# Patient Record
Sex: MALE | Race: WHITE | Employment: OTHER | URBAN - METROPOLITAN AREA
[De-identification: names, ages, dates, MRNs, and addresses within clinical notes are randomized per-mention and may not be internally consistent; named-entity substitution may affect disease eponyms.]

---

## 2020-02-04 ENCOUNTER — HOSPITAL ENCOUNTER (OUTPATIENT)
Dept: ONCOLOGY | Age: 65
Setting detail: INFUSION SERIES
Discharge: HOME | End: 2020-02-04
Payer: COMMERCIAL

## 2020-02-04 ENCOUNTER — HOSPITAL ENCOUNTER (OUTPATIENT)
Dept: GENERAL RADIOLOGY | Age: 65
Discharge: HOME | End: 2020-02-04
Payer: COMMERCIAL

## 2020-02-04 ENCOUNTER — HOSPITAL ENCOUNTER (OUTPATIENT)
Dept: NON INVASIVE DIAGNOSTICS | Age: 65
Discharge: HOME | End: 2020-02-04
Payer: COMMERCIAL

## 2020-02-04 ENCOUNTER — HOSPITAL ENCOUNTER (OUTPATIENT)
Dept: PULMONOLOGY | Age: 65
Discharge: HOME | End: 2020-02-04
Payer: COMMERCIAL

## 2020-02-04 VITALS
TEMPERATURE: 98.2 F | HEART RATE: 88 BPM | DIASTOLIC BLOOD PRESSURE: 65 MMHG | WEIGHT: 245.59 LBS | BODY MASS INDEX: 31.52 KG/M2 | SYSTOLIC BLOOD PRESSURE: 123 MMHG | HEIGHT: 74 IN | RESPIRATION RATE: 18 BRPM

## 2020-02-04 LAB
A/G RATIO: 1.9 (ref 1.1–2.2)
ABO/RH: NORMAL
ALBUMIN SERPL-MCNC: 3.9 G/DL (ref 3.4–5)
ALP BLD-CCNC: 61 U/L (ref 40–129)
ALT SERPL-CCNC: 12 U/L (ref 10–40)
ANION GAP SERPL CALCULATED.3IONS-SCNC: 15 MMOL/L (ref 3–16)
ANTIBODY SCREEN: NORMAL
AST SERPL-CCNC: 13 U/L (ref 15–37)
BACTERIA: ABNORMAL /HPF
BASOPHILS ABSOLUTE: 0 K/UL (ref 0–0.2)
BASOPHILS RELATIVE PERCENT: 0.8 %
BILIRUB SERPL-MCNC: 0.7 MG/DL (ref 0–1)
BILIRUBIN URINE: NEGATIVE
BLOOD, URINE: NEGATIVE
BUN BLDV-MCNC: 28 MG/DL (ref 7–20)
CALCIUM SERPL-MCNC: 8.6 MG/DL (ref 8.3–10.6)
CASTS: ABNORMAL /LPF
CHLORIDE BLD-SCNC: 99 MMOL/L (ref 99–110)
CLARITY: CLEAR
CO2: 25 MMOL/L (ref 21–32)
COLOR: YELLOW
CREAT SERPL-MCNC: 3.2 MG/DL (ref 0.8–1.3)
EOSINOPHILS ABSOLUTE: 0.3 K/UL (ref 0–0.6)
EOSINOPHILS RELATIVE PERCENT: 12.4 %
EPITHELIAL CELLS, UA: ABNORMAL /HPF
GFR AFRICAN AMERICAN: 24
GFR NON-AFRICAN AMERICAN: 20
GLOBULIN: 2.1 G/DL
GLUCOSE BLD-MCNC: 103 MG/DL (ref 70–99)
GLUCOSE URINE: NEGATIVE MG/DL
HAV IGM SER IA-ACNC: NORMAL
HBV SURFACE AB TITR SER: <3.5 MIU/ML
HCT VFR BLD CALC: 30.9 % (ref 40.5–52.5)
HEMOGLOBIN: 10.4 G/DL (ref 13.5–17.5)
INR BLD: 1.09 (ref 0.86–1.14)
KETONES, URINE: NEGATIVE MG/DL
LACTATE DEHYDROGENASE: 222 U/L (ref 100–190)
LEUKOCYTE ESTERASE, URINE: NEGATIVE
LYMPHOCYTES ABSOLUTE: 0.5 K/UL (ref 1–5.1)
LYMPHOCYTES RELATIVE PERCENT: 19.2 %
MAGNESIUM: 1.9 MG/DL (ref 1.8–2.4)
MCH RBC QN AUTO: 33.6 PG (ref 26–34)
MCHC RBC AUTO-ENTMCNC: 33.8 G/DL (ref 31–36)
MCV RBC AUTO: 99.2 FL (ref 80–100)
MICROSCOPIC EXAMINATION: YES
MONOCYTES ABSOLUTE: 0.4 K/UL (ref 0–1.3)
MONOCYTES RELATIVE PERCENT: 15.4 %
MUCUS: ABNORMAL /LPF
NEUTROPHILS ABSOLUTE: 1.3 K/UL (ref 1.7–7.7)
NEUTROPHILS RELATIVE PERCENT: 52.2 %
NITRITE, URINE: NEGATIVE
PDW BLD-RTO: 15.7 % (ref 12.4–15.4)
PH UA: 7.5 (ref 5–8)
PLATELET # BLD: 164 K/UL (ref 135–450)
PMV BLD AUTO: 8.4 FL (ref 5–10.5)
POTASSIUM SERPL-SCNC: 3.6 MMOL/L (ref 3.5–5.1)
PROTEIN UA: 30 MG/DL
PROTHROMBIN TIME: 12.6 SEC (ref 10–13.2)
RBC # BLD: 3.11 M/UL (ref 4.2–5.9)
RBC UA: ABNORMAL /HPF (ref 0–2)
SICKLE CELL SCREEN: NEGATIVE
SODIUM BLD-SCNC: 139 MMOL/L (ref 136–145)
SPECIFIC GRAVITY UA: 1.02 (ref 1–1.03)
TOTAL PROTEIN: 6 G/DL (ref 6.4–8.2)
URINE TYPE: ABNORMAL
UROBILINOGEN, URINE: 1 E.U./DL
WBC # BLD: 2.6 K/UL (ref 4–11)
WBC UA: ABNORMAL /HPF (ref 0–5)

## 2020-02-04 PROCEDURE — 86900 BLOOD TYPING SEROLOGIC ABO: CPT

## 2020-02-04 PROCEDURE — 85610 PROTHROMBIN TIME: CPT

## 2020-02-04 PROCEDURE — 86709 HEPATITIS A IGM ANTIBODY: CPT

## 2020-02-04 PROCEDURE — 86787 VARICELLA-ZOSTER ANTIBODY: CPT

## 2020-02-04 PROCEDURE — 85025 COMPLETE CBC W/AUTO DIFF WBC: CPT

## 2020-02-04 PROCEDURE — 83615 LACTATE (LD) (LDH) ENZYME: CPT

## 2020-02-04 PROCEDURE — 86706 HEP B SURFACE ANTIBODY: CPT

## 2020-02-04 PROCEDURE — 94664 DEMO&/EVAL PT USE INHALER: CPT

## 2020-02-04 PROCEDURE — 85660 RBC SICKLE CELL TEST: CPT

## 2020-02-04 PROCEDURE — 94060 EVALUATION OF WHEEZING: CPT

## 2020-02-04 PROCEDURE — 77075 RADEX OSSEOUS SURVEY COMPL: CPT

## 2020-02-04 PROCEDURE — 83735 ASSAY OF MAGNESIUM: CPT

## 2020-02-04 PROCEDURE — 86707 HEPATITIS BE ANTIBODY: CPT

## 2020-02-04 PROCEDURE — 93306 TTE W/DOPPLER COMPLETE: CPT

## 2020-02-04 PROCEDURE — 71046 X-RAY EXAM CHEST 2 VIEWS: CPT

## 2020-02-04 PROCEDURE — 94761 N-INVAS EAR/PLS OXIMETRY MLT: CPT

## 2020-02-04 PROCEDURE — 86645 CMV ANTIBODY IGM: CPT

## 2020-02-04 PROCEDURE — 86901 BLOOD TYPING SEROLOGIC RH(D): CPT

## 2020-02-04 PROCEDURE — 80053 COMPREHEN METABOLIC PANEL: CPT

## 2020-02-04 PROCEDURE — 99201 HC NEW PT, OUTPT VISIT LEVEL 1: CPT

## 2020-02-04 PROCEDURE — 86850 RBC ANTIBODY SCREEN: CPT

## 2020-02-04 PROCEDURE — 86694 HERPES SIMPLEX NES ANTBDY: CPT

## 2020-02-04 PROCEDURE — 93005 ELECTROCARDIOGRAM TRACING: CPT | Performed by: INTERNAL MEDICINE

## 2020-02-04 PROCEDURE — 94726 PLETHYSMOGRAPHY LUNG VOLUMES: CPT

## 2020-02-04 PROCEDURE — 94729 DIFFUSING CAPACITY: CPT

## 2020-02-04 PROCEDURE — 81001 URINALYSIS AUTO W/SCOPE: CPT

## 2020-02-04 RX ORDER — ALBUTEROL SULFATE 2.5 MG/3ML
2.5 SOLUTION RESPIRATORY (INHALATION) ONCE
Status: DISCONTINUED | OUTPATIENT
Start: 2020-02-04 | End: 2020-02-05 | Stop reason: HOSPADM

## 2020-02-04 NOTE — ONCOLOGY
Patient here for pre BMT evaluation. Plan for Cytoxan mobilization followed by Melphalan 140mg/m2 and PBSCT. Patient is on hemodialysis with [facility]) and has dialysis on M, W, F. Coordinator will work with Dr. Lilia Tohmpson office to facilitate dialysis while at [facility] [employer]   Reviewed the followin. Purpose of Cytoxan Mobilization to decrease tumor burden and collect stem cells  2. Side effects of Cytoxan discussed including pancytopenia, risk of infection, hair loss, hemorrhagic cystitis and GI complications. 3.. GCSF injections purpose and side effects reviewed. 4. Patient shown the apheresis machine and process explained. Aware collections may take 1-3 days in Outpatient Infusion. 5. Discussed admission for Melphalan prep regimen and infusion of stem cells. Side effects, hospital length of stay and follow up care discussed. 6. Tentative collection and admit calendar reviewed. Patient to return on 20 for candidacy with Dr. Krishna Mancia. Labs drawn per order.

## 2020-02-04 NOTE — PROGRESS NOTES
Patient seen in OP Infusion for labs and teaching with Blayne Smith RN, Transplant Coordinator. D/C'd ambulatory with Alicia.

## 2020-02-05 ENCOUNTER — TELEPHONE (OUTPATIENT)
Dept: SURGERY | Age: 65
End: 2020-02-05

## 2020-02-05 LAB
EKG ATRIAL RATE: 83 BPM
EKG DIAGNOSIS: NORMAL
EKG P AXIS: 38 DEGREES
EKG P-R INTERVAL: 156 MS
EKG Q-T INTERVAL: 374 MS
EKG QRS DURATION: 80 MS
EKG QTC CALCULATION (BAZETT): 439 MS
EKG R AXIS: 52 DEGREES
EKG T AXIS: 74 DEGREES
EKG VENTRICULAR RATE: 83 BPM
HEPATITIS BE ANTIBODY: NEGATIVE
VARICELLA-ZOSTER VIRUS AB, IGG: NORMAL

## 2020-02-05 PROCEDURE — 93010 ELECTROCARDIOGRAM REPORT: CPT | Performed by: INTERNAL MEDICINE

## 2020-02-06 LAB
CYTOMEGALOVIRUS IGM ANTIBODY: <8 AU/ML
HERPES TYPE 1/2 IGM COMBINED: 0.16 IV
VARICELLA ZOSTER AB IGM: 0.31 ISR

## 2020-02-06 NOTE — BH NOTE
Pre-Transplant Psychological Evaluation, Diane Marquez, 2020  Screening Measures:  PHQ-9, TERS  Reason for Referral:  Mr. Live Barrett and his significant other, Gisele Spaulding, were interviewed in preparation for his autologous stem cell transplant for multiple myeloma. He was diagnosed with kidney failure in 2018 and during workup was found to have anemia and thrombocytopenia. He had a good initial response to Cy/Bor/d and was switched to a clinical trial where he received Revlimid, Ninlaro, and dexamethasone beginning in 2019. He was switched to carfilzomib, pomalidomide and dex in 2019 when his light chains began trending up. He has received 6 cycles with good response. He continues on dialysis 3 times per week. Mental Status/Appearance/Coping Style: Mr. Live Barrett is a robust appearing 66-year-old  gentleman of tall and large build. He and [employer] are both very talkative and told many stories in great detail that had little to do with the evaluation. Mr. Live Barrett has an optimistic and upbeat nature and he feels positive about his prognosis, including the recovery of his renal function. He says he is spiritual and believes but doesnt affiliate with a particular imelda. Social/Family History/Support System: Mr. Live Barrett was born in [place], [place], the 2nd of 4 children. 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 He had an older brother who  by suicide as an adult after suffering a number of personal challenges and a divorce. He has 2 younger sisters, Jovanna Ludwig and Meghann Zamora. Jovanna Ludwig is 61 and lives in [facility], and Meghann Zamora is 48 and lives in [facility]. Meghann Zamora will be the back-up caregiver for [employer]. When Mr. Live Barrett was 8 his father was transferred to the [facility] and he spent the remainder of his childhood in the [facility].   After graduating high school, he was working full time at [employer] and going to college at [employer]. He quit school to get  when he was 21, but that marriage lasted 4 years and did not produce children. He went back to school, continuing to work full time, and gradually completed a degree in Marketing with a minor in politics. He stayed single for 15 years and met Talia Ivy in a club where she went on Saturday nights with her friends to drink and dance when her ex- had their daughter. She said she reluctantly began to date him but then they soon moved in together and have done so now for 28 years. She wears an engagement ring but never agreed to  him. At first it was so her daughter would be able to get financial aid through college when the time came without having to include Mr. Wilmer Persaud, but later she says they forgot about getting . Ms. Rolando Smart worked as manager for a [employer] complex for many years and retired several years ago. She is a smoker but says she goes outside. The couple lives in [place] and her daughter, Deanna Marley, and her 2 children, [place] (9) and Param Fleming (5) live 5 houses away. The grandchildren are usually frequent visitors but Deanna Marley refuses to let them get flu shots, so they are keeping them at a distance at present. Mr. Chato Frye main hobby since he was 16 has been drag racing and he owns a street legal dragster. He says he only goes to drag races about 4 times per year but enjoys spending time working on his car and going to car shows. Occupational History/Financial Concerns: Mr. 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 He has been on SSDI since soon after his diagnosis and he retains his health insurance through [facility]. Quality of Life Issues (Body Image, hair loss, sexual functioning):  Mr. Arabella Osuna says he has plenty of emotional support, sleeps well, and has a good appetite.   He says he has some age-related aches and pains

## 2020-02-07 LAB — HERPES TYPE I/II IGG COMBINED: 7.35 IV

## 2020-02-13 NOTE — H&P
[email or web address] History and Physical        Attending Physician: Rhys Lopez MD        Referring MD: MD [id number]  [place], [address]    Name: Kamryn Valentine :  1955  MRN:  [id number]    Admission: 20    Date: 20    Reason for Admission: Cytoxan Mobilization     History of Present Illness: This is a 60 yo male w/ h/o kappa light chain multiple myeloma (Dx 10/2018). He was referred to the ED after a routine workplace physical and labs revealed JUDY, anemia and thrombocytopenia  w/ SCr 4.6, Hgb 7.8 & platelets of 16Q. He was admitted to [facility] for additional work-up and was found to have light chain multiple myeloma and nephropathy. His myeloma work up (10/26/18) showed his serum kappa free light chains were 8353 and lambda free light chains 0.45 w/ [place] K/[place] 19051. SPEP monoclonal protein was barely detectable. A BM bx/asp (10/29/18) showed 80% plasma cells with a del 17 (TP53) and del 13q. He began treatment w/ Cytoxan, Velcade and dexamethasone (18) and had a good response. He then enrolled on clinical trial (19) w/ Ninlaro, Revlimid and dexamethasone, but his light chains started to increase. He then began treatment w/ carfilzomib, pomalidomide and dexamethasone (19). His restaging myeloma work -up (20) shows the followin hour urine - 228 mg protein per 24 hours, ANN MARIE positive for kappa light chains. SPEP showed minor m-spike consisting of free monoclonal kappa light chains. His serum free light chain showed kappa of 127.00, lambda 1.09 w/ K/L ratio of  116.15. His BM bx/asp (1/10/20) showed 2.8% plasma cells w/ normal cytogenetics, but FISH is abnormal w/ one copy of CDKN2C (1p32.3) in 49.3%. , CCND1/IGH t(11:14) single fusion in 82% of cells, monosomy 13 in 87% of cells, one copy of MAF (16q23.2) in 85% of cells, TP53 (17p13.1) in 89% of cells and once copy of MAFB (20q12) in 78 % of cells.  He is now s/p 6 cycles and has achieved a VGPR. He remains on hemodialysis 3 times weekly w/ Dr. Dony Wilburn. He is now being admitted for Cytoxan mobilization. This will be followed by high dose G-CSF and collection of peripheral blood stem cells. He feels well today and denies fever, chills, sweats, change in appetite, visual changes, headache, sinus congestion, sore throat, mouth pain, cough, increased shortness of breath, chest pain, abdominal pain, nausea, vomiting, diarrhea, constipation, dysuria, hematuria, lower extremity pain or swelling. He has no complaints today. Pre Transplant Workup:        Pre Transplant labs:     2/4/2020    CMV IgM <8.0   VARICELLA ZOSTER AB IGM 0.31   Herpes Type 1/2 IgM Combined 0.16      2/4/2020   Herpes Type I/II IgG Combined 7.35      2/4/2020    Herpes Type 1/2 IgM Combined 0.16   Hep A IgM Non-reactive   Hep B E Ab Negative   Hep B S Ab <3.50      2/4/2020    Varicella-Zoster Virus Ab, Igg Immune       No past surgical history on file. No past medical history on file. Prior to Admission medications    Medication Sig Start Date End Date Taking? Authorizing Provider   allopurinol (ZYLOPRIM) 100 MG tablet Take 200 mg by mouth daily     Historical Provider, MD   pantoprazole (PROTONIX) 20 MG tablet Take 20 mg by mouth daily    Historical Provider, MD   Cyanocobalamin (VITAMIN B 12 PO) Take by mouth    Historical Provider, MD   acyclovir (ZOVIRAX) 200 MG capsule Take by mouth 2 times daily     Historical Provider, MD       No Known Allergies    No family history on file.      Social History     Socioeconomic History    Marital status:      Spouse name: Not on file    Number of children: Not on file    Years of education: Not on file    Highest education level: Not on file   Occupational History    Not on file   Social Needs    Financial resource strain: Not on file    Food insecurity:     Worry: Not on file     Inability: Not on file   Patino Rule Transportation needs:     Medical: Not on file     Non-medical: Not on file   Tobacco Use    Smoking status: Not on file   Substance and Sexual Activity    Alcohol use: Not on file    Drug use: Not on file    Sexual activity: Not on file   Lifestyle    Physical activity:     Days per week: Not on file     Minutes per session: Not on file    Stress: Not on file   Relationships    Social connections:     Talks on phone: Not on file     Gets together: Not on file     Attends Adventism service: Not on file     Active member of club or organization: Not on file     Attends meetings of clubs or organizations: Not on file     Relationship status: Not on file    Intimate partner violence:     Fear of current or ex partner: Not on file     Emotionally abused: Not on file     Physically abused: Not on file     Forced sexual activity: Not on file   Other Topics Concern    Not on file   Social History Narrative    Not on file        ROS:  As noted above, otherwise remainder of 10-point ROS negative      Physical Exam:     Vital Signs:  /84   Pulse 100   Temp 98.4 °F (36.9 °C) (Oral)   Resp 17   Ht 6' 4\" (1.93 m)   Wt 241 lb 6 oz (109.5 kg)   SpO2 100%   BMI 29.38 kg/m²     Weight:    Wt Readings from Last 3 Encounters:   02/17/20 241 lb 6 oz (109.5 kg)   02/14/20 244 lb (110.7 kg)   02/04/20 245 lb 9.5 oz (111.4 kg)       KPS: 80% Normal activity with effort; some signs or symptoms of disease    ECOG PS:  (1) Restricted in physically strenuous activity, ambulatory and able to do work of light nature    General: Awake, alert and oriented.   HEENT: normocephalic, alopecia, PERRL, no scleral erythema or icterus, Oral mucosa moist and intact, throat clear  NECK: supple without palpable adenopathy  BACK: Straight negative CVAT  SKIN: warm dry and intact without lesions rashes or masses  CHEST: CTA bilaterally without use of accessory muscles  CV: Normal S1 S2, RRR, no MRG  ABD: NT ND normoactive BS, no palpable masses or hepatosplenomegaly  EXTREMITIES: without edema, denies calf tenderness, left arm fistula  NEURO: CN II - XII grossly intact  CATHETER: Right IJ Trifusion (IR, 2/14/20) - CDI     Laboratory Data:  CBC:   Recent Labs     02/17/20  1256   WBC 3.4*   HGB 11.2*   HCT 33.0*   MCV 97.7        BMP/Mag:  Recent Labs     02/17/20  1256      K 3.3*   CL 95*   CO2 32   PHOS 2.3*   BUN 13   CREATININE 1.6*   MG 1.80     LIVP:   Recent Labs     02/17/20  1256   AST 17   ALT 10   BILIDIR <0.2   BILITOT 0.9   ALKPHOS 68     Coags:   Recent Labs     02/17/20  1256   PROTIME 11.9   INR 1.03   APTT 30.7     Uric Acid   Recent Labs     02/17/20  1256   LABURIC 3.4*       PROBLEM LIST:            1. [place] Light Chain Multiple Myeloma   2. ESRD on HD   3. GERD  4. HTN  5. Hypokalemia       TREATMENT:            1. CyBorD (started 10/30/18)  2. Revlimid / Ishmael Sax / Dex on clinical trial (started 2/1/19)  3. Carfilzomib / Pomalidomide / Dex x 6 cycles (Relapse #1 - started 8/26/19 - 2/2020)  4. Cytoxan Mobilization w/ 25% dose reduction (2/17/20)    ASSESSMENT AND PLAN:            1. Kappa Light Chain Multiple Myeloma:  Currently in VGPR  - Admit for Cytoxan Mobilization w/ continuous bladder irrigation, which will be used w/ high dose G-CSF to collect peripheral blood stem cells  - Admit at a later date for reduced dose Melphalan (140 mg/M^2) followed by autologous stem cell resuce  - Post transplant maintenance: Consider KPD x 4 cycles    Day + 1    2. ID:  Afebrile, no evidence of infection.    - Cont acyclovir ppx  - Begin Diflucan and Levaquin (dose adjusted for ESRD) when ANC < 1.5    3. Heme:  Anemia from previous chemotherapy & ESRD   - Transfuse for Hgb < 7 and Platelets < 37Y  - No transfusion today    4. ESRD / Metabolic:  Electrolytes are WNL and renal fxn stable.   - Followed by Dr. Claire Hall, consult on admit    - Cont HD on MWF  - Start IVF:  NS @ 75 mL/hr w/ chemotherapy   - Replace

## 2020-02-13 NOTE — PRE-PROCEDURE INSTRUCTIONS
Attempted to call patient about procedure. No answer. Voicemail left. Told to be here at 1300 for procedure at 1430. NPO after midnight, but can take morning medication with sips of water. Office should have told them when and if they should stop any blood thinners. Told to have a responsible adult be with the patient to take them home and stay with them afterwards, if they are not admitted to hospital afterwards. And if available bring current list of medications.

## 2020-02-14 ENCOUNTER — HOSPITAL ENCOUNTER (OUTPATIENT)
Dept: INTERVENTIONAL RADIOLOGY/VASCULAR | Age: 65
Discharge: HOME | End: 2020-02-14
Attending: INTERNAL MEDICINE | Admitting: INTERNAL MEDICINE
Payer: COMMERCIAL

## 2020-02-14 VITALS — BODY MASS INDEX: 29.71 KG/M2 | TEMPERATURE: 98.4 F | WEIGHT: 244 LBS | HEIGHT: 76 IN

## 2020-02-14 LAB
A/G RATIO: 2.1 (ref 1.1–2.2)
ALBUMIN SERPL-MCNC: 4.5 G/DL (ref 3.4–5)
ALP BLD-CCNC: 72 U/L (ref 40–129)
ALT SERPL-CCNC: 11 U/L (ref 10–40)
ANION GAP SERPL CALCULATED.3IONS-SCNC: 13 MMOL/L (ref 3–16)
APTT: 30.3 SEC (ref 24.2–36.2)
AST SERPL-CCNC: 16 U/L (ref 15–37)
BASOPHILS ABSOLUTE: 0.1 K/UL (ref 0–0.2)
BASOPHILS RELATIVE PERCENT: 1.8 %
BILIRUB SERPL-MCNC: 1 MG/DL (ref 0–1)
BILIRUBIN DIRECT: <0.2 MG/DL (ref 0–0.3)
BILIRUBIN, INDIRECT: NORMAL MG/DL (ref 0–1)
BUN BLDV-MCNC: 13 MG/DL (ref 7–20)
CALCIUM SERPL-MCNC: 9.4 MG/DL (ref 8.3–10.6)
CHLORIDE BLD-SCNC: 95 MMOL/L (ref 99–110)
CO2: 32 MMOL/L (ref 21–32)
CREAT SERPL-MCNC: 1.8 MG/DL (ref 0.8–1.3)
EOSINOPHILS ABSOLUTE: 0.1 K/UL (ref 0–0.6)
EOSINOPHILS RELATIVE PERCENT: 2 %
GFR AFRICAN AMERICAN: 46
GFR NON-AFRICAN AMERICAN: 38
GLOBULIN: 2.1 G/DL
GLUCOSE BLD-MCNC: 93 MG/DL (ref 70–99)
HCT VFR BLD CALC: 34.1 % (ref 40.5–52.5)
HEMOGLOBIN: 11.4 G/DL (ref 13.5–17.5)
INR BLD: 1.11 (ref 0.86–1.14)
LACTATE DEHYDROGENASE: 223 U/L (ref 100–190)
LYMPHOCYTES ABSOLUTE: 0.5 K/UL (ref 1–5.1)
LYMPHOCYTES RELATIVE PERCENT: 16.5 %
MAGNESIUM: 1.8 MG/DL (ref 1.8–2.4)
MCH RBC QN AUTO: 32.9 PG (ref 26–34)
MCHC RBC AUTO-ENTMCNC: 33.4 G/DL (ref 31–36)
MCV RBC AUTO: 98.5 FL (ref 80–100)
MONOCYTES ABSOLUTE: 0.5 K/UL (ref 0–1.3)
MONOCYTES RELATIVE PERCENT: 16.5 %
NEUTROPHILS ABSOLUTE: 2 K/UL (ref 1.7–7.7)
NEUTROPHILS RELATIVE PERCENT: 63.2 %
PDW BLD-RTO: 16 % (ref 12.4–15.4)
PLATELET # BLD: 232 K/UL (ref 135–450)
PMV BLD AUTO: 7.8 FL (ref 5–10.5)
POTASSIUM SERPL-SCNC: 3.5 MMOL/L (ref 3.5–5.1)
PROTHROMBIN TIME: 12.9 SEC (ref 10–13.2)
RBC # BLD: 3.46 M/UL (ref 4.2–5.9)
SODIUM BLD-SCNC: 140 MMOL/L (ref 136–145)
TOTAL PROTEIN: 6.6 G/DL (ref 6.4–8.2)
URIC ACID, SERUM: 3.2 MG/DL (ref 3.5–7.2)
WBC # BLD: 3.1 K/UL (ref 4–11)

## 2020-02-14 PROCEDURE — 83615 LACTATE (LD) (LDH) ENZYME: CPT

## 2020-02-14 PROCEDURE — 85610 PROTHROMBIN TIME: CPT

## 2020-02-14 PROCEDURE — 83735 ASSAY OF MAGNESIUM: CPT

## 2020-02-14 PROCEDURE — 84550 ASSAY OF BLOOD/URIC ACID: CPT

## 2020-02-14 PROCEDURE — 6360000002 HC RX W HCPCS

## 2020-02-14 PROCEDURE — 80053 COMPREHEN METABOLIC PANEL: CPT

## 2020-02-14 PROCEDURE — 36558 INSERT TUNNELED CV CATH: CPT

## 2020-02-14 PROCEDURE — C1894 INTRO/SHEATH, NON-LASER: HCPCS

## 2020-02-14 PROCEDURE — 76937 US GUIDE VASCULAR ACCESS: CPT

## 2020-02-14 PROCEDURE — 99152 MOD SED SAME PHYS/QHP 5/>YRS: CPT

## 2020-02-14 PROCEDURE — C1751 CATH, INF, PER/CENT/MIDLINE: HCPCS

## 2020-02-14 PROCEDURE — 82248 BILIRUBIN DIRECT: CPT

## 2020-02-14 PROCEDURE — 85025 COMPLETE CBC W/AUTO DIFF WBC: CPT

## 2020-02-14 PROCEDURE — 77001 FLUOROGUIDE FOR VEIN DEVICE: CPT

## 2020-02-14 PROCEDURE — 85730 THROMBOPLASTIN TIME PARTIAL: CPT

## 2020-02-14 PROCEDURE — 2500000003 HC RX 250 WO HCPCS

## 2020-02-14 RX ORDER — ACYCLOVIR 400 MG/1
400 TABLET ORAL 2 TIMES DAILY
COMMUNITY
End: 2022-01-01 | Stop reason: SDUPTHER

## 2020-02-14 RX ORDER — PANTOPRAZOLE SODIUM 20 MG/1
40 TABLET, DELAYED RELEASE ORAL DAILY
Status: ON HOLD | COMMUNITY
End: 2020-02-19 | Stop reason: SDUPTHER

## 2020-02-14 RX ORDER — ALLOPURINOL 100 MG/1
200 TABLET ORAL DAILY
COMMUNITY
End: 2022-01-01 | Stop reason: SDUPTHER

## 2020-02-14 NOTE — PRE SEDATION
Sedation Pre-Procedure Note    Patient Name: Lexi Rapp   YOB: 1955  Room[facility]Bed: [facility]  Medical Record Number: [id number]  Date: 2/14/2020   Time: 4:03 PM       Indication:  Multiple myeloma; requires IV access for treatment. Consent: I have discussed with the patient and/or the patient representative the indication, alternatives, and the possible risks and/or complications of the planned procedure and the anesthesia methods. The patient and/or patient representative appear to understand and agree to proceed. Vital Signs:   Vitals:    02/14/20 1329   Temp: 98.4 °F (36.9 °C)       Past Medical History:   has no past medical history on file. Past Surgical History:   has no past surgical history on file. Medications:   Scheduled Meds:   Continuous Infusions:   PRN Meds:   Home Meds:   Prior to Admission medications    Medication Sig Start Date End Date Taking? Authorizing Provider   allopurinol (ZYLOPRIM) 100 MG tablet Take 200 mg by mouth daily    Yes Historical Provider, MD   pantoprazole (PROTONIX) 20 MG tablet Take 20 mg by mouth daily   Yes Historical Provider, MD   Cyanocobalamin (VITAMIN B 12 PO) Take by mouth   Yes Historical Provider, MD   aspirin 81 MG tablet Take 81 mg by mouth daily   Yes Historical Provider, MD   acyclovir (ZOVIRAX) 200 MG capsule Take by mouth 2 times daily    Yes Historical Provider, MD     Coumadin Use Last 7 Days:  no  Antiplatelet drug therapy use last 7 days: no  Other anticoagulant use last 7 days: no  Additional Medication Information:  n/a      Pre-Sedation Documentation and Exam:   I have reviewed the patient's history and review of systems.     Mallampati Airway Assessment:  Mallampati Class II - (soft palate, fauces & uvula are visible)    Prior History of Anesthesia Complications:   none    ASA Classification:  Class 2 - A normal healthy patient with mild systemic disease    Sedation/ Anesthesia Plan:   intravenous sedation    Medications Planned:   midazolam (Versed) intravenously and fentanyl intravenously    Patient is an appropriate candidate for plan of sedation: yes    Electronically signed by Tony Chand MD on 2/14/2020 at 4:03 PM

## 2020-02-14 NOTE — PROGRESS NOTES
Placement of right internal jugular approach tunneled Lombardo catheter (trifusion catheter). Tip confirmed at cavoatrial junction. OK to use.

## 2020-02-17 ENCOUNTER — APPOINTMENT (OUTPATIENT)
Dept: GENERAL RADIOLOGY | Age: 65
DRG: 846 | End: 2020-02-17
Attending: INTERNAL MEDICINE
Payer: COMMERCIAL

## 2020-02-17 ENCOUNTER — HOSPITAL ENCOUNTER (INPATIENT)
Age: 65
LOS: 2 days | Discharge: HOME | DRG: 846 | End: 2020-02-19
Attending: INTERNAL MEDICINE | Admitting: INTERNAL MEDICINE
Payer: COMMERCIAL

## 2020-02-17 PROBLEM — C90.01 MULTIPLE MYELOMA IN REMISSION (HCC): Status: ACTIVE | Noted: 2020-02-17

## 2020-02-17 LAB
ALBUMIN SERPL-MCNC: 4.4 G/DL (ref 3.4–5)
ALP BLD-CCNC: 68 U/L (ref 40–129)
ALT SERPL-CCNC: 10 U/L (ref 10–40)
AMORPHOUS: NORMAL /HPF
ANION GAP SERPL CALCULATED.3IONS-SCNC: 11 MMOL/L (ref 3–16)
APTT: 30.7 SEC (ref 24.2–36.2)
AST SERPL-CCNC: 17 U/L (ref 15–37)
BASOPHILS ABSOLUTE: 0.1 K/UL (ref 0–0.2)
BASOPHILS RELATIVE PERCENT: 2.1 %
BILIRUB SERPL-MCNC: 0.9 MG/DL (ref 0–1)
BILIRUBIN DIRECT: <0.2 MG/DL (ref 0–0.3)
BILIRUBIN URINE: NEGATIVE
BILIRUBIN, INDIRECT: NORMAL MG/DL (ref 0–1)
BLOOD, URINE: ABNORMAL
BUN BLDV-MCNC: 13 MG/DL (ref 7–20)
CALCIUM SERPL-MCNC: 9.3 MG/DL (ref 8.3–10.6)
CHLORIDE BLD-SCNC: 95 MMOL/L (ref 99–110)
CLARITY: CLEAR
CO2: 32 MMOL/L (ref 21–32)
COLOR: YELLOW
CREAT SERPL-MCNC: 1.6 MG/DL (ref 0.8–1.3)
EKG ATRIAL RATE: 91 BPM
EKG DIAGNOSIS: NORMAL
EKG P AXIS: 57 DEGREES
EKG P-R INTERVAL: 146 MS
EKG Q-T INTERVAL: 360 MS
EKG QRS DURATION: 82 MS
EKG QTC CALCULATION (BAZETT): 442 MS
EKG R AXIS: 66 DEGREES
EKG T AXIS: 84 DEGREES
EKG VENTRICULAR RATE: 91 BPM
EOSINOPHILS ABSOLUTE: 0.1 K/UL (ref 0–0.6)
EOSINOPHILS RELATIVE PERCENT: 3.7 %
GFR AFRICAN AMERICAN: 53
GFR NON-AFRICAN AMERICAN: 44
GLUCOSE BLD-MCNC: 114 MG/DL (ref 70–99)
GLUCOSE URINE: NEGATIVE MG/DL
HCT VFR BLD CALC: 33 % (ref 40.5–52.5)
HEMOGLOBIN: 11.2 G/DL (ref 13.5–17.5)
INR BLD: 1.03 (ref 0.86–1.14)
KETONES, URINE: NEGATIVE MG/DL
LACTATE DEHYDROGENASE: 206 U/L (ref 100–190)
LEUKOCYTE ESTERASE, URINE: NEGATIVE
LYMPHOCYTES ABSOLUTE: 0.5 K/UL (ref 1–5.1)
LYMPHOCYTES RELATIVE PERCENT: 15 %
MAGNESIUM: 1.8 MG/DL (ref 1.8–2.4)
MCH RBC QN AUTO: 33.2 PG (ref 26–34)
MCHC RBC AUTO-ENTMCNC: 34 G/DL (ref 31–36)
MCV RBC AUTO: 97.7 FL (ref 80–100)
MICROSCOPIC EXAMINATION: YES
MONOCYTES ABSOLUTE: 0.3 K/UL (ref 0–1.3)
MONOCYTES RELATIVE PERCENT: 8.5 %
NEUTROPHILS ABSOLUTE: 2.4 K/UL (ref 1.7–7.7)
NEUTROPHILS RELATIVE PERCENT: 70.7 %
NITRITE, URINE: NEGATIVE
PDW BLD-RTO: 15.5 % (ref 12.4–15.4)
PH UA: 7.5 (ref 5–8)
PHOSPHORUS: 2.3 MG/DL (ref 2.5–4.9)
PLATELET # BLD: 221 K/UL (ref 135–450)
PMV BLD AUTO: 8.2 FL (ref 5–10.5)
POTASSIUM SERPL-SCNC: 3.3 MMOL/L (ref 3.5–5.1)
PROTEIN UA: ABNORMAL MG/DL
PROTHROMBIN TIME: 11.9 SEC (ref 10–13.2)
RBC # BLD: 3.38 M/UL (ref 4.2–5.9)
RBC UA: NORMAL /HPF (ref 0–4)
SODIUM BLD-SCNC: 138 MMOL/L (ref 136–145)
SPECIFIC GRAVITY UA: 1.01 (ref 1–1.03)
TOTAL PROTEIN: 6.5 G/DL (ref 6.4–8.2)
URIC ACID, SERUM: 3.4 MG/DL (ref 3.5–7.2)
URINE TYPE: ABNORMAL
UROBILINOGEN, URINE: 0.2 E.U./DL
WBC # BLD: 3.4 K/UL (ref 4–11)
WBC UA: NORMAL /HPF (ref 0–5)

## 2020-02-17 PROCEDURE — 83735 ASSAY OF MAGNESIUM: CPT

## 2020-02-17 PROCEDURE — 93005 ELECTROCARDIOGRAM TRACING: CPT | Performed by: NURSE PRACTITIONER

## 2020-02-17 PROCEDURE — 83615 LACTATE (LD) (LDH) ENZYME: CPT

## 2020-02-17 PROCEDURE — 96415 CHEMO IV INFUSION ADDL HR: CPT

## 2020-02-17 PROCEDURE — 51702 INSERT TEMP BLADDER CATH: CPT

## 2020-02-17 PROCEDURE — 87081 CULTURE SCREEN ONLY: CPT

## 2020-02-17 PROCEDURE — 6370000000 HC RX 637 (ALT 250 FOR IP): Performed by: NURSE PRACTITIONER

## 2020-02-17 PROCEDURE — 36592 COLLECT BLOOD FROM PICC: CPT

## 2020-02-17 PROCEDURE — 84550 ASSAY OF BLOOD/URIC ACID: CPT

## 2020-02-17 PROCEDURE — 80076 HEPATIC FUNCTION PANEL: CPT

## 2020-02-17 PROCEDURE — 93010 ELECTROCARDIOGRAM REPORT: CPT | Performed by: INTERNAL MEDICINE

## 2020-02-17 PROCEDURE — 3E03305 INTRODUCTION OF OTHER ANTINEOPLASTIC INTO PERIPHERAL VEIN, PERCUTANEOUS APPROACH: ICD-10-PCS | Performed by: INTERNAL MEDICINE

## 2020-02-17 PROCEDURE — 6360000002 HC RX W HCPCS: Performed by: INTERNAL MEDICINE

## 2020-02-17 PROCEDURE — 85025 COMPLETE CBC W/AUTO DIFF WBC: CPT

## 2020-02-17 PROCEDURE — 6360000002 HC RX W HCPCS: Performed by: NURSE PRACTITIONER

## 2020-02-17 PROCEDURE — 2580000003 HC RX 258: Performed by: INTERNAL MEDICINE

## 2020-02-17 PROCEDURE — 6370000000 HC RX 637 (ALT 250 FOR IP): Performed by: INTERNAL MEDICINE

## 2020-02-17 PROCEDURE — 84100 ASSAY OF PHOSPHORUS: CPT

## 2020-02-17 PROCEDURE — 81001 URINALYSIS AUTO W/SCOPE: CPT

## 2020-02-17 PROCEDURE — 2060000000 HC ICU INTERMEDIATE R&B

## 2020-02-17 PROCEDURE — 85610 PROTHROMBIN TIME: CPT

## 2020-02-17 PROCEDURE — 85730 THROMBOPLASTIN TIME PARTIAL: CPT

## 2020-02-17 PROCEDURE — 80048 BASIC METABOLIC PNL TOTAL CA: CPT

## 2020-02-17 PROCEDURE — 71046 X-RAY EXAM CHEST 2 VIEWS: CPT

## 2020-02-17 PROCEDURE — 2580000003 HC RX 258: Performed by: NURSE PRACTITIONER

## 2020-02-17 PROCEDURE — 96413 CHEMO IV INFUSION 1 HR: CPT

## 2020-02-17 RX ORDER — ONDANSETRON HYDROCHLORIDE 8 MG/1
24 TABLET, FILM COATED ORAL ONCE
Status: COMPLETED | OUTPATIENT
Start: 2020-02-18 | End: 2020-02-18

## 2020-02-17 RX ORDER — LORAZEPAM 2 MG/ML
0.5 INJECTION INTRAMUSCULAR EVERY 4 HOURS PRN
Status: DISCONTINUED | OUTPATIENT
Start: 2020-02-17 | End: 2020-02-19 | Stop reason: HOSPADM

## 2020-02-17 RX ORDER — POTASSIUM CHLORIDE 29.8 MG/ML
20 INJECTION INTRAVENOUS PRN
Status: DISCONTINUED | OUTPATIENT
Start: 2020-02-17 | End: 2020-02-19 | Stop reason: HOSPADM

## 2020-02-17 RX ORDER — DEXAMETHASONE 4 MG/1
12 TABLET ORAL ONCE
Status: COMPLETED | OUTPATIENT
Start: 2020-02-17 | End: 2020-02-17

## 2020-02-17 RX ORDER — SODIUM CHLORIDE 0.9 % (FLUSH) 0.9 %
10 SYRINGE (ML) INJECTION EVERY 12 HOURS SCHEDULED
Status: DISCONTINUED | OUTPATIENT
Start: 2020-02-17 | End: 2020-02-19 | Stop reason: HOSPADM

## 2020-02-17 RX ORDER — DEXAMETHASONE 4 MG/1
8 TABLET ORAL DAILY
Status: DISCONTINUED | OUTPATIENT
Start: 2020-02-17 | End: 2020-02-18

## 2020-02-17 RX ORDER — HEPARIN SODIUM 5000 [USP'U]/ML
5000 INJECTION, SOLUTION INTRAVENOUS; SUBCUTANEOUS EVERY 8 HOURS SCHEDULED
Status: DISCONTINUED | OUTPATIENT
Start: 2020-02-17 | End: 2020-02-19 | Stop reason: HOSPADM

## 2020-02-17 RX ORDER — PANTOPRAZOLE SODIUM 40 MG/1
40 TABLET, DELAYED RELEASE ORAL DAILY
Status: DISCONTINUED | OUTPATIENT
Start: 2020-02-18 | End: 2020-02-19 | Stop reason: HOSPADM

## 2020-02-17 RX ORDER — ONDANSETRON HYDROCHLORIDE 8 MG/1
24 TABLET, FILM COATED ORAL ONCE
Status: COMPLETED | OUTPATIENT
Start: 2020-02-17 | End: 2020-02-17

## 2020-02-17 RX ORDER — UBIDECARENONE 75 MG
50 CAPSULE ORAL DAILY
Status: DISCONTINUED | OUTPATIENT
Start: 2020-02-17 | End: 2020-02-17

## 2020-02-17 RX ORDER — SODIUM CHLORIDE 9 MG/ML
INJECTION, SOLUTION INTRAVENOUS CONTINUOUS PRN
Status: DISCONTINUED | OUTPATIENT
Start: 2020-02-17 | End: 2020-02-19 | Stop reason: HOSPADM

## 2020-02-17 RX ORDER — LORAZEPAM 0.5 MG/1
0.5 TABLET ORAL EVERY 4 HOURS PRN
Status: DISCONTINUED | OUTPATIENT
Start: 2020-02-17 | End: 2020-02-19 | Stop reason: HOSPADM

## 2020-02-17 RX ORDER — PROCHLORPERAZINE EDISYLATE 5 MG/ML
10 INJECTION INTRAMUSCULAR; INTRAVENOUS EVERY 4 HOURS PRN
Status: DISCONTINUED | OUTPATIENT
Start: 2020-02-17 | End: 2020-02-19 | Stop reason: HOSPADM

## 2020-02-17 RX ORDER — ALLOPURINOL 100 MG/1
200 TABLET ORAL DAILY
Status: DISCONTINUED | OUTPATIENT
Start: 2020-02-17 | End: 2020-02-19 | Stop reason: HOSPADM

## 2020-02-17 RX ORDER — ACYCLOVIR 200 MG/1
200 CAPSULE ORAL 2 TIMES DAILY
Status: DISCONTINUED | OUTPATIENT
Start: 2020-02-17 | End: 2020-02-19 | Stop reason: HOSPADM

## 2020-02-17 RX ORDER — SODIUM CHLORIDE 0.9 % (FLUSH) 0.9 %
10 SYRINGE (ML) INJECTION PRN
Status: DISCONTINUED | OUTPATIENT
Start: 2020-02-17 | End: 2020-02-19 | Stop reason: HOSPADM

## 2020-02-17 RX ORDER — SODIUM CHLORIDE 9 MG/ML
INJECTION, SOLUTION INTRAVENOUS CONTINUOUS
Status: DISCONTINUED | OUTPATIENT
Start: 2020-02-17 | End: 2020-02-19

## 2020-02-17 RX ORDER — PROCHLORPERAZINE MALEATE 10 MG
10 TABLET ORAL EVERY 4 HOURS PRN
Status: DISCONTINUED | OUTPATIENT
Start: 2020-02-17 | End: 2020-02-19 | Stop reason: HOSPADM

## 2020-02-17 RX ORDER — PANTOPRAZOLE SODIUM 20 MG/1
20 TABLET, DELAYED RELEASE ORAL DAILY
Status: DISCONTINUED | OUTPATIENT
Start: 2020-02-17 | End: 2020-02-17

## 2020-02-17 RX ORDER — POTASSIUM CHLORIDE 20 MEQ/1
40 TABLET, EXTENDED RELEASE ORAL ONCE
Status: COMPLETED | OUTPATIENT
Start: 2020-02-17 | End: 2020-02-17

## 2020-02-17 RX ORDER — MAGNESIUM SULFATE IN WATER 40 MG/ML
4 INJECTION, SOLUTION INTRAVENOUS PRN
Status: DISCONTINUED | OUTPATIENT
Start: 2020-02-17 | End: 2020-02-19 | Stop reason: HOSPADM

## 2020-02-17 RX ORDER — DEXAMETHASONE 4 MG/1
12 TABLET ORAL ONCE
Status: COMPLETED | OUTPATIENT
Start: 2020-02-18 | End: 2020-02-18

## 2020-02-17 RX ORDER — HEPARIN SODIUM 5000 [USP'U]/.5ML
5000 INJECTION, SOLUTION INTRAVENOUS; SUBCUTANEOUS EVERY 8 HOURS SCHEDULED
Status: DISCONTINUED | OUTPATIENT
Start: 2020-02-17 | End: 2020-02-17 | Stop reason: CLARIF

## 2020-02-17 RX ORDER — CHOLECALCIFEROL (VITAMIN D3) 10 MCG
1 TABLET ORAL DAILY
Status: DISCONTINUED | OUTPATIENT
Start: 2020-02-17 | End: 2020-02-19 | Stop reason: HOSPADM

## 2020-02-17 RX ADMIN — NEPHROCAP 1 MG: 1 CAP ORAL at 15:59

## 2020-02-17 RX ADMIN — ALLOPURINOL 200 MG: 100 TABLET ORAL at 15:59

## 2020-02-17 RX ADMIN — Medication 10 ML: at 20:37

## 2020-02-17 RX ADMIN — HEPARIN SODIUM 5000 UNITS: 5000 INJECTION INTRAVENOUS; SUBCUTANEOUS at 21:27

## 2020-02-17 RX ADMIN — SODIUM CHLORIDE 15 ML: 900 IRRIGANT IRRIGATION at 17:38

## 2020-02-17 RX ADMIN — ACYCLOVIR 200 MG: 200 CAPSULE ORAL at 15:59

## 2020-02-17 RX ADMIN — ALTEPLASE 2 MG: 2.2 INJECTION, POWDER, LYOPHILIZED, FOR SOLUTION INTRAVENOUS at 17:29

## 2020-02-17 RX ADMIN — MESNA 3600 MG: 100 INJECTION, SOLUTION INTRAVENOUS at 18:28

## 2020-02-17 RX ADMIN — DEXAMETHASONE 12 MG: 4 TABLET ORAL at 17:20

## 2020-02-17 RX ADMIN — SODIUM CHLORIDE 15 ML: 900 IRRIGANT IRRIGATION at 17:36

## 2020-02-17 RX ADMIN — ACYCLOVIR 200 MG: 200 CAPSULE ORAL at 20:37

## 2020-02-17 RX ADMIN — CYCLOPHOSPHAMIDE 3600 MG: 1 INJECTION, POWDER, FOR SOLUTION INTRAVENOUS; ORAL at 18:28

## 2020-02-17 RX ADMIN — SODIUM CHLORIDE 150 MG: 9 INJECTION, SOLUTION INTRAVENOUS at 17:22

## 2020-02-17 RX ADMIN — POTASSIUM CHLORIDE 40 MEQ: 20 TABLET, EXTENDED RELEASE ORAL at 15:59

## 2020-02-17 RX ADMIN — ALTEPLASE 2 MG: 2.2 INJECTION, POWDER, LYOPHILIZED, FOR SOLUTION INTRAVENOUS at 21:28

## 2020-02-17 RX ADMIN — ONDANSETRON HYDROCHLORIDE 24 MG: 8 TABLET, FILM COATED ORAL at 17:21

## 2020-02-17 RX ADMIN — SODIUM CHLORIDE: 9 INJECTION, SOLUTION INTRAVENOUS at 17:25

## 2020-02-17 RX ADMIN — MESNA 720 MG: 100 INJECTION, SOLUTION INTRAVENOUS at 18:16

## 2020-02-17 ASSESSMENT — PAIN SCALES - GENERAL
PAINLEVEL_OUTOF10: 0
PAINLEVEL_OUTOF10: 0

## 2020-02-17 NOTE — PLAN OF CARE
Problem: Falls - Risk of:  Goal: Will remain free from falls  Description  Will remain free from falls  Outcome: Ongoing  Note:   Orthostatic vital signs obtained at start of shift - see flowsheet for details. Pt meets criteria for orthostasis. Pt is a Med fall risk. See Padmini Alta Fall Score and ABCDS Injury Risk assessments. - Screening for Orthostasis AND not a Kennan Risk per LOUIS/ABCDS: Pt bed is in low position, side rails up, call light and belongings are in reach. Fall risk light is on outside pts room. Pt encouraged to call for assistance as needed. Will continue with hourly rounds for PO intake, pain needs, toileting and repositioning as needed. Problem: Bleeding:  Goal: Will show no signs and symptoms of excessive bleeding  Description  Will show no signs and symptoms of excessive bleeding  Outcome: Ongoing  Note:   Patient's hemoglobin this AM:   Recent Labs     02/17/20  1256   HGB 11.2*     Patient's platelet count this AM:   Recent Labs     02/17/20  1256       Thrombocytopenia not present at this time. Patient showing no signs or symptoms of active bleeding. Transfusion not indicated at this time. Patient verbalizes understanding of all instructions. Will continue to assess and implement POC. Call light within reach and hourly rounding in place. Problem: Venous Thromboembolism:  Goal: Will show no signs or symptoms of venous thromboembolism  Description  Will show no signs or symptoms of venous thromboembolism  Outcome: Ongoing  Note:   - DVT Prophylaxis: Platelets >58,679 cells/dL, - daily lovenox prophylaxis ordered  Contraindications to pharmacologic prophylaxis: Patient already on therapeutic anticoagulation  Contraindications to mechanical prophylaxis: Patient already on therapeutic anticoagulation      Problem: PROTECTIVE PRECAUTIONS  Goal: Patient will remain free of nosocomial Infections  Outcome: Ongoing  Note:   Pt remains in protective precautions.   Pt educated on wearing mask when in hallways. Pt, staff, and visitors adhering to handwashing guidelines. Pt educated to shower or bathe daily with chlorhexidine and linens changed daily per protocol. Pt verbalizes understanding of low microbial diet. Will continue to monitor. Problem: Discharge Planning:  Goal: Discharged to appropriate level of care  Description  Discharged to appropriate level of care  Outcome: Ongoing  Note:   Pt is aware of current plan of care.

## 2020-02-17 NOTE — PROGRESS NOTES
Original chemotherapy orders reviewed and acknowledged. Appropriateness of chemotherapy treatment regimen Cytoxan for diagnosis of Multiple Myeloma was verified. Patient educated on chemotherapy regimen. Consent for chemotherapy obtained. Estimated body surface area is 2.42 meters squared as calculated from the following:    Height as of this encounter: 6' 4\" (1.93 m). Weight as of this encounter: 241 lb 6 oz (109.5 kg). verified. Appropriate dosing calculations of chemotherapy based on above height, weight, and BSA verified.       Mitra Villalobos Cox2/17/2020  1:56 PM

## 2020-02-17 NOTE — PROGRESS NOTES
4 Eyes Admission Assessment     I agree as the admission nurse that 2 RN's have performed a thorough Head to Toe Skin Assessment on the patient. ALL assessment sites listed below have been assessed on admission. Areas assessed by both nurses: Renetta and Vanessa  [x]   Head, Face, and Ears   [x]   Shoulders, Back, and Chest  [x]   Arms, Elbows, and Hands   [x]   Coccyx, Sacrum, and Ischum  []   Legs, Feet, and Heels        Does the Patient have Skin Breakdown?   No         Johann Prevention initiated:  No   Wound Care Orders initiated:  NA      [facility] nurse consulted for Pressure Injury (Stage 3,4, Unstageable, DTI, NWPT, and Complex wounds):  NA      Nurse 1 eSignature: Electronically signed by Yumiko Wall RN on 2/17/20 at 5:40 PM    **SHARE this note so that the co-signing nurse is able to place an eSignature**    Nurse 2 eSignature: Electronically signed by Sherry Arevalo RN on 2/17/20 at 6:03 PM

## 2020-02-18 LAB
ALBUMIN SERPL-MCNC: 4.1 G/DL (ref 3.4–5)
ALP BLD-CCNC: 68 U/L (ref 40–129)
ALT SERPL-CCNC: 9 U/L (ref 10–40)
ANION GAP SERPL CALCULATED.3IONS-SCNC: 13 MMOL/L (ref 3–16)
AST SERPL-CCNC: 15 U/L (ref 15–37)
BASOPHILS ABSOLUTE: 0 K/UL (ref 0–0.2)
BASOPHILS RELATIVE PERCENT: 0.3 %
BILIRUB SERPL-MCNC: 0.6 MG/DL (ref 0–1)
BILIRUBIN DIRECT: <0.2 MG/DL (ref 0–0.3)
BILIRUBIN, INDIRECT: ABNORMAL MG/DL (ref 0–1)
BUN BLDV-MCNC: 28 MG/DL (ref 7–20)
CALCIUM SERPL-MCNC: 9.1 MG/DL (ref 8.3–10.6)
CHLORIDE BLD-SCNC: 100 MMOL/L (ref 99–110)
CO2: 23 MMOL/L (ref 21–32)
CREAT SERPL-MCNC: 2.5 MG/DL (ref 0.8–1.3)
EOSINOPHILS ABSOLUTE: 0 K/UL (ref 0–0.6)
EOSINOPHILS RELATIVE PERCENT: 0.1 %
GFR AFRICAN AMERICAN: 32
GFR NON-AFRICAN AMERICAN: 26
GLUCOSE BLD-MCNC: 160 MG/DL (ref 70–99)
HCT VFR BLD CALC: 30.6 % (ref 40.5–52.5)
HEMOGLOBIN: 10.3 G/DL (ref 13.5–17.5)
LACTATE DEHYDROGENASE: 183 U/L (ref 100–190)
LYMPHOCYTES ABSOLUTE: 0.4 K/UL (ref 1–5.1)
LYMPHOCYTES RELATIVE PERCENT: 10 %
MCH RBC QN AUTO: 33.2 PG (ref 26–34)
MCHC RBC AUTO-ENTMCNC: 33.9 G/DL (ref 31–36)
MCV RBC AUTO: 98 FL (ref 80–100)
MONOCYTES ABSOLUTE: 0.1 K/UL (ref 0–1.3)
MONOCYTES RELATIVE PERCENT: 1.8 %
NEUTROPHILS ABSOLUTE: 3.3 K/UL (ref 1.7–7.7)
NEUTROPHILS RELATIVE PERCENT: 87.8 %
PDW BLD-RTO: 15.5 % (ref 12.4–15.4)
PHOSPHORUS: 2.4 MG/DL (ref 2.5–4.9)
PLATELET # BLD: 187 K/UL (ref 135–450)
PMV BLD AUTO: 8.5 FL (ref 5–10.5)
POTASSIUM SERPL-SCNC: 4.8 MMOL/L (ref 3.5–5.1)
RBC # BLD: 3.12 M/UL (ref 4.2–5.9)
SODIUM BLD-SCNC: 136 MMOL/L (ref 136–145)
TOTAL PROTEIN: 5.9 G/DL (ref 6.4–8.2)
URIC ACID, SERUM: 4.3 MG/DL (ref 3.5–7.2)
WBC # BLD: 3.7 K/UL (ref 4–11)

## 2020-02-18 PROCEDURE — 80076 HEPATIC FUNCTION PANEL: CPT

## 2020-02-18 PROCEDURE — 85025 COMPLETE CBC W/AUTO DIFF WBC: CPT

## 2020-02-18 PROCEDURE — 2580000003 HC RX 258: Performed by: INTERNAL MEDICINE

## 2020-02-18 PROCEDURE — 6370000000 HC RX 637 (ALT 250 FOR IP): Performed by: NURSE PRACTITIONER

## 2020-02-18 PROCEDURE — 6370000000 HC RX 637 (ALT 250 FOR IP): Performed by: INTERNAL MEDICINE

## 2020-02-18 PROCEDURE — 36592 COLLECT BLOOD FROM PICC: CPT

## 2020-02-18 PROCEDURE — 6360000002 HC RX W HCPCS: Performed by: INTERNAL MEDICINE

## 2020-02-18 PROCEDURE — 96415 CHEMO IV INFUSION ADDL HR: CPT

## 2020-02-18 PROCEDURE — 6360000002 HC RX W HCPCS: Performed by: NURSE PRACTITIONER

## 2020-02-18 PROCEDURE — 83615 LACTATE (LD) (LDH) ENZYME: CPT

## 2020-02-18 PROCEDURE — 84550 ASSAY OF BLOOD/URIC ACID: CPT

## 2020-02-18 PROCEDURE — 2060000000 HC ICU INTERMEDIATE R&B

## 2020-02-18 PROCEDURE — 84100 ASSAY OF PHOSPHORUS: CPT

## 2020-02-18 PROCEDURE — 80048 BASIC METABOLIC PNL TOTAL CA: CPT

## 2020-02-18 PROCEDURE — 2580000003 HC RX 258: Performed by: NURSE PRACTITIONER

## 2020-02-18 PROCEDURE — 96413 CHEMO IV INFUSION 1 HR: CPT

## 2020-02-18 RX ORDER — DEXAMETHASONE 4 MG/1
8 TABLET ORAL DAILY
Status: DISCONTINUED | OUTPATIENT
Start: 2020-02-19 | End: 2020-02-19 | Stop reason: HOSPADM

## 2020-02-18 RX ORDER — ACETAMINOPHEN 325 MG/1
650 TABLET ORAL EVERY 6 HOURS PRN
Status: DISCONTINUED | OUTPATIENT
Start: 2020-02-18 | End: 2020-02-19 | Stop reason: HOSPADM

## 2020-02-18 RX ADMIN — HEPARIN SODIUM 5000 UNITS: 5000 INJECTION INTRAVENOUS; SUBCUTANEOUS at 22:28

## 2020-02-18 RX ADMIN — NEPHROCAP 1 MG: 1 CAP ORAL at 12:31

## 2020-02-18 RX ADMIN — CYCLOPHOSPHAMIDE 3600 MG: 1 INJECTION, POWDER, FOR SOLUTION INTRAVENOUS; ORAL at 12:14

## 2020-02-18 RX ADMIN — MESNA 3600 MG: 100 INJECTION, SOLUTION INTRAVENOUS at 17:47

## 2020-02-18 RX ADMIN — SODIUM CHLORIDE: 9 INJECTION, SOLUTION INTRAVENOUS at 06:14

## 2020-02-18 RX ADMIN — SODIUM CHLORIDE 15 ML: 900 IRRIGANT IRRIGATION at 06:17

## 2020-02-18 RX ADMIN — ONDANSETRON HYDROCHLORIDE 24 MG: 8 TABLET, FILM COATED ORAL at 11:26

## 2020-02-18 RX ADMIN — PANTOPRAZOLE SODIUM 40 MG: 40 TABLET, DELAYED RELEASE ORAL at 09:13

## 2020-02-18 RX ADMIN — HEPARIN SODIUM 5000 UNITS: 5000 INJECTION INTRAVENOUS; SUBCUTANEOUS at 06:14

## 2020-02-18 RX ADMIN — DEXAMETHASONE 12 MG: 4 TABLET ORAL at 11:27

## 2020-02-18 RX ADMIN — SODIUM CHLORIDE 15 ML: 900 IRRIGANT IRRIGATION at 18:23

## 2020-02-18 RX ADMIN — Medication 10 ML: at 20:51

## 2020-02-18 RX ADMIN — SODIUM CHLORIDE: 9 INJECTION, SOLUTION INTRAVENOUS at 19:42

## 2020-02-18 RX ADMIN — MESNA 720 MG: 100 INJECTION, SOLUTION INTRAVENOUS at 11:55

## 2020-02-18 RX ADMIN — SODIUM CHLORIDE 15 ML: 900 IRRIGANT IRRIGATION at 12:24

## 2020-02-18 RX ADMIN — SALINE NASAL SPRAY 1 SPRAY: 1.5 SOLUTION NASAL at 22:35

## 2020-02-18 RX ADMIN — ACYCLOVIR 200 MG: 200 CAPSULE ORAL at 20:50

## 2020-02-18 RX ADMIN — SODIUM CHLORIDE 15 ML: 900 IRRIGANT IRRIGATION at 20:51

## 2020-02-18 RX ADMIN — HEPARIN SODIUM 5000 UNITS: 5000 INJECTION INTRAVENOUS; SUBCUTANEOUS at 17:41

## 2020-02-18 RX ADMIN — ACYCLOVIR 200 MG: 200 CAPSULE ORAL at 09:13

## 2020-02-18 RX ADMIN — ACETAMINOPHEN 650 MG: 325 TABLET ORAL at 23:09

## 2020-02-18 RX ADMIN — ALLOPURINOL 200 MG: 100 TABLET ORAL at 09:13

## 2020-02-18 RX ADMIN — Medication 10 ML: at 09:15

## 2020-02-18 ASSESSMENT — PAIN - FUNCTIONAL ASSESSMENT
PAIN_FUNCTIONAL_ASSESSMENT: ACTIVITIES ARE NOT PREVENTED
PAIN_FUNCTIONAL_ASSESSMENT: ACTIVITIES ARE NOT PREVENTED

## 2020-02-18 ASSESSMENT — PAIN DESCRIPTION - PROGRESSION
CLINICAL_PROGRESSION: NOT CHANGED
CLINICAL_PROGRESSION: GRADUALLY WORSENING

## 2020-02-18 ASSESSMENT — PAIN SCALES - GENERAL
PAINLEVEL_OUTOF10: 0
PAINLEVEL_OUTOF10: 0
PAINLEVEL_OUTOF10: 6
PAINLEVEL_OUTOF10: 6
PAINLEVEL_OUTOF10: 0
PAINLEVEL_OUTOF10: 0

## 2020-02-18 ASSESSMENT — PAIN DESCRIPTION - ORIENTATION
ORIENTATION: MID
ORIENTATION: ANTERIOR

## 2020-02-18 ASSESSMENT — PAIN DESCRIPTION - ONSET
ONSET: ON-GOING
ONSET: ON-GOING

## 2020-02-18 ASSESSMENT — PAIN DESCRIPTION - LOCATION
LOCATION: HEAD
LOCATION: HEAD

## 2020-02-18 ASSESSMENT — PAIN DESCRIPTION - PAIN TYPE
TYPE: ACUTE PAIN
TYPE: ACUTE PAIN

## 2020-02-18 ASSESSMENT — PAIN DESCRIPTION - DESCRIPTORS
DESCRIPTORS: HEADACHE
DESCRIPTORS: HEADACHE

## 2020-02-18 ASSESSMENT — PAIN DESCRIPTION - FREQUENCY
FREQUENCY: INTERMITTENT
FREQUENCY: INTERMITTENT

## 2020-02-18 NOTE — CARE COORDINATION
Type of Admission  Multiple Myeloma  Cytoxan Mobilization  Day #2        Central venous catheter  Right IJ Tri fusion ( 2/14/20, IR)  L AV Shunt for Hemodialysis    Plan  Cytoxan mobilization with future goal of Autologous Stem Cell Transplant for treatment of Multiple Myeloma        Update  2/17/20:  Planned admission for IV Cytoxan for stem cell mobilzation. Aware of bladder irrigation with chemotherapy. 2/18/20: Tolerating chemotherapy without problem, including bladder irrigation. Discharge tomorrow. Education  2/17/20:  Introduced myself in RN D/C Planner role to Mr. Robert Cantu. Given a copy of treatment calendar from BMT Coordinator. Given \"Purple ER\" card & reviewed purpose. 2/18/20:  Confirmed home Pharmacy as [facility] with patient.     Discharge  Wed, 2/19/20)  See AVS for Follow up appointments  Hemodialysis M-W-F    Pending

## 2020-02-18 NOTE — PLAN OF CARE
on wearing mask when in hallways. Pt, staff, and visitors adhering to handwashing guidelines. Pt educated to shower or bathe daily with chlorhexidine and linens changed daily per protocol. Pt verbalizes understanding of low microbial diet. Will continue to monitor. Problem: Discharge Planning:  Goal: Discharged to appropriate level of care  Description  Discharged to appropriate level of care  Outcome: Ongoing  Note:   Pt is aware of current plan of care.

## 2020-02-18 NOTE — ONCOLOGY
Administration: Chemotherapy drug Cytoxin independently verified with  Anthony Folds RN prior to administration. Acknowledgement of informed consent for chemotherapy administration verified. Original order, appropriateness of regimen, drug supplied, height, weight, BSA, dose calculations, expiration dates/times, drug appearance, and two patient identifiers were verified by both RNs. Drug checked for vesicant/irritant status and for risk of hypersensitivity. Most recent laboratory values and allergies, were reviewed. Positive, brisk blood return via CVC was confirmed prior to administration. Chest x-ray for correct line placement reviewed. Renetta R Shanks and Anthony Folds RN verified correct rate of chemotherapy and maintenance IV fluids. Patient was educated on chemotherapy regimen prior to administration including indication for treatment related to disease & side effects of chemotherapy drug. Patient verbalizes understanding of all instructions. Completion of Chemotherapy: Monitoring during infusion done per policy, see Flowsheets. Blood return verified before, during, and after infusion per policy; no signs of extravasation. Pt tolerated chemotherapy well and without incident. Chemotherapy infusion end time on the [facility]. Will continue to monitor.

## 2020-02-18 NOTE — PROCEDURES
[address]               [address]                               PULMONARY FUNCTION    PATIENT NAME: Maikol Polo                       :        1955  MED REC NO:   [id number]                          ROOM:  ACCOUNT NO:   [de-identified]                           ADMIT DATE: 2020  PROVIDER:     Priscilla Morgan MD      DATE OF PROCEDURE:  2020    Forced vital capacity low normal, FEV1 mildly reduced, FEV1 to FVC ratio  normal.  After inhaled bronchodilator, mid-to-late expiratory flow rate  increased. Lung volume determinations by plethysmography are normal.   Single-breath diffusion capacity normal after comparison to alveolar  volume. IMPRESSION:  Mild obstructive ventilatory defect. Improvement after  inhaled bronchodilator seen in small airway flow rates. Findings may be  consistent with mild asthma. Requires clinical correlation.         Radha Lloyd MD    D: 2020 11:00:41       T: 2020 11:04:14     [email or web address]  Job#: [id number]     Doc#: [id number]    CC:

## 2020-02-18 NOTE — PROGRESS NOTES
[facility] Progress Note    2020     Batool Eid    MRN: [id number]    : 1955    Referring MD: MD [id number]  [place], [address]      SUBJECTIVE:  No complaints. Nikki 1st dose of Cytoxan well. Bladder irrigation ongoing.       ECOG PS:  (1) Restricted in physically strenuous activity, ambulatory and able to do work of light nature    KPS: 90% Able to carry on normal activity; minor signs or symptoms of disease    Isolation: None    Medications    Scheduled Meds:   sodium chloride flush  10 mL Intravenous 2 times per day    Saline Mouthwash  15 mL Swish & Spit 4x Daily AC & HS    acyclovir  200 mg Oral BID    allopurinol  200 mg Oral Daily    ondansetron  24 mg Oral Once    dexamethasone  12 mg Oral Once    cyclophosphamide (CYTOXAN) chemo infusion  3,600 mg Intravenous Once    dexamethasone  8 mg Oral Daily    mesna (MESNEX) IVPB  720 mg Intravenous Once    b complex-C-folic acid  1 mg Oral Daily    mesna (MESNEX) IVPB  3,600 mg Intravenous Q24H    pantoprazole  40 mg Oral Daily    heparin (porcine)  5,000 Units Subcutaneous 3 times per day     Continuous Infusions:   sodium chloride      sodium chloride 75 mL/hr at 20 0614     PRN Meds:.sodium chloride, sodium chloride flush, potassium chloride, magnesium sulfate, magnesium hydroxide, Saline Mouthwash, alteplase, prochlorperazine **OR** prochlorperazine, LORazepam **OR** LORazepam    ROS:  As noted above, otherwise remainder of 10-point ROS negative    Physical Exam:     I&O:      Intake/Output Summary (Last 24 hours) at 2020 0651  Last data filed at 2020 0644  Gross per 24 hour   Intake 2948 ml   Output 850 ml   Net 2098 ml       Vital Signs:  /84   Pulse 74   Temp 97.6 °F (36.4 °C) (Oral)   Resp 16   Ht 6' 4\" (1.93 m)   Wt 241 lb 6 oz (109.5 kg)   SpO2 95%   BMI 29.38 kg/m²     Weight:    Wt Readings from Last 3 Encounters:   20 241 lb 6 oz (109.5 kg)   20 Melphalan (140 mg/M^2) followed by autologous stem cell resuce  - Post transplant maintenance: Consider KPD x 4 cycles     Day + 2     2. ID:  Afebrile, no evidence of infection.    - Cont acyclovir ppx  - Begin Diflucan and Levaquin (dose adjusted for ESRD) when ANC < 1.5     3. Heme:  Anemia from previous chemotherapy & ESRD   - Transfuse for Hgb < 7 and Platelets < 25D  - No transfusion today     4. ESRD / Metabolic:  Electrolytes are WNL and renal fxn stable except mild hypoPhos  - Followed by Dr. Hernando Reyna, consult on admit    - Cont HD on MWF  - Cont IVF:  NS @ 75 mL/hr w/ chemotherapy   - Cont bladder irrigation w/ mesna to prevent hemorrhagic cystitis  - Replace potassium and magnesium per nephrology     5. GI / Nutrition:    - H/o GERD  Nutrition:  Appetite and oral intake is good.    - Cont MVI  - Cont renal microbial diet   - Follow closely with dietary  GERD:    - Cont PPI        6.  Cardiac:   - H/o HTN  - Not currently on medications     - DVT Prophylaxis: Platelets >18,772 cells/dL, - subcut Heparin prophylaxis ordered  Contraindications to pharmacologic prophylaxis: None  Contraindications to mechanical prophylaxis: None    - Disposition: Likely on Wednesday when chemotherapy completes w/out toxicity       KIMBERLEY Wilhelm - CNP

## 2020-02-18 NOTE — CONSULTS
Nephrology Consult Note  [phone]   [email or web address].        Reason for Consult:  ESRD    HISTORY OF PRESENT ILLNESS:                This is a patient with significant past medical history of MM who presented 2/17 for chemo. His kappa light chain MM was diagnosed 10/2018. He is on hemodialysis Mon/Wed/Fri at [facility] and had HD yesterday before admission. He is here for cytoxan mobilization and hopes for stem cell transplant in the future. He is doing well, and of note is also getting bladder irrigation. No issues with HD recently. Past Medical History:        Diagnosis Date    Cancer [facility])        Past Surgical History:    History reviewed. No pertinent surgical history. Current Medications:    No current facility-administered medications on file prior to encounter. Current Outpatient Medications on File Prior to Encounter   Medication Sig Dispense Refill    B Complex-C-Folic Acid (DIALYVITE 140 PO) Take 1 tablet by mouth daily         Allergies:  Patient has no known allergies.     Social History:    Social History     Socioeconomic History    Marital status: Life Partner     Spouse name: Earl Loco Number of children: Not on file    Years of education: Not on file    Highest education level: Not on file   Occupational History    Not on file   Social Needs    Financial resource strain: Not on file    Food insecurity:     Worry: Not on file     Inability: Not on file    Transportation needs:     Medical: Not on file     Non-medical: Not on file   Tobacco Use    Smoking status: Never Smoker    Smokeless tobacco: Never Used   Substance and Sexual Activity    Alcohol use: Not Currently    Drug use: Not on file    Sexual activity: Not on file   Lifestyle    Physical activity:     Days per week: Not on file     Minutes per session: Not on file    Stress: Not on file   Relationships    Social connections:     Talks on phone: Not on file     Gets together: Not on file

## 2020-02-19 VITALS
DIASTOLIC BLOOD PRESSURE: 77 MMHG | OXYGEN SATURATION: 100 % | HEART RATE: 70 BPM | TEMPERATURE: 98.1 F | BODY MASS INDEX: 30.45 KG/M2 | HEIGHT: 76 IN | WEIGHT: 250.06 LBS | SYSTOLIC BLOOD PRESSURE: 116 MMHG | RESPIRATION RATE: 18 BRPM

## 2020-02-19 LAB
ALBUMIN SERPL-MCNC: 3.5 G/DL (ref 3.4–5)
ALP BLD-CCNC: 54 U/L (ref 40–129)
ALT SERPL-CCNC: 7 U/L (ref 10–40)
ANION GAP SERPL CALCULATED.3IONS-SCNC: 10 MMOL/L (ref 3–16)
AST SERPL-CCNC: 13 U/L (ref 15–37)
BASOPHILS ABSOLUTE: 0 K/UL (ref 0–0.2)
BASOPHILS RELATIVE PERCENT: 0 %
BILIRUB SERPL-MCNC: 0.5 MG/DL (ref 0–1)
BILIRUBIN DIRECT: <0.2 MG/DL (ref 0–0.3)
BILIRUBIN, INDIRECT: ABNORMAL MG/DL (ref 0–1)
BUN BLDV-MCNC: 48 MG/DL (ref 7–20)
CALCIUM SERPL-MCNC: 8.7 MG/DL (ref 8.3–10.6)
CHLORIDE BLD-SCNC: 103 MMOL/L (ref 99–110)
CO2: 21 MMOL/L (ref 21–32)
CREAT SERPL-MCNC: 2.6 MG/DL (ref 0.8–1.3)
EOSINOPHILS ABSOLUTE: 0 K/UL (ref 0–0.6)
EOSINOPHILS RELATIVE PERCENT: 0 %
GFR AFRICAN AMERICAN: 30
GFR NON-AFRICAN AMERICAN: 25
GLUCOSE BLD-MCNC: 133 MG/DL (ref 70–99)
HCT VFR BLD CALC: 27.1 % (ref 40.5–52.5)
HEMOGLOBIN: 9.2 G/DL (ref 13.5–17.5)
LACTATE DEHYDROGENASE: 162 U/L (ref 100–190)
LYMPHOCYTES ABSOLUTE: 0.2 K/UL (ref 1–5.1)
LYMPHOCYTES RELATIVE PERCENT: 3.7 %
MCH RBC QN AUTO: 33.2 PG (ref 26–34)
MCHC RBC AUTO-ENTMCNC: 33.9 G/DL (ref 31–36)
MCV RBC AUTO: 98 FL (ref 80–100)
MONOCYTES ABSOLUTE: 0.2 K/UL (ref 0–1.3)
MONOCYTES RELATIVE PERCENT: 4.3 %
NEUTROPHILS ABSOLUTE: 4.8 K/UL (ref 1.7–7.7)
NEUTROPHILS RELATIVE PERCENT: 92 %
PDW BLD-RTO: 15.3 % (ref 12.4–15.4)
PHOSPHORUS: 4.6 MG/DL (ref 2.5–4.9)
PLATELET # BLD: 158 K/UL (ref 135–450)
PMV BLD AUTO: 8.5 FL (ref 5–10.5)
POTASSIUM SERPL-SCNC: 4.7 MMOL/L (ref 3.5–5.1)
RBC # BLD: 2.77 M/UL (ref 4.2–5.9)
SODIUM BLD-SCNC: 134 MMOL/L (ref 136–145)
TOTAL PROTEIN: 5.2 G/DL (ref 6.4–8.2)
URIC ACID, SERUM: 6.1 MG/DL (ref 3.5–7.2)
VRE CULTURE: NORMAL
WBC # BLD: 5.3 K/UL (ref 4–11)

## 2020-02-19 PROCEDURE — 85025 COMPLETE CBC W/AUTO DIFF WBC: CPT

## 2020-02-19 PROCEDURE — 83615 LACTATE (LD) (LDH) ENZYME: CPT

## 2020-02-19 PROCEDURE — 6370000000 HC RX 637 (ALT 250 FOR IP): Performed by: NURSE PRACTITIONER

## 2020-02-19 PROCEDURE — 80076 HEPATIC FUNCTION PANEL: CPT

## 2020-02-19 PROCEDURE — 6360000002 HC RX W HCPCS: Performed by: INTERNAL MEDICINE

## 2020-02-19 PROCEDURE — 36592 COLLECT BLOOD FROM PICC: CPT

## 2020-02-19 PROCEDURE — 5A1D70Z PERFORMANCE OF URINARY FILTRATION, INTERMITTENT, LESS THAN 6 HOURS PER DAY: ICD-10-PCS | Performed by: INTERNAL MEDICINE

## 2020-02-19 PROCEDURE — 6360000002 HC RX W HCPCS: Performed by: NURSE PRACTITIONER

## 2020-02-19 PROCEDURE — 84100 ASSAY OF PHOSPHORUS: CPT

## 2020-02-19 PROCEDURE — 80048 BASIC METABOLIC PNL TOTAL CA: CPT

## 2020-02-19 PROCEDURE — 90935 HEMODIALYSIS ONE EVALUATION: CPT

## 2020-02-19 PROCEDURE — 84550 ASSAY OF BLOOD/URIC ACID: CPT

## 2020-02-19 RX ORDER — PANTOPRAZOLE SODIUM 40 MG/1
40 TABLET, DELAYED RELEASE ORAL DAILY
Qty: 30 TABLET | Refills: 0
Start: 2020-02-19 | End: 2022-01-01 | Stop reason: HOSPADM

## 2020-02-19 RX ORDER — PROCHLORPERAZINE MALEATE 10 MG
10 TABLET ORAL EVERY 4 HOURS PRN
Qty: 60 TABLET | Refills: 3 | Status: ON HOLD | OUTPATIENT
Start: 2020-02-19 | End: 2022-01-01 | Stop reason: CLARIF

## 2020-02-19 RX ORDER — HEPARIN SODIUM (PORCINE) LOCK FLUSH IV SOLN 100 UNIT/ML 100 UNIT/ML
1500 SOLUTION INTRAVENOUS ONCE
Status: DISCONTINUED | OUTPATIENT
Start: 2020-02-19 | End: 2020-02-19 | Stop reason: HOSPADM

## 2020-02-19 RX ADMIN — PANTOPRAZOLE SODIUM 40 MG: 40 TABLET, DELAYED RELEASE ORAL at 14:21

## 2020-02-19 RX ADMIN — NEPHROCAP 1 MG: 1 CAP ORAL at 14:21

## 2020-02-19 RX ADMIN — ALLOPURINOL 200 MG: 100 TABLET ORAL at 14:22

## 2020-02-19 RX ADMIN — DEXAMETHASONE 8 MG: 4 TABLET ORAL at 14:22

## 2020-02-19 RX ADMIN — HEPARIN SODIUM 5000 UNITS: 5000 INJECTION INTRAVENOUS; SUBCUTANEOUS at 06:01

## 2020-02-19 RX ADMIN — ACYCLOVIR 200 MG: 200 CAPSULE ORAL at 14:22

## 2020-02-19 ASSESSMENT — PAIN SCALES - GENERAL
PAINLEVEL_OUTOF10: 0

## 2020-02-19 NOTE — PLAN OF CARE
Problem: Falls - Risk of:  Goal: Will remain free from falls  Description  Will remain free from falls  Outcome: Ongoing  Note:   Orthostatic vital signs obtained at start of shift - see flowsheet for details. Pt does not meet criteria for orthostasis. Pt is a Med fall risk. See Belita Goody Fall Score and ABCDS Injury Risk assessments. - Screening for Orthostasis AND not a Ephraim Risk per LOUIS/ABCDS: Pt bed is in low position, side rails up, call light and belongings are in reach. Fall risk light is on outside pts room. Pt encouraged to call for assistance as needed. Will continue with hourly rounds for PO intake, pain needs, toileting and repositioning as needed. Problem: Bleeding:  Goal: Will show no signs and symptoms of excessive bleeding  Description  Will show no signs and symptoms of excessive bleeding  Outcome: Ongoing  Note:   Patient's hemoglobin this AM:   Recent Labs     02/19/20  0330   HGB 9.2*     Patient's platelet count this AM:   Recent Labs     02/19/20  0330       Thrombocytopenia not present at this time. Patient showing no signs or symptoms of active bleeding. Transfusion not indicated at this time. Patient verbalizes understanding of all instructions. Will continue to assess and implement POC. Call light within reach and hourly rounding in place. Problem: Venous Thromboembolism:  Goal: Will show no signs or symptoms of venous thromboembolism  Description  Will show no signs or symptoms of venous thromboembolism  Outcome: Ongoing  Note:   Adherent with DVT Prevention: Pt is at risk for DVT d/t decreased mobility and cancer treatment. Pt educated on importance of activity. Pt has orders for Heparin . Pt verbalizes understanding of need for prophylaxis while inpatient. Problem: PROTECTIVE PRECAUTIONS  Goal: Patient will remain free of nosocomial Infections  Outcome: Ongoing  Note:   Pt currently in a private, positive pressure room.   Educated pt on wearing a mask when neutropenic and/or leaving the floor. No living plants or flowers allowed. Also reinforced importance of hand hygiene. Pt following a low microbial diet. Surfaces throughout room cleaned with bleach wipes per unit policy. Problem: Discharge Planning:  Goal: Discharged to appropriate level of care  Description  Discharged to appropriate level of care  Outcome: Ongoing  Note:   Pt aware of discharge plan. Problem: Pain:  Goal: Pain level will decrease  Description  Pain level will decrease  Outcome: Ongoing  Note:   Pt reports 6/10 headache, Tylenol given. Pt reports relief, will continue to monitor.

## 2020-02-19 NOTE — PROGRESS NOTES
Nephrology Progress Note  [phone]  [phone]   [email or web address]    Patient:  Narciso Comer   : 1955    CC:  ESRD    Subjective:  Mr. Chelsi Salazar had a headache overnight that is better. Otherwise doing well. ROS:   No chest pain, no N/V.      SHx:  No visitors this morning. Meds:  Scheduled Meds:   dexamethasone  8 mg Oral Daily    sodium chloride flush  10 mL Intravenous 2 times per day    Saline Mouthwash  15 mL Swish & Spit 4x Daily AC & HS    acyclovir  200 mg Oral BID    allopurinol  200 mg Oral Daily    b complex-C-folic acid  1 mg Oral Daily    mesna (MESNEX) IVPB  3,600 mg Intravenous Q24H    pantoprazole  40 mg Oral Daily    heparin (porcine)  5,000 Units Subcutaneous 3 times per day     Continuous Infusions:   sodium chloride      sodium chloride 75 mL/hr at 20     PRN Meds:.sodium chloride, acetaminophen, sodium chloride, sodium chloride flush, potassium chloride, magnesium sulfate, magnesium hydroxide, Saline Mouthwash, alteplase, prochlorperazine **OR** prochlorperazine, LORazepam **OR** LORazepam    Vitals:  /74   Pulse 79   Temp 97.6 °F (36.4 °C) (Oral)   Resp 18   Ht 6' 4\" (1.93 m)   Wt 250 lb 1 oz (113.4 kg)   SpO2 97%   BMI 30.44 kg/m²     Physical Exam:  Gen: Resting in bed, NAD. HEENT: MMM, OP clear. CV: RRR, no S3.  Lungs: good respiratory effort and clear air entry   Abd: S/NT +BS  Ext: No edema, no cyanosis  Skin: Warm. No rashes appreciated. : +3 way peters with bladder irrigation. Access: Left upper arm AV fistula +bruit/thrill. Right IJ tunneled HD catheter c/d/i.     Labs:  CBC:   Lab Results   Component Value Date    WBC 5.3 2020    RBC 2.77 2020    HGB 9.2 2020    HCT 27.1 2020    MCV 98.0 2020    MCH 33.2 2020    MCHC 33.9 2020    RDW 15.3 2020     2020    MPV 8.5 2020     BMP:    Lab Results   Component Value Date     2020    K 4.7 2020    CL 103 02/19/2020    CO2 21 02/19/2020    BUN 48 02/19/2020    LABALBU 3.5 02/19/2020    CREATININE 2.6 02/19/2020    CALCIUM 8.7 02/19/2020    GFRAA 30 02/19/2020    LABGLOM 25 02/19/2020    GLUCOSE 133 02/19/2020       Assessment/Plan:  1. ESRD: On HD Mon/Wed/Fri at [facility]. HD this morning.     2. Kappa light chain MM: Here for cytoxan mobilization. Planning future melphalan treatment with autologous stem cell tx. I will discontinue the NS IV fluids today.      3. Anemia of CKD and cancer: Hgb at outpatient ESRD goal.  Per oncology.     4. Hypokalemia: Resolved.       5. Volume/HTN: Stop IV fluids. Remove fluid to dry weight with HD today - BP normal, he does not appear fluid overloaded today.     5. ID: On acyclovir, diflucan, and levaquin prophylaxis.       6. Access: Left upper arm AV fistula. Right IJ tunneled HD catheter.     7. Disposition: HD this morning to facilitate discharge.       Discussed with patient's RN, dialysis RN, and NP team for BMT    Jillian Lord MD  [facility]  Office Number: [phone]

## 2020-02-19 NOTE — DISCHARGE SUMMARY
to carry on normal activity; minor signs or symptoms of disease    PE performed by Dr. Nishi Hudson: Awake, alert and oriented. HEENT: normocephalic, alopecia,  no scleral erythema or icterus, Oral mucosa moist and intact, throat clear  NECK: supple   BACK: Straight negative CVAT  SKIN: warm dry and intact without lesions rashes or masses  CHEST: CTA bilaterally without use of accessory muscles  CV: Normal S1 S2, RRR, no MRG  ABD: NT ND normoactive BS, no palpable masses or hepatosplenomegaly  EXTREMITIES: without edema, denies calf tenderness, left arm fistula  NEURO: CN II - XII grossly intact  CATHETER: Right IJ Trifusion (IR, 2/14/20) - CDI     Discharge Laboratory Data:  CBC:   Recent Labs     02/17/20  1256 02/18/20  0332 02/19/20  0330   WBC 3.4* 3.7* 5.3   HGB 11.2* 10.3* 9.2*   HCT 33.0* 30.6* 27.1*   MCV 97.7 98.0 98.0    187 158     BMP/Mag:  Recent Labs     02/17/20  1256 02/18/20  0332 02/19/20  0330    136 134*   K 3.3* 4.8 4.7   CL 95* 100 103   CO2 32 23 21   PHOS 2.3* 2.4* 4.6   BUN 13 28* 48*   CREATININE 1.6* 2.5* 2.6*   MG 1.80  --   --      LIVP:   Recent Labs     02/17/20  1256 02/18/20  0332 02/19/20  0330   AST 17 15 13*   ALT 10 9* 7*   BILIDIR <0.2 <0.2 <0.2   BILITOT 0.9 0.6 0.5   ALKPHOS 68 68 54     Coags:   Recent Labs     02/17/20  1256   PROTIME 11.9   INR 1.03   APTT 30.7     Uric Acid   Recent Labs     02/17/20  1256 02/18/20  0332 02/19/20  0330   LABURIC 3.4* 4.3 6.1     Tacro:  No results for input(s): TACROLEV in the last 72 hours. CMV Quant DNA by PCR: No results found for: CMVDNAQNT  IgG: No results for input(s): IGG in the last 72 hours. PROBLEM LIST:           1.  [place] Light Chain Multiple Myeloma   2.  ESRD on HD   3.  GERD  4.  HTN  5.  Hypokalemia      TREATMENT:           1.  CyBorD (started 10/30/18)  2.  Revlimid / Sophia Ovalles / Manas Persons clinical trial (started 2/1/19)  3.  Carfilzomib / Pomalidomide / Dex x 6 cycles (Relapse #1 - started 8/26/19 -

## 2020-02-24 ENCOUNTER — APPOINTMENT (OUTPATIENT)
Dept: GENERAL RADIOLOGY | Age: 65
DRG: 871 | End: 2020-02-24
Payer: COMMERCIAL

## 2020-02-24 ENCOUNTER — HOSPITAL ENCOUNTER (INPATIENT)
Age: 65
LOS: 7 days | Discharge: HOME | DRG: 871 | End: 2020-03-02
Attending: EMERGENCY MEDICINE | Admitting: INTERNAL MEDICINE
Payer: COMMERCIAL

## 2020-02-24 PROBLEM — R50.81 NEUTROPENIC FEVER (HCC): Status: ACTIVE | Noted: 2020-02-24

## 2020-02-24 PROBLEM — D70.9 NEUTROPENIC FEVER (HCC): Status: ACTIVE | Noted: 2020-02-24

## 2020-02-24 LAB
ALBUMIN SERPL-MCNC: 3.5 G/DL (ref 3.4–5)
ALP BLD-CCNC: 79 U/L (ref 40–129)
ALT SERPL-CCNC: 16 U/L (ref 10–40)
ANION GAP SERPL CALCULATED.3IONS-SCNC: 12 MMOL/L (ref 3–16)
AST SERPL-CCNC: 11 U/L (ref 15–37)
BILIRUB SERPL-MCNC: 1.2 MG/DL (ref 0–1)
BILIRUBIN DIRECT: 0.4 MG/DL (ref 0–0.3)
BILIRUBIN, INDIRECT: 0.8 MG/DL (ref 0–1)
BUN BLDV-MCNC: 30 MG/DL (ref 7–20)
CALCIUM SERPL-MCNC: 8.7 MG/DL (ref 8.3–10.6)
CHLORIDE BLD-SCNC: 96 MMOL/L (ref 99–110)
CO2: 28 MMOL/L (ref 21–32)
CREAT SERPL-MCNC: 2 MG/DL (ref 0.8–1.3)
GFR AFRICAN AMERICAN: 41
GFR NON-AFRICAN AMERICAN: 34
GLUCOSE BLD-MCNC: 121 MG/DL (ref 70–99)
HCT VFR BLD CALC: 23.1 % (ref 40.5–52.5)
HEMOGLOBIN: 8 G/DL (ref 13.5–17.5)
LACTATE DEHYDROGENASE: 107 U/L (ref 100–190)
MAGNESIUM: 1.7 MG/DL (ref 1.8–2.4)
MCH RBC QN AUTO: 33.3 PG (ref 26–34)
MCHC RBC AUTO-ENTMCNC: 34.6 G/DL (ref 31–36)
MCV RBC AUTO: 96.2 FL (ref 80–100)
PDW BLD-RTO: 14.3 % (ref 12.4–15.4)
PLATELET # BLD: 54 K/UL (ref 135–450)
PMV BLD AUTO: 10.2 FL (ref 5–10.5)
POTASSIUM SERPL-SCNC: 3.9 MMOL/L (ref 3.5–5.1)
RAPID INFLUENZA  B AGN: NEGATIVE
RAPID INFLUENZA A AGN: NEGATIVE
RBC # BLD: 2.4 M/UL (ref 4.2–5.9)
SODIUM BLD-SCNC: 136 MMOL/L (ref 136–145)
TOTAL PROTEIN: 5.4 G/DL (ref 6.4–8.2)
URIC ACID, SERUM: 3.7 MG/DL (ref 3.5–7.2)
WBC # BLD: 0.1 K/UL (ref 4–11)

## 2020-02-24 PROCEDURE — 87102 FUNGUS ISOLATION CULTURE: CPT

## 2020-02-24 PROCEDURE — 85025 COMPLETE CBC W/AUTO DIFF WBC: CPT

## 2020-02-24 PROCEDURE — 87252 VIRUS INOCULATION TISSUE: CPT

## 2020-02-24 PROCEDURE — 87103 BLOOD FUNGUS CULTURE: CPT

## 2020-02-24 PROCEDURE — 87804 INFLUENZA ASSAY W/OPTIC: CPT

## 2020-02-24 PROCEDURE — 93005 ELECTROCARDIOGRAM TRACING: CPT | Performed by: PHYSICIAN ASSISTANT

## 2020-02-24 PROCEDURE — 6360000002 HC RX W HCPCS: Performed by: PHYSICIAN ASSISTANT

## 2020-02-24 PROCEDURE — 71046 X-RAY EXAM CHEST 2 VIEWS: CPT

## 2020-02-24 PROCEDURE — 36415 COLL VENOUS BLD VENIPUNCTURE: CPT

## 2020-02-24 PROCEDURE — 99284 EMERGENCY DEPT VISIT MOD MDM: CPT

## 2020-02-24 PROCEDURE — 83605 ASSAY OF LACTIC ACID: CPT

## 2020-02-24 PROCEDURE — 87253 VIRUS INOCULATE TISSUE ADDL: CPT

## 2020-02-24 PROCEDURE — 2580000003 HC RX 258: Performed by: PHYSICIAN ASSISTANT

## 2020-02-24 PROCEDURE — 83735 ASSAY OF MAGNESIUM: CPT

## 2020-02-24 PROCEDURE — 80076 HEPATIC FUNCTION PANEL: CPT

## 2020-02-24 PROCEDURE — 80048 BASIC METABOLIC PNL TOTAL CA: CPT

## 2020-02-24 PROCEDURE — 83615 LACTATE (LD) (LDH) ENZYME: CPT

## 2020-02-24 PROCEDURE — 87070 CULTURE OTHR SPECIMN AEROBIC: CPT

## 2020-02-24 PROCEDURE — 96365 THER/PROPH/DIAG IV INF INIT: CPT

## 2020-02-24 PROCEDURE — 84550 ASSAY OF BLOOD/URIC ACID: CPT

## 2020-02-24 PROCEDURE — 87040 BLOOD CULTURE FOR BACTERIA: CPT

## 2020-02-24 PROCEDURE — 2060000000 HC ICU INTERMEDIATE R&B

## 2020-02-24 RX ORDER — FAMOTIDINE 20 MG/1
20 TABLET, FILM COATED ORAL DAILY
Status: DISCONTINUED | OUTPATIENT
Start: 2020-02-25 | End: 2020-03-02 | Stop reason: HOSPADM

## 2020-02-24 RX ORDER — ACYCLOVIR 200 MG/1
400 CAPSULE ORAL 2 TIMES DAILY
Status: DISCONTINUED | OUTPATIENT
Start: 2020-02-24 | End: 2020-02-25

## 2020-02-24 RX ORDER — POTASSIUM CHLORIDE 29.8 MG/ML
20 INJECTION INTRAVENOUS PRN
Status: DISCONTINUED | OUTPATIENT
Start: 2020-02-24 | End: 2020-02-25

## 2020-02-24 RX ORDER — ACETAMINOPHEN 500 MG
500 TABLET ORAL EVERY 8 HOURS PRN
Status: DISCONTINUED | OUTPATIENT
Start: 2020-02-24 | End: 2020-02-24 | Stop reason: SDUPTHER

## 2020-02-24 RX ORDER — ALLOPURINOL 100 MG/1
200 TABLET ORAL DAILY
Status: DISCONTINUED | OUTPATIENT
Start: 2020-02-25 | End: 2020-03-02 | Stop reason: HOSPADM

## 2020-02-24 RX ORDER — 0.9 % SODIUM CHLORIDE 0.9 %
250 INTRAVENOUS SOLUTION INTRAVENOUS ONCE
Status: COMPLETED | OUTPATIENT
Start: 2020-02-24 | End: 2020-02-25

## 2020-02-24 RX ORDER — SODIUM CHLORIDE 0.9 % (FLUSH) 0.9 %
10 SYRINGE (ML) INJECTION EVERY 12 HOURS SCHEDULED
Status: DISCONTINUED | OUTPATIENT
Start: 2020-02-24 | End: 2020-03-02 | Stop reason: HOSPADM

## 2020-02-24 RX ORDER — OXYCODONE HYDROCHLORIDE 5 MG/1
5 TABLET ORAL EVERY 4 HOURS PRN
Status: DISCONTINUED | OUTPATIENT
Start: 2020-02-24 | End: 2020-03-02 | Stop reason: HOSPADM

## 2020-02-24 RX ORDER — ONDANSETRON HYDROCHLORIDE 8 MG/1
8 TABLET, FILM COATED ORAL EVERY 8 HOURS PRN
Status: DISCONTINUED | OUTPATIENT
Start: 2020-02-24 | End: 2020-03-02 | Stop reason: HOSPADM

## 2020-02-24 RX ORDER — MAGNESIUM SULFATE IN WATER 40 MG/ML
4 INJECTION, SOLUTION INTRAVENOUS PRN
Status: DISCONTINUED | OUTPATIENT
Start: 2020-02-24 | End: 2020-02-25

## 2020-02-24 RX ORDER — SODIUM CHLORIDE 0.9 % (FLUSH) 0.9 %
10 SYRINGE (ML) INJECTION PRN
Status: DISCONTINUED | OUTPATIENT
Start: 2020-02-24 | End: 2020-03-02 | Stop reason: HOSPADM

## 2020-02-24 RX ORDER — ACETAMINOPHEN 325 MG/1
650 TABLET ORAL EVERY 6 HOURS PRN
Status: DISCONTINUED | OUTPATIENT
Start: 2020-02-24 | End: 2020-03-02 | Stop reason: HOSPADM

## 2020-02-24 RX ORDER — ACETAMINOPHEN 650 MG/1
650 SUPPOSITORY RECTAL EVERY 6 HOURS PRN
Status: DISCONTINUED | OUTPATIENT
Start: 2020-02-24 | End: 2020-02-25

## 2020-02-24 RX ORDER — 0.9 % SODIUM CHLORIDE 0.9 %
500 INTRAVENOUS SOLUTION INTRAVENOUS ONCE
Status: COMPLETED | OUTPATIENT
Start: 2020-02-24 | End: 2020-02-24

## 2020-02-24 RX ORDER — OXYCODONE HYDROCHLORIDE 5 MG/1
10 TABLET ORAL EVERY 4 HOURS PRN
Status: DISCONTINUED | OUTPATIENT
Start: 2020-02-24 | End: 2020-03-02 | Stop reason: HOSPADM

## 2020-02-24 RX ADMIN — SODIUM CHLORIDE 250 ML: 9 INJECTION, SOLUTION INTRAVENOUS at 21:53

## 2020-02-24 RX ADMIN — PIPERACILLIN AND TAZOBACTAM 3.38 G: 3; .375 INJECTION, POWDER, LYOPHILIZED, FOR SOLUTION INTRAVENOUS at 20:55

## 2020-02-24 RX ADMIN — SODIUM CHLORIDE 500 ML: 9 INJECTION, SOLUTION INTRAVENOUS at 20:55

## 2020-02-24 ASSESSMENT — ENCOUNTER SYMPTOMS
NAUSEA: 0
COUGH: 0
WHEEZING: 0
VOMITING: 0
SHORTNESS OF BREATH: 0
DIARRHEA: 0
ABDOMINAL PAIN: 0
CHEST TIGHTNESS: 0

## 2020-02-24 ASSESSMENT — PAIN SCALES - GENERAL
PAINLEVEL_OUTOF10: 0
PAINLEVEL_OUTOF10: 0

## 2020-02-25 LAB
A/G RATIO: 1.8 (ref 1.1–2.2)
ALBUMIN SERPL-MCNC: 3.2 G/DL (ref 3.4–5)
ALP BLD-CCNC: 71 U/L (ref 40–129)
ALT SERPL-CCNC: 14 U/L (ref 10–40)
ANION GAP SERPL CALCULATED.3IONS-SCNC: 11 MMOL/L (ref 3–16)
APTT: 31.1 SEC (ref 24.2–36.2)
AST SERPL-CCNC: 10 U/L (ref 15–37)
BILIRUB SERPL-MCNC: 1.1 MG/DL (ref 0–1)
BUN BLDV-MCNC: 34 MG/DL (ref 7–20)
CALCIUM SERPL-MCNC: 8.3 MG/DL (ref 8.3–10.6)
CHLORIDE BLD-SCNC: 99 MMOL/L (ref 99–110)
CO2: 28 MMOL/L (ref 21–32)
CREAT SERPL-MCNC: 2.4 MG/DL (ref 0.8–1.3)
EKG ATRIAL RATE: 109 BPM
EKG DIAGNOSIS: NORMAL
EKG P AXIS: 13 DEGREES
EKG P-R INTERVAL: 140 MS
EKG Q-T INTERVAL: 358 MS
EKG QRS DURATION: 80 MS
EKG QTC CALCULATION (BAZETT): 482 MS
EKG R AXIS: 38 DEGREES
EKG T AXIS: 52 DEGREES
EKG VENTRICULAR RATE: 109 BPM
GFR AFRICAN AMERICAN: 33
GFR NON-AFRICAN AMERICAN: 27
GLOBULIN: 1.8 G/DL
GLUCOSE BLD-MCNC: 114 MG/DL (ref 70–99)
HCT VFR BLD CALC: 21.3 % (ref 40.5–52.5)
HEMOGLOBIN: 7.2 G/DL (ref 13.5–17.5)
INR BLD: 1.32 (ref 0.86–1.14)
LACTIC ACID: 0.9 MMOL/L (ref 0.4–2)
MAGNESIUM: 1.8 MG/DL (ref 1.8–2.4)
MCH RBC QN AUTO: 32.7 PG (ref 26–34)
MCHC RBC AUTO-ENTMCNC: 33.9 G/DL (ref 31–36)
MCV RBC AUTO: 96.4 FL (ref 80–100)
PDW BLD-RTO: 14.3 % (ref 12.4–15.4)
PHOSPHORUS: 3.1 MG/DL (ref 2.5–4.9)
PLATELET # BLD: 42 K/UL (ref 135–450)
PMV BLD AUTO: 9.4 FL (ref 5–10.5)
POTASSIUM SERPL-SCNC: 3.7 MMOL/L (ref 3.5–5.1)
PROTHROMBIN TIME: 15.4 SEC (ref 10–13.2)
RBC # BLD: 2.2 M/UL (ref 4.2–5.9)
SODIUM BLD-SCNC: 138 MMOL/L (ref 136–145)
TOTAL PROTEIN: 5 G/DL (ref 6.4–8.2)
WBC # BLD: 0 K/UL (ref 4–11)

## 2020-02-25 PROCEDURE — 2500000003 HC RX 250 WO HCPCS: Performed by: NURSE PRACTITIONER

## 2020-02-25 PROCEDURE — 6370000000 HC RX 637 (ALT 250 FOR IP): Performed by: INTERNAL MEDICINE

## 2020-02-25 PROCEDURE — 2060000000 HC ICU INTERMEDIATE R&B

## 2020-02-25 PROCEDURE — 36415 COLL VENOUS BLD VENIPUNCTURE: CPT

## 2020-02-25 PROCEDURE — 36592 COLLECT BLOOD FROM PICC: CPT

## 2020-02-25 PROCEDURE — 85730 THROMBOPLASTIN TIME PARTIAL: CPT

## 2020-02-25 PROCEDURE — 85025 COMPLETE CBC W/AUTO DIFF WBC: CPT

## 2020-02-25 PROCEDURE — 80053 COMPREHEN METABOLIC PANEL: CPT

## 2020-02-25 PROCEDURE — 2580000003 HC RX 258: Performed by: INTERNAL MEDICINE

## 2020-02-25 PROCEDURE — 6370000000 HC RX 637 (ALT 250 FOR IP): Performed by: NURSE PRACTITIONER

## 2020-02-25 PROCEDURE — 83735 ASSAY OF MAGNESIUM: CPT

## 2020-02-25 PROCEDURE — 6360000002 HC RX W HCPCS: Performed by: NURSE PRACTITIONER

## 2020-02-25 PROCEDURE — 85610 PROTHROMBIN TIME: CPT

## 2020-02-25 PROCEDURE — 6360000002 HC RX W HCPCS: Performed by: INTERNAL MEDICINE

## 2020-02-25 PROCEDURE — 84100 ASSAY OF PHOSPHORUS: CPT

## 2020-02-25 PROCEDURE — 2580000003 HC RX 258

## 2020-02-25 RX ORDER — FLUCONAZOLE 200 MG/1
200 TABLET ORAL
Status: DISCONTINUED | OUTPATIENT
Start: 2020-02-26 | End: 2020-03-02

## 2020-02-25 RX ORDER — SODIUM CHLORIDE 9 MG/ML
INJECTION, SOLUTION INTRAVENOUS
Status: COMPLETED
Start: 2020-02-25 | End: 2020-02-25

## 2020-02-25 RX ORDER — ACYCLOVIR 200 MG/1
200 CAPSULE ORAL 2 TIMES DAILY
Status: DISCONTINUED | OUTPATIENT
Start: 2020-02-25 | End: 2020-03-02 | Stop reason: HOSPADM

## 2020-02-25 RX ADMIN — MICONAZOLE NITRATE: 20 POWDER TOPICAL at 20:58

## 2020-02-25 RX ADMIN — SODIUM CHLORIDE 250 ML: 9 INJECTION, SOLUTION INTRAVENOUS at 05:42

## 2020-02-25 RX ADMIN — TBO-FILGRASTIM 1200 MCG: 300 INJECTION, SOLUTION SUBCUTANEOUS at 10:39

## 2020-02-25 RX ADMIN — FAMOTIDINE 20 MG: 20 TABLET ORAL at 08:25

## 2020-02-25 RX ADMIN — ALLOPURINOL 200 MG: 100 TABLET ORAL at 08:25

## 2020-02-25 RX ADMIN — PIPERACILLIN AND TAZOBACTAM 2.25 G: 2; .25 INJECTION, POWDER, LYOPHILIZED, FOR SOLUTION INTRAVENOUS at 05:40

## 2020-02-25 RX ADMIN — Medication 10 ML: at 20:58

## 2020-02-25 RX ADMIN — PIPERACILLIN AND TAZOBACTAM 2.25 G: 2; .25 INJECTION, POWDER, LYOPHILIZED, FOR SOLUTION INTRAVENOUS at 20:58

## 2020-02-25 RX ADMIN — MICONAZOLE NITRATE: 20 POWDER TOPICAL at 10:38

## 2020-02-25 RX ADMIN — Medication 10 ML: at 00:13

## 2020-02-25 RX ADMIN — PIPERACILLIN AND TAZOBACTAM 2.25 G: 2; .25 INJECTION, POWDER, LYOPHILIZED, FOR SOLUTION INTRAVENOUS at 13:12

## 2020-02-25 RX ADMIN — ACYCLOVIR 200 MG: 200 CAPSULE ORAL at 20:58

## 2020-02-25 ASSESSMENT — PAIN SCALES - GENERAL
PAINLEVEL_OUTOF10: 0
PAINLEVEL_OUTOF10: 0

## 2020-02-25 NOTE — H&P
Patient with MM - currently in the process of cytoxan assisted, mobilization of autologous stem cells    Called with fever > 101; received GCSF and underwent HD today      Upon presentation, temp 100.3; associated fatigue and malaise      Pan cultured - given a dose of zosyn; no clear localizing source of fever    Admit to [facility]    A few meds renally adjusted:  Pantoprazole changed to pepcid  Vitamins held  Compazine changed to ondansetron  Zosyn, 2.5 g q 8hrs; dose after HD or if not immediately after will need additional 0.75 g dose    Tylenol for fever, dose adjusted; oxycodone for pain    Daily CBC, CMP - bili mildly elevated and will trend moving forward            Vel Booker DO, MS  [facility]    Please contact via:  1. Perfect Serve  2.   Cell Phone:  [phone]    2/24/2020   10:11 PM

## 2020-02-25 NOTE — ED PROVIDER NOTES
is no distension. Palpations: Abdomen is soft. Tenderness: There is no abdominal tenderness. There is no guarding or rebound. Hernia: No hernia is present. Musculoskeletal: Normal range of motion. General: No swelling. Skin:     General: Skin is warm and dry. Findings: Rash (erythematous rash bilateral groin, no overt cellulitis surrounding) present. Neurological:      Mental Status: He is alert and oriented to person, place, and time. Psychiatric:         Behavior: Behavior normal.         Thought Content: Thought content normal.         Judgment: Judgment normal.       Diagnostic Results     EKG   Interpreted in conjunction with emergency department physician No att. providers found  Rhythm: sinus tachycardia  Rate: 109  Axis: normal  Ectopy: none  Conduction: normal  ST Segments: no acute change  T Waves: no acute change  Q Waves:nonspecific  Clinical Impression: sinus tachycardia  Comparison:  2/17/20    RADIOLOGY:  XR CHEST STANDARD (2 VW)   Final Result   1.  No acute findings in the chest.          LABS:   Results for orders placed or performed during the hospital encounter of 02/24/20   Rapid influenza A/B antigens   Result Value Ref Range    Rapid Influenza A Ag Negative Negative    Rapid Influenza B Ag Negative Negative   CBC auto differential   Result Value Ref Range    WBC 0.1 (LL) 4.0 - 11.0 K/uL    RBC 2.40 (L) 4.20 - 5.90 M/uL    Hemoglobin 8.0 (L) 13.5 - 17.5 g/dL    Hematocrit 23.1 (L) 40.5 - 52.5 %    MCV 96.2 80.0 - 100.0 fL    MCH 33.3 26.0 - 34.0 pg    MCHC 34.6 31.0 - 36.0 g/dL    RDW 14.3 12.4 - 15.4 %    Platelets 54 (L) 070 - 450 K/uL    MPV 10.2 5.0 - 10.5 fL   Basic Metabolic Panel   Result Value Ref Range    Sodium 136 136 - 145 mmol/L    Potassium 3.9 3.5 - 5.1 mmol/L    Chloride 96 (L) 99 - 110 mmol/L    CO2 28 21 - 32 mmol/L    Anion Gap 12 3 - 16    Glucose 121 (H) 70 - 99 mg/dL    BUN 30 (H) 7 - 20 mg/dL    CREATININE 2.0 (H) 0.8 - 1.3 mg/dL    GFR (TYLENOL) suppository 650 mg    magnesium hydroxide (MILK OF MAGNESIA) 400 MG/5ML suspension 30 mL    Saline Mouthwash 15 mL    Saline Mouthwash 15 mL    alteplase (CATHFLO) injection 2 mg       CONSULTS:  IP CONSULT TO Joseph / PETRA / Jose Jae is a 59 y.o. male presenting with concerns of fever in setting of current multiple myeloma treatment. Patient has generally felt ill for the past 2 days, no specific symptoms. Was noted to be febrile to 101.3 at home, prompting presentation to the emergency department. On arrival, patient does have a temperature of 100.3 and he is tachycardic, softer blood pressures. Patient appears ill, although is in no acute distress. Cardiac evaluation with tachycardia present, no murmurs or rubs. Lungs are clear to auscultation throughout. Abdomen is benign. Patient does have a rash to the bilateral groin which is consistent with jock itch, does not appear to be cellulitic in nature. No peripheral edema is noted. EKG with sinus tachycardia, no evidence of ischemia. Chest x-ray without pneumonia. Neutropenic fever work-up was initiated, IV access was established, swabs and cultures were obtained. Patient is negative for influenza. White blood cell count did come back at 0.1, consistent with neutropenia. Patient was initiated on Zosyn for antibiotic coverage. Although patient does have symptoms consistent with sepsis, he is a end-stage renal disease on dialysis patient, therefore sepsis fluids were not initiated. Patient's appropriate sepsis fluids were calculated at 2500 on ideal body weight, although patient was provided with boluses in 250 ml increments to avoid excessive fluid overload. At this time, patient will be admitted to oncology service for further monitoring and evaluation of neutropenic fever. He will be monitored in the emergency department until bed is ready upstairs.     This patient was also evaluated by the attending physician. All care plans were discussed and agreed upon. Clinical Impression     1. Neutropenic fever ([facility] [address]        Disposition     PATIENT REFERRED TO:  No follow-up provider specified.     DISCHARGE MEDICATIONS:  Current Discharge Medication List          DISPOSITION Admitted 02/24/2020 10:08:33 PM        Jean Ivey  02/25/20 9430

## 2020-02-25 NOTE — H&P
once copy of MAFB (20q12) in 78 % of cells. He is now s/p 6 cycles and has achieved a VGPR. He remains on hemodialysis 3 times weekly w/ Dr. Kelly Ngo.       He received high dose Cytoxan mobilization (2/17/20) and is now receiving high dose G-CSF in preparation to collect peripheral blood progenitor cells. He reported to the ED (2/24/20) w/ fever, malaise and fatigue. History reviewed. No pertinent surgical history. Past Medical History:   Diagnosis Date    Cancer [facility])        Prior to Admission medications    Medication Sig Start Date End Date Taking?  Authorizing Provider   pantoprazole (PROTONIX) 40 MG tablet Take 1 tablet by mouth daily 2/19/20  Yes Michelle Dyson MD   prochlorperazine (COMPAZINE) 10 MG tablet Take 1 tablet by mouth every 4 hours as needed (nausea) 2/19/20  Yes Michelle Dyson MD   B Complex-C-Folic Acid (DIALYVITE 420 PO) Take 1 tablet by mouth daily   Yes Historical Provider, MD   allopurinol (ZYLOPRIM) 100 MG tablet Take 200 mg by mouth daily    Yes Historical Provider, MD   acyclovir (ZOVIRAX) 200 MG capsule Take by mouth 2 times daily    Yes Historical Provider, MD       No Known Allergies    Family History   Problem Relation Age of Onset    Cancer Father         Social History     Socioeconomic History    Marital status: Life Partner     Spouse name: Pastor Antunez Number of children: Not on file    Years of education: Not on file    Highest education level: Not on file   Occupational History    Not on file   Social Needs    Financial resource strain: Not on file    Food insecurity:     Worry: Not on file     Inability: Not on file    Transportation needs:     Medical: Not on file     Non-medical: Not on file   Tobacco Use    Smoking status: Never Smoker    Smokeless tobacco: Never Used   Substance and Sexual Activity    Alcohol use: Not Currently    Drug use: Never    Sexual activity: Not Currently     Partners: Female   Lifestyle    Physical activity: 0.1* 0.0*   HGB 8.0* 7.2*   HCT 23.1* 21.3*   MCV 96.2 96.4   PLT 54* 42*     BMP/Mag:  Recent Labs     02/24/20  2104 02/25/20  0350    138   K 3.9 3.7   CL 96* 99   CO2 28 28   PHOS  --  3.1   BUN 30* 34*   CREATININE 2.0* 2.4*   MG 1.70* 1.80     LIVP:   Recent Labs     02/24/20  2104 02/25/20  0350   AST 11* 10*   ALT 16 14   BILIDIR 0.4*  --    BILITOT 1.2* 1.1*   ALKPHOS 79 71     Coags:   Recent Labs     02/25/20  0650   PROTIME 15.4*   INR 1.32*   APTT 31.1     Uric Acid   Recent Labs     02/24/20  2104   LABURIC 3.7       PROBLEM LIST:           1. [place] Light Chain Multiple Myeloma   2. ESRD on HD   3. GERD  4. HTN  5. Hypokalemia   6. Neutropenic Fever / Sepsis (2/2020)     TREATMENT:           1. CyBorD (started 10/30/18)  2. Revlimid / Trish Nunnery / Dex on clinical trial (started 2/1/19)  3. Carfilzomib / Pomalidomide / Dex x 6 cycles (Relapse #1 - started 8/26/19 - 2/2020)  4. Cytoxan Mobilization w/ 25% dose reduction (2/17/20)     ASSESSMENT AND PLAN:           1. [place]appa Light Chain Multiple Myeloma:  Currently in VGPR  - S/p Cytoxan Mobilization. Cont high dose G-CSF 1200 mcg daily in preparation to collect peripheral blood stem cells  - Admit at a later date for reduced dose Melphalan (140 mg/M^2) followed by autologous stem cell resuce  - Post transplant maintenance: Consider KPD x 4 cycles     Day + 9     2. ID:  Admitted w/ neutropenic fever and sepsis from unidentified infection   - Tinea cruris - start Miconazole powder  - CXR (2/24/20) - No acute process  - Pan cx (2/24/20) - Pending   - Cont Diflucan & acyclovir ppx  - Cont Zosyn Day + 2     3. Heme:  Pancytopenia from chemotherapy   - Transfuse for Hgb < 7 and Platelets < 15Z  - No transfusion today     4. ESRD / Metabolic:  Electrolytes are WNL and renal fxn stable.   - Followed by Dr. Farshad Richmond, consult on admit    - Cont HD on MWF  - Replace potassium and magnesium per nephrology     5. GI / Nutrition:    - H/o GERD  Nutrition:  Appetite and oral intake is good. - Cont MVI  - Start renal microbial diet   - Follow closely with dietary  GERD:    - Cont PPI        6.  Cardiac:   - H/o HTN  - Not currently on medications        - DVT Prophylaxis: Platelets <62,554 cells/dL - prophylactic lovenox on hold and mechanical prophylaxis with bilateral SCDs while in bed in place. Contraindications to pharmacologic prophylaxis: Thrombocytopenia  Contraindications to mechanical prophylaxis: None      - Disposition: once counts recover and afebrile    The patient was seen and examined by Dr. Ramya Ba . This admission history and physical has been discussed and agreed upon by Dr. Ramya Ba.     KIMBERLEY Baig - CNP

## 2020-02-25 NOTE — ED PROVIDER NOTES
ED Attending Attestation Note     Date of evaluation: 2/24/2020    This patient was seen by the advance practice provider. I have seen and examined the patient, agree with the workup, evaluation, management and diagnosis. The care plan has been discussed. I have reviewed the ECG and concur with the NIKI's interpretation. My assessment reveals a younger than stated age appearing white male who appears to feel unwell and who on arrival is noted to have multiple vital abnormalities including tachycardia 115, temperature 100.3, and tachypnea 24. Medical history notable for MM and patient reports fever 103 at home prior to coming in. Neutropenic fever workup started and ordered zosyn. Medical history notable for ESRD as well and will plan for 30cc/kg bolus (~2.5L) in small bolus aliquots with frequent reassessments. Will plan to discuss admission with oncology when initial labs return.       Vitor Kaufman MD  02/25/20 2222

## 2020-02-26 LAB
A/G RATIO: 1.9 (ref 1.1–2.2)
ABO/RH: NORMAL
ALBUMIN SERPL-MCNC: 3.4 G/DL (ref 3.4–5)
ALP BLD-CCNC: 71 U/L (ref 40–129)
ALT SERPL-CCNC: 12 U/L (ref 10–40)
ANION GAP SERPL CALCULATED.3IONS-SCNC: 14 MMOL/L (ref 3–16)
ANTIBODY SCREEN: NORMAL
AST SERPL-CCNC: 9 U/L (ref 15–37)
BILIRUB SERPL-MCNC: 0.7 MG/DL (ref 0–1)
BLOOD BANK DISPENSE STATUS: NORMAL
BLOOD BANK PRODUCT CODE: NORMAL
BPU ID: NORMAL
BUN BLDV-MCNC: 45 MG/DL (ref 7–20)
CALCIUM SERPL-MCNC: 8.6 MG/DL (ref 8.3–10.6)
CHLORIDE BLD-SCNC: 102 MMOL/L (ref 99–110)
CO2: 23 MMOL/L (ref 21–32)
CREAT SERPL-MCNC: 3 MG/DL (ref 0.8–1.3)
DESCRIPTION BLOOD BANK: NORMAL
GFR AFRICAN AMERICAN: 26
GFR NON-AFRICAN AMERICAN: 21
GLOBULIN: 1.8 G/DL
GLUCOSE BLD-MCNC: 106 MG/DL (ref 70–99)
HCT VFR BLD CALC: 20.5 % (ref 40.5–52.5)
HEMOGLOBIN: 7 G/DL (ref 13.5–17.5)
MAGNESIUM: 2 MG/DL (ref 1.8–2.4)
MCH RBC QN AUTO: 32.8 PG (ref 26–34)
MCHC RBC AUTO-ENTMCNC: 34.3 G/DL (ref 31–36)
MCV RBC AUTO: 95.6 FL (ref 80–100)
PDW BLD-RTO: 14.3 % (ref 12.4–15.4)
PHOSPHORUS: 3.3 MG/DL (ref 2.5–4.9)
PLATELET # BLD: 34 K/UL (ref 135–450)
PMV BLD AUTO: 9 FL (ref 5–10.5)
POTASSIUM SERPL-SCNC: 3.5 MMOL/L (ref 3.5–5.1)
RBC # BLD: 2.14 M/UL (ref 4.2–5.9)
SODIUM BLD-SCNC: 139 MMOL/L (ref 136–145)
THROAT CULTURE: NORMAL
TOTAL PROTEIN: 5.2 G/DL (ref 6.4–8.2)
URIC ACID, SERUM: 5.4 MG/DL (ref 3.5–7.2)
WBC # BLD: 0 K/UL (ref 4–11)

## 2020-02-26 PROCEDURE — 83735 ASSAY OF MAGNESIUM: CPT

## 2020-02-26 PROCEDURE — 84550 ASSAY OF BLOOD/URIC ACID: CPT

## 2020-02-26 PROCEDURE — 2580000003 HC RX 258: Performed by: INTERNAL MEDICINE

## 2020-02-26 PROCEDURE — 6370000000 HC RX 637 (ALT 250 FOR IP): Performed by: INTERNAL MEDICINE

## 2020-02-26 PROCEDURE — 86901 BLOOD TYPING SEROLOGIC RH(D): CPT

## 2020-02-26 PROCEDURE — 6360000002 HC RX W HCPCS: Performed by: INTERNAL MEDICINE

## 2020-02-26 PROCEDURE — 85025 COMPLETE CBC W/AUTO DIFF WBC: CPT

## 2020-02-26 PROCEDURE — 6360000002 HC RX W HCPCS: Performed by: NURSE PRACTITIONER

## 2020-02-26 PROCEDURE — 5A1D70Z PERFORMANCE OF URINARY FILTRATION, INTERMITTENT, LESS THAN 6 HOURS PER DAY: ICD-10-PCS | Performed by: INTERNAL MEDICINE

## 2020-02-26 PROCEDURE — 94760 N-INVAS EAR/PLS OXIMETRY 1: CPT

## 2020-02-26 PROCEDURE — 86923 COMPATIBILITY TEST ELECTRIC: CPT

## 2020-02-26 PROCEDURE — 2060000000 HC ICU INTERMEDIATE R&B

## 2020-02-26 PROCEDURE — 80053 COMPREHEN METABOLIC PANEL: CPT

## 2020-02-26 PROCEDURE — 84100 ASSAY OF PHOSPHORUS: CPT

## 2020-02-26 PROCEDURE — 86900 BLOOD TYPING SEROLOGIC ABO: CPT

## 2020-02-26 PROCEDURE — 6370000000 HC RX 637 (ALT 250 FOR IP): Performed by: NURSE PRACTITIONER

## 2020-02-26 PROCEDURE — 36430 TRANSFUSION BLD/BLD COMPNT: CPT

## 2020-02-26 PROCEDURE — 36592 COLLECT BLOOD FROM PICC: CPT

## 2020-02-26 PROCEDURE — P9040 RBC LEUKOREDUCED IRRADIATED: HCPCS

## 2020-02-26 PROCEDURE — 86850 RBC ANTIBODY SCREEN: CPT

## 2020-02-26 PROCEDURE — 90935 HEMODIALYSIS ONE EVALUATION: CPT

## 2020-02-26 RX ORDER — 0.9 % SODIUM CHLORIDE 0.9 %
20 INTRAVENOUS SOLUTION INTRAVENOUS ONCE
Status: DISCONTINUED | OUTPATIENT
Start: 2020-02-26 | End: 2020-02-27

## 2020-02-26 RX ADMIN — ALLOPURINOL 200 MG: 100 TABLET ORAL at 13:33

## 2020-02-26 RX ADMIN — MICONAZOLE NITRATE: 20 POWDER TOPICAL at 20:35

## 2020-02-26 RX ADMIN — Medication 10 ML: at 20:35

## 2020-02-26 RX ADMIN — ACYCLOVIR 200 MG: 200 CAPSULE ORAL at 20:34

## 2020-02-26 RX ADMIN — FLUCONAZOLE 200 MG: 200 TABLET ORAL at 20:34

## 2020-02-26 RX ADMIN — PIPERACILLIN AND TAZOBACTAM 2.25 G: 2; .25 INJECTION, POWDER, LYOPHILIZED, FOR SOLUTION INTRAVENOUS at 20:47

## 2020-02-26 RX ADMIN — SODIUM CHLORIDE 15 ML: 900 IRRIGANT IRRIGATION at 20:35

## 2020-02-26 RX ADMIN — MICONAZOLE NITRATE: 20 POWDER TOPICAL at 13:34

## 2020-02-26 RX ADMIN — PIPERACILLIN AND TAZOBACTAM 2.25 G: 2; .25 INJECTION, POWDER, LYOPHILIZED, FOR SOLUTION INTRAVENOUS at 13:33

## 2020-02-26 RX ADMIN — FAMOTIDINE 20 MG: 20 TABLET ORAL at 13:33

## 2020-02-26 RX ADMIN — TBO-FILGRASTIM 1200 MCG: 300 INJECTION, SOLUTION SUBCUTANEOUS at 08:33

## 2020-02-26 RX ADMIN — PIPERACILLIN AND TAZOBACTAM 2.25 G: 2; .25 INJECTION, POWDER, LYOPHILIZED, FOR SOLUTION INTRAVENOUS at 05:29

## 2020-02-26 RX ADMIN — ACYCLOVIR 200 MG: 200 CAPSULE ORAL at 13:33

## 2020-02-26 RX ADMIN — Medication 10 ML: at 08:40

## 2020-02-26 ASSESSMENT — PAIN SCALES - GENERAL
PAINLEVEL_OUTOF10: 0

## 2020-02-26 NOTE — PLAN OF CARE
Problem: Falls - Risk of:  Goal: Will remain free from falls  Description  Will remain free from falls  2/26/2020 1716 by Trygve Kawasaki, RN  Outcome: Ongoing  Note:   Orthostatic vital signs obtained at start of shift - see flowsheet for details. Pt does not meet criteria for orthostasis. Pt is a Med fall risk. See Hillery Lawrence Fall Score and ABCDS Injury Risk assessments. Pt bed is in low position, side rails up, call light and belongings are in reach. Fall risk light is on outside pts room. Pt encouraged to call for assistance as needed. Will continue with hourly rounds for PO intake, pain needs, toileting and repositioning as needed. Problem: Infection - Central Venous Catheter-Associated Bloodstream Infection:  Goal: Will show no infection signs and symptoms  Description  Will show no infection signs and symptoms  2/26/2020 1716 by Trygve Kawasaki, RN  Outcome: Ongoing  Note:   CVC site remains free of signs/symptoms of infection. No drainage, edema, erythema, pain, or warmth noted at site. Dressing changes continue per protocol and on an as needed basis - see flowsheet. Performed CHG bath today per [facility] protocol utilizing CHG solution in the shower. CVC site cleansed with CHG wipe over dressing, skin surrounding dressing, and first 6\" of IV tubing. Pt tolerated well. Continued to encourage daily CHG bathing per [facility] protocol. Problem: Bleeding:  Goal: Will show no signs and symptoms of excessive bleeding  Description  Will show no signs and symptoms of excessive bleeding  2/26/2020 1716 by Trygve Kawasaki, RN  Note:   Patient's hemoglobin this AM:   Recent Labs     02/26/20  0405   HGB 7.0*     Patient's platelet count this AM:   Recent Labs     02/26/20  0405   PLT 34*    Thrombocytopenia Precautions in place. Patient showing no signs or symptoms of active bleeding. Patient transfused blood products per orders - see flowsheet.   Patient verbalizes understanding of all instructions. Will continue to assess and implement POC. Call light within reach and hourly rounding in place.

## 2020-02-26 NOTE — PROGRESS NOTES
unidentified infection   - Tinea cruris - start Miconazole powder  - CXR (2/24/20) - No acute process  - Pan cx (2/24/20) - NGTD   - Cont Diflucan & acyclovir ppx  - Cont Zosyn Day + 3     3.  Heme:  Pancytopenia from chemotherapy   - Transfuse for Hgb < 7 and Platelets < 41D  - No transfusion today  -Continue HD GCSF daily, send CD34 when counts recover     4.  ESRD / Metabolic:  Electrolytes are WNL and renal fxn stable. - Followed by Dr. Briana Sheikh, consult on admit    - Cont HD on MWF  - Replace potassium and magnesium per nephrology     5. GI / Nutrition:    - H/o GERD  Nutrition:  Appetite and oral intake is good.   - Cont MVI  - Start renal microbial diet   - Follow closely with dietary  GERD:    - Cont PPI        6.  Cardiac:   - H/o HTN  - Not currently on medications             - DVT Prophylaxis: Platelets <48,940 cells/dL - prophylactic lovenox on hold and mechanical prophylaxis with bilateral SCDs while in bed in place. Contraindications to pharmacologic prophylaxis: Thrombocytopenia  Contraindications to mechanical prophylaxis: None    - Disposition: When afebrile and off IV abx      KIMBERLEY Cast - MINDI Dobson.  Tony Aguilar, [facility]

## 2020-02-27 LAB
A/G RATIO: 1.8 (ref 1.1–2.2)
ALBUMIN SERPL-MCNC: 3.3 G/DL (ref 3.4–5)
ALP BLD-CCNC: 70 U/L (ref 40–129)
ALT SERPL-CCNC: 10 U/L (ref 10–40)
ANION GAP SERPL CALCULATED.3IONS-SCNC: 13 MMOL/L (ref 3–16)
APTT: 29.1 SEC (ref 24.2–36.2)
AST SERPL-CCNC: 7 U/L (ref 15–37)
BILIRUB SERPL-MCNC: 0.7 MG/DL (ref 0–1)
BUN BLDV-MCNC: 32 MG/DL (ref 7–20)
CALCIUM SERPL-MCNC: 8.4 MG/DL (ref 8.3–10.6)
CHLORIDE BLD-SCNC: 99 MMOL/L (ref 99–110)
CO2: 25 MMOL/L (ref 21–32)
CREAT SERPL-MCNC: 2.9 MG/DL (ref 0.8–1.3)
FINAL REPORT: NORMAL
GFR AFRICAN AMERICAN: 27
GFR NON-AFRICAN AMERICAN: 22
GLOBULIN: 1.8 G/DL
GLUCOSE BLD-MCNC: 96 MG/DL (ref 70–99)
HCT VFR BLD CALC: 21.2 % (ref 40.5–52.5)
HEMOGLOBIN: 7.3 G/DL (ref 13.5–17.5)
HEPATITIS B SURFACE ANTIGEN INTERPRETATION: NORMAL
INR BLD: 1.27 (ref 0.86–1.14)
MAGNESIUM: 1.9 MG/DL (ref 1.8–2.4)
MCH RBC QN AUTO: 32.7 PG (ref 26–34)
MCHC RBC AUTO-ENTMCNC: 34.4 G/DL (ref 31–36)
MCV RBC AUTO: 94.8 FL (ref 80–100)
PDW BLD-RTO: 14.1 % (ref 12.4–15.4)
PLATELET # BLD: 30 K/UL (ref 135–450)
PMV BLD AUTO: 9.1 FL (ref 5–10.5)
POTASSIUM SERPL-SCNC: 3.7 MMOL/L (ref 3.5–5.1)
PRELIMINARY: NORMAL
PROTHROMBIN TIME: 14.8 SEC (ref 10–13.2)
RBC # BLD: 2.24 M/UL (ref 4.2–5.9)
SODIUM BLD-SCNC: 137 MMOL/L (ref 136–145)
TOTAL PROTEIN: 5.1 G/DL (ref 6.4–8.2)
WBC # BLD: 0.1 K/UL (ref 4–11)

## 2020-02-27 PROCEDURE — 2580000003 HC RX 258: Performed by: INTERNAL MEDICINE

## 2020-02-27 PROCEDURE — 80053 COMPREHEN METABOLIC PANEL: CPT

## 2020-02-27 PROCEDURE — 83735 ASSAY OF MAGNESIUM: CPT

## 2020-02-27 PROCEDURE — 6360000002 HC RX W HCPCS: Performed by: INTERNAL MEDICINE

## 2020-02-27 PROCEDURE — 36592 COLLECT BLOOD FROM PICC: CPT

## 2020-02-27 PROCEDURE — 6370000000 HC RX 637 (ALT 250 FOR IP): Performed by: NURSE PRACTITIONER

## 2020-02-27 PROCEDURE — 85610 PROTHROMBIN TIME: CPT

## 2020-02-27 PROCEDURE — 6360000002 HC RX W HCPCS: Performed by: NURSE PRACTITIONER

## 2020-02-27 PROCEDURE — 85730 THROMBOPLASTIN TIME PARTIAL: CPT

## 2020-02-27 PROCEDURE — 87340 HEPATITIS B SURFACE AG IA: CPT

## 2020-02-27 PROCEDURE — 6370000000 HC RX 637 (ALT 250 FOR IP): Performed by: INTERNAL MEDICINE

## 2020-02-27 PROCEDURE — 2060000000 HC ICU INTERMEDIATE R&B

## 2020-02-27 PROCEDURE — 85025 COMPLETE CBC W/AUTO DIFF WBC: CPT

## 2020-02-27 RX ADMIN — MICONAZOLE NITRATE: 20 POWDER TOPICAL at 20:44

## 2020-02-27 RX ADMIN — TBO-FILGRASTIM 1200 MCG: 300 INJECTION, SOLUTION SUBCUTANEOUS at 06:55

## 2020-02-27 RX ADMIN — PIPERACILLIN AND TAZOBACTAM 2.25 G: 2; .25 INJECTION, POWDER, LYOPHILIZED, FOR SOLUTION INTRAVENOUS at 13:05

## 2020-02-27 RX ADMIN — ALLOPURINOL 200 MG: 100 TABLET ORAL at 08:35

## 2020-02-27 RX ADMIN — Medication 10 ML: at 20:44

## 2020-02-27 RX ADMIN — PIPERACILLIN AND TAZOBACTAM 2.25 G: 2; .25 INJECTION, POWDER, LYOPHILIZED, FOR SOLUTION INTRAVENOUS at 05:57

## 2020-02-27 RX ADMIN — PIPERACILLIN AND TAZOBACTAM 2.25 G: 2; .25 INJECTION, POWDER, LYOPHILIZED, FOR SOLUTION INTRAVENOUS at 20:49

## 2020-02-27 RX ADMIN — ACYCLOVIR 200 MG: 200 CAPSULE ORAL at 20:44

## 2020-02-27 RX ADMIN — ACYCLOVIR 200 MG: 200 CAPSULE ORAL at 08:35

## 2020-02-27 RX ADMIN — MICONAZOLE NITRATE: 20 POWDER TOPICAL at 08:35

## 2020-02-27 RX ADMIN — Medication 10 ML: at 08:35

## 2020-02-27 RX ADMIN — FAMOTIDINE 20 MG: 20 TABLET ORAL at 08:35

## 2020-02-27 RX ADMIN — ACETAMINOPHEN 650 MG: 325 TABLET ORAL at 00:37

## 2020-02-27 ASSESSMENT — PAIN DESCRIPTION - FREQUENCY: FREQUENCY: CONTINUOUS

## 2020-02-27 ASSESSMENT — PAIN DESCRIPTION - DESCRIPTORS: DESCRIPTORS: HEADACHE

## 2020-02-27 ASSESSMENT — PAIN - FUNCTIONAL ASSESSMENT: PAIN_FUNCTIONAL_ASSESSMENT: ACTIVITIES ARE NOT PREVENTED

## 2020-02-27 ASSESSMENT — PAIN DESCRIPTION - PAIN TYPE: TYPE: ACUTE PAIN

## 2020-02-27 ASSESSMENT — PAIN SCALES - GENERAL
PAINLEVEL_OUTOF10: 0
PAINLEVEL_OUTOF10: 0
PAINLEVEL_OUTOF10: 3
PAINLEVEL_OUTOF10: 0

## 2020-02-27 ASSESSMENT — PAIN DESCRIPTION - PROGRESSION
CLINICAL_PROGRESSION: NOT CHANGED

## 2020-02-27 ASSESSMENT — PAIN DESCRIPTION - LOCATION: LOCATION: HEAD

## 2020-02-27 ASSESSMENT — PAIN DESCRIPTION - ORIENTATION: ORIENTATION: MID

## 2020-02-27 ASSESSMENT — PAIN DESCRIPTION - ONSET: ONSET: GRADUAL

## 2020-02-27 NOTE — PROGRESS NOTES
[address] Progress Note    2020     Harpal Last    MRN: [id number]    : 1955    Referring MD: MD Cesar Maza [address] [place] [address]      SUBJECTIVE:  No further fever. No complaints this morning. ECOG PS:(2) Ambulatory and capable of self care, unable to carry out work activity, up and about > 50% or waking hours    KPS: 80% Normal activity with effort; some signs or symptoms of disease    Isolation: None    Medications    Scheduled Meds:   sodium chloride  20 mL Intravenous Once    tbo-filgrastim  1,200 mcg Subcutaneous Q24H    miconazole   Topical BID    fluconazole  200 mg Oral Once per day on     acyclovir  200 mg Oral BID    allopurinol  200 mg Oral Daily    piperacillin-tazobactam  2.25 g Intravenous Q8H    famotidine  20 mg Oral Daily    sodium chloride flush  10 mL Intravenous 2 times per day    Saline Mouthwash  15 mL Swish & Spit 4x Daily AC & HS     Continuous Infusions:  PRN Meds:.ondansetron, oxyCODONE **OR** oxyCODONE, sodium chloride flush, acetaminophen, magnesium hydroxide, Saline Mouthwash, alteplase    ROS:  As noted above, otherwise remainder of 10-point ROS negative    Physical Exam:     I&O:      Intake/Output Summary (Last 24 hours) at 2020 0702  Last data filed at 2020 5052  Gross per 24 hour   Intake 2334.48 ml   Output 1700 ml   Net 634.48 ml       Vital Signs:  /69   Pulse 85   Temp 98.2 °F (36.8 °C) (Oral)   Resp 16   Ht 6' 4\" (1.93 m)   Wt 237 lb 7 oz (107.7 kg)   SpO2 96%   BMI 28.90 kg/m²     Weight:    Wt Readings from Last 3 Encounters:   20 237 lb 7 oz (107.7 kg)   20 250 lb 1 oz (113.4 kg)   20 244 lb (110.7 kg)         General: Awake, alert and oriented.   HEENT: normocephalic, alopecia, PERRL, no scleral erythema or icterus, Oral mucosa moist and intact, throat clear  NECK: supple without palpable adenopathy  BACK: Straight negative CVAT  SKIN: warm dry and intact without

## 2020-02-27 NOTE — PLAN OF CARE
Problem: Falls - Risk of:  Goal: Will remain free from falls  Description  Will remain free from falls  2/26/2020 1716 by Kody Tee RN  Outcome: Ongoing  Note:   Orthostatic vital signs obtained at start of shift - see flowsheet for details. Pt does not meet criteria for orthostasis. Pt is a Med fall risk. See [place] Fall Fall Score and ABCDS Injury Risk assessments. Pt bed is in low position, side rails up, call light and belongings are in reach. Fall risk light is on outside pts room. Pt encouraged to call for assistance as needed. Will continue with hourly rounds for PO intake, pain needs, toileting and repositioning as needed. Problem: Infection - Central Venous Catheter-Associated Bloodstream Infection:  Goal: Will show no infection signs and symptoms  Description  Will show no infection signs and symptoms  2/26/2020 1716 by Kody Tee RN  Outcome: Ongoing  Note:   CVC site remains free of signs/symptoms of infection. No drainage, edema, erythema, pain, or warmth noted at site. Dressing changes continue per protocol and on an as needed basis - see flowsheet. Performed CHG bath today per [facility] protocol utilizing CHG solution in the shower. CVC site cleansed with CHG wipe over dressing, skin surrounding dressing, and first 6\" of IV tubing. Pt tolerated well. Continued to encourage daily CHG bathing per [facility] protocol. Problem: Bleeding:  Goal: Will show no signs and symptoms of excessive bleeding  Description  Will show no signs and symptoms of excessive bleeding  2/26/2020 1716 by Kody Tee RN  Note:   Patient's hemoglobin this AM:   Recent Labs     02/27/20  0357   HGB 7.3*     Patient's platelet count this AM:   Recent Labs     02/27/20  0357   PLT 30*    Thrombocytopenia Precautions in place. Patient showing no signs or symptoms of active bleeding. Patient transfused blood products per orders - see flowsheet.   Patient verbalizes understanding of all instructions. Will continue to assess and implement POC. Call light within reach and hourly rounding in place.

## 2020-02-27 NOTE — PROGRESS NOTES
Nutrition Assessment     Type and Reason for Visit: Initial, Positive Nutrition Screen    Nutrition Recommendations: Continue current diet- monitor for need for renal diet restrictions. Nutrition Assessment:  RD triggered for positive nutr screen for MST. Pt reports poor po intake x 1 day pta r/t fever. Otherwise reports good appetite and minor weight loss, but he attriubutes this to eating less/more portioned controlled and gave up soda. No nutrition risk at this time. RD did review renal diet- pt \"not following\" per say but renal labs wnl and will monitor if necessary as pt is on HD .     Malnutrition Assessment:  · Malnutrition Status: No malnutrition    Nutrition Risk Level   Risk Level: Low    Nutrition Diagnosis:   · Problem: No nutrition diagnosis at this time  · Etiology:      Signs and symptoms:      Nutrition Intervention:  Food and/or Delivery: Continue current diet  Nutrition Education/Counseling/Coordination of Care:  Continued Inpatient Monitoring      Electronically signed by Gely Cline RD, LD on 2/27/20 at 3:34 PM    Contact Number: [phone]

## 2020-02-28 LAB
A/G RATIO: 1.7 (ref 1.1–2.2)
ALBUMIN SERPL-MCNC: 3.2 G/DL (ref 3.4–5)
ALP BLD-CCNC: 70 U/L (ref 40–129)
ALT SERPL-CCNC: 9 U/L (ref 10–40)
ANION GAP SERPL CALCULATED.3IONS-SCNC: 12 MMOL/L (ref 3–16)
AST SERPL-CCNC: 7 U/L (ref 15–37)
BILIRUB SERPL-MCNC: 0.5 MG/DL (ref 0–1)
BLOOD CULTURE, ROUTINE: NORMAL
BUN BLDV-MCNC: 33 MG/DL (ref 7–20)
CALCIUM SERPL-MCNC: 8.7 MG/DL (ref 8.3–10.6)
CHLORIDE BLD-SCNC: 104 MMOL/L (ref 99–110)
CO2: 25 MMOL/L (ref 21–32)
CREAT SERPL-MCNC: 3.3 MG/DL (ref 0.8–1.3)
CULTURE, BLOOD 2: NORMAL
GFR AFRICAN AMERICAN: 23
GFR NON-AFRICAN AMERICAN: 19
GLOBULIN: 1.9 G/DL
GLUCOSE BLD-MCNC: 87 MG/DL (ref 70–99)
HCT VFR BLD CALC: 21.3 % (ref 40.5–52.5)
HEMOGLOBIN: 7.2 G/DL (ref 13.5–17.5)
MAGNESIUM: 1.9 MG/DL (ref 1.8–2.4)
MCH RBC QN AUTO: 32.1 PG (ref 26–34)
MCHC RBC AUTO-ENTMCNC: 33.8 G/DL (ref 31–36)
MCV RBC AUTO: 95 FL (ref 80–100)
PDW BLD-RTO: 14.2 % (ref 12.4–15.4)
PHOSPHORUS: 2.6 MG/DL (ref 2.5–4.9)
PLATELET # BLD: 42 K/UL (ref 135–450)
PMV BLD AUTO: 9.9 FL (ref 5–10.5)
POTASSIUM SERPL-SCNC: 3.7 MMOL/L (ref 3.5–5.1)
RBC # BLD: 2.24 M/UL (ref 4.2–5.9)
SODIUM BLD-SCNC: 141 MMOL/L (ref 136–145)
TOTAL PROTEIN: 5.1 G/DL (ref 6.4–8.2)
URIC ACID, SERUM: 4.8 MG/DL (ref 3.5–7.2)
WBC # BLD: 0.4 K/UL (ref 4–11)

## 2020-02-28 PROCEDURE — 83735 ASSAY OF MAGNESIUM: CPT

## 2020-02-28 PROCEDURE — 2060000000 HC ICU INTERMEDIATE R&B

## 2020-02-28 PROCEDURE — 36592 COLLECT BLOOD FROM PICC: CPT

## 2020-02-28 PROCEDURE — 6360000002 HC RX W HCPCS: Performed by: INTERNAL MEDICINE

## 2020-02-28 PROCEDURE — 84100 ASSAY OF PHOSPHORUS: CPT

## 2020-02-28 PROCEDURE — 85025 COMPLETE CBC W/AUTO DIFF WBC: CPT

## 2020-02-28 PROCEDURE — 90935 HEMODIALYSIS ONE EVALUATION: CPT

## 2020-02-28 PROCEDURE — 6370000000 HC RX 637 (ALT 250 FOR IP): Performed by: NURSE PRACTITIONER

## 2020-02-28 PROCEDURE — 2580000003 HC RX 258: Performed by: INTERNAL MEDICINE

## 2020-02-28 PROCEDURE — 80053 COMPREHEN METABOLIC PANEL: CPT

## 2020-02-28 PROCEDURE — 84550 ASSAY OF BLOOD/URIC ACID: CPT

## 2020-02-28 PROCEDURE — 6360000002 HC RX W HCPCS: Performed by: NURSE PRACTITIONER

## 2020-02-28 PROCEDURE — 2580000003 HC RX 258

## 2020-02-28 PROCEDURE — 6370000000 HC RX 637 (ALT 250 FOR IP): Performed by: INTERNAL MEDICINE

## 2020-02-28 RX ORDER — SODIUM CHLORIDE 9 MG/ML
INJECTION, SOLUTION INTRAVENOUS
Status: COMPLETED
Start: 2020-02-28 | End: 2020-02-28

## 2020-02-28 RX ADMIN — SODIUM CHLORIDE 250 ML: 9 INJECTION, SOLUTION INTRAVENOUS at 21:29

## 2020-02-28 RX ADMIN — ACETAMINOPHEN 650 MG: 325 TABLET ORAL at 21:24

## 2020-02-28 RX ADMIN — FLUCONAZOLE 200 MG: 200 TABLET ORAL at 21:23

## 2020-02-28 RX ADMIN — PIPERACILLIN AND TAZOBACTAM 2.25 G: 2; .25 INJECTION, POWDER, LYOPHILIZED, FOR SOLUTION INTRAVENOUS at 14:04

## 2020-02-28 RX ADMIN — FAMOTIDINE 20 MG: 20 TABLET ORAL at 14:04

## 2020-02-28 RX ADMIN — PIPERACILLIN AND TAZOBACTAM 2.25 G: 2; .25 INJECTION, POWDER, LYOPHILIZED, FOR SOLUTION INTRAVENOUS at 21:29

## 2020-02-28 RX ADMIN — Medication 10 ML: at 14:04

## 2020-02-28 RX ADMIN — ACYCLOVIR 200 MG: 200 CAPSULE ORAL at 21:23

## 2020-02-28 RX ADMIN — MICONAZOLE NITRATE: 20 POWDER TOPICAL at 21:30

## 2020-02-28 RX ADMIN — ALLOPURINOL 200 MG: 100 TABLET ORAL at 14:03

## 2020-02-28 RX ADMIN — Medication 10 ML: at 21:29

## 2020-02-28 RX ADMIN — TBO-FILGRASTIM 1200 MCG: 300 INJECTION, SOLUTION SUBCUTANEOUS at 06:44

## 2020-02-28 RX ADMIN — PIPERACILLIN AND TAZOBACTAM 2.25 G: 2; .25 INJECTION, POWDER, LYOPHILIZED, FOR SOLUTION INTRAVENOUS at 05:25

## 2020-02-28 ASSESSMENT — PAIN DESCRIPTION - PAIN TYPE: TYPE: ACUTE PAIN

## 2020-02-28 ASSESSMENT — PAIN SCALES - GENERAL
PAINLEVEL_OUTOF10: 3
PAINLEVEL_OUTOF10: 0
PAINLEVEL_OUTOF10: 0
PAINLEVEL_OUTOF10: 3
PAINLEVEL_OUTOF10: 0

## 2020-02-28 ASSESSMENT — PAIN DESCRIPTION - ONSET: ONSET: GRADUAL

## 2020-02-28 ASSESSMENT — PAIN DESCRIPTION - ORIENTATION: ORIENTATION: MID

## 2020-02-28 ASSESSMENT — PAIN DESCRIPTION - LOCATION: LOCATION: HEAD

## 2020-02-28 ASSESSMENT — PAIN DESCRIPTION - FREQUENCY: FREQUENCY: CONTINUOUS

## 2020-02-28 ASSESSMENT — PAIN DESCRIPTION - PROGRESSION
CLINICAL_PROGRESSION: NOT CHANGED
CLINICAL_PROGRESSION: NOT CHANGED

## 2020-02-28 ASSESSMENT — PAIN - FUNCTIONAL ASSESSMENT: PAIN_FUNCTIONAL_ASSESSMENT: ACTIVITIES ARE NOT PREVENTED

## 2020-02-28 ASSESSMENT — PAIN DESCRIPTION - DESCRIPTORS: DESCRIPTORS: HEADACHE

## 2020-02-28 NOTE — PLAN OF CARE
Problem: Falls - Risk of:  Goal: Will remain free from falls  Description  Will remain free from falls  Outcome: Ongoing  Note:   Orthostatic vital signs obtained at start of shift - see flowsheet for details. Pt does not meet criteria for orthostasis. Pt is a Med fall risk. See Elin Hurter Fall Score and ABCDS Injury Risk assessments. - Screening for Orthostasis AND not a [place] Risk per LOUIS/ABCDS: Pt bed is in low position, side rails up, call light and belongings are in reach. Fall risk light is on outside pts room. Pt encouraged to call for assistance as needed. Will continue with hourly rounds for PO intake, pain needs, toileting and repositioning as needed. Problem: Infection - Central Venous Catheter-Associated Bloodstream Infection:  Goal: Will show no infection signs and symptoms  Description  Will show no infection signs and symptoms  Outcome: Ongoing  Note:   CVC site remains free of signs/symptoms of infection. No drainage, edema, erythema, pain, or warmth noted at site. Dressing changes continue per protocol and on an as needed basis - see flowsheet. Compliant with [facility] Bath Protocol:  Performed CHG bath today per [facility] protocol utilizing CHG solution in the shower. CVC site cleansed with CHG wipe over dressing, skin surrounding dressing, and first 6\" of IV tubing. Pt tolerated well. Continued to encourage daily CHG bathing per [facility] [place] [facility] protocol. Problem: Bleeding:  Goal: Will show no signs and symptoms of excessive bleeding  Description  Will show no signs and symptoms of excessive bleeding  Outcome: Ongoing  Note:   Patient's hemoglobin this AM:   Recent Labs     02/28/20  0335   HGB 7.2*     Patient's platelet count this AM:   Recent Labs     02/28/20  0335   PLT 42*    Thrombocytopenia Precautions in place. Patient showing no signs or symptoms of active bleeding. Transfusion not indicated at this time. Patient verbalizes understanding of all instructions.  Will continue to assess and implement POC. Call light within reach and hourly rounding in place. Problem: Pain:  Goal: Pain level will decrease  Description  Pain level will decrease  Outcome: Ongoing  Note:   Pt denies pain, will continue to monitor.

## 2020-02-28 NOTE — PROGRESS NOTES
[address] Progress Note    2020     Mikey Gaffney    MRN: [id number]    : 1955    Referring MD: MD Cesar Moreno [address]      SUBJECTIVE:  Awake alert and well oriented. No complaints this morning. ECOG PS:(2) Ambulatory and capable of self care, unable to carry out work activity, up and about > 50% or waking hours    KPS: 80% Normal activity with effort; some signs or symptoms of disease    Isolation: None    Medications    Scheduled Meds:   tbo-filgrastim  1,200 mcg Subcutaneous Q24H    miconazole   Topical BID    fluconazole  200 mg Oral Once per day on     acyclovir  200 mg Oral BID    allopurinol  200 mg Oral Daily    piperacillin-tazobactam  2.25 g Intravenous Q8H    famotidine  20 mg Oral Daily    sodium chloride flush  10 mL Intravenous 2 times per day    Saline Mouthwash  15 mL Swish & Spit 4x Daily AC & HS     Continuous Infusions:  PRN Meds:.ondansetron, oxyCODONE **OR** oxyCODONE, sodium chloride flush, acetaminophen, magnesium hydroxide, Saline Mouthwash, alteplase    ROS:  As noted above, otherwise remainder of 10-point ROS negative    Physical Exam:     I&O:      Intake/Output Summary (Last 24 hours) at 2020 0706  Last data filed at 2020 0659  Gross per 24 hour   Intake 910 ml   Output 775 ml   Net 135 ml       Vital Signs:  /64   Pulse 88   Temp 98 °F (36.7 °C) (Oral)   Resp 16   Ht 6' 4\" (1.93 m)   Wt 235 lb 6.4 oz (106.8 kg)   SpO2 94%   BMI 28.65 kg/m²     Weight:    Wt Readings from Last 3 Encounters:   20 235 lb 6.4 oz (106.8 kg)   20 250 lb 1 oz (113.4 kg)   20 244 lb (110.7 kg)         General: Awake, alert and oriented.   HEENT: normocephalic, alopecia, PERRL, no scleral erythema or icterus, Oral mucosa moist and intact, throat clear  NECK: supple without palpable adenopathy  BACK: Straight negative CVAT  SKIN: warm dry and intact without lesions rashes or masses  CHEST: CTA bilaterally without use of accessory muscles  CV: Normal S1 S2, RRR, no MRG  ABD: NT ND normoactive BS, no palpable masses or hepatosplenomegaly  EXTREMITIES: without edema, denies calf tenderness, left arm fistula  NEURO: CN II - XII grossly intact  CATHETER: Right IJ Trifusion (IR, 2/14/20) - CDI     Data    CBC:   Recent Labs     02/26/20  0405 02/27/20  0357 02/28/20  0335   WBC 0.0* 0.1* 0.4*   HGB 7.0* 7.3* 7.2*   HCT 20.5* 21.2* 21.3*   MCV 95.6 94.8 95.0   PLT 34* 30* 42*     BMP/Mag:  Recent Labs     02/26/20  0405 02/27/20  0357 02/28/20  0335    137 141   K 3.5 3.7 3.7    99 104   CO2 23 25 25   PHOS 3.3  --  2.6   BUN 45* 32* 33*   CREATININE 3.0* 2.9* 3.3*   MG 2.00 1.90 1.90     LIVP:   Recent Labs     02/26/20  0405 02/27/20  0357 02/28/20  0335   AST 9* 7* 7*   ALT 12 10 9*   BILITOT 0.7 0.7 0.5   ALKPHOS 71 70 70     Coags:   Recent Labs     02/27/20  0357   PROTIME 14.8*   INR 1.27*   APTT 29.1     Uric Acid   Recent Labs     02/26/20  0405 02/28/20  0335   LABURIC 5.4 4.8       PROBLEM LIST:             1.  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  ESRD on HD   3.  GERD  4.  HTN  5.  Hypokalemia   6. Neutropenic Fever / Sepsis (2/2020)     TREATMENT:           1. CyBorD (started 10/30/18)  2.  Revlimid / Daysi Sierra / Dana Mane clinical trial (started 2/1/19)  3.  Carfilzomib / Pomalidomide / Dex x 6 cycles (Relapse #1 - started 8/26/19 - 2/2020)  4.  Cytoxan Mobilization w/ 25% dose reduction (2/17/20)     ASSESSMENT AND PLAN:           1.  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 Cont high dose G-CSF 1200 mcg daily in preparation to collect peripheral blood stem cells  - Admit at a later date for reduced dose Melphalan (140 mg/M^2) followed by autologous stem cell resuce  - Post transplant maintenance: Consider KPD x 4 cycles     Day + 12 - CD34 count when WBC is 1.0. Eugenia is aware that he is inpatient.  He was predicated to begin collecting on 3/1/20.       2.  ID:  Admitted w/ neutropenic fever and sepsis from unidentified infection. Sepsis has resolved  - Tinea cruris:  Cont Miconazole powder  - CXR (2/24/20) - No acute process  - Pan cx (2/24/20) - NGTD   - Cont Diflucan & acyclovir ppx  - Cont Zosyn Day + 5     3.  Heme:  Pancytopenia from chemotherapy   - Transfuse for Hgb < 7 and Platelets < 11C  - No transfusion today     4.  ESRD / Metabolic:  Electrolytes are WNL and renal fxn stable. - Followed by Dr. Governor Roblero, consult on admit    - Cont HD on MWF  - Replace potassium and magnesium per nephrology     5. GI / Nutrition:    - H/o GERD  Nutrition:  Appetite and oral intake are good.   - Cont MVI  - Cont renal microbial diet   - Follow closely with dietary  GERD:    - Cont PPI        6.  Cardiac:   - H/o HTN   - Not currently on medications         - DVT Prophylaxis: Platelets <74,558 cells/dL - prophylactic lovenox on hold and mechanical prophylaxis with bilateral SCDs while in bed in place. Contraindications to pharmacologic prophylaxis: Thrombocytopenia  Contraindications to mechanical prophylaxis: None    - Disposition: When afebrile and off IV abx      KIMBERLEY Clark - MINDI Andino.  [place], [place]  [address] [facility]

## 2020-02-28 NOTE — PLAN OF CARE
02/28/20  0335   HGB 7.2*     Patient's platelet count this AM:   Recent Labs     02/28/20  0335   PLT 42*    Thrombocytopenia Precautions in place. Patient showing no signs or symptoms of active bleeding. Transfusion not indicated at this time. Patient verbalizes understanding of all instructions. Will continue to assess and implement POC. Call light within reach and hourly rounding in place. Problem: Pain:  Goal: Pain level will decrease  Description  Pain level will decrease  2/28/2020 1457 by Jeferson Kc RN  Outcome: Ongoing  Note:   No complaints of pain at this time. Will continue to order.

## 2020-02-29 LAB
A/G RATIO: 1.8 (ref 1.1–2.2)
ALBUMIN SERPL-MCNC: 3.6 G/DL (ref 3.4–5)
ALP BLD-CCNC: 80 U/L (ref 40–129)
ALT SERPL-CCNC: 9 U/L (ref 10–40)
ANION GAP SERPL CALCULATED.3IONS-SCNC: 13 MMOL/L (ref 3–16)
AST SERPL-CCNC: 11 U/L (ref 15–37)
BANDED NEUTROPHILS RELATIVE PERCENT: 4 % (ref 0–7)
BASOPHILS ABSOLUTE: 0 K/UL (ref 0–0.2)
BASOPHILS RELATIVE PERCENT: 0 %
BILIRUB SERPL-MCNC: 0.5 MG/DL (ref 0–1)
BUN BLDV-MCNC: 17 MG/DL (ref 7–20)
CALCIUM SERPL-MCNC: 8.8 MG/DL (ref 8.3–10.6)
CHLORIDE BLD-SCNC: 100 MMOL/L (ref 99–110)
CO2: 26 MMOL/L (ref 21–32)
CREAT SERPL-MCNC: 2.8 MG/DL (ref 0.8–1.3)
EOSINOPHILS ABSOLUTE: 0 K/UL (ref 0–0.6)
EOSINOPHILS RELATIVE PERCENT: 0 %
GFR AFRICAN AMERICAN: 28
GFR NON-AFRICAN AMERICAN: 23
GLOBULIN: 2 G/DL
GLUCOSE BLD-MCNC: 89 MG/DL (ref 70–99)
HCT VFR BLD CALC: 23.5 % (ref 40.5–52.5)
HEMATOLOGY PATH CONSULT: NO
HEMOGLOBIN: 8 G/DL (ref 13.5–17.5)
LYMPHOCYTES ABSOLUTE: 0.3 K/UL (ref 1–5.1)
LYMPHOCYTES RELATIVE PERCENT: 11 %
MAGNESIUM: 1.7 MG/DL (ref 1.8–2.4)
MCH RBC QN AUTO: 32.2 PG (ref 26–34)
MCHC RBC AUTO-ENTMCNC: 34 G/DL (ref 31–36)
MCV RBC AUTO: 95 FL (ref 80–100)
METAMYELOCYTES RELATIVE PERCENT: 9 %
MONOCYTES ABSOLUTE: 0.2 K/UL (ref 0–1.3)
MONOCYTES RELATIVE PERCENT: 10 %
MYELOCYTE PERCENT: 3 %
NEUTROPHILS ABSOLUTE: 1.9 K/UL (ref 1.7–7.7)
NEUTROPHILS RELATIVE PERCENT: 63 %
PDW BLD-RTO: 14.1 % (ref 12.4–15.4)
PLATELET # BLD: 65 K/UL (ref 135–450)
PMV BLD AUTO: 9.9 FL (ref 5–10.5)
POTASSIUM SERPL-SCNC: 3.8 MMOL/L (ref 3.5–5.1)
RBC # BLD: 2.48 M/UL (ref 4.2–5.9)
SODIUM BLD-SCNC: 139 MMOL/L (ref 136–145)
TOTAL PROTEIN: 5.6 G/DL (ref 6.4–8.2)
WBC # BLD: 2.4 K/UL (ref 4–11)

## 2020-02-29 PROCEDURE — 36592 COLLECT BLOOD FROM PICC: CPT

## 2020-02-29 PROCEDURE — 85025 COMPLETE CBC W/AUTO DIFF WBC: CPT

## 2020-02-29 PROCEDURE — 86367 STEM CELLS TOTAL COUNT: CPT

## 2020-02-29 PROCEDURE — 6360000002 HC RX W HCPCS: Performed by: INTERNAL MEDICINE

## 2020-02-29 PROCEDURE — 80053 COMPREHEN METABOLIC PANEL: CPT

## 2020-02-29 PROCEDURE — 6370000000 HC RX 637 (ALT 250 FOR IP): Performed by: NURSE PRACTITIONER

## 2020-02-29 PROCEDURE — 83735 ASSAY OF MAGNESIUM: CPT

## 2020-02-29 PROCEDURE — 6370000000 HC RX 637 (ALT 250 FOR IP): Performed by: INTERNAL MEDICINE

## 2020-02-29 PROCEDURE — 6360000002 HC RX W HCPCS: Performed by: NURSE PRACTITIONER

## 2020-02-29 PROCEDURE — 2580000003 HC RX 258: Performed by: INTERNAL MEDICINE

## 2020-02-29 PROCEDURE — 2060000000 HC ICU INTERMEDIATE R&B

## 2020-02-29 RX ADMIN — ALLOPURINOL 200 MG: 100 TABLET ORAL at 08:29

## 2020-02-29 RX ADMIN — MICONAZOLE NITRATE: 20 POWDER TOPICAL at 21:44

## 2020-02-29 RX ADMIN — Medication 10 ML: at 21:43

## 2020-02-29 RX ADMIN — ACYCLOVIR 200 MG: 200 CAPSULE ORAL at 08:29

## 2020-02-29 RX ADMIN — PIPERACILLIN AND TAZOBACTAM 2.25 G: 2; .25 INJECTION, POWDER, LYOPHILIZED, FOR SOLUTION INTRAVENOUS at 21:30

## 2020-02-29 RX ADMIN — PIPERACILLIN AND TAZOBACTAM 2.25 G: 2; .25 INJECTION, POWDER, LYOPHILIZED, FOR SOLUTION INTRAVENOUS at 05:04

## 2020-02-29 RX ADMIN — TBO-FILGRASTIM 1200 MCG: 300 INJECTION, SOLUTION SUBCUTANEOUS at 06:44

## 2020-02-29 RX ADMIN — PIPERACILLIN AND TAZOBACTAM 2.25 G: 2; .25 INJECTION, POWDER, LYOPHILIZED, FOR SOLUTION INTRAVENOUS at 13:30

## 2020-02-29 RX ADMIN — ACYCLOVIR 200 MG: 200 CAPSULE ORAL at 21:30

## 2020-02-29 RX ADMIN — FAMOTIDINE 20 MG: 20 TABLET ORAL at 08:29

## 2020-02-29 ASSESSMENT — PAIN SCALES - GENERAL
PAINLEVEL_OUTOF10: 0

## 2020-02-29 ASSESSMENT — PAIN DESCRIPTION - PROGRESSION: CLINICAL_PROGRESSION: NOT CHANGED

## 2020-02-29 NOTE — PLAN OF CARE
Problem: Falls - Risk of:  Goal: Will remain free from falls  Description  Will remain free from falls  Outcome: Ongoing     Orthostatic vital signs obtained at start of shift - see flowsheet for details. Pt does not meet criteria for orthostasis. Pt is a Med fall risk. See Robert Ear Fall Score and ABCDS Injury Risk assessments. - Screening for Orthostasis AND not a [place] Risk per LOUIS/ABCDS: Pt bed is in low position, side rails up, call light and belongings are in reach. Fall risk light is on outside pts room. Pt encouraged to call for assistance as needed. Will continue with hourly rounds for PO intake, pain needs, toileting and repositioning as needed. Problem: Infection - Central Venous Catheter-Associated Bloodstream Infection:  Goal: Will show no infection signs and symptoms  Description  Will show no infection signs and symptoms  Outcome: Ongoing     CVC site remains free of signs/symptoms of infection. No drainage, edema, erythema, pain, or warmth noted at site. Dressing changes continue per protocol and on an as needed basis - see flowsheet. Problem: Bleeding:  Goal: Will show no signs and symptoms of excessive bleeding  Description  Will show no signs and symptoms of excessive bleeding  Outcome: Ongoing  Patient's hemoglobin this AM:   Recent Labs     02/29/20  0340   HGB 8.0*     Patient's platelet count this AM:   Recent Labs     02/29/20  0340   PLT 65*    Thrombocytopenia Precautions in place. Patient showing no signs or symptoms of active bleeding. Transfusion not indicated at this time. Patient verbalizes understanding of all instructions. Will continue to assess and implement POC. Call light within reach and hourly rounding in place. Problem: Pain:  Description  Pain management should include both nonpharmacologic and pharmacologic interventions.   Goal: Pain level will decrease  Description  Pain level will decrease  Outcome: Ongoing      Pt reported a headache this shift.  Took PRN tylenol once and pt satisfied.

## 2020-02-29 NOTE — PROGRESS NOTES
[address] Progress Note    2020     Humberto Kaur    MRN: [id number]    : 1955    Referring MD: MD Cesar Mart [address]      SUBJECTIVE:  No new issues - wbc 2.4 today    ECOG PS:(2) Ambulatory and capable of self care, unable to carry out work activity, up and about > 50% or waking hours    KPS: 80% Normal activity with effort; some signs or symptoms of disease    Isolation: None    Medications    Scheduled Meds:   tbo-filgrastim  1,200 mcg Subcutaneous Q24H    miconazole   Topical BID    fluconazole  200 mg Oral Once per day on     acyclovir  200 mg Oral BID    allopurinol  200 mg Oral Daily    piperacillin-tazobactam  2.25 g Intravenous Q8H    famotidine  20 mg Oral Daily    sodium chloride flush  10 mL Intravenous 2 times per day    Saline Mouthwash  15 mL Swish & Spit 4x Daily AC & HS     Continuous Infusions:  PRN Meds:.ondansetron, oxyCODONE **OR** oxyCODONE, sodium chloride flush, acetaminophen, magnesium hydroxide, Saline Mouthwash, alteplase    ROS:  As noted above, otherwise remainder of 10-point ROS negative    Physical Exam:     I&O:      Intake/Output Summary (Last 24 hours) at 2020 1319  Last data filed at 2020 1207  Gross per 24 hour   Intake 806 ml   Output 400 ml   Net 406 ml       Vital Signs:  /70   Pulse 102   Temp 98.9 °F (37.2 °C) (Oral)   Resp 20   Ht 6' 4\" (1.93 m)   Wt 239 lb (108.4 kg) Comment: per pt refused to retake   SpO2 97%   BMI 29.09 kg/m²     Weight:    Wt Readings from Last 3 Encounters:   20 239 lb (108.4 kg)   20 250 lb 1 oz (113.4 kg)   20 244 lb (110.7 kg)     General: Awake, alert and oriented.   HEENT: normocephalic, alopecia, PERRL, no scleral erythema or icterus, Oral mucosa moist and intact, throat clear  NECK: supple without palpable adenopathy  BACK: Straight negative CVAT  SKIN: warm dry and intact without lesions rashes or masses  CHEST: CTA

## 2020-02-29 NOTE — PROGRESS NOTES
Physician notified of CD34 Count 26. Notified Earnestine at [facility] that he would be ready to collect Bernard 3/1. Fabien Alonso stated that she would be here around 8:00 am on 3/1.     Patient updated with the plan

## 2020-03-01 LAB
A/G RATIO: 1.6 (ref 1.1–2.2)
ALBUMIN SERPL-MCNC: 3.1 G/DL (ref 3.4–5)
ALP BLD-CCNC: 83 U/L (ref 40–129)
ALT SERPL-CCNC: 8 U/L (ref 10–40)
ANION GAP SERPL CALCULATED.3IONS-SCNC: 12 MMOL/L (ref 3–16)
AST SERPL-CCNC: 13 U/L (ref 15–37)
BANDED NEUTROPHILS RELATIVE PERCENT: 19 % (ref 0–7)
BASOPHILS ABSOLUTE: 0 K/UL (ref 0–0.2)
BASOPHILS RELATIVE PERCENT: 0 %
BILIRUB SERPL-MCNC: <0.2 MG/DL (ref 0–1)
BUN BLDV-MCNC: 21 MG/DL (ref 7–20)
CALCIUM SERPL-MCNC: 9 MG/DL (ref 8.3–10.6)
CHLORIDE BLD-SCNC: 102 MMOL/L (ref 99–110)
CO2: 26 MMOL/L (ref 21–32)
CREAT SERPL-MCNC: 3.4 MG/DL (ref 0.8–1.3)
EOSINOPHILS ABSOLUTE: 0 K/UL (ref 0–0.6)
EOSINOPHILS RELATIVE PERCENT: 0 %
GFR AFRICAN AMERICAN: 22
GFR NON-AFRICAN AMERICAN: 18
GLOBULIN: 1.9 G/DL
GLUCOSE BLD-MCNC: 90 MG/DL (ref 70–99)
HCT VFR BLD CALC: 21.4 % (ref 40.5–52.5)
HEMOGLOBIN: 7.3 G/DL (ref 13.5–17.5)
LYMPHOCYTES ABSOLUTE: 0.2 K/UL (ref 1–5.1)
LYMPHOCYTES RELATIVE PERCENT: 3 %
MAGNESIUM: 1.9 MG/DL (ref 1.8–2.4)
MCH RBC QN AUTO: 32.4 PG (ref 26–34)
MCHC RBC AUTO-ENTMCNC: 34 G/DL (ref 31–36)
MCV RBC AUTO: 95.1 FL (ref 80–100)
METAMYELOCYTES RELATIVE PERCENT: 6 %
MONOCYTES ABSOLUTE: 0.8 K/UL (ref 0–1.3)
MONOCYTES RELATIVE PERCENT: 11 %
MYELOCYTE PERCENT: 4 %
NEUTROPHILS ABSOLUTE: 6.6 K/UL (ref 1.7–7.7)
NEUTROPHILS RELATIVE PERCENT: 57 %
PDW BLD-RTO: 14.1 % (ref 12.4–15.4)
PLATELET # BLD: 80 K/UL (ref 135–450)
PMV BLD AUTO: 9.9 FL (ref 5–10.5)
POTASSIUM SERPL-SCNC: 3.6 MMOL/L (ref 3.5–5.1)
RBC # BLD: 2.25 M/UL (ref 4.2–5.9)
SODIUM BLD-SCNC: 140 MMOL/L (ref 136–145)
TOTAL PROTEIN: 5 G/DL (ref 6.4–8.2)
WBC # BLD: 7.7 K/UL (ref 4–11)

## 2020-03-01 PROCEDURE — 83735 ASSAY OF MAGNESIUM: CPT

## 2020-03-01 PROCEDURE — 38206 HARVEST AUTO STEM CELLS: CPT

## 2020-03-01 PROCEDURE — 85025 COMPLETE CBC W/AUTO DIFF WBC: CPT

## 2020-03-01 PROCEDURE — 80053 COMPREHEN METABOLIC PANEL: CPT

## 2020-03-01 PROCEDURE — 2060000000 HC ICU INTERMEDIATE R&B

## 2020-03-01 PROCEDURE — 36592 COLLECT BLOOD FROM PICC: CPT

## 2020-03-01 PROCEDURE — 38207 CRYOPRESERVE STEM CELLS: CPT

## 2020-03-01 PROCEDURE — 2580000003 HC RX 258: Performed by: INTERNAL MEDICINE

## 2020-03-01 PROCEDURE — 6360000002 HC RX W HCPCS: Performed by: INTERNAL MEDICINE

## 2020-03-01 PROCEDURE — 6370000000 HC RX 637 (ALT 250 FOR IP): Performed by: INTERNAL MEDICINE

## 2020-03-01 PROCEDURE — 6370000000 HC RX 637 (ALT 250 FOR IP): Performed by: NURSE PRACTITIONER

## 2020-03-01 RX ORDER — SODIUM CHLORIDE 9 MG/ML
INJECTION, SOLUTION INTRAVENOUS
Status: DISCONTINUED
Start: 2020-03-01 | End: 2020-03-02

## 2020-03-01 RX ADMIN — ACYCLOVIR 200 MG: 200 CAPSULE ORAL at 20:33

## 2020-03-01 RX ADMIN — ALLOPURINOL 200 MG: 100 TABLET ORAL at 08:00

## 2020-03-01 RX ADMIN — PIPERACILLIN AND TAZOBACTAM 2.25 G: 2; .25 INJECTION, POWDER, LYOPHILIZED, FOR SOLUTION INTRAVENOUS at 20:35

## 2020-03-01 RX ADMIN — FAMOTIDINE 20 MG: 20 TABLET ORAL at 07:59

## 2020-03-01 RX ADMIN — PIPERACILLIN AND TAZOBACTAM 2.25 G: 2; .25 INJECTION, POWDER, LYOPHILIZED, FOR SOLUTION INTRAVENOUS at 05:30

## 2020-03-01 RX ADMIN — PIPERACILLIN AND TAZOBACTAM 2.25 G: 2; .25 INJECTION, POWDER, LYOPHILIZED, FOR SOLUTION INTRAVENOUS at 13:29

## 2020-03-01 RX ADMIN — CALCIUM GLUCONATE 4 G: 98 INJECTION, SOLUTION INTRAVENOUS at 08:04

## 2020-03-01 RX ADMIN — ACYCLOVIR 200 MG: 200 CAPSULE ORAL at 07:59

## 2020-03-01 RX ADMIN — Medication 10 ML: at 20:33

## 2020-03-01 RX ADMIN — TBO-FILGRASTIM 1200 MCG: 300 INJECTION, SOLUTION SUBCUTANEOUS at 06:25

## 2020-03-01 ASSESSMENT — PAIN SCALES - GENERAL
PAINLEVEL_OUTOF10: 0

## 2020-03-01 NOTE — PROGRESS NOTES
Notified that patient will be collecting  in the morning 3/2 approximately 0800. Patient collected 4.5 CD 34/kg today. Dr. Alvina Shaw would like another collection tomorrow morning. Eugenia aware. Patient will get dialysis tomorrow after collection. Dr. Rochelle Darby is aware of the plan as well. If patient HCT is at or below 22% give patient one unit of blood prior to collection. If platelets are below 30 give one unit of platelets. Give granix at 0630. Have calcium gluconate at the bedside.

## 2020-03-01 NOTE — PROGRESS NOTES
Nephrology Progress Note  [phone]   [email or web address]    Patient:  Diane Sierra   : 1955    CC:  ESRD    Subjective:  Mr. Serena Disla left last week, had high dose cytoxan mobilization and high dose G-CSF, in prep to collect progenitor cells. He is admitted with fever and malaise. All cultures are NGSF  Pt is doing well, no new issues, cells collected today, next HD in am. Home soon       ROS:   No chest pain, no N/V.      SHx:  No visitors this morning. Meds:  Scheduled Meds:   tbo-filgrastim  1,200 mcg Subcutaneous Q24H    miconazole   Topical BID    fluconazole  200 mg Oral Once per day on     acyclovir  200 mg Oral BID    allopurinol  200 mg Oral Daily    piperacillin-tazobactam  2.25 g Intravenous Q8H    famotidine  20 mg Oral Daily    sodium chloride flush  10 mL Intravenous 2 times per day    Saline Mouthwash  15 mL Swish & Spit 4x Daily AC & HS     Continuous Infusions:    PRN Meds:.ondansetron, oxyCODONE **OR** oxyCODONE, sodium chloride flush, acetaminophen, magnesium hydroxide, Saline Mouthwash, alteplase    Vitals:  /75   Pulse 95   Temp 98.3 °F (36.8 °C) (Oral)   Resp 18   Ht 6' 4\" (1.93 m)   Wt 239 lb (108.4 kg) Comment: per pt refused to retake   SpO2 98%   BMI 29.09 kg/m²     Physical Exam:  Gen: Resting in bed, NAD. HEENT: MMM, OP clear. CV: RRR, no S3.  Lungs: good respiratory effort and clear air entry   Abd: S/NT +BS  Ext: No edema, no cyanosis  Skin: Warm. No rashes appreciated. : +3 way peters with bladder irrigation. Access: Left upper arm AV fistula +bruit/thrill. Right IJ tunneled HD catheter c/d/i.     Labs:  CBC:   Lab Results   Component Value Date    WBC 7.7 2020    RBC 2.25 2020    HGB 7.3 2020    HCT 21.4 2020    MCV 95.1 2020    MCH 32.4 2020    MCHC 34.0 2020    RDW 14.1 2020    PLT 80 2020    MPV 9.9 2020     BMP:    Lab Results   Component Value Date    NA

## 2020-03-01 NOTE — PROGRESS NOTES
negative CVAT  SKIN: warm dry and intact without lesions rashes or masses  CHEST: CTA bilaterally without use of accessory muscles  CV: Normal S1 S2, RRR, no MRG  ABD: NT ND normoactive BS, no palpable masses or hepatosplenomegaly  EXTREMITIES: without edema, denies calf tenderness, left arm fistula  NEURO: CN II - XII grossly intact  CATHETER: Right IJ Trifusion (IR, 2/14/20) - CDI     Data    CBC:   Recent Labs     02/28/20 0335 02/29/20 0340 03/01/20  0423   WBC 0.4* 2.4* 7.7   HGB 7.2* 8.0* 7.3*   HCT 21.3* 23.5* 21.4*   MCV 95.0 95.0 95.1   PLT 42* 65* 80*     BMP/Mag:  Recent Labs     02/28/20 0335 02/29/20 0340 03/01/20  0423 03/01/20  0432    139 140  --    K 3.7 3.8 3.6  --     100 102  --    CO2 25 26 26  --    PHOS 2.6  --   --   --    BUN 33* 17 21*  --    CREATININE 3.3* 2.8* 3.4*  --    MG 1.90 1.70*  --  1.90     LIVP:   Recent Labs     02/28/20 0335 02/29/20 0340 03/01/20  0423   AST 7* 11* 13*   ALT 9* 9* 8*   BILITOT 0.5 0.5 <0.2   ALKPHOS 70 80 83     Coags:   No results for input(s): PROTIME, INR, APTT in the last 72 hours. Uric Acid   Recent Labs     02/28/20 0335   LABURIC 4.8       PROBLEM LIST:             1.  [place] Light Chain Multiple Myeloma   2.  ESRD on HD   3.  GERD  4.  HTN  5.  Hypokalemia   6. Neutropenic Fever / Sepsis (2/2020)     TREATMENT:           1. CyBorD (started 10/30/18)  2.  Revlimid / Raenette Cellar / Sorento Orts clinical trial (started 2/1/19)  3.  Carfilzomib / Pomalidomide / Dex x 6 cycles (Relapse #1 - started 8/26/19 - 2/2020)  4.  Cytoxan Mobilization w/ 25% dose reduction (2/17/20)     ASSESSMENT AND PLAN:           1.  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  Cont high dose G-CSF 1200 mcg daily in preparation to collect peripheral blood stem cells  - Admit at a later date for reduced dose Melphalan (140 mg/M^2) followed by autologous stem cell resuce  - Post transplant maintenance: Consider KPD x 4

## 2020-03-02 VITALS
BODY MASS INDEX: 28.99 KG/M2 | HEIGHT: 76 IN | SYSTOLIC BLOOD PRESSURE: 133 MMHG | OXYGEN SATURATION: 96 % | TEMPERATURE: 99 F | WEIGHT: 238.1 LBS | RESPIRATION RATE: 18 BRPM | HEART RATE: 90 BPM | DIASTOLIC BLOOD PRESSURE: 79 MMHG

## 2020-03-02 LAB
A/G RATIO: 1.5 (ref 1.1–2.2)
ABO/RH: NORMAL
ALBUMIN SERPL-MCNC: 3 G/DL (ref 3.4–5)
ALP BLD-CCNC: 97 U/L (ref 40–129)
ALT SERPL-CCNC: 8 U/L (ref 10–40)
ANION GAP SERPL CALCULATED.3IONS-SCNC: 12 MMOL/L (ref 3–16)
ANTIBODY SCREEN: NORMAL
APTT: 26.1 SEC (ref 24.2–36.2)
AST SERPL-CCNC: 16 U/L (ref 15–37)
BANDED NEUTROPHILS RELATIVE PERCENT: 13 % (ref 0–7)
BASOPHILS ABSOLUTE: 0 K/UL (ref 0–0.2)
BASOPHILS RELATIVE PERCENT: 0 %
BILIRUB SERPL-MCNC: 0.3 MG/DL (ref 0–1)
BILIRUBIN URINE: NEGATIVE
BLOOD BANK DISPENSE STATUS: NORMAL
BLOOD BANK PRODUCT CODE: NORMAL
BLOOD, URINE: NEGATIVE
BPU ID: NORMAL
BUN BLDV-MCNC: 19 MG/DL (ref 7–20)
CALCIUM SERPL-MCNC: 9.3 MG/DL (ref 8.3–10.6)
CHLORIDE BLD-SCNC: 100 MMOL/L (ref 99–110)
CLARITY: CLEAR
CO2: 28 MMOL/L (ref 21–32)
COLOR: YELLOW
CREAT SERPL-MCNC: 3.5 MG/DL (ref 0.8–1.3)
DESCRIPTION BLOOD BANK: NORMAL
EOSINOPHILS ABSOLUTE: 0 K/UL (ref 0–0.6)
EOSINOPHILS RELATIVE PERCENT: 0 %
EPITHELIAL CELLS, UA: NORMAL /HPF (ref 0–5)
GFR AFRICAN AMERICAN: 21
GFR NON-AFRICAN AMERICAN: 18
GLOBULIN: 2 G/DL
GLUCOSE BLD-MCNC: 98 MG/DL (ref 70–99)
GLUCOSE URINE: NEGATIVE MG/DL
HCT VFR BLD CALC: 21.8 % (ref 40.5–52.5)
HCT VFR BLD CALC: 22.4 % (ref 40.5–52.5)
HEMOGLOBIN: 7.2 G/DL (ref 13.5–17.5)
HEMOGLOBIN: 7.7 G/DL (ref 13.5–17.5)
INR BLD: 1.2 (ref 0.86–1.14)
KETONES, URINE: NEGATIVE MG/DL
LEUKOCYTE ESTERASE, URINE: NEGATIVE
LYMPHOCYTES ABSOLUTE: 0.4 K/UL (ref 1–5.1)
LYMPHOCYTES RELATIVE PERCENT: 3 %
MAGNESIUM: 1.7 MG/DL (ref 1.8–2.4)
MCH RBC QN AUTO: 31.9 PG (ref 26–34)
MCH RBC QN AUTO: 32.3 PG (ref 26–34)
MCHC RBC AUTO-ENTMCNC: 33.1 G/DL (ref 31–36)
MCHC RBC AUTO-ENTMCNC: 34.2 G/DL (ref 31–36)
MCV RBC AUTO: 94.4 FL (ref 80–100)
MCV RBC AUTO: 96.4 FL (ref 80–100)
METAMYELOCYTES RELATIVE PERCENT: 3 %
MICROSCOPIC EXAMINATION: YES
MONOCYTES ABSOLUTE: 0.3 K/UL (ref 0–1.3)
MONOCYTES RELATIVE PERCENT: 2 %
MYELOCYTE PERCENT: 1 %
NEUTROPHILS ABSOLUTE: 12.4 K/UL (ref 1.7–7.7)
NEUTROPHILS RELATIVE PERCENT: 78 %
NITRITE, URINE: NEGATIVE
PDW BLD-RTO: 14.6 % (ref 12.4–15.4)
PDW BLD-RTO: 14.7 % (ref 12.4–15.4)
PH UA: 8.5 (ref 5–8)
PHOSPHORUS: 2 MG/DL (ref 2.5–4.9)
PLATELET # BLD: 72 K/UL (ref 135–450)
PLATELET # BLD: 82 K/UL (ref 135–450)
PLATELET SLIDE REVIEW: ABNORMAL
PMV BLD AUTO: 8.9 FL (ref 5–10.5)
PMV BLD AUTO: 8.9 FL (ref 5–10.5)
POTASSIUM SERPL-SCNC: 3.4 MMOL/L (ref 3.5–5.1)
PROTEIN UA: ABNORMAL MG/DL
PROTHROMBIN TIME: 13.9 SEC (ref 10–13.2)
RBC # BLD: 2.26 M/UL (ref 4.2–5.9)
RBC # BLD: 2.37 M/UL (ref 4.2–5.9)
RBC UA: NORMAL /HPF (ref 0–4)
SLIDE REVIEW: ABNORMAL
SODIUM BLD-SCNC: 140 MMOL/L (ref 136–145)
SPECIFIC GRAVITY UA: 1.01 (ref 1–1.03)
TEAR DROP CELLS: ABNORMAL
TOTAL PROTEIN: 5 G/DL (ref 6.4–8.2)
TOXIC GRANULATION: PRESENT
URIC ACID, SERUM: 6.3 MG/DL (ref 3.5–7.2)
URINE TYPE: ABNORMAL
UROBILINOGEN, URINE: 0.2 E.U./DL
WBC # BLD: 13.1 K/UL (ref 4–11)
WBC # BLD: 13.8 K/UL (ref 4–11)
WBC UA: NORMAL /HPF (ref 0–5)

## 2020-03-02 PROCEDURE — 38206 HARVEST AUTO STEM CELLS: CPT

## 2020-03-02 PROCEDURE — 6370000000 HC RX 637 (ALT 250 FOR IP): Performed by: INTERNAL MEDICINE

## 2020-03-02 PROCEDURE — 6360000002 HC RX W HCPCS: Performed by: NURSE PRACTITIONER

## 2020-03-02 PROCEDURE — 85025 COMPLETE CBC W/AUTO DIFF WBC: CPT

## 2020-03-02 PROCEDURE — 86850 RBC ANTIBODY SCREEN: CPT

## 2020-03-02 PROCEDURE — 85730 THROMBOPLASTIN TIME PARTIAL: CPT

## 2020-03-02 PROCEDURE — P9040 RBC LEUKOREDUCED IRRADIATED: HCPCS

## 2020-03-02 PROCEDURE — 38207 CRYOPRESERVE STEM CELLS: CPT

## 2020-03-02 PROCEDURE — 86900 BLOOD TYPING SEROLOGIC ABO: CPT

## 2020-03-02 PROCEDURE — 85610 PROTHROMBIN TIME: CPT

## 2020-03-02 PROCEDURE — 83735 ASSAY OF MAGNESIUM: CPT

## 2020-03-02 PROCEDURE — 6370000000 HC RX 637 (ALT 250 FOR IP): Performed by: NURSE PRACTITIONER

## 2020-03-02 PROCEDURE — 2580000003 HC RX 258: Performed by: INTERNAL MEDICINE

## 2020-03-02 PROCEDURE — 84550 ASSAY OF BLOOD/URIC ACID: CPT

## 2020-03-02 PROCEDURE — 86901 BLOOD TYPING SEROLOGIC RH(D): CPT

## 2020-03-02 PROCEDURE — 36430 TRANSFUSION BLD/BLD COMPNT: CPT

## 2020-03-02 PROCEDURE — 6360000002 HC RX W HCPCS: Performed by: INTERNAL MEDICINE

## 2020-03-02 PROCEDURE — 81001 URINALYSIS AUTO W/SCOPE: CPT

## 2020-03-02 PROCEDURE — 36592 COLLECT BLOOD FROM PICC: CPT

## 2020-03-02 PROCEDURE — 86923 COMPATIBILITY TEST ELECTRIC: CPT

## 2020-03-02 PROCEDURE — 90935 HEMODIALYSIS ONE EVALUATION: CPT

## 2020-03-02 PROCEDURE — 80053 COMPREHEN METABOLIC PANEL: CPT

## 2020-03-02 PROCEDURE — 84100 ASSAY OF PHOSPHORUS: CPT

## 2020-03-02 PROCEDURE — 85027 COMPLETE CBC AUTOMATED: CPT

## 2020-03-02 RX ORDER — HEPARIN SODIUM (PORCINE) LOCK FLUSH IV SOLN 100 UNIT/ML 100 UNIT/ML
1500 SOLUTION INTRAVENOUS ONCE
Status: COMPLETED | OUTPATIENT
Start: 2020-03-02 | End: 2020-03-02

## 2020-03-02 RX ORDER — 0.9 % SODIUM CHLORIDE 0.9 %
20 INTRAVENOUS SOLUTION INTRAVENOUS ONCE
Status: COMPLETED | OUTPATIENT
Start: 2020-03-02 | End: 2020-03-02

## 2020-03-02 RX ADMIN — SODIUM CHLORIDE 20 ML: 9 INJECTION, SOLUTION INTRAVENOUS at 04:58

## 2020-03-02 RX ADMIN — CALCIUM GLUCONATE 4 G: 98 INJECTION, SOLUTION INTRAVENOUS at 08:23

## 2020-03-02 RX ADMIN — ACYCLOVIR 200 MG: 200 CAPSULE ORAL at 10:15

## 2020-03-02 RX ADMIN — ALTEPLASE 2 MG: 2.2 INJECTION, POWDER, LYOPHILIZED, FOR SOLUTION INTRAVENOUS at 02:58

## 2020-03-02 RX ADMIN — TBO-FILGRASTIM 1200 MCG: 300 INJECTION, SOLUTION SUBCUTANEOUS at 06:26

## 2020-03-02 RX ADMIN — FAMOTIDINE 20 MG: 20 TABLET ORAL at 10:17

## 2020-03-02 RX ADMIN — Medication 1500 UNITS: at 18:26

## 2020-03-02 RX ADMIN — ALLOPURINOL 200 MG: 100 TABLET ORAL at 10:16

## 2020-03-02 RX ADMIN — PIPERACILLIN AND TAZOBACTAM 2.25 G: 2; .25 INJECTION, POWDER, LYOPHILIZED, FOR SOLUTION INTRAVENOUS at 04:41

## 2020-03-02 ASSESSMENT — PAIN SCALES - GENERAL
PAINLEVEL_OUTOF10: 0

## 2020-03-02 NOTE — DISCHARGE SUMMARY
[facility] Discharge Summary             Attending Physician: Nila Cisneros MD    Referring MD: MD Cesar Leong [facility] [address]    Name: Doug Brice :  1955  MRN:  [id number]    Admission: 2020   Discharge:  3/2/2020    Date: 3/2/2020    Diagnosis on admit: Neutropenic fever & Sepsis      Procedures: Routine chest x-ray, laboratories, EKG, IV fluid hydration, Panculture for fevers, IV antimicrobial therapy, Respiratory therapy, Oxygen therapy, Blood Product Infusions, HD MWF    Consultations: Nephrology     Medications:    Geneberto Baijoaquin, [address] Medication Instructions W:[id number]    Printed on:20 4363   Medication Information                      acyclovir (ZOVIRAX) 200 MG capsule  Take by mouth 2 times daily              allopurinol (ZYLOPRIM) 100 MG tablet  Take 200 mg by mouth daily              B Complex-C-Folic Acid (DIALYVITE 606 PO)  Take 1 tablet by mouth daily             pantoprazole (PROTONIX) 40 MG tablet  Take 1 tablet by mouth daily             prochlorperazine (COMPAZINE) 10 MG tablet  Take 1 tablet by mouth every 4 hours as needed (nausea)                 Reason for Admission: Neutropenic fever & Sepsis    History of Present Illness:      This is a 58 yo male w/ h/o kappa light chain multiple myeloma (Dx 10/2018). Yuliya Richards was referred to the ED after a routine workplace physical and labs revealed JUDY, anemia and thrombocytopenia  w/ SCr 4.6, Hgb 7.8 & platelets of 90E.  He was admitted to [facility] for additional work-up and was found to have light chain multiple myeloma and nephropathy.  His myeloma work up (10/26/18) showed his serum kappa free light chains were 8353 and lambda free light chains 0.45 w/ a K/L ratio of 48678.  SPEP monoclonal protein was barely detectable.  A BM bx/asp (10/29/18) showed 80% plasma cells with a del 17 (TP53) and del 13q.  He began treatment w/ Cytoxan, Velcade and dexamethasone (18) and had a good response.

## 2020-03-02 NOTE — PROGRESS NOTES
Reviewed discharge instructions with patient. Reviewed discharge medications including dosing, schedule, indication, and adverse reactions. Reviewed which medications were already taken today and next dosage due for each medication. Reviewed signs and symptoms that prompt a call to the physician and appropriate phone numbers. Purple ER card given to the patient with explanations of its use. Reviewed follow up appointments that have been made in [facility]. Low microbial diet, activity restrictions, and increased risk of infection were reviewed. Patient is being discharged with IV access d/t need for ongoing therapy:      Type: R trifusion                        Plan:continue   Next dressing change due on: 3/9/20  Cap changes due on: 3/9/20  CVC care and maintenance was reviewed with patient. Pt verbalizes understanding of line care and maintenance. Patient verbalized understanding of all instructions and questions were answered to his. satisfaction. Signed discharge instructions were given to the patient and a copy placed in the paper-lite chart. Patient discharged to home per self with spouse.       Electronically signed by Zeny Wolf RN on 3/2/2020 at 6:51 PM

## 2020-03-02 NOTE — PROGRESS NOTES
03/02/2020    K 3.4 03/02/2020     03/02/2020    CO2 28 03/02/2020    BUN 19 03/02/2020    LABALBU 3.0 03/02/2020    CREATININE 3.5 03/02/2020    CALCIUM 9.3 03/02/2020    GFRAA 21 03/02/2020    LABGLOM 18 03/02/2020    GLUCOSE 98 03/02/2020       Assessment/Plan:  1. ESRD: On HD Mon/Wed/Fri at [facility].  kg. - Below EDW; no fluid on him; will make  kg.     2. [place] light chain MM: undergoing VGPR,  Planning future treatment with autologous stem cell tx.    - s/p cytoxan, GSF to be admitted for later for stem cell transplant      3. Neutropenic fever , completed zosyn, cultures were negative. On acyclovir ppx     4. Pancytopenia      5. Volume/HTN: stable      5. ID: On antibiotics for now .       6. Access: Left upper arm AV fistula.      7.) Anemia; Selective transfusion protocol per Heme/onc, Hgb > 7 currently. Recommendations:  iHD today per schedule; adjust EDW to be current weight after HD will try  mL as below 110. No fluid on him. Will make EDW post weight today. Likely 108 kg. Stable for DC after from renal stand point when OK with other physicians. Thank you for involving us in the care of this patient, please call with any questions.     Signed:  Charity Copeland M.D.   [facility]  Office Number: [phone]  Fax Number: [phone]  Answering Service: [phone]  3/2/2020, 11:55 AM

## 2020-03-02 NOTE — DISCHARGE SUMMARY
He then enrolled on clinical trial (19) w/ Ninlaro, Revlimid and dexamethasone, but his light chains started to increase.  He then began treatment w/ carfilzomib, pomalidomide and dexamethasone (19).  His restaging myeloma work -up (20) shows the followin hour urine - 228 mg protein per 24 hours, ANN MARIE positive for kappa light chains.  SPEP showed minor m-spike consisting of free monoclonal kappa light chains.  His serum free light chain showed kappa of 127.00, lambda 1.09 w/ K/L ratio of  116. 15.  His BM bx/asp (1/10/20) showed 2.8% plasma cells w/ normal cytogenetics, but FISH is abnormal w/ one copy of CDKN2C (1p32.3) in 49.3%. , CCND1/IGH t(11:14) single fusion in 82% of cells, monosomy 13 in 87% of cells, one copy of MAF (16q23.2) in 85% of cells, TP53 (17p13.1) in 89% of cells and once copy of MAFB (20q12) in 78 % of cells. He is now s/p 6 cycles and has achieved a VGPR.  He remains on hemodialysis 3 times weekly w/ Dr. Artur Acevedo.       He received high dose Cytoxan mobilization (20) and was receiving high dose G-CSF in preparation to collect peripheral blood progenitor cells when he reported to the ED (20) w/ sepsis / neutropenic fever, malaise and fatigue. He was pancultured and started on Zosyn. His blood cultures were negative as well as his chest x-ray. The etiology of the neutropenic fever is unclear but likely a bacterial infection. He completed 7 days of Zosyn and is remained afebrile. He continued to receive high-dose G-CSF while inpatient and collected 4.5 x10^6 vu31stoxv/kg on 3/1/20. He will collect stem cells again today, receive dialysis and then be discharged home.   He will follow-up in [facility] sometime the week of 3/9/20 w/ Dr. Emir Shine.           Physical Exam:     Vital Signs:  BP 99/62   Pulse 83   Temp 98.2 °F (36.8 °C) (Oral)   Resp 18   Ht 6' 4\" (1.93 m)   Wt 239 lb (108.4 kg) Comment: per pt refused to retake   SpO2 96%   BMI 29.09 kg/m²     Weight: cycles (Relapse #1 - started 8/26/19 - 2/2020)  4.  Cytoxan Mobilization w/ 25% dose reduction (2/17/20)     ASSESSMENT AND PLAN:           1.  Kappa Light Chain Multiple Myeloma:  Currently in VGPR  - S/p Cytoxan Mobilization. Cont high dose G-CSF 1200 mcg daily in preparation to collect peripheral blood stem cells  - Admit at a later date for reduced dose Melphalan (140 mg/M^2) followed by autologous stem cell resuce  - Post transplant maintenance: Consider KPD x 4 cycles     IIV + 06 - She collected 4.5 x10^6 va21auyqs/kg and will collect again on 3/2/20      2.  ID:  Afebrile, no evidence of infection   - Cont acyclovir ppx    3.  Heme:  Pancytopenia from chemotherapy has resolved    - Transfuse for Hgb < 7 and Platelets < 81E  - No transfusion today     4.  ESRD / Metabolic:  Electrolytes are WNL and renal fxn stable. Mild hypoPhos & hypoK  - Followed by Dr. Varsha oMnahan, consult on admit    - Cont HD on MWF     5. GI / Nutrition:    - H/o GERD  Nutrition:  Appetite and oral intake are good.   - Cont MVI  - Cont renal microbial diet   GERD:    - Cont PPI        6.  Cardiac:   - H/o HTN   - Not currently on medications       Condition on discharge:  Stable    Discharge Instructions:  Return to [facility] the week of 3/9/20 for labs (CBC w/ diff, CMP, Mag & Phos) and provider visit. The patient was advised on activity and dietary restrictions. The patient was advised to follow up in the emergency department or contact the physician with any unresolved nausea/vomiting/diarrhea/pain or temperature greater than 100.5 F or any other unusual symptoms. This discharge summary and plan was discussed and agreed upon with Dr. Shelly Aguilar.     Genna Juarez, MINDI

## 2020-03-02 NOTE — PLAN OF CARE
Problem: Falls - Risk of:  Goal: Will remain free from falls  Description  Will remain free from falls  Outcome: Ongoing   Orthostatic vital signs obtained at start of shift - see flowsheet for details. Pt does not meet criteria for orthostasis. Pt is a Med fall risk. See [place] Fall Score and ABCDS Injury Risk assessments. - Screening for Orthostasis AND not a [place] Risk per LOUIS/ABCDS: Pt bed is in low position, side rails up, call light and belongings are in reach. Fall risk light is on outside pts room. Pt encouraged to call for assistance as needed. Will continue with hourly rounds for PO intake, pain needs, toileting and repositioning as needed. Problem: Infection - Central Venous Catheter-Associated Bloodstream Infection:  Goal: Will show no infection signs and symptoms  Description  Will show no infection signs and symptoms  Outcome: Ongoing   CVC site remains free of signs/symptoms of infection. No drainage, edema, erythema, pain, or warmth noted at site. Dressing changes continue per protocol and on an as needed basis - see flowsheet. Compliant with [facility] Bath Protocol:  Performed CHG bath today per [facility] protocol utilizing CHG solution in the shower. CVC site cleansed with CHG wipe over dressing, skin surrounding dressing, and first 6\" of IV tubing. Pt tolerated well. Continued to encourage daily CHG bathing per [facility] protocol. Patient prefers to shower at night. Problem: Discharge Planning:  Goal: Discharged to appropriate level of care  Description  Discharged to appropriate level of care  Outcome: Ongoing   Pt verbalizes understanding of discharge plan at this time. Will continue to monitor and provide support.         Problem: Bleeding:  Goal: Will show no signs and symptoms of excessive bleeding  Description  Will show no signs and symptoms of excessive bleeding  Outcome: Ongoing   Patient's hemoglobin this AM:   Recent Labs     03/01/20  0423   HGB 7.3*     Patient's

## 2020-03-11 ENCOUNTER — HOSPITAL ENCOUNTER (INPATIENT)
Age: 65
LOS: 16 days | Discharge: HOME | DRG: 016 | End: 2020-03-27
Attending: INTERNAL MEDICINE | Admitting: INTERNAL MEDICINE
Payer: COMMERCIAL

## 2020-03-11 ENCOUNTER — APPOINTMENT (OUTPATIENT)
Dept: GENERAL RADIOLOGY | Age: 65
DRG: 016 | End: 2020-03-11
Attending: INTERNAL MEDICINE
Payer: COMMERCIAL

## 2020-03-11 PROBLEM — Z94.81 AUTOLOGOUS BONE MARROW TRANSPLANTATION STATUS (HCC): Status: ACTIVE | Noted: 2020-03-11

## 2020-03-11 LAB
ALBUMIN SERPL-MCNC: 3.8 G/DL (ref 3.4–5)
ALP BLD-CCNC: 73 U/L (ref 40–129)
ALT SERPL-CCNC: 20 U/L (ref 10–40)
ANION GAP SERPL CALCULATED.3IONS-SCNC: 11 MMOL/L (ref 3–16)
ANISOCYTOSIS: ABNORMAL
APTT: 29.9 SEC (ref 24.2–36.2)
AST SERPL-CCNC: 26 U/L (ref 15–37)
ATYPICAL LYMPHOCYTE RELATIVE PERCENT: 1 % (ref 0–6)
BANDED NEUTROPHILS RELATIVE PERCENT: 1 % (ref 0–7)
BASOPHILS ABSOLUTE: 0 K/UL (ref 0–0.2)
BASOPHILS RELATIVE PERCENT: 0 %
BILIRUB SERPL-MCNC: 0.5 MG/DL (ref 0–1)
BILIRUBIN DIRECT: <0.2 MG/DL (ref 0–0.3)
BILIRUBIN, INDIRECT: ABNORMAL MG/DL (ref 0–1)
BUN BLDV-MCNC: 14 MG/DL (ref 7–20)
CALCIUM SERPL-MCNC: 9.2 MG/DL (ref 8.3–10.6)
CHLORIDE BLD-SCNC: 95 MMOL/L (ref 99–110)
CO2: 32 MMOL/L (ref 21–32)
CREAT SERPL-MCNC: 1.9 MG/DL (ref 0.8–1.3)
EKG ATRIAL RATE: 106 BPM
EKG DIAGNOSIS: NORMAL
EKG P AXIS: 22 DEGREES
EKG P-R INTERVAL: 154 MS
EKG Q-T INTERVAL: 334 MS
EKG QRS DURATION: 78 MS
EKG QTC CALCULATION (BAZETT): 443 MS
EKG R AXIS: 30 DEGREES
EKG T AXIS: 22 DEGREES
EKG VENTRICULAR RATE: 106 BPM
EOSINOPHILS ABSOLUTE: 0 K/UL (ref 0–0.6)
EOSINOPHILS RELATIVE PERCENT: 0 %
GFR AFRICAN AMERICAN: 43
GFR NON-AFRICAN AMERICAN: 36
GLUCOSE BLD-MCNC: 108 MG/DL (ref 70–99)
HCT VFR BLD CALC: 24.1 % (ref 40.5–52.5)
HEMOGLOBIN: 8.2 G/DL (ref 13.5–17.5)
INR BLD: 1.07 (ref 0.86–1.14)
LACTATE DEHYDROGENASE: 248 U/L (ref 100–190)
LYMPHOCYTES ABSOLUTE: 0.2 K/UL (ref 1–5.1)
LYMPHOCYTES RELATIVE PERCENT: 7 %
MAGNESIUM: 1.8 MG/DL (ref 1.8–2.4)
MCH RBC QN AUTO: 32.7 PG (ref 26–34)
MCHC RBC AUTO-ENTMCNC: 34.1 G/DL (ref 31–36)
MCV RBC AUTO: 96.1 FL (ref 80–100)
METAMYELOCYTES RELATIVE PERCENT: 1 %
MONOCYTES ABSOLUTE: 0.2 K/UL (ref 0–1.3)
MONOCYTES RELATIVE PERCENT: 7 %
MYELOCYTE PERCENT: 1 %
NEUTROPHILS ABSOLUTE: 2.3 K/UL (ref 1.7–7.7)
NEUTROPHILS RELATIVE PERCENT: 82 %
PDW BLD-RTO: 15.2 % (ref 12.4–15.4)
PHOSPHORUS: 2 MG/DL (ref 2.5–4.9)
PLATELET # BLD: 147 K/UL (ref 135–450)
PLATELET SLIDE REVIEW: ABNORMAL
PMV BLD AUTO: 9.1 FL (ref 5–10.5)
POTASSIUM SERPL-SCNC: 3.8 MMOL/L (ref 3.5–5.1)
PROTHROMBIN TIME: 12.4 SEC (ref 10–13.2)
RBC # BLD: 2.51 M/UL (ref 4.2–5.9)
SODIUM BLD-SCNC: 138 MMOL/L (ref 136–145)
TOTAL PROTEIN: 5.7 G/DL (ref 6.4–8.2)
URIC ACID, SERUM: 2.3 MG/DL (ref 3.5–7.2)
WBC # BLD: 2.7 K/UL (ref 4–11)

## 2020-03-11 PROCEDURE — 83735 ASSAY OF MAGNESIUM: CPT

## 2020-03-11 PROCEDURE — 85730 THROMBOPLASTIN TIME PARTIAL: CPT

## 2020-03-11 PROCEDURE — 83615 LACTATE (LD) (LDH) ENZYME: CPT

## 2020-03-11 PROCEDURE — 85025 COMPLETE CBC W/AUTO DIFF WBC: CPT

## 2020-03-11 PROCEDURE — 2580000003 HC RX 258: Performed by: INTERNAL MEDICINE

## 2020-03-11 PROCEDURE — 93005 ELECTROCARDIOGRAM TRACING: CPT | Performed by: NURSE PRACTITIONER

## 2020-03-11 PROCEDURE — 93010 ELECTROCARDIOGRAM REPORT: CPT | Performed by: INTERNAL MEDICINE

## 2020-03-11 PROCEDURE — 80048 BASIC METABOLIC PNL TOTAL CA: CPT

## 2020-03-11 PROCEDURE — 71046 X-RAY EXAM CHEST 2 VIEWS: CPT

## 2020-03-11 PROCEDURE — 6370000000 HC RX 637 (ALT 250 FOR IP): Performed by: NURSE PRACTITIONER

## 2020-03-11 PROCEDURE — 84100 ASSAY OF PHOSPHORUS: CPT

## 2020-03-11 PROCEDURE — 80076 HEPATIC FUNCTION PANEL: CPT

## 2020-03-11 PROCEDURE — 2060000000 HC ICU INTERMEDIATE R&B

## 2020-03-11 PROCEDURE — 84550 ASSAY OF BLOOD/URIC ACID: CPT

## 2020-03-11 PROCEDURE — 2580000003 HC RX 258: Performed by: NURSE PRACTITIONER

## 2020-03-11 PROCEDURE — 36592 COLLECT BLOOD FROM PICC: CPT

## 2020-03-11 PROCEDURE — 87081 CULTURE SCREEN ONLY: CPT

## 2020-03-11 PROCEDURE — 6360000002 HC RX W HCPCS: Performed by: NURSE PRACTITIONER

## 2020-03-11 PROCEDURE — 6370000000 HC RX 637 (ALT 250 FOR IP): Performed by: INTERNAL MEDICINE

## 2020-03-11 PROCEDURE — 6360000002 HC RX W HCPCS: Performed by: INTERNAL MEDICINE

## 2020-03-11 PROCEDURE — 85610 PROTHROMBIN TIME: CPT

## 2020-03-11 RX ORDER — SODIUM CHLORIDE 0.9 % (FLUSH) 0.9 %
10 SYRINGE (ML) INJECTION PRN
Status: DISCONTINUED | OUTPATIENT
Start: 2020-03-11 | End: 2020-03-27 | Stop reason: HOSPADM

## 2020-03-11 RX ORDER — 0.9 % SODIUM CHLORIDE 0.9 %
250 INTRAVENOUS SOLUTION INTRAVENOUS ONCE
Status: COMPLETED | OUTPATIENT
Start: 2020-03-11 | End: 2020-03-11

## 2020-03-11 RX ORDER — DEXAMETHASONE 4 MG/1
12 TABLET ORAL ONCE
Status: COMPLETED | OUTPATIENT
Start: 2020-03-11 | End: 2020-03-11

## 2020-03-11 RX ORDER — LORAZEPAM 0.5 MG/1
0.5 TABLET ORAL EVERY 4 HOURS PRN
Status: DISCONTINUED | OUTPATIENT
Start: 2020-03-11 | End: 2020-03-27 | Stop reason: HOSPADM

## 2020-03-11 RX ORDER — PROCHLORPERAZINE MALEATE 10 MG
10 TABLET ORAL EVERY 4 HOURS PRN
Status: DISCONTINUED | OUTPATIENT
Start: 2020-03-11 | End: 2020-03-27 | Stop reason: HOSPADM

## 2020-03-11 RX ORDER — FLUCONAZOLE 200 MG/1
200 TABLET ORAL
Status: DISCONTINUED | OUTPATIENT
Start: 2020-03-13 | End: 2020-03-26

## 2020-03-11 RX ORDER — ONDANSETRON HYDROCHLORIDE 8 MG/1
24 TABLET, FILM COATED ORAL ONCE
Status: COMPLETED | OUTPATIENT
Start: 2020-03-11 | End: 2020-03-11

## 2020-03-11 RX ORDER — HEPARIN SODIUM 5000 [USP'U]/ML
5000 INJECTION, SOLUTION INTRAVENOUS; SUBCUTANEOUS EVERY 8 HOURS SCHEDULED
Status: DISCONTINUED | OUTPATIENT
Start: 2020-03-11 | End: 2020-03-18

## 2020-03-11 RX ORDER — PANTOPRAZOLE SODIUM 40 MG/1
40 TABLET, DELAYED RELEASE ORAL DAILY
Status: DISCONTINUED | OUTPATIENT
Start: 2020-03-12 | End: 2020-03-27 | Stop reason: HOSPADM

## 2020-03-11 RX ORDER — PROCHLORPERAZINE EDISYLATE 5 MG/ML
10 INJECTION INTRAMUSCULAR; INTRAVENOUS EVERY 4 HOURS PRN
Status: DISCONTINUED | OUTPATIENT
Start: 2020-03-11 | End: 2020-03-27 | Stop reason: HOSPADM

## 2020-03-11 RX ORDER — ACYCLOVIR 200 MG/1
200 CAPSULE ORAL 2 TIMES DAILY
Status: DISCONTINUED | OUTPATIENT
Start: 2020-03-11 | End: 2020-03-27 | Stop reason: HOSPADM

## 2020-03-11 RX ORDER — ALLOPURINOL 100 MG/1
200 TABLET ORAL DAILY
Status: DISCONTINUED | OUTPATIENT
Start: 2020-03-12 | End: 2020-03-27 | Stop reason: HOSPADM

## 2020-03-11 RX ORDER — LORAZEPAM 2 MG/ML
0.5 INJECTION INTRAMUSCULAR EVERY 4 HOURS PRN
Status: DISCONTINUED | OUTPATIENT
Start: 2020-03-11 | End: 2020-03-27 | Stop reason: HOSPADM

## 2020-03-11 RX ORDER — FLUCONAZOLE 200 MG/1
200 TABLET ORAL DAILY
Status: DISCONTINUED | OUTPATIENT
Start: 2020-03-12 | End: 2020-03-11

## 2020-03-11 RX ORDER — FOLIC ACID/VIT B COMPLEX AND C 0.8 MG
1 TABLET ORAL DAILY
Status: DISCONTINUED | OUTPATIENT
Start: 2020-03-11 | End: 2020-03-11

## 2020-03-11 RX ORDER — SODIUM CHLORIDE 9 MG/ML
INJECTION, SOLUTION INTRAVENOUS CONTINUOUS PRN
Status: DISCONTINUED | OUTPATIENT
Start: 2020-03-11 | End: 2020-03-27 | Stop reason: HOSPADM

## 2020-03-11 RX ORDER — CHOLECALCIFEROL (VITAMIN D3) 10 MCG
1 TABLET ORAL DAILY
Status: DISCONTINUED | OUTPATIENT
Start: 2020-03-12 | End: 2020-03-27 | Stop reason: HOSPADM

## 2020-03-11 RX ORDER — SODIUM CHLORIDE 0.9 % (FLUSH) 0.9 %
10 SYRINGE (ML) INJECTION EVERY 12 HOURS SCHEDULED
Status: DISCONTINUED | OUTPATIENT
Start: 2020-03-11 | End: 2020-03-27 | Stop reason: HOSPADM

## 2020-03-11 RX ADMIN — SODIUM CHLORIDE 15 ML: 900 IRRIGANT IRRIGATION at 17:37

## 2020-03-11 RX ADMIN — SODIUM CHLORIDE 150 MG: 900 INJECTION, SOLUTION INTRAVENOUS at 16:05

## 2020-03-11 RX ADMIN — HEPARIN SODIUM 5000 UNITS: 5000 INJECTION INTRAVENOUS; SUBCUTANEOUS at 15:04

## 2020-03-11 RX ADMIN — Medication 10 ML: at 20:12

## 2020-03-11 RX ADMIN — ONDANSETRON HYDROCHLORIDE 24 MG: 8 TABLET, FILM COATED ORAL at 16:06

## 2020-03-11 RX ADMIN — ACYCLOVIR 200 MG: 200 CAPSULE ORAL at 20:12

## 2020-03-11 RX ADMIN — SODIUM CHLORIDE 250 ML: 9 INJECTION, SOLUTION INTRAVENOUS at 15:05

## 2020-03-11 RX ADMIN — SODIUM CHLORIDE 15 ML: 900 IRRIGANT IRRIGATION at 20:12

## 2020-03-11 RX ADMIN — DEXAMETHASONE 12 MG: 4 TABLET ORAL at 16:06

## 2020-03-11 RX ADMIN — HEPARIN SODIUM 5000 UNITS: 5000 INJECTION INTRAVENOUS; SUBCUTANEOUS at 22:12

## 2020-03-11 ASSESSMENT — PAIN SCALES - GENERAL
PAINLEVEL_OUTOF10: 0

## 2020-03-11 NOTE — PROGRESS NOTES
4 Eyes Admission Assessment     I agree as the admission nurse that 2 RN's have performed a thorough Head to Toe Skin Assessment on the patient. ALL assessment sites listed below have been assessed on admission. Areas assessed by both nurses: Consuelo Frankel and Christa Boast  [x]   Head, Face, and Ears   [x]   Shoulders, Back, and Chest  [x]   Arms, Elbows, and Hands   [x]   Coccyx, Sacrum, and Ischum  [x]   Legs, Feet, and Heels        Does the Patient have Skin Breakdown?   Yes a wound was noted on the Admission Assessment and an LDA was Initiated documentation include the Vanna-wound, Wound Assessment, Measurements, Dressing Treatment, Drainage, and Color\",         Johann Prevention initiated:  NA   Wound Care Orders initiated:  NA      WOC nurse consulted for Pressure Injury (Stage 3,4, Unstageable, DTI, NWPT, and Complex wounds):  NA      Nurse 1 eSignature: Electronically signed by Aisha Portillo RN on 3/11/20 at 6:55 PM EDT        Nurse 2 eSignature: Electronically signed by Alma Delia Villa RN on 3/12/20 at 11:17 AM EDT

## 2020-03-11 NOTE — H&P
Cont dialyvite daily    5. GI / Nutrition:    - H/o GERD  Nutrition:  Appetite and oral intake is good. - Cont MVI  - Cont low microbial diet   - Follow closely with dietary  GERD:    - Cont PPI         - DVT Prophylaxis: Platelets >05,291 cells/dL, - subcut Heparin prophylaxis TID   Contraindications to pharmacologic prophylaxis: None  Contraindications to mechanical prophylaxis: None    - Disposition:  Likely on Wednesday when chemotherapy completes w/out toxicity     The patient was seen and examined by Dr. Kim Fleming.  This admission history and physical has been discussed and agreed upon by Dr. Kim Fleming

## 2020-03-11 NOTE — PROGRESS NOTES
Administration: Chemotherapy drug Melphalan independently verified with [address] prior to administration. Acknowledgement of informed consent for chemotherapy administration verified. Original order, appropriateness of regimen, drug supplied, height, weight, BSA, dose calculations, expiration dates/times, drug appearance, and two patient identifiers were verified by both RNs. Drug checked for vesicant/irritant status and for risk of hypersensitivity. Most recent laboratory values and allergies, were reviewed. Positive, brisk blood return via CVC was confirmed prior to administration. Chest x-ray for correct line placement reviewed. Dorina Tapia RN verified correct rate of chemotherapy and maintenance IV fluids. Patient was educated on chemotherapy regimen prior to administration including indication for treatment related to disease & side effects of chemotherapy drug. Patient verbalizes understanding of all instructions. Completion of Chemotherapy: Monitoring during infusion done per policy, see Flowsheets. Blood return verified before, during, and after infusion per policy; no signs of extravasation. Pt tolerated chemotherapy well and without incident. Chemotherapy infusion end time on the [facility]. Will continue to monitor.

## 2020-03-12 LAB
ANION GAP SERPL CALCULATED.3IONS-SCNC: 14 MMOL/L (ref 3–16)
APTT: 29.8 SEC (ref 24.2–36.2)
BACTERIA: ABNORMAL /HPF
BASOPHILS ABSOLUTE: 0 K/UL (ref 0–0.2)
BASOPHILS RELATIVE PERCENT: 0 %
BILIRUBIN URINE: NEGATIVE
BLOOD, URINE: NEGATIVE
BUN BLDV-MCNC: 27 MG/DL (ref 7–20)
CALCIUM SERPL-MCNC: 9.2 MG/DL (ref 8.3–10.6)
CHLORIDE BLD-SCNC: 98 MMOL/L (ref 99–110)
CLARITY: CLEAR
CO2: 26 MMOL/L (ref 21–32)
COLOR: YELLOW
CREAT SERPL-MCNC: 2.6 MG/DL (ref 0.8–1.3)
EOSINOPHILS ABSOLUTE: 0 K/UL (ref 0–0.6)
EOSINOPHILS RELATIVE PERCENT: 0 %
EPITHELIAL CELLS, UA: ABNORMAL /HPF (ref 0–5)
GFR AFRICAN AMERICAN: 30
GFR NON-AFRICAN AMERICAN: 25
GLUCOSE BLD-MCNC: 149 MG/DL (ref 70–99)
GLUCOSE URINE: NEGATIVE MG/DL
HCT VFR BLD CALC: 24 % (ref 40.5–52.5)
HEMOGLOBIN: 8.3 G/DL (ref 13.5–17.5)
INR BLD: 1.05 (ref 0.86–1.14)
KETONES, URINE: NEGATIVE MG/DL
LEUKOCYTE ESTERASE, URINE: NEGATIVE
LYMPHOCYTES ABSOLUTE: 0.1 K/UL (ref 1–5.1)
LYMPHOCYTES RELATIVE PERCENT: 3 %
MAGNESIUM: 1.9 MG/DL (ref 1.8–2.4)
MCH RBC QN AUTO: 33.2 PG (ref 26–34)
MCHC RBC AUTO-ENTMCNC: 34.5 G/DL (ref 31–36)
MCV RBC AUTO: 96.1 FL (ref 80–100)
MICROSCOPIC EXAMINATION: YES
MONOCYTES ABSOLUTE: 0.1 K/UL (ref 0–1.3)
MONOCYTES RELATIVE PERCENT: 5 %
MUCUS: ABNORMAL /LPF
NEUTROPHILS ABSOLUTE: 1.9 K/UL (ref 1.7–7.7)
NEUTROPHILS RELATIVE PERCENT: 92 %
NITRITE, URINE: NEGATIVE
PDW BLD-RTO: 15.1 % (ref 12.4–15.4)
PH UA: 8.5 (ref 5–8)
PLATELET # BLD: 164 K/UL (ref 135–450)
PMV BLD AUTO: 9.3 FL (ref 5–10.5)
POTASSIUM SERPL-SCNC: 5.3 MMOL/L (ref 3.5–5.1)
PROTEIN UA: 30 MG/DL
PROTHROMBIN TIME: 12.2 SEC (ref 10–13.2)
RBC # BLD: 2.5 M/UL (ref 4.2–5.9)
RBC UA: ABNORMAL /HPF (ref 0–4)
SODIUM BLD-SCNC: 138 MMOL/L (ref 136–145)
SPECIFIC GRAVITY UA: 1.01 (ref 1–1.03)
URIC ACID, SERUM: 4.7 MG/DL (ref 3.5–7.2)
URINE TYPE: ABNORMAL
UROBILINOGEN, URINE: 0.2 E.U./DL
WBC # BLD: 2.1 K/UL (ref 4–11)
WBC UA: ABNORMAL /HPF (ref 0–5)

## 2020-03-12 PROCEDURE — 85730 THROMBOPLASTIN TIME PARTIAL: CPT

## 2020-03-12 PROCEDURE — 6360000002 HC RX W HCPCS: Performed by: NURSE PRACTITIONER

## 2020-03-12 PROCEDURE — 84550 ASSAY OF BLOOD/URIC ACID: CPT

## 2020-03-12 PROCEDURE — 2060000000 HC ICU INTERMEDIATE R&B

## 2020-03-12 PROCEDURE — 38208 THAW PRESERVED STEM CELLS: CPT

## 2020-03-12 PROCEDURE — 83735 ASSAY OF MAGNESIUM: CPT

## 2020-03-12 PROCEDURE — 85025 COMPLETE CBC W/AUTO DIFF WBC: CPT

## 2020-03-12 PROCEDURE — 30243Y0 TRANSFUSION OF AUTOLOGOUS HEMATOPOIETIC STEM CELLS INTO CENTRAL VEIN, PERCUTANEOUS APPROACH: ICD-10-PCS | Performed by: INTERNAL MEDICINE

## 2020-03-12 PROCEDURE — 80048 BASIC METABOLIC PNL TOTAL CA: CPT

## 2020-03-12 PROCEDURE — 36592 COLLECT BLOOD FROM PICC: CPT

## 2020-03-12 PROCEDURE — 30243G0 TRANSFUSION OF AUTOLOGOUS BONE MARROW INTO CENTRAL VEIN, PERCUTANEOUS APPROACH: ICD-10-PCS | Performed by: INTERNAL MEDICINE

## 2020-03-12 PROCEDURE — 6370000000 HC RX 637 (ALT 250 FOR IP): Performed by: NURSE PRACTITIONER

## 2020-03-12 PROCEDURE — 81001 URINALYSIS AUTO W/SCOPE: CPT

## 2020-03-12 PROCEDURE — 38241 TRANSPLT AUTOL HCT/DONOR: CPT

## 2020-03-12 PROCEDURE — 2580000003 HC RX 258: Performed by: NURSE PRACTITIONER

## 2020-03-12 PROCEDURE — 85610 PROTHROMBIN TIME: CPT

## 2020-03-12 RX ORDER — SODIUM CHLORIDE 9 MG/ML
INJECTION, SOLUTION INTRAVENOUS CONTINUOUS PRN
Status: DISCONTINUED | OUTPATIENT
Start: 2020-03-12 | End: 2020-03-27 | Stop reason: HOSPADM

## 2020-03-12 RX ORDER — EPINEPHRINE 1 MG/ML
0.3 INJECTION, SOLUTION, CONCENTRATE INTRAVENOUS
Status: DISPENSED | OUTPATIENT
Start: 2020-03-12 | End: 2020-03-12

## 2020-03-12 RX ORDER — DIPHENHYDRAMINE HCL 25 MG
25 TABLET ORAL ONCE
Status: COMPLETED | OUTPATIENT
Start: 2020-03-12 | End: 2020-03-12

## 2020-03-12 RX ORDER — ACETAMINOPHEN 325 MG/1
650 TABLET ORAL ONCE
Status: COMPLETED | OUTPATIENT
Start: 2020-03-12 | End: 2020-03-12

## 2020-03-12 RX ORDER — DIPHENHYDRAMINE HYDROCHLORIDE 50 MG/ML
25 INJECTION INTRAMUSCULAR; INTRAVENOUS PRN
Status: DISPENSED | OUTPATIENT
Start: 2020-03-12 | End: 2020-03-13

## 2020-03-12 RX ORDER — MORPHINE SULFATE 2 MG/ML
2 INJECTION, SOLUTION INTRAMUSCULAR; INTRAVENOUS PRN
Status: DISCONTINUED | OUTPATIENT
Start: 2020-03-12 | End: 2020-03-16

## 2020-03-12 RX ADMIN — DIPHENHYDRAMINE HCL 25 MG: 25 TABLET ORAL at 10:15

## 2020-03-12 RX ADMIN — NEPHROCAP 1 MG: 1 CAP ORAL at 08:21

## 2020-03-12 RX ADMIN — PANTOPRAZOLE SODIUM 40 MG: 40 TABLET, DELAYED RELEASE ORAL at 08:21

## 2020-03-12 RX ADMIN — ALLOPURINOL 200 MG: 100 TABLET ORAL at 08:21

## 2020-03-12 RX ADMIN — ACYCLOVIR 200 MG: 200 CAPSULE ORAL at 21:28

## 2020-03-12 RX ADMIN — ACYCLOVIR 200 MG: 200 CAPSULE ORAL at 08:22

## 2020-03-12 RX ADMIN — ACETAMINOPHEN 650 MG: 325 TABLET ORAL at 10:15

## 2020-03-12 RX ADMIN — HYDROCORTISONE SODIUM SUCCINATE 100 MG: 100 INJECTION, POWDER, FOR SOLUTION INTRAMUSCULAR; INTRAVENOUS at 10:16

## 2020-03-12 RX ADMIN — HEPARIN SODIUM 5000 UNITS: 5000 INJECTION INTRAVENOUS; SUBCUTANEOUS at 15:21

## 2020-03-12 RX ADMIN — Medication 10 ML: at 08:21

## 2020-03-12 RX ADMIN — Medication 10 ML: at 21:00

## 2020-03-12 RX ADMIN — HEPARIN SODIUM 5000 UNITS: 5000 INJECTION INTRAVENOUS; SUBCUTANEOUS at 21:28

## 2020-03-12 RX ADMIN — HEPARIN SODIUM 5000 UNITS: 5000 INJECTION INTRAVENOUS; SUBCUTANEOUS at 06:28

## 2020-03-12 ASSESSMENT — PAIN SCALES - GENERAL
PAINLEVEL_OUTOF10: 0

## 2020-03-12 NOTE — PROGRESS NOTES
4 Eyes Admission Assessment     I agree as the admission nurse that 2 RN's have performed a thorough Head to Toe Skin Assessment on the patient. ALL assessment sites listed below have been assessed on admission. Areas assessed by both nurses: Andi Jackson and Elliott Omar  [x]   Head, Face, and Ears   [x]   Shoulders, Back, and Chest  [x]   Arms, Elbows, and Hands   [x]   Coccyx, Sacrum, and Ischum  [x]   Legs, Feet, and Heels        Does the Patient have Skin Breakdown?   Yes a wound was noted on the Admission Assessment and an LDA was Initiated documentation include the Vanna-wound, Wound Assessment, Measurements, Dressing Treatment, Drainage, and Color\",         Johann Prevention initiated:  NA   Wound Care Orders initiated:  NA      WOC nurse consulted for Pressure Injury (Stage 3,4, Unstageable, DTI, NWPT, and Complex wounds):  NA      Nurse 1 eSignature: Electronically signed by Celso Caba RN on 3/11/20 at 6:55 PM EDT        Nurse 2 eSignature: Electronically signed by Jesús Wise RN on 3/12/20 at 2:48 AM EDT

## 2020-03-12 NOTE — PROCEDURES
Transplant (T0) Progress Note    Tran Pérez                           Blood Type: O neg    MARCH 12, 2020                          Start time: 1116 Completion time 1145    Transplant Type: Autologous    Product Type: PBSC (peripheral blood stem cell)    Product Unit Number: [id number] A0-B0  [id number] A0-B0    Cell Count: 5  x 10^6  Volume: 280 mL      Product Manipulation:      Volume Reduction  No  Plasma Depletion  No  RBC Depletion  No    Catheter lumen used for infusion: RED         Positive Blood Return: Yes    Donor Name: SELF                                     Blood Type: O neg    Relationship: SELF                          Donor Sex: Male    Premeds Given: TYLENOL, BENADRYL, SOLU-CORTEF    Adverse Reaction(s) and treatment: Baseline vital signs obtained prior to infusion and monitoring completed throughout per [facility] protocol - see flowsheets. All IVFs turned down to [facility] during stem cell infusion. Stem cell product(s) verified with 2nd RN Ana Whitt prior to infusion using 2 patient identifiers. Infused via gravity with rate controlled by Keyon Vincent RN per [facility] protocols. Emergency medications epinephrine, benadryl, & hydrocortisone available as needed. Emergency medical equipment available as needed including telemetry monitoring throughout infusion, supplemental O2, suction equipment, and crash cart. RN at bedside throughout infusion.    Riaz Peck

## 2020-03-12 NOTE — PLAN OF CARE
Problem: Falls - Risk of:  Goal: Will remain free from falls  Description: Will remain free from falls  3/12/2020 1543 by Danya Bacon RN  Outcome: Ongoing  Note: Pt bed is in low position, side rails up, call light and belongings are in reach. Fall risk light is on outside pts room. Pt encouraged to call for assistance as needed. Will continue with hourly rounds for PO intake, pain needs, toileting and repositioning as needed. Problem: PROTECTIVE PRECAUTIONS  Goal: Patient will remain free of nosocomial Infections  3/12/2020 1543 by Danya Bacon RN  Outcome: Ongoing  Note: Pt remains in protective precautions. Pt educated on wearing mask when in hallways. Pt, staff, and visitors adhering to handwashing guidelines. Pt educated to shower or bathe daily with chlorhexidine and linens changed daily per protocol. Pt verbalizes understanding of low microbial diet. Will continue to monitor.

## 2020-03-12 NOTE — PLAN OF CARE
Problem: Falls - Risk of:  Goal: Will remain free from falls  Description: Will remain free from falls  3/12/2020 1425 by Alvaro Elliott RN  Outcome: Ongoing  Note: Orthostatic vital signs obtained at start of shift - see flowsheet for details. Pt does not meet criteria for orthostasis. Pt is a Med fall risk. See [place] Fall Score and ABCDS Injury Risk assessments. Pt bed is in low position, side rails up, call light and belongings are in reach. Fall risk light is on outside pts room. Pt encouraged to call for assistance as needed. Will continue with hourly rounds for PO intake, pain needs, toileting and repositioning as needed. Problem: Bleeding:  Goal: Will show no signs and symptoms of excessive bleeding  Description: Will show no signs and symptoms of excessive bleeding  3/12/2020 1425 by Alvaro Elliott RN  Outcome: Ongoing  Note: Patient's hemoglobin this AM:   Recent Labs     03/12/20  0415   HGB 8.3*     Patient's platelet count this AM:   Recent Labs     03/12/20 0415       Thrombocytopenia not present at this time. Patient showing no signs or symptoms of active bleeding. Transfusion not indicated at this time. Patient verbalizes understanding of all instructions. Will continue to assess and implement POC. Call light within reach and hourly rounding in place. Problem: Venous Thromboembolism:  Goal: Will show no signs or symptoms of venous thromboembolism  Description: Will show no signs or symptoms of venous thromboembolism  3/12/2020 1425 by Alvaro Elliott RN  Outcome: Ongoing  Note: Pt is at risk for DVT d/t decreased mobility and cancer treatment. Pt educated on importance of activity. Pt has orders for Subcut prophylactic lovenox. Pt verbalizes understanding of need for prophylaxis while inpatient.           Problem: PROTECTIVE PRECAUTIONS  Goal: Patient will remain free of nosocomial Infections  3/12/2020 1425 by Alvaro Elliott RN  Outcome: Ongoing  Note: Pt remains in protective precautions. No living plants or fresh flowers in his/her room. Patient educated on wearing mask when in hallways. Patient, staff, and visitors adhering to handwashing guidelines. Patient cleansed with chlorhexidine wipes and linens changed daily per protocol. Pt verbalizes understanding of low microbial diet. Patient remains free of nosocomial infections.

## 2020-03-12 NOTE — PLAN OF CARE
meadows or plants in patients room. Pt shows no signs and symptoms of nosocomial infection. Will continue to monitor and educate.

## 2020-03-12 NOTE — PROGRESS NOTES
[facility]  Autologous Progress Note    3/12/2020    Narciso Comer    :  1955    MRN:  [id number]    Referring MD: No referring provider defined for this encounter. Subjective: Tolerated chemo well. ECOG PS:  (1) Restricted in physically strenuous activity, ambulatory and able to do work of light nature    KPS: 90% Able to carry on normal activity; minor signs or symptoms of disease    Isolation:  None     Medications    Scheduled Meds:   sodium chloride flush  10 mL Intravenous 2 times per day    Saline Mouthwash  15 mL Swish & Spit 4x Daily AC & HS    heparin (porcine)  5,000 Units Subcutaneous 3 times per day    acyclovir  200 mg Oral BID    allopurinol  200 mg Oral Daily    pantoprazole  40 mg Oral Daily    [START ON 3/13/2020] fluconazole  200 mg Oral Q MWF    b complex-C-folic acid  1 capsule Oral Daily     Continuous Infusions:   sodium chloride       PRN Meds:.sodium chloride, sodium chloride flush, magnesium hydroxide, Saline Mouthwash, alteplase, prochlorperazine **OR** prochlorperazine, LORazepam **OR** LORazepam    ROS:  As noted above, otherwise remainder of 10-point ROS negative    Physical Exam:     I&O:      Intake/Output Summary (Last 24 hours) at 3/12/2020 0629  Last data filed at 3/12/2020 0036  Gross per 24 hour   Intake 1665 ml   Output 280 ml   Net 1385 ml       Vital Signs:  /77   Pulse 87   Temp 98.2 °F (36.8 °C) (Oral)   Resp 16   Ht 6' 4\" (1.93 m)   Wt 239 lb 6 oz (108.6 kg)   SpO2 92%   BMI 29.14 kg/m²     Weight:    Wt Readings from Last 3 Encounters:   20 239 lb 6 oz (108.6 kg)   20 238 lb 1.6 oz (108 kg)   20 250 lb 1 oz (113.4 kg)       General: Awake, alert and oriented.   HEENT: normocephalic, alopecia, PERRL, no scleral erythema or icterus, Oral mucosa moist and intact, throat clear  NECK: supple without palpable adenopathy  BACK: Straight negative CVAT  SKIN: warm dry and intact without lesions rashes or masses  CHEST: CTA bilaterally without use of accessory muscles  CV: Normal S1 S2, RRR, no MRG  ABD: NT ND normoactive BS, no palpable masses or hepatosplenomegaly  EXTREMITIES: without edema, denies calf tenderness, left arm fistula  NEURO: CN II - XII grossly intact  CATHETER: Right IJ Trifusion (IR, 2/14/20) - CDI     Data:   CBC:   Recent Labs     03/11/20  1344 03/12/20  0415   WBC 2.7* 2.1*   HGB 8.2* 8.3*   HCT 24.1* 24.0*   MCV 96.1 96.1    164     BMP/Mag:  Recent Labs     03/11/20  1344 03/12/20  0415    138   K 3.8 5.3*   CL 95* 98*   CO2 32 26   PHOS 2.0*  --    BUN 14 27*   CREATININE 1.9* 2.6*   MG 1.80 1.90     LIVP:   Recent Labs     03/11/20  1344   AST 26   ALT 20   BILIDIR <0.2   BILITOT 0.5   ALKPHOS 73     Uric Acid:    Recent Labs     03/12/20  0415   LABURIC 4.7     Coags:   Recent Labs     03/11/20  1344 03/12/20  0415   PROTIME 12.4 12.2   INR 1.07 1.05   APTT 29.9 29.8         PROBLEM LIST:           1.  [place] Light Chain Multiple Myeloma   2. ESRD on HD   3. GERD  4. HTN  5. Hypokalemia      TREATMENT:           1. CyBorD (started 10/30/18)  2. Revlimid / Hollice Mariluz / Dex on clinical trial (started 2/1/19)  3. Carfilzomib / Pomalidomide / Dex x 6 cycles (Relapse #1 - started 8/26/19 - 2/2020)  4. Cytoxan Mobilization w/ 25% dose reduction (2/17/20)  5. Melphalan 140mg/m2 & Autologous SCT 3/12/20     ASSESSMENT AND PLAN:           1. [place] Light Chain Multiple Myeloma:  Currently in [facility]  - Admitted for reduced dose Melphalan (140 mg/M^2) followed by autologous stem cell resuce  - Post transplant maintenance: Consider KPD x 4 cycles     Day 0     2. ID: Afebrile, no evidence of infection.    - Cont acyclovir ppx; Start fluconazole ppx 3/12/20; start Levaquin ppx (dose adjusted for ESRD) when [address] < 1.5     3. Heme: Leukopenia from previous chemo, Anemia from previous chemotherapy & ESRD   - Transfuse for Hgb < 7 and Platelets < 99K  - No transfusion today     4.  ESRD / Metabolic: HyperK+, ESRD  - Followed by Dr. Calixto Gonzales, consult on admit    - Cont HD on MWF  - IVFs: 250mL x1 prior to melphalan and then only as needed  - Electrolyte management per nephrology  - Cont allopurinol 200mg daily  - Cont dialyvite daily     5. GI / Nutrition:    - H/o GERD  Nutrition:  Appetite and oral intake is good.    - Cont MVI  - Cont low microbial diet   - Follow closely with dietary  GERD:    - Cont PPI         - DVT Prophylaxis: Platelets >56,291; heparin Q 8 hours  Contraindications to pharmacologic prophylaxis: None  Contraindications to mechanical prophylaxis: None    - Disposition:  Plan for discharge once ANC >1 and patient has recovered from chemo toxicities    Toni Richter MD  [facility]  Please contact me through [facility]

## 2020-03-12 NOTE — PROGRESS NOTES
Nutrition Assessment     Type and Reason for Visit: Initial    Nutrition Recommendations:    Low Microbial diet. Monitor renal labs- may need renal diet restrictions. Pt reports not following diet restrictions at home. Nutrition Assessment:  RD triggered as pt admitted for [place] 140 then AUTO 3/12. Pt on HD MWF; RD offered diet edu -pt declined. RD notes from previous edu attempt during pt admit for stem cell collection declined need for renal or BMT edu. Pt states \"My wife has all of that\". RD edu pt that K+ elevated today and may need to add diet modifiers for elevated renal labs. Pt eating well, however dietary recall only indicates pt consume ~32-40 oz fluid daily. Stable nutritionally otherwise. RD will monitor pt labs and ability to maintain nutrition post BMT.      Malnutrition Assessment:  · Malnutrition Status: No malnutrition    Nutrition Risk Level   Risk Level: Low    Nutrition Diagnosis:   · Problem: Increased nutrient needs  · Etiology: Increased demand for energy/nutrients    Signs and symptoms: Known losses from dialysis(bmt; )    Nutrition Intervention:  Food and/or Delivery: Continue current diet  Nutrition Education/Counseling/Coordination of Care:  Continued Inpatient Monitoring, Education declined      Electronically signed by Shreya Fall RD, LD on 3/12/20 at 11:06 AM EDT    Contact Number: [phone]

## 2020-03-13 LAB
ALBUMIN SERPL-MCNC: 3.5 G/DL (ref 3.4–5)
ALP BLD-CCNC: 59 U/L (ref 40–129)
ALT SERPL-CCNC: 15 U/L (ref 10–40)
ANION GAP SERPL CALCULATED.3IONS-SCNC: 12 MMOL/L (ref 3–16)
AST SERPL-CCNC: 25 U/L (ref 15–37)
BASOPHILS ABSOLUTE: 0 K/UL (ref 0–0.2)
BASOPHILS RELATIVE PERCENT: 0.1 %
BILIRUB SERPL-MCNC: 0.4 MG/DL (ref 0–1)
BILIRUBIN DIRECT: <0.2 MG/DL (ref 0–0.3)
BILIRUBIN, INDIRECT: ABNORMAL MG/DL (ref 0–1)
BUN BLDV-MCNC: 56 MG/DL (ref 7–20)
CALCIUM SERPL-MCNC: 8.4 MG/DL (ref 8.3–10.6)
CHLORIDE BLD-SCNC: 101 MMOL/L (ref 99–110)
CO2: 26 MMOL/L (ref 21–32)
CREAT SERPL-MCNC: 4.1 MG/DL (ref 0.8–1.3)
EOSINOPHILS ABSOLUTE: 0 K/UL (ref 0–0.6)
EOSINOPHILS RELATIVE PERCENT: 0 %
GFR AFRICAN AMERICAN: 18
GFR NON-AFRICAN AMERICAN: 15
GLUCOSE BLD-MCNC: 115 MG/DL (ref 70–99)
HCT VFR BLD CALC: 21.2 % (ref 40.5–52.5)
HEMOGLOBIN: 7.4 G/DL (ref 13.5–17.5)
LACTATE DEHYDROGENASE: 353 U/L (ref 100–190)
LYMPHOCYTES ABSOLUTE: 0.1 K/UL (ref 1–5.1)
LYMPHOCYTES RELATIVE PERCENT: 1.6 %
MCH RBC QN AUTO: 33.3 PG (ref 26–34)
MCHC RBC AUTO-ENTMCNC: 34.7 G/DL (ref 31–36)
MCV RBC AUTO: 96 FL (ref 80–100)
MONOCYTES ABSOLUTE: 0.2 K/UL (ref 0–1.3)
MONOCYTES RELATIVE PERCENT: 5.5 %
NEUTROPHILS ABSOLUTE: 3.1 K/UL (ref 1.7–7.7)
NEUTROPHILS RELATIVE PERCENT: 92.8 %
PDW BLD-RTO: 15.3 % (ref 12.4–15.4)
PHOSPHORUS: 5.8 MG/DL (ref 2.5–4.9)
PLATELET # BLD: 161 K/UL (ref 135–450)
PMV BLD AUTO: 8.7 FL (ref 5–10.5)
POTASSIUM SERPL-SCNC: 4.6 MMOL/L (ref 3.5–5.1)
RBC # BLD: 2.21 M/UL (ref 4.2–5.9)
SODIUM BLD-SCNC: 139 MMOL/L (ref 136–145)
TOTAL PROTEIN: 5.1 G/DL (ref 6.4–8.2)
URIC ACID, SERUM: 10 MG/DL (ref 3.5–7.2)
VRE CULTURE: NORMAL
WBC # BLD: 3.3 K/UL (ref 4–11)

## 2020-03-13 PROCEDURE — 6370000000 HC RX 637 (ALT 250 FOR IP): Performed by: INTERNAL MEDICINE

## 2020-03-13 PROCEDURE — 5A1D70Z PERFORMANCE OF URINARY FILTRATION, INTERMITTENT, LESS THAN 6 HOURS PER DAY: ICD-10-PCS | Performed by: INTERNAL MEDICINE

## 2020-03-13 PROCEDURE — 80048 BASIC METABOLIC PNL TOTAL CA: CPT

## 2020-03-13 PROCEDURE — 84550 ASSAY OF BLOOD/URIC ACID: CPT

## 2020-03-13 PROCEDURE — 2060000000 HC ICU INTERMEDIATE R&B

## 2020-03-13 PROCEDURE — 90935 HEMODIALYSIS ONE EVALUATION: CPT

## 2020-03-13 PROCEDURE — 36592 COLLECT BLOOD FROM PICC: CPT

## 2020-03-13 PROCEDURE — 80076 HEPATIC FUNCTION PANEL: CPT

## 2020-03-13 PROCEDURE — 85025 COMPLETE CBC W/AUTO DIFF WBC: CPT

## 2020-03-13 PROCEDURE — 84100 ASSAY OF PHOSPHORUS: CPT

## 2020-03-13 PROCEDURE — 83615 LACTATE (LD) (LDH) ENZYME: CPT

## 2020-03-13 PROCEDURE — 6370000000 HC RX 637 (ALT 250 FOR IP): Performed by: NURSE PRACTITIONER

## 2020-03-13 PROCEDURE — 6360000002 HC RX W HCPCS: Performed by: NURSE PRACTITIONER

## 2020-03-13 PROCEDURE — 2580000003 HC RX 258: Performed by: NURSE PRACTITIONER

## 2020-03-13 RX ADMIN — HEPARIN SODIUM 5000 UNITS: 5000 INJECTION INTRAVENOUS; SUBCUTANEOUS at 22:48

## 2020-03-13 RX ADMIN — HEPARIN SODIUM 5000 UNITS: 5000 INJECTION INTRAVENOUS; SUBCUTANEOUS at 06:29

## 2020-03-13 RX ADMIN — Medication 10 ML: at 20:09

## 2020-03-13 RX ADMIN — ALLOPURINOL 200 MG: 100 TABLET ORAL at 08:28

## 2020-03-13 RX ADMIN — SODIUM CHLORIDE 15 ML: 900 IRRIGANT IRRIGATION at 20:10

## 2020-03-13 RX ADMIN — ACYCLOVIR 200 MG: 200 CAPSULE ORAL at 08:29

## 2020-03-13 RX ADMIN — PANTOPRAZOLE SODIUM 40 MG: 40 TABLET, DELAYED RELEASE ORAL at 08:29

## 2020-03-13 RX ADMIN — ACYCLOVIR 200 MG: 200 CAPSULE ORAL at 20:09

## 2020-03-13 RX ADMIN — FLUCONAZOLE 200 MG: 200 TABLET ORAL at 17:15

## 2020-03-13 RX ADMIN — Medication 10 ML: at 08:29

## 2020-03-13 ASSESSMENT — PAIN SCALES - GENERAL
PAINLEVEL_OUTOF10: 0

## 2020-03-13 NOTE — PROGRESS NOTES
[facility]  Autologous Progress Note    3/13/2020    Wiley Ovalles    :  1955    MRN:  [id number]    Referring MD: No referring provider defined for this encounter. Subjective: Tolerated PBSC infusion well. ECOG PS:  (1) Restricted in physically strenuous activity, ambulatory and able to do work of light nature    KPS: 90% Able to carry on normal activity; minor signs or symptoms of disease    Isolation:  None     Medications    Scheduled Meds:   [START ON 3/17/2020] Tbo-Filgrastim  300 mcg Subcutaneous QPM    sodium chloride flush  10 mL Intravenous 2 times per day    Saline Mouthwash  15 mL Swish & Spit 4x Daily AC & HS    heparin (porcine)  5,000 Units Subcutaneous 3 times per day    acyclovir  200 mg Oral BID    allopurinol  200 mg Oral Daily    pantoprazole  40 mg Oral Daily    fluconazole  200 mg Oral Q MWF    b complex-C-folic acid  1 capsule Oral Daily     Continuous Infusions:   sodium chloride      sodium chloride       PRN Meds:.morphine, sodium chloride, sodium chloride, sodium chloride, sodium chloride flush, magnesium hydroxide, Saline Mouthwash, alteplase, prochlorperazine **OR** prochlorperazine, LORazepam **OR** LORazepam    ROS:  As noted above, otherwise remainder of 10-point ROS negative    Physical Exam:     I&O:      Intake/Output Summary (Last 24 hours) at 3/13/2020 0937  Last data filed at 3/13/2020 0824  Gross per 24 hour   Intake 520 ml   Output 1050 ml   Net -530 ml       Vital Signs:  /72   Pulse 84   Temp 98.3 °F (36.8 °C) (Oral)   Resp 16   Ht 6' 4\" (1.93 m)   Wt 239 lb 9.6 oz (108.7 kg)   SpO2 95%   BMI 29.17 kg/m²     Weight:    Wt Readings from Last 3 Encounters:   20 239 lb 9.6 oz (108.7 kg)   20 238 lb 1.6 oz (108 kg)   20 250 lb 1 oz (113.4 kg)       General: Awake, alert and oriented.   HEENT: normocephalic, alopecia, PERRL, no scleral erythema or icterus, Oral mucosa moist and intact, throat clear  NECK: supple without palpable adenopathy  BACK: Straight negative CVAT  SKIN: warm dry and intact without lesions rashes or masses  CHEST: CTA bilaterally without use of accessory muscles  CV: Normal S1 S2, RRR, no MRG  ABD: NT ND normoactive BS, no palpable masses or hepatosplenomegaly  EXTREMITIES: without edema, denies calf tenderness, left arm fistula  NEURO: CN II - XII grossly intact  CATHETER: Right IJ Trifusion (IR, 2/14/20) - CDI     Data:   CBC:   Recent Labs     03/11/20  1344 03/12/20  0415 03/13/20  0405   WBC 2.7* 2.1* 3.3*   HGB 8.2* 8.3* 7.4*   HCT 24.1* 24.0* 21.2*   MCV 96.1 96.1 96.0    164 161     BMP/Mag:  Recent Labs     03/11/20  1344 03/12/20  0415 03/13/20  0405    138 139   K 3.8 5.3* 4.6   CL 95* 98* 101   CO2 32 26 26   PHOS 2.0*  --  5.8*   BUN 14 27* 56*   CREATININE 1.9* 2.6* 4.1*   MG 1.80 1.90  --      LIVP:   Recent Labs     03/11/20  1344 03/13/20  0405   AST 26 25   ALT 20 15   BILIDIR <0.2 <0.2   BILITOT 0.5 0.4   ALKPHOS 73 59     Uric Acid:    Recent Labs     03/13/20  0405   LABURIC 10.0*     Coags:   Recent Labs     03/11/20  1344 03/12/20  0415   PROTIME 12.4 12.2   INR 1.07 1.05   APTT 29.9 29.8         PROBLEM LIST:           1.  [place] Light Chain Multiple Myeloma   2. ESRD on HD   3. GERD  4. HTN  5. Hypokalemia      TREATMENT:           1. CyBorD (started 10/30/18)  2. Revlimid / Anthony Pedro / Dex on clinical trial (started 2/1/19)  3. Carfilzomib / Pomalidomide / Dex x 6 cycles (Relapse #1 - started 8/26/19 - 2/2020)  4. Cytoxan Mobilization w/ 25% dose reduction (2/17/20)  5. Melphalan 140mg/m2 & Autologous SCT 3/12/20     ASSESSMENT AND PLAN:           1. [place] Light Chain Multiple Myeloma:  Currently in [facility]GPR  - Admitted for reduced dose Melphalan (140 mg/M^2) followed by autologous stem cell resuce  - Post transplant maintenance: Consider KPD x 4 cycles     Day +1     2.  ID: Afebrile, no evidence of infection.    - Cont acyclovir ppx; Start fluconazole ppx 3/12/20;

## 2020-03-13 NOTE — PROGRESS NOTES
[facility]       Progress Note           Subjective/   59y.o. year old male who we are seeing in consultation for ESRD. HPI:  Seen on dialysis. Orders confirmed. Pre-weight at HD today 109.8 kg. ROS:  Denies any sob, intake reduced.     Objective/   GEN:  Chronically ill, /75   Pulse 84   Temp 98 °F (36.7 °C)   Resp 16   Ht 6' 4\" (1.93 m)   Wt 242 lb 1.6 oz (109.8 kg)   SpO2 95%   BMI 29.47 kg/m²   HEENT: non-icteric, no JVD  CV: S1, S2 without m/r/g; no LE edema  RESP: CTA B without w/r/r; breathing wnl  ABD: +bs, soft, nt, no hsm  SKIN: warm, no rashes  ACCESS: LUE AVF in use    Data/  Recent Labs     03/11/20  1344 03/12/20  0415 03/13/20  0405   WBC 2.7* 2.1* 3.3*   HGB 8.2* 8.3* 7.4*   HCT 24.1* 24.0* 21.2*   MCV 96.1 96.1 96.0    164 161     Recent Labs     03/11/20  1344 03/12/20  0415 03/13/20  0405    138 139   K 3.8 5.3* 4.6   CL 95* 98* 101   CO2 32 26 26   GLUCOSE 108* 149* 115*   PHOS 2.0*  --  5.8*   MG 1.80 1.90  --    BUN 14 27* 56*   CREATININE 1.9* 2.6* 4.1*   LABGLOM 36* 25* 15*   GFRAA 43* 30* 18*       Assessment/     - End stage renal disease - on HD Mon-Wed-Fri     - [place] light chain MM - admitted for high dose melphalan & autologous stem cell  transplant on 3/12     - Anemia of chronic disease - VANESSA with HD     - Hypertension - controlled     - Hyperkalemia - within range    - Hyperuricemia - on allopurinol       Plan/     - HD today with 500 ml removal goal today, under EDW of 111 kg  - Dose VANESSA with HD today, prn prbc's for Hb less than 7.0  - Trend labs

## 2020-03-13 NOTE — CARE COORDINATION
Type of Admission  [place] Light Chain Multiple myeloma  Melphalan Auto SCT--->T:0: 3/12/20  Day+1      Central venous catheter  Right SC Tri fusion ( 2/14/20 IR)  Left Forearm Hemodialysis  Fistula        Plan    Proceed with Melphalan Auto SCT for treatment of Multiple Myeloma      Update  3/11/20:  Planned admission for Melphalan Auto SCT. Rosey Roque is known to [facility] staff from recent admits for Cytoxan mobilization. Patient receives hemodialysis 3x week. 3/13/20: Day +1 from infusion of stem cells yesterday. Hemodialysis in room today. Education  3/11/20:  Greeted Mr. Karma Roque when admitted to the unit,accompanied by significant other.         Discharge  Home when [address] is >1.0 & without toxicities        Pending

## 2020-03-14 LAB
ANION GAP SERPL CALCULATED.3IONS-SCNC: 13 MMOL/L (ref 3–16)
BASOPHILS ABSOLUTE: 0 K/UL (ref 0–0.2)
BASOPHILS RELATIVE PERCENT: 0.3 %
BUN BLDV-MCNC: 35 MG/DL (ref 7–20)
CALCIUM SERPL-MCNC: 8.1 MG/DL (ref 8.3–10.6)
CHLORIDE BLD-SCNC: 98 MMOL/L (ref 99–110)
CO2: 27 MMOL/L (ref 21–32)
CREAT SERPL-MCNC: 2.5 MG/DL (ref 0.8–1.3)
EOSINOPHILS ABSOLUTE: 0 K/UL (ref 0–0.6)
EOSINOPHILS RELATIVE PERCENT: 0.1 %
GFR AFRICAN AMERICAN: 32
GFR NON-AFRICAN AMERICAN: 26
GLUCOSE BLD-MCNC: 116 MG/DL (ref 70–99)
HCT VFR BLD CALC: 23.3 % (ref 40.5–52.5)
HEMOGLOBIN: 8.1 G/DL (ref 13.5–17.5)
LYMPHOCYTES ABSOLUTE: 0 K/UL (ref 1–5.1)
LYMPHOCYTES RELATIVE PERCENT: 1.1 %
MCH RBC QN AUTO: 32.9 PG (ref 26–34)
MCHC RBC AUTO-ENTMCNC: 34.7 G/DL (ref 31–36)
MCV RBC AUTO: 94.9 FL (ref 80–100)
MONOCYTES ABSOLUTE: 0 K/UL (ref 0–1.3)
MONOCYTES RELATIVE PERCENT: 1.2 %
NEUTROPHILS ABSOLUTE: 1.8 K/UL (ref 1.7–7.7)
NEUTROPHILS RELATIVE PERCENT: 97.3 %
PDW BLD-RTO: 15.4 % (ref 12.4–15.4)
PLATELET # BLD: 178 K/UL (ref 135–450)
PMV BLD AUTO: 8.7 FL (ref 5–10.5)
POTASSIUM SERPL-SCNC: 4.4 MMOL/L (ref 3.5–5.1)
RBC # BLD: 2.45 M/UL (ref 4.2–5.9)
SODIUM BLD-SCNC: 138 MMOL/L (ref 136–145)
URIC ACID, SERUM: 7.1 MG/DL (ref 3.5–7.2)
WBC # BLD: 1.8 K/UL (ref 4–11)

## 2020-03-14 PROCEDURE — 36592 COLLECT BLOOD FROM PICC: CPT

## 2020-03-14 PROCEDURE — 2060000000 HC ICU INTERMEDIATE R&B

## 2020-03-14 PROCEDURE — 6360000002 HC RX W HCPCS: Performed by: NURSE PRACTITIONER

## 2020-03-14 PROCEDURE — 80048 BASIC METABOLIC PNL TOTAL CA: CPT

## 2020-03-14 PROCEDURE — 6370000000 HC RX 637 (ALT 250 FOR IP): Performed by: INTERNAL MEDICINE

## 2020-03-14 PROCEDURE — 84550 ASSAY OF BLOOD/URIC ACID: CPT

## 2020-03-14 PROCEDURE — 85025 COMPLETE CBC W/AUTO DIFF WBC: CPT

## 2020-03-14 PROCEDURE — 6370000000 HC RX 637 (ALT 250 FOR IP): Performed by: NURSE PRACTITIONER

## 2020-03-14 PROCEDURE — 2580000003 HC RX 258: Performed by: NURSE PRACTITIONER

## 2020-03-14 RX ORDER — LEVOFLOXACIN 250 MG/1
250 TABLET ORAL EVERY OTHER DAY
Status: DISCONTINUED | OUTPATIENT
Start: 2020-03-14 | End: 2020-03-20

## 2020-03-14 RX ADMIN — ACYCLOVIR 200 MG: 200 CAPSULE ORAL at 20:01

## 2020-03-14 RX ADMIN — HEPARIN SODIUM 5000 UNITS: 5000 INJECTION INTRAVENOUS; SUBCUTANEOUS at 22:37

## 2020-03-14 RX ADMIN — HEPARIN SODIUM 5000 UNITS: 5000 INJECTION INTRAVENOUS; SUBCUTANEOUS at 14:10

## 2020-03-14 RX ADMIN — Medication 10 ML: at 20:02

## 2020-03-14 RX ADMIN — NEPHROCAP 1 MG: 1 CAP ORAL at 09:01

## 2020-03-14 RX ADMIN — ACYCLOVIR 200 MG: 200 CAPSULE ORAL at 08:58

## 2020-03-14 RX ADMIN — PANTOPRAZOLE SODIUM 40 MG: 40 TABLET, DELAYED RELEASE ORAL at 08:58

## 2020-03-14 RX ADMIN — SODIUM CHLORIDE 15 ML: 900 IRRIGANT IRRIGATION at 20:02

## 2020-03-14 RX ADMIN — HEPARIN SODIUM 5000 UNITS: 5000 INJECTION INTRAVENOUS; SUBCUTANEOUS at 06:10

## 2020-03-14 RX ADMIN — LEVOFLOXACIN 250 MG: 250 TABLET, FILM COATED ORAL at 20:01

## 2020-03-14 RX ADMIN — PROCHLORPERAZINE MALEATE 10 MG: 10 TABLET ORAL at 08:25

## 2020-03-14 RX ADMIN — SODIUM CHLORIDE 15 ML: 900 IRRIGANT IRRIGATION at 16:42

## 2020-03-14 RX ADMIN — SODIUM CHLORIDE 15 ML: 900 IRRIGANT IRRIGATION at 15:15

## 2020-03-14 RX ADMIN — SODIUM CHLORIDE 15 ML: 900 IRRIGANT IRRIGATION at 06:11

## 2020-03-14 RX ADMIN — Medication 10 ML: at 15:15

## 2020-03-14 RX ADMIN — ALLOPURINOL 200 MG: 100 TABLET ORAL at 08:58

## 2020-03-14 ASSESSMENT — PAIN SCALES - GENERAL
PAINLEVEL_OUTOF10: 0

## 2020-03-14 NOTE — PROGRESS NOTES
[facility]  Autologous Progress Note    3/14/2020    Stuart Isbell    :  1955    MRN:  [id number]    Referring MD: No referring provider defined for this encounter. Subjective:  Mildly orthostatic this morning. Feels kind of out of it. No specific complaint. ECOG PS:  (1) Restricted in physically strenuous activity, ambulatory and able to do work of light nature    KPS: 90% Able to carry on normal activity; minor signs or symptoms of disease    Isolation:  None     Medications    Scheduled Meds:   [START ON 3/17/2020] Tbo-Filgrastim  300 mcg Subcutaneous QPM    darbepoetin evangelina-polysorbate  100 mcg Intravenous Weekly    sodium chloride flush  10 mL Intravenous 2 times per day    Saline Mouthwash  15 mL Swish & Spit 4x Daily AC & HS    heparin (porcine)  5,000 Units Subcutaneous 3 times per day    acyclovir  200 mg Oral BID    allopurinol  200 mg Oral Daily    pantoprazole  40 mg Oral Daily    fluconazole  200 mg Oral Q MWF    b complex-C-folic acid  1 capsule Oral Daily     Continuous Infusions:   sodium chloride      sodium chloride       PRN Meds:.morphine, sodium chloride, sodium chloride, sodium chloride flush, magnesium hydroxide, Saline Mouthwash, alteplase, prochlorperazine **OR** prochlorperazine, LORazepam **OR** LORazepam    ROS:  As noted above, otherwise remainder of 10-point ROS negative    Physical Exam:     I&O:      Intake/Output Summary (Last 24 hours) at 3/14/2020 0917  Last data filed at 3/14/2020 0321  Gross per 24 hour   Intake 600 ml   Output 1925 ml   Net -1325 ml       Vital Signs:  /66   Pulse 82   Temp 98.1 °F (36.7 °C) (Oral)   Resp 16   Ht 6' 4\" (1.93 m)   Wt 236 lb (107 kg)   SpO2 98%   BMI 28.73 kg/m²     Weight:    Wt Readings from Last 3 Encounters:   20 236 lb (107 kg)   20 238 lb 1.6 oz (108 kg)   20 250 lb 1 oz (113.4 kg)       General: Awake, alert and oriented.   HEENT: normocephalic, alopecia, PERRL, no scleral erythema or icterus, Oral mucosa moist and intact, throat clear  NECK: supple without palpable adenopathy  BACK: Straight negative CVAT  SKIN: warm dry and intact without lesions rashes or masses  CHEST: CTA bilaterally without use of accessory muscles  CV: Normal S1 S2, RRR, no MRG  ABD: NT ND normoactive BS, no palpable masses or hepatosplenomegaly  EXTREMITIES: without edema, denies calf tenderness, left arm fistula  NEURO: CN II - XII grossly intact  CATHETER: Right IJ Trifusion (IR, 2/14/20) - CDI     Data:   CBC:   Recent Labs     03/12/20  0415 03/13/20  0405 03/14/20  0320   WBC 2.1* 3.3* 1.8*   HGB 8.3* 7.4* 8.1*   HCT 24.0* 21.2* 23.3*   MCV 96.1 96.0 94.9    161 178     BMP/Mag:  Recent Labs     03/11/20  1344 03/12/20  0415 03/13/20  0405 03/14/20  0320    138 139 138   K 3.8 5.3* 4.6 4.4   CL 95* 98* 101 98*   CO2 32 26 26 27   PHOS 2.0*  --  5.8*  --    BUN 14 27* 56* 35*   CREATININE 1.9* 2.6* 4.1* 2.5*   MG 1.80 1.90  --   --      LIVP:   Recent Labs     03/11/20  1344 03/13/20  0405   AST 26 25   ALT 20 15   BILIDIR <0.2 <0.2   BILITOT 0.5 0.4   ALKPHOS 73 59     Uric Acid:    Recent Labs     03/14/20  0320   LABURIC 7.1     Coags:   Recent Labs     03/11/20  1344 03/12/20  0415   PROTIME 12.4 12.2   INR 1.07 1.05   APTT 29.9 29.8         PROBLEM LIST:           1.  [place] Light Chain Multiple Myeloma   2. ESRD on HD   3. GERD  4. HTN  5. Hypokalemia      TREATMENT:           1. CyBorD (started 10/30/18)  2. Revlimid / Ishmael Sax / Dex on clinical trial (started 2/1/19)  3. Carfilzomib / Pomalidomide / Dex x 6 cycles (Relapse #1 - started 8/26/19 - 2/2020)  4. Cytoxan Mobilization w/ 25% dose reduction (2/17/20)  5. Melphalan 140mg/m2 & Autologous SCT 3/12/20     ASSESSMENT AND PLAN:           1.  [place] Light Chain Multiple Myeloma:  Currently in [facility]GPR  - Admitted for reduced dose Melphalan (140 mg/M^2) followed by autologous stem cell resuce  - Post transplant maintenance: Consider KPD x

## 2020-03-14 NOTE — PROGRESS NOTES
[facility]       Progress Note           Subjective/   59y.o. year old male who we are seeing in consultation for ESRD. HPI:  No issues with HD yesterday. ROS:  Denies any sob. No chest pain. Feels fatigued. SHx: No visitors this morning. Objective/   /66   Pulse 82   Temp 98.1 °F (36.7 °C) (Oral)   Resp 16   Ht 6' 4\" (1.93 m)   Wt 235 lb 3.2 oz (106.7 kg)   SpO2 98%   BMI 28.63 kg/m²   GEN:  NAD, resting in bed. HEENT: non-icteric, no JVD  CV: S1, S2 without m/r/g; no LE edema  RESP: CTA B without w/r/r; breathing wnl  ABD: +bs, soft, nt, no hsm  SKIN: warm, no rashes  ACCESS: LUE AVF +bruit/thrill.     Data/  Recent Labs     03/12/20  0415 03/13/20  0405 03/14/20  0320   WBC 2.1* 3.3* 1.8*   HGB 8.3* 7.4* 8.1*   HCT 24.0* 21.2* 23.3*   MCV 96.1 96.0 94.9    161 178     Recent Labs     03/11/20  1344 03/12/20  0415 03/13/20  0405 03/14/20  0320    138 139 138   K 3.8 5.3* 4.6 4.4   CL 95* 98* 101 98*   CO2 32 26 26 27   GLUCOSE 108* 149* 115* 116*   PHOS 2.0*  --  5.8*  --    MG 1.80 1.90  --   --    BUN 14 27* 56* 35*   CREATININE 1.9* 2.6* 4.1* 2.5*   LABGLOM 36* 25* 15* 26*   GFRAA 43* 30* 18* 32*       Assessment/     - End stage renal disease - on HD Mon-Wed-Fri     - [place] light chain MM - admitted for high dose melphalan & autologous stem cell  transplant on 3/12     - Anemia of chronic disease - VANESSA with HD     - Hypertension - controlled     - Hyperkalemia - within range    - Hyperuricemia - on allopurinol       Plan/     - HD again Monday, under dry weight of 111 kg  - Dose VANESSA with HD, prn prbc's for Hb less than 7.0  - Trend labs

## 2020-03-14 NOTE — PLAN OF CARE
visitors adhering to handwashing guidelines. Patient cleansed with chlorhexidine wipes and linens changed daily per protocol. Pt verbalizes understanding of low microbial diet. Patient remains free of nosocomial infections.

## 2020-03-15 LAB
ANION GAP SERPL CALCULATED.3IONS-SCNC: 13 MMOL/L (ref 3–16)
BASOPHILS ABSOLUTE: 0 K/UL (ref 0–0.2)
BASOPHILS RELATIVE PERCENT: 0.4 %
BUN BLDV-MCNC: 47 MG/DL (ref 7–20)
CALCIUM SERPL-MCNC: 8.6 MG/DL (ref 8.3–10.6)
CHLORIDE BLD-SCNC: 100 MMOL/L (ref 99–110)
CO2: 23 MMOL/L (ref 21–32)
CREAT SERPL-MCNC: 3.3 MG/DL (ref 0.8–1.3)
EOSINOPHILS ABSOLUTE: 0 K/UL (ref 0–0.6)
EOSINOPHILS RELATIVE PERCENT: 0.3 %
GFR AFRICAN AMERICAN: 23
GFR NON-AFRICAN AMERICAN: 19
GLUCOSE BLD-MCNC: 113 MG/DL (ref 70–99)
HCT VFR BLD CALC: 22.1 % (ref 40.5–52.5)
HEMOGLOBIN: 7.5 G/DL (ref 13.5–17.5)
LYMPHOCYTES ABSOLUTE: 0 K/UL (ref 1–5.1)
LYMPHOCYTES RELATIVE PERCENT: 0.7 %
MCH RBC QN AUTO: 32.4 PG (ref 26–34)
MCHC RBC AUTO-ENTMCNC: 34 G/DL (ref 31–36)
MCV RBC AUTO: 95.1 FL (ref 80–100)
MONOCYTES ABSOLUTE: 0 K/UL (ref 0–1.3)
MONOCYTES RELATIVE PERCENT: 0.8 %
NEUTROPHILS ABSOLUTE: 1.2 K/UL (ref 1.7–7.7)
NEUTROPHILS RELATIVE PERCENT: 97.8 %
PDW BLD-RTO: 15.2 % (ref 12.4–15.4)
PLATELET # BLD: 151 K/UL (ref 135–450)
PMV BLD AUTO: 8.3 FL (ref 5–10.5)
POTASSIUM SERPL-SCNC: 4.2 MMOL/L (ref 3.5–5.1)
RBC # BLD: 2.32 M/UL (ref 4.2–5.9)
SODIUM BLD-SCNC: 136 MMOL/L (ref 136–145)
URIC ACID, SERUM: 10.6 MG/DL (ref 3.5–7.2)
WBC # BLD: 1.2 K/UL (ref 4–11)

## 2020-03-15 PROCEDURE — 36592 COLLECT BLOOD FROM PICC: CPT

## 2020-03-15 PROCEDURE — 6360000002 HC RX W HCPCS: Performed by: NURSE PRACTITIONER

## 2020-03-15 PROCEDURE — 80048 BASIC METABOLIC PNL TOTAL CA: CPT

## 2020-03-15 PROCEDURE — 2060000000 HC ICU INTERMEDIATE R&B

## 2020-03-15 PROCEDURE — 6370000000 HC RX 637 (ALT 250 FOR IP): Performed by: NURSE PRACTITIONER

## 2020-03-15 PROCEDURE — 85025 COMPLETE CBC W/AUTO DIFF WBC: CPT

## 2020-03-15 PROCEDURE — 2580000003 HC RX 258: Performed by: NURSE PRACTITIONER

## 2020-03-15 PROCEDURE — 84550 ASSAY OF BLOOD/URIC ACID: CPT

## 2020-03-15 RX ADMIN — ACYCLOVIR 200 MG: 200 CAPSULE ORAL at 08:57

## 2020-03-15 RX ADMIN — ACYCLOVIR 200 MG: 200 CAPSULE ORAL at 20:08

## 2020-03-15 RX ADMIN — Medication 10 ML: at 09:01

## 2020-03-15 RX ADMIN — HEPARIN SODIUM 5000 UNITS: 5000 INJECTION INTRAVENOUS; SUBCUTANEOUS at 22:46

## 2020-03-15 RX ADMIN — ALLOPURINOL 200 MG: 100 TABLET ORAL at 08:57

## 2020-03-15 RX ADMIN — HEPARIN SODIUM 5000 UNITS: 5000 INJECTION INTRAVENOUS; SUBCUTANEOUS at 16:09

## 2020-03-15 RX ADMIN — SODIUM CHLORIDE 15 ML: 900 IRRIGANT IRRIGATION at 05:51

## 2020-03-15 RX ADMIN — HEPARIN SODIUM 5000 UNITS: 5000 INJECTION INTRAVENOUS; SUBCUTANEOUS at 05:50

## 2020-03-15 RX ADMIN — SODIUM CHLORIDE 15 ML: 900 IRRIGANT IRRIGATION at 20:08

## 2020-03-15 RX ADMIN — NEPHROCAP 1 MG: 1 CAP ORAL at 09:01

## 2020-03-15 RX ADMIN — SODIUM CHLORIDE 15 ML: 900 IRRIGANT IRRIGATION at 16:09

## 2020-03-15 RX ADMIN — PANTOPRAZOLE SODIUM 40 MG: 40 TABLET, DELAYED RELEASE ORAL at 08:57

## 2020-03-15 RX ADMIN — Medication 10 ML: at 20:08

## 2020-03-15 ASSESSMENT — PAIN SCALES - GENERAL
PAINLEVEL_OUTOF10: 0

## 2020-03-15 NOTE — PROGRESS NOTES
[facility]       Progress Note           Subjective/   59y.o. year old male who we are seeing in consultation for ESRD. HPI:  No issues, still feels weak. ROS:  Denies any sob. No chest pain. Feels fatigued. SHx: No visitors this morning. Objective/   /67   Pulse 95   Temp 98 °F (36.7 °C) (Oral)   Resp 18   Ht 6' 4\" (1.93 m)   Wt 235 lb 3.2 oz (106.7 kg)   SpO2 99%   BMI 28.63 kg/m²   GEN:  NAD, resting in bed. HEENT: non-icteric, no JVD  CV: S1, S2 without m/r/g; no LE edema  RESP: CTA B without w/r/r; breathing wnl  ABD: +bs, soft, nt, no hsm  SKIN: warm, no rashes  ACCESS: LUE AVF +bruit/thrill. Data/  Recent Labs     03/13/20 0405 03/14/20 0320 03/15/20  0318   WBC 3.3* 1.8* 1.2*   HGB 7.4* 8.1* 7.5*   HCT 21.2* 23.3* 22.1*   MCV 96.0 94.9 95.1    178 151     Recent Labs     03/13/20 0405 03/14/20  0320 03/15/20  0318    138 136   K 4.6 4.4 4.2    98* 100   CO2 26 27 23   GLUCOSE 115* 116* 113*   PHOS 5.8*  --   --    BUN 56* 35* 47*   CREATININE 4.1* 2.5* 3.3*   LABGLOM 15* 26* 19*   GFRAA 18* 32* 23*       Assessment/     - End stage renal disease - on HD Mon-Wed-Fri     - [place] light chain MM - admitted for high dose melphalan & autologous stem cell  transplant on 3/12     - Anemia of chronic disease - VANESSA with HD     - Hypertension - controlled     - Hyperkalemia - within range    - Hyperuricemia - on allopurinol       Plan/     - HD again Monday, under dry weight of 111 kg  - Dose VANESSA with HD, prn prbc's for Hb less than 7.0  - Update PO4 Monday.

## 2020-03-15 NOTE — PROGRESS NOTES
[facility]  Autologous Progress Note    3/15/2020    Mack Dunn    :  1955    MRN:  [id number]    Referring MD: No referring provider defined for this encounter. Subjective:  Feels better this morning. Able to eat breakfast.       ECOG PS:  (1) Restricted in physically strenuous activity, ambulatory and able to do work of light nature    KPS: 90% Able to carry on normal activity; minor signs or symptoms of disease    Isolation:  None     Medications    Scheduled Meds:   levoFLOXacin  250 mg Oral Every Other Day    [START ON 3/17/2020] Tbo-Filgrastim  300 mcg Subcutaneous QPM    darbepoetin evangelina-polysorbate  100 mcg Intravenous Weekly    sodium chloride flush  10 mL Intravenous 2 times per day    Saline Mouthwash  15 mL Swish & Spit 4x Daily AC & HS    heparin (porcine)  5,000 Units Subcutaneous 3 times per day    acyclovir  200 mg Oral BID    allopurinol  200 mg Oral Daily    pantoprazole  40 mg Oral Daily    fluconazole  200 mg Oral Q MWF    b complex-C-folic acid  1 capsule Oral Daily     Continuous Infusions:   sodium chloride      sodium chloride       PRN Meds:.morphine, sodium chloride, sodium chloride, sodium chloride flush, magnesium hydroxide, Saline Mouthwash, alteplase, prochlorperazine **OR** prochlorperazine, LORazepam **OR** LORazepam    ROS:  As noted above, otherwise remainder of 10-point ROS negative    Physical Exam:     I&O:      Intake/Output Summary (Last 24 hours) at 3/15/2020 0939  Last data filed at 3/14/2020 2243  Gross per 24 hour   Intake --   Output 825 ml   Net -825 ml       Vital Signs:  /67   Pulse 95   Temp 98 °F (36.7 °C) (Oral)   Resp 18   Ht 6' 4\" (1.93 m)   Wt 235 lb 3.2 oz (106.7 kg)   SpO2 99%   BMI 28.63 kg/m²     Weight:    Wt Readings from Last 3 Encounters:   20 235 lb 3.2 oz (106.7 kg)   20 238 lb 1.6 oz (108 kg)   20 250 lb 1 oz (113.4 kg)       General: Awake, alert and oriented.   HEENT: normocephalic, alopecia, PERRL, no scleral erythema or icterus, Oral mucosa moist and intact, throat clear  NECK: supple without palpable adenopathy  BACK: Straight negative CVAT  SKIN: warm dry and intact without lesions rashes or masses  CHEST: CTA bilaterally without use of accessory muscles  CV: Normal S1 S2, RRR, no MRG  ABD: NT ND normoactive BS, no palpable masses or hepatosplenomegaly  EXTREMITIES: without edema, denies calf tenderness, left arm fistula  NEURO: CN II - XII grossly intact  CATHETER: Right IJ Trifusion (IR, 2/14/20) - CDI     Data:   CBC:   Recent Labs     03/13/20  0405 03/14/20  0320 03/15/20  0318   WBC 3.3* 1.8* 1.2*   HGB 7.4* 8.1* 7.5*   HCT 21.2* 23.3* 22.1*   MCV 96.0 94.9 95.1    178 151     BMP/Mag:  Recent Labs     03/13/20  0405 03/14/20  0320 03/15/20  0318    138 136   K 4.6 4.4 4.2    98* 100   CO2 26 27 23   PHOS 5.8*  --   --    BUN 56* 35* 47*   CREATININE 4.1* 2.5* 3.3*     LIVP:   Recent Labs     03/13/20 0405   AST 25   ALT 15   BILIDIR <0.2   BILITOT 0.4   ALKPHOS 59     Uric Acid:    Recent Labs     03/15/20  0318   LABURIC 10.6*     Coags:   No results for input(s): PROTIME, INR, APTT in the last 72 hours. PROBLEM LIST:           1.  [place] Light Chain Multiple Myeloma   2. ESRD on HD   3. GERD  4. HTN  5. Hypokalemia      TREATMENT:           1. CyBorD (started 10/30/18)  2. Revlimid / Glenora Peyer / Dex on clinical trial (started 2/1/19)  3. Carfilzomib / Pomalidomide / Dex x 6 cycles (Relapse #1 - started 8/26/19 - 2/2020)  4. Cytoxan Mobilization w/ 25% dose reduction (2/17/20)  5. Melphalan 140mg/m2 & Autologous SCT 3/12/20     ASSESSMENT AND PLAN:           1. [place] Light Chain Multiple Myeloma:  Currently in [facility]  - Admitted for reduced dose Melphalan (140 mg/M^2) followed by autologous stem cell resuce  - Post transplant maintenance: Consider KPD x 4 cycles     Day + 3     2.  ID: Afebrile, no evidence of infection.    - Cont acyclovir ppx; Start fluconazole ppx 3/12/20; start Levaquin ppx (dose adjusted for ESRD - 250 mg Q 48 hours) when ANC < 1.5, started 3/14     3. Heme: Leukopenia from previous chemo, Anemia from previous chemotherapy & ESRD   - Transfuse for Hgb < 7 and Platelets < 26C  - No transfusion today     4. ESRD / Metabolic: HyperK+, ESRD  - Followed by Dr. Calixto Gonzales, consult on admit    - Cont HD on MWF  - IVFs: 250mL x1 prior to melphalan and then only as needed  - Electrolyte management per nephrology  - Cont allopurinol 200mg daily  - Cont dialyvite daily     5. GI / Nutrition:    - H/o GERD  Nutrition:  Appetite and oral intake is good.    - Cont MVI  - Cont low microbial diet   - Follow closely with dietary  GERD:    - Cont PPI         - DVT Prophylaxis: Platelets >64,263; heparin Q 8 hours  Contraindications to pharmacologic prophylaxis: None  Contraindications to mechanical prophylaxis: None    - Disposition:  Plan for discharge once ANC >1 and patient has recovered from chemo toxicities    Josefa Helton MD

## 2020-03-15 NOTE — PLAN OF CARE
Problem: Falls - Risk of:  Goal: Will remain free from falls  Description: Will remain free from falls  Outcome: Ongoing  Note: Orthostatic vital signs obtained at start of shift - see flowsheet for details. Pt does not meet criteria for orthostasis. Pt is a Med fall risk. See Evins Aguirre Fall Score and ABCDS Injury Risk assessments. Pt bed is in low position, side rails up, call light and belongings are in reach. Fall risk light is on outside pts room. Pt encouraged to call for assistance as needed. Will continue with hourly rounds for PO intake, pain needs, toileting and repositioning as needed. Problem: Bleeding:  Goal: Will show no signs and symptoms of excessive bleeding  Description: Will show no signs and symptoms of excessive bleeding  Outcome: Ongoing  Note: Patient's hemoglobin this AM:   Recent Labs     03/15/20  0318   HGB 7.5*     Patient's platelet count this AM:   Recent Labs     03/15/20  0318       Thrombocytopenia Precautions in place. Patient showing no signs or symptoms of active bleeding. Transfusion not indicated at this time. Patient verbalizes understanding of all instructions. Will continue to assess and implement POC. Call light within reach and hourly rounding in place. Problem: Venous Thromboembolism:  Goal: Will show no signs or symptoms of venous thromboembolism  Description: Will show no signs or symptoms of venous thromboembolism  Outcome: Ongoing  Note: Pt is at risk for DVT d/t decreased mobility and cancer treatment. Pt educated on importance of activity. Pt has orders for heparin. Pt verbalizes understanding of need for prophylaxis while inpatient. Problem: PROTECTIVE PRECAUTIONS  Goal: Patient will remain free of nosocomial Infections  Outcome: Ongoing  Note: Pt remains in protective precautions. No living plants or fresh flowers in his/her room. Patient educated on wearing mask when in hallways.  Patient, staff, and visitors adhering to handwashing guidelines. Patient cleansed with chlorhexidine wipes and linens changed daily per protocol. Pt verbalizes understanding of low microbial diet. Patient remains free of nosocomial infections.

## 2020-03-16 ENCOUNTER — APPOINTMENT (OUTPATIENT)
Dept: GENERAL RADIOLOGY | Age: 65
DRG: 016 | End: 2020-03-16
Attending: INTERNAL MEDICINE
Payer: COMMERCIAL

## 2020-03-16 LAB
ALBUMIN SERPL-MCNC: 3.8 G/DL (ref 3.4–5)
ALP BLD-CCNC: 64 U/L (ref 40–129)
ALT SERPL-CCNC: 19 U/L (ref 10–40)
ANION GAP SERPL CALCULATED.3IONS-SCNC: 14 MMOL/L (ref 3–16)
APTT: 30.7 SEC (ref 24.2–36.2)
AST SERPL-CCNC: 20 U/L (ref 15–37)
BASOPHILS ABSOLUTE: 0 K/UL (ref 0–0.2)
BASOPHILS RELATIVE PERCENT: 0.4 %
BILIRUB SERPL-MCNC: 0.4 MG/DL (ref 0–1)
BILIRUBIN DIRECT: <0.2 MG/DL (ref 0–0.3)
BILIRUBIN, INDIRECT: ABNORMAL MG/DL (ref 0–1)
BUN BLDV-MCNC: 58 MG/DL (ref 7–20)
CALCIUM SERPL-MCNC: 8.6 MG/DL (ref 8.3–10.6)
CHLORIDE BLD-SCNC: 102 MMOL/L (ref 99–110)
CO2: 20 MMOL/L (ref 21–32)
CREAT SERPL-MCNC: 3.5 MG/DL (ref 0.8–1.3)
EOSINOPHILS ABSOLUTE: 0 K/UL (ref 0–0.6)
EOSINOPHILS RELATIVE PERCENT: 0.3 %
GFR AFRICAN AMERICAN: 21
GFR NON-AFRICAN AMERICAN: 18
GLUCOSE BLD-MCNC: 120 MG/DL (ref 70–99)
HCT VFR BLD CALC: 21.5 % (ref 40.5–52.5)
HEMOGLOBIN: 7.4 G/DL (ref 13.5–17.5)
INR BLD: 1.04 (ref 0.86–1.14)
LACTATE DEHYDROGENASE: 164 U/L (ref 100–190)
LYMPHOCYTES ABSOLUTE: 0 K/UL (ref 1–5.1)
LYMPHOCYTES RELATIVE PERCENT: 0.6 %
MAGNESIUM: 2.1 MG/DL (ref 1.8–2.4)
MCH RBC QN AUTO: 32.6 PG (ref 26–34)
MCHC RBC AUTO-ENTMCNC: 34.5 G/DL (ref 31–36)
MCV RBC AUTO: 94.4 FL (ref 80–100)
MONOCYTES ABSOLUTE: 0 K/UL (ref 0–1.3)
MONOCYTES RELATIVE PERCENT: 0.6 %
NEUTROPHILS ABSOLUTE: 1.1 K/UL (ref 1.7–7.7)
NEUTROPHILS RELATIVE PERCENT: 98.1 %
PDW BLD-RTO: 15 % (ref 12.4–15.4)
PHOSPHORUS: 4 MG/DL (ref 2.5–4.9)
PLATELET # BLD: 133 K/UL (ref 135–450)
PMV BLD AUTO: 8.9 FL (ref 5–10.5)
POTASSIUM SERPL-SCNC: 4.2 MMOL/L (ref 3.5–5.1)
PROTHROMBIN TIME: 12.1 SEC (ref 10–13.2)
RBC # BLD: 2.27 M/UL (ref 4.2–5.9)
SODIUM BLD-SCNC: 136 MMOL/L (ref 136–145)
TOTAL PROTEIN: 5.5 G/DL (ref 6.4–8.2)
URIC ACID, SERUM: 11.9 MG/DL (ref 3.5–7.2)
WBC # BLD: 1.1 K/UL (ref 4–11)

## 2020-03-16 PROCEDURE — 90935 HEMODIALYSIS ONE EVALUATION: CPT

## 2020-03-16 PROCEDURE — 85610 PROTHROMBIN TIME: CPT

## 2020-03-16 PROCEDURE — 36592 COLLECT BLOOD FROM PICC: CPT

## 2020-03-16 PROCEDURE — 84100 ASSAY OF PHOSPHORUS: CPT

## 2020-03-16 PROCEDURE — 6370000000 HC RX 637 (ALT 250 FOR IP): Performed by: INTERNAL MEDICINE

## 2020-03-16 PROCEDURE — 80048 BASIC METABOLIC PNL TOTAL CA: CPT

## 2020-03-16 PROCEDURE — 2580000003 HC RX 258: Performed by: NURSE PRACTITIONER

## 2020-03-16 PROCEDURE — 6370000000 HC RX 637 (ALT 250 FOR IP): Performed by: NURSE PRACTITIONER

## 2020-03-16 PROCEDURE — 2060000000 HC ICU INTERMEDIATE R&B

## 2020-03-16 PROCEDURE — 80076 HEPATIC FUNCTION PANEL: CPT

## 2020-03-16 PROCEDURE — 84550 ASSAY OF BLOOD/URIC ACID: CPT

## 2020-03-16 PROCEDURE — 83735 ASSAY OF MAGNESIUM: CPT

## 2020-03-16 PROCEDURE — 85730 THROMBOPLASTIN TIME PARTIAL: CPT

## 2020-03-16 PROCEDURE — 83615 LACTATE (LD) (LDH) ENZYME: CPT

## 2020-03-16 PROCEDURE — 6360000002 HC RX W HCPCS: Performed by: INTERNAL MEDICINE

## 2020-03-16 PROCEDURE — 71045 X-RAY EXAM CHEST 1 VIEW: CPT

## 2020-03-16 PROCEDURE — 6360000002 HC RX W HCPCS: Performed by: NURSE PRACTITIONER

## 2020-03-16 PROCEDURE — 85025 COMPLETE CBC W/AUTO DIFF WBC: CPT

## 2020-03-16 RX ORDER — SODIUM CHLORIDE 9 MG/ML
INJECTION, SOLUTION INTRAVENOUS
Status: DISPENSED
Start: 2020-03-16 | End: 2020-03-16

## 2020-03-16 RX ADMIN — HEPARIN SODIUM 5000 UNITS: 5000 INJECTION INTRAVENOUS; SUBCUTANEOUS at 13:56

## 2020-03-16 RX ADMIN — SODIUM CHLORIDE 15 ML: 900 IRRIGANT IRRIGATION at 20:00

## 2020-03-16 RX ADMIN — Medication 10 ML: at 20:00

## 2020-03-16 RX ADMIN — SODIUM CHLORIDE 15 ML: 900 IRRIGANT IRRIGATION at 05:38

## 2020-03-16 RX ADMIN — PROCHLORPERAZINE EDISYLATE 10 MG: 5 INJECTION INTRAMUSCULAR; INTRAVENOUS at 20:00

## 2020-03-16 RX ADMIN — ALLOPURINOL 200 MG: 100 TABLET ORAL at 08:05

## 2020-03-16 RX ADMIN — LEVOFLOXACIN 250 MG: 250 TABLET, FILM COATED ORAL at 22:00

## 2020-03-16 RX ADMIN — PANTOPRAZOLE SODIUM 40 MG: 40 TABLET, DELAYED RELEASE ORAL at 08:05

## 2020-03-16 RX ADMIN — HEPARIN SODIUM 5000 UNITS: 5000 INJECTION INTRAVENOUS; SUBCUTANEOUS at 22:00

## 2020-03-16 RX ADMIN — ACYCLOVIR 200 MG: 200 CAPSULE ORAL at 08:05

## 2020-03-16 RX ADMIN — LORAZEPAM 0.5 MG: 0.5 TABLET ORAL at 22:00

## 2020-03-16 RX ADMIN — FLUCONAZOLE 200 MG: 200 TABLET ORAL at 16:28

## 2020-03-16 RX ADMIN — HEPARIN SODIUM 5000 UNITS: 5000 INJECTION INTRAVENOUS; SUBCUTANEOUS at 05:38

## 2020-03-16 RX ADMIN — NEPHROCAP 1 MG: 1 CAP ORAL at 13:49

## 2020-03-16 RX ADMIN — ACYCLOVIR 200 MG: 200 CAPSULE ORAL at 22:00

## 2020-03-16 ASSESSMENT — PAIN SCALES - GENERAL
PAINLEVEL_OUTOF10: 0

## 2020-03-16 NOTE — FLOWSHEET NOTE
Treatment time: 3.5 hours  Net UF: 0 ml     Pre weight: 104 kg   Post weight: 104 kg  EDW: 110 kg     Access used: GENARO AVF  Access function: Positional with -400 ml/min     Medications or blood products given: none     Regular outpatient schedule: MWF     Summary of response to treatment: Tolerated treatment well. No HD complications or complaints. Copy of dialysis treatment record placed in chart, to be scanned into EMR.       03/16/20 0801 03/16/20 1230   Treatment   Time On  --  0840   Time Off  --  1214   Vital Signs   /79 125/61   Temp 98.1 °F (36.7 °C) 97.5 °F (36.4 °C)   Pulse 91 79   Resp 20 16   Weight 229 lb 4.5 oz (104 kg) 229 lb 4.5 oz (104 kg)   Weight Method Standing scale Standing scale   Treatment Initiation   Dialyze Hours 3.5  --    Treatment  Initiation Universal Precautions maintained;Lines secured to patient; Connections secured;Prime given;Venous Parameters set; Arterial Parameters set; Air foam detector engaged;Dialysate;Saline line double clamped;Dialyzer;F180  --    Dialysis Bath   K+ (Potassium) 3  --    Ca+ (Calcium) 2.5  --    Na+ (Sodium) 138  --    HCO3 (Bicarb) 35  --    Hemodialysis Fistula/Graft Left Forearm   Placement Date/Time: 07/25/19 0900   Orientation: Left  Access Location: Forearm   Site Assessment Clean;Dry; Intact Clean;Dry; Intact   Thrill Present Present   Bruit Present Present   Access Interventions Aseptic Technique; Needles taped to patient  --    Dressing Intervention  --  New   Dressing Status  --  Clean;Dry; Intact   Post-Hemodialysis Assessment   Post-Treatment Procedures  --  Blood returned; Access bleeding time > 10 minutes   Machine Disinfection Process  --  Exterior Machine Disinfection   Rinseback Volume (ml)  --  500 ml   Total Liters Processed (l/min)  --  71.9 l/min   Dialyzer Clearance  --  Moderately streaked   Hemodialysis Intake (ml)  --  600 ml   Hemodialysis Output (ml)  --  1100 ml   NET Removed (ml)  --  0 ml   Tolerated Treatment  -- Good

## 2020-03-16 NOTE — PLAN OF CARE
Problem: Falls - Risk of:  Goal: Will remain free from falls  Description: Will remain free from falls  Note: Orthostatic vital signs obtained at start of shift - see flowsheet for details. Pt does not meet criteria for orthostasis. Pt is a Med fall risk. See Alysia Person Fall Score and ABCDS Injury Risk assessments. Pt bed is in low position, side rails up, call light and belongings are in reach. Fall risk light is on outside pts room. Pt encouraged to call for assistance as needed. Will continue with hourly rounds for PO intake, pain needs, toileting and repositioning as needed. Problem: Bleeding:  Goal: Will show no signs and symptoms of excessive bleeding  Description: Will show no signs and symptoms of excessive bleeding  Patient's hemoglobin this AM:   Recent Labs     03/16/20 0310   HGB 7.4*     Patient's platelet count this AM:   Recent Labs     03/16/20 0310   *    Thrombocytopenia Precautions in place. Patient showing no signs or symptoms of active bleeding. Transfusion not indicated at this time. Patient verbalizes understanding of all instructions. Will continue to assess and implement POC. Call light within reach and hourly rounding in place. Problem: Venous Thromboembolism:  Goal: Will show no signs or symptoms of venous thromboembolism  Description: Will show no signs or symptoms of venous thromboembolism  Note: Pt with recent hx of clot. Currently on treatment dose Heparin. Pt at risk of bleeding d/t anticoagulation. Cautioned pt on safety precautions to decrease risk of bleeding. Will notify provider if platelets drop below 50,000 and/or if pt has invasive procedure scheduled in order to hold anticoagulation. Problem: PROTECTIVE PRECAUTIONS  Goal: Patient will remain free of nosocomial Infections  Note: Pt remains in protective precautions. No living plants or fresh flowers in his room. Patient educated on wearing mask when in hallways.  Patient, staff, and visitors

## 2020-03-16 NOTE — CARE COORDINATION
Type of Admission  [place] Light Chain Multiple myeloma  Melphalan Auto SCT--->T:0: 3/12/20  Day+4      Central venous catheter  Right SC Tri fusion ( 2/14/20 IR)  Left Forearm Hemodialysis  Fistula        Plan    Proceed with Melphalan Auto SCT for treatment of Multiple Myeloma      Update  3/11/20:  Planned admission for Melphalan Auto SCT. Rosey Roque is known to [employer] staff from recent admits for Cytoxan mobilization. Patient receives hemodialysis 3x week. 3/13/20: Day +1 from infusion of stem cells yesterday. Hemodialysis in room today. 3/16/20: Hemodialysis today. Reports no problems. Education  3/11/20:  Greeted Mr. Karma Roque when admitted to the unit,accompanied by significant other. 3/16/20:  Discussed use of G-CSF daily starting on day +5 to assist in [facility] recovery, verbalized understanding.         Discharge  Home when [facility] is >1.0 & without toxicities        Pending

## 2020-03-16 NOTE — PROGRESS NOTES
normocephalic, alopecia, PERRL, no scleral erythema or icterus, Oral mucosa moist and intact, throat clear  NECK: supple without palpable adenopathy  BACK: Straight negative CVAT  SKIN: warm dry and intact without lesions rashes or masses  CHEST: CTA bilaterally without use of accessory muscles  CV: Normal S1 S2, RRR, no MRG  ABD: NT ND normoactive BS, no palpable masses or hepatosplenomegaly  EXTREMITIES: without edema, denies calf tenderness, left arm fistula  NEURO: CN II - XII grossly intact  CATHETER: Right IJ Trifusion (IR, 2/14/20) - CDI     Data:   CBC:   Recent Labs     03/14/20 0320 03/15/20  0318 03/16/20  0310   WBC 1.8* 1.2* 1.1*   HGB 8.1* 7.5* 7.4*   HCT 23.3* 22.1* 21.5*   MCV 94.9 95.1 94.4    151 133*     BMP/Mag:  Recent Labs     03/14/20  0320 03/15/20  0318 03/16/20  0310    136 136   K 4.4 4.2 4.2   CL 98* 100 102   CO2 27 23 20*   PHOS  --   --  4.0   BUN 35* 47* 58*   CREATININE 2.5* 3.3* 3.5*   MG  --   --  2.10     LIVP:   Recent Labs     03/16/20 0310   AST 20   ALT 19   BILIDIR <0.2   BILITOT 0.4   ALKPHOS 64     Uric Acid:    Recent Labs     03/16/20 0310   LABURIC 11.9*     Coags:   Recent Labs     03/16/20 0310   PROTIME 12.1   INR 1.04   APTT 30.7         PROBLEM LIST:           1.  [place] Light Chain Multiple Myeloma   2. ESRD on HD   3. GERD  4. HTN  5. Hypokalemia      TREATMENT:           1. CyBorD (started 10/30/18)  2. Revlimid / Duayne Jewels / Dex on clinical trial (started 2/1/19)  3. Carfilzomib / Pomalidomide / Dex x 6 cycles (Relapse #1 - started 8/26/19 - 2/2020)  4. Cytoxan Mobilization w/ 25% dose reduction (2/17/20)  5. Melphalan 140mg/m2 & Autologous SCT 3/12/20     ASSESSMENT AND PLAN:           1. [place] Light Chain Multiple Myeloma:  Currently in [facility]  - Admitted for reduced dose Melphalan (140 mg/M^2) followed by autologous stem cell resuce  - Post transplant maintenance: Consider KPD x 4 cycles     Day + 4     2.  ID: Afebrile, no evidence of

## 2020-03-16 NOTE — PROGRESS NOTES
[facility]       Progress Note           Subjective/   59y.o. year old male who we are seeing in consultation for ESRD. HPI:  No issues, feels a bit stronger than yesterday, weak, HD today so far feels ok. ROS:  Denies any sob. No chest pain. SHx: No visitors this morning. Objective/   /79   Pulse 91   Temp 98.1 °F (36.7 °C) (Oral)   Resp 20   Ht 6' 4\" (1.93 m)   Wt 229 lb 4.5 oz (104 kg)   SpO2 99%   BMI 27.91 kg/m²   GEN:  NAD, resting in bed. HEENT: non-icteric, no JVD  CV: S1, S2 without m/r/g; no LE edema  RESP: CTA B without w/r/r; breathing wnl  ABD: +bs, soft, nt, no hsm  SKIN: warm, no rashes  ACCESS: LUE AVF +bruit/thrill. Data/  Recent Labs     03/14/20  0320 03/15/20  0318 03/16/20  0310   WBC 1.8* 1.2* 1.1*   HGB 8.1* 7.5* 7.4*   HCT 23.3* 22.1* 21.5*   MCV 94.9 95.1 94.4    151 133*     Recent Labs     03/14/20  0320 03/15/20  0318 03/16/20  0310    136 136   K 4.4 4.2 4.2   CL 98* 100 102   CO2 27 23 20*   GLUCOSE 116* 113* 120*   PHOS  --   --  4.0   MG  --   --  2.10   BUN 35* 47* 58*   CREATININE 2.5* 3.3* 3.5*   LABGLOM 26* 19* 18*   GFRAA 32* 23* 21*       Assessment/     - End stage renal disease - on HD Mon-Wed-Fri     - [place] light chain MM - admitted for high dose melphalan & autologous stem cell  transplant on 3/12     - Anemia of chronic disease - VANESSA with HD     - Hypertension - controlled     - Hyperkalemia - within range    - Hyperuricemia - on allopurinol       Plan/     - HD MWF ,below prior target weight will need to adjust down .   - Dose VANESSA with HD, prn prbc's for Hb less than 7.0

## 2020-03-17 LAB
ABO/RH: NORMAL
ANION GAP SERPL CALCULATED.3IONS-SCNC: 14 MMOL/L (ref 3–16)
ANISOCYTOSIS: ABNORMAL
ANTIBODY SCREEN: NORMAL
BASOPHILS ABSOLUTE: 0 K/UL (ref 0–0.2)
BASOPHILS RELATIVE PERCENT: 0.6 %
BILIRUBIN URINE: NEGATIVE
BLOOD BANK DISPENSE STATUS: NORMAL
BLOOD BANK PRODUCT CODE: NORMAL
BLOOD, URINE: NEGATIVE
BPU ID: NORMAL
BUN BLDV-MCNC: 39 MG/DL (ref 7–20)
C DIFF TOXIN/ANTIGEN: NORMAL
CALCIUM SERPL-MCNC: 8.6 MG/DL (ref 8.3–10.6)
CHLORIDE BLD-SCNC: 100 MMOL/L (ref 99–110)
CLARITY: CLEAR
CO2: 25 MMOL/L (ref 21–32)
COLOR: YELLOW
CREAT SERPL-MCNC: 2.7 MG/DL (ref 0.8–1.3)
DESCRIPTION BLOOD BANK: NORMAL
EOSINOPHILS ABSOLUTE: 0 K/UL (ref 0–0.6)
EOSINOPHILS RELATIVE PERCENT: 1.8 %
EPITHELIAL CELLS, UA: ABNORMAL /HPF (ref 0–5)
GFR AFRICAN AMERICAN: 29
GFR NON-AFRICAN AMERICAN: 24
GLUCOSE BLD-MCNC: 105 MG/DL (ref 70–99)
GLUCOSE URINE: NEGATIVE MG/DL
HCT VFR BLD CALC: 19.3 % (ref 40.5–52.5)
HEMOGLOBIN: 6.7 G/DL (ref 13.5–17.5)
KETONES, URINE: NEGATIVE MG/DL
LEUKOCYTE ESTERASE, URINE: NEGATIVE
LYMPHOCYTES ABSOLUTE: 0 K/UL (ref 1–5.1)
LYMPHOCYTES RELATIVE PERCENT: 0.8 %
MCH RBC QN AUTO: 32.9 PG (ref 26–34)
MCHC RBC AUTO-ENTMCNC: 34.9 G/DL (ref 31–36)
MCV RBC AUTO: 94.2 FL (ref 80–100)
MICROCYTES: ABNORMAL
MICROSCOPIC EXAMINATION: YES
MONOCYTES ABSOLUTE: 0 K/UL (ref 0–1.3)
MONOCYTES RELATIVE PERCENT: 0.2 %
NEUTROPHILS ABSOLUTE: 0.5 K/UL (ref 1.7–7.7)
NEUTROPHILS RELATIVE PERCENT: 96.6 %
NITRITE, URINE: NEGATIVE
OVALOCYTES: ABNORMAL
PDW BLD-RTO: 15.3 % (ref 12.4–15.4)
PH UA: 7.5 (ref 5–8)
PLATELET # BLD: 104 K/UL (ref 135–450)
PLATELET SLIDE REVIEW: ABNORMAL
PMV BLD AUTO: 8.9 FL (ref 5–10.5)
POTASSIUM SERPL-SCNC: 3.8 MMOL/L (ref 3.5–5.1)
PROTEIN UA: ABNORMAL MG/DL
RBC # BLD: 2.04 M/UL (ref 4.2–5.9)
RBC UA: ABNORMAL /HPF (ref 0–4)
SLIDE REVIEW: ABNORMAL
SODIUM BLD-SCNC: 139 MMOL/L (ref 136–145)
SPECIFIC GRAVITY UA: 1.01 (ref 1–1.03)
URIC ACID, SERUM: 6 MG/DL (ref 3.5–7.2)
URINE TYPE: ABNORMAL
UROBILINOGEN, URINE: 0.2 E.U./DL
WBC # BLD: 0.6 K/UL (ref 4–11)
WBC UA: ABNORMAL /HPF (ref 0–5)

## 2020-03-17 PROCEDURE — 36430 TRANSFUSION BLD/BLD COMPNT: CPT

## 2020-03-17 PROCEDURE — 36592 COLLECT BLOOD FROM PICC: CPT

## 2020-03-17 PROCEDURE — P9040 RBC LEUKOREDUCED IRRADIATED: HCPCS

## 2020-03-17 PROCEDURE — 2060000000 HC ICU INTERMEDIATE R&B

## 2020-03-17 PROCEDURE — 6370000000 HC RX 637 (ALT 250 FOR IP): Performed by: INTERNAL MEDICINE

## 2020-03-17 PROCEDURE — 84550 ASSAY OF BLOOD/URIC ACID: CPT

## 2020-03-17 PROCEDURE — 6360000002 HC RX W HCPCS: Performed by: NURSE PRACTITIONER

## 2020-03-17 PROCEDURE — 80048 BASIC METABOLIC PNL TOTAL CA: CPT

## 2020-03-17 PROCEDURE — 86850 RBC ANTIBODY SCREEN: CPT

## 2020-03-17 PROCEDURE — 86900 BLOOD TYPING SEROLOGIC ABO: CPT

## 2020-03-17 PROCEDURE — 6370000000 HC RX 637 (ALT 250 FOR IP): Performed by: NURSE PRACTITIONER

## 2020-03-17 PROCEDURE — 2580000003 HC RX 258: Performed by: NURSE PRACTITIONER

## 2020-03-17 PROCEDURE — 86923 COMPATIBILITY TEST ELECTRIC: CPT

## 2020-03-17 PROCEDURE — 81001 URINALYSIS AUTO W/SCOPE: CPT

## 2020-03-17 PROCEDURE — 86901 BLOOD TYPING SEROLOGIC RH(D): CPT

## 2020-03-17 PROCEDURE — 85025 COMPLETE CBC W/AUTO DIFF WBC: CPT

## 2020-03-17 PROCEDURE — 87324 CLOSTRIDIUM AG IA: CPT

## 2020-03-17 PROCEDURE — 87449 NOS EACH ORGANISM AG IA: CPT

## 2020-03-17 PROCEDURE — 87081 CULTURE SCREEN ONLY: CPT

## 2020-03-17 PROCEDURE — 6360000002 HC RX W HCPCS: Performed by: INTERNAL MEDICINE

## 2020-03-17 RX ORDER — 0.9 % SODIUM CHLORIDE 0.9 %
20 INTRAVENOUS SOLUTION INTRAVENOUS ONCE
Status: COMPLETED | OUTPATIENT
Start: 2020-03-17 | End: 2020-03-17

## 2020-03-17 RX ORDER — LOPERAMIDE HYDROCHLORIDE 2 MG/1
4 CAPSULE ORAL ONCE
Status: COMPLETED | OUTPATIENT
Start: 2020-03-17 | End: 2020-03-17

## 2020-03-17 RX ORDER — LOPERAMIDE HYDROCHLORIDE 2 MG/1
2 CAPSULE ORAL PRN
Status: DISCONTINUED | OUTPATIENT
Start: 2020-03-17 | End: 2020-03-27 | Stop reason: HOSPADM

## 2020-03-17 RX ADMIN — Medication 10 ML: at 09:21

## 2020-03-17 RX ADMIN — LOPERAMIDE HYDROCHLORIDE 4 MG: 2 CAPSULE ORAL at 15:24

## 2020-03-17 RX ADMIN — SODIUM CHLORIDE 15 ML: 900 IRRIGANT IRRIGATION at 20:15

## 2020-03-17 RX ADMIN — HEPARIN SODIUM 5000 UNITS: 5000 INJECTION INTRAVENOUS; SUBCUTANEOUS at 22:15

## 2020-03-17 RX ADMIN — HEPARIN SODIUM 5000 UNITS: 5000 INJECTION INTRAVENOUS; SUBCUTANEOUS at 15:24

## 2020-03-17 RX ADMIN — NEPHROCAP 1 MG: 1 CAP ORAL at 09:21

## 2020-03-17 RX ADMIN — PANTOPRAZOLE SODIUM 40 MG: 40 TABLET, DELAYED RELEASE ORAL at 09:21

## 2020-03-17 RX ADMIN — LOPERAMIDE HYDROCHLORIDE 2 MG: 2 CAPSULE ORAL at 20:15

## 2020-03-17 RX ADMIN — SODIUM CHLORIDE 15 ML: 900 IRRIGANT IRRIGATION at 11:41

## 2020-03-17 RX ADMIN — HEPARIN SODIUM 5000 UNITS: 5000 INJECTION INTRAVENOUS; SUBCUTANEOUS at 06:00

## 2020-03-17 RX ADMIN — LORAZEPAM 0.5 MG: 0.5 TABLET ORAL at 22:15

## 2020-03-17 RX ADMIN — SODIUM CHLORIDE 20 ML: 9 INJECTION, SOLUTION INTRAVENOUS at 09:10

## 2020-03-17 RX ADMIN — ACYCLOVIR 200 MG: 200 CAPSULE ORAL at 20:15

## 2020-03-17 RX ADMIN — ALLOPURINOL 200 MG: 100 TABLET ORAL at 09:21

## 2020-03-17 RX ADMIN — TBO-FILGRASTIM 300 MCG: 300 INJECTION, SOLUTION SUBCUTANEOUS at 17:58

## 2020-03-17 RX ADMIN — SODIUM CHLORIDE 15 ML: 900 IRRIGANT IRRIGATION at 18:00

## 2020-03-17 RX ADMIN — ACYCLOVIR 200 MG: 200 CAPSULE ORAL at 09:21

## 2020-03-17 RX ADMIN — Medication 10 ML: at 20:15

## 2020-03-17 ASSESSMENT — PAIN SCALES - GENERAL
PAINLEVEL_OUTOF10: 0

## 2020-03-17 NOTE — PLAN OF CARE
Problem: Diarrhea:  Goal: Bowel elimination is within specified parameters  Description: Bowel elimination is within specified parameters  Outcome: Ongoing  Note: Pt reported intermittent diarrhea. Hat placed in toilet and pt had three loose stools within 24 hours. Cdiff sample sent per protocol. Result was negative and pt was given Imodium. Will cont to monitor.

## 2020-03-17 NOTE — PROGRESS NOTES
[facility]  Autologous Progress Note    3/17/2020    Batool Eid    :  1955    MRN:  [id number]    Referring MD: No referring provider defined for this encounter. Subjective:  Feeling better. No acute complaints. ECOG PS:  (1) Restricted in physically strenuous activity, ambulatory and able to do work of light nature    KPS: 90% Able to carry on normal activity; minor signs or symptoms of disease    Isolation:  None     Medications    Scheduled Meds:   sodium chloride  20 mL Intravenous Once    levoFLOXacin  250 mg Oral Every Other Day    Tbo-Filgrastim  300 mcg Subcutaneous QPM    darbepoetin evangelina-polysorbate  100 mcg Intravenous Weekly    sodium chloride flush  10 mL Intravenous 2 times per day    Saline Mouthwash  15 mL Swish & Spit 4x Daily AC & HS    heparin (porcine)  5,000 Units Subcutaneous 3 times per day    acyclovir  200 mg Oral BID    allopurinol  200 mg Oral Daily    pantoprazole  40 mg Oral Daily    fluconazole  200 mg Oral Q MWF    b complex-C-folic acid  1 capsule Oral Daily     Continuous Infusions:   sodium chloride      sodium chloride       PRN Meds:.sodium chloride, sodium chloride, sodium chloride flush, magnesium hydroxide, Saline Mouthwash, alteplase, prochlorperazine **OR** prochlorperazine, LORazepam **OR** LORazepam    ROS:  As noted above, otherwise remainder of 10-point ROS negative    Physical Exam:     I&O:      Intake/Output Summary (Last 24 hours) at 3/17/2020 0943  Last data filed at 3/17/2020 0920  Gross per 24 hour   Intake 1680 ml   Output 2275 ml   Net -595 ml       Vital Signs:  BP (!) 101/59   Pulse 101   Temp 98 °F (36.7 °C) (Oral)   Resp 16   Ht 6' 4\" (1.93 m)   Wt 229 lb 3.2 oz (104 kg)   SpO2 96%   BMI 27.90 kg/m²     Weight:    Wt Readings from Last 3 Encounters:   20 229 lb 3.2 oz (104 kg)   20 238 lb 1.6 oz (108 kg)   20 250 lb 1 oz (113.4 kg)       General: Awake, alert and oriented.   HEENT:

## 2020-03-17 NOTE — PROGRESS NOTES
[facility]       Progress Note           Subjective/   59y.o. year old male who we are seeing in consultation for ESRD. HPI:  No issues, feels a bit stronger than yesterday, weak, HD next in am ,will need to revisit target weight as his weight is down and po intake poor. ROS:  Denies any sob. No chest pain. SHx: No visitors this morning. Objective/   /67   Pulse 95   Temp 98.1 °F (36.7 °C) (Oral)   Resp 16   Ht 6' 4\" (1.93 m)   Wt 229 lb 3.2 oz (104 kg)   SpO2 97%   BMI 27.90 kg/m²   GEN:  NAD, resting in bed. HEENT: non-icteric, no JVD  CV: S1, S2 without m/r/g; no LE edema  RESP: CTA B without w/r/r; breathing wnl  ABD: +bs, soft, nt, no hsm  SKIN: warm, no rashes  ACCESS: LUE AVF +bruit/thrill. Data/  Recent Labs     03/15/20  0318 03/16/20  0310 03/17/20  0400   WBC 1.2* 1.1* 0.6*   HGB 7.5* 7.4* 6.7*   HCT 22.1* 21.5* 19.3*   MCV 95.1 94.4 94.2    133* 104*     Recent Labs     03/15/20  0318 03/16/20  0310 03/17/20  0400    136 139   K 4.2 4.2 3.8    102 100   CO2 23 20* 25   GLUCOSE 113* 120* 105*   PHOS  --  4.0  --    MG  --  2.10  --    BUN 47* 58* 39*   CREATININE 3.3* 3.5* 2.7*   LABGLOM 19* 18* 24*   GFRAA 23* 21* 29*       Assessment/     - End stage renal disease - on HD Mon-Wed-Fri     - [place] light chain MM - admitted for high dose melphalan & autologous stem cell  transplant on 3/12     - Anemia of chronic disease - VANESSA with HD as needed, transfusion per Oncology      - Hypertension - controlled     - Hyperkalemia - within range    - Hyperuricemia - on allopurinol       Plan/     - HD MWF ,below prior target weight will need to adjust down .

## 2020-03-17 NOTE — PLAN OF CARE
Problem: Falls - Risk of:  Goal: Will remain free from falls  Description: Will remain free from falls  3/17/2020 1444 by Edgardo Chatterjee RN  Outcome: Ongoing  Note: Orthostatic vital signs obtained at start of shift - see flowsheet for details. Pt does not meet criteria for orthostasis. Pt is a Med fall risk. See Debby Bucco Fall Score and ABCDS Injury Risk assessments. - Screening for Orthostasis AND not a [place] Risk per [place]OUIS/ABCDS: Pt bed is in low position, side rails up, call light and belongings are in reach. Fall risk light is on outside pts room. Pt encouraged to call for assistance as needed. Will continue with hourly rounds for PO intake, pain needs, toileting and repositioning as needed. Problem: Bleeding:  Goal: Will show no signs and symptoms of excessive bleeding  Description: Will show no signs and symptoms of excessive bleeding  3/17/2020 1444 by Edgardo Chatterjee RN  Outcome: Ongoing  Note: Patient's hemoglobin this AM:   Recent Labs     03/17/20  0400   HGB 6.7*     Patient's platelet count this AM:   Recent Labs     03/17/20  0400   *    Thrombocytopenia Precautions in place. Patient showing no signs or symptoms of active bleeding. Patient transfused blood products per orders - see flowsheet. Patient verbalizes understanding of all instructions. Will continue to assess and implement POC. Call light within reach and hourly rounding in place. Problem: Venous Thromboembolism:  Goal: Will show no signs or symptoms of venous thromboembolism  Description: Will show no signs or symptoms of venous thromboembolism  3/17/2020 1444 by Edgardo Chatterjee RN  Outcome: Ongoing  Note:  Pt is at risk for DVT d/t decreased mobility and cancer treatment. Pt educated on importance of activity. Pt has orders for subcut prophylactic heparin. Pt verbalizes understanding of need for prophylaxis while inpatient.         Problem: PROTECTIVE PRECAUTIONS  Goal: Patient will remain

## 2020-03-18 LAB
ALBUMIN SERPL-MCNC: 3.5 G/DL (ref 3.4–5)
ALP BLD-CCNC: 61 U/L (ref 40–129)
ALT SERPL-CCNC: 17 U/L (ref 10–40)
ANION GAP SERPL CALCULATED.3IONS-SCNC: 12 MMOL/L (ref 3–16)
AST SERPL-CCNC: 17 U/L (ref 15–37)
BILIRUB SERPL-MCNC: 0.4 MG/DL (ref 0–1)
BILIRUBIN DIRECT: <0.2 MG/DL (ref 0–0.3)
BILIRUBIN, INDIRECT: ABNORMAL MG/DL (ref 0–1)
BUN BLDV-MCNC: 48 MG/DL (ref 7–20)
CALCIUM SERPL-MCNC: 8.8 MG/DL (ref 8.3–10.6)
CHLORIDE BLD-SCNC: 102 MMOL/L (ref 99–110)
CO2: 24 MMOL/L (ref 21–32)
CREAT SERPL-MCNC: 3.2 MG/DL (ref 0.8–1.3)
GFR AFRICAN AMERICAN: 24
GFR NON-AFRICAN AMERICAN: 20
GLUCOSE BLD-MCNC: 99 MG/DL (ref 70–99)
HCT VFR BLD CALC: 20.8 % (ref 40.5–52.5)
HEMOGLOBIN: 7.3 G/DL (ref 13.5–17.5)
LACTATE DEHYDROGENASE: 131 U/L (ref 100–190)
MCH RBC QN AUTO: 32.4 PG (ref 26–34)
MCHC RBC AUTO-ENTMCNC: 35.1 G/DL (ref 31–36)
MCV RBC AUTO: 92.4 FL (ref 80–100)
PDW BLD-RTO: 14.8 % (ref 12.4–15.4)
PHOSPHORUS: 4.1 MG/DL (ref 2.5–4.9)
PLATELET # BLD: 65 K/UL (ref 135–450)
PMV BLD AUTO: 8.1 FL (ref 5–10.5)
POTASSIUM SERPL-SCNC: 3.8 MMOL/L (ref 3.5–5.1)
RBC # BLD: 2.25 M/UL (ref 4.2–5.9)
SODIUM BLD-SCNC: 138 MMOL/L (ref 136–145)
TOTAL PROTEIN: 5.1 G/DL (ref 6.4–8.2)
URIC ACID, SERUM: 7.6 MG/DL (ref 3.5–7.2)
WBC # BLD: 0.1 K/UL (ref 4–11)

## 2020-03-18 PROCEDURE — 85025 COMPLETE CBC W/AUTO DIFF WBC: CPT

## 2020-03-18 PROCEDURE — 6360000002 HC RX W HCPCS: Performed by: NURSE PRACTITIONER

## 2020-03-18 PROCEDURE — 6370000000 HC RX 637 (ALT 250 FOR IP): Performed by: INTERNAL MEDICINE

## 2020-03-18 PROCEDURE — 6370000000 HC RX 637 (ALT 250 FOR IP): Performed by: NURSE PRACTITIONER

## 2020-03-18 PROCEDURE — 80048 BASIC METABOLIC PNL TOTAL CA: CPT

## 2020-03-18 PROCEDURE — 83615 LACTATE (LD) (LDH) ENZYME: CPT

## 2020-03-18 PROCEDURE — 84550 ASSAY OF BLOOD/URIC ACID: CPT

## 2020-03-18 PROCEDURE — 90935 HEMODIALYSIS ONE EVALUATION: CPT

## 2020-03-18 PROCEDURE — 80076 HEPATIC FUNCTION PANEL: CPT

## 2020-03-18 PROCEDURE — 36592 COLLECT BLOOD FROM PICC: CPT

## 2020-03-18 PROCEDURE — 6360000002 HC RX W HCPCS: Performed by: INTERNAL MEDICINE

## 2020-03-18 PROCEDURE — 2580000003 HC RX 258: Performed by: NURSE PRACTITIONER

## 2020-03-18 PROCEDURE — 84100 ASSAY OF PHOSPHORUS: CPT

## 2020-03-18 PROCEDURE — 2060000000 HC ICU INTERMEDIATE R&B

## 2020-03-18 RX ADMIN — LEVOFLOXACIN 250 MG: 250 TABLET, FILM COATED ORAL at 20:23

## 2020-03-18 RX ADMIN — SODIUM CHLORIDE 15 ML: 900 IRRIGANT IRRIGATION at 16:28

## 2020-03-18 RX ADMIN — SODIUM CHLORIDE 15 ML: 900 IRRIGANT IRRIGATION at 20:24

## 2020-03-18 RX ADMIN — ACYCLOVIR 200 MG: 200 CAPSULE ORAL at 20:23

## 2020-03-18 RX ADMIN — HEPARIN SODIUM 5000 UNITS: 5000 INJECTION INTRAVENOUS; SUBCUTANEOUS at 06:15

## 2020-03-18 RX ADMIN — PANTOPRAZOLE SODIUM 40 MG: 40 TABLET, DELAYED RELEASE ORAL at 13:46

## 2020-03-18 RX ADMIN — NEPHROCAP 1 MG: 1 CAP ORAL at 13:45

## 2020-03-18 RX ADMIN — LORAZEPAM 0.5 MG: 0.5 TABLET ORAL at 22:52

## 2020-03-18 RX ADMIN — Medication 10 ML: at 20:24

## 2020-03-18 RX ADMIN — FLUCONAZOLE 200 MG: 200 TABLET ORAL at 16:27

## 2020-03-18 RX ADMIN — TBO-FILGRASTIM 300 MCG: 300 INJECTION, SOLUTION SUBCUTANEOUS at 18:33

## 2020-03-18 RX ADMIN — ACYCLOVIR 200 MG: 200 CAPSULE ORAL at 13:45

## 2020-03-18 RX ADMIN — ALLOPURINOL 200 MG: 100 TABLET ORAL at 13:45

## 2020-03-18 ASSESSMENT — PAIN SCALES - GENERAL
PAINLEVEL_OUTOF10: 0

## 2020-03-18 NOTE — PROGRESS NOTES
[facility]       Progress Note           Subjective/   59y.o. year old male who we are seeing in consultation for ESRD. HPI:  No issues, feels about the same, appetite still not great. Pt is seen while on HD   Lowered target weight to 104 Kg ,doing well on dialysis so far today and  blood pressure stable . ROS:  Denies any sob. No chest pain. SHx: No visitors this morning. Objective/   /62   Pulse 96   Temp 98.6 °F (37 °C) (Oral)   Resp 14   Ht 6' 4\" (1.93 m)   Wt 228 lb 12.8 oz (103.8 kg)   SpO2 96%   BMI 27.85 kg/m²   GEN:  NAD, resting in bed. HEENT: non-icteric, no JVD  CV: S1, S2 without m/r/g; no LE edema  RESP: CTA B without w/r/r; breathing wnl  ABD: +bs, soft, nt, no hsm  SKIN: warm, no rashes  ACCESS: LUE AVF +bruit/thrill. Data/  Recent Labs     03/16/20  0310 03/17/20  0400 03/18/20  0415   WBC 1.1* 0.6* 0.1*   HGB 7.4* 6.7* 7.3*   HCT 21.5* 19.3* 20.8*   MCV 94.4 94.2 92.4   * 104* 65*     Recent Labs     03/16/20  0310 03/17/20  0400 03/18/20  0415    139 138   K 4.2 3.8 3.8    100 102   CO2 20* 25 24   GLUCOSE 120* 105* 99   PHOS 4.0  --  4.1   MG 2.10  --   --    BUN 58* 39* 48*   CREATININE 3.5* 2.7* 3.2*   LABGLOM 18* 24* 20*   GFRAA 21* 29* 24*       Assessment/     - End stage renal disease - on HD Mon-Wed-Fri     - [place] light chain MM - admitted for high dose melphalan & autologous stem cell  transplant on 3/12     - Anemia of chronic disease - VANESSA with HD as needed, transfusion per Oncology      - Hypertension - controlled     - Hyperkalemia - within range    - Hyperuricemia - on allopurinol       Plan/     - HD MWF ,below prior target weight will need to adjust down to 104 Kg for now  .

## 2020-03-18 NOTE — PLAN OF CARE
Problem: Falls - Risk of:  Goal: Will remain free from falls  Description: Will remain free from falls  Outcome: Ongoing  Note: Pt with steady gait, up ad essence, no issues at this time, instructed pt to call out for assistance as needed, pt verbalized understanding, bed in low position, side rails up, call light in reach, will continue to monitor. Problem: Infection - Central Venous Catheter-Associated Bloodstream Infection:  Goal: Will show no infection signs and symptoms  Description: Will show no infection signs and symptoms  Outcome: Ongoing  Note: Pt afebrile, no S/S of infection, CVC site free from S/S of infection, no drainage, edema, redness, swelling, or tenderness, flushes easily with brisk blood return, dressing changes continue per protocol and on an as needed basis - see flowsheet, will continue to monitor. Problem: Bleeding:  Goal: Will show no signs and symptoms of excessive bleeding  Description: Will show no signs and symptoms of excessive bleeding  Outcome: Ongoing  Note: Pt at risk for bleeding, no S/S of bleeding noted, PLT count with AM labs are unknown at this time, orders to transfuse if equal to or less then 10, will continue to monitor. Problem: PROTECTIVE PRECAUTIONS  Goal: Patient will remain free of nosocomial Infections  Outcome: Ongoing  Note: Pt afebrile, no S/S of infection, will continue to monitor. Refusing BCC Bath Protocol:  Despite multiple attempts, pt refusing shower or bed bath with CHG today. Discussed risks associated with not following BCC bath protocol including increased risk of CVC line infection and sepsis. Will notify treatment team of refusal.      Problem: Activity:  Goal: Ability to tolerate increased activity will improve  Description: Ability to tolerate increased activity will improve  Outcome: Ongoing  Note: Stable/No Isolation Precautions:  Pt with activity orders for up ad essence.   Encouraged pt to be up OOB as much as possible throughout the day and for all meals. Encouraged frequent short naps as necessary to preserve energy but instructed that while awake, pt should be OOB. Encouraged pt to ambulate in halls. Pt not seen up in halls this shift. Pt is visualized to be OOB 0-25% of the time this shift. Will continue to encourage frequent activity. Problem: Skin Integrity:  Goal: Absence of new skin breakdown  Description: Absence of new skin breakdown  Outcome: Ongoing  Note: Pt's skin remains intact, no evidence of breakdown noted, will continue to monitor. Problem: Venous Thromboembolism:  Goal: Will show no signs or symptoms of venous thromboembolism  Description: Will show no signs or symptoms of venous thromboembolism  Outcome: Ongoing  Note: Adherent with DVT Prevention: Pt is at risk for DVT d/t decreased mobility and cancer treatment. Pt educated on importance of activity. Pt has orders for heparin. Pt verbalizes understanding of need for prophylaxis while inpatient. Problem: Nausea/Vomiting:  Goal: Ability to achieve adequate nutritional intake will improve  Description: Ability to achieve adequate nutritional intake will improve  Outcome: Ongoing  Note: Pt free from nausea/vomiting, will continue to monitor. Problem: Diarrhea:  Goal: Bowel elimination is within specified parameters  Description: Bowel elimination is within specified parameters  Outcome: Ongoing  Note: Pt with on episode of diarrhea, administered imodium x1, pt reports adequate relief, will continue to monitor.

## 2020-03-18 NOTE — PROGRESS NOTES
[facility]  Autologous Progress Note    3/18/2020    Yudi Mackenzie    :  1955    MRN:  [id number]    Referring MD: No referring provider defined for this encounter. Subjective:  Feeling better. No acute complaints. ECOG PS:  (1) Restricted in physically strenuous activity, ambulatory and able to do work of light nature    KPS: 90% Able to carry on normal activity; minor signs or symptoms of disease    Isolation:  None     Medications    Scheduled Meds:   levoFLOXacin  250 mg Oral Every Other Day    Tbo-Filgrastim  300 mcg Subcutaneous QPM    darbepoetin evangelina-polysorbate  100 mcg Intravenous Weekly    sodium chloride flush  10 mL Intravenous 2 times per day    Saline Mouthwash  15 mL Swish & Spit 4x Daily AC & HS    heparin (porcine)  5,000 Units Subcutaneous 3 times per day    acyclovir  200 mg Oral BID    allopurinol  200 mg Oral Daily    pantoprazole  40 mg Oral Daily    fluconazole  200 mg Oral Q MWF    b complex-C-folic acid  1 capsule Oral Daily     Continuous Infusions:   sodium chloride      sodium chloride       PRN Meds:. [COMPLETED] loperamide **FOLLOWED BY** loperamide, sodium chloride, sodium chloride, sodium chloride flush, magnesium hydroxide, Saline Mouthwash, alteplase, prochlorperazine **OR** prochlorperazine, LORazepam **OR** LORazepam    ROS:  As noted above, otherwise remainder of 10-point ROS negative    Physical Exam:     I&O:      Intake/Output Summary (Last 24 hours) at 3/18/2020 0649  Last data filed at 3/18/2020 0415  Gross per 24 hour   Intake 1759 ml   Output 1125 ml   Net 634 ml       Vital Signs:  /76   Pulse 100   Temp 98.9 °F (37.2 °C) (Oral)   Resp 20   Ht 6' 4\" (1.93 m)   Wt 229 lb 3.2 oz (104 kg)   SpO2 96%   BMI 27.90 kg/m²     Weight:    Wt Readings from Last 3 Encounters:   20 229 lb 3.2 oz (104 kg)   20 238 lb 1.6 oz (108 kg)   20 250 lb 1 oz (113.4 kg)       General: Awake, alert and oriented.   HEENT: normocephalic, alopecia, PERRL, no scleral erythema or icterus, Oral mucosa moist and intact, throat clear  NECK: supple without palpable adenopathy  BACK: Straight negative CVAT  SKIN: warm dry and intact without lesions rashes or masses  CHEST: CTA bilaterally without use of accessory muscles  CV: Normal S1 S2, RRR, no MRG  ABD: NT ND normoactive BS, no palpable masses or hepatosplenomegaly  EXTREMITIES: without edema, denies calf tenderness, left arm fistula  NEURO: CN II - XII grossly intact  CATHETER: Right IJ Trifusion (IR, 2/14/20) - CDI     Data:   CBC:   Recent Labs     03/16/20 0310 03/17/20  0400 03/18/20  0415   WBC 1.1* 0.6* 0.1*   HGB 7.4* 6.7* 7.3*   HCT 21.5* 19.3* 20.8*   MCV 94.4 94.2 92.4   * 104* 65*     BMP/Mag:  Recent Labs     03/16/20  0310 03/17/20  0400 03/18/20  0415    139 138   K 4.2 3.8 3.8    100 102   CO2 20* 25 24   PHOS 4.0  --  4.1   BUN 58* 39* 48*   CREATININE 3.5* 2.7* 3.2*   MG 2.10  --   --      LIVP:   Recent Labs     03/16/20  0310 03/18/20  0415   AST 20 17   ALT 19 17   BILIDIR <0.2 <0.2   BILITOT 0.4 0.4   ALKPHOS 64 61     Uric Acid:    Recent Labs     03/18/20  0415   LABURIC 7.6*     Coags:   Recent Labs     03/16/20  0310   PROTIME 12.1   INR 1.04   APTT 30.7         PROBLEM LIST:           1.  [place] Multiple Myeloma   2. ESRD on HD   3. GERD  4. HTN  5. Hypokalemia      TREATMENT:           1. CyBorD (started 10/30/18)  2. Revlimid / John Jeffery / Dex on clinical trial (started 2/1/19)  3. Carfilzomib / Pomalidomide / Dex x 6 cycles (Relapse #1 - started 8/26/19 - 2/2020)  4. Cytoxan Mobilization w/ 25% dose reduction (2/17/20)  5. Melphalan 140mg/m2 & Autologous SCT 3/12/20     ASSESSMENT AND PLAN:           1. [place] Multiple Myeloma:  Currently in [facility]  - Admitted for reduced dose Melphalan (140 mg/M^2) followed by autologous stem cell resuce  - Post transplant maintenance: Consider KPD x 4 cycles     Day + 6     2.  ID:

## 2020-03-19 LAB
ANION GAP SERPL CALCULATED.3IONS-SCNC: 10 MMOL/L (ref 3–16)
APTT: 28.6 SEC (ref 24.2–36.2)
BUN BLDV-MCNC: 30 MG/DL (ref 7–20)
CALCIUM SERPL-MCNC: 8.6 MG/DL (ref 8.3–10.6)
CHLORIDE BLD-SCNC: 98 MMOL/L (ref 99–110)
CO2: 28 MMOL/L (ref 21–32)
CREAT SERPL-MCNC: 2.6 MG/DL (ref 0.8–1.3)
GFR AFRICAN AMERICAN: 30
GFR NON-AFRICAN AMERICAN: 25
GLUCOSE BLD-MCNC: 104 MG/DL (ref 70–99)
HCT VFR BLD CALC: 20.8 % (ref 40.5–52.5)
HEMOGLOBIN: 7.2 G/DL (ref 13.5–17.5)
INR BLD: 1.08 (ref 0.86–1.14)
MAGNESIUM: 2 MG/DL (ref 1.8–2.4)
MCH RBC QN AUTO: 32.2 PG (ref 26–34)
MCHC RBC AUTO-ENTMCNC: 34.8 G/DL (ref 31–36)
MCV RBC AUTO: 92.5 FL (ref 80–100)
PDW BLD-RTO: 14.4 % (ref 12.4–15.4)
PLATELET # BLD: 52 K/UL (ref 135–450)
PMV BLD AUTO: 8.3 FL (ref 5–10.5)
POTASSIUM SERPL-SCNC: 3.8 MMOL/L (ref 3.5–5.1)
PROTHROMBIN TIME: 12.5 SEC (ref 10–13.2)
RBC # BLD: 2.24 M/UL (ref 4.2–5.9)
SODIUM BLD-SCNC: 136 MMOL/L (ref 136–145)
URIC ACID, SERUM: 3.6 MG/DL (ref 3.5–7.2)
VRE CULTURE: NORMAL
WBC # BLD: 0 K/UL (ref 4–11)

## 2020-03-19 PROCEDURE — 85025 COMPLETE CBC W/AUTO DIFF WBC: CPT

## 2020-03-19 PROCEDURE — 80048 BASIC METABOLIC PNL TOTAL CA: CPT

## 2020-03-19 PROCEDURE — 6370000000 HC RX 637 (ALT 250 FOR IP): Performed by: NURSE PRACTITIONER

## 2020-03-19 PROCEDURE — 36592 COLLECT BLOOD FROM PICC: CPT

## 2020-03-19 PROCEDURE — 85730 THROMBOPLASTIN TIME PARTIAL: CPT

## 2020-03-19 PROCEDURE — 2060000000 HC ICU INTERMEDIATE R&B

## 2020-03-19 PROCEDURE — 83735 ASSAY OF MAGNESIUM: CPT

## 2020-03-19 PROCEDURE — 6370000000 HC RX 637 (ALT 250 FOR IP): Performed by: INTERNAL MEDICINE

## 2020-03-19 PROCEDURE — 2580000003 HC RX 258: Performed by: NURSE PRACTITIONER

## 2020-03-19 PROCEDURE — 85610 PROTHROMBIN TIME: CPT

## 2020-03-19 PROCEDURE — 6360000002 HC RX W HCPCS: Performed by: INTERNAL MEDICINE

## 2020-03-19 PROCEDURE — 84550 ASSAY OF BLOOD/URIC ACID: CPT

## 2020-03-19 RX ADMIN — LORAZEPAM 0.5 MG: 0.5 TABLET ORAL at 21:55

## 2020-03-19 RX ADMIN — ALLOPURINOL 200 MG: 100 TABLET ORAL at 08:39

## 2020-03-19 RX ADMIN — SODIUM CHLORIDE 15 ML: 900 IRRIGANT IRRIGATION at 05:39

## 2020-03-19 RX ADMIN — SODIUM CHLORIDE 15 ML: 900 IRRIGANT IRRIGATION at 12:36

## 2020-03-19 RX ADMIN — ACYCLOVIR 200 MG: 200 CAPSULE ORAL at 08:40

## 2020-03-19 RX ADMIN — NEPHROCAP 1 MG: 1 CAP ORAL at 08:39

## 2020-03-19 RX ADMIN — ACYCLOVIR 200 MG: 200 CAPSULE ORAL at 20:15

## 2020-03-19 RX ADMIN — PANTOPRAZOLE SODIUM 40 MG: 40 TABLET, DELAYED RELEASE ORAL at 08:40

## 2020-03-19 RX ADMIN — SODIUM CHLORIDE 15 ML: 900 IRRIGANT IRRIGATION at 20:16

## 2020-03-19 RX ADMIN — Medication 10 ML: at 08:39

## 2020-03-19 RX ADMIN — Medication 10 ML: at 20:16

## 2020-03-19 RX ADMIN — TBO-FILGRASTIM 300 MCG: 300 INJECTION, SOLUTION SUBCUTANEOUS at 18:10

## 2020-03-19 ASSESSMENT — PAIN SCALES - GENERAL
PAINLEVEL_OUTOF10: 0

## 2020-03-19 NOTE — PROGRESS NOTES
[facility]  Autologous Progress Note    3/19/2020    Kaykay Rangel    :  1955    MRN:  [id number]    Referring MD: No referring provider defined for this encounter. Subjective:  Lack of energy, no specific complaints. ECOG PS:  (1) Restricted in physically strenuous activity, ambulatory and able to do work of light nature    KPS: 90% Able to carry on normal activity; minor signs or symptoms of disease    Isolation:  None     Medications    Scheduled Meds:   levoFLOXacin  250 mg Oral Every Other Day    Tbo-Filgrastim  300 mcg Subcutaneous QPM    darbepoetin evangelina-polysorbate  100 mcg Intravenous Weekly    sodium chloride flush  10 mL Intravenous 2 times per day    Saline Mouthwash  15 mL Swish & Spit 4x Daily AC & HS    acyclovir  200 mg Oral BID    allopurinol  200 mg Oral Daily    pantoprazole  40 mg Oral Daily    fluconazole  200 mg Oral Q MWF    b complex-C-folic acid  1 capsule Oral Daily     Continuous Infusions:   sodium chloride      sodium chloride       PRN Meds:. [COMPLETED] loperamide **FOLLOWED BY** loperamide, sodium chloride, sodium chloride, sodium chloride flush, magnesium hydroxide, Saline Mouthwash, alteplase, prochlorperazine **OR** prochlorperazine, LORazepam **OR** LORazepam    ROS:  As noted above, otherwise remainder of 10-point ROS negative    Physical Exam:     I&O:      Intake/Output Summary (Last 24 hours) at 3/19/2020 0708  Last data filed at 3/19/2020 4557  Gross per 24 hour   Intake 720 ml   Output 825 ml   Net -105 ml       Vital Signs:  /66   Pulse 97   Temp 98.1 °F (36.7 °C) (Oral)   Resp 16   Ht 6' 4\" (1.93 m)   Wt 228 lb 12.8 oz (103.8 kg)   SpO2 97%   BMI 27.85 kg/m²     Weight:    Wt Readings from Last 3 Encounters:   20 228 lb 12.8 oz (103.8 kg)   20 238 lb 1.6 oz (108 kg)   20 250 lb 1 oz (113.4 kg)       General: Awake, alert and oriented.   HEENT: normocephalic, alopecia, PERRL, no scleral erythema or icterus, adjusted for ESRD - 250 mg Q 48 hours)      3. Heme: Pancytopenia from recent chemo  - Transfuse for Hgb < 7 and Platelets < 15N  - No transfusion today  - VANESSA by renal     4. ESRD / Metabolic: Hyperuricemia, ESRD  - Followed by Dr. Linda Landaverde HD on MWF  - IVFs: 250mL as needed  - Electrolyte management per nephrology  - Cont allopurinol 200mg daily  - Cont dialyvite daily     5. GI / Nutrition:    - H/o GERD  Nutrition:  Appetite and oral intake is good.    - Cont MVI  - Cont low microbial diet   - Follow closely with dietary  GERD:    - Cont PPI         - DVT Prophylaxis: Platelets <47,153  Contraindications to pharmacologic prophylaxis: Thrombocytopenia  Contraindications to mechanical prophylaxis: None    - Disposition:  Plan for discharge once ANC >1 and patient has recovered from chemo toxicities    Sergio Melton MD  [facility]  Please contact me through [address]

## 2020-03-19 NOTE — PROGRESS NOTES
g/day  Estimated Daily Total Fluid (ml/day): 2000 mL per day     Food / Nutrition-Related History  Pre-Admission / Home Diet:  Pre-Admission/Home Diet: General   Home Supplements / Herbals:    none noted  Food Restrictions / Cultural Requests:    none noted    Diet Orders / Intake / Nutrition Support  Current diet/supplement order: DIET GENERAL;  Dietary Nutrition Supplements: Renal Oral Supplement     NSG Recorded PO:   PO Fluids P.O.: 120 mL(water)  PO Meals PO Meals Eaten (%): 1 - 25%(eggs)   PO Intake: 26-50%, 51-75% and %    NUTRITION RISK LEVEL: Risk Level:  Moderate    Noam Fonseca, [address]  [place]:  [phone]  Office:  [phone]

## 2020-03-19 NOTE — PROGRESS NOTES
[facility]       Progress Note           Subjective/   59y.o. year old male who we are seeing in consultation for ESRD. HPI:  No issues, feels about the same, appetite still not great. Pt is stable   Lowered target weight to 104 Kg. ROS:  Denies any sob. No chest pain. SHx: No visitors this morning. Objective/   BP (!) 91/58   Pulse 101   Temp 98.7 °F (37.1 °C) (Oral)   Resp 18   Ht 6' 4\" (1.93 m)   Wt 229 lb (103.9 kg)   SpO2 98%   BMI 27.87 kg/m²   GEN:  NAD, resting in bed. HEENT: non-icteric, no JVD  CV: S1, S2 without m/r/g; no LE edema  RESP: CTA B without w/r/r; breathing wnl  ABD: +bs, soft, nt, no hsm  SKIN: warm, no rashes  ACCESS: LUE AVF +bruit/thrill. Data/  Recent Labs     03/17/20  0400 03/18/20  0415 03/19/20  0330   WBC 0.6* 0.1* 0.0*   HGB 6.7* 7.3* 7.2*   HCT 19.3* 20.8* 20.8*   MCV 94.2 92.4 92.5   * 65* 52*     Recent Labs     03/17/20  0400 03/18/20  0415 03/19/20  0330    138 136   K 3.8 3.8 3.8    102 98*   CO2 25 24 28   GLUCOSE 105* 99 104*   PHOS  --  4.1  --    MG  --   --  2.00   BUN 39* 48* 30*   CREATININE 2.7* 3.2* 2.6*   LABGLOM 24* 20* 25*   GFRAA 29* 24* 30*       Assessment/     - End stage renal disease - on HD Mon-Wed-Fri     - [place] chain MM - admitted for high dose melphalan & autologous stem cell  transplant on 3/12     - Anemia of chronic disease - VANESSA with HD as needed, transfusion per Oncology      - Hypertension - controlled     - Hyperkalemia - within range    - Hyperuricemia - on allopurinol       Plan/     - HD MWF we did  adjust down to 104 Kg for now  .

## 2020-03-19 NOTE — PLAN OF CARE
Nutrition Problem: Increased nutrient needs  Intervention: Food and/or Nutrient Delivery: Continue current diet  Nutritional Goals: Patient will tolerate and consume 50% or greater of all meals and supplements

## 2020-03-19 NOTE — PLAN OF CARE
prophylaxis while inpatient. Problem: PROTECTIVE PRECAUTIONS  Goal: Patient will remain free of nosocomial Infections  3/19/2020 0025 by Marie Hensley RN  Outcome: Ongoing    Pt educated on hand hygiene. Pt wears mask when in hallway. No fresh flowers or plants in patients room. Pt shows no signs or symptoms of nosocomial infection so far this shift. Will continue to monitor. Problem: Infection - Central Venous Catheter-Associated Bloodstream Infection:  Goal: Will show no infection signs and symptoms  Description: Will show no infection signs and symptoms  3/19/2020 0025 by Marie Hensley RN  Outcome: Ongoing    CVC site remains free of signs/symptoms of infection. No drainage, edema, erythema, pain, or warmth noted at site. Dressing changes continue per protocol and on an as needed basis - see flowsheet. Problem: Discharge Planning:  Goal: Discharged to appropriate level of care  Description: Discharged to appropriate level of care  Outcome: Ongoing    Pt demonstrates and verbalizes understanding of plan of care. Will continue to update pt. Problem: Activity:  Goal: Ability to tolerate increased activity will improve  Description: Ability to tolerate increased activity will improve  3/19/2020 0025 by Marie Hensley RN  Outcome: Ongoing    Pt remained resting in bed during this shift. Encouraged pt to be active when possible. Will continue to monitor. Problem: Nutrition Deficit:  Goal: Ability to achieve adequate nutritional intake will improve  Description: Ability to achieve adequate nutritional intake will improve  3/19/2020 0025 by Marie Hensley RN  Outcome: Ongoing    Pt has decrease in appetite and not eating adequate amounts at meals. Will continue to monitor.       Problem: Skin Integrity:  Goal: Absence of new skin breakdown  Description: Absence of new skin breakdown  3/19/2020 0025 by Marie Hensley RN  Outcome: Ongoing    Pt shows no new evidence of skin breakdown during this shift. Will continue to monitor. Problem: Nausea/Vomiting:  Goal: Ability to achieve adequate nutritional intake will improve  Description: Ability to achieve adequate nutritional intake will improve  3/19/2020 0025 by Lori Nevarez RN  Outcome: Ongoing     Problem: Diarrhea:  Goal: Bowel elimination is within specified parameters  Description: Bowel elimination is within specified parameters  3/19/2020 0025 by Lori Nevarez RN  Outcome: Ongoing    Pt had two bowel movements so far this shift. Pt stated bowel movement was loose, not watery. Will continue to monitor.

## 2020-03-20 ENCOUNTER — APPOINTMENT (OUTPATIENT)
Dept: GENERAL RADIOLOGY | Age: 65
DRG: 016 | End: 2020-03-20
Attending: INTERNAL MEDICINE
Payer: COMMERCIAL

## 2020-03-20 LAB
ALBUMIN SERPL-MCNC: 3.4 G/DL (ref 3.4–5)
ALP BLD-CCNC: 67 U/L (ref 40–129)
ALT SERPL-CCNC: 12 U/L (ref 10–40)
ANION GAP SERPL CALCULATED.3IONS-SCNC: 12 MMOL/L (ref 3–16)
AST SERPL-CCNC: 11 U/L (ref 15–37)
BILIRUB SERPL-MCNC: 0.5 MG/DL (ref 0–1)
BILIRUBIN DIRECT: <0.2 MG/DL (ref 0–0.3)
BILIRUBIN URINE: NEGATIVE
BILIRUBIN, INDIRECT: ABNORMAL MG/DL (ref 0–1)
BLOOD BANK DISPENSE STATUS: NORMAL
BLOOD BANK PRODUCT CODE: NORMAL
BLOOD, URINE: ABNORMAL
BPU ID: NORMAL
BUN BLDV-MCNC: 44 MG/DL (ref 7–20)
CALCIUM SERPL-MCNC: 8.8 MG/DL (ref 8.3–10.6)
CHLORIDE BLD-SCNC: 100 MMOL/L (ref 99–110)
CLARITY: CLEAR
CO2: 25 MMOL/L (ref 21–32)
COLOR: YELLOW
CREAT SERPL-MCNC: 3.3 MG/DL (ref 0.8–1.3)
DESCRIPTION BLOOD BANK: NORMAL
EKG ATRIAL RATE: 109 BPM
EKG DIAGNOSIS: NORMAL
EKG P AXIS: 21 DEGREES
EKG P-R INTERVAL: 148 MS
EKG Q-T INTERVAL: 348 MS
EKG QRS DURATION: 84 MS
EKG QTC CALCULATION (BAZETT): 468 MS
EKG R AXIS: 40 DEGREES
EKG T AXIS: 55 DEGREES
EKG VENTRICULAR RATE: 109 BPM
GFR AFRICAN AMERICAN: 23
GFR NON-AFRICAN AMERICAN: 19
GLUCOSE BLD-MCNC: 121 MG/DL (ref 70–99)
GLUCOSE URINE: NEGATIVE MG/DL
HCT VFR BLD CALC: 19.3 % (ref 40.5–52.5)
HEMOGLOBIN: 6.9 G/DL (ref 13.5–17.5)
KETONES, URINE: NEGATIVE MG/DL
LACTATE DEHYDROGENASE: 113 U/L (ref 100–190)
LACTIC ACID: 0.8 MMOL/L (ref 0.4–2)
LEUKOCYTE ESTERASE, URINE: NEGATIVE
MCH RBC QN AUTO: 32.2 PG (ref 26–34)
MCHC RBC AUTO-ENTMCNC: 35.8 G/DL (ref 31–36)
MCV RBC AUTO: 90.1 FL (ref 80–100)
MICROSCOPIC EXAMINATION: YES
NITRITE, URINE: NEGATIVE
PDW BLD-RTO: 14.3 % (ref 12.4–15.4)
PH UA: 5.5 (ref 5–8)
PHOSPHORUS: 3.4 MG/DL (ref 2.5–4.9)
PLATELET # BLD: 33 K/UL (ref 135–450)
PMV BLD AUTO: 8.8 FL (ref 5–10.5)
POTASSIUM SERPL-SCNC: 3.7 MMOL/L (ref 3.5–5.1)
PROTEIN UA: ABNORMAL MG/DL
RBC # BLD: 2.14 M/UL (ref 4.2–5.9)
SODIUM BLD-SCNC: 137 MMOL/L (ref 136–145)
SPECIFIC GRAVITY UA: 1.02 (ref 1–1.03)
TOTAL PROTEIN: 5.2 G/DL (ref 6.4–8.2)
URIC ACID, SERUM: 4.7 MG/DL (ref 3.5–7.2)
URINE TYPE: ABNORMAL
UROBILINOGEN, URINE: 0.2 E.U./DL
WBC # BLD: 0 K/UL (ref 4–11)

## 2020-03-20 PROCEDURE — 80048 BASIC METABOLIC PNL TOTAL CA: CPT

## 2020-03-20 PROCEDURE — 85025 COMPLETE CBC W/AUTO DIFF WBC: CPT

## 2020-03-20 PROCEDURE — 87102 FUNGUS ISOLATION CULTURE: CPT

## 2020-03-20 PROCEDURE — 84100 ASSAY OF PHOSPHORUS: CPT

## 2020-03-20 PROCEDURE — 2060000000 HC ICU INTERMEDIATE R&B

## 2020-03-20 PROCEDURE — 87040 BLOOD CULTURE FOR BACTERIA: CPT

## 2020-03-20 PROCEDURE — 87186 SC STD MICRODIL/AGAR DIL: CPT

## 2020-03-20 PROCEDURE — P9040 RBC LEUKOREDUCED IRRADIATED: HCPCS

## 2020-03-20 PROCEDURE — 71045 X-RAY EXAM CHEST 1 VIEW: CPT

## 2020-03-20 PROCEDURE — 6370000000 HC RX 637 (ALT 250 FOR IP): Performed by: NURSE PRACTITIONER

## 2020-03-20 PROCEDURE — 6370000000 HC RX 637 (ALT 250 FOR IP): Performed by: INTERNAL MEDICINE

## 2020-03-20 PROCEDURE — 87086 URINE CULTURE/COLONY COUNT: CPT

## 2020-03-20 PROCEDURE — 2580000003 HC RX 258: Performed by: NURSE PRACTITIONER

## 2020-03-20 PROCEDURE — 84550 ASSAY OF BLOOD/URIC ACID: CPT

## 2020-03-20 PROCEDURE — 87252 VIRUS INOCULATION TISSUE: CPT

## 2020-03-20 PROCEDURE — 87205 SMEAR GRAM STAIN: CPT

## 2020-03-20 PROCEDURE — 6360000002 HC RX W HCPCS: Performed by: INTERNAL MEDICINE

## 2020-03-20 PROCEDURE — 99222 1ST HOSP IP/OBS MODERATE 55: CPT | Performed by: INTERNAL MEDICINE

## 2020-03-20 PROCEDURE — 93005 ELECTROCARDIOGRAM TRACING: CPT | Performed by: INTERNAL MEDICINE

## 2020-03-20 PROCEDURE — 87150 DNA/RNA AMPLIFIED PROBE: CPT

## 2020-03-20 PROCEDURE — 80076 HEPATIC FUNCTION PANEL: CPT

## 2020-03-20 PROCEDURE — 83615 LACTATE (LD) (LDH) ENZYME: CPT

## 2020-03-20 PROCEDURE — 87103 BLOOD FUNGUS CULTURE: CPT

## 2020-03-20 PROCEDURE — 36430 TRANSFUSION BLD/BLD COMPNT: CPT

## 2020-03-20 PROCEDURE — 2580000003 HC RX 258: Performed by: INTERNAL MEDICINE

## 2020-03-20 PROCEDURE — 83605 ASSAY OF LACTIC ACID: CPT

## 2020-03-20 PROCEDURE — 81001 URINALYSIS AUTO W/SCOPE: CPT

## 2020-03-20 PROCEDURE — 87070 CULTURE OTHR SPECIMN AEROBIC: CPT

## 2020-03-20 PROCEDURE — 93010 ELECTROCARDIOGRAM REPORT: CPT | Performed by: INTERNAL MEDICINE

## 2020-03-20 PROCEDURE — 87253 VIRUS INOCULATE TISSUE ADDL: CPT

## 2020-03-20 PROCEDURE — 36592 COLLECT BLOOD FROM PICC: CPT

## 2020-03-20 RX ORDER — ACETAMINOPHEN 325 MG/1
650 TABLET ORAL EVERY 4 HOURS PRN
Status: DISCONTINUED | OUTPATIENT
Start: 2020-03-20 | End: 2020-03-27 | Stop reason: HOSPADM

## 2020-03-20 RX ORDER — 0.9 % SODIUM CHLORIDE 0.9 %
20 INTRAVENOUS SOLUTION INTRAVENOUS ONCE
Status: DISCONTINUED | OUTPATIENT
Start: 2020-03-20 | End: 2020-03-23

## 2020-03-20 RX ADMIN — ACETAMINOPHEN 650 MG: 325 TABLET ORAL at 15:50

## 2020-03-20 RX ADMIN — PROCHLORPERAZINE EDISYLATE 10 MG: 5 INJECTION INTRAMUSCULAR; INTRAVENOUS at 12:03

## 2020-03-20 RX ADMIN — NEPHROCAP 1 MG: 1 CAP ORAL at 11:54

## 2020-03-20 RX ADMIN — ACETAMINOPHEN 650 MG: 325 TABLET ORAL at 20:24

## 2020-03-20 RX ADMIN — Medication 10 ML: at 11:54

## 2020-03-20 RX ADMIN — ACYCLOVIR 200 MG: 200 CAPSULE ORAL at 11:53

## 2020-03-20 RX ADMIN — ACETAMINOPHEN 650 MG: 325 TABLET ORAL at 11:54

## 2020-03-20 RX ADMIN — METOPROLOL TARTRATE 12.5 MG: 25 TABLET, FILM COATED ORAL at 18:08

## 2020-03-20 RX ADMIN — FLUCONAZOLE 200 MG: 200 TABLET ORAL at 15:50

## 2020-03-20 RX ADMIN — ALLOPURINOL 200 MG: 100 TABLET ORAL at 11:54

## 2020-03-20 RX ADMIN — PIPERACILLIN AND TAZOBACTAM 2.25 G: 2; .25 INJECTION, POWDER, LYOPHILIZED, FOR SOLUTION INTRAVENOUS at 20:15

## 2020-03-20 RX ADMIN — PANTOPRAZOLE SODIUM 40 MG: 40 TABLET, DELAYED RELEASE ORAL at 11:54

## 2020-03-20 RX ADMIN — PIPERACILLIN AND TAZOBACTAM 2.25 G: 2; .25 INJECTION, POWDER, LYOPHILIZED, FOR SOLUTION INTRAVENOUS at 12:01

## 2020-03-20 RX ADMIN — ACYCLOVIR 200 MG: 200 CAPSULE ORAL at 20:30

## 2020-03-20 RX ADMIN — TBO-FILGRASTIM 300 MCG: 300 INJECTION, SOLUTION SUBCUTANEOUS at 18:08

## 2020-03-20 RX ADMIN — LOPERAMIDE HYDROCHLORIDE 2 MG: 2 CAPSULE ORAL at 18:01

## 2020-03-20 RX ADMIN — Medication 10 ML: at 21:41

## 2020-03-20 ASSESSMENT — PAIN SCALES - GENERAL
PAINLEVEL_OUTOF10: 0

## 2020-03-20 NOTE — PLAN OF CARE
Problem: Falls - Risk of:  Goal: Will remain free from falls  Description: Will remain free from falls  3/19/2020 0025 by Marie Hensley RN  Outcome: Ongoing    Orthostatic vital signs obtained at start of shift - see flowsheet for details. Pt does not meet criteria for orthostasis. Pt is a Med fall risk. See Keren Maser Fall Score and ABCDS Injury Risk assessments. Pt placed on bed alarm with soft blood pressures in 90's and 80's. Pt states no current symptoms of dizziness or light headedness. States sometimes feeling dizzy when standing. Pt bed is in low position, side rails up, call light and belongings are in reach. Fall risk light is on outside pts room. Pt encouraged to call for assistance as needed. Bed alarm on with soft BP's in the 80's when standing. Will continue with hourly rounds for PO intake, pain needs, toileting and repositioning as needed. Problem: Bleeding:  Goal: Will show no signs and symptoms of excessive bleeding  Description: Will show no signs and symptoms of excessive bleeding  3/19/2020 0025 by Marie Hensley RN  Outcome: Ongoing     Patient's hemoglobin this AM:   Recent Labs     03/20/20  0325   HGB 6.9*     Patient's platelet count this AM:   Recent Labs     03/20/20  0325   PLT 33*    Thrombocytopenia Precautions in place. Patient showing no signs or symptoms of active bleeding. Patient transfused blood products per orders - see flowsheet. Patient verbalizes understanding of all instructions. Will continue to assess and implement POC. Call light within reach and hourly rounding in place. Problem: Venous Thromboembolism:  Goal: Will show no signs or symptoms of venous thromboembolism  Description: Will show no signs or symptoms of venous thromboembolism  Outcome: Ongoing    Adherent with DVT Prevention: Pt is at risk for DVT d/t decreased mobility and cancer treatment. Pt educated on importance of activity. Pt has orders for SCDs while in bed.   Pt verbalizes understanding of need for prophylaxis while inpatient. Problem: PROTECTIVE PRECAUTIONS  Goal: Patient will remain free of nosocomial Infections  3/19/2020 0025 by Miko Crespo RN  Outcome: Ongoing    Pt educated on hand hygiene. Pt wears mask when in hallway. No fresh flowers or plants in patients room. Pt shows no signs or symptoms of nosocomial infection so far this shift. Will continue to monitor. Problem: Infection - Central Venous Catheter-Associated Bloodstream Infection:  Goal: Will show no infection signs and symptoms  Description: Will show no infection signs and symptoms  3/19/2020 0025 by Miko Crespo RN  Outcome: Ongoing    CVC site remains free of signs/symptoms of infection. No drainage, edema, erythema, pain, or warmth noted at site. Dressing changes continue per protocol and on an as needed basis - see flowsheet. Problem: Discharge Planning:  Goal: Discharged to appropriate level of care  Description: Discharged to appropriate level of care  Outcome: Ongoing    Pt demonstrates and verbalizes understanding of plan of care. Will continue to update pt. Problem: Activity:  Goal: Ability to tolerate increased activity will improve  Description: Ability to tolerate increased activity will improve  3/19/2020 0025 by Miko Crespo RN  Outcome: Ongoing    Pt remained resting in bed during this shift. Encouraged pt to be active when possible. Will continue to monitor. Problem: Nutrition Deficit:  Goal: Ability to achieve adequate nutritional intake will improve  Description: Ability to achieve adequate nutritional intake will improve  3/19/2020 0025 by Miko Crespo RN  Outcome: Ongoing    Pt has decrease in appetite and not eating adequate amounts at meals. Will continue to monitor.       Problem: Skin Integrity:  Goal: Absence of new skin breakdown  Description: Absence of new skin breakdown  3/19/2020 0025 by Miko Crespo RN  Outcome: Ongoing    Pt shows no new evidence of skin breakdown

## 2020-03-20 NOTE — PROGRESS NOTES
Went to assess and do vitals on patient. Revealed temp 102.7 f. Neutropenic protocol initiated per transcribed orders. Dr. Abbey Gastelum notified. Started on Zosyn. Temp 102.7    Site(s) / Line Type Time Cultures obtained   Date 3/20/2020  11:08 AM   R.  Trifusion   red and white lumen 11:50 AM

## 2020-03-20 NOTE — CONSULTS
Nephrology Consult received for ESRD. Full consult note to follow.     [employer]
WBC 0.1* 0.0* 0.0*   HGB 7.3* 7.2* 6.9*   HCT 20.8* 20.8* 19.3*   MCV 92.4 92.5 90.1   PLT 65* 52* 33*     No results found for: CKTOTAL, CKMB, CKMBINDEX, TROPONINI  No results found for: BNP  Lab Results   Component Value Date    PROTIME 12.5 03/19/2020    PROTIME 12.1 03/16/2020    PROTIME 12.2 03/12/2020    INR 1.08 03/19/2020    INR 1.04 03/16/2020    INR 1.05 03/12/2020     No results found for: CHOL, HDL, TRIG    Diagnostic and imaging results reviewed. ECG: Sinus tachy, LV basal and septal PVCs  Echo: 2/2020 - normal LVEF  Cath: Normal.    I independently reviewed the ECG and telemetry. Scheduled Meds:   sodium chloride  20 mL Intravenous Once    piperacillin-tazobactam  2.25 g Intravenous Q8H    metoprolol tartrate  12.5 mg Oral Q8H    Tbo-Filgrastim  300 mcg Subcutaneous QPM    darbepoetin evangelina-polysorbate  100 mcg Intravenous Weekly    sodium chloride flush  10 mL Intravenous 2 times per day    Saline Mouthwash  15 mL Swish & Spit 4x Daily AC & HS    acyclovir  200 mg Oral BID    allopurinol  200 mg Oral Daily    pantoprazole  40 mg Oral Daily    fluconazole  200 mg Oral Q MWF    b complex-C-folic acid  1 capsule Oral Daily     Continuous Infusions:   sodium chloride      sodium chloride       PRN Meds:.acetaminophen, [COMPLETED] loperamide **FOLLOWED BY** loperamide, sodium chloride, sodium chloride, sodium chloride flush, magnesium hydroxide, Saline Mouthwash, alteplase, prochlorperazine **OR** prochlorperazine, LORazepam **OR** LORazepam     Assessment:   Patient Active Problem List    Diagnosis Date Noted    Autologous bone marrow transplantation status ([facility] 03/11/2020    Neutropenic fever ([facility] 02/24/2020    Multiple myeloma in remission ([facility] 02/17/2020      Active Hospital Problems    Diagnosis Date Noted    Autologous bone marrow transplantation status ([facility] [Z94.81] 03/11/2020     Recommendation (s): 1. Sinus Tachycardia  2.  PVCs from Left ventricular inflow

## 2020-03-20 NOTE — PROGRESS NOTES
[facility]       Progress Note           Subjective/   59y.o. year old male who we are seeing in consultation for ESRD. HPI:  No issues, feels about the same, appetite still not great. Pt is a bit hypotensive nadh ad run of Trigeminy, his weight is stable no weight gain ,labs look ok ,K 3.7, will defer dialysis until tomorrow am.    Lowered target weight to 104 Kg. ROS:  Denies any sob. No chest pain. SHx: No visitors this morning. Objective/   /65   Pulse 107   Temp 102.7 °F (39.3 °C) (Oral)   Resp 18   Ht 6' 4\" (1.93 m)   Wt 228 lb 12.8 oz (103.8 kg)   SpO2 94%   BMI 27.85 kg/m²   GEN:  NAD, resting in bed. HEENT: non-icteric, no JVD  CV: S1, S2 without m/r/g; no LE edema  RESP: CTA B without w/r/r; breathing wnl  ABD: +bs, soft, nt, no hsm  SKIN: warm, no rashes  ACCESS: LUE AVF +bruit/thrill.     Data/  Recent Labs     03/18/20  0415 03/19/20  0330 03/20/20  0325   WBC 0.1* 0.0* 0.0*   HGB 7.3* 7.2* 6.9*   HCT 20.8* 20.8* 19.3*   MCV 92.4 92.5 90.1   PLT 65* 52* 33*     Recent Labs     03/18/20  0415 03/19/20  0330 03/20/20  0325    136 137   K 3.8 3.8 3.7    98* 100   CO2 24 28 25   GLUCOSE 99 104* 121*   PHOS 4.1  --  3.4   MG  --  2.00  --    BUN 48* 30* 44*   CREATININE 3.2* 2.6* 3.3*   LABGLOM 20* 25* 19*   GFRAA 24* 30* 23*       Assessment/     - End stage renal disease - on HD Mon-Wed-Fri     - [place] light chain MM - admitted for high dose melphalan & autologous stem cell  transplant on 3/12     - Anemia of chronic disease - VANESSA with HD as needed, transfusion per Oncology      - Hypertension - controlled     - Hyperkalemia - within range    - Hyperuricemia - on allopurinol       Plan/     - HD MWF will hold for now and monitor plan next HD in am .

## 2020-03-20 NOTE — PROGRESS NOTES
[facility]  Autologous Progress Note    3/20/2020    Kathi Bowles    :  1955    MRN:  [id number]    Referring MD: No referring provider defined for this encounter. Subjective:  LFatigued without specific complaints. ECOG PS:  (1) Restricted in physically strenuous activity, ambulatory and able to do work of light nature    KPS: 90% Able to carry on normal activity; minor signs or symptoms of disease    Isolation:  None     Medications    Scheduled Meds:   sodium chloride  20 mL Intravenous Once    levoFLOXacin  250 mg Oral Every Other Day    Tbo-Filgrastim  300 mcg Subcutaneous QPM    darbepoetin evangelina-polysorbate  100 mcg Intravenous Weekly    sodium chloride flush  10 mL Intravenous 2 times per day    Saline Mouthwash  15 mL Swish & Spit 4x Daily AC & HS    acyclovir  200 mg Oral BID    allopurinol  200 mg Oral Daily    pantoprazole  40 mg Oral Daily    fluconazole  200 mg Oral Q MWF    b complex-C-folic acid  1 capsule Oral Daily     Continuous Infusions:   sodium chloride      sodium chloride       PRN Meds:. [COMPLETED] loperamide **FOLLOWED BY** loperamide, sodium chloride, sodium chloride, sodium chloride flush, magnesium hydroxide, Saline Mouthwash, alteplase, prochlorperazine **OR** prochlorperazine, LORazepam **OR** LORazepam    ROS:  As noted above, otherwise remainder of 10-point ROS negative    Physical Exam:     I&O:      Intake/Output Summary (Last 24 hours) at 3/20/2020 0946  Last data filed at 3/20/2020 0915  Gross per 24 hour   Intake 130 ml   Output 550 ml   Net -420 ml       Vital Signs:  /65   Pulse 111   Temp 98.5 °F (36.9 °C) (Oral)   Resp 18   Ht 6' 4\" (1.93 m)   Wt 228 lb 12.8 oz (103.8 kg)   SpO2 94%   BMI 27.85 kg/m²     Weight:    Wt Readings from Last 3 Encounters:   20 228 lb 12.8 oz (103.8 kg)   20 238 lb 1.6 oz (108 kg)   20 250 lb 1 oz (113.4 kg)       General: Awake, alert and oriented.   HEENT: normocephalic, alopecia, PERRL, no scleral erythema or icterus, Oral mucosa moist and intact, throat clear  NECK: supple without palpable adenopathy  BACK: Straight negative CVAT  SKIN: warm dry and intact without lesions rashes or masses  CHEST: CTA bilaterally without use of accessory muscles  CV: Normal S1 S2, irreg, no MRG  ABD: NT ND normoactive BS, no palpable masses or hepatosplenomegaly  EXTREMITIES: without edema, denies calf tenderness, left arm fistula  NEURO: CN II - XII grossly intact  CATHETER: Right IJ Trifusion (IR, 2/14/20) - CDI     Data:   CBC:   Recent Labs     03/18/20 0415 03/19/20  0330 03/20/20  0325   WBC 0.1* 0.0* 0.0*   HGB 7.3* 7.2* 6.9*   HCT 20.8* 20.8* 19.3*   MCV 92.4 92.5 90.1   PLT 65* 52* 33*     BMP/Mag:  Recent Labs     03/18/20 0415 03/19/20  0330 03/20/20  0325    136 137   K 3.8 3.8 3.7    98* 100   CO2 24 28 25   PHOS 4.1  --  3.4   BUN 48* 30* 44*   CREATININE 3.2* 2.6* 3.3*   MG  --  2.00  --      LIVP:   Recent Labs     03/18/20 0415 03/20/20  0325   AST 17 11*   ALT 17 12   BILIDIR <0.2 <0.2   BILITOT 0.4 0.5   ALKPHOS 61 67     Uric Acid:    Recent Labs     03/20/20  0325   LABURIC 4.7     Coags:   Recent Labs     03/19/20  0330   PROTIME 12.5   INR 1.08   APTT 28.6         PROBLEM LIST:           1.  [place] Light Chain Multiple Myeloma   2. ESRD on HD   3. GERD  4. HTN  5. Hypokalemia      TREATMENT:           1. CyBorD (started 10/30/18)  2. Revlimid / Aleman Fried / Dex on clinical trial (started 2/1/19)  3. Carfilzomib / Pomalidomide / Dex x 6 cycles (Relapse #1 - started 8/26/19 - 2/2020)  4. Cytoxan Mobilization w/ 25% dose reduction (2/17/20)  5. Melphalan 140mg/m2 & Autologous SCT 3/12/20     ASSESSMENT AND PLAN:           1. [place] Light Chain Multiple Myeloma:  Currently in [facility]  - Admitted for reduced dose Melphalan (140 mg/M^2) followed by autologous stem cell resuce  - Post transplant maintenance: Consider KPD x 4 cycles     Day + 8     2.  ID: Afebrile, no

## 2020-03-21 LAB
ANION GAP SERPL CALCULATED.3IONS-SCNC: 15 MMOL/L (ref 3–16)
BACTERIA: ABNORMAL /HPF
BUN BLDV-MCNC: 57 MG/DL (ref 7–20)
CALCIUM SERPL-MCNC: 8.9 MG/DL (ref 8.3–10.6)
CHLORIDE BLD-SCNC: 96 MMOL/L (ref 99–110)
CO2: 22 MMOL/L (ref 21–32)
CREAT SERPL-MCNC: 4.2 MG/DL (ref 0.8–1.3)
EPITHELIAL CELLS, UA: ABNORMAL /HPF (ref 0–5)
GFR AFRICAN AMERICAN: 17
GFR NON-AFRICAN AMERICAN: 14
GLUCOSE BLD-MCNC: 120 MG/DL (ref 70–99)
HCT VFR BLD CALC: 20.8 % (ref 40.5–52.5)
HEMOGLOBIN: 7.5 G/DL (ref 13.5–17.5)
MCH RBC QN AUTO: 32.3 PG (ref 26–34)
MCHC RBC AUTO-ENTMCNC: 35.9 G/DL (ref 31–36)
MCV RBC AUTO: 90.1 FL (ref 80–100)
PDW BLD-RTO: 14.6 % (ref 12.4–15.4)
PLATELET # BLD: 14 K/UL (ref 135–450)
PMV BLD AUTO: 8 FL (ref 5–10.5)
POTASSIUM SERPL-SCNC: 3.5 MMOL/L (ref 3.5–5.1)
RBC # BLD: 2.31 M/UL (ref 4.2–5.9)
RBC UA: >100 /HPF (ref 0–4)
RENAL EPITHELIAL, UA: ABNORMAL /HPF (ref 0–1)
REPORT: NORMAL
SODIUM BLD-SCNC: 133 MMOL/L (ref 136–145)
URIC ACID, SERUM: 5.6 MG/DL (ref 3.5–7.2)
WBC # BLD: 0 K/UL (ref 4–11)
WBC UA: ABNORMAL /HPF (ref 0–5)

## 2020-03-21 PROCEDURE — 6370000000 HC RX 637 (ALT 250 FOR IP): Performed by: INTERNAL MEDICINE

## 2020-03-21 PROCEDURE — 36592 COLLECT BLOOD FROM PICC: CPT

## 2020-03-21 PROCEDURE — 80048 BASIC METABOLIC PNL TOTAL CA: CPT

## 2020-03-21 PROCEDURE — 2580000003 HC RX 258: Performed by: NURSE PRACTITIONER

## 2020-03-21 PROCEDURE — 99233 SBSQ HOSP IP/OBS HIGH 50: CPT | Performed by: NURSE PRACTITIONER

## 2020-03-21 PROCEDURE — 6360000002 HC RX W HCPCS: Performed by: INTERNAL MEDICINE

## 2020-03-21 PROCEDURE — 36430 TRANSFUSION BLD/BLD COMPNT: CPT

## 2020-03-21 PROCEDURE — 2060000000 HC ICU INTERMEDIATE R&B

## 2020-03-21 PROCEDURE — 85025 COMPLETE CBC W/AUTO DIFF WBC: CPT

## 2020-03-21 PROCEDURE — 90935 HEMODIALYSIS ONE EVALUATION: CPT

## 2020-03-21 PROCEDURE — 6370000000 HC RX 637 (ALT 250 FOR IP): Performed by: NURSE PRACTITIONER

## 2020-03-21 PROCEDURE — 84550 ASSAY OF BLOOD/URIC ACID: CPT

## 2020-03-21 PROCEDURE — 2580000003 HC RX 258: Performed by: INTERNAL MEDICINE

## 2020-03-21 RX ORDER — 0.9 % SODIUM CHLORIDE 0.9 %
500 INTRAVENOUS SOLUTION INTRAVENOUS ONCE
Status: COMPLETED | OUTPATIENT
Start: 2020-03-21 | End: 2020-03-21

## 2020-03-21 RX ADMIN — LOPERAMIDE HYDROCHLORIDE 2 MG: 2 CAPSULE ORAL at 03:31

## 2020-03-21 RX ADMIN — ACYCLOVIR 200 MG: 200 CAPSULE ORAL at 20:30

## 2020-03-21 RX ADMIN — METOPROLOL TARTRATE 12.5 MG: 25 TABLET, FILM COATED ORAL at 18:31

## 2020-03-21 RX ADMIN — METOPROLOL TARTRATE 12.5 MG: 25 TABLET, FILM COATED ORAL at 00:50

## 2020-03-21 RX ADMIN — PIPERACILLIN AND TAZOBACTAM 2.25 G: 2; .25 INJECTION, POWDER, LYOPHILIZED, FOR SOLUTION INTRAVENOUS at 11:37

## 2020-03-21 RX ADMIN — ACYCLOVIR 200 MG: 200 CAPSULE ORAL at 10:12

## 2020-03-21 RX ADMIN — NEPHROCAP 1 MG: 1 CAP ORAL at 10:12

## 2020-03-21 RX ADMIN — ALLOPURINOL 200 MG: 100 TABLET ORAL at 10:12

## 2020-03-21 RX ADMIN — SODIUM CHLORIDE 500 ML: 9 INJECTION, SOLUTION INTRAVENOUS at 11:37

## 2020-03-21 RX ADMIN — Medication 10 ML: at 20:02

## 2020-03-21 RX ADMIN — DARBEPOETIN ALFA 100 MCG: 100 INJECTION, SOLUTION INTRAVENOUS; SUBCUTANEOUS at 16:21

## 2020-03-21 RX ADMIN — PIPERACILLIN AND TAZOBACTAM 2.25 G: 2; .25 INJECTION, POWDER, LYOPHILIZED, FOR SOLUTION INTRAVENOUS at 20:42

## 2020-03-21 RX ADMIN — TBO-FILGRASTIM 300 MCG: 300 INJECTION, SOLUTION SUBCUTANEOUS at 18:30

## 2020-03-21 RX ADMIN — PANTOPRAZOLE SODIUM 40 MG: 40 TABLET, DELAYED RELEASE ORAL at 10:12

## 2020-03-21 RX ADMIN — SODIUM CHLORIDE 15 ML: 900 IRRIGANT IRRIGATION at 20:03

## 2020-03-21 RX ADMIN — PIPERACILLIN AND TAZOBACTAM 2.25 G: 2; .25 INJECTION, POWDER, LYOPHILIZED, FOR SOLUTION INTRAVENOUS at 03:26

## 2020-03-21 RX ADMIN — Medication 10 ML: at 10:12

## 2020-03-21 ASSESSMENT — PAIN SCALES - GENERAL
PAINLEVEL_OUTOF10: 0

## 2020-03-21 NOTE — PLAN OF CARE
Problem: Falls - Risk of:  Goal: Will remain free from falls  Description: Will remain free from falls  Outcome: Ongoing  Note: + High Fall Risk per LOUIS/ABCDS: Explained fall risk precautions to pt and family and rationale behind their use to keep the patient safe. Pt bed is in low position, side rails up, call light and belongings are in reach. Fall wristband applied and present on pts wrist.  Bed alarm on. Pt encouraged to call for assistance. Will continue with hourly rounds for PO intake, pain needs, toileting and repositioning as needed. Problem: Bleeding:  Goal: Will show no signs and symptoms of excessive bleeding  Description: Will show no signs and symptoms of excessive bleeding  Outcome: Ongoing  Note: Patient's hemoglobin this AM:   Recent Labs     03/21/20  0325   HGB 7.5*     Patient's platelet count this AM:   Recent Labs     03/21/20  0325   PLT 14*    Thrombocytopenia Precautions in place. Patient showing no signs or symptoms of active bleeding. Transfusion not indicated at this time. Patient verbalizes understanding of all instructions. Will continue to assess and implement POC. Call light within reach and hourly rounding in place. Problem: PROTECTIVE PRECAUTIONS  Goal: Patient will remain free of nosocomial Infections  Outcome: Ongoing  Note: Pt currently in a private, positive pressure room. Educated pt on wearing a mask when neutropenic and/or leaving the floor. No living plants or flowers allowed. Also reinforced importance of hand hygiene. Pt following a low microbial diet. Surfaces throughout room cleaned with bleach wipes per unit policy. Problem: Infection - Central Venous Catheter-Associated Bloodstream Infection:  Goal: Will show no infection signs and symptoms  Description: Will show no infection signs and symptoms  Outcome: Ongoing  Note: Pt afebrile this shift. CVC site remains free of signs/symptoms of infection.  No drainage, edema, erythema, pain, or nausea/vomiting  Description: Absence of nausea/vomiting  Outcome: Ongoing  Note: No c/o nausea. Problem: Diarrhea:  Goal: Bowel elimination is within specified parameters  Description: Bowel elimination is within specified parameters  Outcome: Ongoing  Note: Pt with one loose BM. Cdiff neg.

## 2020-03-21 NOTE — PROGRESS NOTES
Pt with lower BP this mornin/59, 83/61. Pt continues on telemetry with frequent PVCs. Metoprolol held per order parameters. Cardiology paged but did not receive call back. Discussed pt with MD Riggins with order for 500cc fluid bolus. Pts BP increased slightly after bolus, pt now 90/60s. Not as many PVCs on tele this afternoon. Will cont to monitor.

## 2020-03-21 NOTE — PROGRESS NOTES
Treatment time: 3.5hrs  Net UF: 0 ml     Pre weight: 104 kg   Post weight:104 kg  EDW: 104 kg     Access used: LAVF    Access function:good with  ml/min     Medications or blood products given: Aranesp 100mcg     Regular outpatient schedule: Marco A QUIÑONES     Summary of response to treatment: tolerated tx in full, hypotensive at times throughout tx, hemostasis achieved within appropriate time, no s/s of acute distress noted during or post tx       Copy of dialysis treatment record placed in chart, to be scanned into EMR.

## 2020-03-21 NOTE — PLAN OF CARE
Problem: Falls - Risk of:  Goal: Will remain free from falls  Description: Will remain free from falls  Outcome: Ongoing  Note: Orthostatic vital signs obtained at start of shift - see flowsheet for details. Pt does not meet criteria for orthostasis. Pt is a high fall risk. Bed alarm on. Pt instructed to call for help before getting out of bed. See Keren Mcknighter Fall Score and ABCDS Injury Risk assessments. Pt bed is in low position, side rails up, call light and belongings are in reach. Fall risk light is on outside pts room. Pt encouraged to call for assistance as needed. Will continue with hourly rounds for PO intake, pain needs, toileting and repositioning as needed. Problem: Bleeding:  Goal: Will show no signs and symptoms of excessive bleeding  Description: Will show no signs and symptoms of excessive bleeding  Outcome: Ongoing  Note: Patient's hemoglobin this AM:   Recent Labs     03/20/20  0325   HGB 6.9*     Patient's platelet count this AM:   Recent Labs     03/20/20  0325   PLT 33*    Thrombocytopenia Precautions in place. Patient showing no signs or symptoms of active bleeding. Transfusion not indicated at this time. Problem: Infection - Central Venous Catheter-Associated Bloodstream Infection:  Goal: Will show no infection signs and symptoms  Description: Will show no infection signs and symptoms  Outcome: Ongoing  Note: CVC site remains free of signs/symptoms of infection. No drainage, edema, erythema, pain, or warmth noted at site. Dressing changes continue per protocol and on an as needed basis - see flowsheet.

## 2020-03-21 NOTE — PROGRESS NOTES
[facility]  Autologous Progress Note    3/21/2020    Adan Cirri    :  1955    MRN:  [id number]    Referring MD: No referring provider defined for this encounter. Subjective:  Flat affect.   Weak and no appetite,  Intermittent diarrhea     ECOG PS:  (1) Restricted in physically strenuous activity, ambulatory and able to do work of light nature    KPS: 90% Able to carry on normal activity; minor signs or symptoms of disease    Isolation:  None     Medications    Scheduled Meds:   sodium chloride  500 mL Intravenous Once    sodium chloride  20 mL Intravenous Once    piperacillin-tazobactam  2.25 g Intravenous Q8H    metoprolol tartrate  12.5 mg Oral Q8H    Tbo-Filgrastim  300 mcg Subcutaneous QPM    darbepoetin evangelina-polysorbate  100 mcg Intravenous Weekly    sodium chloride flush  10 mL Intravenous 2 times per day    Saline Mouthwash  15 mL Swish & Spit 4x Daily AC & HS    acyclovir  200 mg Oral BID    allopurinol  200 mg Oral Daily    pantoprazole  40 mg Oral Daily    fluconazole  200 mg Oral Q MWF    b complex-C-folic acid  1 capsule Oral Daily     Continuous Infusions:   sodium chloride      sodium chloride       PRN Meds:.acetaminophen, [COMPLETED] loperamide **FOLLOWED BY** loperamide, sodium chloride, sodium chloride, sodium chloride flush, magnesium hydroxide, Saline Mouthwash, alteplase, prochlorperazine **OR** prochlorperazine, LORazepam **OR** LORazepam    ROS:  As noted above, otherwise remainder of 10-point ROS negative    Physical Exam:     I&O:      Intake/Output Summary (Last 24 hours) at 3/21/2020 1134  Last data filed at 3/21/2020 1007  Gross per 24 hour   Intake 692.21 ml   Output 775 ml   Net -82.79 ml       Vital Signs:  BP 83/61   Pulse 91   Temp 98.7 °F (37.1 °C) (Oral)   Resp 20   Ht 6' 4\" (1.93 m)   Wt 227 lb 11.2 oz (103.3 kg)   SpO2 95%   BMI 27.72 kg/m²     Weight:    Wt Readings from Last 3 Encounters:   20 227 lb 11.2 oz (103.3 kg)

## 2020-03-21 NOTE — PROGRESS NOTES
and affect         Assessment/Plan:     1.  Trigeminy - decreased frequency on Metoprolol 12.5mg TID started yesterday, continue to monitor        CASSIE Pritchett, ANTHONY Gentile Leader, MINDI  Heart Failure  [facility]       Core Measures:   · Discharge instructions:   · LVEF documented:   · ACEI for LV dysfunction:   · Smoking Cessation:

## 2020-03-22 LAB
ABO/RH: NORMAL
ANION GAP SERPL CALCULATED.3IONS-SCNC: 12 MMOL/L (ref 3–16)
ANTIBODY SCREEN: NORMAL
BACTERIA: ABNORMAL /HPF
BILIRUBIN URINE: NEGATIVE
BLOOD BANK DISPENSE STATUS: NORMAL
BLOOD BANK DISPENSE STATUS: NORMAL
BLOOD BANK PRODUCT CODE: NORMAL
BLOOD BANK PRODUCT CODE: NORMAL
BLOOD, URINE: ABNORMAL
BPU ID: NORMAL
BPU ID: NORMAL
BUN BLDV-MCNC: 35 MG/DL (ref 7–20)
CALCIUM SERPL-MCNC: 8.3 MG/DL (ref 8.3–10.6)
CHLORIDE BLD-SCNC: 98 MMOL/L (ref 99–110)
CLARITY: CLEAR
CO2: 26 MMOL/L (ref 21–32)
COLOR: YELLOW
CREAT SERPL-MCNC: 3.1 MG/DL (ref 0.8–1.3)
DESCRIPTION BLOOD BANK: NORMAL
DESCRIPTION BLOOD BANK: NORMAL
GFR AFRICAN AMERICAN: 25
GFR NON-AFRICAN AMERICAN: 20
GLUCOSE BLD-MCNC: 105 MG/DL (ref 70–99)
GLUCOSE URINE: NEGATIVE MG/DL
HCT VFR BLD CALC: 18.4 % (ref 40.5–52.5)
HEMOGLOBIN: 6.6 G/DL (ref 13.5–17.5)
KETONES, URINE: NEGATIVE MG/DL
LEUKOCYTE ESTERASE, URINE: NEGATIVE
MCH RBC QN AUTO: 32.4 PG (ref 26–34)
MCHC RBC AUTO-ENTMCNC: 36.1 G/DL (ref 31–36)
MCV RBC AUTO: 89.8 FL (ref 80–100)
MICROSCOPIC EXAMINATION: YES
NITRITE, URINE: NEGATIVE
PDW BLD-RTO: 14.6 % (ref 12.4–15.4)
PH UA: 6 (ref 5–8)
PLATELET # BLD: 5 K/UL (ref 135–450)
PMV BLD AUTO: 9.2 FL (ref 5–10.5)
POTASSIUM SERPL-SCNC: 3.6 MMOL/L (ref 3.5–5.1)
PROTEIN UA: 30 MG/DL
RBC # BLD: 2.05 M/UL (ref 4.2–5.9)
RBC UA: ABNORMAL /HPF (ref 0–4)
SODIUM BLD-SCNC: 136 MMOL/L (ref 136–145)
SPECIFIC GRAVITY UA: 1.02 (ref 1–1.03)
THROAT CULTURE: NORMAL
URIC ACID, SERUM: 3.7 MG/DL (ref 3.5–7.2)
URINE CULTURE, ROUTINE: NORMAL
URINE TYPE: ABNORMAL
UROBILINOGEN, URINE: 0.2 E.U./DL
WBC # BLD: 0 K/UL (ref 4–11)
WBC UA: ABNORMAL /HPF (ref 0–5)

## 2020-03-22 PROCEDURE — 6370000000 HC RX 637 (ALT 250 FOR IP): Performed by: NURSE PRACTITIONER

## 2020-03-22 PROCEDURE — 6370000000 HC RX 637 (ALT 250 FOR IP): Performed by: INTERNAL MEDICINE

## 2020-03-22 PROCEDURE — 36592 COLLECT BLOOD FROM PICC: CPT

## 2020-03-22 PROCEDURE — 85025 COMPLETE CBC W/AUTO DIFF WBC: CPT

## 2020-03-22 PROCEDURE — 81001 URINALYSIS AUTO W/SCOPE: CPT

## 2020-03-22 PROCEDURE — 84550 ASSAY OF BLOOD/URIC ACID: CPT

## 2020-03-22 PROCEDURE — 2060000000 HC ICU INTERMEDIATE R&B

## 2020-03-22 PROCEDURE — 86901 BLOOD TYPING SEROLOGIC RH(D): CPT

## 2020-03-22 PROCEDURE — 86850 RBC ANTIBODY SCREEN: CPT

## 2020-03-22 PROCEDURE — 6360000002 HC RX W HCPCS: Performed by: INTERNAL MEDICINE

## 2020-03-22 PROCEDURE — P9037 PLATE PHERES LEUKOREDU IRRAD: HCPCS

## 2020-03-22 PROCEDURE — 2580000003 HC RX 258: Performed by: NURSE PRACTITIONER

## 2020-03-22 PROCEDURE — 2580000003 HC RX 258: Performed by: INTERNAL MEDICINE

## 2020-03-22 PROCEDURE — 86900 BLOOD TYPING SEROLOGIC ABO: CPT

## 2020-03-22 PROCEDURE — P9040 RBC LEUKOREDUCED IRRADIATED: HCPCS

## 2020-03-22 PROCEDURE — 99232 SBSQ HOSP IP/OBS MODERATE 35: CPT | Performed by: NURSE PRACTITIONER

## 2020-03-22 PROCEDURE — 86923 COMPATIBILITY TEST ELECTRIC: CPT

## 2020-03-22 PROCEDURE — 36430 TRANSFUSION BLD/BLD COMPNT: CPT

## 2020-03-22 PROCEDURE — 80048 BASIC METABOLIC PNL TOTAL CA: CPT

## 2020-03-22 RX ORDER — 0.9 % SODIUM CHLORIDE 0.9 %
20 INTRAVENOUS SOLUTION INTRAVENOUS ONCE
Status: COMPLETED | OUTPATIENT
Start: 2020-03-22 | End: 2020-03-22

## 2020-03-22 RX ORDER — 0.9 % SODIUM CHLORIDE 0.9 %
250 INTRAVENOUS SOLUTION INTRAVENOUS ONCE
Status: COMPLETED | OUTPATIENT
Start: 2020-03-22 | End: 2020-03-22

## 2020-03-22 RX ADMIN — ACYCLOVIR 200 MG: 200 CAPSULE ORAL at 09:14

## 2020-03-22 RX ADMIN — PIPERACILLIN AND TAZOBACTAM 2.25 G: 2; .25 INJECTION, POWDER, LYOPHILIZED, FOR SOLUTION INTRAVENOUS at 11:44

## 2020-03-22 RX ADMIN — METOPROLOL TARTRATE 12.5 MG: 25 TABLET, FILM COATED ORAL at 18:09

## 2020-03-22 RX ADMIN — LOPERAMIDE HYDROCHLORIDE 2 MG: 2 CAPSULE ORAL at 20:06

## 2020-03-22 RX ADMIN — SODIUM CHLORIDE 15 ML: 900 IRRIGANT IRRIGATION at 20:00

## 2020-03-22 RX ADMIN — NEPHROCAP 1 MG: 1 CAP ORAL at 09:18

## 2020-03-22 RX ADMIN — PIPERACILLIN AND TAZOBACTAM 2.25 G: 2; .25 INJECTION, POWDER, LYOPHILIZED, FOR SOLUTION INTRAVENOUS at 19:56

## 2020-03-22 RX ADMIN — METOPROLOL TARTRATE 12.5 MG: 25 TABLET, FILM COATED ORAL at 01:06

## 2020-03-22 RX ADMIN — SODIUM CHLORIDE 20 ML: 9 INJECTION, SOLUTION INTRAVENOUS at 05:54

## 2020-03-22 RX ADMIN — LOPERAMIDE HYDROCHLORIDE 2 MG: 2 CAPSULE ORAL at 09:41

## 2020-03-22 RX ADMIN — SODIUM CHLORIDE 15 ML: 900 IRRIGANT IRRIGATION at 17:14

## 2020-03-22 RX ADMIN — LOPERAMIDE HYDROCHLORIDE 2 MG: 2 CAPSULE ORAL at 18:09

## 2020-03-22 RX ADMIN — SODIUM CHLORIDE: 9 INJECTION, SOLUTION INTRAVENOUS at 11:37

## 2020-03-22 RX ADMIN — SODIUM CHLORIDE 250 ML: 9 INJECTION, SOLUTION INTRAVENOUS at 01:06

## 2020-03-22 RX ADMIN — PIPERACILLIN AND TAZOBACTAM 2.25 G: 2; .25 INJECTION, POWDER, LYOPHILIZED, FOR SOLUTION INTRAVENOUS at 03:18

## 2020-03-22 RX ADMIN — ALLOPURINOL 200 MG: 100 TABLET ORAL at 09:18

## 2020-03-22 RX ADMIN — TBO-FILGRASTIM 300 MCG: 300 INJECTION, SOLUTION SUBCUTANEOUS at 18:09

## 2020-03-22 RX ADMIN — PANTOPRAZOLE SODIUM 40 MG: 40 TABLET, DELAYED RELEASE ORAL at 09:13

## 2020-03-22 RX ADMIN — METOPROLOL TARTRATE 12.5 MG: 25 TABLET, FILM COATED ORAL at 09:14

## 2020-03-22 RX ADMIN — SODIUM CHLORIDE 15 ML: 900 IRRIGANT IRRIGATION at 06:00

## 2020-03-22 RX ADMIN — Medication 10 ML: at 20:00

## 2020-03-22 RX ADMIN — SODIUM CHLORIDE 20 ML: 9 INJECTION, SOLUTION INTRAVENOUS at 06:25

## 2020-03-22 RX ADMIN — ACYCLOVIR 200 MG: 200 CAPSULE ORAL at 20:30

## 2020-03-22 ASSESSMENT — PAIN SCALES - GENERAL
PAINLEVEL_OUTOF10: 0

## 2020-03-22 NOTE — PROGRESS NOTES
[facility] [place]  DAILY PROGRESS NOTE    Admit Date: 3/11/2020       Chief Complaint: Tigeminy     Interval History:   Patient seen and examined and notes reviewed. Patient is being followed for ST, frequent PVCs in trigeminy noted yesterday. TTE wnl, metoprolol started and PVCs decreased in frequency. Pt had 10beat run of VT early this morning, asx.  BP a little improved today so hopefully he will be able to tolerate Metoprolol TID and will not need other treatment for ectopy.    pt denies CP, palpitations or SOB    In: 1798 [P.O.:540; I.V.:832; Blood:250]  Out: 450    Wt Readings from Last 2 Encounters:   03/21/20 229 lb 4.5 oz (104 kg)   03/02/20 238 lb 1.6 oz (108 kg)         Data:   Scheduled Meds:   Scheduled Meds:   sodium chloride  20 mL Intravenous Once    piperacillin-tazobactam  2.25 g Intravenous Q8H    metoprolol tartrate  12.5 mg Oral Q8H    Tbo-Filgrastim  300 mcg Subcutaneous QPM    darbepoetin evangelina-polysorbate  100 mcg Intravenous Weekly    sodium chloride flush  10 mL Intravenous 2 times per day    Saline Mouthwash  15 mL Swish & Spit 4x Daily AC & HS    acyclovir  200 mg Oral BID    allopurinol  200 mg Oral Daily    pantoprazole  40 mg Oral Daily    fluconazole  200 mg Oral Q MWF    b complex-C-folic acid  1 capsule Oral Daily     Continuous Infusions:   sodium chloride      sodium chloride       PRN Meds:.acetaminophen, [COMPLETED] loperamide **FOLLOWED BY** loperamide, sodium chloride, sodium chloride, sodium chloride flush, magnesium hydroxide, Saline Mouthwash, alteplase, prochlorperazine **OR** prochlorperazine, LORazepam **OR** LORazepam  Continuous Infusions:   sodium chloride      sodium chloride         Intake/Output Summary (Last 24 hours) at 3/22/2020 0839  Last data filed at 3/22/2020 0512  Gross per 24 hour   Intake 1858 ml   Output 550 ml   Net 1308 ml     Lab Data:  CBC:   Lab Results   Component Value Date    WBC 0.0 03/22/2020    HGB 6.6 03/22/2020    PLT 5 muscle use, no respiratory distress  Heart: s1s2  Abdomen: soft, non-tender; bowel sounds normal  Extremities: No edema, DP +  Psychiatric: normal insight and affect         Assessment/Plan:     1.  Trigeminy/VT - continue Metoprolol 12.5mg TID, if ectopy increases or worsens please call        CASSIE Peng, AGPCNP Denver Evener, CNP  Heart Failure  [facility]       Core Measures:   · Discharge instructions:   · LVEF documented:   · ACEI for LV dysfunction:   · Smoking Cessation:

## 2020-03-22 NOTE — PROGRESS NOTES
[facility]       Progress Note           Subjective/   59y.o. year old male who we are seeing in consultation for ESRD. HPI:  No issues, feels about the same  Tolerated HD well yesterday      ROS:  Denies any sob. No chest pain. BP still on the low side, but stable    SHx: No visitors this morning. Objective/   BP (!) 92/58   Pulse 91   Temp 98.7 °F (37.1 °C) (Oral)   Resp 20   Ht 6' 4\" (1.93 m)   Wt 230 lb (104.3 kg)   SpO2 97%   BMI 28.00 kg/m²   GEN:  NAD, resting in bed. HEENT: non-icteric, no JVD  CV: S1, S2 without m/r/g; no LE edema  RESP: CTA B without w/r/r; breathing wnl  ABD: +bs, soft, nt, no hsm  SKIN: warm, no rashes  ACCESS: LUE AVF +bruit/thrill.     Data/  Recent Labs     03/20/20 0325 03/21/20 0325 03/22/20  0315   WBC 0.0* 0.0* 0.0*   HGB 6.9* 7.5* 6.6*   HCT 19.3* 20.8* 18.4*   MCV 90.1 90.1 89.8   PLT 33* 14* 5*     Recent Labs     03/20/20 0325 03/21/20  0325 03/22/20  0315    133* 136   K 3.7 3.5 3.6    96* 98*   CO2 25 22 26   GLUCOSE 121* 120* 105*   PHOS 3.4  --   --    BUN 44* 57* 35*   CREATININE 3.3* 4.2* 3.1*   LABGLOM 19* 14* 20*   GFRAA 23* 17* 25*       Assessment/Plan     - End stage renal disease - on HD Mon-Wed-Fri   TW lowered to 104 kg per dr Tolu Lan; discussed with dr Guerita Hammond, may need to give some fluid back at HD tomorrow ( he lost a lot of weight since admission , and may very well be intravascularly depleted )  If hypotension persists, may need ECHO (the  chemotherapy that he received is NOT cardiotoxic, but may need to r/o cardiac etiology still)      - [place] light chain MM - admitted for high dose melphalan & autologous stem cell  transplant on 3/12     - Anemia of chronic disease - VANESSA with HD as needed, transfusion per Oncology      - Hypertension - controlled     - Hyperkalemia - within range    - Hyperuricemia - on allopurinol

## 2020-03-22 NOTE — PROGRESS NOTES
Pt had 10 beats of V tach at 0418. Pt was sleeping on his right side at the time. I woke the pt up and took a set of vitals. Vital signs were stable . See flowsheet. Pt was asymptomatic. Will continue to monitor.

## 2020-03-22 NOTE — PLAN OF CARE
edema, erythema, pain, or warmth noted at site. Dressing changes continue per protocol and on an as needed basis - see flowsheet. Compliant with [facility] Bath Protocol:  Performed CHG bath today per [facility] protocol utilizing CHG solution in the shower. CVC site cleansed with CHG wipe over dressing, skin surrounding dressing, and first 6\" of IV tubing. Pt tolerated well. Continued to encourage daily CHG bathing per [email or web address] protocol. Problem: Activity:  Goal: Ability to tolerate increased activity will improve  Description: Ability to tolerate increased activity will improve  Outcome: Ongoing  Note: Encouraged sitting up chair throughout day. Pt sat up in morning for approx 1hr and then remained in bed rest of shift. Problem: Nutrition Deficit:  Goal: Ability to achieve adequate nutritional intake will improve  Description: Ability to achieve adequate nutritional intake will improve  Outcome: Ongoing  Note: Pt with very poor appetite despite encouragement. Problem: Skin Integrity:  Goal: Absence of new skin breakdown  Description: Absence of new skin breakdown  Outcome: Ongoing  Note: No new signs of skin breakdown. Problem: Nausea/Vomiting:  Goal: Absence of nausea/vomiting  Description: Absence of nausea/vomiting  Outcome: Ongoing  Note: No c/o nausea. Problem: Diarrhea:  Goal: Bowel elimination is within specified parameters  Description: Bowel elimination is within specified parameters  Outcome: Ongoing  Note: Pt with two loose BMs this shift. Unmeasured urine output with BMs. Cdiff negative. Imodium administered.

## 2020-03-22 NOTE — PLAN OF CARE
edema, erythema, pain, or warmth noted at site. Dressing changes continue per protocol and on an as needed basis - see flowsheet. Refusing [facility] Bath Protocol:  Despite multiple attempts by this RN, pt refusing shower or bed bath with CHG today. Discussed risks associated with not following [facility] bath protocol including increased risk of CVC line infection & sepsis in an immunocompromised pt. Will discuss continued refusal with treatment team if pt continues to refuse daily bath protocol for 2 or more days. CVC site cleansed with CHG wipe over dressing, skin surrounding dressing, and first 6\" of IV tubing. Pt tolerated well. Continued to encourage daily CHG bathing per [facility] protocol. Problem: Nutrition Deficit:  Goal: Ability to achieve adequate nutritional intake will improve  Description: Ability to achieve adequate nutritional intake will improve  Outcome: Ongoing  Note: Pt with very poor appetite. Problem: Skin Integrity:  Goal: Absence of new skin breakdown  Description: Absence of new skin breakdown  Outcome: Ongoing  Note: No new signs of skin breakdown. Problem: Nausea/Vomiting:  Goal: Ability to achieve adequate nutritional intake will improve  Description: Ability to achieve adequate nutritional intake will improve  Outcome: Ongoing  Note: Pt with very poor appetite. Problem: Nausea/Vomiting:  Goal: Absence of nausea/vomiting  Description: Absence of nausea/vomiting  Outcome: Ongoing  Note: No c/o nausea. Problem: Diarrhea:  Goal: Bowel elimination is within specified parameters  Description: Bowel elimination is within specified parameters  Outcome: Ongoing  Note: Pt with 2 loose BM. Cdiff neg.

## 2020-03-22 NOTE — PROGRESS NOTES
[facility]  Autologous Progress Note    3/22/2020    Nisha Whyte    :  1955    MRN:  [id number]    Referring MD: No referring provider defined for this encounter. Subjective:A little more interactive. No diarrhea, poor appetite.      ECOG PS:  (1) Restricted in physically strenuous activity, ambulatory and able to do work of light nature    KPS: 90% Able to carry on normal activity; minor signs or symptoms of disease    Isolation:  None     Medications    Scheduled Meds:   sodium chloride  20 mL Intravenous Once    piperacillin-tazobactam  2.25 g Intravenous Q8H    metoprolol tartrate  12.5 mg Oral Q8H    Tbo-Filgrastim  300 mcg Subcutaneous QPM    darbepoetin evangelina-polysorbate  100 mcg Intravenous Weekly    sodium chloride flush  10 mL Intravenous 2 times per day    Saline Mouthwash  15 mL Swish & Spit 4x Daily AC & HS    acyclovir  200 mg Oral BID    allopurinol  200 mg Oral Daily    pantoprazole  40 mg Oral Daily    fluconazole  200 mg Oral Q MWF    b complex-C-folic acid  1 capsule Oral Daily     Continuous Infusions:   sodium chloride      sodium chloride       PRN Meds:.acetaminophen, [COMPLETED] loperamide **FOLLOWED BY** loperamide, sodium chloride, sodium chloride, sodium chloride flush, magnesium hydroxide, Saline Mouthwash, alteplase, prochlorperazine **OR** prochlorperazine, LORazepam **OR** LORazepam    ROS:  As noted above, otherwise remainder of 10-point ROS negative    Physical Exam:     I&O:      Intake/Output Summary (Last 24 hours) at 3/22/2020 1132  Last data filed at 3/22/2020 0905  Gross per 24 hour   Intake 2228 ml   Output 450 ml   Net 1778 ml       Vital Signs:  BP (!) 92/58   Pulse 91   Temp 98.7 °F (37.1 °C) (Oral)   Resp 20   Ht 6' 4\" (1.93 m)   Wt 230 lb (104.3 kg)   SpO2 97%   BMI 28.00 kg/m²     Weight:    Wt Readings from Last 3 Encounters:   20 230 lb (104.3 kg)   20 238 lb 1.6 oz (108 kg)   20 250 lb 1 oz (113.4 kg) without a source. - Cont acyclovir 200 BID; Fluconazole and Levaquin ppx (dose adjusted for ESRD - 250 mg Q 48 hours)     D2  Zosyn 2.25 gm IV Q 8 H.     3. Heme: Pancytopenia from recent chemo  - Transfuse for Hgb < 7 and Platelets < 28U  - No transfusion today  - VANESSA by renal     4. ESRD / Metabolic: Hyperuricemia, ESRD  - Followed by Dr. Mariya Blanton HD on MWF  - IVFs: 250mL as needed  - Electrolyte management per nephrology  - Cont allopurinol 200mg daily  - Cont dialyvite daily  - 500 ml NS 3/21, 250 ml NS 3/22     5. GI / Nutrition:    - H/o GERD  Nutrition:  Appetite and oral intake is good. - Cont MVI  - Cont low microbial diet   - Follow closely with dietary  GERD:    - Cont PPI       6. CV - irreg HR.   12 lead pending.    - DVT Prophylaxis: Platelets <08,841  Contraindications to pharmacologic prophylaxis: Thrombocytopenia  Contraindications to mechanical prophylaxis: None    - Disposition:  Plan for discharge once ANC >1 and patient has recovered from chemo toxicities    Gerhardt Denmark, MD Gerhardt Denmark, MD  [facility]  Please contact me through [address]

## 2020-03-23 ENCOUNTER — APPOINTMENT (OUTPATIENT)
Dept: GENERAL RADIOLOGY | Age: 65
DRG: 016 | End: 2020-03-23
Attending: INTERNAL MEDICINE
Payer: COMMERCIAL

## 2020-03-23 LAB
ALBUMIN SERPL-MCNC: 2.9 G/DL (ref 3.4–5)
ALP BLD-CCNC: 59 U/L (ref 40–129)
ALT SERPL-CCNC: 13 U/L (ref 10–40)
ANION GAP SERPL CALCULATED.3IONS-SCNC: 12 MMOL/L (ref 3–16)
APTT: 27.6 SEC (ref 24.2–36.2)
AST SERPL-CCNC: 11 U/L (ref 15–37)
BILIRUB SERPL-MCNC: 0.5 MG/DL (ref 0–1)
BILIRUBIN DIRECT: <0.2 MG/DL (ref 0–0.3)
BILIRUBIN, INDIRECT: ABNORMAL MG/DL (ref 0–1)
BLOOD BANK DISPENSE STATUS: NORMAL
BLOOD BANK PRODUCT CODE: NORMAL
BLOOD CULTURE, ROUTINE: ABNORMAL
BLOOD CULTURE, ROUTINE: ABNORMAL
BPU ID: NORMAL
BUN BLDV-MCNC: 47 MG/DL (ref 7–20)
CALCIUM SERPL-MCNC: 8.6 MG/DL (ref 8.3–10.6)
CHLORIDE BLD-SCNC: 96 MMOL/L (ref 99–110)
CO2: 26 MMOL/L (ref 21–32)
CREAT SERPL-MCNC: 3.8 MG/DL (ref 0.8–1.3)
DESCRIPTION BLOOD BANK: NORMAL
FINAL REPORT: NORMAL
GFR AFRICAN AMERICAN: 19
GFR NON-AFRICAN AMERICAN: 16
GLUCOSE BLD-MCNC: 96 MG/DL (ref 70–99)
HCT VFR BLD CALC: 20.4 % (ref 40.5–52.5)
HEMOGLOBIN: 7.2 G/DL (ref 13.5–17.5)
INR BLD: 1.16 (ref 0.86–1.14)
LACTATE DEHYDROGENASE: 96 U/L (ref 100–190)
MAGNESIUM: 2.1 MG/DL (ref 1.8–2.4)
MCH RBC QN AUTO: 31.8 PG (ref 26–34)
MCHC RBC AUTO-ENTMCNC: 35.4 G/DL (ref 31–36)
MCV RBC AUTO: 89.8 FL (ref 80–100)
ORGANISM: ABNORMAL
ORGANISM: ABNORMAL
PDW BLD-RTO: 14.2 % (ref 12.4–15.4)
PHOSPHORUS: 3 MG/DL (ref 2.5–4.9)
PLATELET # BLD: 12 K/UL (ref 135–450)
PMV BLD AUTO: 7.9 FL (ref 5–10.5)
POTASSIUM SERPL-SCNC: 3.3 MMOL/L (ref 3.5–5.1)
PRELIMINARY: NORMAL
PROTHROMBIN TIME: 13.5 SEC (ref 10–13.2)
RBC # BLD: 2.27 M/UL (ref 4.2–5.9)
SODIUM BLD-SCNC: 134 MMOL/L (ref 136–145)
TOTAL PROTEIN: 5.1 G/DL (ref 6.4–8.2)
URIC ACID, SERUM: 4.9 MG/DL (ref 3.5–7.2)
WBC # BLD: 0.1 K/UL (ref 4–11)

## 2020-03-23 PROCEDURE — 80076 HEPATIC FUNCTION PANEL: CPT

## 2020-03-23 PROCEDURE — 85730 THROMBOPLASTIN TIME PARTIAL: CPT

## 2020-03-23 PROCEDURE — P9037 PLATE PHERES LEUKOREDU IRRAD: HCPCS

## 2020-03-23 PROCEDURE — 6370000000 HC RX 637 (ALT 250 FOR IP): Performed by: INTERNAL MEDICINE

## 2020-03-23 PROCEDURE — 84550 ASSAY OF BLOOD/URIC ACID: CPT

## 2020-03-23 PROCEDURE — 85610 PROTHROMBIN TIME: CPT

## 2020-03-23 PROCEDURE — 2580000003 HC RX 258

## 2020-03-23 PROCEDURE — 83735 ASSAY OF MAGNESIUM: CPT

## 2020-03-23 PROCEDURE — 83615 LACTATE (LD) (LDH) ENZYME: CPT

## 2020-03-23 PROCEDURE — 6360000002 HC RX W HCPCS: Performed by: INTERNAL MEDICINE

## 2020-03-23 PROCEDURE — 99232 SBSQ HOSP IP/OBS MODERATE 35: CPT | Performed by: NURSE PRACTITIONER

## 2020-03-23 PROCEDURE — 36592 COLLECT BLOOD FROM PICC: CPT

## 2020-03-23 PROCEDURE — 2580000003 HC RX 258: Performed by: INTERNAL MEDICINE

## 2020-03-23 PROCEDURE — 90935 HEMODIALYSIS ONE EVALUATION: CPT

## 2020-03-23 PROCEDURE — 2060000000 HC ICU INTERMEDIATE R&B

## 2020-03-23 PROCEDURE — 80048 BASIC METABOLIC PNL TOTAL CA: CPT

## 2020-03-23 PROCEDURE — 6370000000 HC RX 637 (ALT 250 FOR IP): Performed by: NURSE PRACTITIONER

## 2020-03-23 PROCEDURE — 84100 ASSAY OF PHOSPHORUS: CPT

## 2020-03-23 PROCEDURE — 2580000003 HC RX 258: Performed by: NURSE PRACTITIONER

## 2020-03-23 PROCEDURE — 71045 X-RAY EXAM CHEST 1 VIEW: CPT

## 2020-03-23 PROCEDURE — 36430 TRANSFUSION BLD/BLD COMPNT: CPT

## 2020-03-23 PROCEDURE — 85025 COMPLETE CBC W/AUTO DIFF WBC: CPT

## 2020-03-23 PROCEDURE — 6360000002 HC RX W HCPCS: Performed by: NURSE PRACTITIONER

## 2020-03-23 RX ORDER — 0.9 % SODIUM CHLORIDE 0.9 %
20 INTRAVENOUS SOLUTION INTRAVENOUS ONCE
Status: COMPLETED | OUTPATIENT
Start: 2020-03-23 | End: 2020-03-23

## 2020-03-23 RX ORDER — POTASSIUM CHLORIDE 20 MEQ/1
20 TABLET, EXTENDED RELEASE ORAL ONCE
Status: COMPLETED | OUTPATIENT
Start: 2020-03-23 | End: 2020-03-23

## 2020-03-23 RX ORDER — SODIUM CHLORIDE 9 MG/ML
INJECTION, SOLUTION INTRAVENOUS
Status: DISPENSED
Start: 2020-03-23 | End: 2020-03-24

## 2020-03-23 RX ORDER — POTASSIUM CHLORIDE 20 MEQ/1
20 TABLET, EXTENDED RELEASE ORAL 2 TIMES DAILY WITH MEALS
Status: DISCONTINUED | OUTPATIENT
Start: 2020-03-23 | End: 2020-03-23

## 2020-03-23 RX ORDER — SODIUM CHLORIDE 9 MG/ML
INJECTION, SOLUTION INTRAVENOUS
Status: COMPLETED
Start: 2020-03-23 | End: 2020-03-23

## 2020-03-23 RX ADMIN — Medication 10 ML: at 09:36

## 2020-03-23 RX ADMIN — SODIUM CHLORIDE 20 ML: 9 INJECTION, SOLUTION INTRAVENOUS at 11:12

## 2020-03-23 RX ADMIN — ALLOPURINOL 200 MG: 100 TABLET ORAL at 20:30

## 2020-03-23 RX ADMIN — SODIUM CHLORIDE: 900 INJECTION INTRAVENOUS at 20:30

## 2020-03-23 RX ADMIN — POTASSIUM CHLORIDE 20 MEQ: 20 TABLET, EXTENDED RELEASE ORAL at 20:30

## 2020-03-23 RX ADMIN — METOPROLOL TARTRATE 12.5 MG: 25 TABLET, FILM COATED ORAL at 01:48

## 2020-03-23 RX ADMIN — SODIUM CHLORIDE 15 ML: 900 IRRIGANT IRRIGATION at 09:37

## 2020-03-23 RX ADMIN — MEROPENEM 500 MG: 500 INJECTION, POWDER, FOR SOLUTION INTRAVENOUS at 09:35

## 2020-03-23 RX ADMIN — FLUCONAZOLE 200 MG: 200 TABLET ORAL at 20:30

## 2020-03-23 RX ADMIN — Medication 10 ML: at 20:30

## 2020-03-23 RX ADMIN — NEPHROCAP 1 MG: 1 CAP ORAL at 22:00

## 2020-03-23 RX ADMIN — ACYCLOVIR 200 MG: 200 CAPSULE ORAL at 20:30

## 2020-03-23 RX ADMIN — MEROPENEM 500 MG: 500 INJECTION, POWDER, FOR SOLUTION INTRAVENOUS at 20:30

## 2020-03-23 RX ADMIN — PANTOPRAZOLE SODIUM 40 MG: 40 TABLET, DELAYED RELEASE ORAL at 20:30

## 2020-03-23 RX ADMIN — TBO-FILGRASTIM 300 MCG: 300 INJECTION, SOLUTION SUBCUTANEOUS at 20:30

## 2020-03-23 RX ADMIN — SODIUM CHLORIDE: 9 INJECTION, SOLUTION INTRAVENOUS at 20:30

## 2020-03-23 RX ADMIN — METOPROLOL TARTRATE 12.5 MG: 25 TABLET, FILM COATED ORAL at 20:30

## 2020-03-23 RX ADMIN — PIPERACILLIN AND TAZOBACTAM 2.25 G: 2; .25 INJECTION, POWDER, LYOPHILIZED, FOR SOLUTION INTRAVENOUS at 03:30

## 2020-03-23 ASSESSMENT — PAIN SCALES - GENERAL
PAINLEVEL_OUTOF10: 0

## 2020-03-23 NOTE — PROGRESS NOTES
oz (108 kg)   02/18/20 250 lb 1 oz (113.4 kg)       General: Awake, alert and oriented. HEENT: normocephalic, alopecia, PERRL, no scleral erythema or icterus, Oral mucosa moist and intact, throat clear  NECK: supple without palpable adenopathy  BACK: Straight negative CVAT  SKIN: warm dry and intact without lesions rashes or masses  CHEST: CTA bilaterally without use of accessory muscles  CV: Normal S1 S2, irreg, no MRG  ABD: NT ND normoactive BS, no palpable masses or hepatosplenomegaly  EXTREMITIES: without edema, denies calf tenderness, left arm fistula  NEURO: CN II - XII grossly intact  CATHETER: Right IJ Trifusion (IR, 2/14/20) - CDI     Data:   CBC:   Recent Labs     03/21/20 0325 03/22/20 0315 03/23/20  0330   WBC 0.0* 0.0* 0.1*   HGB 7.5* 6.6* 7.2*   HCT 20.8* 18.4* 20.4*   MCV 90.1 89.8 89.8   PLT 14* 5* 12*     BMP/Mag:  Recent Labs     03/21/20 0325 03/22/20 0315 03/23/20  0330   * 136 134*   K 3.5 3.6 3.3*   CL 96* 98* 96*   CO2 22 26 26   PHOS  --   --  3.0   BUN 57* 35* 47*   CREATININE 4.2* 3.1* 3.8*   MG  --   --  2.10     LIVP:   Recent Labs     03/23/20  0330   AST 11*   ALT 13   BILIDIR <0.2   BILITOT 0.5   ALKPHOS 59     Uric Acid:    Recent Labs     03/23/20  0330   LABURIC 4.9     Coags:   Recent Labs     03/23/20  0330   PROTIME 13.5*   INR 1.16*   APTT 27.6     MICROBIOLOGY:  1. Blood Cultures 3/20:  Escherichia coli (esbl) (2)   Antibiotic Interpretation ENE   ampicillin Resistant >=32 mcg/mL   ceFAZolin Resistant >=64 mcg/mL   cefepime Resistant     cefTRIAXone Resistant >=64 mcg/mL   ciprofloxacin Resistant >=4 mcg/mL   ertapenem Sensitive <=0.12 mcg/mL   gentamicin Resistant >=16 mcg/mL   levofloxacin Resistant >=8 mcg/mL   tobramycin Resistant >=16 mcg/mL   trimethoprim-sulfamethoxazole Sensitive <=20 mcg/mL     2. Blood Culture BCID Panel 3/20:  - +. Coli  - +Enterobacteriaceae    PROBLEM LIST:           1.  [place] Light Chain Multiple Myeloma   2. ESRD on HD   3. GERD  4.

## 2020-03-23 NOTE — PROGRESS NOTES
[facility]       Progress Note           Subjective/   59y.o. year old male who we are seeing in consultation for ESRD. HPI:  No issues, feels about the same  Next HD today ,no fluid removal ,may need fluid given ,will replace K and use 4 K bath . ROS:  Denies any sob. No chest pain. BP still on the low side, but stable    SHx: No visitors this morning. Objective/   /62   Pulse 85   Temp 98.6 °F (37 °C) (Oral)   Resp 20   Ht 6' 4\" (1.93 m)   Wt 230 lb (104.3 kg)   SpO2 95%   BMI 28.00 kg/m²   GEN:  NAD, resting in bed. HEENT: non-icteric, no JVD  CV: S1, S2 without m/r/g; no LE edema  RESP: CTA B without w/r/r; breathing wnl  ABD: +bs, soft, nt, no hsm  SKIN: warm, no rashes  ACCESS: LUE AVF +bruit/thrill.     Data/  Recent Labs     03/21/20 0325 03/22/20  0315 03/23/20  0330   WBC 0.0* 0.0* 0.1*   HGB 7.5* 6.6* 7.2*   HCT 20.8* 18.4* 20.4*   MCV 90.1 89.8 89.8   PLT 14* 5* 12*     Recent Labs     03/21/20 0325 03/22/20  0315 03/23/20  0330   * 136 134*   K 3.5 3.6 3.3*   CL 96* 98* 96*   CO2 22 26 26   GLUCOSE 120* 105* 96   PHOS  --   --  3.0   MG  --   --  2.10   BUN 57* 35* 47*   CREATININE 4.2* 3.1* 3.8*   LABGLOM 14* 20* 16*   GFRAA 17* 25* 19*       Assessment/Plan     - End stage renal disease - on HD Mon-Wed-Fri  Reviewed with        - [place] light chain MM - admitted for high dose melphalan & autologous stem cell  transplant on 3/12     - Anemia of chronic disease - VANESSA with HD as needed, transfusion per Oncology      - Hypertension - controlled     - Hyperkalemia - within range    - Hyperuricemia - on allopurinol

## 2020-03-24 LAB
ANION GAP SERPL CALCULATED.3IONS-SCNC: 12 MMOL/L (ref 3–16)
BUN BLDV-MCNC: 24 MG/DL (ref 7–20)
CALCIUM SERPL-MCNC: 8.5 MG/DL (ref 8.3–10.6)
CHLORIDE BLD-SCNC: 98 MMOL/L (ref 99–110)
CO2: 28 MMOL/L (ref 21–32)
CREAT SERPL-MCNC: 2.3 MG/DL (ref 0.8–1.3)
CULTURE, BLOOD 2: NORMAL
GFR AFRICAN AMERICAN: 35
GFR NON-AFRICAN AMERICAN: 29
GLUCOSE BLD-MCNC: 84 MG/DL (ref 70–99)
HCT VFR BLD CALC: 21.6 % (ref 40.5–52.5)
HEMOGLOBIN: 7.6 G/DL (ref 13.5–17.5)
MCH RBC QN AUTO: 32 PG (ref 26–34)
MCHC RBC AUTO-ENTMCNC: 35.2 G/DL (ref 31–36)
MCV RBC AUTO: 90.9 FL (ref 80–100)
PDW BLD-RTO: 14.3 % (ref 12.4–15.4)
PLATELET # BLD: 24 K/UL (ref 135–450)
PMV BLD AUTO: 8.8 FL (ref 5–10.5)
POTASSIUM SERPL-SCNC: 3.5 MMOL/L (ref 3.5–5.1)
RBC # BLD: 2.37 M/UL (ref 4.2–5.9)
SODIUM BLD-SCNC: 138 MMOL/L (ref 136–145)
URIC ACID, SERUM: 2.5 MG/DL (ref 3.5–7.2)
WBC # BLD: 0.3 K/UL (ref 4–11)

## 2020-03-24 PROCEDURE — 2580000003 HC RX 258: Performed by: INTERNAL MEDICINE

## 2020-03-24 PROCEDURE — 87081 CULTURE SCREEN ONLY: CPT

## 2020-03-24 PROCEDURE — 6370000000 HC RX 637 (ALT 250 FOR IP): Performed by: INTERNAL MEDICINE

## 2020-03-24 PROCEDURE — 36592 COLLECT BLOOD FROM PICC: CPT

## 2020-03-24 PROCEDURE — 85025 COMPLETE CBC W/AUTO DIFF WBC: CPT

## 2020-03-24 PROCEDURE — 6370000000 HC RX 637 (ALT 250 FOR IP): Performed by: NURSE PRACTITIONER

## 2020-03-24 PROCEDURE — 6360000002 HC RX W HCPCS: Performed by: INTERNAL MEDICINE

## 2020-03-24 PROCEDURE — 80048 BASIC METABOLIC PNL TOTAL CA: CPT

## 2020-03-24 PROCEDURE — 84550 ASSAY OF BLOOD/URIC ACID: CPT

## 2020-03-24 PROCEDURE — 2580000003 HC RX 258: Performed by: NURSE PRACTITIONER

## 2020-03-24 PROCEDURE — 2060000000 HC ICU INTERMEDIATE R&B

## 2020-03-24 RX ADMIN — Medication 10 ML: at 20:30

## 2020-03-24 RX ADMIN — METOPROLOL TARTRATE 12.5 MG: 25 TABLET, FILM COATED ORAL at 04:00

## 2020-03-24 RX ADMIN — MEROPENEM 500 MG: 500 INJECTION, POWDER, FOR SOLUTION INTRAVENOUS at 18:22

## 2020-03-24 RX ADMIN — NEPHROCAP 1 MG: 1 CAP ORAL at 08:58

## 2020-03-24 RX ADMIN — PANTOPRAZOLE SODIUM 40 MG: 40 TABLET, DELAYED RELEASE ORAL at 08:58

## 2020-03-24 RX ADMIN — TBO-FILGRASTIM 300 MCG: 300 INJECTION, SOLUTION SUBCUTANEOUS at 18:22

## 2020-03-24 RX ADMIN — Medication 10 ML: at 08:59

## 2020-03-24 RX ADMIN — METOPROLOL TARTRATE 12.5 MG: 25 TABLET, FILM COATED ORAL at 12:17

## 2020-03-24 RX ADMIN — ACYCLOVIR 200 MG: 200 CAPSULE ORAL at 08:58

## 2020-03-24 RX ADMIN — METOPROLOL TARTRATE 12.5 MG: 25 TABLET, FILM COATED ORAL at 20:30

## 2020-03-24 RX ADMIN — ALLOPURINOL 200 MG: 100 TABLET ORAL at 08:58

## 2020-03-24 RX ADMIN — ACYCLOVIR 200 MG: 200 CAPSULE ORAL at 20:30

## 2020-03-24 ASSESSMENT — PAIN SCALES - GENERAL
PAINLEVEL_OUTOF10: 0

## 2020-03-24 NOTE — PLAN OF CARE
Problem: Falls - Risk of:  Goal: Will remain free from falls  Description: Will remain free from falls  Outcome: Ongoing  Note: Pt with weak but steady gait, up with standby assistance, no other issues at this time, instructed pt to call out for assistance as needed, pt verbalized understanding, bed in low position, bed alarm on, side rails up, call light in reach, will continue to monitor. Problem: Infection - Central Venous Catheter-Associated Bloodstream Infection:  Goal: Will show no infection signs and symptoms  Description: Will show no infection signs and symptoms  Outcome: Ongoing  Note: Pt afebrile, no S/S of infection, CVC site free from S/S of infection, no drainage, edema, redness, swelling, or tenderness, flushes easily with brisk blood return, dressing changes continue per protocol and on an as needed basis - see flowsheet, will continue to monitor. Problem: Bleeding:  Goal: Will show no signs and symptoms of excessive bleeding  Description: Will show no signs and symptoms of excessive bleeding  Outcome: Ongoing  Note: Pt at risk for bleeding, no S/S of bleeding noted, PLTS were 24 with AM labs, orders to transfuse if equal to or less then 10, will continue to monitor. Problem: PROTECTIVE PRECAUTIONS  Goal: Patient will remain free of nosocomial Infections  Outcome: Ongoing  Note: Pt afebrile, no S/S of infection, will continue to monitor. Refusing BCC Bath Protocol:  Despite multiple attempts, pt refusing shower or bed bath with CHG today. Discussed risks associated with not following BCC bath protocol including increased risk of CVC line infection and sepsis. Will notify treatment team of refusal.      Problem: Activity:  Goal: Ability to tolerate increased activity will improve  Description: Ability to tolerate increased activity will improve  Outcome: Ongoing  Note: Stable/In Isolation:  Pt with activity orders for up with assistance.   Encouraged pt to be up OOB as much as possible throughout the day and for all meals. Encouraged frequent short naps as necessary to preserve energy but instructed that while awake, pt should be OOB. Pt in isolation and is unable to ambulate in halls d/t restrictions. Continued to encourage activity in room as much as possible. Pt is visualized to be OOB 0-25% of the time this shift. Will continue to encourage frequent activity. Problem: Skin Integrity:  Goal: Absence of new skin breakdown  Description: Absence of new skin breakdown  Outcome: Ongoing  Note: Pt's skin remains intact, no evidence of breakdown noted, will continue to monitor. Problem: Venous Thromboembolism:  Goal: Will show no signs or symptoms of venous thromboembolism  Description: Will show no signs or symptoms of venous thromboembolism  Outcome: Ongoing  Note: Refusing DVT Prevention: Pt is at risk for DVT d/t decreased mobility and cancer treatment. Pt educated on importance of activity. Pt has orders for SCDs while in bed, however pt currently refusing treatment. Reviewed risks of DVT & PE development while inpatient. Treatment team aware of patient's refusal and re-education of importance of prophylaxis. No new orders at this time. Will continue to re-instruct patient and intervene as appropriate. Problem: Physical Regulation:  Goal: Prevent transmision of infection  Description: Prevent transmision of infection  Note: Pt remains in isolation for active ESBL infection, maintained isolation precautions, will continue to monitor. Problem: Nausea/Vomiting:  Goal: Ability to achieve adequate nutritional intake will improve  Description: Ability to achieve adequate nutritional intake will improve  Outcome: Ongoing  Note: Pt free from nausea/vomiting, will continue to monitor.      Problem: Diarrhea:  Goal: Bowel elimination is within specified parameters  Description: Bowel elimination is within specified parameters  Note: Pt with two loose stools this shift, denies want for imodium, will continue to monitor.

## 2020-03-24 NOTE — PROGRESS NOTES
BCC  Autologous Progress Note    3/24/2020    Elisabeth Palacios    :  1955    MRN:  [id number]    Referring MD: No referring provider defined for this encounter. Subjective: Feeling much better. Appetite recovering, but still weak.      ECOG PS: (2) Ambulatory and capable of self care, unable to carry out work activity, up and about > 50% or waking hours     KPS: 70% Cares for self; unable to carry on normal activity or to do active work    Isolation: Contact for ESBL E. Coli    Medications    Scheduled Meds:   meropenem  500 mg Intravenous Q24H    metoprolol tartrate  12.5 mg Oral Q8H    Tbo-Filgrastim  300 mcg Subcutaneous QPM    darbepoetin evangelina-polysorbate  100 mcg Intravenous Weekly    sodium chloride flush  10 mL Intravenous 2 times per day    Saline Mouthwash  15 mL Swish & Spit 4x Daily AC & HS    acyclovir  200 mg Oral BID    allopurinol  200 mg Oral Daily    pantoprazole  40 mg Oral Daily    fluconazole  200 mg Oral Q MWF    b complex-C-folic acid  1 capsule Oral Daily     Continuous Infusions:   sodium chloride Stopped (200)    sodium chloride 20 mL/hr at 20 1137     PRN Meds:.acetaminophen, [COMPLETED] loperamide **FOLLOWED BY** loperamide, sodium chloride, sodium chloride, sodium chloride flush, magnesium hydroxide, Saline Mouthwash, alteplase, prochlorperazine **OR** prochlorperazine, LORazepam **OR** LORazepam    ROS:  As noted above, otherwise remainder of 10-point ROS negative    Physical Exam:     I&O:      Intake/Output Summary (Last 24 hours) at 3/24/2020 0735  Last data filed at 3/24/2020 0400  Gross per 24 hour   Intake 1844 ml   Output 1560 ml   Net 284 ml       Vital Signs:  /69   Pulse 80   Temp 98.2 °F (36.8 °C) (Oral)   Resp 22   Ht 6' 4\" (1.93 m)   Wt 230 lb 6.1 oz (104.5 kg)   SpO2 98%   BMI 28.04 kg/m²     Weight:    Wt Readings from Last 3 Encounters:   20 230 lb 6.1 oz (104.5 kg)   20 238 lb 1.6 oz (108 kg) 02/18/20 250 lb 1 oz (113.4 kg)       General: Awake, alert and oriented. HEENT: normocephalic, alopecia, PERRL, no scleral erythema or icterus, Oral mucosa moist and intact, throat clear  NECK: supple without palpable adenopathy  BACK: Straight negative CVAT  SKIN: warm dry and intact without lesions rashes or masses  CHEST: CTA bilaterally without use of accessory muscles  CV: Normal S1 S2, irreg, no MRG  ABD: NT ND normoactive BS, no palpable masses or hepatosplenomegaly  EXTREMITIES: without edema, denies calf tenderness, left arm fistula  NEURO: CN II - XII grossly intact  CATHETER: Right IJ Trifusion (IR, 2/14/20) - CDI     Data:   CBC:   Recent Labs     03/22/20 0315 03/23/20  0330 03/24/20  0400   WBC 0.0* 0.1* 0.3*   HGB 6.6* 7.2* 7.6*   HCT 18.4* 20.4* 21.6*   MCV 89.8 89.8 90.9   PLT 5* 12* 24*     BMP/Mag:  Recent Labs     03/22/20 0315 03/23/20  0330 03/24/20  0400    134* 138   K 3.6 3.3* 3.5   CL 98* 96* 98*   CO2 26 26 28   PHOS  --  3.0  --    BUN 35* 47* 24*   CREATININE 3.1* 3.8* 2.3*   MG  --  2.10  --      LIVP:   Recent Labs     03/23/20  0330   AST 11*   ALT 13   BILIDIR <0.2   BILITOT 0.5   ALKPHOS 59     Uric Acid:    Recent Labs     03/24/20  0400   LABURIC 2.5*     Coags:   Recent Labs     03/23/20  0330   PROTIME 13.5*   INR 1.16*   APTT 27.6     MICROBIOLOGY:  1. Blood Cultures 3/20:  Escherichia coli (esbl) (2)   Antibiotic Interpretation ENE   ampicillin Resistant >=32 mcg/mL   ceFAZolin Resistant >=64 mcg/mL   cefepime Resistant     cefTRIAXone Resistant >=64 mcg/mL   ciprofloxacin Resistant >=4 mcg/mL   ertapenem Sensitive <=0.12 mcg/mL   gentamicin Resistant >=16 mcg/mL   levofloxacin Resistant >=8 mcg/mL   tobramycin Resistant >=16 mcg/mL   trimethoprim-sulfamethoxazole Sensitive <=20 mcg/mL     2. Blood Culture BCID Panel 3/20:  - +. Coli  - +Enterobacteriaceae    PROBLEM LIST:           1.  [place] Light Chain Multiple Myeloma   2. ESRD on HD   3. GERD  4. HTN  5.

## 2020-03-24 NOTE — PROGRESS NOTES
none noted    Diet Orders / Intake / Nutrition Support  Current diet/supplement order: DIET GENERAL;  Dietary Nutrition Supplements: Renal Oral Supplement     NSG Recorded PO:   PO Fluids P.O.: 100 mL  PO Meals PO Meals Eaten (%): 51 - 75%(PBJ)   PO Intake: 26-50%, 51-75% and %    NUTRITION RISK LEVEL: Risk Level:  Moderate    Chhaya Tran RD, LD  [place]:  [phone]  Office:  [phone]

## 2020-03-24 NOTE — FLOWSHEET NOTE
03/22/20 1530 03/23/20 1915   Treatment   Time On 1539  --    Time Off  --  1911   Treatment Goal  --  0   Observations & Evaluations   Level of Consciousness  --  0   Heart Rhythm Regular Regular   Respiratory Quality/Effort Unlabored Unlabored   Vital Signs   BP (!) 113/57 (!) 113/57   Temp 98.9 °F (37.2 °C)  --    Pulse 84 91   Resp  --  23   SpO2  --  93 %   Weight 229 lb 15 oz (104.3 kg) 230 lb 6.1 oz (104.5 kg)   Weight Method Standing scale Standing scale   Percent Weight Change -0.03 0.19   Dry Weight 230 lb 6.1 oz (104.5 kg) 230 lb 6.1 oz (104.5 kg)   Access   Needle Size Used 15  --    Treatment Initiation   Dialyze Hours 3.5  --    Dialysis Bath   K+ (Potassium) 4  --    Ca+ (Calcium) 2.25  --    Na+ (Sodium) 138  --    HCO3 (Bicarb) 35  --    Post-Hemodialysis Assessment   Rinseback Volume (ml)  --  400 ml   Total Liters Processed (l/min)  --  80.7 l/min   Dialyzer Clearance  --  Lightly streaked   Duration of Treatment (minutes)  --  210 minutes   Heparin amount administered during treatment (units)  --  0 units   Hemodialysis Intake (ml)  --  700 ml   Hemodialysis Output (ml)  --  1100 ml   NET Removed (ml)  --  0 ml   Tolerated Treatment  --  Good   Tolerated 3.5 hour HD tx without any difficulty. BP dropped below 100 once during end of tx, asymptomatic. No fluid removed today. No meds given. Report to floor RN and tx record printed for scan into the EMR.

## 2020-03-25 LAB
ALBUMIN SERPL-MCNC: 2.9 G/DL (ref 3.4–5)
ALP BLD-CCNC: 59 U/L (ref 40–129)
ALT SERPL-CCNC: 11 U/L (ref 10–40)
ANION GAP SERPL CALCULATED.3IONS-SCNC: 12 MMOL/L (ref 3–16)
AST SERPL-CCNC: 11 U/L (ref 15–37)
BASOPHILS ABSOLUTE: 0 K/UL (ref 0–0.2)
BASOPHILS RELATIVE PERCENT: 0.1 %
BILIRUB SERPL-MCNC: 0.3 MG/DL (ref 0–1)
BILIRUBIN DIRECT: <0.2 MG/DL (ref 0–0.3)
BILIRUBIN, INDIRECT: ABNORMAL MG/DL (ref 0–1)
BUN BLDV-MCNC: 35 MG/DL (ref 7–20)
CALCIUM SERPL-MCNC: 8.5 MG/DL (ref 8.3–10.6)
CHLORIDE BLD-SCNC: 103 MMOL/L (ref 99–110)
CO2: 28 MMOL/L (ref 21–32)
CREAT SERPL-MCNC: 3 MG/DL (ref 0.8–1.3)
EOSINOPHILS ABSOLUTE: 0 K/UL (ref 0–0.6)
EOSINOPHILS RELATIVE PERCENT: 0.1 %
GFR AFRICAN AMERICAN: 26
GFR NON-AFRICAN AMERICAN: 21
GLUCOSE BLD-MCNC: 94 MG/DL (ref 70–99)
HCT VFR BLD CALC: 21.1 % (ref 40.5–52.5)
HEMOGLOBIN: 7.4 G/DL (ref 13.5–17.5)
HEPATITIS B SURFACE ANTIGEN INTERPRETATION: NORMAL
LACTATE DEHYDROGENASE: 93 U/L (ref 100–190)
LYMPHOCYTES ABSOLUTE: 0 K/UL (ref 1–5.1)
LYMPHOCYTES RELATIVE PERCENT: 5 %
MCH RBC QN AUTO: 31.9 PG (ref 26–34)
MCHC RBC AUTO-ENTMCNC: 35.3 G/DL (ref 31–36)
MCV RBC AUTO: 90.5 FL (ref 80–100)
MONOCYTES ABSOLUTE: 0.1 K/UL (ref 0–1.3)
MONOCYTES RELATIVE PERCENT: 14.4 %
NEUTROPHILS ABSOLUTE: 0.7 K/UL (ref 1.7–7.7)
NEUTROPHILS RELATIVE PERCENT: 80.4 %
PDW BLD-RTO: 14.4 % (ref 12.4–15.4)
PHOSPHORUS: 1.7 MG/DL (ref 2.5–4.9)
PLATELET # BLD: 22 K/UL (ref 135–450)
PMV BLD AUTO: 9.7 FL (ref 5–10.5)
POTASSIUM SERPL-SCNC: 3.5 MMOL/L (ref 3.5–5.1)
RBC # BLD: 2.33 M/UL (ref 4.2–5.9)
SODIUM BLD-SCNC: 143 MMOL/L (ref 136–145)
TOTAL PROTEIN: 4.9 G/DL (ref 6.4–8.2)
URIC ACID, SERUM: 4.5 MG/DL (ref 3.5–7.2)
WBC # BLD: 0.9 K/UL (ref 4–11)

## 2020-03-25 PROCEDURE — 6370000000 HC RX 637 (ALT 250 FOR IP): Performed by: INTERNAL MEDICINE

## 2020-03-25 PROCEDURE — 2580000003 HC RX 258: Performed by: NURSE PRACTITIONER

## 2020-03-25 PROCEDURE — 36592 COLLECT BLOOD FROM PICC: CPT

## 2020-03-25 PROCEDURE — 90935 HEMODIALYSIS ONE EVALUATION: CPT

## 2020-03-25 PROCEDURE — 87340 HEPATITIS B SURFACE AG IA: CPT

## 2020-03-25 PROCEDURE — APPSS15 APP SPLIT SHARED TIME 0-15 MINUTES: Performed by: NURSE PRACTITIONER

## 2020-03-25 PROCEDURE — 84550 ASSAY OF BLOOD/URIC ACID: CPT

## 2020-03-25 PROCEDURE — 6360000002 HC RX W HCPCS: Performed by: INTERNAL MEDICINE

## 2020-03-25 PROCEDURE — 2060000000 HC ICU INTERMEDIATE R&B

## 2020-03-25 PROCEDURE — 80076 HEPATIC FUNCTION PANEL: CPT

## 2020-03-25 PROCEDURE — 6370000000 HC RX 637 (ALT 250 FOR IP): Performed by: NURSE PRACTITIONER

## 2020-03-25 PROCEDURE — 85025 COMPLETE CBC W/AUTO DIFF WBC: CPT

## 2020-03-25 PROCEDURE — 2580000003 HC RX 258: Performed by: INTERNAL MEDICINE

## 2020-03-25 PROCEDURE — 84100 ASSAY OF PHOSPHORUS: CPT

## 2020-03-25 PROCEDURE — 80048 BASIC METABOLIC PNL TOTAL CA: CPT

## 2020-03-25 PROCEDURE — 83615 LACTATE (LD) (LDH) ENZYME: CPT

## 2020-03-25 RX ADMIN — METOPROLOL TARTRATE 12.5 MG: 25 TABLET, FILM COATED ORAL at 15:55

## 2020-03-25 RX ADMIN — FLUCONAZOLE 200 MG: 200 TABLET ORAL at 09:00

## 2020-03-25 RX ADMIN — METOPROLOL TARTRATE 12.5 MG: 25 TABLET, FILM COATED ORAL at 03:30

## 2020-03-25 RX ADMIN — NEPHROCAP 1 MG: 1 CAP ORAL at 08:37

## 2020-03-25 RX ADMIN — MEROPENEM 500 MG: 500 INJECTION, POWDER, FOR SOLUTION INTRAVENOUS at 18:45

## 2020-03-25 RX ADMIN — ALLOPURINOL 200 MG: 100 TABLET ORAL at 08:36

## 2020-03-25 RX ADMIN — METOPROLOL TARTRATE 12.5 MG: 25 TABLET, FILM COATED ORAL at 20:25

## 2020-03-25 RX ADMIN — TBO-FILGRASTIM 300 MCG: 300 INJECTION, SOLUTION SUBCUTANEOUS at 18:45

## 2020-03-25 RX ADMIN — Medication 10 ML: at 16:00

## 2020-03-25 RX ADMIN — Medication 10 ML: at 20:26

## 2020-03-25 RX ADMIN — ACYCLOVIR 200 MG: 200 CAPSULE ORAL at 20:25

## 2020-03-25 RX ADMIN — ACYCLOVIR 200 MG: 200 CAPSULE ORAL at 08:37

## 2020-03-25 RX ADMIN — PANTOPRAZOLE SODIUM 40 MG: 40 TABLET, DELAYED RELEASE ORAL at 08:37

## 2020-03-25 ASSESSMENT — PAIN SCALES - GENERAL
PAINLEVEL_OUTOF10: 0

## 2020-03-25 NOTE — CARE COORDINATION
Case Management Assessment           Daily Note                 Date/ Time of Note: 3/25/2020 8:16 AM         Note completed by: Oralia Kwok    Patient Name: Jose Jane  YOB: 1955    Diagnosis:Multiple myeloma not having achieved remission [C90.00]  Autologous bone marrow transplantation status [facility]) [Z94.81]  Patient Admission Status: Inpatient    Date of Admission:3/11/2020 12:36 PM Length of Stay: 14 GLOS:        Current Plan of Care: Home with no needs  ________________________________________________________________________________________  PT AM-PAC:   / 24 per last evaluation on: n/a    OT AM-PAC:   / 24 per last evaluation on: n/a    DME Needs for discharge: n/a    ________________________________________________________________________________________  Discharge Plan: Home    Tentative discharge date: tbd    Current barriers to discharge: once [address] >1 and patient has recovered from chemo toxicities    Referrals completed: Not Applicable    Resources/ information provided: Not indicated at this time  ________________________________________________________________________________________  Case Management Notes: Patient remains inpatient as he continue to recover from his auto transplant. He is feeling better, but still weak. Awaiting recovery from chemo toxicities. Patient will return home with no needs. Ansley Anders and his family were provided with choice of provider; he and his family are in agreement with the discharge plan.     Care Transition Patient: Vidhya Schmidt  Case Management Department  Ph: [phone]  Fax: [phone]

## 2020-03-25 NOTE — PROGRESS NOTES
[facility] Heart Electrophysiology    Reviewed tele and chart. NSR with occasional PVCs overnight. PVCs  - Some PVCs and trigeminy noted on tele, no NSVT  - On metoprolol 12.5 mg Q 8 - tolerating - BP soft at times  - Keep on tele  - Keep K+ > 4.0 and Mg > 2.0    Patient not assessed.      Kt Paez CNP

## 2020-03-25 NOTE — CARE COORDINATION
Type of Admission  [place] Light Chain Multiple myeloma  Melphalan Auto SCT--->T:0: 3/12/20  Day+13      Central venous catheter  Right SC Tri fusion ( 2/14/20 IR)  Left Forearm Hemodialysis  Fistula        Plan    Proceed with Melphalan Auto SCT for treatment of Multiple Myeloma      Update  3/11/20:  Planned admission for Melphalan Auto SCT. Rosey Sweet is known to [facility] staff from recent admits for Cytoxan mobilization. Patient receives hemodialysis 3x week. 3/13/20: Day +1 from infusion of stem cells yesterday. Hemodialysis in room today. 3/16/20: Hemodialysis today. Reports no problems. 3/17/20: Requiring PRBC\"s today. States he feels much better, states sleep better due to taking a \"sleeper\" last evening,  3/25/20:  Counts continue to recover. Potential for discharge on Friday, 3/27      Education  3/11/20:  Greeted Mr. Elana Sweet when admitted to the unit,accompanied by significant other. 3/16/20:  Discussed use of G-CSF daily starting on day +5 to assist in [facility] recovery, verbalized understanding. 3/17/20:  Reinforced use of daily G-CSF to assist in [facility] recovery. 3/27/20: I have left a message with significant other, Johana regarding discharge planning. I have forwarded to her my contact information.         Discharge  Home when [facility] is >1.0 & without toxicities        Pending

## 2020-03-25 NOTE — PROGRESS NOTES
HTN  5.  Hypokalemia      TREATMENT:           1. CyBorD (started 10/30/18)  2. Revlimid / Sophia Gong / Dex on clinical trial (started 2/1/19)  3. Carfilzomib / Pomalidomide / Dex x 6 cycles (Relapse #1 - started 8/26/19 - 2/2020)  4. Cytoxan Mobilization w/ 25% dose reduction (2/17/20)  5. Melphalan 140mg/m2 & Autologous SCT 3/12/20     ASSESSMENT AND PLAN:           1. [place] Multiple Myeloma:  Currently in [facility]  - Admitted for reduced dose Melphalan (140 mg/M^2) followed by autologous stem cell resuce  - Post transplant maintenance: Consider KPD x 4 cycles     Day +13     2. ID: +ESBL E.Coli & Enterobacteriaceae bacteremia  - Cont acyclovir 200 BID; Fluconazole and Levaquin ppx (dose adjusted for ESRD - 250 mg Q 48 hours)   - Cont Merrem Day +3 (3/23/20). Will convert to Invanz at home. Abx Hx:   Zosyn 3/20-3/23     3. Heme: Pancytopenia from chemotherapy. WBC recovering  - Transfuse for Hgb < 7 and Platelets < 48U  - No transfusion today  - VANESSA by renal  - Cont daily granix (3/17)     4. ESRD / Metabolic: HypoPhos  - Followed by Dr. Win HERNANDEZ on MWF - conservative fluid removal with concurrent sepsis  - IVFs: Only as needed for hypovolemia / hypotension  - Electrolytes managed per nephrology - will let them adjust phos, HD today  - Cont allopurinol 200mg daily  - Cont dialyvite daily     5. GI / Nutrition:   Nutrition:  Poor appetite & intake  - Cont MVI  - Cont low microbial diet   - Follow closely with dietary  GERD:    - Cont PPI         - DVT Prophylaxis: SCDs while in bed.  No pharm ppx   Contraindications to pharmacologic prophylaxis: Thrombocytopenia  Contraindications to mechanical prophylaxis: None    - Disposition:  Plan for discharge once ANC >1 and patient has recovered from chemo toxicities      KIMBERLEY Nath - MINDI Hernandez MD  [facility]  Please contact me through [facility]

## 2020-03-25 NOTE — PLAN OF CARE
throughout the day and for all meals. Encouraged frequent short naps as necessary to preserve energy but instructed that while awake, pt should be OOB. Pt in isolation and is unable to ambulate in halls d/t restrictions. Continued to encourage activity in room as much as possible. Pt is visualized to be OOB 0-25% of the time this shift. Will continue to encourage frequent activity. Problem: Skin Integrity:  Goal: Absence of new skin breakdown  Description: Absence of new skin breakdown  Outcome: Ongoing  Note: Pt's skin remains intact, no evidence of breakdown noted, will continue to monitor. Problem: Venous Thromboembolism:  Goal: Will show no signs or symptoms of venous thromboembolism  Description: Will show no signs or symptoms of venous thromboembolism  Outcome: Ongoing  Note: Refusing DVT Prevention: Pt is at risk for DVT d/t decreased mobility and cancer treatment. Pt educated on importance of activity. Pt has orders for SCDs while in bed, however pt currently refusing treatment. Reviewed risks of DVT & PE development while inpatient. Treatment team aware of patient's refusal and re-education of importance of prophylaxis. No new orders at this time. Will continue to re-instruct patient and intervene as appropriate. Problem: Physical Regulation:  Goal: Prevent transmision of infection  Description: Prevent transmision of infection  Outcome: Ongoing  Note: Pt remains in isolation for active ESBL infection, maintained isolation precautions, will continue to monitor. Problem: Nausea/Vomiting:  Goal: Ability to achieve adequate nutritional intake will improve  Description: Ability to achieve adequate nutritional intake will improve  Outcome: Ongoing  Note: Pt free from nausea/vomiting, will continue to monitor.      Problem: Diarrhea:  Goal: Bowel elimination is within specified parameters  Description: Bowel elimination is within specified parameters  Outcome: Ongoing  Note: Pt free from

## 2020-03-26 ENCOUNTER — APPOINTMENT (OUTPATIENT)
Dept: INTERVENTIONAL RADIOLOGY/VASCULAR | Age: 65
DRG: 016 | End: 2020-03-26
Attending: INTERNAL MEDICINE
Payer: COMMERCIAL

## 2020-03-26 LAB
ANION GAP SERPL CALCULATED.3IONS-SCNC: 11 MMOL/L (ref 3–16)
APTT: 27.2 SEC (ref 24.2–36.2)
BANDED NEUTROPHILS RELATIVE PERCENT: 19 % (ref 0–7)
BASOPHILS ABSOLUTE: 0 K/UL (ref 0–0.2)
BASOPHILS RELATIVE PERCENT: 0 %
BLOOD BANK DISPENSE STATUS: NORMAL
BLOOD BANK DISPENSE STATUS: NORMAL
BLOOD BANK PRODUCT CODE: NORMAL
BLOOD BANK PRODUCT CODE: NORMAL
BPU ID: NORMAL
BPU ID: NORMAL
BUN BLDV-MCNC: 22 MG/DL (ref 7–20)
CALCIUM SERPL-MCNC: 8.4 MG/DL (ref 8.3–10.6)
CHLORIDE BLD-SCNC: 99 MMOL/L (ref 99–110)
CO2: 29 MMOL/L (ref 21–32)
CREAT SERPL-MCNC: 2.3 MG/DL (ref 0.8–1.3)
DESCRIPTION BLOOD BANK: NORMAL
DESCRIPTION BLOOD BANK: NORMAL
EOSINOPHILS ABSOLUTE: 0 K/UL (ref 0–0.6)
EOSINOPHILS RELATIVE PERCENT: 0 %
GFR AFRICAN AMERICAN: 35
GFR NON-AFRICAN AMERICAN: 29
GLUCOSE BLD-MCNC: 95 MG/DL (ref 70–99)
HCT VFR BLD CALC: 21.6 % (ref 40.5–52.5)
HEMOGLOBIN: 7.4 G/DL (ref 13.5–17.5)
HYPOCHROMIA: ABNORMAL
INR BLD: 1.17 (ref 0.86–1.14)
LYMPHOCYTES ABSOLUTE: 0.1 K/UL (ref 1–5.1)
LYMPHOCYTES RELATIVE PERCENT: 3 %
MAGNESIUM: 1.7 MG/DL (ref 1.8–2.4)
MCH RBC QN AUTO: 31.9 PG (ref 26–34)
MCHC RBC AUTO-ENTMCNC: 34.2 G/DL (ref 31–36)
MCV RBC AUTO: 93 FL (ref 80–100)
METAMYELOCYTES RELATIVE PERCENT: 2 %
MONOCYTES ABSOLUTE: 0.2 K/UL (ref 0–1.3)
MONOCYTES RELATIVE PERCENT: 9 %
NEUTROPHILS ABSOLUTE: 2.2 K/UL (ref 1.7–7.7)
NEUTROPHILS RELATIVE PERCENT: 67 %
PDW BLD-RTO: 14.2 % (ref 12.4–15.4)
PLATELET # BLD: 29 K/UL (ref 135–450)
PLATELET # BLD: 45 K/UL (ref 135–450)
PMV BLD AUTO: 10.8 FL (ref 5–10.5)
POTASSIUM SERPL-SCNC: 3.4 MMOL/L (ref 3.5–5.1)
PROTHROMBIN TIME: 13.6 SEC (ref 10–13.2)
RBC # BLD: 2.32 M/UL (ref 4.2–5.9)
SODIUM BLD-SCNC: 139 MMOL/L (ref 136–145)
URIC ACID, SERUM: 3.4 MG/DL (ref 3.5–7.2)
VRE CULTURE: NORMAL
WBC # BLD: 2.5 K/UL (ref 4–11)

## 2020-03-26 PROCEDURE — 6370000000 HC RX 637 (ALT 250 FOR IP): Performed by: NURSE PRACTITIONER

## 2020-03-26 PROCEDURE — 2580000003 HC RX 258: Performed by: NURSE PRACTITIONER

## 2020-03-26 PROCEDURE — 6370000000 HC RX 637 (ALT 250 FOR IP): Performed by: INTERNAL MEDICINE

## 2020-03-26 PROCEDURE — 85730 THROMBOPLASTIN TIME PARTIAL: CPT

## 2020-03-26 PROCEDURE — 83735 ASSAY OF MAGNESIUM: CPT

## 2020-03-26 PROCEDURE — 85049 AUTOMATED PLATELET COUNT: CPT

## 2020-03-26 PROCEDURE — 6360000002 HC RX W HCPCS: Performed by: NURSE PRACTITIONER

## 2020-03-26 PROCEDURE — 85610 PROTHROMBIN TIME: CPT

## 2020-03-26 PROCEDURE — P9037 PLATE PHERES LEUKOREDU IRRAD: HCPCS

## 2020-03-26 PROCEDURE — 2060000000 HC ICU INTERMEDIATE R&B

## 2020-03-26 PROCEDURE — 05PY33Z REMOVAL OF INFUSION DEVICE FROM UPPER VEIN, PERCUTANEOUS APPROACH: ICD-10-PCS | Performed by: RADIOLOGY

## 2020-03-26 PROCEDURE — 97165 OT EVAL LOW COMPLEX 30 MIN: CPT

## 2020-03-26 PROCEDURE — 36589 REMOVAL TUNNELED CV CATH: CPT

## 2020-03-26 PROCEDURE — 2580000003 HC RX 258: Performed by: INTERNAL MEDICINE

## 2020-03-26 PROCEDURE — 36430 TRANSFUSION BLD/BLD COMPNT: CPT

## 2020-03-26 PROCEDURE — 97116 GAIT TRAINING THERAPY: CPT

## 2020-03-26 PROCEDURE — 85025 COMPLETE CBC W/AUTO DIFF WBC: CPT

## 2020-03-26 PROCEDURE — 97535 SELF CARE MNGMENT TRAINING: CPT

## 2020-03-26 PROCEDURE — 6360000002 HC RX W HCPCS: Performed by: INTERNAL MEDICINE

## 2020-03-26 PROCEDURE — 84550 ASSAY OF BLOOD/URIC ACID: CPT

## 2020-03-26 PROCEDURE — 80048 BASIC METABOLIC PNL TOTAL CA: CPT

## 2020-03-26 PROCEDURE — 97161 PT EVAL LOW COMPLEX 20 MIN: CPT

## 2020-03-26 RX ORDER — 0.9 % SODIUM CHLORIDE 0.9 %
20 INTRAVENOUS SOLUTION INTRAVENOUS ONCE
Status: DISCONTINUED | OUTPATIENT
Start: 2020-03-26 | End: 2020-03-27 | Stop reason: HOSPADM

## 2020-03-26 RX ORDER — POTASSIUM CHLORIDE 750 MG/1
10 TABLET, EXTENDED RELEASE ORAL ONCE
Status: COMPLETED | OUTPATIENT
Start: 2020-03-26 | End: 2020-03-26

## 2020-03-26 RX ORDER — 0.9 % SODIUM CHLORIDE 0.9 %
20 INTRAVENOUS SOLUTION INTRAVENOUS ONCE
Status: COMPLETED | OUTPATIENT
Start: 2020-03-26 | End: 2020-03-26

## 2020-03-26 RX ADMIN — NEPHROCAP 1 MG: 1 CAP ORAL at 08:10

## 2020-03-26 RX ADMIN — TBO-FILGRASTIM 300 MCG: 300 INJECTION, SOLUTION SUBCUTANEOUS at 18:42

## 2020-03-26 RX ADMIN — SODIUM CHLORIDE 20 ML: 9 INJECTION, SOLUTION INTRAVENOUS at 08:11

## 2020-03-26 RX ADMIN — Medication 10 ML: at 20:04

## 2020-03-26 RX ADMIN — ALLOPURINOL 200 MG: 100 TABLET ORAL at 08:10

## 2020-03-26 RX ADMIN — MEROPENEM 500 MG: 500 INJECTION, POWDER, FOR SOLUTION INTRAVENOUS at 18:41

## 2020-03-26 RX ADMIN — SODIUM CHLORIDE 15 ML: 900 IRRIGANT IRRIGATION at 20:04

## 2020-03-26 RX ADMIN — PANTOPRAZOLE SODIUM 40 MG: 40 TABLET, DELAYED RELEASE ORAL at 08:10

## 2020-03-26 RX ADMIN — PROCHLORPERAZINE MALEATE 10 MG: 10 TABLET ORAL at 13:42

## 2020-03-26 RX ADMIN — METOPROLOL TARTRATE 12.5 MG: 25 TABLET, FILM COATED ORAL at 12:53

## 2020-03-26 RX ADMIN — POTASSIUM CHLORIDE 10 MEQ: 750 TABLET, EXTENDED RELEASE ORAL at 12:53

## 2020-03-26 RX ADMIN — ACYCLOVIR 200 MG: 200 CAPSULE ORAL at 20:04

## 2020-03-26 RX ADMIN — ACYCLOVIR 200 MG: 200 CAPSULE ORAL at 08:10

## 2020-03-26 RX ADMIN — Medication 10 ML: at 09:00

## 2020-03-26 ASSESSMENT — PAIN SCALES - GENERAL
PAINLEVEL_OUTOF10: 0

## 2020-03-26 NOTE — PROGRESS NOTES
kg)       General: Awake, alert and oriented. HEENT: normocephalic, alopecia, PERRL, no scleral erythema or icterus, Oral mucosa moist and intact, throat clear  NECK: supple without palpable adenopathy  BACK: Straight negative CVAT  SKIN: warm dry and intact without lesions rashes or masses  CHEST: CTA bilaterally without use of accessory muscles  CV: Normal S1 S2, irreg, no MRG  ABD: NT ND normoactive BS, no palpable masses or hepatosplenomegaly  EXTREMITIES: without edema, denies calf tenderness, left arm fistula  NEURO: CN II - XII grossly intact  CATHETER: Right IJ Trifusion (IR, 2/14/20) - CDI     Data:   CBC:   Recent Labs     03/24/20  0400 03/25/20  0402 03/26/20  0400   WBC 0.3* 0.9* 2.5*   HGB 7.6* 7.4* 7.4*   HCT 21.6* 21.1* 21.6*   MCV 90.9 90.5 93.0   PLT 24* 22* 29*     BMP/Mag:  Recent Labs     03/24/20 0400 03/25/20  0402 03/26/20  0359 03/26/20  0400    143  --  139   K 3.5 3.5  --  3.4*   CL 98* 103  --  99   CO2 28 28  --  29   PHOS  --  1.7*  --   --    BUN 24* 35*  --  22*   CREATININE 2.3* 3.0*  --  2.3*   MG  --   --  1.70*  --      LIVP:   Recent Labs     03/25/20 0402   AST 11*   ALT 11   BILIDIR <0.2   BILITOT 0.3   ALKPHOS 59     Uric Acid:    Recent Labs     03/26/20 0400   LABURIC 3.4*     Coags:   Recent Labs     03/26/20 0359   PROTIME 13.6*   INR 1.17*   APTT 27.2     MICROBIOLOGY:  1. Blood Cultures 3/20:  Escherichia coli (esbl) (2)   Antibiotic Interpretation ENE   ampicillin Resistant >=32 mcg/mL   ceFAZolin Resistant >=64 mcg/mL   cefepime Resistant     cefTRIAXone Resistant >=64 mcg/mL   ciprofloxacin Resistant >=4 mcg/mL   ertapenem Sensitive <=0.12 mcg/mL   gentamicin Resistant >=16 mcg/mL   levofloxacin Resistant >=8 mcg/mL   tobramycin Resistant >=16 mcg/mL   trimethoprim-sulfamethoxazole Sensitive <=20 mcg/mL     2. Blood Culture BCID Panel 3/20:  - +E. Coli ESBL  - +Enterobacteriaceae    PROBLEM LIST:           1.  [place] Light Chain Multiple Myeloma   2.   ESRD on

## 2020-03-26 NOTE — PROGRESS NOTES
Occupational Therapy   Occupational Therapy Initial Assessment /Treatment/Discharge  Date: 3/26/2020   Patient Name: Ahmet Velazquez  MRN: [id number]     : 1955    Date of Service: 3/26/2020    Discharge Recommendations:    Ahmet Velazquez scored a 24/24 on the AM-PAC ADL Inpatient form. Current research shows that an AM-PAC score of 18 or greater is typically associated with a discharge to the patient's home setting. Based on the patients AM-PAC score and their current ADL deficits, it is recommended that the patient have 2-3 sessions per week of Occupational Therapy at d/c to increase the patients independence. If patient discharges prior to next session this note will serve as a discharge summary. Please see below for the latest assessment towards goals. OT Equipment Recommendations  Equipment Needed: No    Assessment   Assessment: Pt is demonstrating good functional ability. No OT needs identified. Pt expresses no concerns regarding discharge to home. Prognosis: Good  Decision Making: Low Complexity  Patient Education: Role of OT:  Pt verbalized understanding  No Skilled OT: Safe to return home; No OT goals identified  REQUIRES OT FOLLOW UP: No  Activity Tolerance  Activity Tolerance: Patient Tolerated treatment well  Safety Devices  Safety Devices in place: Yes  Type of devices: Left in bed;Bed alarm in place;Call light within reach;Nurse notified           Restrictions  Position Activity Restriction  Other position/activity restrictions: Up as tolerate    Subjective   General  Chart Reviewed: Yes  Additional Pertinent Hx: Pt admitted to [facility] 3/11/2020 for high-dose melphalan & autologous SCT. PMH includes: ESRD, Multiple Myeloma  Family / Caregiver Present: No  Referring Practitioner: KIMBERLEY Carlos-CNP  Diagnosis: Multiple Myeloma  Subjective  Subjective: Pt in bed upon entry. I'm not going back to work.   Patient Currently in Pain: Denies  Vital Signs  Patient Currently in Pain: Denies  Social/Functional History  Social/Functional History  Lives With: Spouse  Type of Home: House  Home Layout: Two level  Home Access: Stairs to enter with rails  Entrance Stairs - Number of Steps: 2-3; flight to second floor bedroom  Bathroom Shower/Tub: Tub/Shower unit  Bathroom Toilet: Standard  Bathroom Equipment: Grab bars around toilet, Shower chair, Grab bars in shower, Hand-held shower  Home Equipment: 4 wheeled walker  ADL Assistance: Independent  Homemaking Assistance: Needs assistance(share the housework)  Ambulation Assistance: Independent  Transfer Assistance: Independent  Active : Yes  Type of occupation: off work for a year- unsure if going back  Leisure & Hobbies: work on cars  Additional Comments: No falls PTA. Wife available 24/7 at [place]. Objective   Vision: Impaired  Vision Exceptions: Wears glasses at all times  Hearing: Within functional limits    Orientation  Overall Orientation Status: Within Functional Limits     Balance  Sitting Balance: Independent  Standing Balance: Independent  Standing Balance  Time: ~3 min  Activity: bathroom mobility/activity, walk in room  Functional Mobility  Functional - Mobility Device: No device  Activity: To/from bathroom; Other  Assist Level: Supervision  Toilet Transfers  Toilet - Technique: Ambulating  Equipment Used: Standard toilet  Toilet Transfer: Supervision  ADL  Grooming: Independent  LE Dressing: Independent  Toileting: (denied need)  Tone LUE  LUE Tone: Normotonic  Coordination  Movements Are Fluid And Coordinated: Yes     Bed mobility  Supine to Sit: Modified independent  Sit to Supine: Modified independent  Scooting: Independent  Transfers  Stand Step Transfers: Supervision  Sit to stand: Supervision  Stand to sit: Supervision     Cognition  Overall Cognitive Status: WNL                 LUE AROM (degrees)  LUE AROM : WFL  RUE AROM (degrees)  RUE AROM : WFL                  Treatment included ADL and transfer training.      Plan

## 2020-03-26 NOTE — PROGRESS NOTES
Excited to see wife & grandkids. Pain Screening  Patient Currently in Pain: Denies  Vital Signs  Patient Currently in Pain: Denies       Orientation  Orientation  Overall Orientation Status: Within Normal Limits  Social/Functional History  Social/Functional History  Lives With: Spouse  Type of Home: House  Home Layout: Two level  Home Access: Stairs to enter with rails  Entrance Stairs - Number of Steps: 2-3; flight to second floor bedroom  Bathroom Shower/Tub: Tub/Shower unit  Bathroom Toilet: Standard  Bathroom Equipment: Grab bars around toilet, Shower chair, Grab bars in shower, Hand-held shower  Home Equipment: 4 wheeled walker  ADL Assistance: Independent  Homemaking Assistance: Needs assistance(share the housework)  Ambulation Assistance: Independent  Transfer Assistance: Independent  Active : Yes  Type of occupation: off work for a year- unsure if going back  Leisure & Hobbies: work on cars  Additional Comments: No falls PTA. Wife available 24/7 at [place]. Objective          AROM RLE (degrees)  RLE AROM: WNL  AROM LLE (degrees)  LLE AROM : WNL  Strength RLE  Comment: MMT not performed due to low platelets but appears WFL; independent with LAQ sitting EOB  Strength LLE  Comment: MMT not performed due to low platelets but appears WFL; independent with LAQ sitting EOB        Bed mobility  Supine to Sit: Independent(HOB flat without rail)  Sit to Supine: Independent  Scooting: Independent  Transfers  Sit to Stand: Independent(from bed & chair)  Stand to sit: Independent  Ambulation  Ambulation?: Yes  Ambulation 1  Surface: level tile  Device: No Device  Assistance: Supervision  Quality of Gait: steady gait, no LOB, upright posture  Distance: 50' x 2 (within room due to isolation)  Stairs/Curb  Stairs?: No(pt reports no concerns for steps at home) unable to leave room due to isolation.      Balance  Sitting - Static: Good  Sitting - Dynamic: Good  Standing - Static: Good  Standing - Dynamic:

## 2020-03-26 NOTE — PLAN OF CARE
Problem: Falls - Risk of:  Goal: Will remain free from falls  Description: Will remain free from falls  Outcome: Ongoing     Pt is a High fall risk. See Belita Goody Fall Score and ABCDS Injury Risk assessments.   + Screening for Orthostasis and/or + High Fall Risk per LOUIS/ABCDS: Explained fall risk precautions to pt and family and rationale behind their use to keep the patient safe. Pt bed is in low position, side rails up, call light and belongings are in reach. Fall wristband applied and present on pts wrist.  Bed alarm on. Pt encouraged to call for assistance. Will continue with hourly rounds for PO intake, pain needs, toileting and repositioning as needed. Problem: Bleeding:  Goal: Will show no signs and symptoms of excessive bleeding  Description: Will show no signs and symptoms of excessive bleeding  Outcome: Ongoing     Patient's hemoglobin this AM:   Recent Labs     03/26/20  0400   HGB 7.4*     Patient's platelet count this AM:   Recent Labs     03/26/20  0400   PLT 29*    Thrombocytopenia Precautions in place. Patient showing no signs or symptoms of active bleeding. Transfusion not indicated at this time. Patient verbalizes understanding of all instructions. Will continue to assess and implement POC. Call light within reach and hourly rounding in place. Problem: PROTECTIVE PRECAUTIONS  Goal: Patient will remain free of nosocomial Infections  Outcome: Ongoing     Pt remains in protective precautions. Pt educated on wearing mask when in hallways. Pt, staff, and visitors adhering to handwashing guidelines. Pt educated to shower or bathe daily with chlorhexidine and linens changed daily per protocol. Pt verbalizes understanding of low microbial diet. Will continue to monitor.       Problem: Infection - Central Venous Catheter-Associated Bloodstream Infection:  Goal: Will show no infection signs and symptoms  Description: Will show no infection signs and symptoms  Outcome: Ongoing     CVC site remains free of signs/symptoms of infection. No drainage, edema, erythema, pain, or warmth noted at site. Dressing changes continue per protocol and on an as needed basis - see flowsheet. Problem: Diarrhea:  Goal: Bowel elimination is within specified parameters  Description: Bowel elimination is within specified parameters  Outcome: Ongoing     Pt has had two episodes of diarrhea this shift. Pt. Has declined imodium.

## 2020-03-26 NOTE — PLAN OF CARE
Problem: Falls - Risk of:  Goal: Will remain free from falls  Description: Will remain free from falls  3/26/2020 0830 by Jana Rivera RN  Outcome: Ongoing  Note: Pt remains on BASIM due to intermittent dizziness and low pressures. Pt calls out appropriately and is adherent to fall prevention measures. Care area free of debris, bed in low locked position. No falls to note at this time. Will monitor. Problem: Bleeding:  Goal: Will show no signs and symptoms of excessive bleeding  Description: Will show no signs and symptoms of excessive bleeding  3/26/2020 0830 by Jana Rivera RN  Outcome: Ongoing  Note: Pt given platelets for line removal in IR today. Pt tolerated well. Problem: PROTECTIVE PRECAUTIONS  Goal: Patient will remain free of nosocomial Infections  3/26/2020 0830 by Jana Rivera RN  Outcome: Ongoing  Note: Pt remains in protective precautions. No living plants or fresh flowers in his/her room. Patient educated on wearing mask when in hallways. Patient, staff, and visitors adhering to handwashing guidelines. Patient cleansed with chlorhexidine wipes and linens changed daily per protocol. Pt verbalizes understanding of low microbial diet. Patient remains free of nosocomial infections. Pt is on granix injections to stimulate counts. Problem: Infection - Central Venous Catheter-Associated Bloodstream Infection:  Goal: Will show no infection signs and symptoms  Description: Will show no infection signs and symptoms  3/26/2020 0830 by Jana Rivera RN  Outcome: Ongoing  Note: Line planned to be removed. Pt remains in contact isolation due to bloodstream infection. Problem: Discharge Planning:  Goal: Discharged to appropriate level of care  Description: Discharged to appropriate level of care  3/26/2020 0830 by Jana Rivera RN  Outcome: Ongoing  Note: Pt plans to be discharged on Friday after dialysis. Problem:  Activity:  Goal: Ability to tolerate increased activity will improve  Description: Ability to tolerate increased activity will improve  3/26/2020 0830 by Xenia Alvarenga RN  Outcome: Ongoing  Note: Pt ambulates to bathroom with stand-by assistance. Tolerates activity well. Problem: Nutrition Deficit:  Goal: Ability to achieve adequate nutritional intake will improve  Description: Ability to achieve adequate nutritional intake will improve  3/26/2020 0830 by Xenia Alvarenga RN  Outcome: Ongoing  Note: Pt consumes 50% of meals but does drink neppro shakes in addition. NPO at midnight for line removal.      Problem: Nausea/Vomiting:  Goal: Ability to achieve adequate nutritional intake will improve  Description: Ability to achieve adequate nutritional intake will improve  3/26/2020 0830 by Xenia Alvarenga RN  Outcome: Ongoing  Note: Pt consumes 50% of meals but does drink neppro shakes in addition. NPO at midnight for line removal.      Problem: Diarrhea:  Goal: Bowel elimination is within specified parameters  Description: Bowel elimination is within specified parameters  3/26/2020 0830 by Xenia Alvarenga RN  Outcome: Ongoing  Note: Pt has few, small loose bowel movements. Declines need for immodium. Problem: Physical Regulation:  Goal: Prevent transmision of infection  Description: Prevent transmision of infection  3/26/2020 0830 by Xenia Alvarenga RN  Outcome: Ongoing  Note: Remains in contact isolation.

## 2020-03-27 VITALS
HEART RATE: 103 BPM | RESPIRATION RATE: 18 BRPM | WEIGHT: 228.4 LBS | OXYGEN SATURATION: 99 % | TEMPERATURE: 97.7 F | DIASTOLIC BLOOD PRESSURE: 71 MMHG | BODY MASS INDEX: 27.81 KG/M2 | HEIGHT: 76 IN | SYSTOLIC BLOOD PRESSURE: 108 MMHG

## 2020-03-27 LAB
ABO/RH: NORMAL
ALBUMIN SERPL-MCNC: 3.3 G/DL (ref 3.4–5)
ALP BLD-CCNC: 166 U/L (ref 40–129)
ALT SERPL-CCNC: 12 U/L (ref 10–40)
ANION GAP SERPL CALCULATED.3IONS-SCNC: 15 MMOL/L (ref 3–16)
ANTIBODY SCREEN: NORMAL
AST SERPL-CCNC: 18 U/L (ref 15–37)
BANDED NEUTROPHILS RELATIVE PERCENT: 12 % (ref 0–7)
BASOPHILS ABSOLUTE: 0 K/UL (ref 0–0.2)
BASOPHILS RELATIVE PERCENT: 0 %
BILIRUB SERPL-MCNC: 0.3 MG/DL (ref 0–1)
BILIRUBIN DIRECT: <0.2 MG/DL (ref 0–0.3)
BILIRUBIN, INDIRECT: ABNORMAL MG/DL (ref 0–1)
BUN BLDV-MCNC: 27 MG/DL (ref 7–20)
CALCIUM SERPL-MCNC: 8.7 MG/DL (ref 8.3–10.6)
CHLORIDE BLD-SCNC: 99 MMOL/L (ref 99–110)
CO2: 26 MMOL/L (ref 21–32)
CREAT SERPL-MCNC: 2.6 MG/DL (ref 0.8–1.3)
EOSINOPHILS ABSOLUTE: 0 K/UL (ref 0–0.6)
EOSINOPHILS RELATIVE PERCENT: 0 %
GFR AFRICAN AMERICAN: 30
GFR NON-AFRICAN AMERICAN: 25
GLUCOSE BLD-MCNC: 93 MG/DL (ref 70–99)
HCT VFR BLD CALC: 22 % (ref 40.5–52.5)
HEMOGLOBIN: 7.7 G/DL (ref 13.5–17.5)
LACTATE DEHYDROGENASE: 206 U/L (ref 100–190)
LYMPHOCYTES ABSOLUTE: 0.2 K/UL (ref 1–5.1)
LYMPHOCYTES RELATIVE PERCENT: 3 %
MCH RBC QN AUTO: 32.2 PG (ref 26–34)
MCHC RBC AUTO-ENTMCNC: 34.8 G/DL (ref 31–36)
MCV RBC AUTO: 92.5 FL (ref 80–100)
METAMYELOCYTES RELATIVE PERCENT: 2 %
MONOCYTES ABSOLUTE: 0.7 K/UL (ref 0–1.3)
MONOCYTES RELATIVE PERCENT: 13 %
MYELOCYTE PERCENT: 1 %
NEUTROPHILS ABSOLUTE: 4.7 K/UL (ref 1.7–7.7)
NEUTROPHILS RELATIVE PERCENT: 69 %
NUCLEATED RED BLOOD CELLS: 1 /100 WBC
NUCLEATED RED BLOOD CELLS: 1 /100 WBC
PDW BLD-RTO: 14.4 % (ref 12.4–15.4)
PHOSPHORUS: 1.9 MG/DL (ref 2.5–4.9)
PLATELET # BLD: 53 K/UL (ref 135–450)
PMV BLD AUTO: 9.9 FL (ref 5–10.5)
POTASSIUM SERPL-SCNC: 3.4 MMOL/L (ref 3.5–5.1)
RBC # BLD: 2.38 M/UL (ref 4.2–5.9)
SODIUM BLD-SCNC: 140 MMOL/L (ref 136–145)
TOTAL PROTEIN: 5.3 G/DL (ref 6.4–8.2)
URIC ACID, SERUM: 5.5 MG/DL (ref 3.5–7.2)
WBC # BLD: 5.6 K/UL (ref 4–11)

## 2020-03-27 PROCEDURE — 6370000000 HC RX 637 (ALT 250 FOR IP): Performed by: INTERNAL MEDICINE

## 2020-03-27 PROCEDURE — 80048 BASIC METABOLIC PNL TOTAL CA: CPT

## 2020-03-27 PROCEDURE — 2580000003 HC RX 258: Performed by: NURSE PRACTITIONER

## 2020-03-27 PROCEDURE — 85025 COMPLETE CBC W/AUTO DIFF WBC: CPT

## 2020-03-27 PROCEDURE — 90935 HEMODIALYSIS ONE EVALUATION: CPT

## 2020-03-27 PROCEDURE — 6360000002 HC RX W HCPCS: Performed by: INTERNAL MEDICINE

## 2020-03-27 PROCEDURE — 84550 ASSAY OF BLOOD/URIC ACID: CPT

## 2020-03-27 PROCEDURE — 80076 HEPATIC FUNCTION PANEL: CPT

## 2020-03-27 PROCEDURE — 2580000003 HC RX 258: Performed by: INTERNAL MEDICINE

## 2020-03-27 PROCEDURE — 86900 BLOOD TYPING SEROLOGIC ABO: CPT

## 2020-03-27 PROCEDURE — 6370000000 HC RX 637 (ALT 250 FOR IP): Performed by: NURSE PRACTITIONER

## 2020-03-27 PROCEDURE — 83615 LACTATE (LD) (LDH) ENZYME: CPT

## 2020-03-27 PROCEDURE — 84100 ASSAY OF PHOSPHORUS: CPT

## 2020-03-27 PROCEDURE — 86850 RBC ANTIBODY SCREEN: CPT

## 2020-03-27 PROCEDURE — 86901 BLOOD TYPING SEROLOGIC RH(D): CPT

## 2020-03-27 RX ORDER — POTASSIUM CHLORIDE 750 MG/1
10 TABLET, EXTENDED RELEASE ORAL ONCE
Status: COMPLETED | OUTPATIENT
Start: 2020-03-27 | End: 2020-03-27

## 2020-03-27 RX ORDER — LOPERAMIDE HYDROCHLORIDE 2 MG/1
2 CAPSULE ORAL PRN
COMMUNITY
Start: 2020-03-27 | End: 2020-04-06

## 2020-03-27 RX ADMIN — Medication 10 ML: at 12:46

## 2020-03-27 RX ADMIN — ACYCLOVIR 200 MG: 200 CAPSULE ORAL at 12:46

## 2020-03-27 RX ADMIN — ALLOPURINOL 200 MG: 100 TABLET ORAL at 12:47

## 2020-03-27 RX ADMIN — SODIUM CHLORIDE 500 MG: 900 INJECTION, SOLUTION INTRAVENOUS at 12:43

## 2020-03-27 RX ADMIN — DARBEPOETIN ALFA 100 MCG: 100 INJECTION, SOLUTION INTRAVENOUS; SUBCUTANEOUS at 11:51

## 2020-03-27 RX ADMIN — POTASSIUM CHLORIDE 10 MEQ: 750 TABLET, EXTENDED RELEASE ORAL at 13:26

## 2020-03-27 RX ADMIN — PANTOPRAZOLE SODIUM 40 MG: 40 TABLET, DELAYED RELEASE ORAL at 12:46

## 2020-03-27 RX ADMIN — NEPHROCAP 1 MG: 1 CAP ORAL at 12:46

## 2020-03-27 RX ADMIN — METOPROLOL TARTRATE 12.5 MG: 25 TABLET, FILM COATED ORAL at 03:06

## 2020-03-27 ASSESSMENT — PAIN SCALES - GENERAL
PAINLEVEL_OUTOF10: 0

## 2020-03-27 NOTE — PLAN OF CARE
Problem: Falls - Risk of:  Goal: Will remain free from falls  Description: Will remain free from falls  3/27/2020 1219 by Grant Nash RN  Outcome: Met This Shift  Pt remained free of falls this admission. Problem: Bleeding:  Goal: Will show no signs and symptoms of excessive bleeding  Description: Will show no signs and symptoms of excessive bleeding  3/27/2020 1219 by Grant Nash RN  Outcome: Met This Shift   Pt discharged with no s/s of bleeding. Problem: Venous Thromboembolism:  Goal: Will show no signs or symptoms of venous thromboembolism  Description: Will show no signs or symptoms of venous thromboembolism  3/27/2020 1219 by Grant Nash RN  Outcome: Met This Shift   Pt discharged without s/s of DVT. Problem: PROTECTIVE PRECAUTIONS  Goal: Patient will remain free of nosocomial Infections  3/27/2020 1219 by Grant Nash RN  Outcome: Met This Shift   Pt remained in protective precautions throughout admission. Problem: Infection - Central Venous Catheter-Associated Bloodstream Infection:  Goal: Will show no infection signs and symptoms  Description: Will show no infection signs and symptoms  Outcome: Met This Shift   Pt discharged without IV access. CVC removal site C/D/I. Problem: Discharge Planning:  Goal: Discharged to appropriate level of care  Description: Discharged to appropriate level of care  Outcome: Met This Shift   Pt discharged home today with wife. Problem: Activity:  Goal: Ability to tolerate increased activity will improve  Description: Ability to tolerate increased activity will improve  3/27/2020 1219 by Grant Nash RN  Outcome: Met This Shift   Pt discharged ambulatory. Problem: Nutrition Deficit:  Goal: Ability to achieve adequate nutritional intake will improve  Description: Ability to achieve adequate nutritional intake will improve  Outcome: Met This Shift   Pt with adequate PO intake prior to discharge.      Problem: Skin Integrity:  Goal: Absence of new skin breakdown  Description: Absence of new skin breakdown  Outcome: Met This Shift   Pt without s/s of new skin breakdown at discharge. Problem: Nausea/Vomiting:  Goal: Ability to achieve adequate nutritional intake will improve  Description: Ability to achieve adequate nutritional intake will improve  Outcome: Met This Shift   Pt denied nausea and vomiting at discharge. Problem: Diarrhea:  Goal: Bowel elimination is within specified parameters  Description: Bowel elimination is within specified parameters  Outcome: Met This Shift   Pt without diarrhea at discharge. Problem: Physical Regulation:  Goal: Prevent transmision of infection  Description: Prevent transmision of infection  Outcome: Met This Shift   Pt remained in contact precautions throughout admission for ESBL.

## 2020-03-27 NOTE — DISCHARGE SUMMARY
toxicities including CINV & mild diarrhea. He also developed NF & sepsis 3/20 2/2 ESBL E.coli & enterobacteriacae bacteremia. He was initially started on IV zosyn (3/20/22) and had some improvement. Once ESBL E.coli was cultured this was changed to IV merrem (3/23). He then had neutrophil recovery 3/24. He is now greatly improved and nearly back to baseline. He has been afebrile for several days on current abx regimen. Given ESBL E.coli, decision was made to remove his trifusion catheter. This was done 3/26. He will now be discharged on IV Invanz (3/27-4/5) via PIV to complete 14 days of appropriate therapy. He will f/u at [facility] 3/29 with provider. He will otherwise plan on follow up every day for IV Invanz through 4/5. At d/c he is feeling overall well and cont to improve. He denies n/v/d/constipation. Denies SOB/ENGEL/Chest pain. Denies fevers/chills/night sweats. Physical Exam:     Vital Signs:  BP (!) 99/56   Pulse 87   Temp 98.2 °F (36.8 °C) (Oral)   Resp 18   Ht 6' 4\" (1.93 m)   Wt 229 lb 8 oz (104.1 kg)   SpO2 98%   BMI 27.94 kg/m²     Weight:    Wt Readings from Last 3 Encounters:   03/27/20 229 lb 8 oz (104.1 kg)   03/02/20 238 lb 1.6 oz (108 kg)   02/18/20 250 lb 1 oz (113.4 kg)       KPS: 80% Normal activity with effort; some signs or symptoms of disease    PE performed by Dr. Leticia Bello: Awake, alert and oriented    HEENT: normocephalic, alopecia, PERRL, no scleral erythema or icterus, Oral mucosa moist and intact, throat clear. NECK: supple without palpable adenopathy  BACK: Straight, negative CVAT  SKIN: warm dry and intact without lesions rashes or masses  CHEST: CTA bilaterally without use of accessory muscles  CV: Normal S1 S2, RRR, no MRG  ABD: NT ND normoactive BS, no palpable masses or hepatosplenomegaly  EXTREMITIES: without edema, denies calf tenderness.  LUE AVF +bruit/thrill  NEURO: CN II - XII grossly intact  CATHETER: None    Discharge Laboratory Data:  CBC:

## 2020-03-27 NOTE — FLOWSHEET NOTE
Treatment time: 3.5 hours  Net UF: 0 ml     Pre weight: 104.1kg   Post weight: 103.6 kg  EDW: 104.5 kg     Access used: GENARO AVF  Access function: Good with  ml/min     Medications or blood products given: Aranesp     Regular outpatient schedule: JENI,W,F Kathe Franklin     Summary of response to treatment: Tolerated treatment good. No HD complications noted.  No fluid removed per order as patient is under his EDW     Copy of dialysis treatment record placed in chart, to be scanned into EMR.       03/27/20 0845 03/27/20 1232   Treatment   Time On  --  0857   Time Off  --  1228   Vital Signs   BP (!) 100/56 105/60   Temp 98.2 °F (36.8 °C) 98.2 °F (36.8 °C)   Pulse 82  --    Resp 18  --    SpO2 98 % 97 %   Weight 229 lb 8 oz (104.1 kg) 228 lb 6.3 oz (103.6 kg)   Dialysis Bath   K+ (Potassium) 4  --    Ca+ (Calcium) 2.5  --    Na+ (Sodium) 138  --    HCO3 (Bicarb) 35  --

## 2020-03-27 NOTE — PLAN OF CARE
daily per protocol. Pt verbalizes understanding of low microbial diet. Patient remains free of nosocomial infections. Problem: Activity:  Goal: Ability to tolerate increased activity will improve  Description: Ability to tolerate increased activity will improve  Note: Pt with activity orders for stand by assist.  Pt in isolation and is unable to ambulate in halls d/t restrictions. Continued to encourage activity in room as much as possible. Pt is visualized to be OOB 26-50% of the time this shift. Will continue to encourage frequent activity. Problem: Nausea/Vomiting:  Goal: Absence of nausea/vomiting  Description: Absence of nausea/vomiting  Note: Patient denied any nausea throughout shift. Will continue to monitor for needs. Problem: Nutrition  Goal: Optimal nutrition therapy  Note: Patient with decreased appetite. Encouraged patient to eat small meals more frequently as well as drink nutritional supplements to meet caloric intake. Patient verbalized understanding. Patient attempting to eat (see flow sheet). Will continue to monitor.

## 2020-03-27 NOTE — PROGRESS NOTES
[facility]       Progress Note           Subjective/   59y.o. year old male who we are seeing in consultation for ESRD. HPI:    No new issues, feels better     Pt is seen while on HD reviewed with RN. ROS:  Denies any sob. No chest pain. Reviewed plans with RN     SHx: No visitors this morning. Objective/   BP (!) 99/56   Pulse 87   Temp 98.2 °F (36.8 °C) (Oral)   Resp 18   Ht 6' 4\" (1.93 m)   Wt 229 lb 8 oz (104.1 kg)   SpO2 98%   BMI 27.94 kg/m²   GEN:  NAD, resting in bed. HEENT: non-icteric, no JVD  CV: S1, S2 without m/r/g; no LE edema  RESP: CTA B without w/r/r; breathing wnl  ABD: +bs, soft, nt, no hsm  SKIN: warm, no rashes  ACCESS: LUE AVF +bruit/thrill.     Data/  Recent Labs     03/25/20  0402 03/26/20  0400 03/26/20  0920 03/27/20  0412   WBC 0.9* 2.5*  --  5.6   HGB 7.4* 7.4*  --  7.7*   HCT 21.1* 21.6*  --  22.0*   MCV 90.5 93.0  --  92.5   PLT 22* 29* 45* 53*     Recent Labs     03/25/20  0402 03/26/20  0359 03/26/20  0400 03/27/20  0412     --  139 140   K 3.5  --  3.4* 3.4*     --  99 99   CO2 28  --  29 26   GLUCOSE 94  --  95 93   PHOS 1.7*  --   --  1.9*   MG  --  1.70*  --   --    BUN 35*  --  22* 27*   CREATININE 3.0*  --  2.3* 2.6*   LABGLOM 21*  --  29* 25*   GFRAA 26*  --  35* 30*       Assessment/Plan     - End stage renal disease - on HD Mon-Wed-Fri, back to [facility] next Monday after discharge today   replace K     - [place] MM - admitted for high dose melphalan & autologous stem cell  transplant on 3/12     - Anemia of chronic disease - VANESSA with HD as needed, transfusion per Oncology      - ESBL on Merrem        - Hyperuricemia - on allopurinol

## 2020-03-27 NOTE — CARE COORDINATION
Type of Admission  [place] Multiple myeloma  Melphalan Auto SCT--->T:0: 3/12/20  Day+15      Central venous catheter  Right SC Tri fusion ( 2/14/20 IR)  Left Forearm Hemodialysis  Fistula        Plan    Proceed with Melphalan Auto SCT for treatment of Multiple Myeloma      Update  3/11/20:  Planned admission for Melphalan Auto SCT. MrJarvis Rossi is known to [facility] staff from recent admits for Cytoxan mobilization. Patient receives hemodialysis 3x week. 3/13/20: Day +1 from infusion of stem cells yesterday. Hemodialysis in room today. 3/16/20: Hemodialysis today. Reports no problems. 3/17/20: Requiring PRBC\"s today. States he feels much better, states sleep better due to taking a \"sleeper\" last evening,  3/25/20:  Counts continue to recover. Potential for discharge on Friday, 3/27  6/27/20:  Home today after hemodialysis. I have contacted [facility] in [facility] regarding discharge & faxed Discharge Summary & Dialysis Run times. Griffin Cooper is aware of foll up at [address]. Education  3/11/20:  Greeted Mr. Ashwini Rossi when admitted to the unit,accompanied by significant other. 3/16/20:  Discussed use of G-CSF daily starting on day +5 to assist in [facility] recovery, verbalized understanding. 3/17/20:  Reinforced use of daily G-CSF to assist in [facility] recovery. 3/27/20: I have left a message with significant other, Johana regarding discharge planning. I have forwarded to her my contact information. 3/27/20: I have spoke to significant other, Johana regarding discharge & precautions to take. Discussed follow up appointments, low microbial guidelines. 3/27/20::Reviewed discharge instructions with patient . Reviewed discharge medications including dosing, schedule, indication, and adverse reactions. Reviewed which medications were already taken today and next dosage due for each medication. Aware of prescription e-prescribed to local Pharmacy.     Reviewed signs and symptoms that prompt a

## 2020-03-30 LAB
CULTURE, FUNGUS BLOOD: NORMAL
CULTURE, FUNGUS BLOOD: NORMAL
FUNGUS (MYCOLOGY) CULTURE: NORMAL
FUNGUS STAIN: NORMAL

## 2020-04-20 LAB
CULTURE, FUNGUS BLOOD: NORMAL
CULTURE, FUNGUS BLOOD: NORMAL
FUNGUS (MYCOLOGY) CULTURE: NORMAL
FUNGUS (MYCOLOGY) CULTURE: NORMAL
FUNGUS STAIN: NORMAL
FUNGUS STAIN: NORMAL

## 2020-07-13 ENCOUNTER — OFFICE VISIT (OUTPATIENT)
Dept: PRIMARY CARE CLINIC | Age: 65
End: 2020-07-13
Payer: MEDICARE

## 2020-07-13 PROCEDURE — G8419 CALC BMI OUT NRM PARAM NOF/U: HCPCS | Performed by: NURSE PRACTITIONER

## 2020-07-13 PROCEDURE — G8428 CUR MEDS NOT DOCUMENT: HCPCS | Performed by: NURSE PRACTITIONER

## 2020-07-13 PROCEDURE — 99211 OFF/OP EST MAY X REQ PHY/QHP: CPT | Performed by: NURSE PRACTITIONER

## 2020-07-13 NOTE — PROGRESS NOTES
Melany Sousa received a viral test for COVID-19. They were educated on isolation and quarantine as appropriate. For any symptoms, they were directed to seek care from their PCP, given contact information to establish with a doctor, directed to an urgent care or the emergency room.

## 2020-07-18 LAB
SARS-COV-2: NOT DETECTED
SOURCE: NORMAL

## 2021-01-01 ENCOUNTER — HOSPITAL ENCOUNTER (OUTPATIENT)
Dept: GENERAL RADIOLOGY | Age: 66
Discharge: HOME | End: 2021-12-20
Payer: MEDICARE

## 2021-01-01 ENCOUNTER — HOSPITAL ENCOUNTER (OUTPATIENT)
Dept: PULMONOLOGY | Age: 66
Discharge: HOME | End: 2021-12-20
Payer: MEDICARE

## 2021-01-01 ENCOUNTER — HOSPITAL ENCOUNTER (OUTPATIENT)
Dept: NON INVASIVE DIAGNOSTICS | Age: 66
Discharge: HOME | End: 2021-12-20
Payer: MEDICARE

## 2021-01-01 DIAGNOSIS — C90.02 MULTIPLE MYELOMA IN RELAPSE (HCC): Primary | ICD-10-CM

## 2021-01-01 DIAGNOSIS — R06.02 SHORTNESS OF BREATH: ICD-10-CM

## 2021-01-01 DIAGNOSIS — C90.02 MULTIPLE MYELOMA IN RELAPSE (HCC): ICD-10-CM

## 2021-01-01 LAB
EKG ATRIAL RATE: 86 BPM
EKG DIAGNOSIS: NORMAL
EKG P AXIS: 52 DEGREES
EKG P-R INTERVAL: 148 MS
EKG Q-T INTERVAL: 360 MS
EKG QRS DURATION: 76 MS
EKG QTC CALCULATION (BAZETT): 430 MS
EKG R AXIS: 68 DEGREES
EKG T AXIS: 88 DEGREES
EKG VENTRICULAR RATE: 86 BPM
LV EF: 58 %
LVEF MODALITY: NORMAL

## 2021-01-01 PROCEDURE — 93010 ELECTROCARDIOGRAM REPORT: CPT | Performed by: INTERNAL MEDICINE

## 2021-01-01 PROCEDURE — 71046 X-RAY EXAM CHEST 2 VIEWS: CPT

## 2021-01-01 PROCEDURE — 6360000002 HC RX W HCPCS: Performed by: INTERNAL MEDICINE

## 2021-01-01 PROCEDURE — 94729 DIFFUSING CAPACITY: CPT

## 2021-01-01 PROCEDURE — 94761 N-INVAS EAR/PLS OXIMETRY MLT: CPT

## 2021-01-01 PROCEDURE — 94726 PLETHYSMOGRAPHY LUNG VOLUMES: CPT

## 2021-01-01 PROCEDURE — 6360000004 HC RX CONTRAST MEDICATION: Performed by: INTERNAL MEDICINE

## 2021-01-01 PROCEDURE — 93005 ELECTROCARDIOGRAM TRACING: CPT | Performed by: INTERNAL MEDICINE

## 2021-01-01 PROCEDURE — C8929 TTE W OR WO FOL WCON,DOPPLER: HCPCS

## 2021-01-01 PROCEDURE — 94060 EVALUATION OF WHEEZING: CPT

## 2021-01-01 PROCEDURE — 94664 DEMO&/EVAL PT USE INHALER: CPT

## 2021-01-01 RX ORDER — ALBUTEROL SULFATE 2.5 MG/3ML
2.5 SOLUTION RESPIRATORY (INHALATION) ONCE
Status: COMPLETED | OUTPATIENT
Start: 2021-01-01 | End: 2021-01-01

## 2021-01-01 RX ADMIN — ALBUTEROL SULFATE 2.5 MG: 2.5 SOLUTION RESPIRATORY (INHALATION) at 16:44

## 2021-01-01 RX ADMIN — PERFLUTREN 1.65 MG: 6.52 INJECTION, SUSPENSION INTRAVENOUS at 15:33

## 2021-12-22 NOTE — PROCEDURES
[address]               [address] [place], [address]                               PULMONARY FUNCTION    PATIENT NAME: Lina Ashley                       :        1955  MED REC NO:   [id number]                          ROOM:  ACCOUNT NO:   [de-identified]                           ADMIT DATE: 2021  PROVIDER:     Jen Mccoy MD    DATE OF PROCEDURE:  2021    INDICATION:  Multiple myeloma, relapse. BMI:  29.2. TOBACCO HISTORY:  Never smoked. Spirometry data is acceptable and reproducible. Lung volumes performed via plethysmography. Oxygen saturation is 98%. Diffusion capacity adjusted for hemoglobin of 8.8 via Dinakara method. SPIROMETRY:  FEV1 to FVC ratio is 71%. FEV1 is 3.35, which is 79% of  predicted. FVC is 4.72, which is 83% of predicted. Lung volumes show total lung capacity of 9.94, which is 120% of  predicted. Residual volume is 5.53, which is 204% of predicted. Diffusion capacity is 32.32, which is 81% of predicted. IMPRESSION:  1. No obstructive defect is present. 2.  No significant response to bronchodilators. 3.  Air trapping and hyperinflation are present. 4.  Normal diffusion capacity. 5. In comparison with PFTs dated 2020, there has been no  significant change in spirometry; however, air trapping and  hyperinflation are worse, and diffusion capacity is unchanged.         Satnam Santacruz MD    D: 2021 15:35:47       T: 2021 16:35:06     [email or web address]  Job#: [id number]     Doc#: [id number]    CC:

## 2022-01-01 ENCOUNTER — APPOINTMENT (OUTPATIENT)
Dept: GENERAL RADIOLOGY | Age: 67
DRG: 018 | End: 2022-01-01
Attending: INTERNAL MEDICINE
Payer: MEDICARE

## 2022-01-01 ENCOUNTER — HOSPITAL ENCOUNTER (OUTPATIENT)
Dept: ONCOLOGY | Age: 67
Setting detail: INFUSION SERIES
Discharge: HOME | End: 2022-03-29
Payer: MEDICARE

## 2022-01-01 ENCOUNTER — HOSPITAL ENCOUNTER (OUTPATIENT)
Age: 67
Discharge: HOME | End: 2022-04-22
Attending: INTERNAL MEDICINE | Admitting: INTERNAL MEDICINE
Payer: MEDICARE

## 2022-01-01 ENCOUNTER — HOSPITAL ENCOUNTER (OUTPATIENT)
Dept: ONCOLOGY | Age: 67
Setting detail: INFUSION SERIES
Discharge: HOME | End: 2022-07-23
Payer: MEDICARE

## 2022-01-01 ENCOUNTER — APPOINTMENT (OUTPATIENT)
Dept: GENERAL RADIOLOGY | Age: 67
DRG: 871 | End: 2022-01-01
Payer: MEDICARE

## 2022-01-01 ENCOUNTER — HOSPITAL ENCOUNTER (OUTPATIENT)
Dept: ONCOLOGY | Age: 67
Setting detail: INFUSION SERIES
Discharge: HOME | End: 2022-02-03
Payer: MEDICARE

## 2022-01-01 ENCOUNTER — HOSPITAL ENCOUNTER (OUTPATIENT)
Dept: ONCOLOGY | Age: 67
Setting detail: INFUSION SERIES
Discharge: HOME | End: 2022-07-21
Payer: MEDICARE

## 2022-01-01 ENCOUNTER — HOSPITAL ENCOUNTER (OUTPATIENT)
Dept: ONCOLOGY | Age: 67
Setting detail: INFUSION SERIES
Discharge: HOME | End: 2022-04-22
Payer: MEDICARE

## 2022-01-01 ENCOUNTER — CLINICAL DOCUMENTATION (OUTPATIENT)
Dept: ONCOLOGY | Age: 67
End: 2022-01-01

## 2022-01-01 ENCOUNTER — HOSPITAL ENCOUNTER (OUTPATIENT)
Dept: ONCOLOGY | Age: 67
Setting detail: INFUSION SERIES
Discharge: HOME | End: 2022-06-27
Payer: MEDICARE

## 2022-01-01 ENCOUNTER — HOSPITAL ENCOUNTER (OUTPATIENT)
Dept: ONCOLOGY | Age: 67
Setting detail: INFUSION SERIES
Discharge: HOME | End: 2022-02-08
Payer: MEDICARE

## 2022-01-01 ENCOUNTER — HOSPITAL ENCOUNTER (OUTPATIENT)
Dept: ONCOLOGY | Age: 67
Setting detail: INFUSION SERIES
Discharge: HOME | End: 2022-02-02
Payer: MEDICARE

## 2022-01-01 ENCOUNTER — HOSPITAL ENCOUNTER (OUTPATIENT)
Dept: ONCOLOGY | Age: 67
Setting detail: INFUSION SERIES
Discharge: HOME | End: 2022-07-25
Payer: MEDICARE

## 2022-01-01 ENCOUNTER — HOSPITAL ENCOUNTER (OUTPATIENT)
Dept: ONCOLOGY | Age: 67
Setting detail: INFUSION SERIES
Discharge: HOME | End: 2022-07-07
Payer: MEDICARE

## 2022-01-01 ENCOUNTER — HOSPITAL ENCOUNTER (OUTPATIENT)
Dept: ONCOLOGY | Age: 67
Setting detail: INFUSION SERIES
Discharge: HOME | End: 2022-03-03
Payer: MEDICARE

## 2022-01-01 ENCOUNTER — HOSPITAL ENCOUNTER (OUTPATIENT)
Dept: ONCOLOGY | Age: 67
Setting detail: INFUSION SERIES
Discharge: HOME | End: 2022-02-01
Payer: MEDICARE

## 2022-01-01 ENCOUNTER — HOSPITAL ENCOUNTER (OUTPATIENT)
Dept: ONCOLOGY | Age: 67
Setting detail: INFUSION SERIES
Discharge: HOME | End: 2022-07-14
Payer: MEDICARE

## 2022-01-01 ENCOUNTER — HOSPITAL ENCOUNTER (OUTPATIENT)
Age: 67
Discharge: HOME | End: 2022-04-13
Attending: INTERNAL MEDICINE | Admitting: INTERNAL MEDICINE
Payer: MEDICARE

## 2022-01-01 ENCOUNTER — HOSPITAL ENCOUNTER (OUTPATIENT)
Age: 67
Discharge: HOME | End: 2022-04-15
Attending: INTERNAL MEDICINE | Admitting: INTERNAL MEDICINE
Payer: MEDICARE

## 2022-01-01 ENCOUNTER — HOSPITAL ENCOUNTER (OUTPATIENT)
Dept: ONCOLOGY | Age: 67
Setting detail: INFUSION SERIES
Discharge: HOME | End: 2022-02-28
Payer: MEDICARE

## 2022-01-01 ENCOUNTER — HOSPITAL ENCOUNTER (OUTPATIENT)
Dept: ONCOLOGY | Age: 67
Setting detail: INFUSION SERIES
Discharge: HOME | End: 2022-07-09
Payer: MEDICARE

## 2022-01-01 ENCOUNTER — HOSPITAL ENCOUNTER (OUTPATIENT)
Dept: ONCOLOGY | Age: 67
Setting detail: INFUSION SERIES
Discharge: HOME | End: 2022-04-08
Payer: MEDICARE

## 2022-01-01 ENCOUNTER — HOSPITAL ENCOUNTER (OUTPATIENT)
Dept: ONCOLOGY | Age: 67
Setting detail: INFUSION SERIES
Discharge: HOME | End: 2022-07-04
Payer: MEDICARE

## 2022-01-01 ENCOUNTER — HOSPITAL ENCOUNTER (OUTPATIENT)
Dept: ONCOLOGY | Age: 67
Setting detail: INFUSION SERIES
Discharge: HOME | End: 2022-03-18
Payer: MEDICARE

## 2022-01-01 ENCOUNTER — HOSPITAL ENCOUNTER (OUTPATIENT)
Dept: ONCOLOGY | Age: 67
Setting detail: INFUSION SERIES
Discharge: HOME | End: 2022-05-11
Payer: MEDICARE

## 2022-01-01 ENCOUNTER — HOSPITAL ENCOUNTER (OUTPATIENT)
Dept: ONCOLOGY | Age: 67
Setting detail: INFUSION SERIES
Discharge: HOME | End: 2022-04-19
Payer: MEDICARE

## 2022-01-01 ENCOUNTER — HOSPITAL ENCOUNTER (OUTPATIENT)
Dept: ONCOLOGY | Age: 67
Setting detail: INFUSION SERIES
Discharge: HOME | End: 2022-02-17
Payer: MEDICARE

## 2022-01-01 ENCOUNTER — HOSPITAL ENCOUNTER (OUTPATIENT)
Dept: ONCOLOGY | Age: 67
Setting detail: INFUSION SERIES
Discharge: HOME | End: 2022-04-24
Payer: MEDICARE

## 2022-01-01 ENCOUNTER — HOSPITAL ENCOUNTER (OUTPATIENT)
Dept: ONCOLOGY | Age: 67
Setting detail: INFUSION SERIES
Discharge: HOME | End: 2022-07-20

## 2022-01-01 ENCOUNTER — HOSPITAL ENCOUNTER (OUTPATIENT)
Dept: ONCOLOGY | Age: 67
Setting detail: INFUSION SERIES
Discharge: HOME | End: 2022-04-15
Payer: MEDICARE

## 2022-01-01 ENCOUNTER — HOSPITAL ENCOUNTER (OUTPATIENT)
Dept: ONCOLOGY | Age: 67
Setting detail: INFUSION SERIES
Discharge: HOME | End: 2022-05-02
Payer: MEDICARE

## 2022-01-01 ENCOUNTER — HOSPITAL ENCOUNTER (OUTPATIENT)
Dept: ONCOLOGY | Age: 67
Setting detail: INFUSION SERIES
Discharge: HOME | End: 2022-08-01
Payer: MEDICARE

## 2022-01-01 ENCOUNTER — HOSPITAL ENCOUNTER (OUTPATIENT)
Dept: ONCOLOGY | Age: 67
Setting detail: INFUSION SERIES
Discharge: HOME | End: 2022-07-27
Payer: MEDICARE

## 2022-01-01 ENCOUNTER — HOSPITAL ENCOUNTER (OUTPATIENT)
Dept: ONCOLOGY | Age: 67
Setting detail: INFUSION SERIES
Discharge: HOME | End: 2022-01-12
Payer: MEDICARE

## 2022-01-01 ENCOUNTER — HOSPITAL ENCOUNTER (OUTPATIENT)
Age: 67
Discharge: HOME | End: 2022-04-14
Attending: INTERNAL MEDICINE | Admitting: INTERNAL MEDICINE
Payer: MEDICARE

## 2022-01-01 ENCOUNTER — HOSPITAL ENCOUNTER (OUTPATIENT)
Dept: ONCOLOGY | Age: 67
Setting detail: INFUSION SERIES
Discharge: HOME | End: 2022-01-30
Payer: MEDICARE

## 2022-01-01 ENCOUNTER — HOSPITAL ENCOUNTER (OUTPATIENT)
Dept: CT IMAGING | Age: 67
Discharge: HOME | End: 2022-07-11
Payer: MEDICARE

## 2022-01-01 ENCOUNTER — HOSPITAL ENCOUNTER (OUTPATIENT)
Dept: ONCOLOGY | Age: 67
Setting detail: INFUSION SERIES
End: 2022-01-01
Payer: MEDICARE

## 2022-01-01 ENCOUNTER — APPOINTMENT (OUTPATIENT)
Dept: CT IMAGING | Age: 67
DRG: 064 | End: 2022-01-01
Payer: MEDICARE

## 2022-01-01 ENCOUNTER — HOSPITAL ENCOUNTER (OUTPATIENT)
Dept: ONCOLOGY | Age: 67
Setting detail: INFUSION SERIES
Discharge: HOME | End: 2022-04-06
Payer: MEDICARE

## 2022-01-01 ENCOUNTER — HOSPITAL ENCOUNTER (OUTPATIENT)
Dept: ONCOLOGY | Age: 67
Setting detail: INFUSION SERIES
Discharge: HOME | End: 2022-04-28
Payer: MEDICARE

## 2022-01-01 ENCOUNTER — HOSPITAL ENCOUNTER (OUTPATIENT)
Dept: ONCOLOGY | Age: 67
Setting detail: INFUSION SERIES
Discharge: HOME | End: 2022-04-12
Payer: MEDICARE

## 2022-01-01 ENCOUNTER — HOSPITAL ENCOUNTER (OUTPATIENT)
Dept: INTERVENTIONAL RADIOLOGY/VASCULAR | Age: 67
Discharge: HOME | End: 2022-02-01
Payer: MEDICARE

## 2022-01-01 ENCOUNTER — HOSPITAL ENCOUNTER (OUTPATIENT)
Dept: ONCOLOGY | Age: 67
Setting detail: INFUSION SERIES
Discharge: HOME | End: 2022-04-10
Payer: MEDICARE

## 2022-01-01 ENCOUNTER — HOSPITAL ENCOUNTER (OUTPATIENT)
Age: 67
Discharge: HOME | End: 2022-04-28
Attending: INTERNAL MEDICINE | Admitting: INTERNAL MEDICINE
Payer: MEDICARE

## 2022-01-01 ENCOUNTER — TELEPHONE (OUTPATIENT)
Dept: ONCOLOGY | Age: 67
End: 2022-01-01

## 2022-01-01 ENCOUNTER — HOSPITAL ENCOUNTER (OUTPATIENT)
Dept: ONCOLOGY | Age: 67
Setting detail: INFUSION SERIES
Discharge: HOME | End: 2022-07-29
Payer: MEDICARE

## 2022-01-01 ENCOUNTER — HOSPITAL ENCOUNTER (OUTPATIENT)
Dept: ONCOLOGY | Age: 67
Setting detail: INFUSION SERIES
Discharge: HOME | End: 2022-03-31
Payer: MEDICARE

## 2022-01-01 ENCOUNTER — HOSPITAL ENCOUNTER (OUTPATIENT)
Dept: GENERAL RADIOLOGY | Age: 67
Discharge: HOME | End: 2022-04-12
Payer: MEDICARE

## 2022-01-01 ENCOUNTER — HOSPITAL ENCOUNTER (OUTPATIENT)
Dept: ONCOLOGY | Age: 67
Setting detail: INFUSION SERIES
Discharge: HOME | End: 2022-07-12
Payer: MEDICARE

## 2022-01-01 ENCOUNTER — HOSPITAL ENCOUNTER (OUTPATIENT)
Age: 67
Discharge: HOME | End: 2022-04-18
Attending: INTERNAL MEDICINE | Admitting: INTERNAL MEDICINE
Payer: MEDICARE

## 2022-01-01 ENCOUNTER — HOSPITAL ENCOUNTER (OUTPATIENT)
Dept: ONCOLOGY | Age: 67
Setting detail: INFUSION SERIES
Discharge: HOME | End: 2022-05-21
Payer: MEDICARE

## 2022-01-01 ENCOUNTER — HOSPITAL ENCOUNTER (OUTPATIENT)
Dept: ONCOLOGY | Age: 67
Setting detail: INFUSION SERIES
Discharge: HOME | End: 2022-04-20
Payer: MEDICARE

## 2022-01-01 ENCOUNTER — PREP FOR PROCEDURE (OUTPATIENT)
Dept: ONCOLOGY | Age: 67
End: 2022-01-01

## 2022-01-01 ENCOUNTER — HOSPITAL ENCOUNTER (OUTPATIENT)
Dept: ONCOLOGY | Age: 67
Setting detail: INFUSION SERIES
Discharge: HOME | End: 2022-04-26
Payer: MEDICARE

## 2022-01-01 ENCOUNTER — HOSPITAL ENCOUNTER (OUTPATIENT)
Dept: ONCOLOGY | Age: 67
Setting detail: INFUSION SERIES
Discharge: HOME | End: 2022-01-26
Payer: MEDICARE

## 2022-01-01 ENCOUNTER — HOSPITAL ENCOUNTER (OUTPATIENT)
Age: 67
Discharge: HOME | End: 2022-04-11
Attending: INTERNAL MEDICINE | Admitting: INTERNAL MEDICINE
Payer: MEDICARE

## 2022-01-01 ENCOUNTER — HOSPITAL ENCOUNTER (OUTPATIENT)
Dept: ONCOLOGY | Age: 67
Setting detail: INFUSION SERIES
Discharge: HOME | End: 2022-02-07
Payer: MEDICARE

## 2022-01-01 ENCOUNTER — HOSPITAL ENCOUNTER (OUTPATIENT)
Dept: ONCOLOGY | Age: 67
Setting detail: INFUSION SERIES
Discharge: HOME | End: 2022-03-14
Payer: MEDICARE

## 2022-01-01 ENCOUNTER — HOSPITAL ENCOUNTER (OUTPATIENT)
Dept: ONCOLOGY | Age: 67
Setting detail: INFUSION SERIES
Discharge: HOME | End: 2022-07-16
Payer: MEDICARE

## 2022-01-01 ENCOUNTER — HOSPITAL ENCOUNTER (OUTPATIENT)
Dept: ONCOLOGY | Age: 67
Setting detail: INFUSION SERIES
Discharge: HOME | End: 2022-02-20
Payer: MEDICARE

## 2022-01-01 ENCOUNTER — HOSPITAL ENCOUNTER (OUTPATIENT)
Age: 67
Discharge: HOME | End: 2022-04-24
Attending: INTERNAL MEDICINE | Admitting: INTERNAL MEDICINE
Payer: MEDICARE

## 2022-01-01 ENCOUNTER — APPOINTMENT (OUTPATIENT)
Dept: MRI IMAGING | Age: 67
DRG: 064 | End: 2022-01-01
Payer: MEDICARE

## 2022-01-01 ENCOUNTER — HOSPITAL ENCOUNTER (OUTPATIENT)
Dept: ONCOLOGY | Age: 67
Setting detail: INFUSION SERIES
Discharge: HOME | End: 2022-04-04
Payer: MEDICARE

## 2022-01-01 ENCOUNTER — HOSPITAL ENCOUNTER (OUTPATIENT)
Dept: ONCOLOGY | Age: 67
Setting detail: INFUSION SERIES
Discharge: HOME | End: 2022-01-28
Payer: MEDICARE

## 2022-01-01 ENCOUNTER — HOSPITAL ENCOUNTER (OUTPATIENT)
Dept: ONCOLOGY | Age: 67
Setting detail: INFUSION SERIES
Discharge: HOME | End: 2022-03-10
Payer: MEDICARE

## 2022-01-01 ENCOUNTER — HOSPITAL ENCOUNTER (OUTPATIENT)
Dept: GENERAL RADIOLOGY | Age: 67
Discharge: HOME | End: 2022-03-29
Payer: MEDICARE

## 2022-01-01 ENCOUNTER — HOSPITAL ENCOUNTER (OUTPATIENT)
Dept: ONCOLOGY | Age: 67
Setting detail: INFUSION SERIES
Discharge: HOME | End: 2022-03-30
Payer: MEDICARE

## 2022-01-01 ENCOUNTER — HOSPITAL ENCOUNTER (OUTPATIENT)
Dept: ONCOLOGY | Age: 67
Setting detail: INFUSION SERIES
Discharge: HOME | End: 2022-05-22
Payer: MEDICARE

## 2022-01-01 ENCOUNTER — HOSPITAL ENCOUNTER (OUTPATIENT)
Dept: ONCOLOGY | Age: 67
Setting detail: INFUSION SERIES
Discharge: HOME | End: 2022-08-04
Payer: MEDICARE

## 2022-01-01 ENCOUNTER — HOSPITAL ENCOUNTER (OUTPATIENT)
Dept: ONCOLOGY | Age: 67
Setting detail: INFUSION SERIES
Discharge: HOME | End: 2022-04-18
Payer: MEDICARE

## 2022-01-01 ENCOUNTER — HOSPITAL ENCOUNTER (OUTPATIENT)
Dept: ONCOLOGY | Age: 67
Setting detail: INFUSION SERIES
Discharge: HOME | End: 2022-03-16
Payer: MEDICARE

## 2022-01-01 ENCOUNTER — HOSPITAL ENCOUNTER (OUTPATIENT)
Dept: ONCOLOGY | Age: 67
Setting detail: INFUSION SERIES
Discharge: HOME | End: 2022-06-30
Payer: MEDICARE

## 2022-01-01 ENCOUNTER — HOSPITAL ENCOUNTER (OUTPATIENT)
Dept: ONCOLOGY | Age: 67
Setting detail: INFUSION SERIES
Discharge: HOME | End: 2022-04-03
Payer: MEDICARE

## 2022-01-01 ENCOUNTER — HOSPITAL ENCOUNTER (OUTPATIENT)
Dept: ONCOLOGY | Age: 67
Setting detail: INFUSION SERIES
Discharge: HOME | End: 2022-04-01
Payer: MEDICARE

## 2022-01-01 ENCOUNTER — APPOINTMENT (OUTPATIENT)
Dept: GENERAL RADIOLOGY | Age: 67
End: 2022-01-01
Attending: INTERNAL MEDICINE
Payer: MEDICARE

## 2022-01-01 ENCOUNTER — HOSPITAL ENCOUNTER (OUTPATIENT)
Dept: CT IMAGING | Age: 67
Discharge: HOME | End: 2022-02-24
Payer: MEDICARE

## 2022-01-01 ENCOUNTER — HOSPITAL ENCOUNTER (OUTPATIENT)
Dept: ONCOLOGY | Age: 67
Setting detail: INFUSION SERIES
Discharge: HOME | End: 2022-03-17
Payer: MEDICARE

## 2022-01-01 ENCOUNTER — HOSPITAL ENCOUNTER (OUTPATIENT)
Dept: ONCOLOGY | Age: 67
Setting detail: INFUSION SERIES
Discharge: HOME | End: 2022-02-14
Payer: MEDICARE

## 2022-01-01 ENCOUNTER — HOSPITAL ENCOUNTER (OUTPATIENT)
Dept: GENERAL RADIOLOGY | Age: 67
Discharge: HOME | End: 2022-05-02
Payer: MEDICARE

## 2022-01-01 ENCOUNTER — HOSPITAL ENCOUNTER (OUTPATIENT)
Age: 67
Discharge: HOME | End: 2022-04-12
Attending: INTERNAL MEDICINE | Admitting: INTERNAL MEDICINE
Payer: MEDICARE

## 2022-01-01 ENCOUNTER — HOSPITAL ENCOUNTER (OUTPATIENT)
Age: 67
Discharge: HOME | End: 2022-04-17
Attending: INTERNAL MEDICINE | Admitting: INTERNAL MEDICINE
Payer: MEDICARE

## 2022-01-01 ENCOUNTER — HOSPITAL ENCOUNTER (OUTPATIENT)
Dept: GENERAL RADIOLOGY | Age: 67
Discharge: HOME | End: 2022-04-04
Payer: MEDICARE

## 2022-01-01 ENCOUNTER — APPOINTMENT (OUTPATIENT)
Dept: GENERAL RADIOLOGY | Age: 67
DRG: 064 | End: 2022-01-01
Payer: MEDICARE

## 2022-01-01 ENCOUNTER — HOSPITAL ENCOUNTER (OUTPATIENT)
Dept: ONCOLOGY | Age: 67
Setting detail: INFUSION SERIES
Discharge: HOME | End: 2022-04-14
Payer: MEDICARE

## 2022-01-01 ENCOUNTER — HOSPITAL ENCOUNTER (INPATIENT)
Age: 67
LOS: 3 days | Discharge: HOME | DRG: 064 | End: 2022-01-21
Attending: EMERGENCY MEDICINE | Admitting: INTERNAL MEDICINE
Payer: MEDICARE

## 2022-01-01 ENCOUNTER — HOSPITAL ENCOUNTER (INPATIENT)
Age: 67
LOS: 4 days | DRG: 871 | End: 2022-08-09
Attending: EMERGENCY MEDICINE | Admitting: INTERNAL MEDICINE
Payer: MEDICARE

## 2022-01-01 ENCOUNTER — HOSPITAL ENCOUNTER (INPATIENT)
Age: 67
LOS: 7 days | Discharge: HOME | DRG: 018 | End: 2022-03-28
Attending: INTERNAL MEDICINE | Admitting: INTERNAL MEDICINE
Payer: MEDICARE

## 2022-01-01 ENCOUNTER — HOSPITAL ENCOUNTER (OUTPATIENT)
Age: 67
Setting detail: OBSERVATION
Discharge: HOME | End: 2022-05-02
Attending: INTERNAL MEDICINE | Admitting: INTERNAL MEDICINE
Payer: MEDICARE

## 2022-01-01 ENCOUNTER — HOSPITAL ENCOUNTER (OUTPATIENT)
Dept: ONCOLOGY | Age: 67
Setting detail: INFUSION SERIES
Discharge: HOME | End: 2022-04-13
Payer: MEDICARE

## 2022-01-01 ENCOUNTER — HOSPITAL ENCOUNTER (OUTPATIENT)
Dept: ONCOLOGY | Age: 67
Setting detail: INFUSION SERIES
Discharge: HOME | End: 2022-02-24
Payer: MEDICARE

## 2022-01-01 ENCOUNTER — HOSPITAL ENCOUNTER (OUTPATIENT)
Dept: ONCOLOGY | Age: 67
Setting detail: INFUSION SERIES
Discharge: HOME | End: 2022-04-30
Payer: MEDICARE

## 2022-01-01 ENCOUNTER — HOSPITAL ENCOUNTER (OUTPATIENT)
Dept: ONCOLOGY | Age: 67
Setting detail: INFUSION SERIES
Discharge: HOME | End: 2022-04-05
Payer: MEDICARE

## 2022-01-01 ENCOUNTER — HOSPITAL ENCOUNTER (OUTPATIENT)
Dept: ONCOLOGY | Age: 67
Setting detail: INFUSION SERIES
Discharge: HOME | End: 2022-07-13
Payer: MEDICARE

## 2022-01-01 ENCOUNTER — HOSPITAL ENCOUNTER (OUTPATIENT)
Dept: ONCOLOGY | Age: 67
Setting detail: INFUSION SERIES
Discharge: HOME | End: 2022-04-02
Payer: MEDICARE

## 2022-01-01 ENCOUNTER — HOSPITAL ENCOUNTER (OUTPATIENT)
Dept: GENERAL RADIOLOGY | Age: 67
Discharge: HOME | End: 2022-04-18
Payer: MEDICARE

## 2022-01-01 ENCOUNTER — HOSPITAL ENCOUNTER (OUTPATIENT)
Age: 67
Discharge: HOME | End: 2022-04-30
Attending: INTERNAL MEDICINE | Admitting: INTERNAL MEDICINE
Payer: MEDICARE

## 2022-01-01 ENCOUNTER — HOSPITAL ENCOUNTER (OUTPATIENT)
Age: 67
Discharge: HOME | End: 2022-04-26
Attending: INTERNAL MEDICINE | Admitting: INTERNAL MEDICINE
Payer: MEDICARE

## 2022-01-01 ENCOUNTER — HOSPITAL ENCOUNTER (OUTPATIENT)
Dept: ONCOLOGY | Age: 67
Setting detail: INFUSION SERIES
Discharge: HOME | End: 2022-03-07
Payer: MEDICARE

## 2022-01-01 ENCOUNTER — HOSPITAL ENCOUNTER (OUTPATIENT)
Dept: ONCOLOGY | Age: 67
Setting detail: INFUSION SERIES
Discharge: HOME | End: 2022-04-11
Payer: MEDICARE

## 2022-01-01 ENCOUNTER — HOSPITAL ENCOUNTER (OUTPATIENT)
Dept: ONCOLOGY | Age: 67
Setting detail: INFUSION SERIES
Discharge: HOME | End: 2022-02-06
Payer: MEDICARE

## 2022-01-01 ENCOUNTER — HOSPITAL ENCOUNTER (OUTPATIENT)
Dept: ONCOLOGY | Age: 67
Setting detail: INFUSION SERIES
Discharge: HOME | End: 2022-07-02
Payer: MEDICARE

## 2022-01-01 ENCOUNTER — HOSPITAL ENCOUNTER (OUTPATIENT)
Age: 67
Discharge: HOME | End: 2022-04-20
Attending: INTERNAL MEDICINE | Admitting: INTERNAL MEDICINE
Payer: MEDICARE

## 2022-01-01 ENCOUNTER — HOSPITAL ENCOUNTER (OUTPATIENT)
Dept: ONCOLOGY | Age: 67
Setting detail: INFUSION SERIES
Discharge: HOME | End: 2022-02-10
Payer: MEDICARE

## 2022-01-01 ENCOUNTER — HOSPITAL ENCOUNTER (OUTPATIENT)
Dept: ONCOLOGY | Age: 67
Setting detail: INFUSION SERIES
Discharge: HOME | End: 2022-04-21
Payer: MEDICARE

## 2022-01-01 ENCOUNTER — HOSPITAL ENCOUNTER (OUTPATIENT)
Dept: ONCOLOGY | Age: 67
Setting detail: INFUSION SERIES
Discharge: HOME | End: 2022-04-07
Payer: MEDICARE

## 2022-01-01 ENCOUNTER — HOSPITAL ENCOUNTER (OUTPATIENT)
Dept: ONCOLOGY | Age: 67
Setting detail: INFUSION SERIES
End: 2022-01-01

## 2022-01-01 ENCOUNTER — HOSPITAL ENCOUNTER (OUTPATIENT)
Dept: ONCOLOGY | Age: 67
Setting detail: INFUSION SERIES
Discharge: HOME | End: 2022-04-17
Payer: MEDICARE

## 2022-01-01 ENCOUNTER — HOSPITAL ENCOUNTER (OUTPATIENT)
Dept: ONCOLOGY | Age: 67
Setting detail: INFUSION SERIES
Discharge: HOME | End: 2022-07-18
Payer: MEDICARE

## 2022-01-01 ENCOUNTER — HOSPITAL ENCOUNTER (OUTPATIENT)
Dept: GENERAL RADIOLOGY | Age: 67
Discharge: HOME | End: 2022-03-16
Payer: MEDICARE

## 2022-01-01 ENCOUNTER — APPOINTMENT (OUTPATIENT)
Dept: ONCOLOGY | Age: 67
End: 2022-01-01
Payer: MEDICARE

## 2022-01-01 VITALS
TEMPERATURE: 98.3 F | HEART RATE: 102 BPM | SYSTOLIC BLOOD PRESSURE: 122 MMHG | DIASTOLIC BLOOD PRESSURE: 78 MMHG | RESPIRATION RATE: 16 BRPM | OXYGEN SATURATION: 99 %

## 2022-01-01 VITALS
TEMPERATURE: 98.3 F | RESPIRATION RATE: 18 BRPM | HEART RATE: 95 BPM | OXYGEN SATURATION: 100 % | DIASTOLIC BLOOD PRESSURE: 96 MMHG | SYSTOLIC BLOOD PRESSURE: 148 MMHG

## 2022-01-01 VITALS
RESPIRATION RATE: 20 BRPM | BODY MASS INDEX: 32.34 KG/M2 | SYSTOLIC BLOOD PRESSURE: 141 MMHG | HEART RATE: 102 BPM | OXYGEN SATURATION: 99 % | WEIGHT: 251.99 LBS | TEMPERATURE: 98.9 F | DIASTOLIC BLOOD PRESSURE: 90 MMHG

## 2022-01-01 VITALS
WEIGHT: 253.75 LBS | TEMPERATURE: 97 F | BODY MASS INDEX: 32.57 KG/M2 | RESPIRATION RATE: 20 BRPM | OXYGEN SATURATION: 98 % | DIASTOLIC BLOOD PRESSURE: 87 MMHG | HEIGHT: 74 IN | SYSTOLIC BLOOD PRESSURE: 134 MMHG | HEART RATE: 83 BPM

## 2022-01-01 VITALS
RESPIRATION RATE: 16 BRPM | OXYGEN SATURATION: 98 % | SYSTOLIC BLOOD PRESSURE: 151 MMHG | HEART RATE: 80 BPM | TEMPERATURE: 98.2 F | DIASTOLIC BLOOD PRESSURE: 83 MMHG

## 2022-01-01 VITALS
RESPIRATION RATE: 18 BRPM | SYSTOLIC BLOOD PRESSURE: 156 MMHG | WEIGHT: 248.9 LBS | HEART RATE: 90 BPM | OXYGEN SATURATION: 100 % | TEMPERATURE: 98.1 F | BODY MASS INDEX: 31.94 KG/M2 | DIASTOLIC BLOOD PRESSURE: 86 MMHG

## 2022-01-01 VITALS
RESPIRATION RATE: 16 BRPM | SYSTOLIC BLOOD PRESSURE: 155 MMHG | TEMPERATURE: 98.2 F | HEART RATE: 93 BPM | DIASTOLIC BLOOD PRESSURE: 86 MMHG | OXYGEN SATURATION: 100 %

## 2022-01-01 VITALS
DIASTOLIC BLOOD PRESSURE: 99 MMHG | HEART RATE: 82 BPM | OXYGEN SATURATION: 100 % | RESPIRATION RATE: 18 BRPM | SYSTOLIC BLOOD PRESSURE: 143 MMHG | TEMPERATURE: 97.9 F

## 2022-01-01 VITALS
RESPIRATION RATE: 20 BRPM | OXYGEN SATURATION: 99 % | RESPIRATION RATE: 20 BRPM | TEMPERATURE: 98 F | SYSTOLIC BLOOD PRESSURE: 150 MMHG | WEIGHT: 253.09 LBS | SYSTOLIC BLOOD PRESSURE: 127 MMHG | TEMPERATURE: 98.2 F | HEART RATE: 87 BPM | DIASTOLIC BLOOD PRESSURE: 93 MMHG | BODY MASS INDEX: 32.48 KG/M2 | OXYGEN SATURATION: 100 % | HEART RATE: 99 BPM | DIASTOLIC BLOOD PRESSURE: 77 MMHG

## 2022-01-01 VITALS
HEART RATE: 87 BPM | SYSTOLIC BLOOD PRESSURE: 130 MMHG | TEMPERATURE: 97.4 F | DIASTOLIC BLOOD PRESSURE: 85 MMHG | OXYGEN SATURATION: 98 % | RESPIRATION RATE: 18 BRPM

## 2022-01-01 VITALS
HEART RATE: 96 BPM | SYSTOLIC BLOOD PRESSURE: 125 MMHG | RESPIRATION RATE: 18 BRPM | OXYGEN SATURATION: 98 % | TEMPERATURE: 98 F | DIASTOLIC BLOOD PRESSURE: 81 MMHG

## 2022-01-01 VITALS
BODY MASS INDEX: 32.28 KG/M2 | TEMPERATURE: 98.1 F | WEIGHT: 251.54 LBS | HEART RATE: 94 BPM | DIASTOLIC BLOOD PRESSURE: 74 MMHG | OXYGEN SATURATION: 98 % | SYSTOLIC BLOOD PRESSURE: 129 MMHG | RESPIRATION RATE: 16 BRPM

## 2022-01-01 VITALS
TEMPERATURE: 97.8 F | SYSTOLIC BLOOD PRESSURE: 131 MMHG | WEIGHT: 254.85 LBS | HEART RATE: 107 BPM | OXYGEN SATURATION: 98 % | RESPIRATION RATE: 18 BRPM | BODY MASS INDEX: 31.02 KG/M2 | DIASTOLIC BLOOD PRESSURE: 90 MMHG

## 2022-01-01 VITALS
HEART RATE: 103 BPM | WEIGHT: 254.63 LBS | SYSTOLIC BLOOD PRESSURE: 137 MMHG | BODY MASS INDEX: 32.68 KG/M2 | TEMPERATURE: 97.9 F | OXYGEN SATURATION: 98 % | DIASTOLIC BLOOD PRESSURE: 83 MMHG | RESPIRATION RATE: 18 BRPM | HEIGHT: 74 IN

## 2022-01-01 VITALS
OXYGEN SATURATION: 100 % | TEMPERATURE: 98.1 F | DIASTOLIC BLOOD PRESSURE: 85 MMHG | HEART RATE: 95 BPM | SYSTOLIC BLOOD PRESSURE: 139 MMHG | RESPIRATION RATE: 20 BRPM

## 2022-01-01 VITALS
HEART RATE: 108 BPM | DIASTOLIC BLOOD PRESSURE: 77 MMHG | SYSTOLIC BLOOD PRESSURE: 113 MMHG | OXYGEN SATURATION: 98 % | TEMPERATURE: 98.8 F | RESPIRATION RATE: 18 BRPM

## 2022-01-01 VITALS
SYSTOLIC BLOOD PRESSURE: 138 MMHG | TEMPERATURE: 97.9 F | RESPIRATION RATE: 16 BRPM | DIASTOLIC BLOOD PRESSURE: 92 MMHG | OXYGEN SATURATION: 98 % | HEART RATE: 84 BPM

## 2022-01-01 VITALS
SYSTOLIC BLOOD PRESSURE: 124 MMHG | HEART RATE: 98 BPM | DIASTOLIC BLOOD PRESSURE: 87 MMHG | OXYGEN SATURATION: 99 % | RESPIRATION RATE: 20 BRPM | TEMPERATURE: 98.5 F

## 2022-01-01 VITALS
DIASTOLIC BLOOD PRESSURE: 92 MMHG | TEMPERATURE: 97.8 F | HEART RATE: 99 BPM | BODY MASS INDEX: 32.68 KG/M2 | RESPIRATION RATE: 18 BRPM | SYSTOLIC BLOOD PRESSURE: 146 MMHG | OXYGEN SATURATION: 100 % | WEIGHT: 254.63 LBS

## 2022-01-01 VITALS
TEMPERATURE: 97.8 F | WEIGHT: 255.07 LBS | BODY MASS INDEX: 31.05 KG/M2 | HEART RATE: 85 BPM | DIASTOLIC BLOOD PRESSURE: 80 MMHG | SYSTOLIC BLOOD PRESSURE: 140 MMHG | OXYGEN SATURATION: 98 % | RESPIRATION RATE: 18 BRPM

## 2022-01-01 VITALS
RESPIRATION RATE: 18 BRPM | OXYGEN SATURATION: 98 % | HEART RATE: 100 BPM | TEMPERATURE: 97.9 F | TEMPERATURE: 98.1 F | DIASTOLIC BLOOD PRESSURE: 67 MMHG | SYSTOLIC BLOOD PRESSURE: 112 MMHG | RESPIRATION RATE: 18 BRPM | SYSTOLIC BLOOD PRESSURE: 137 MMHG | OXYGEN SATURATION: 100 % | HEART RATE: 77 BPM | DIASTOLIC BLOOD PRESSURE: 79 MMHG

## 2022-01-01 VITALS
RESPIRATION RATE: 20 BRPM | TEMPERATURE: 98.7 F | HEART RATE: 97 BPM | DIASTOLIC BLOOD PRESSURE: 77 MMHG | SYSTOLIC BLOOD PRESSURE: 133 MMHG | OXYGEN SATURATION: 96 %

## 2022-01-01 VITALS
RESPIRATION RATE: 18 BRPM | OXYGEN SATURATION: 98 % | SYSTOLIC BLOOD PRESSURE: 122 MMHG | HEART RATE: 104 BPM | TEMPERATURE: 98.3 F | DIASTOLIC BLOOD PRESSURE: 76 MMHG

## 2022-01-01 VITALS
TEMPERATURE: 98.3 F | OXYGEN SATURATION: 100 % | TEMPERATURE: 97.9 F | DIASTOLIC BLOOD PRESSURE: 84 MMHG | HEART RATE: 102 BPM | SYSTOLIC BLOOD PRESSURE: 132 MMHG | OXYGEN SATURATION: 99 % | SYSTOLIC BLOOD PRESSURE: 128 MMHG | RESPIRATION RATE: 16 BRPM | HEART RATE: 88 BPM | DIASTOLIC BLOOD PRESSURE: 88 MMHG | RESPIRATION RATE: 16 BRPM

## 2022-01-01 VITALS
HEART RATE: 106 BPM | SYSTOLIC BLOOD PRESSURE: 136 MMHG | TEMPERATURE: 98 F | OXYGEN SATURATION: 99 % | RESPIRATION RATE: 18 BRPM | DIASTOLIC BLOOD PRESSURE: 87 MMHG

## 2022-01-01 VITALS
DIASTOLIC BLOOD PRESSURE: 88 MMHG | TEMPERATURE: 98.4 F | HEART RATE: 86 BPM | SYSTOLIC BLOOD PRESSURE: 149 MMHG | OXYGEN SATURATION: 100 % | RESPIRATION RATE: 18 BRPM

## 2022-01-01 VITALS
DIASTOLIC BLOOD PRESSURE: 54 MMHG | WEIGHT: 251.1 LBS | SYSTOLIC BLOOD PRESSURE: 148 MMHG | HEART RATE: 102 BPM | BODY MASS INDEX: 32.22 KG/M2 | OXYGEN SATURATION: 100 % | TEMPERATURE: 98 F | RESPIRATION RATE: 16 BRPM

## 2022-01-01 VITALS
RESPIRATION RATE: 18 BRPM | BODY MASS INDEX: 32.16 KG/M2 | DIASTOLIC BLOOD PRESSURE: 76 MMHG | TEMPERATURE: 97.9 F | HEART RATE: 107 BPM | WEIGHT: 250.6 LBS | SYSTOLIC BLOOD PRESSURE: 119 MMHG | OXYGEN SATURATION: 99 %

## 2022-01-01 VITALS
HEART RATE: 84 BPM | OXYGEN SATURATION: 99 % | DIASTOLIC BLOOD PRESSURE: 96 MMHG | SYSTOLIC BLOOD PRESSURE: 152 MMHG | RESPIRATION RATE: 16 BRPM | TEMPERATURE: 97.6 F

## 2022-01-01 VITALS
RESPIRATION RATE: 16 BRPM | DIASTOLIC BLOOD PRESSURE: 78 MMHG | SYSTOLIC BLOOD PRESSURE: 119 MMHG | OXYGEN SATURATION: 97 % | HEART RATE: 102 BPM | TEMPERATURE: 98.5 F

## 2022-01-01 VITALS
BODY MASS INDEX: 31.56 KG/M2 | RESPIRATION RATE: 17 BRPM | TEMPERATURE: 98 F | HEIGHT: 76 IN | SYSTOLIC BLOOD PRESSURE: 134 MMHG | WEIGHT: 259.2 LBS | HEART RATE: 79 BPM | DIASTOLIC BLOOD PRESSURE: 84 MMHG | OXYGEN SATURATION: 98 %

## 2022-01-01 VITALS
SYSTOLIC BLOOD PRESSURE: 149 MMHG | HEIGHT: 74 IN | TEMPERATURE: 98 F | DIASTOLIC BLOOD PRESSURE: 91 MMHG | OXYGEN SATURATION: 98 % | WEIGHT: 253.53 LBS | HEART RATE: 85 BPM | RESPIRATION RATE: 16 BRPM | BODY MASS INDEX: 32.54 KG/M2

## 2022-01-01 VITALS
WEIGHT: 254.41 LBS | HEART RATE: 101 BPM | HEART RATE: 77 BPM | TEMPERATURE: 99 F | SYSTOLIC BLOOD PRESSURE: 144 MMHG | RESPIRATION RATE: 18 BRPM | BODY MASS INDEX: 32.65 KG/M2 | TEMPERATURE: 98 F | DIASTOLIC BLOOD PRESSURE: 86 MMHG | SYSTOLIC BLOOD PRESSURE: 160 MMHG | RESPIRATION RATE: 18 BRPM | OXYGEN SATURATION: 96 % | OXYGEN SATURATION: 100 % | DIASTOLIC BLOOD PRESSURE: 70 MMHG

## 2022-01-01 VITALS
HEART RATE: 102 BPM | DIASTOLIC BLOOD PRESSURE: 86 MMHG | RESPIRATION RATE: 18 BRPM | TEMPERATURE: 97.9 F | SYSTOLIC BLOOD PRESSURE: 145 MMHG | OXYGEN SATURATION: 98 %

## 2022-01-01 VITALS
DIASTOLIC BLOOD PRESSURE: 88 MMHG | RESPIRATION RATE: 20 BRPM | SYSTOLIC BLOOD PRESSURE: 135 MMHG | OXYGEN SATURATION: 98 % | TEMPERATURE: 98.2 F | HEART RATE: 99 BPM

## 2022-01-01 VITALS
HEART RATE: 73 BPM | TEMPERATURE: 98 F | DIASTOLIC BLOOD PRESSURE: 88 MMHG | SYSTOLIC BLOOD PRESSURE: 146 MMHG | RESPIRATION RATE: 18 BRPM | OXYGEN SATURATION: 98 %

## 2022-01-01 VITALS
HEART RATE: 99 BPM | DIASTOLIC BLOOD PRESSURE: 54 MMHG | OXYGEN SATURATION: 100 % | SYSTOLIC BLOOD PRESSURE: 126 MMHG | TEMPERATURE: 98.5 F | WEIGHT: 254.19 LBS | RESPIRATION RATE: 18 BRPM | BODY MASS INDEX: 32.62 KG/M2

## 2022-01-01 VITALS
HEART RATE: 95 BPM | SYSTOLIC BLOOD PRESSURE: 142 MMHG | WEIGHT: 251.77 LBS | TEMPERATURE: 98.2 F | DIASTOLIC BLOOD PRESSURE: 92 MMHG | RESPIRATION RATE: 18 BRPM | BODY MASS INDEX: 32.31 KG/M2 | OXYGEN SATURATION: 100 %

## 2022-01-01 VITALS
OXYGEN SATURATION: 98 % | TEMPERATURE: 97.6 F | BODY MASS INDEX: 31.53 KG/M2 | RESPIRATION RATE: 18 BRPM | DIASTOLIC BLOOD PRESSURE: 94 MMHG | SYSTOLIC BLOOD PRESSURE: 150 MMHG | WEIGHT: 259.04 LBS | HEART RATE: 84 BPM

## 2022-01-01 VITALS
RESPIRATION RATE: 18 BRPM | SYSTOLIC BLOOD PRESSURE: 161 MMHG | BODY MASS INDEX: 31.05 KG/M2 | RESPIRATION RATE: 18 BRPM | DIASTOLIC BLOOD PRESSURE: 93 MMHG | OXYGEN SATURATION: 100 % | HEART RATE: 85 BPM | SYSTOLIC BLOOD PRESSURE: 155 MMHG | WEIGHT: 255.07 LBS | OXYGEN SATURATION: 98 % | DIASTOLIC BLOOD PRESSURE: 93 MMHG | HEART RATE: 102 BPM | BODY MASS INDEX: 30.97 KG/M2 | TEMPERATURE: 98 F | TEMPERATURE: 98 F | WEIGHT: 254.41 LBS

## 2022-01-01 VITALS
HEART RATE: 93 BPM | SYSTOLIC BLOOD PRESSURE: 144 MMHG | RESPIRATION RATE: 18 BRPM | DIASTOLIC BLOOD PRESSURE: 85 MMHG | TEMPERATURE: 97.8 F | OXYGEN SATURATION: 98 %

## 2022-01-01 VITALS
SYSTOLIC BLOOD PRESSURE: 130 MMHG | DIASTOLIC BLOOD PRESSURE: 70 MMHG | HEART RATE: 83 BPM | RESPIRATION RATE: 20 BRPM | OXYGEN SATURATION: 96 % | TEMPERATURE: 98.4 F

## 2022-01-01 VITALS
DIASTOLIC BLOOD PRESSURE: 75 MMHG | RESPIRATION RATE: 18 BRPM | TEMPERATURE: 97.9 F | HEART RATE: 80 BPM | OXYGEN SATURATION: 100 % | SYSTOLIC BLOOD PRESSURE: 130 MMHG

## 2022-01-01 VITALS
TEMPERATURE: 97.7 F | OXYGEN SATURATION: 100 % | RESPIRATION RATE: 22 BRPM | SYSTOLIC BLOOD PRESSURE: 146 MMHG | HEART RATE: 86 BPM | DIASTOLIC BLOOD PRESSURE: 93 MMHG

## 2022-01-01 VITALS
SYSTOLIC BLOOD PRESSURE: 121 MMHG | TEMPERATURE: 98.3 F | DIASTOLIC BLOOD PRESSURE: 86 MMHG | OXYGEN SATURATION: 96 % | RESPIRATION RATE: 18 BRPM | HEART RATE: 110 BPM

## 2022-01-01 VITALS
TEMPERATURE: 98.2 F | OXYGEN SATURATION: 99 % | SYSTOLIC BLOOD PRESSURE: 130 MMHG | RESPIRATION RATE: 17 BRPM | DIASTOLIC BLOOD PRESSURE: 85 MMHG | HEART RATE: 92 BPM

## 2022-01-01 VITALS
RESPIRATION RATE: 18 BRPM | OXYGEN SATURATION: 100 % | SYSTOLIC BLOOD PRESSURE: 134 MMHG | DIASTOLIC BLOOD PRESSURE: 64 MMHG | TEMPERATURE: 98 F | HEART RATE: 86 BPM

## 2022-01-01 VITALS
SYSTOLIC BLOOD PRESSURE: 128 MMHG | OXYGEN SATURATION: 97 % | TEMPERATURE: 98.7 F | DIASTOLIC BLOOD PRESSURE: 97 MMHG | RESPIRATION RATE: 18 BRPM | HEART RATE: 89 BPM

## 2022-01-01 VITALS
OXYGEN SATURATION: 100 % | TEMPERATURE: 97.6 F | DIASTOLIC BLOOD PRESSURE: 95 MMHG | SYSTOLIC BLOOD PRESSURE: 147 MMHG | HEART RATE: 96 BPM | RESPIRATION RATE: 18 BRPM

## 2022-01-01 VITALS
OXYGEN SATURATION: 99 % | HEART RATE: 96 BPM | WEIGHT: 249.34 LBS | TEMPERATURE: 97.7 F | SYSTOLIC BLOOD PRESSURE: 157 MMHG | DIASTOLIC BLOOD PRESSURE: 81 MMHG | RESPIRATION RATE: 20 BRPM | BODY MASS INDEX: 32 KG/M2

## 2022-01-01 VITALS
OXYGEN SATURATION: 100 % | DIASTOLIC BLOOD PRESSURE: 75 MMHG | HEART RATE: 88 BPM | RESPIRATION RATE: 16 BRPM | TEMPERATURE: 98.7 F | SYSTOLIC BLOOD PRESSURE: 145 MMHG

## 2022-01-01 VITALS
HEART RATE: 97 BPM | RESPIRATION RATE: 18 BRPM | OXYGEN SATURATION: 100 % | DIASTOLIC BLOOD PRESSURE: 94 MMHG | SYSTOLIC BLOOD PRESSURE: 131 MMHG | TEMPERATURE: 97.8 F

## 2022-01-01 VITALS
BODY MASS INDEX: 32.13 KG/M2 | SYSTOLIC BLOOD PRESSURE: 127 MMHG | HEART RATE: 95 BPM | TEMPERATURE: 97.4 F | DIASTOLIC BLOOD PRESSURE: 86 MMHG | WEIGHT: 250.4 LBS | OXYGEN SATURATION: 100 % | RESPIRATION RATE: 18 BRPM

## 2022-01-01 VITALS
RESPIRATION RATE: 18 BRPM | HEART RATE: 91 BPM | SYSTOLIC BLOOD PRESSURE: 140 MMHG | WEIGHT: 258.82 LBS | BODY MASS INDEX: 31.5 KG/M2 | DIASTOLIC BLOOD PRESSURE: 75 MMHG | TEMPERATURE: 97.8 F | OXYGEN SATURATION: 97 %

## 2022-01-01 VITALS
TEMPERATURE: 98.4 F | BODY MASS INDEX: 32.31 KG/M2 | RESPIRATION RATE: 16 BRPM | HEART RATE: 97 BPM | WEIGHT: 251.77 LBS | OXYGEN SATURATION: 98 % | DIASTOLIC BLOOD PRESSURE: 87 MMHG | SYSTOLIC BLOOD PRESSURE: 133 MMHG

## 2022-01-01 VITALS
RESPIRATION RATE: 18 BRPM | SYSTOLIC BLOOD PRESSURE: 142 MMHG | DIASTOLIC BLOOD PRESSURE: 95 MMHG | OXYGEN SATURATION: 100 % | HEART RATE: 90 BPM | TEMPERATURE: 98.4 F

## 2022-01-01 VITALS
RESPIRATION RATE: 20 BRPM | DIASTOLIC BLOOD PRESSURE: 96 MMHG | SYSTOLIC BLOOD PRESSURE: 143 MMHG | OXYGEN SATURATION: 99 % | BODY MASS INDEX: 31.53 KG/M2 | WEIGHT: 259.04 LBS | TEMPERATURE: 97.5 F | HEART RATE: 83 BPM

## 2022-01-01 VITALS
TEMPERATURE: 97.7 F | DIASTOLIC BLOOD PRESSURE: 111 MMHG | SYSTOLIC BLOOD PRESSURE: 176 MMHG | RESPIRATION RATE: 20 BRPM | HEART RATE: 78 BPM | OXYGEN SATURATION: 100 %

## 2022-01-01 VITALS
OXYGEN SATURATION: 100 % | DIASTOLIC BLOOD PRESSURE: 98 MMHG | BODY MASS INDEX: 30.69 KG/M2 | HEIGHT: 76 IN | SYSTOLIC BLOOD PRESSURE: 155 MMHG | RESPIRATION RATE: 18 BRPM | HEART RATE: 97 BPM | WEIGHT: 251.99 LBS | TEMPERATURE: 99.1 F

## 2022-01-01 VITALS
DIASTOLIC BLOOD PRESSURE: 83 MMHG | SYSTOLIC BLOOD PRESSURE: 129 MMHG | OXYGEN SATURATION: 97 % | TEMPERATURE: 98.3 F | RESPIRATION RATE: 16 BRPM | HEART RATE: 98 BPM

## 2022-01-01 VITALS
SYSTOLIC BLOOD PRESSURE: 136 MMHG | TEMPERATURE: 97.9 F | OXYGEN SATURATION: 99 % | DIASTOLIC BLOOD PRESSURE: 78 MMHG | RESPIRATION RATE: 20 BRPM | HEART RATE: 89 BPM

## 2022-01-01 VITALS
DIASTOLIC BLOOD PRESSURE: 87 MMHG | OXYGEN SATURATION: 97 % | HEART RATE: 90 BPM | TEMPERATURE: 97.9 F | SYSTOLIC BLOOD PRESSURE: 132 MMHG | RESPIRATION RATE: 16 BRPM

## 2022-01-01 VITALS
RESPIRATION RATE: 18 BRPM | SYSTOLIC BLOOD PRESSURE: 144 MMHG | TEMPERATURE: 98.2 F | OXYGEN SATURATION: 99 % | DIASTOLIC BLOOD PRESSURE: 88 MMHG | HEART RATE: 87 BPM

## 2022-01-01 VITALS
DIASTOLIC BLOOD PRESSURE: 86 MMHG | OXYGEN SATURATION: 100 % | SYSTOLIC BLOOD PRESSURE: 146 MMHG | RESPIRATION RATE: 20 BRPM | TEMPERATURE: 97.8 F | HEART RATE: 84 BPM

## 2022-01-01 VITALS
OXYGEN SATURATION: 98 % | RESPIRATION RATE: 16 BRPM | DIASTOLIC BLOOD PRESSURE: 82 MMHG | SYSTOLIC BLOOD PRESSURE: 144 MMHG | HEART RATE: 89 BPM | TEMPERATURE: 97.4 F

## 2022-01-01 VITALS
RESPIRATION RATE: 18 BRPM | HEART RATE: 90 BPM | SYSTOLIC BLOOD PRESSURE: 169 MMHG | DIASTOLIC BLOOD PRESSURE: 99 MMHG | TEMPERATURE: 98 F | OXYGEN SATURATION: 99 %

## 2022-01-01 VITALS
SYSTOLIC BLOOD PRESSURE: 155 MMHG | RESPIRATION RATE: 16 BRPM | DIASTOLIC BLOOD PRESSURE: 87 MMHG | TEMPERATURE: 97.8 F | OXYGEN SATURATION: 100 % | HEART RATE: 89 BPM

## 2022-01-01 VITALS
DIASTOLIC BLOOD PRESSURE: 66 MMHG | SYSTOLIC BLOOD PRESSURE: 103 MMHG | TEMPERATURE: 97.7 F | BODY MASS INDEX: 31.94 KG/M2 | HEART RATE: 110 BPM | WEIGHT: 248.9 LBS | OXYGEN SATURATION: 100 % | RESPIRATION RATE: 20 BRPM

## 2022-01-01 VITALS
RESPIRATION RATE: 16 BRPM | OXYGEN SATURATION: 99 % | SYSTOLIC BLOOD PRESSURE: 152 MMHG | HEART RATE: 72 BPM | DIASTOLIC BLOOD PRESSURE: 86 MMHG | TEMPERATURE: 98 F

## 2022-01-01 VITALS
RESPIRATION RATE: 20 BRPM | DIASTOLIC BLOOD PRESSURE: 85 MMHG | HEART RATE: 87 BPM | OXYGEN SATURATION: 99 % | TEMPERATURE: 98.3 F | SYSTOLIC BLOOD PRESSURE: 122 MMHG

## 2022-01-01 VITALS
TEMPERATURE: 98.1 F | SYSTOLIC BLOOD PRESSURE: 134 MMHG | HEART RATE: 111 BPM | RESPIRATION RATE: 18 BRPM | OXYGEN SATURATION: 100 % | DIASTOLIC BLOOD PRESSURE: 55 MMHG

## 2022-01-01 VITALS
WEIGHT: 253.53 LBS | HEART RATE: 99 BPM | OXYGEN SATURATION: 97 % | TEMPERATURE: 97.7 F | RESPIRATION RATE: 18 BRPM | BODY MASS INDEX: 32.54 KG/M2 | SYSTOLIC BLOOD PRESSURE: 132 MMHG | DIASTOLIC BLOOD PRESSURE: 82 MMHG

## 2022-01-01 VITALS
SYSTOLIC BLOOD PRESSURE: 119 MMHG | RESPIRATION RATE: 21 BRPM | BODY MASS INDEX: 36.64 KG/M2 | TEMPERATURE: 101.5 F | WEIGHT: 285.5 LBS | HEIGHT: 74 IN | OXYGEN SATURATION: 96 % | HEART RATE: 95 BPM | DIASTOLIC BLOOD PRESSURE: 55 MMHG

## 2022-01-01 VITALS
HEART RATE: 95 BPM | WEIGHT: 243.8 LBS | HEIGHT: 76 IN | OXYGEN SATURATION: 100 % | RESPIRATION RATE: 13 BRPM | BODY MASS INDEX: 29.69 KG/M2 | SYSTOLIC BLOOD PRESSURE: 118 MMHG | TEMPERATURE: 97.5 F | DIASTOLIC BLOOD PRESSURE: 80 MMHG

## 2022-01-01 VITALS
SYSTOLIC BLOOD PRESSURE: 128 MMHG | OXYGEN SATURATION: 99 % | HEART RATE: 96 BPM | TEMPERATURE: 98.2 F | DIASTOLIC BLOOD PRESSURE: 74 MMHG | RESPIRATION RATE: 16 BRPM

## 2022-01-01 VITALS
HEART RATE: 97 BPM | TEMPERATURE: 97.7 F | DIASTOLIC BLOOD PRESSURE: 82 MMHG | RESPIRATION RATE: 16 BRPM | OXYGEN SATURATION: 100 % | SYSTOLIC BLOOD PRESSURE: 142 MMHG

## 2022-01-01 VITALS
TEMPERATURE: 97.4 F | HEART RATE: 87 BPM | RESPIRATION RATE: 18 BRPM | BODY MASS INDEX: 31.61 KG/M2 | WEIGHT: 259.7 LBS | OXYGEN SATURATION: 100 % | DIASTOLIC BLOOD PRESSURE: 88 MMHG | SYSTOLIC BLOOD PRESSURE: 169 MMHG

## 2022-01-01 VITALS
HEART RATE: 82 BPM | OXYGEN SATURATION: 100 % | WEIGHT: 260.14 LBS | TEMPERATURE: 97.7 F | SYSTOLIC BLOOD PRESSURE: 151 MMHG | DIASTOLIC BLOOD PRESSURE: 98 MMHG | BODY MASS INDEX: 31.67 KG/M2 | RESPIRATION RATE: 20 BRPM

## 2022-01-01 VITALS
RESPIRATION RATE: 18 BRPM | OXYGEN SATURATION: 98 % | HEART RATE: 98 BPM | DIASTOLIC BLOOD PRESSURE: 94 MMHG | SYSTOLIC BLOOD PRESSURE: 150 MMHG | TEMPERATURE: 98 F

## 2022-01-01 VITALS
DIASTOLIC BLOOD PRESSURE: 97 MMHG | HEART RATE: 108 BPM | BODY MASS INDEX: 31.75 KG/M2 | RESPIRATION RATE: 18 BRPM | TEMPERATURE: 98.1 F | WEIGHT: 260.8 LBS | OXYGEN SATURATION: 98 % | SYSTOLIC BLOOD PRESSURE: 146 MMHG

## 2022-01-01 VITALS
TEMPERATURE: 98.1 F | HEART RATE: 109 BPM | DIASTOLIC BLOOD PRESSURE: 79 MMHG | OXYGEN SATURATION: 100 % | SYSTOLIC BLOOD PRESSURE: 142 MMHG | RESPIRATION RATE: 18 BRPM

## 2022-01-01 VITALS
DIASTOLIC BLOOD PRESSURE: 95 MMHG | SYSTOLIC BLOOD PRESSURE: 177 MMHG | TEMPERATURE: 98 F | HEART RATE: 80 BPM | OXYGEN SATURATION: 100 % | RESPIRATION RATE: 16 BRPM

## 2022-01-01 VITALS
SYSTOLIC BLOOD PRESSURE: 127 MMHG | RESPIRATION RATE: 16 BRPM | OXYGEN SATURATION: 98 % | HEART RATE: 94 BPM | DIASTOLIC BLOOD PRESSURE: 83 MMHG | TEMPERATURE: 97.8 F

## 2022-01-01 VITALS — WEIGHT: 236 LBS | TEMPERATURE: 97.9 F | BODY MASS INDEX: 28.74 KG/M2 | HEIGHT: 76 IN

## 2022-01-01 DIAGNOSIS — C90.01 MULTIPLE MYELOMA IN REMISSION (HCC): ICD-10-CM

## 2022-01-01 DIAGNOSIS — U07.1 COVID-19: ICD-10-CM

## 2022-01-01 DIAGNOSIS — C90.00 MULTIPLE MYELOMA NOT HAVING ACHIEVED REMISSION (HCC): ICD-10-CM

## 2022-01-01 DIAGNOSIS — R94.5 ABNORMAL RESULTS OF LIVER FUNCTION STUDIES: Primary | ICD-10-CM

## 2022-01-01 DIAGNOSIS — C90.01 MULTIPLE MYELOMA IN REMISSION (HCC): Primary | ICD-10-CM

## 2022-01-01 DIAGNOSIS — R79.1 ABNORMAL COAGULATION PROFILE: ICD-10-CM

## 2022-01-01 DIAGNOSIS — R50.81 NEUTROPENIC FEVER (HCC): Primary | ICD-10-CM

## 2022-01-01 DIAGNOSIS — C90.02 MULTIPLE MYELOMA IN RELAPSE (HCC): ICD-10-CM

## 2022-01-01 DIAGNOSIS — C90.00 MULTIPLE MYELOMA NOT HAVING ACHIEVED REMISSION (HCC): Primary | ICD-10-CM

## 2022-01-01 DIAGNOSIS — G93.1 ANOXIC BRAIN DAMAGE (HCC): ICD-10-CM

## 2022-01-01 DIAGNOSIS — R65.21 SEPTIC SHOCK (HCC): ICD-10-CM

## 2022-01-01 DIAGNOSIS — E87.5 HYPERKALEMIA: ICD-10-CM

## 2022-01-01 DIAGNOSIS — J96.00 ACUTE RESPIRATORY FAILURE DUE TO COVID-19 (HCC): ICD-10-CM

## 2022-01-01 DIAGNOSIS — R94.5 ABNORMAL RESULTS OF LIVER FUNCTION STUDIES: ICD-10-CM

## 2022-01-01 DIAGNOSIS — I62.00 SUBDURAL HEMORRHAGE (HCC): Primary | ICD-10-CM

## 2022-01-01 DIAGNOSIS — D70.9 NEUTROPENIC FEVER (HCC): Primary | ICD-10-CM

## 2022-01-01 DIAGNOSIS — U07.1 ACUTE RESPIRATORY FAILURE DUE TO COVID-19 (HCC): ICD-10-CM

## 2022-01-01 DIAGNOSIS — E83.42 HYPOMAGNESEMIA: ICD-10-CM

## 2022-01-01 DIAGNOSIS — A41.9 SEPTIC SHOCK (HCC): ICD-10-CM

## 2022-01-01 DIAGNOSIS — C90.00 MULTIPLE MYELOMA, REMISSION STATUS UNSPECIFIED (HCC): ICD-10-CM

## 2022-01-01 DIAGNOSIS — D64.9 ANEMIA, UNSPECIFIED TYPE: ICD-10-CM

## 2022-01-01 LAB
A/G RATIO: 1.5 (ref 1.1–2.2)
ABO/RH: NORMAL
ALBUMIN SERPL-MCNC: 2.2 G/DL (ref 3.4–5)
ALBUMIN SERPL-MCNC: 2.5 G/DL (ref 3.4–5)
ALBUMIN SERPL-MCNC: 2.8 G/DL (ref 3.4–5)
ALBUMIN SERPL-MCNC: 3.1 G/DL (ref 3.4–5)
ALBUMIN SERPL-MCNC: 3.3 G/DL (ref 3.4–5)
ALBUMIN SERPL-MCNC: 3.4 G/DL (ref 3.4–5)
ALBUMIN SERPL-MCNC: 3.5 G/DL (ref 3.4–5)
ALBUMIN SERPL-MCNC: 3.6 G/DL (ref 3.1–4.9)
ALBUMIN SERPL-MCNC: 3.7 G/DL (ref 3.4–5)
ALBUMIN SERPL-MCNC: 3.8 G/DL (ref 3.4–5)
ALBUMIN SERPL-MCNC: 3.8 G/DL (ref 3.4–5)
ALBUMIN SERPL-MCNC: 3.9 G/DL (ref 3.4–5)
ALBUMIN SERPL-MCNC: 4 G/DL (ref 3.4–5)
ALBUMIN SERPL-MCNC: 4 G/DL (ref 3.4–5)
ALBUMIN SERPL-MCNC: 4.1 G/DL (ref 3.4–5)
ALBUMIN SERPL-MCNC: 4.2 G/DL (ref 3.4–5)
ALBUMIN SERPL-MCNC: 4.3 G/DL (ref 3.4–5)
ALBUMIN SERPL-MCNC: 4.4 G/DL (ref 3.4–5)
ALP BLD-CCNC: 61 U/L (ref 40–129)
ALP BLD-CCNC: 62 U/L (ref 40–129)
ALP BLD-CCNC: 62 U/L (ref 40–129)
ALP BLD-CCNC: 63 U/L (ref 40–129)
ALP BLD-CCNC: 67 U/L (ref 40–129)
ALP BLD-CCNC: 67 U/L (ref 40–129)
ALP BLD-CCNC: 68 U/L (ref 40–129)
ALP BLD-CCNC: 69 U/L (ref 40–129)
ALP BLD-CCNC: 70 U/L (ref 40–129)
ALP BLD-CCNC: 70 U/L (ref 40–129)
ALP BLD-CCNC: 71 U/L (ref 40–129)
ALP BLD-CCNC: 72 U/L (ref 40–129)
ALP BLD-CCNC: 72 U/L (ref 40–129)
ALP BLD-CCNC: 73 U/L (ref 40–129)
ALP BLD-CCNC: 74 U/L (ref 40–129)
ALP BLD-CCNC: 76 U/L (ref 40–129)
ALP BLD-CCNC: 80 U/L (ref 40–129)
ALP BLD-CCNC: 84 U/L (ref 40–129)
ALP BLD-CCNC: 98 U/L (ref 40–129)
ALP BLD-CCNC: 99 U/L (ref 40–129)
ALPHA-1-GLOBULIN: 0.2 G/DL (ref 0.2–0.4)
ALPHA-2-GLOBULIN: 0.5 G/DL (ref 0.4–1.1)
ALT SERPL-CCNC: 11 U/L (ref 10–40)
ALT SERPL-CCNC: 11 U/L (ref 10–40)
ALT SERPL-CCNC: 13 U/L (ref 10–40)
ALT SERPL-CCNC: 14 U/L (ref 10–40)
ALT SERPL-CCNC: 14 U/L (ref 10–40)
ALT SERPL-CCNC: 15 U/L (ref 10–40)
ALT SERPL-CCNC: 16 U/L (ref 10–40)
ALT SERPL-CCNC: 18 U/L (ref 10–40)
ALT SERPL-CCNC: 19 U/L (ref 10–40)
ALT SERPL-CCNC: 20 U/L (ref 10–40)
ALT SERPL-CCNC: 21 U/L (ref 10–40)
ALT SERPL-CCNC: 23 U/L (ref 10–40)
ALT SERPL-CCNC: 24 U/L (ref 10–40)
ALT SERPL-CCNC: 6 U/L (ref 10–40)
ALT SERPL-CCNC: 7 U/L (ref 10–40)
ALT SERPL-CCNC: 8 U/L (ref 10–40)
ALT SERPL-CCNC: 9 U/L (ref 10–40)
ALT SERPL-CCNC: <5 U/L (ref 10–40)
AMORPHOUS: ABNORMAL
AMORPHOUS: ABNORMAL /HPF
AMORPHOUS: NORMAL /HPF
ANION GAP SERPL CALCULATED.3IONS-SCNC: 10 MMOL/L (ref 3–16)
ANION GAP SERPL CALCULATED.3IONS-SCNC: 11 MMOL/L (ref 3–16)
ANION GAP SERPL CALCULATED.3IONS-SCNC: 12 MMOL/L (ref 3–16)
ANION GAP SERPL CALCULATED.3IONS-SCNC: 13 MMOL/L (ref 3–16)
ANION GAP SERPL CALCULATED.3IONS-SCNC: 15 MMOL/L (ref 3–16)
ANION GAP SERPL CALCULATED.3IONS-SCNC: 17 MMOL/L (ref 3–16)
ANION GAP SERPL CALCULATED.3IONS-SCNC: 19 MMOL/L (ref 3–16)
ANION GAP SERPL CALCULATED.3IONS-SCNC: 19 MMOL/L (ref 3–16)
ANION GAP SERPL CALCULATED.3IONS-SCNC: 7 MMOL/L (ref 3–16)
ANION GAP SERPL CALCULATED.3IONS-SCNC: 7 MMOL/L (ref 3–16)
ANION GAP SERPL CALCULATED.3IONS-SCNC: 9 MMOL/L (ref 3–16)
ANISOCYTOSIS: ABNORMAL
ANTIBODY IDENTIFICATION: NORMAL
ANTIBODY SCREEN: NORMAL
APTT: 16.6 SEC (ref 26.2–38.6)
APTT: 29.8 SEC (ref 26.2–38.6)
APTT: 29.9 SEC (ref 26.2–38.6)
APTT: 30.3 SEC (ref 26.2–38.6)
APTT: 30.9 SEC (ref 26.2–38.6)
APTT: 30.9 SEC (ref 26.2–38.6)
APTT: 31.4 SEC (ref 26.2–38.6)
APTT: 31.6 SEC (ref 23–34.3)
APTT: 31.6 SEC (ref 26.2–38.6)
APTT: 31.7 SEC (ref 26.2–38.6)
APTT: 31.8 SEC (ref 26.2–38.6)
APTT: 32.1 SEC (ref 26.2–38.6)
APTT: 32.7 SEC (ref 26.2–38.6)
APTT: 33.1 SEC (ref 26.2–38.6)
APTT: 33.6 SEC (ref 26.2–38.6)
APTT: 37.6 SEC (ref 23–34.3)
APTT: 45.8 SEC (ref 26.2–38.6)
APTT: 47 SEC (ref 26.2–38.6)
APTT: 48 SEC (ref 26.2–38.6)
APTT: 55.2 SEC (ref 26.2–38.6)
APTT: 67.3 SEC (ref 26.2–38.6)
AST SERPL-CCNC: 10 U/L (ref 15–37)
AST SERPL-CCNC: 12 U/L (ref 15–37)
AST SERPL-CCNC: 12 U/L (ref 15–37)
AST SERPL-CCNC: 13 U/L (ref 15–37)
AST SERPL-CCNC: 13 U/L (ref 15–37)
AST SERPL-CCNC: 14 U/L (ref 15–37)
AST SERPL-CCNC: 15 U/L (ref 15–37)
AST SERPL-CCNC: 16 U/L (ref 15–37)
AST SERPL-CCNC: 17 U/L (ref 15–37)
AST SERPL-CCNC: 17 U/L (ref 15–37)
AST SERPL-CCNC: 18 U/L (ref 15–37)
AST SERPL-CCNC: 19 U/L (ref 15–37)
AST SERPL-CCNC: 19 U/L (ref 15–37)
AST SERPL-CCNC: 20 U/L (ref 15–37)
AST SERPL-CCNC: 21 U/L (ref 15–37)
AST SERPL-CCNC: 22 U/L (ref 15–37)
AST SERPL-CCNC: 22 U/L (ref 15–37)
AST SERPL-CCNC: 24 U/L (ref 15–37)
AST SERPL-CCNC: 24 U/L (ref 15–37)
AST SERPL-CCNC: 27 U/L (ref 15–37)
AST SERPL-CCNC: 38 U/L (ref 15–37)
AST SERPL-CCNC: 9 U/L (ref 15–37)
ATYPICAL LYMPHOCYTE RELATIVE PERCENT: 1 % (ref 0–6)
BACTERIA: ABNORMAL /HPF
BANDED NEUTROPHILS RELATIVE PERCENT: 1 % (ref 0–7)
BANDED NEUTROPHILS RELATIVE PERCENT: 10 % (ref 0–7)
BANDED NEUTROPHILS RELATIVE PERCENT: 19 % (ref 0–7)
BANDED NEUTROPHILS RELATIVE PERCENT: 2 % (ref 0–7)
BANDED NEUTROPHILS RELATIVE PERCENT: 4 % (ref 0–7)
BANDED NEUTROPHILS RELATIVE PERCENT: 6 % (ref 0–7)
BASE EXCESS ARTERIAL: -1.2 MMOL/L (ref -3–3)
BASE EXCESS ARTERIAL: -15 (ref -3–3)
BASE EXCESS ARTERIAL: -15 (ref -3–3)
BASE EXCESS ARTERIAL: -16 (ref -3–3)
BASE EXCESS ARTERIAL: -4.2 MMOL/L (ref -3–3)
BASE EXCESS ARTERIAL: -4.7 MMOL/L (ref -3–3)
BASE EXCESS VENOUS: -2 MMOL/L (ref -2–3)
BASE EXCESS VENOUS: -9.7 MMOL/L (ref -2–3)
BASOPHILS ABSOLUTE: 0 E9/L (ref 0–0.2)
BASOPHILS ABSOLUTE: 0 K/UL (ref 0–0.2)
BASOPHILS RELATIVE PERCENT: 0 %
BASOPHILS RELATIVE PERCENT: 0.1 %
BASOPHILS RELATIVE PERCENT: 0.2 %
BASOPHILS RELATIVE PERCENT: 0.2 %
BASOPHILS RELATIVE PERCENT: 0.3 %
BASOPHILS RELATIVE PERCENT: 0.4 %
BASOPHILS RELATIVE PERCENT: 0.5 %
BASOPHILS RELATIVE PERCENT: 0.8 %
BASOPHILS RELATIVE PERCENT: 0.9 %
BASOPHILS RELATIVE PERCENT: 1 %
BASOPHILS RELATIVE PERCENT: 1.1 %
BASOPHILS RELATIVE PERCENT: 1.4 % (ref 0–2)
BASOPHILS RELATIVE PERCENT: 1.6 %
BASOPHILS RELATIVE PERCENT: 2 %
BASOPHILS RELATIVE PERCENT: 2.3 %
BASOPHILS RELATIVE PERCENT: 4 %
BETA GLOBULIN: 0.8 G/DL (ref 0.9–1.6)
BILIRUB SERPL-MCNC: 0.3 MG/DL (ref 0–1)
BILIRUB SERPL-MCNC: 0.4 MG/DL (ref 0–1)
BILIRUB SERPL-MCNC: 0.5 MG/DL (ref 0–1)
BILIRUB SERPL-MCNC: 0.6 MG/DL (ref 0–1)
BILIRUB SERPL-MCNC: 0.7 MG/DL (ref 0–1)
BILIRUB SERPL-MCNC: 0.8 MG/DL (ref 0–1)
BILIRUB SERPL-MCNC: 0.9 MG/DL (ref 0–1)
BILIRUB SERPL-MCNC: 1.1 MG/DL (ref 0–1)
BILIRUB SERPL-MCNC: 1.2 MG/DL (ref 0–1)
BILIRUB SERPL-MCNC: 2.8 MG/DL (ref 0–1)
BILIRUB SERPL-MCNC: 3.6 MG/DL (ref 0–1)
BILIRUBIN DIRECT: 1.1 MG/DL (ref 0–0.3)
BILIRUBIN DIRECT: 2.7 MG/DL (ref 0–0.3)
BILIRUBIN DIRECT: 3.1 MG/DL (ref 0–0.3)
BILIRUBIN DIRECT: <0.2 MG/DL (ref 0–0.3)
BILIRUBIN URINE: NEGATIVE
BILIRUBIN, INDIRECT: 0.1 MG/DL (ref 0–1)
BILIRUBIN, INDIRECT: 0.1 MG/DL (ref 0–1)
BILIRUBIN, INDIRECT: 0.5 MG/DL (ref 0–1)
BILIRUBIN, INDIRECT: ABNORMAL MG/DL (ref 0–1)
BLASTS RELATIVE PERCENT: 5 %
BLOOD BANK DISPENSE STATUS: NORMAL
BLOOD BANK PRODUCT CODE: NORMAL
BLOOD CULTURE, ROUTINE: ABNORMAL
BLOOD CULTURE, ROUTINE: ABNORMAL
BLOOD CULTURE, ROUTINE: NORMAL
BLOOD CULTURE, ROUTINE: NORMAL
BLOOD, URINE: ABNORMAL
BPU ID: NORMAL
BUN BLDV-MCNC: 16 MG/DL (ref 7–20)
BUN BLDV-MCNC: 17 MG/DL (ref 7–20)
BUN BLDV-MCNC: 18 MG/DL (ref 7–20)
BUN BLDV-MCNC: 20 MG/DL (ref 7–20)
BUN BLDV-MCNC: 21 MG/DL (ref 7–20)
BUN BLDV-MCNC: 22 MG/DL (ref 7–20)
BUN BLDV-MCNC: 24 MG/DL (ref 7–20)
BUN BLDV-MCNC: 26 MG/DL (ref 7–20)
BUN BLDV-MCNC: 27 MG/DL (ref 7–20)
BUN BLDV-MCNC: 28 MG/DL (ref 7–20)
BUN BLDV-MCNC: 29 MG/DL (ref 7–20)
BUN BLDV-MCNC: 30 MG/DL (ref 7–20)
BUN BLDV-MCNC: 30 MG/DL (ref 7–20)
BUN BLDV-MCNC: 31 MG/DL (ref 7–20)
BUN BLDV-MCNC: 32 MG/DL (ref 7–20)
BUN BLDV-MCNC: 33 MG/DL (ref 7–20)
BUN BLDV-MCNC: 34 MG/DL (ref 7–20)
BUN BLDV-MCNC: 35 MG/DL (ref 7–20)
BUN BLDV-MCNC: 36 MG/DL (ref 7–20)
BUN BLDV-MCNC: 36 MG/DL (ref 7–20)
BUN BLDV-MCNC: 37 MG/DL (ref 7–20)
BUN BLDV-MCNC: 38 MG/DL (ref 7–20)
BUN BLDV-MCNC: 40 MG/DL (ref 7–20)
BUN BLDV-MCNC: 43 MG/DL (ref 7–20)
BUN BLDV-MCNC: 66 MG/DL (ref 7–20)
BUN BLDV-MCNC: 68 MG/DL (ref 7–20)
BUN BLDV-MCNC: 71 MG/DL (ref 7–20)
BUN BLDV-MCNC: 76 MG/DL (ref 7–20)
BUN BLDV-MCNC: 88 MG/DL (ref 7–20)
C DIFF TOXIN/ANTIGEN: NORMAL
C-REACTIVE PROTEIN: 14.2 MG/L (ref 0–5.1)
C-REACTIVE PROTEIN: 156.4 MG/L (ref 0–5.1)
C-REACTIVE PROTEIN: 25.2 MG/L (ref 0–5.1)
C-REACTIVE PROTEIN: 288 MG/L (ref 0–5.1)
C-REACTIVE PROTEIN: 3.2 MG/L (ref 0–5.1)
C-REACTIVE PROTEIN: 31.7 MG/L (ref 0–5.1)
C-REACTIVE PROTEIN: 32.3 MG/L (ref 0–5.1)
C-REACTIVE PROTEIN: 335.9 MG/L (ref 0–5.1)
C-REACTIVE PROTEIN: 382.5 MG/L (ref 0–5.1)
C-REACTIVE PROTEIN: 4.5 MG/L (ref 0–5.1)
C-REACTIVE PROTEIN: 45.3 MG/L (ref 0–5.1)
C-REACTIVE PROTEIN: 51 MG/L (ref 0–5.1)
C-REACTIVE PROTEIN: 53.9 MG/L (ref 0–5.1)
C-REACTIVE PROTEIN: 6.2 MG/L (ref 0–5.1)
C-REACTIVE PROTEIN: 8.8 MG/L (ref 0–5.1)
C-REACTIVE PROTEIN: <3 MG/L (ref 0–5.1)
CALCIUM IONIZED: 0.77 MMOL/L (ref 1.12–1.32)
CALCIUM IONIZED: 1.08 MMOL/L (ref 1.12–1.32)
CALCIUM IONIZED: 1.18 MMOL/L (ref 1.12–1.32)
CALCIUM SERPL-MCNC: 10 MG/DL (ref 8.3–10.6)
CALCIUM SERPL-MCNC: 5.9 MG/DL (ref 8.3–10.6)
CALCIUM SERPL-MCNC: 6.2 MG/DL (ref 8.3–10.6)
CALCIUM SERPL-MCNC: 6.5 MG/DL (ref 8.3–10.6)
CALCIUM SERPL-MCNC: 6.5 MG/DL (ref 8.3–10.6)
CALCIUM SERPL-MCNC: 6.6 MG/DL (ref 8.3–10.6)
CALCIUM SERPL-MCNC: 6.7 MG/DL (ref 8.3–10.6)
CALCIUM SERPL-MCNC: 6.9 MG/DL (ref 8.3–10.6)
CALCIUM SERPL-MCNC: 7.1 MG/DL (ref 8.3–10.6)
CALCIUM SERPL-MCNC: 7.2 MG/DL (ref 8.3–10.6)
CALCIUM SERPL-MCNC: 7.4 MG/DL (ref 8.3–10.6)
CALCIUM SERPL-MCNC: 7.5 MG/DL (ref 8.3–10.6)
CALCIUM SERPL-MCNC: 7.5 MG/DL (ref 8.3–10.6)
CALCIUM SERPL-MCNC: 7.7 MG/DL (ref 8.3–10.6)
CALCIUM SERPL-MCNC: 7.9 MG/DL (ref 8.3–10.6)
CALCIUM SERPL-MCNC: 7.9 MG/DL (ref 8.3–10.6)
CALCIUM SERPL-MCNC: 8 MG/DL (ref 8.3–10.6)
CALCIUM SERPL-MCNC: 8.1 MG/DL (ref 8.3–10.6)
CALCIUM SERPL-MCNC: 8.1 MG/DL (ref 8.3–10.6)
CALCIUM SERPL-MCNC: 8.2 MG/DL (ref 8.3–10.6)
CALCIUM SERPL-MCNC: 8.3 MG/DL (ref 8.3–10.6)
CALCIUM SERPL-MCNC: 8.4 MG/DL (ref 8.3–10.6)
CALCIUM SERPL-MCNC: 8.5 MG/DL (ref 8.3–10.6)
CALCIUM SERPL-MCNC: 8.5 MG/DL (ref 8.3–10.6)
CALCIUM SERPL-MCNC: 8.6 MG/DL (ref 8.3–10.6)
CALCIUM SERPL-MCNC: 8.6 MG/DL (ref 8.3–10.6)
CALCIUM SERPL-MCNC: 8.7 MG/DL (ref 8.3–10.6)
CALCIUM SERPL-MCNC: 8.8 MG/DL (ref 8.3–10.6)
CALCIUM SERPL-MCNC: 8.9 MG/DL (ref 8.3–10.6)
CALCIUM SERPL-MCNC: 9.1 MG/DL (ref 8.3–10.6)
CALCIUM SERPL-MCNC: 9.2 MG/DL (ref 8.3–10.6)
CALCIUM SERPL-MCNC: 9.3 MG/DL (ref 8.3–10.6)
CALCIUM SERPL-MCNC: 9.3 MG/DL (ref 8.3–10.6)
CALCIUM SERPL-MCNC: 9.4 MG/DL (ref 8.3–10.6)
CALCIUM SERPL-MCNC: 9.5 MG/DL (ref 8.3–10.6)
CALCIUM SERPL-MCNC: 9.7 MG/DL (ref 8.3–10.6)
CALCIUM SERPL-MCNC: 9.7 MG/DL (ref 8.3–10.6)
CALCIUM SERPL-MCNC: 9.9 MG/DL (ref 8.3–10.6)
CARBOXYHEMOGLOBIN ARTERIAL: 1.2 % (ref 0–1.5)
CARBOXYHEMOGLOBIN ARTERIAL: 1.3 % (ref 0–1.5)
CARBOXYHEMOGLOBIN ARTERIAL: 1.3 % (ref 0–1.5)
CARBOXYHEMOGLOBIN: 1.3 % (ref 0–1.5)
CARBOXYHEMOGLOBIN: 2 % (ref 0–1.5)
CHLORIDE BLD-SCNC: 101 MMOL/L (ref 99–110)
CHLORIDE BLD-SCNC: 105 MMOL/L (ref 99–110)
CHLORIDE BLD-SCNC: 106 MMOL/L (ref 99–110)
CHLORIDE BLD-SCNC: 107 MMOL/L (ref 99–110)
CHLORIDE BLD-SCNC: 108 MMOL/L (ref 99–110)
CHLORIDE BLD-SCNC: 109 MMOL/L (ref 99–110)
CHLORIDE BLD-SCNC: 110 MMOL/L (ref 99–110)
CHLORIDE BLD-SCNC: 111 MMOL/L (ref 99–110)
CHLORIDE BLD-SCNC: 112 MMOL/L (ref 99–110)
CHLORIDE BLD-SCNC: 113 MMOL/L (ref 99–110)
CHLORIDE BLD-SCNC: 114 MMOL/L (ref 99–110)
CHLORIDE BLD-SCNC: 86 MMOL/L (ref 99–110)
CHLORIDE BLD-SCNC: 86 MMOL/L (ref 99–110)
CHLORIDE BLD-SCNC: 91 MMOL/L (ref 99–110)
CLARITY: CLEAR
CO2: 13 MMOL/L (ref 21–32)
CO2: 14 MMOL/L (ref 21–32)
CO2: 18 MMOL/L (ref 21–32)
CO2: 19 MMOL/L (ref 21–32)
CO2: 20 MMOL/L (ref 21–32)
CO2: 21 MMOL/L (ref 21–32)
CO2: 22 MMOL/L (ref 21–32)
CO2: 23 MMOL/L (ref 21–32)
CO2: 24 MMOL/L (ref 21–32)
CO2: 25 MMOL/L (ref 21–32)
CO2: 25 MMOL/L (ref 21–32)
CO2: 26 MMOL/L (ref 21–32)
CO2: 26 MMOL/L (ref 21–32)
COLOR: YELLOW
CREAT SERPL-MCNC: 1.6 MG/DL (ref 0.8–1.3)
CREAT SERPL-MCNC: 1.7 MG/DL (ref 0.8–1.3)
CREAT SERPL-MCNC: 1.8 MG/DL (ref 0.8–1.3)
CREAT SERPL-MCNC: 1.9 MG/DL (ref 0.8–1.3)
CREAT SERPL-MCNC: 2 MG/DL (ref 0.8–1.3)
CREAT SERPL-MCNC: 2.1 MG/DL (ref 0.8–1.3)
CREAT SERPL-MCNC: 2.2 MG/DL (ref 0.8–1.3)
CREAT SERPL-MCNC: 2.2 MG/DL (ref 0.8–1.3)
CREAT SERPL-MCNC: 2.3 MG/DL (ref 0.8–1.3)
CREAT SERPL-MCNC: 2.3 MG/DL (ref 0.8–1.3)
CREAT SERPL-MCNC: 2.4 MG/DL (ref 0.8–1.3)
CREAT SERPL-MCNC: 2.5 MG/DL (ref 0.8–1.3)
CREAT SERPL-MCNC: 2.6 MG/DL (ref 0.8–1.3)
CREAT SERPL-MCNC: 2.7 MG/DL (ref 0.8–1.3)
CREAT SERPL-MCNC: 2.9 MG/DL (ref 0.8–1.3)
CREAT SERPL-MCNC: 2.9 MG/DL (ref 0.8–1.3)
CREAT SERPL-MCNC: 3.1 MG/DL (ref 0.8–1.3)
CREAT SERPL-MCNC: 3.1 MG/DL (ref 0.8–1.3)
CREAT SERPL-MCNC: 4 MG/DL (ref 0.8–1.3)
CREAT SERPL-MCNC: 4.8 MG/DL (ref 0.8–1.3)
CREAT SERPL-MCNC: 5.3 MG/DL (ref 0.8–1.3)
CREAT SERPL-MCNC: 6.4 MG/DL (ref 0.8–1.3)
CULTURE, BLOOD 2: ABNORMAL
CULTURE, BLOOD 2: NORMAL
CULTURE, BLOOD 2: NORMAL
CULTURE, FUNGUS BLOOD: NORMAL
CULTURE, FUNGUS BLOOD: NORMAL
D DIMER: 369 NG/ML DDU (ref 0–229)
D DIMER: 491 NG/ML DDU (ref 0–229)
D DIMER: 527 NG/ML DDU (ref 0–229)
D DIMER: 532 NG/ML DDU (ref 0–229)
D DIMER: 535 NG/ML DDU (ref 0–229)
D DIMER: 573 NG/ML DDU (ref 0–229)
D DIMER: 648 NG/ML DDU (ref 0–229)
DESCRIPTION BLOOD BANK: NORMAL
DOHLE BODIES: PRESENT
DOHLE BODIES: SLIGHT
EKG ATRIAL RATE: 101 BPM
EKG ATRIAL RATE: 153 BPM
EKG ATRIAL RATE: 90 BPM
EKG ATRIAL RATE: 91 BPM
EKG DIAGNOSIS: NORMAL
EKG P AXIS: 26 DEGREES
EKG P AXIS: 44 DEGREES
EKG P AXIS: 5 DEGREES
EKG P AXIS: 56 DEGREES
EKG P-R INTERVAL: 120 MS
EKG P-R INTERVAL: 156 MS
EKG P-R INTERVAL: 170 MS
EKG P-R INTERVAL: 172 MS
EKG Q-T INTERVAL: 244 MS
EKG Q-T INTERVAL: 364 MS
EKG Q-T INTERVAL: 364 MS
EKG Q-T INTERVAL: 366 MS
EKG QRS DURATION: 68 MS
EKG QRS DURATION: 70 MS
EKG QRS DURATION: 72 MS
EKG QRS DURATION: 76 MS
EKG QTC CALCULATION (BAZETT): 389 MS
EKG QTC CALCULATION (BAZETT): 445 MS
EKG QTC CALCULATION (BAZETT): 450 MS
EKG QTC CALCULATION (BAZETT): 471 MS
EKG R AXIS: 24 DEGREES
EKG R AXIS: 28 DEGREES
EKG R AXIS: 35 DEGREES
EKG R AXIS: 44 DEGREES
EKG T AXIS: 29 DEGREES
EKG T AXIS: 40 DEGREES
EKG T AXIS: 66 DEGREES
EKG T AXIS: 98 DEGREES
EKG VENTRICULAR RATE: 101 BPM
EKG VENTRICULAR RATE: 153 BPM
EKG VENTRICULAR RATE: 90 BPM
EKG VENTRICULAR RATE: 91 BPM
EOSINOPHILS ABSOLUTE: 0 K/UL (ref 0–0.6)
EOSINOPHILS ABSOLUTE: 0.1 E9/L (ref 0.05–0.5)
EOSINOPHILS ABSOLUTE: 0.1 K/UL (ref 0–0.6)
EOSINOPHILS ABSOLUTE: 0.2 K/UL (ref 0–0.6)
EOSINOPHILS ABSOLUTE: 0.3 K/UL (ref 0–0.6)
EOSINOPHILS RELATIVE PERCENT: 0 %
EOSINOPHILS RELATIVE PERCENT: 0.1 %
EOSINOPHILS RELATIVE PERCENT: 0.7 %
EOSINOPHILS RELATIVE PERCENT: 1 %
EOSINOPHILS RELATIVE PERCENT: 10.6 % (ref 0–6)
EOSINOPHILS RELATIVE PERCENT: 11.2 %
EOSINOPHILS RELATIVE PERCENT: 11.6 %
EOSINOPHILS RELATIVE PERCENT: 12 %
EOSINOPHILS RELATIVE PERCENT: 12.1 %
EOSINOPHILS RELATIVE PERCENT: 13 %
EOSINOPHILS RELATIVE PERCENT: 14 %
EOSINOPHILS RELATIVE PERCENT: 16 %
EOSINOPHILS RELATIVE PERCENT: 16 %
EOSINOPHILS RELATIVE PERCENT: 2 %
EOSINOPHILS RELATIVE PERCENT: 2.4 %
EOSINOPHILS RELATIVE PERCENT: 2.6 %
EOSINOPHILS RELATIVE PERCENT: 4 %
EOSINOPHILS RELATIVE PERCENT: 4 %
EOSINOPHILS RELATIVE PERCENT: 4.2 %
EOSINOPHILS RELATIVE PERCENT: 5 %
EOSINOPHILS RELATIVE PERCENT: 5 %
EOSINOPHILS RELATIVE PERCENT: 5.3 %
EOSINOPHILS RELATIVE PERCENT: 5.5 %
EOSINOPHILS RELATIVE PERCENT: 6 %
EOSINOPHILS RELATIVE PERCENT: 6 %
EOSINOPHILS RELATIVE PERCENT: 7 %
EOSINOPHILS RELATIVE PERCENT: 8 %
EOSINOPHILS RELATIVE PERCENT: 8.7 %
EOSINOPHILS RELATIVE PERCENT: 9 %
EOSINOPHILS RELATIVE PERCENT: 9.1 %
EPITHELIAL CELLS, UA: ABNORMAL /HPF
EPITHELIAL CELLS, UA: ABNORMAL /HPF (ref 0–5)
EPITHELIAL CELLS, UA: NORMAL /HPF (ref 0–5)
FERRITIN: 1000 NG/ML (ref 30–400)
FERRITIN: 1010 NG/ML (ref 30–400)
FERRITIN: 1021 NG/ML (ref 30–400)
FERRITIN: 1059 NG/ML (ref 30–400)
FERRITIN: 1070 NG/ML (ref 30–400)
FERRITIN: 1154 NG/ML (ref 30–400)
FERRITIN: 1156 NG/ML (ref 30–400)
FERRITIN: 1166 NG/ML (ref 30–400)
FERRITIN: 1190 NG/ML (ref 30–400)
FERRITIN: 1222 NG/ML (ref 30–400)
FERRITIN: 1325 NG/ML (ref 30–400)
FERRITIN: 1407 NG/ML (ref 30–400)
FERRITIN: 1456 NG/ML (ref 30–400)
FERRITIN: 1470 NG/ML (ref 30–400)
FERRITIN: 1492 NG/ML (ref 30–400)
FERRITIN: 1678 NG/ML (ref 30–400)
FERRITIN: 1688 NG/ML (ref 30–400)
FERRITIN: 1723 NG/ML (ref 30–400)
FERRITIN: 1742 NG/ML (ref 30–400)
FERRITIN: 1794 NG/ML (ref 30–400)
FERRITIN: 2049 NG/ML (ref 30–400)
FERRITIN: 760.6 NG/ML (ref 30–400)
FERRITIN: 768.7 NG/ML (ref 30–400)
FERRITIN: 772.7 NG/ML (ref 30–400)
FERRITIN: 778.5 NG/ML (ref 30–400)
FERRITIN: 812.1 NG/ML (ref 30–400)
FERRITIN: 815.7 NG/ML (ref 30–400)
FERRITIN: 835.1 NG/ML (ref 30–400)
FERRITIN: 928.7 NG/ML (ref 30–400)
FERRITIN: 931.8 NG/ML (ref 30–400)
FERRITIN: 933.5 NG/ML (ref 30–400)
FERRITIN: 936 NG/ML (ref 30–400)
FERRITIN: 955.5 NG/ML (ref 30–400)
FERRITIN: 956.4 NG/ML (ref 30–400)
FERRITIN: 959.7 NG/ML (ref 30–400)
FERRITIN: 981.4 NG/ML (ref 30–400)
FIBRINOGEN: 279 MG/DL (ref 200–397)
FIBRINOGEN: 312 MG/DL (ref 200–397)
FIBRINOGEN: 333 MG/DL (ref 200–397)
FIBRINOGEN: 355 MG/DL (ref 200–397)
FIBRINOGEN: 387 MG/DL (ref 200–397)
FIBRINOGEN: 391 MG/DL (ref 200–397)
FIBRINOGEN: 419 MG/DL (ref 200–397)
FIBRINOGEN: 455 MG/DL (ref 200–397)
FINAL REPORT: NORMAL
FUNGUS (MYCOLOGY) CULTURE: NORMAL
FUNGUS (MYCOLOGY) CULTURE: NORMAL
FUNGUS STAIN: NORMAL
FUNGUS STAIN: NORMAL
GAMMA GLOBULIN: 0.7 G/DL (ref 0.6–1.8)
GFR AFRICAN AMERICAN: 11
GFR AFRICAN AMERICAN: 13
GFR AFRICAN AMERICAN: 15
GFR AFRICAN AMERICAN: 18
GFR AFRICAN AMERICAN: 24
GFR AFRICAN AMERICAN: 24
GFR AFRICAN AMERICAN: 26
GFR AFRICAN AMERICAN: 26
GFR AFRICAN AMERICAN: 29
GFR AFRICAN AMERICAN: 30
GFR AFRICAN AMERICAN: 31
GFR AFRICAN AMERICAN: 33
GFR AFRICAN AMERICAN: 35
GFR AFRICAN AMERICAN: 35
GFR AFRICAN AMERICAN: 36
GFR AFRICAN AMERICAN: 36
GFR AFRICAN AMERICAN: 38
GFR AFRICAN AMERICAN: 41
GFR AFRICAN AMERICAN: 43
GFR AFRICAN AMERICAN: 46
GFR AFRICAN AMERICAN: 49
GFR AFRICAN AMERICAN: 53
GFR NON-AFRICAN AMERICAN: 11
GFR NON-AFRICAN AMERICAN: 12
GFR NON-AFRICAN AMERICAN: 15
GFR NON-AFRICAN AMERICAN: 20
GFR NON-AFRICAN AMERICAN: 20
GFR NON-AFRICAN AMERICAN: 22
GFR NON-AFRICAN AMERICAN: 22
GFR NON-AFRICAN AMERICAN: 24
GFR NON-AFRICAN AMERICAN: 25
GFR NON-AFRICAN AMERICAN: 26
GFR NON-AFRICAN AMERICAN: 27
GFR NON-AFRICAN AMERICAN: 29
GFR NON-AFRICAN AMERICAN: 29
GFR NON-AFRICAN AMERICAN: 30
GFR NON-AFRICAN AMERICAN: 30
GFR NON-AFRICAN AMERICAN: 32
GFR NON-AFRICAN AMERICAN: 34
GFR NON-AFRICAN AMERICAN: 36
GFR NON-AFRICAN AMERICAN: 38
GFR NON-AFRICAN AMERICAN: 40
GFR NON-AFRICAN AMERICAN: 43
GFR NON-AFRICAN AMERICAN: 9
GLUCOSE BLD-MCNC: 102 MG/DL (ref 70–99)
GLUCOSE BLD-MCNC: 103 MG/DL (ref 70–99)
GLUCOSE BLD-MCNC: 104 MG/DL (ref 70–99)
GLUCOSE BLD-MCNC: 106 MG/DL (ref 70–99)
GLUCOSE BLD-MCNC: 109 MG/DL (ref 70–99)
GLUCOSE BLD-MCNC: 110 MG/DL (ref 70–99)
GLUCOSE BLD-MCNC: 111 MG/DL (ref 70–99)
GLUCOSE BLD-MCNC: 113 MG/DL (ref 70–99)
GLUCOSE BLD-MCNC: 114 MG/DL (ref 70–99)
GLUCOSE BLD-MCNC: 115 MG/DL (ref 70–99)
GLUCOSE BLD-MCNC: 116 MG/DL (ref 70–99)
GLUCOSE BLD-MCNC: 117 MG/DL (ref 70–99)
GLUCOSE BLD-MCNC: 117 MG/DL (ref 70–99)
GLUCOSE BLD-MCNC: 119 MG/DL (ref 70–99)
GLUCOSE BLD-MCNC: 120 MG/DL (ref 70–99)
GLUCOSE BLD-MCNC: 121 MG/DL (ref 70–99)
GLUCOSE BLD-MCNC: 121 MG/DL (ref 70–99)
GLUCOSE BLD-MCNC: 123 MG/DL (ref 70–99)
GLUCOSE BLD-MCNC: 127 MG/DL (ref 70–99)
GLUCOSE BLD-MCNC: 129 MG/DL (ref 70–99)
GLUCOSE BLD-MCNC: 129 MG/DL (ref 70–99)
GLUCOSE BLD-MCNC: 130 MG/DL (ref 70–99)
GLUCOSE BLD-MCNC: 131 MG/DL (ref 70–99)
GLUCOSE BLD-MCNC: 134 MG/DL (ref 70–99)
GLUCOSE BLD-MCNC: 137 MG/DL (ref 70–99)
GLUCOSE BLD-MCNC: 139 MG/DL (ref 70–99)
GLUCOSE BLD-MCNC: 141 MG/DL (ref 70–99)
GLUCOSE BLD-MCNC: 144 MG/DL (ref 70–99)
GLUCOSE BLD-MCNC: 144 MG/DL (ref 70–99)
GLUCOSE BLD-MCNC: 149 MG/DL (ref 70–99)
GLUCOSE BLD-MCNC: 155 MG/DL (ref 70–99)
GLUCOSE BLD-MCNC: 159 MG/DL (ref 70–99)
GLUCOSE BLD-MCNC: 164 MG/DL (ref 70–99)
GLUCOSE BLD-MCNC: 274 MG/DL (ref 70–99)
GLUCOSE BLD-MCNC: 78 MG/DL (ref 70–99)
GLUCOSE BLD-MCNC: 81 MG/DL (ref 70–99)
GLUCOSE BLD-MCNC: 83 MG/DL (ref 70–99)
GLUCOSE BLD-MCNC: 84 MG/DL (ref 70–99)
GLUCOSE BLD-MCNC: 88 MG/DL (ref 70–99)
GLUCOSE BLD-MCNC: 89 MG/DL (ref 70–99)
GLUCOSE BLD-MCNC: 91 MG/DL (ref 70–99)
GLUCOSE BLD-MCNC: 93 MG/DL (ref 70–99)
GLUCOSE BLD-MCNC: 95 MG/DL (ref 70–99)
GLUCOSE BLD-MCNC: 95 MG/DL (ref 70–99)
GLUCOSE BLD-MCNC: 96 MG/DL (ref 70–99)
GLUCOSE BLD-MCNC: 97 MG/DL (ref 70–99)
GLUCOSE BLD-MCNC: 98 MG/DL (ref 70–99)
GLUCOSE BLD-MCNC: 99 MG/DL (ref 70–99)
GLUCOSE URINE: NEGATIVE MG/DL
HCO3 ARTERIAL: 11 MMOL/L (ref 21–29)
HCO3 ARTERIAL: 12.6 MMOL/L (ref 21–29)
HCO3 ARTERIAL: 12.7 MMOL/L (ref 21–29)
HCO3 ARTERIAL: 20 MMOL/L (ref 21–29)
HCO3 ARTERIAL: 20 MMOL/L (ref 21–29)
HCO3 ARTERIAL: 23 MMOL/L (ref 21–29)
HCO3 VENOUS: 16.6 MMOL/L (ref 24–28)
HCO3 VENOUS: 23.2 MMOL/L (ref 24–28)
HCT VFR BLD CALC: 17.1 % (ref 40.5–52.5)
HCT VFR BLD CALC: 18.3 % (ref 40.5–52.5)
HCT VFR BLD CALC: 18.6 % (ref 40.5–52.5)
HCT VFR BLD CALC: 18.9 % (ref 40.5–52.5)
HCT VFR BLD CALC: 18.9 % (ref 40.5–52.5)
HCT VFR BLD CALC: 19.2 % (ref 40.5–52.5)
HCT VFR BLD CALC: 19.3 % (ref 40.5–52.5)
HCT VFR BLD CALC: 19.5 % (ref 40.5–52.5)
HCT VFR BLD CALC: 19.8 % (ref 40.5–52.5)
HCT VFR BLD CALC: 19.9 % (ref 40.5–52.5)
HCT VFR BLD CALC: 20.5 % (ref 40.5–52.5)
HCT VFR BLD CALC: 20.5 % (ref 40.5–52.5)
HCT VFR BLD CALC: 20.6 % (ref 40.5–52.5)
HCT VFR BLD CALC: 20.6 % (ref 40.5–52.5)
HCT VFR BLD CALC: 20.7 % (ref 40.5–52.5)
HCT VFR BLD CALC: 21.1 % (ref 37–54)
HCT VFR BLD CALC: 21.2 % (ref 40.5–52.5)
HCT VFR BLD CALC: 21.3 % (ref 40.5–52.5)
HCT VFR BLD CALC: 21.4 % (ref 40.5–52.5)
HCT VFR BLD CALC: 21.4 % (ref 40.5–52.5)
HCT VFR BLD CALC: 21.5 % (ref 40.5–52.5)
HCT VFR BLD CALC: 21.5 % (ref 40.5–52.5)
HCT VFR BLD CALC: 21.7 % (ref 40.5–52.5)
HCT VFR BLD CALC: 21.9 % (ref 40.5–52.5)
HCT VFR BLD CALC: 22.1 % (ref 40.5–52.5)
HCT VFR BLD CALC: 22.2 % (ref 40.5–52.5)
HCT VFR BLD CALC: 22.3 % (ref 40.5–52.5)
HCT VFR BLD CALC: 22.4 % (ref 40.5–52.5)
HCT VFR BLD CALC: 22.7 % (ref 40.5–52.5)
HCT VFR BLD CALC: 22.7 % (ref 40.5–52.5)
HCT VFR BLD CALC: 22.8 % (ref 40.5–52.5)
HCT VFR BLD CALC: 23 % (ref 40.5–52.5)
HCT VFR BLD CALC: 23.3 % (ref 40.5–52.5)
HCT VFR BLD CALC: 23.5 % (ref 40.5–52.5)
HCT VFR BLD CALC: 23.7 % (ref 40.5–52.5)
HCT VFR BLD CALC: 23.8 % (ref 40.5–52.5)
HCT VFR BLD CALC: 23.8 % (ref 40.5–52.5)
HCT VFR BLD CALC: 24.1 % (ref 40.5–52.5)
HCT VFR BLD CALC: 24.2 % (ref 40.5–52.5)
HCT VFR BLD CALC: 24.6 % (ref 40.5–52.5)
HCT VFR BLD CALC: 24.7 % (ref 40.5–52.5)
HCT VFR BLD CALC: 25.7 % (ref 40.5–52.5)
HCT VFR BLD CALC: 26.2 % (ref 40.5–52.5)
HCT VFR BLD CALC: 26.5 % (ref 40.5–52.5)
HCT VFR BLD CALC: 26.6 % (ref 40.5–52.5)
HCT VFR BLD CALC: 26.6 % (ref 40.5–52.5)
HCT VFR BLD CALC: 26.8 % (ref 40.5–52.5)
HCT VFR BLD CALC: 27 % (ref 40.5–52.5)
HCT VFR BLD CALC: 27.1 % (ref 40.5–52.5)
HCT VFR BLD CALC: 27.2 % (ref 40.5–52.5)
HCT VFR BLD CALC: 27.3 % (ref 40.5–52.5)
HCT VFR BLD CALC: 27.7 % (ref 40.5–52.5)
HCT VFR BLD CALC: 28 % (ref 40.5–52.5)
HCT VFR BLD CALC: 28.4 % (ref 40.5–52.5)
HCT VFR BLD CALC: 28.7 % (ref 40.5–52.5)
HCT VFR BLD CALC: 29.3 % (ref 40.5–52.5)
HCT VFR BLD CALC: 30.4 % (ref 40.5–52.5)
HEMATOLOGY PATH CONSULT: NO
HEMATOLOGY PATH CONSULT: NO
HEMATOLOGY PATH CONSULT: YES
HEMATOLOGY PATH CONSULT: YES
HEMOGLOBIN, ART, EXTENDED: 6.4 G/DL
HEMOGLOBIN, ART, EXTENDED: 6.6 G/DL
HEMOGLOBIN, ART, EXTENDED: 6.9 G/DL
HEMOGLOBIN, VEN, REDUCED: 63.5 %
HEMOGLOBIN, VEN, REDUCED: 65.7 %
HEMOGLOBIN: 10 G/DL (ref 13.5–17.5)
HEMOGLOBIN: 10.1 G/DL (ref 13.5–17.5)
HEMOGLOBIN: 6 G/DL (ref 13.5–17.5)
HEMOGLOBIN: 6.4 G/DL (ref 13.5–17.5)
HEMOGLOBIN: 6.7 G/DL (ref 13.5–17.5)
HEMOGLOBIN: 6.7 G/DL (ref 13.5–17.5)
HEMOGLOBIN: 6.8 G/DL (ref 13.5–17.5)
HEMOGLOBIN: 7 G/DL (ref 13.5–17.5)
HEMOGLOBIN: 7.2 G/DL (ref 13.5–17.5)
HEMOGLOBIN: 7.3 G/DL (ref 12.5–16.5)
HEMOGLOBIN: 7.3 G/DL (ref 13.5–17.5)
HEMOGLOBIN: 7.4 G/DL (ref 13.5–17.5)
HEMOGLOBIN: 7.4 G/DL (ref 13.5–17.5)
HEMOGLOBIN: 7.6 G/DL (ref 13.5–17.5)
HEMOGLOBIN: 7.7 G/DL (ref 13.5–17.5)
HEMOGLOBIN: 7.7 G/DL (ref 13.5–17.5)
HEMOGLOBIN: 7.8 G/DL (ref 13.5–17.5)
HEMOGLOBIN: 7.9 G/DL (ref 13.5–17.5)
HEMOGLOBIN: 8.1 G/DL (ref 13.5–17.5)
HEMOGLOBIN: 8.2 G/DL (ref 13.5–17.5)
HEMOGLOBIN: 8.3 G/DL (ref 13.5–17.5)
HEMOGLOBIN: 8.5 G/DL (ref 13.5–17.5)
HEMOGLOBIN: 8.6 G/DL (ref 13.5–17.5)
HEMOGLOBIN: 8.9 G/DL (ref 13.5–17.5)
HEMOGLOBIN: 9.1 G/DL (ref 13.5–17.5)
HEMOGLOBIN: 9.1 G/DL (ref 13.5–17.5)
HEMOGLOBIN: 9.2 G/DL (ref 13.5–17.5)
HEMOGLOBIN: 9.3 G/DL (ref 13.5–17.5)
HEMOGLOBIN: 9.3 G/DL (ref 13.5–17.5)
HEMOGLOBIN: 9.4 G/DL (ref 13.5–17.5)
HEMOGLOBIN: 9.4 G/DL (ref 13.5–17.5)
HEMOGLOBIN: 9.5 G/DL (ref 13.5–17.5)
HEMOGLOBIN: 9.6 G/DL (ref 13.5–17.5)
HYALINE CASTS: ABNORMAL /LPF (ref 0–2)
HYPOCHROMIA: ABNORMAL
IGA: 32 MG/DL (ref 70–400)
IGG: 411 MG/DL (ref 700–1600)
IGG: 663 MG/DL (ref 700–1600)
IGM: 24 MG/DL (ref 40–230)
INFLUENZA A: NOT DETECTED
INFLUENZA B: NOT DETECTED
INR BLD: 0.94 (ref 0.88–1.12)
INR BLD: 1.03 (ref 0.88–1.12)
INR BLD: 1.03 (ref 0.88–1.12)
INR BLD: 1.04 (ref 0.88–1.12)
INR BLD: 1.05 (ref 0.88–1.12)
INR BLD: 1.06 (ref 0.88–1.12)
INR BLD: 1.07 (ref 0.88–1.12)
INR BLD: 1.1 (ref 0.88–1.12)
INR BLD: 1.1 (ref 0.88–1.12)
INR BLD: 1.12 (ref 0.88–1.12)
INR BLD: 1.14 (ref 0.88–1.12)
INR BLD: 1.14 (ref 0.88–1.12)
INR BLD: 1.16 (ref 0.88–1.12)
INR BLD: 1.17 (ref 0.88–1.12)
INR BLD: 1.18 (ref 0.88–1.12)
INR BLD: 1.19 (ref 0.88–1.12)
INR BLD: 1.19 (ref 0.88–1.12)
INR BLD: 1.21 (ref 0.88–1.12)
INR BLD: 1.21 (ref 0.88–1.12)
INR BLD: 1.23 (ref 0.88–1.12)
INR BLD: 1.41 (ref 0.88–1.12)
INR BLD: 1.51 (ref 0.87–1.14)
INR BLD: 1.55 (ref 0.87–1.14)
KAPPA, FREE LIGHT CHAINS, SERUM: 3230.6 MG/L (ref 3.3–19.4)
KAPPA/LAMBDA RATIO: 486.54 (ref 0.26–1.65)
KAPPA/LAMBDA TEST COMMENT: ABNORMAL
KETONES, URINE: ABNORMAL MG/DL
KETONES, URINE: NEGATIVE MG/DL
LACTATE DEHYDROGENASE: 180 U/L (ref 100–190)
LACTATE DEHYDROGENASE: 198 U/L (ref 100–190)
LACTATE DEHYDROGENASE: 206 U/L (ref 100–190)
LACTATE DEHYDROGENASE: 208 U/L (ref 100–190)
LACTATE DEHYDROGENASE: 209 U/L (ref 100–190)
LACTATE DEHYDROGENASE: 212 U/L (ref 100–190)
LACTATE DEHYDROGENASE: 214 U/L (ref 100–190)
LACTATE DEHYDROGENASE: 220 U/L (ref 100–190)
LACTATE DEHYDROGENASE: 223 U/L (ref 100–190)
LACTATE DEHYDROGENASE: 231 U/L (ref 100–190)
LACTATE DEHYDROGENASE: 232 U/L (ref 100–190)
LACTATE DEHYDROGENASE: 233 U/L (ref 100–190)
LACTATE DEHYDROGENASE: 239 U/L (ref 100–190)
LACTATE DEHYDROGENASE: 242 U/L (ref 100–190)
LACTATE DEHYDROGENASE: 251 U/L (ref 100–190)
LACTATE DEHYDROGENASE: 260 U/L (ref 100–190)
LACTATE DEHYDROGENASE: 260 U/L (ref 100–190)
LACTATE DEHYDROGENASE: 266 U/L (ref 100–190)
LACTATE DEHYDROGENASE: 266 U/L (ref 100–190)
LACTATE DEHYDROGENASE: 269 U/L (ref 100–190)
LACTATE DEHYDROGENASE: 297 U/L (ref 100–190)
LACTATE DEHYDROGENASE: 303 U/L (ref 100–190)
LACTATE DEHYDROGENASE: 311 U/L (ref 100–190)
LACTATE DEHYDROGENASE: 367 U/L (ref 100–190)
LACTATE DEHYDROGENASE: 375 U/L (ref 100–190)
LACTATE DEHYDROGENASE: 461 U/L (ref 100–190)
LACTATE DEHYDROGENASE: 490 U/L (ref 100–190)
LACTATE: 3.14 MMOL/L (ref 0.4–2)
LACTATE: 5.46 MMOL/L (ref 0.4–2)
LACTIC ACID, SEPSIS: 1.4 MMOL/L (ref 0.4–1.9)
LACTIC ACID, SEPSIS: 1.8 MMOL/L (ref 0.4–1.9)
LACTIC ACID, SEPSIS: 1.9 MMOL/L (ref 0.4–1.9)
LACTIC ACID, SEPSIS: 2.1 MMOL/L (ref 0.4–1.9)
LACTIC ACID, SEPSIS: 2.2 MMOL/L (ref 0.4–1.9)
LACTIC ACID, SEPSIS: 2.3 MMOL/L (ref 0.4–1.9)
LACTIC ACID, SEPSIS: 3.6 MMOL/L (ref 0.4–1.9)
LACTIC ACID, SEPSIS: 4 MMOL/L (ref 0.4–1.9)
LACTIC ACID, SEPSIS: 4.4 MMOL/L (ref 0.4–1.9)
LACTIC ACID, SEPSIS: 4.8 MMOL/L (ref 0.4–1.9)
LACTIC ACID, SEPSIS: 5 MMOL/L (ref 0.4–1.9)
LACTIC ACID, SEPSIS: 5.6 MMOL/L (ref 0.4–1.9)
LACTIC ACID, SEPSIS: 5.6 MMOL/L (ref 0.4–1.9)
LACTIC ACID, SEPSIS: 5.7 MMOL/L (ref 0.4–1.9)
LACTIC ACID, SEPSIS: 6 MMOL/L (ref 0.4–1.9)
LACTIC ACID, SEPSIS: 6.1 MMOL/L (ref 0.4–1.9)
LACTIC ACID, SEPSIS: 6.4 MMOL/L (ref 0.4–1.9)
LACTIC ACID, SEPSIS: 6.6 MMOL/L (ref 0.4–1.9)
LACTIC ACID, SEPSIS: 6.7 MMOL/L (ref 0.4–1.9)
LACTIC ACID: 0.6 MMOL/L (ref 0.4–2)
LACTIC ACID: 1.3 MMOL/L (ref 0.4–2)
LAMBDA, FREE LIGHT CHAINS, SERUM: 6.64 MG/L (ref 5.71–26.3)
LEUKOCYTE ESTERASE, URINE: NEGATIVE
LYMPHOCYTES ABSOLUTE: 0 K/UL (ref 1–5.1)
LYMPHOCYTES ABSOLUTE: 0 K/UL (ref 1–5.1)
LYMPHOCYTES ABSOLUTE: 0.1 K/UL (ref 1–5.1)
LYMPHOCYTES ABSOLUTE: 0.2 E9/L (ref 1.5–4)
LYMPHOCYTES ABSOLUTE: 0.2 K/UL (ref 1–5.1)
LYMPHOCYTES ABSOLUTE: 0.3 K/UL (ref 1–5.1)
LYMPHOCYTES ABSOLUTE: 0.4 K/UL (ref 1–5.1)
LYMPHOCYTES ABSOLUTE: 0.7 K/UL (ref 1–5.1)
LYMPHOCYTES RELATIVE PERCENT: 1.8 %
LYMPHOCYTES RELATIVE PERCENT: 10 %
LYMPHOCYTES RELATIVE PERCENT: 10.7 %
LYMPHOCYTES RELATIVE PERCENT: 11 %
LYMPHOCYTES RELATIVE PERCENT: 11 %
LYMPHOCYTES RELATIVE PERCENT: 12 %
LYMPHOCYTES RELATIVE PERCENT: 17 %
LYMPHOCYTES RELATIVE PERCENT: 19 %
LYMPHOCYTES RELATIVE PERCENT: 2.6 %
LYMPHOCYTES RELATIVE PERCENT: 20 %
LYMPHOCYTES RELATIVE PERCENT: 20.6 %
LYMPHOCYTES RELATIVE PERCENT: 21 %
LYMPHOCYTES RELATIVE PERCENT: 21.9 %
LYMPHOCYTES RELATIVE PERCENT: 22 %
LYMPHOCYTES RELATIVE PERCENT: 23.4 %
LYMPHOCYTES RELATIVE PERCENT: 23.6 %
LYMPHOCYTES RELATIVE PERCENT: 26 %
LYMPHOCYTES RELATIVE PERCENT: 26.5 % (ref 20–42)
LYMPHOCYTES RELATIVE PERCENT: 26.8 %
LYMPHOCYTES RELATIVE PERCENT: 27 %
LYMPHOCYTES RELATIVE PERCENT: 28 %
LYMPHOCYTES RELATIVE PERCENT: 29.3 %
LYMPHOCYTES RELATIVE PERCENT: 31 %
LYMPHOCYTES RELATIVE PERCENT: 31 %
LYMPHOCYTES RELATIVE PERCENT: 34 %
LYMPHOCYTES RELATIVE PERCENT: 39.4 %
LYMPHOCYTES RELATIVE PERCENT: 40 %
LYMPHOCYTES RELATIVE PERCENT: 42 %
LYMPHOCYTES RELATIVE PERCENT: 43 %
LYMPHOCYTES RELATIVE PERCENT: 44 %
LYMPHOCYTES RELATIVE PERCENT: 5.4 %
LYMPHOCYTES RELATIVE PERCENT: 5.7 %
LYMPHOCYTES RELATIVE PERCENT: 6.1 %
M SPIKE 1 PROTEIN ELECTROPHORESIS URINE/VOL: 0.05 G/DL
MACROCYTES: ABNORMAL
MAGNESIUM: 1.4 MG/DL (ref 1.8–2.4)
MAGNESIUM: 1.5 MG/DL (ref 1.8–2.4)
MAGNESIUM: 1.6 MG/DL (ref 1.8–2.4)
MAGNESIUM: 1.7 MG/DL (ref 1.8–2.4)
MAGNESIUM: 1.8 MG/DL (ref 1.8–2.4)
MAGNESIUM: 2.1 MG/DL (ref 1.8–2.4)
MAGNESIUM: 2.1 MG/DL (ref 1.8–2.4)
MAGNESIUM: 2.3 MG/DL (ref 1.8–2.4)
MCH RBC QN AUTO: 30.8 PG (ref 26–34)
MCH RBC QN AUTO: 31.3 PG (ref 26–34)
MCH RBC QN AUTO: 31.7 PG (ref 26–34)
MCH RBC QN AUTO: 31.8 PG (ref 26–34)
MCH RBC QN AUTO: 31.9 PG (ref 26–34)
MCH RBC QN AUTO: 32 PG (ref 26–34)
MCH RBC QN AUTO: 32.1 PG (ref 26–34)
MCH RBC QN AUTO: 32.1 PG (ref 26–34)
MCH RBC QN AUTO: 32.2 PG (ref 26–34)
MCH RBC QN AUTO: 32.3 PG (ref 26–34)
MCH RBC QN AUTO: 32.4 PG (ref 26–34)
MCH RBC QN AUTO: 32.5 PG (ref 26–34)
MCH RBC QN AUTO: 32.6 PG (ref 26–34)
MCH RBC QN AUTO: 32.6 PG (ref 26–34)
MCH RBC QN AUTO: 32.7 PG (ref 26–34)
MCH RBC QN AUTO: 32.8 PG (ref 26–34)
MCH RBC QN AUTO: 32.8 PG (ref 26–34)
MCH RBC QN AUTO: 32.9 PG (ref 26–34)
MCH RBC QN AUTO: 33 PG (ref 26–34)
MCH RBC QN AUTO: 33.1 PG (ref 26–34)
MCH RBC QN AUTO: 33.1 PG (ref 26–35)
MCH RBC QN AUTO: 33.3 PG (ref 26–34)
MCH RBC QN AUTO: 33.4 PG (ref 26–34)
MCH RBC QN AUTO: 33.5 PG (ref 26–34)
MCH RBC QN AUTO: 33.5 PG (ref 26–34)
MCH RBC QN AUTO: 33.6 PG (ref 26–34)
MCH RBC QN AUTO: 33.7 PG (ref 26–34)
MCH RBC QN AUTO: 33.7 PG (ref 26–34)
MCH RBC QN AUTO: 33.8 PG (ref 26–34)
MCH RBC QN AUTO: 33.9 PG (ref 26–34)
MCH RBC QN AUTO: 33.9 PG (ref 26–34)
MCH RBC QN AUTO: 34 PG (ref 26–34)
MCH RBC QN AUTO: 34.1 PG (ref 26–34)
MCH RBC QN AUTO: 34.1 PG (ref 26–34)
MCH RBC QN AUTO: 34.4 PG (ref 26–34)
MCH RBC QN AUTO: 34.6 PG (ref 26–34)
MCH RBC QN AUTO: 34.7 PG (ref 26–34)
MCH RBC QN AUTO: 34.9 PG (ref 26–34)
MCH RBC QN AUTO: 35 PG (ref 26–34)
MCHC RBC AUTO-ENTMCNC: 33.1 G/DL (ref 31–36)
MCHC RBC AUTO-ENTMCNC: 33.3 G/DL (ref 31–36)
MCHC RBC AUTO-ENTMCNC: 33.3 G/DL (ref 31–36)
MCHC RBC AUTO-ENTMCNC: 33.5 G/DL (ref 31–36)
MCHC RBC AUTO-ENTMCNC: 33.5 G/DL (ref 31–36)
MCHC RBC AUTO-ENTMCNC: 33.7 G/DL (ref 31–36)
MCHC RBC AUTO-ENTMCNC: 33.8 G/DL (ref 31–36)
MCHC RBC AUTO-ENTMCNC: 33.8 G/DL (ref 31–36)
MCHC RBC AUTO-ENTMCNC: 33.9 G/DL (ref 31–36)
MCHC RBC AUTO-ENTMCNC: 34 G/DL (ref 31–36)
MCHC RBC AUTO-ENTMCNC: 34.1 G/DL (ref 31–36)
MCHC RBC AUTO-ENTMCNC: 34.2 G/DL (ref 31–36)
MCHC RBC AUTO-ENTMCNC: 34.2 G/DL (ref 31–36)
MCHC RBC AUTO-ENTMCNC: 34.3 G/DL (ref 31–36)
MCHC RBC AUTO-ENTMCNC: 34.4 G/DL (ref 31–36)
MCHC RBC AUTO-ENTMCNC: 34.5 G/DL (ref 31–36)
MCHC RBC AUTO-ENTMCNC: 34.6 G/DL (ref 31–36)
MCHC RBC AUTO-ENTMCNC: 34.6 G/DL (ref 31–36)
MCHC RBC AUTO-ENTMCNC: 34.7 G/DL (ref 31–36)
MCHC RBC AUTO-ENTMCNC: 34.8 % (ref 32–34.5)
MCHC RBC AUTO-ENTMCNC: 34.8 G/DL (ref 31–36)
MCHC RBC AUTO-ENTMCNC: 35 G/DL (ref 31–36)
MCHC RBC AUTO-ENTMCNC: 35 G/DL (ref 31–36)
MCHC RBC AUTO-ENTMCNC: 36.5 G/DL (ref 31–36)
MCHC RBC AUTO-ENTMCNC: 37.9 G/DL (ref 31–36)
MCHC RBC AUTO-ENTMCNC: 38.8 G/DL (ref 31–36)
MCHC RBC AUTO-ENTMCNC: 38.8 G/DL (ref 31–36)
MCHC RBC AUTO-ENTMCNC: 39.3 G/DL (ref 31–36)
MCHC RBC AUTO-ENTMCNC: 40 G/DL (ref 31–36)
MCV RBC AUTO: 100.8 FL (ref 80–100)
MCV RBC AUTO: 100.9 FL (ref 80–100)
MCV RBC AUTO: 100.9 FL (ref 80–100)
MCV RBC AUTO: 101.8 FL (ref 80–100)
MCV RBC AUTO: 102.3 FL (ref 80–100)
MCV RBC AUTO: 102.7 FL (ref 80–100)
MCV RBC AUTO: 103 FL (ref 80–100)
MCV RBC AUTO: 87.2 FL (ref 80–100)
MCV RBC AUTO: 87.3 FL (ref 80–100)
MCV RBC AUTO: 87.9 FL (ref 80–100)
MCV RBC AUTO: 88 FL (ref 80–100)
MCV RBC AUTO: 88.2 FL (ref 80–100)
MCV RBC AUTO: 88.4 FL (ref 80–100)
MCV RBC AUTO: 89.3 FL (ref 80–100)
MCV RBC AUTO: 91.2 FL (ref 80–100)
MCV RBC AUTO: 91.3 FL (ref 80–100)
MCV RBC AUTO: 92.7 FL (ref 80–100)
MCV RBC AUTO: 93.2 FL (ref 80–100)
MCV RBC AUTO: 93.5 FL (ref 80–100)
MCV RBC AUTO: 93.8 FL (ref 80–100)
MCV RBC AUTO: 94.4 FL (ref 80–100)
MCV RBC AUTO: 94.5 FL (ref 80–100)
MCV RBC AUTO: 94.6 FL (ref 80–100)
MCV RBC AUTO: 94.7 FL (ref 80–100)
MCV RBC AUTO: 94.8 FL (ref 80–100)
MCV RBC AUTO: 94.9 FL (ref 80–100)
MCV RBC AUTO: 94.9 FL (ref 80–100)
MCV RBC AUTO: 95 FL (ref 80–100)
MCV RBC AUTO: 95.2 FL (ref 80–100)
MCV RBC AUTO: 95.2 FL (ref 80–100)
MCV RBC AUTO: 95.2 FL (ref 80–99.9)
MCV RBC AUTO: 95.3 FL (ref 80–100)
MCV RBC AUTO: 95.5 FL (ref 80–100)
MCV RBC AUTO: 95.5 FL (ref 80–100)
MCV RBC AUTO: 95.6 FL (ref 80–100)
MCV RBC AUTO: 95.7 FL (ref 80–100)
MCV RBC AUTO: 95.8 FL (ref 80–100)
MCV RBC AUTO: 95.8 FL (ref 80–100)
MCV RBC AUTO: 95.9 FL (ref 80–100)
MCV RBC AUTO: 96 FL (ref 80–100)
MCV RBC AUTO: 96.1 FL (ref 80–100)
MCV RBC AUTO: 96.3 FL (ref 80–100)
MCV RBC AUTO: 96.5 FL (ref 80–100)
MCV RBC AUTO: 96.6 FL (ref 80–100)
MCV RBC AUTO: 96.6 FL (ref 80–100)
MCV RBC AUTO: 97 FL (ref 80–100)
MCV RBC AUTO: 97.1 FL (ref 80–100)
MCV RBC AUTO: 97.3 FL (ref 80–100)
MCV RBC AUTO: 97.6 FL (ref 80–100)
MCV RBC AUTO: 98 FL (ref 80–100)
MCV RBC AUTO: 98.1 FL (ref 80–100)
MCV RBC AUTO: 98.3 FL (ref 80–100)
MCV RBC AUTO: 98.6 FL (ref 80–100)
MCV RBC AUTO: 98.7 FL (ref 80–100)
MCV RBC AUTO: 98.9 FL (ref 80–100)
MCV RBC AUTO: 99.1 FL (ref 80–100)
MCV RBC AUTO: 99.9 FL (ref 80–100)
METAMYELOCYTES RELATIVE PERCENT: 1 %
METAMYELOCYTES RELATIVE PERCENT: 1 %
METAMYELOCYTES RELATIVE PERCENT: 3 %
METAMYELOCYTES RELATIVE PERCENT: 5 %
METHEMOGLOBIN ARTERIAL: 1.4 % (ref 0–1.4)
METHEMOGLOBIN ARTERIAL: 1.7 % (ref 0–1.4)
METHEMOGLOBIN ARTERIAL: 1.9 % (ref 0–1.4)
METHEMOGLOBIN VENOUS: 0.5 % (ref 0–1.5)
METHEMOGLOBIN VENOUS: 0.6 % (ref 0–1.5)
MICROCYTES: ABNORMAL
MICROSCOPIC EXAMINATION: YES
MONOCYTES ABSOLUTE: 0 K/UL (ref 0–1.3)
MONOCYTES ABSOLUTE: 0.1 E9/L (ref 0.1–0.95)
MONOCYTES ABSOLUTE: 0.1 K/UL (ref 0–1.3)
MONOCYTES ABSOLUTE: 0.2 K/UL (ref 0–1.3)
MONOCYTES ABSOLUTE: 0.3 K/UL (ref 0–1.3)
MONOCYTES RELATIVE PERCENT: 10 %
MONOCYTES RELATIVE PERCENT: 10 %
MONOCYTES RELATIVE PERCENT: 10.4 %
MONOCYTES RELATIVE PERCENT: 12 %
MONOCYTES RELATIVE PERCENT: 13 %
MONOCYTES RELATIVE PERCENT: 13.2 %
MONOCYTES RELATIVE PERCENT: 13.5 %
MONOCYTES RELATIVE PERCENT: 14.5 %
MONOCYTES RELATIVE PERCENT: 15 %
MONOCYTES RELATIVE PERCENT: 15.4 % (ref 2–12)
MONOCYTES RELATIVE PERCENT: 18.6 %
MONOCYTES RELATIVE PERCENT: 19 %
MONOCYTES RELATIVE PERCENT: 22.9 %
MONOCYTES RELATIVE PERCENT: 23 %
MONOCYTES RELATIVE PERCENT: 25.6 %
MONOCYTES RELATIVE PERCENT: 26.6 %
MONOCYTES RELATIVE PERCENT: 27.6 %
MONOCYTES RELATIVE PERCENT: 28 %
MONOCYTES RELATIVE PERCENT: 3 %
MONOCYTES RELATIVE PERCENT: 34 %
MONOCYTES RELATIVE PERCENT: 4 %
MONOCYTES RELATIVE PERCENT: 4.8 %
MONOCYTES RELATIVE PERCENT: 6 %
MONOCYTES RELATIVE PERCENT: 6 %
MONOCYTES RELATIVE PERCENT: 7 %
MONOCYTES RELATIVE PERCENT: 8.5 %
MONOCYTES RELATIVE PERCENT: 9 %
MONOCYTES RELATIVE PERCENT: 9.3 %
MONOCYTES RELATIVE PERCENT: 9.3 %
MONOCYTES RELATIVE PERCENT: 9.9 %
MYELOCYTE PERCENT: 4 %
NEUTROPHILS ABSOLUTE: 0.2 K/UL (ref 1.7–7.7)
NEUTROPHILS ABSOLUTE: 0.3 E9/L (ref 1.8–7.3)
NEUTROPHILS ABSOLUTE: 0.3 K/UL (ref 1.7–7.7)
NEUTROPHILS ABSOLUTE: 0.4 K/UL (ref 1.7–7.7)
NEUTROPHILS ABSOLUTE: 0.5 K/UL (ref 1.7–7.7)
NEUTROPHILS ABSOLUTE: 0.6 K/UL (ref 1.7–7.7)
NEUTROPHILS ABSOLUTE: 0.9 K/UL (ref 1.7–7.7)
NEUTROPHILS ABSOLUTE: 1 K/UL (ref 1.7–7.7)
NEUTROPHILS ABSOLUTE: 1.4 K/UL (ref 1.7–7.7)
NEUTROPHILS ABSOLUTE: 1.4 K/UL (ref 1.7–7.7)
NEUTROPHILS ABSOLUTE: 1.7 K/UL (ref 1.7–7.7)
NEUTROPHILS ABSOLUTE: 2.6 K/UL (ref 1.7–7.7)
NEUTROPHILS ABSOLUTE: 3.4 K/UL (ref 1.7–7.7)
NEUTROPHILS RELATIVE PERCENT: 28 %
NEUTROPHILS RELATIVE PERCENT: 31.4 %
NEUTROPHILS RELATIVE PERCENT: 35 %
NEUTROPHILS RELATIVE PERCENT: 35 %
NEUTROPHILS RELATIVE PERCENT: 36 %
NEUTROPHILS RELATIVE PERCENT: 38 %
NEUTROPHILS RELATIVE PERCENT: 38.8 %
NEUTROPHILS RELATIVE PERCENT: 40.6 %
NEUTROPHILS RELATIVE PERCENT: 40.9 %
NEUTROPHILS RELATIVE PERCENT: 43 %
NEUTROPHILS RELATIVE PERCENT: 45 %
NEUTROPHILS RELATIVE PERCENT: 46 %
NEUTROPHILS RELATIVE PERCENT: 46.1 % (ref 43–80)
NEUTROPHILS RELATIVE PERCENT: 47 %
NEUTROPHILS RELATIVE PERCENT: 48 %
NEUTROPHILS RELATIVE PERCENT: 50 %
NEUTROPHILS RELATIVE PERCENT: 54 %
NEUTROPHILS RELATIVE PERCENT: 56 %
NEUTROPHILS RELATIVE PERCENT: 58 %
NEUTROPHILS RELATIVE PERCENT: 59 %
NEUTROPHILS RELATIVE PERCENT: 59.8 %
NEUTROPHILS RELATIVE PERCENT: 60.7 %
NEUTROPHILS RELATIVE PERCENT: 60.8 %
NEUTROPHILS RELATIVE PERCENT: 62 %
NEUTROPHILS RELATIVE PERCENT: 64.3 %
NEUTROPHILS RELATIVE PERCENT: 70 %
NEUTROPHILS RELATIVE PERCENT: 74 %
NEUTROPHILS RELATIVE PERCENT: 75.4 %
NEUTROPHILS RELATIVE PERCENT: 76 %
NEUTROPHILS RELATIVE PERCENT: 79.1 %
NEUTROPHILS RELATIVE PERCENT: 84.3 %
NEUTROPHILS RELATIVE PERCENT: 88.7 %
NEUTROPHILS RELATIVE PERCENT: 88.7 %
NITRITE, URINE: NEGATIVE
NUCLEATED RED BLOOD CELLS: 1 /100 WBC
NUCLEATED RED BLOOD CELLS: 2 /100 WBC
NUCLEATED RED BLOOD CELLS: 2 /100 WBC
NUCLEATED RED BLOOD CELLS: 3 /100 WBC
O2 SAT, ARTERIAL: 100 % (ref 93–100)
O2 SAT, ARTERIAL: 98 % (ref 93–100)
O2 SAT, ARTERIAL: 99 % (ref 93–100)
O2 SAT, ARTERIAL: 99 % (ref 93–100)
O2 SAT, VEN: 33 %
O2 SAT, VEN: 35 %
ORGANISM: ABNORMAL
OVALOCYTES: ABNORMAL
PCO2 ARTERIAL: 24.1 MM HG (ref 35–45)
PCO2 ARTERIAL: 32.1 MM HG (ref 35–45)
PCO2 ARTERIAL: 32.2 MM HG (ref 35–45)
PCO2 ARTERIAL: 33.4 MMHG (ref 35–45)
PCO2 ARTERIAL: 33.6 MMHG (ref 35–45)
PCO2 ARTERIAL: 35.6 MMHG (ref 35–45)
PCO2, VEN: 37.2 MMHG (ref 41–51)
PCO2, VEN: 40.7 MMHG (ref 41–51)
PDW BLD-RTO: 15.8 % (ref 12.4–15.4)
PDW BLD-RTO: 15.9 % (ref 12.4–15.4)
PDW BLD-RTO: 16 % (ref 12.4–15.4)
PDW BLD-RTO: 16.1 % (ref 12.4–15.4)
PDW BLD-RTO: 16.2 % (ref 12.4–15.4)
PDW BLD-RTO: 16.3 % (ref 12.4–15.4)
PDW BLD-RTO: 16.4 % (ref 12.4–15.4)
PDW BLD-RTO: 16.5 % (ref 12.4–15.4)
PDW BLD-RTO: 16.6 % (ref 12.4–15.4)
PDW BLD-RTO: 16.7 % (ref 12.4–15.4)
PDW BLD-RTO: 17.1 % (ref 12.4–15.4)
PDW BLD-RTO: 17.3 % (ref 12.4–15.4)
PDW BLD-RTO: 17.4 % (ref 12.4–15.4)
PDW BLD-RTO: 17.6 % (ref 12.4–15.4)
PDW BLD-RTO: 18.3 % (ref 12.4–15.4)
PDW BLD-RTO: 18.4 % (ref 12.4–15.4)
PDW BLD-RTO: 18.5 % (ref 12.4–15.4)
PDW BLD-RTO: 18.5 % (ref 12.4–15.4)
PDW BLD-RTO: 18.6 % (ref 12.4–15.4)
PDW BLD-RTO: 18.7 % (ref 12.4–15.4)
PDW BLD-RTO: 18.7 % (ref 12.4–15.4)
PDW BLD-RTO: 18.8 % (ref 12.4–15.4)
PDW BLD-RTO: 18.8 % (ref 12.4–15.4)
PDW BLD-RTO: 18.9 % (ref 12.4–15.4)
PDW BLD-RTO: 19.1 % (ref 12.4–15.4)
PDW BLD-RTO: 19.2 % (ref 12.4–15.4)
PDW BLD-RTO: 19.3 % (ref 12.4–15.4)
PDW BLD-RTO: 19.5 % (ref 12.4–15.4)
PDW BLD-RTO: 19.6 % (ref 12.4–15.4)
PDW BLD-RTO: 19.6 FL (ref 11.5–15)
PDW BLD-RTO: 20.1 % (ref 12.4–15.4)
PDW BLD-RTO: 20.1 % (ref 12.4–15.4)
PDW BLD-RTO: 20.2 % (ref 12.4–15.4)
PDW BLD-RTO: 20.7 % (ref 12.4–15.4)
PDW BLD-RTO: 20.9 % (ref 12.4–15.4)
PDW BLD-RTO: 20.9 % (ref 12.4–15.4)
PDW BLD-RTO: 21.1 % (ref 12.4–15.4)
PDW BLD-RTO: 21.3 % (ref 12.4–15.4)
PDW BLD-RTO: 21.3 % (ref 12.4–15.4)
PDW BLD-RTO: 21.7 % (ref 12.4–15.4)
PERFORMED ON: ABNORMAL
PH ARTERIAL: 7.2 (ref 7.35–7.45)
PH ARTERIAL: 7.21 (ref 7.35–7.45)
PH ARTERIAL: 7.27 (ref 7.35–7.45)
PH ARTERIAL: 7.36 (ref 7.35–7.45)
PH ARTERIAL: 7.39 (ref 7.35–7.45)
PH ARTERIAL: 7.44 (ref 7.35–7.45)
PH UA: 5.5 (ref 5–8)
PH UA: 6 (ref 5–8)
PH UA: 6.5 (ref 5–8)
PH UA: 7 (ref 5–8)
PH VENOUS: 7.26 (ref 7.35–7.45)
PH VENOUS: 7.37 (ref 7.35–7.45)
PH VENOUS: 7.4 (ref 7.35–7.45)
PHOSPHORUS: 1.5 MG/DL (ref 2.5–4.9)
PHOSPHORUS: 1.9 MG/DL (ref 2.5–4.9)
PHOSPHORUS: 2.1 MG/DL (ref 2.5–4.9)
PHOSPHORUS: 2.1 MG/DL (ref 2.5–4.9)
PHOSPHORUS: 2.2 MG/DL (ref 2.5–4.9)
PHOSPHORUS: 2.2 MG/DL (ref 2.5–4.9)
PHOSPHORUS: 2.3 MG/DL (ref 2.5–4.9)
PHOSPHORUS: 2.4 MG/DL (ref 2.5–4.9)
PHOSPHORUS: 2.5 MG/DL (ref 2.5–4.9)
PHOSPHORUS: 2.6 MG/DL (ref 2.5–4.9)
PHOSPHORUS: 2.7 MG/DL (ref 2.5–4.9)
PHOSPHORUS: 2.8 MG/DL (ref 2.5–4.9)
PHOSPHORUS: 2.9 MG/DL (ref 2.5–4.9)
PHOSPHORUS: 3 MG/DL (ref 2.5–4.9)
PHOSPHORUS: 3 MG/DL (ref 2.5–4.9)
PHOSPHORUS: 3.1 MG/DL (ref 2.5–4.9)
PHOSPHORUS: 3.3 MG/DL (ref 2.5–4.9)
PHOSPHORUS: 3.4 MG/DL (ref 2.5–4.9)
PHOSPHORUS: 5.6 MG/DL (ref 2.5–4.9)
PLATELET # BLD: 11 K/UL (ref 135–450)
PLATELET # BLD: 11 K/UL (ref 135–450)
PLATELET # BLD: 12 K/UL (ref 135–450)
PLATELET # BLD: 13 K/UL (ref 135–450)
PLATELET # BLD: 13 K/UL (ref 135–450)
PLATELET # BLD: 17 K/UL (ref 135–450)
PLATELET # BLD: 20 K/UL (ref 135–450)
PLATELET # BLD: 21 K/UL (ref 135–450)
PLATELET # BLD: 21 K/UL (ref 135–450)
PLATELET # BLD: 22 K/UL (ref 135–450)
PLATELET # BLD: 23 K/UL (ref 135–450)
PLATELET # BLD: 24 K/UL (ref 135–450)
PLATELET # BLD: 25 K/UL (ref 135–450)
PLATELET # BLD: 26 K/UL (ref 135–450)
PLATELET # BLD: 28 K/UL (ref 135–450)
PLATELET # BLD: 29 K/UL (ref 135–450)
PLATELET # BLD: 30 K/UL (ref 135–450)
PLATELET # BLD: 32 K/UL (ref 135–450)
PLATELET # BLD: 33 K/UL (ref 135–450)
PLATELET # BLD: 33 K/UL (ref 135–450)
PLATELET # BLD: 34 K/UL (ref 135–450)
PLATELET # BLD: 35 K/UL (ref 135–450)
PLATELET # BLD: 36 E9/L (ref 130–450)
PLATELET # BLD: 36 K/UL (ref 135–450)
PLATELET # BLD: 37 K/UL (ref 135–450)
PLATELET # BLD: 38 K/UL (ref 135–450)
PLATELET # BLD: 38 K/UL (ref 135–450)
PLATELET # BLD: 39 K/UL (ref 135–450)
PLATELET # BLD: 40 K/UL (ref 135–450)
PLATELET # BLD: 41 K/UL (ref 135–450)
PLATELET # BLD: 42 K/UL (ref 135–450)
PLATELET # BLD: 43 K/UL (ref 135–450)
PLATELET # BLD: 46 K/UL (ref 135–450)
PLATELET # BLD: 46 K/UL (ref 135–450)
PLATELET # BLD: 47 K/UL (ref 135–450)
PLATELET # BLD: 47 K/UL (ref 135–450)
PLATELET # BLD: 48 K/UL (ref 135–450)
PLATELET # BLD: 49 K/UL (ref 135–450)
PLATELET # BLD: 50 K/UL (ref 135–450)
PLATELET # BLD: 50 K/UL (ref 135–450)
PLATELET # BLD: 51 K/UL (ref 135–450)
PLATELET # BLD: 52 K/UL (ref 135–450)
PLATELET # BLD: 55 K/UL (ref 135–450)
PLATELET # BLD: 57 K/UL (ref 135–450)
PLATELET # BLD: 74 K/UL (ref 135–450)
PLATELET # BLD: 75 K/UL (ref 135–450)
PLATELET SLIDE REVIEW: ABNORMAL
PMV BLD AUTO: 7.2 FL (ref 5–10.5)
PMV BLD AUTO: 7.3 FL (ref 5–10.5)
PMV BLD AUTO: 7.3 FL (ref 5–10.5)
PMV BLD AUTO: 7.4 FL (ref 5–10.5)
PMV BLD AUTO: 7.4 FL (ref 7–12)
PMV BLD AUTO: 7.5 FL (ref 5–10.5)
PMV BLD AUTO: 7.5 FL (ref 5–10.5)
PMV BLD AUTO: 7.6 FL (ref 5–10.5)
PMV BLD AUTO: 7.7 FL (ref 5–10.5)
PMV BLD AUTO: 7.8 FL (ref 5–10.5)
PMV BLD AUTO: 7.9 FL (ref 5–10.5)
PMV BLD AUTO: 8 FL (ref 5–10.5)
PMV BLD AUTO: 8.1 FL (ref 5–10.5)
PMV BLD AUTO: 8.2 FL (ref 5–10.5)
PMV BLD AUTO: 8.2 FL (ref 5–10.5)
PMV BLD AUTO: 8.3 FL (ref 5–10.5)
PMV BLD AUTO: 8.4 FL (ref 5–10.5)
PMV BLD AUTO: 8.5 FL (ref 5–10.5)
PMV BLD AUTO: 8.5 FL (ref 5–10.5)
PMV BLD AUTO: 8.6 FL (ref 5–10.5)
PMV BLD AUTO: 8.7 FL (ref 5–10.5)
PMV BLD AUTO: 8.9 FL (ref 5–10.5)
PMV BLD AUTO: 9.1 FL (ref 5–10.5)
PMV BLD AUTO: 9.2 FL (ref 5–10.5)
PMV BLD AUTO: 9.2 FL (ref 5–10.5)
PMV BLD AUTO: 9.3 FL (ref 5–10.5)
PMV BLD AUTO: 9.6 FL (ref 5–10.5)
PO2 ARTERIAL: 121.4 MM HG (ref 75–108)
PO2 ARTERIAL: 136.4 MM HG (ref 75–108)
PO2 ARTERIAL: 145 MMHG (ref 75–108)
PO2 ARTERIAL: 172 MMHG (ref 75–108)
PO2 ARTERIAL: 305.4 MM HG (ref 75–108)
PO2 ARTERIAL: 99.8 MMHG (ref 75–108)
PO2, VEN: <30 MMHG (ref 25–40)
PO2, VEN: <30 MMHG (ref 25–40)
POC POTASSIUM: 4.9 MMOL/L (ref 3.5–5.1)
POC POTASSIUM: 5.2 MMOL/L (ref 3.5–5.1)
POC POTASSIUM: 5.4 MMOL/L (ref 3.5–5.1)
POC SAMPLE TYPE: ABNORMAL
POC SODIUM: 138 MMOL/L (ref 136–145)
POC SODIUM: 141 MMOL/L (ref 136–145)
POIKILOCYTES: ABNORMAL
POLYCHROMASIA: ABNORMAL
POTASSIUM REFLEX MAGNESIUM: 4.4 MMOL/L (ref 3.5–5.1)
POTASSIUM REFLEX MAGNESIUM: 4.5 MMOL/L (ref 3.5–5.1)
POTASSIUM REFLEX MAGNESIUM: 5.1 MMOL/L (ref 3.5–5.1)
POTASSIUM REFLEX MAGNESIUM: 5.3 MMOL/L (ref 3.5–5.1)
POTASSIUM SERPL-SCNC: 3.4 MMOL/L (ref 3.5–5.1)
POTASSIUM SERPL-SCNC: 3.4 MMOL/L (ref 3.5–5.1)
POTASSIUM SERPL-SCNC: 3.6 MMOL/L (ref 3.5–5.1)
POTASSIUM SERPL-SCNC: 3.6 MMOL/L (ref 3.5–5.1)
POTASSIUM SERPL-SCNC: 3.7 MMOL/L (ref 3.5–5.1)
POTASSIUM SERPL-SCNC: 3.8 MMOL/L (ref 3.5–5.1)
POTASSIUM SERPL-SCNC: 3.9 MMOL/L (ref 3.5–5.1)
POTASSIUM SERPL-SCNC: 4 MMOL/L (ref 3.5–5.1)
POTASSIUM SERPL-SCNC: 4.1 MMOL/L (ref 3.5–5.1)
POTASSIUM SERPL-SCNC: 4.2 MMOL/L (ref 3.5–5.1)
POTASSIUM SERPL-SCNC: 4.3 MMOL/L (ref 3.5–5.1)
POTASSIUM SERPL-SCNC: 4.4 MMOL/L (ref 3.5–5.1)
POTASSIUM SERPL-SCNC: 4.6 MMOL/L (ref 3.5–5.1)
POTASSIUM SERPL-SCNC: 5 MMOL/L (ref 3.5–5.1)
POTASSIUM SERPL-SCNC: 5 MMOL/L (ref 3.5–5.1)
POTASSIUM SERPL-SCNC: 5.4 MMOL/L (ref 3.5–5.1)
POTASSIUM SERPL-SCNC: 5.6 MMOL/L (ref 3.5–5.1)
PRELIMINARY: NORMAL
PROCALCITONIN: >100 NG/ML (ref 0–0.15)
PROTEIN PROTEIN: 0.1 G/DL
PROTEIN PROTEIN: 101 MG/DL
PROTEIN PROTEIN: 102.3 MG/DL
PROTEIN UA: 100 MG/DL
PROTEIN UA: 30 MG/DL
PROTEIN UA: ABNORMAL MG/DL
PROTHROMBIN TIME: 10.6 SEC (ref 9.9–12.7)
PROTHROMBIN TIME: 11.6 SEC (ref 9.9–12.7)
PROTHROMBIN TIME: 11.7 SEC (ref 9.9–12.7)
PROTHROMBIN TIME: 11.8 SEC (ref 9.9–12.7)
PROTHROMBIN TIME: 11.9 SEC (ref 9.9–12.7)
PROTHROMBIN TIME: 12 SEC (ref 9.9–12.7)
PROTHROMBIN TIME: 12.1 SEC (ref 9.9–12.7)
PROTHROMBIN TIME: 12.5 SEC (ref 9.9–12.7)
PROTHROMBIN TIME: 12.5 SEC (ref 9.9–12.7)
PROTHROMBIN TIME: 12.7 SEC (ref 9.9–12.7)
PROTHROMBIN TIME: 13 SEC (ref 9.9–12.7)
PROTHROMBIN TIME: 13 SEC (ref 9.9–12.7)
PROTHROMBIN TIME: 13.2 SEC (ref 9.9–12.7)
PROTHROMBIN TIME: 13.3 SEC (ref 9.9–12.7)
PROTHROMBIN TIME: 13.4 SEC (ref 9.9–12.7)
PROTHROMBIN TIME: 13.5 SEC (ref 9.9–12.7)
PROTHROMBIN TIME: 13.6 SEC (ref 9.9–12.7)
PROTHROMBIN TIME: 13.8 SEC (ref 9.9–12.7)
PROTHROMBIN TIME: 13.8 SEC (ref 9.9–12.7)
PROTHROMBIN TIME: 14 SEC (ref 9.9–12.7)
PROTHROMBIN TIME: 16.2 SEC (ref 9.9–12.7)
PROTHROMBIN TIME: 18.1 SEC (ref 11.7–14.5)
PROTHROMBIN TIME: 18.5 SEC (ref 11.7–14.5)
RBC # BLD: 1.79 M/UL (ref 4.2–5.9)
RBC # BLD: 1.97 M/UL (ref 4.2–5.9)
RBC # BLD: 1.99 M/UL (ref 4.2–5.9)
RBC # BLD: 2.02 M/UL (ref 4.2–5.9)
RBC # BLD: 2.06 M/UL (ref 4.2–5.9)
RBC # BLD: 2.08 M/UL (ref 4.2–5.9)
RBC # BLD: 2.11 M/UL (ref 4.2–5.9)
RBC # BLD: 2.12 M/UL (ref 4.2–5.9)
RBC # BLD: 2.13 M/UL (ref 4.2–5.9)
RBC # BLD: 2.14 M/UL (ref 4.2–5.9)
RBC # BLD: 2.14 M/UL (ref 4.2–5.9)
RBC # BLD: 2.15 M/UL (ref 4.2–5.9)
RBC # BLD: 2.16 M/UL (ref 4.2–5.9)
RBC # BLD: 2.17 M/UL (ref 4.2–5.9)
RBC # BLD: 2.18 M/UL (ref 4.2–5.9)
RBC # BLD: 2.18 M/UL (ref 4.2–5.9)
RBC # BLD: 2.2 M/UL (ref 4.2–5.9)
RBC # BLD: 2.21 M/UL (ref 4.2–5.9)
RBC # BLD: 2.21 M/UL (ref 4.2–5.9)
RBC # BLD: 2.22 E12/L (ref 3.8–5.8)
RBC # BLD: 2.22 M/UL (ref 4.2–5.9)
RBC # BLD: 2.27 M/UL (ref 4.2–5.9)
RBC # BLD: 2.31 M/UL (ref 4.2–5.9)
RBC # BLD: 2.31 M/UL (ref 4.2–5.9)
RBC # BLD: 2.34 M/UL (ref 4.2–5.9)
RBC # BLD: 2.34 M/UL (ref 4.2–5.9)
RBC # BLD: 2.36 M/UL (ref 4.2–5.9)
RBC # BLD: 2.4 M/UL (ref 4.2–5.9)
RBC # BLD: 2.41 M/UL (ref 4.2–5.9)
RBC # BLD: 2.41 M/UL (ref 4.2–5.9)
RBC # BLD: 2.43 M/UL (ref 4.2–5.9)
RBC # BLD: 2.44 M/UL (ref 4.2–5.9)
RBC # BLD: 2.44 M/UL (ref 4.2–5.9)
RBC # BLD: 2.45 M/UL (ref 4.2–5.9)
RBC # BLD: 2.45 M/UL (ref 4.2–5.9)
RBC # BLD: 2.5 M/UL (ref 4.2–5.9)
RBC # BLD: 2.51 M/UL (ref 4.2–5.9)
RBC # BLD: 2.51 M/UL (ref 4.2–5.9)
RBC # BLD: 2.59 M/UL (ref 4.2–5.9)
RBC # BLD: 2.63 M/UL (ref 4.2–5.9)
RBC # BLD: 2.65 M/UL (ref 4.2–5.9)
RBC # BLD: 2.71 M/UL (ref 4.2–5.9)
RBC # BLD: 2.72 M/UL (ref 4.2–5.9)
RBC # BLD: 2.79 M/UL (ref 4.2–5.9)
RBC # BLD: 2.8 M/UL (ref 4.2–5.9)
RBC # BLD: 2.82 M/UL (ref 4.2–5.9)
RBC # BLD: 2.83 M/UL (ref 4.2–5.9)
RBC # BLD: 2.84 M/UL (ref 4.2–5.9)
RBC # BLD: 2.86 M/UL (ref 4.2–5.9)
RBC # BLD: 2.86 M/UL (ref 4.2–5.9)
RBC # BLD: 2.87 M/UL (ref 4.2–5.9)
RBC # BLD: 2.9 M/UL (ref 4.2–5.9)
RBC # BLD: 2.99 M/UL (ref 4.2–5.9)
RBC # BLD: 3.09 M/UL (ref 4.2–5.9)
RBC # BLD: 3.18 M/UL (ref 4.2–5.9)
RBC UA: ABNORMAL /HPF (ref 0–2)
RBC UA: ABNORMAL /HPF (ref 0–4)
RBC UA: NORMAL /HPF (ref 0–4)
REASON FOR REJECTION: NORMAL
REJECTED TEST: NORMAL
RENAL EPITHELIAL, UA: ABNORMAL /HPF (ref 0–1)
REPORT: NORMAL
SARS-COV-2 RNA, RT PCR: DETECTED
SARS-COV-2: NOT DETECTED
SCHISTOCYTES: ABNORMAL
SCHISTOCYTES: ABNORMAL
SLIDE REVIEW: ABNORMAL
SODIUM BLD-SCNC: 123 MMOL/L (ref 136–145)
SODIUM BLD-SCNC: 123 MMOL/L (ref 136–145)
SODIUM BLD-SCNC: 126 MMOL/L (ref 136–145)
SODIUM BLD-SCNC: 131 MMOL/L (ref 136–145)
SODIUM BLD-SCNC: 136 MMOL/L (ref 136–145)
SODIUM BLD-SCNC: 136 MMOL/L (ref 136–145)
SODIUM BLD-SCNC: 137 MMOL/L (ref 136–145)
SODIUM BLD-SCNC: 138 MMOL/L (ref 136–145)
SODIUM BLD-SCNC: 139 MMOL/L (ref 136–145)
SODIUM BLD-SCNC: 139 MMOL/L (ref 136–145)
SODIUM BLD-SCNC: 140 MMOL/L (ref 136–145)
SODIUM BLD-SCNC: 141 MMOL/L (ref 136–145)
SODIUM BLD-SCNC: 142 MMOL/L (ref 136–145)
SODIUM BLD-SCNC: 143 MMOL/L (ref 136–145)
SODIUM BLD-SCNC: 144 MMOL/L (ref 136–145)
SODIUM BLD-SCNC: 145 MMOL/L (ref 136–145)
SODIUM BLD-SCNC: 146 MMOL/L (ref 136–145)
SODIUM BLD-SCNC: 147 MMOL/L (ref 136–145)
SPE/IFE INTERPRETATION: NORMAL
SPECIFIC GRAVITY UA: 1.01 (ref 1–1.03)
SPECIFIC GRAVITY UA: 1.01 (ref 1–1.03)
SPECIFIC GRAVITY UA: 1.02 (ref 1–1.03)
SPECIFIC GRAVITY UA: <=1.005 (ref 1–1.03)
SPHEROCYTES: ABNORMAL
STOMATOCYTES: ABNORMAL
TCO2 ARTERIAL: 12 MMOL/L
TCO2 ARTERIAL: 14 MMOL/L
TCO2 ARTERIAL: 14 MMOL/L
TCO2 ARTERIAL: 21 MMOL/L
TCO2 ARTERIAL: 21 MMOL/L
TCO2 ARTERIAL: 24 MMOL/L
TCO2 CALC VENOUS: 18 MMOL/L
TCO2 CALC VENOUS: 25 MMOL/L
TEAR DROP CELLS: ABNORMAL
THROAT CULTURE: NORMAL
TOTAL PROTEIN: 4 G/DL (ref 6.4–8.2)
TOTAL PROTEIN: 4.4 G/DL (ref 6.4–8.2)
TOTAL PROTEIN: 4.9 G/DL (ref 6.4–8.2)
TOTAL PROTEIN: 5 G/DL (ref 6.4–8.2)
TOTAL PROTEIN: 5.1 G/DL (ref 6.4–8.2)
TOTAL PROTEIN: 5.2 G/DL (ref 6.4–8.2)
TOTAL PROTEIN: 5.2 G/DL (ref 6.4–8.2)
TOTAL PROTEIN: 5.7 G/DL (ref 6.4–8.2)
TOTAL PROTEIN: 5.8 G/DL (ref 6.4–8.2)
TOTAL PROTEIN: 5.9 G/DL (ref 6.4–8.2)
TOTAL PROTEIN: 6 G/DL (ref 6.4–8.2)
TOTAL PROTEIN: 6.1 G/DL (ref 6.4–8.2)
TOTAL PROTEIN: 6.2 G/DL (ref 6.4–8.2)
TOTAL PROTEIN: 6.2 G/DL (ref 6.4–8.2)
TOTAL PROTEIN: 6.3 G/DL (ref 6.4–8.2)
TOTAL PROTEIN: 6.4 G/DL (ref 6.4–8.2)
TOXIC GRANULATION: PRESENT
TOXIC GRANULATION: SLIGHT
URIC ACID, SERUM: 4.8 MG/DL (ref 3.5–7.2)
URIC ACID, SERUM: 4.9 MG/DL (ref 3.5–7.2)
URIC ACID, SERUM: 5 MG/DL (ref 3.5–7.2)
URIC ACID, SERUM: 5 MG/DL (ref 3.5–7.2)
URIC ACID, SERUM: 5.3 MG/DL (ref 3.5–7.2)
URIC ACID, SERUM: 5.6 MG/DL (ref 3.5–7.2)
URIC ACID, SERUM: 6.1 MG/DL (ref 3.5–7.2)
URIC ACID, SERUM: 6.5 MG/DL (ref 3.5–7.2)
URIC ACID, SERUM: 6.6 MG/DL (ref 3.5–7.2)
URIC ACID, SERUM: 6.7 MG/DL (ref 3.5–7.2)
URIC ACID, SERUM: 6.7 MG/DL (ref 3.5–7.2)
URIC ACID, SERUM: 6.9 MG/DL (ref 3.5–7.2)
URIC ACID, SERUM: 7 MG/DL (ref 3.5–7.2)
URIC ACID, SERUM: 7 MG/DL (ref 3.5–7.2)
URIC ACID, SERUM: 7.1 MG/DL (ref 3.5–7.2)
URIC ACID, SERUM: 7.1 MG/DL (ref 3.5–7.2)
URIC ACID, SERUM: 7.2 MG/DL (ref 3.5–7.2)
URIC ACID, SERUM: 7.3 MG/DL (ref 3.5–7.2)
URIC ACID, SERUM: 7.4 MG/DL (ref 3.5–7.2)
URIC ACID, SERUM: 7.5 MG/DL (ref 3.5–7.2)
URIC ACID, SERUM: 7.6 MG/DL (ref 3.5–7.2)
URIC ACID, SERUM: 7.6 MG/DL (ref 3.5–7.2)
URIC ACID, SERUM: 8 MG/DL (ref 3.5–7.2)
URIC ACID, SERUM: 8.1 MG/DL (ref 3.5–7.2)
URIC ACID, SERUM: 8.1 MG/DL (ref 3.5–7.2)
URINE CULTURE, ROUTINE: ABNORMAL
URINE CULTURE, ROUTINE: NORMAL
URINE CULTURE, ROUTINE: NORMAL
URINE ELECTROPHORESIS INTERP: NORMAL
URINE REFLEX TO CULTURE: ABNORMAL
URINE TYPE: ABNORMAL
UROBILINOGEN, URINE: 0.2 E.U./DL
VACUOLATED NEUTROPHILS: PRESENT
VANCOMYCIN RANDOM: 11.2 UG/ML
WBC # BLD: 0.2 K/UL (ref 4–11)
WBC # BLD: 0.2 K/UL (ref 4–11)
WBC # BLD: 0.3 K/UL (ref 4–11)
WBC # BLD: 0.4 K/UL (ref 4–11)
WBC # BLD: 0.5 K/UL (ref 4–11)
WBC # BLD: 0.6 E9/L (ref 4.5–11.5)
WBC # BLD: 0.6 K/UL (ref 4–11)
WBC # BLD: 0.7 K/UL (ref 4–11)
WBC # BLD: 0.8 K/UL (ref 4–11)
WBC # BLD: 0.9 K/UL (ref 4–11)
WBC # BLD: 1 K/UL (ref 4–11)
WBC # BLD: 1.1 K/UL (ref 4–11)
WBC # BLD: 1.7 K/UL (ref 4–11)
WBC # BLD: 1.7 K/UL (ref 4–11)
WBC # BLD: 1.8 K/UL (ref 4–11)
WBC # BLD: 2.1 K/UL (ref 4–11)
WBC # BLD: 2.2 K/UL (ref 4–11)
WBC # BLD: 3.5 K/UL (ref 4–11)
WBC # BLD: 3.8 K/UL (ref 4–11)
WBC UA: ABNORMAL /HPF (ref 0–5)
WBC UA: NORMAL /HPF (ref 0–5)

## 2022-01-01 PROCEDURE — 6370000000 HC RX 637 (ALT 250 FOR IP): Performed by: NURSE PRACTITIONER

## 2022-01-01 PROCEDURE — 86850 RBC ANTIBODY SCREEN: CPT

## 2022-01-01 PROCEDURE — 36430 TRANSFUSION BLD/BLD COMPNT: CPT

## 2022-01-01 PROCEDURE — 6370000000 HC RX 637 (ALT 250 FOR IP): Performed by: STUDENT IN AN ORGANIZED HEALTH CARE EDUCATION/TRAINING PROGRAM

## 2022-01-01 PROCEDURE — 82728 ASSAY OF FERRITIN: CPT

## 2022-01-01 PROCEDURE — 93005 ELECTROCARDIOGRAM TRACING: CPT | Performed by: NURSE PRACTITIONER

## 2022-01-01 PROCEDURE — 6360000002 HC RX W HCPCS: Performed by: NURSE PRACTITIONER

## 2022-01-01 PROCEDURE — 85730 THROMBOPLASTIN TIME PARTIAL: CPT

## 2022-01-01 PROCEDURE — 87070 CULTURE OTHR SPECIMN AEROBIC: CPT

## 2022-01-01 PROCEDURE — 80048 BASIC METABOLIC PNL TOTAL CA: CPT

## 2022-01-01 PROCEDURE — 2580000003 HC RX 258: Performed by: NURSE PRACTITIONER

## 2022-01-01 PROCEDURE — 86900 BLOOD TYPING SEROLOGIC ABO: CPT

## 2022-01-01 PROCEDURE — 36592 COLLECT BLOOD FROM PICC: CPT

## 2022-01-01 PROCEDURE — P9040 RBC LEUKOREDUCED IRRADIATED: HCPCS

## 2022-01-01 PROCEDURE — 86901 BLOOD TYPING SEROLOGIC RH(D): CPT

## 2022-01-01 PROCEDURE — 70544 MR ANGIOGRAPHY HEAD W/O DYE: CPT

## 2022-01-01 PROCEDURE — 2580000003 HC RX 258: Performed by: INTERNAL MEDICINE

## 2022-01-01 PROCEDURE — P9036 PLATELET PHERESIS IRRADIATED: HCPCS

## 2022-01-01 PROCEDURE — 84100 ASSAY OF PHOSPHORUS: CPT

## 2022-01-01 PROCEDURE — 86923 COMPATIBILITY TEST ELECTRIC: CPT

## 2022-01-01 PROCEDURE — 83615 LACTATE (LD) (LDH) ENZYME: CPT

## 2022-01-01 PROCEDURE — 85025 COMPLETE CBC W/AUTO DIFF WBC: CPT

## 2022-01-01 PROCEDURE — 80076 HEPATIC FUNCTION PANEL: CPT

## 2022-01-01 PROCEDURE — 6360000002 HC RX W HCPCS: Performed by: INTERNAL MEDICINE

## 2022-01-01 PROCEDURE — 86140 C-REACTIVE PROTEIN: CPT

## 2022-01-01 PROCEDURE — 94761 N-INVAS EAR/PLS OXIMETRY MLT: CPT

## 2022-01-01 PROCEDURE — 82803 BLOOD GASES ANY COMBINATION: CPT

## 2022-01-01 PROCEDURE — 86335 IMMUNFIX E-PHORSIS/URINE/CSF: CPT

## 2022-01-01 PROCEDURE — 85384 FIBRINOGEN ACTIVITY: CPT

## 2022-01-01 PROCEDURE — 87449 NOS EACH ORGANISM AG IA: CPT

## 2022-01-01 PROCEDURE — 83605 ASSAY OF LACTIC ACID: CPT

## 2022-01-01 PROCEDURE — 84165 PROTEIN E-PHORESIS SERUM: CPT

## 2022-01-01 PROCEDURE — 85379 FIBRIN DEGRADATION QUANT: CPT

## 2022-01-01 PROCEDURE — 83735 ASSAY OF MAGNESIUM: CPT

## 2022-01-01 PROCEDURE — 96360 HYDRATION IV INFUSION INIT: CPT

## 2022-01-01 PROCEDURE — 2500000003 HC RX 250 WO HCPCS

## 2022-01-01 PROCEDURE — 84550 ASSAY OF BLOOD/URIC ACID: CPT

## 2022-01-01 PROCEDURE — 85049 AUTOMATED PLATELET COUNT: CPT

## 2022-01-01 PROCEDURE — 84166 PROTEIN E-PHORESIS/URINE/CSF: CPT

## 2022-01-01 PROCEDURE — 87253 VIRUS INOCULATE TISSUE ADDL: CPT

## 2022-01-01 PROCEDURE — C1751 CATH, INF, PER/CENT/MIDLINE: HCPCS

## 2022-01-01 PROCEDURE — 2580000003 HC RX 258: Performed by: STUDENT IN AN ORGANIZED HEALTH CARE EDUCATION/TRAINING PROGRAM

## 2022-01-01 PROCEDURE — 6370000000 HC RX 637 (ALT 250 FOR IP): Performed by: INTERNAL MEDICINE

## 2022-01-01 PROCEDURE — 87102 FUNGUS ISOLATION CULTURE: CPT

## 2022-01-01 PROCEDURE — 96372 THER/PROPH/DIAG INJ SC/IM: CPT

## 2022-01-01 PROCEDURE — G0378 HOSPITAL OBSERVATION PER HR: HCPCS

## 2022-01-01 PROCEDURE — 80069 RENAL FUNCTION PANEL: CPT

## 2022-01-01 PROCEDURE — 2580000003 HC RX 258

## 2022-01-01 PROCEDURE — P9047 ALBUMIN (HUMAN), 25%, 50ML: HCPCS | Performed by: INTERNAL MEDICINE

## 2022-01-01 PROCEDURE — 77001 FLUOROGUIDE FOR VEIN DEVICE: CPT

## 2022-01-01 PROCEDURE — 36415 COLL VENOUS BLD VENIPUNCTURE: CPT

## 2022-01-01 PROCEDURE — 85610 PROTHROMBIN TIME: CPT

## 2022-01-01 PROCEDURE — 2000000000 HC ICU R&B

## 2022-01-01 PROCEDURE — 71045 X-RAY EXAM CHEST 1 VIEW: CPT

## 2022-01-01 PROCEDURE — 96361 HYDRATE IV INFUSION ADD-ON: CPT

## 2022-01-01 PROCEDURE — 99233 SBSQ HOSP IP/OBS HIGH 50: CPT | Performed by: INTERNAL MEDICINE

## 2022-01-01 PROCEDURE — 2700000000 HC OXYGEN THERAPY PER DAY

## 2022-01-01 PROCEDURE — 87205 SMEAR GRAM STAIN: CPT

## 2022-01-01 PROCEDURE — C9803 HOPD COVID-19 SPEC COLLECT: HCPCS

## 2022-01-01 PROCEDURE — 81001 URINALYSIS AUTO W/SCOPE: CPT

## 2022-01-01 PROCEDURE — 6360000002 HC RX W HCPCS

## 2022-01-01 PROCEDURE — 97535 SELF CARE MNGMENT TRAINING: CPT

## 2022-01-01 PROCEDURE — 96366 THER/PROPH/DIAG IV INF ADDON: CPT

## 2022-01-01 PROCEDURE — 6370000000 HC RX 637 (ALT 250 FOR IP)

## 2022-01-01 PROCEDURE — 96413 CHEMO IV INFUSION 1 HR: CPT

## 2022-01-01 PROCEDURE — 0BH17EZ INSERTION OF ENDOTRACHEAL AIRWAY INTO TRACHEA, VIA NATURAL OR ARTIFICIAL OPENING: ICD-10-PCS | Performed by: INTERNAL MEDICINE

## 2022-01-01 PROCEDURE — 1200000000 HC SEMI PRIVATE

## 2022-01-01 PROCEDURE — G0379 DIRECT REFER HOSPITAL OBSERV: HCPCS

## 2022-01-01 PROCEDURE — 84132 ASSAY OF SERUM POTASSIUM: CPT

## 2022-01-01 PROCEDURE — 2500000003 HC RX 250 WO HCPCS: Performed by: STUDENT IN AN ORGANIZED HEALTH CARE EDUCATION/TRAINING PROGRAM

## 2022-01-01 PROCEDURE — 0540T HC CAR-T THERAPY AUTOLOGOUS CELL ADMIN: CPT

## 2022-01-01 PROCEDURE — 87324 CLOSTRIDIUM AG IA: CPT

## 2022-01-01 PROCEDURE — C9113 INJ PANTOPRAZOLE SODIUM, VIA: HCPCS | Performed by: STUDENT IN AN ORGANIZED HEALTH CARE EDUCATION/TRAINING PROGRAM

## 2022-01-01 PROCEDURE — 83883 ASSAY NEPHELOMETRY NOT SPEC: CPT

## 2022-01-01 PROCEDURE — 2060000000 HC ICU INTERMEDIATE R&B

## 2022-01-01 PROCEDURE — 97162 PT EVAL MOD COMPLEX 30 MIN: CPT

## 2022-01-01 PROCEDURE — 87086 URINE CULTURE/COLONY COUNT: CPT

## 2022-01-01 PROCEDURE — 99291 CRITICAL CARE FIRST HOUR: CPT | Performed by: INTERNAL MEDICINE

## 2022-01-01 PROCEDURE — 99222 1ST HOSP IP/OBS MODERATE 55: CPT | Performed by: INTERNAL MEDICINE

## 2022-01-01 PROCEDURE — 80202 ASSAY OF VANCOMYCIN: CPT

## 2022-01-01 PROCEDURE — 93010 ELECTROCARDIOGRAM REPORT: CPT | Performed by: INTERNAL MEDICINE

## 2022-01-01 PROCEDURE — 84156 ASSAY OF PROTEIN URINE: CPT

## 2022-01-01 PROCEDURE — XW033K7 INTRODUCTION OF IDECABTAGENE VICLEUCEL IMMUNOTHERAPY INTO PERIPHERAL VEIN, PERCUTANEOUS APPROACH, NEW TECHNOLOGY GROUP 7: ICD-10-PCS | Performed by: INTERNAL MEDICINE

## 2022-01-01 PROCEDURE — 94003 VENT MGMT INPAT SUBQ DAY: CPT

## 2022-01-01 PROCEDURE — 6360000002 HC RX W HCPCS: Performed by: STUDENT IN AN ORGANIZED HEALTH CARE EDUCATION/TRAINING PROGRAM

## 2022-01-01 PROCEDURE — 87252 VIRUS INOCULATION TISSUE: CPT

## 2022-01-01 PROCEDURE — 82330 ASSAY OF CALCIUM: CPT

## 2022-01-01 PROCEDURE — C1894 INTRO/SHEATH, NON-LASER: HCPCS

## 2022-01-01 PROCEDURE — 99153 MOD SED SAME PHYS/QHP EA: CPT

## 2022-01-01 PROCEDURE — 87103 BLOOD FUNGUS CULTURE: CPT

## 2022-01-01 PROCEDURE — 87040 BLOOD CULTURE FOR BACTERIA: CPT

## 2022-01-01 PROCEDURE — 70551 MRI BRAIN STEM W/O DYE: CPT

## 2022-01-01 PROCEDURE — 74018 RADEX ABDOMEN 1 VIEW: CPT

## 2022-01-01 PROCEDURE — 87150 DNA/RNA AMPLIFIED PROBE: CPT

## 2022-01-01 PROCEDURE — XW0DXM6 INTRODUCTION OF BARICITINIB INTO MOUTH AND PHARYNX, EXTERNAL APPROACH, NEW TECHNOLOGY GROUP 6: ICD-10-PCS | Performed by: INTERNAL MEDICINE

## 2022-01-01 PROCEDURE — 3E0333Z INTRODUCTION OF ANTI-INFLAMMATORY INTO PERIPHERAL VEIN, PERCUTANEOUS APPROACH: ICD-10-PCS | Performed by: INTERNAL MEDICINE

## 2022-01-01 PROCEDURE — P9016 RBC LEUKOCYTES REDUCED: HCPCS

## 2022-01-01 PROCEDURE — 85027 COMPLETE CBC AUTOMATED: CPT

## 2022-01-01 PROCEDURE — 87186 SC STD MICRODIL/AGAR DIL: CPT

## 2022-01-01 PROCEDURE — C9081 HC RX FDA APPROVED CELL THERAPY: HCPCS | Performed by: INTERNAL MEDICINE

## 2022-01-01 PROCEDURE — 6360000002 HC RX W HCPCS: Performed by: EMERGENCY MEDICINE

## 2022-01-01 PROCEDURE — C9113 INJ PANTOPRAZOLE SODIUM, VIA: HCPCS

## 2022-01-01 PROCEDURE — 36620 INSERTION CATHETER ARTERY: CPT

## 2022-01-01 PROCEDURE — 96376 TX/PRO/DX INJ SAME DRUG ADON: CPT

## 2022-01-01 PROCEDURE — 96417 CHEMO IV INFUS EACH ADDL SEQ: CPT

## 2022-01-01 PROCEDURE — 99285 EMERGENCY DEPT VISIT HI MDM: CPT

## 2022-01-01 PROCEDURE — 94002 VENT MGMT INPAT INIT DAY: CPT

## 2022-01-01 PROCEDURE — 96374 THER/PROPH/DIAG INJ IV PUSH: CPT

## 2022-01-01 PROCEDURE — U0003 INFECTIOUS AGENT DETECTION BY NUCLEIC ACID (DNA OR RNA); SEVERE ACUTE RESPIRATORY SYNDROME CORONAVIRUS 2 (SARS-COV-2) (CORONAVIRUS DISEASE [COVID-19]), AMPLIFIED PROBE TECHNIQUE, MAKING USE OF HIGH THROUGHPUT TECHNOLOGIES AS DESCRIBED BY CMS-2020-01-R: HCPCS

## 2022-01-01 PROCEDURE — 70450 CT HEAD/BRAIN W/O DYE: CPT

## 2022-01-01 PROCEDURE — 0539T HC CART-T THERAPY RECEIPT & PREP CAR-T CELLS F/ADMIN: CPT

## 2022-01-01 PROCEDURE — 97116 GAIT TRAINING THERAPY: CPT

## 2022-01-01 PROCEDURE — 84145 PROCALCITONIN (PCT): CPT

## 2022-01-01 PROCEDURE — 96409 CHEMO IV PUSH SNGL DRUG: CPT

## 2022-01-01 PROCEDURE — 99232 SBSQ HOSP IP/OBS MODERATE 35: CPT | Performed by: NURSE PRACTITIONER

## 2022-01-01 PROCEDURE — 82232 ASSAY OF BETA-2 PROTEIN: CPT

## 2022-01-01 PROCEDURE — 96365 THER/PROPH/DIAG IV INF INIT: CPT

## 2022-01-01 PROCEDURE — 82784 ASSAY IGA/IGD/IGG/IGM EACH: CPT

## 2022-01-01 PROCEDURE — 2500000003 HC RX 250 WO HCPCS: Performed by: EMERGENCY MEDICINE

## 2022-01-01 PROCEDURE — 36558 INSERT TUNNELED CV CATH: CPT

## 2022-01-01 PROCEDURE — A4217 STERILE WATER/SALINE, 500 ML: HCPCS | Performed by: NURSE PRACTITIONER

## 2022-01-01 PROCEDURE — 38208 THAW PRESERVED STEM CELLS: CPT

## 2022-01-01 PROCEDURE — 84155 ASSAY OF PROTEIN SERUM: CPT

## 2022-01-01 PROCEDURE — 99284 EMERGENCY DEPT VISIT MOD MDM: CPT

## 2022-01-01 PROCEDURE — 99152 MOD SED SAME PHYS/QHP 5/>YRS: CPT

## 2022-01-01 PROCEDURE — 2500000003 HC RX 250 WO HCPCS: Performed by: NURSE PRACTITIONER

## 2022-01-01 PROCEDURE — 8910000000 HC RX FDA APPROVED CELL THERAPY: Performed by: INTERNAL MEDICINE

## 2022-01-01 PROCEDURE — 38241 TRANSPLT AUTOL HCT/DONOR: CPT

## 2022-01-01 PROCEDURE — 86807 CYTOTOXIC ANTIBODY SCREENING: CPT

## 2022-01-01 PROCEDURE — U0005 INFEC AGEN DETEC AMPLI PROBE: HCPCS

## 2022-01-01 PROCEDURE — 97166 OT EVAL MOD COMPLEX 45 MIN: CPT

## 2022-01-01 PROCEDURE — 87077 CULTURE AEROBIC IDENTIFY: CPT

## 2022-01-01 PROCEDURE — APPNB60 APP NON BILLABLE TIME 46-60 MINS: Performed by: NURSE PRACTITIONER

## 2022-01-01 PROCEDURE — 87636 SARSCOV2 & INF A&B AMP PRB: CPT

## 2022-01-01 PROCEDURE — 2580000003 HC RX 258: Performed by: EMERGENCY MEDICINE

## 2022-01-01 PROCEDURE — 93005 ELECTROCARDIOGRAM TRACING: CPT | Performed by: EMERGENCY MEDICINE

## 2022-01-01 PROCEDURE — 51702 INSERT TEMP BLADDER CATH: CPT

## 2022-01-01 PROCEDURE — 84295 ASSAY OF SERUM SODIUM: CPT

## 2022-01-01 PROCEDURE — 93005 ELECTROCARDIOGRAM TRACING: CPT | Performed by: STUDENT IN AN ORGANIZED HEALTH CARE EDUCATION/TRAINING PROGRAM

## 2022-01-01 PROCEDURE — 80053 COMPREHEN METABOLIC PANEL: CPT

## 2022-01-01 PROCEDURE — 86870 RBC ANTIBODY IDENTIFICATION: CPT

## 2022-01-01 PROCEDURE — 99232 SBSQ HOSP IP/OBS MODERATE 35: CPT

## 2022-01-01 PROCEDURE — 5A1945Z RESPIRATORY VENTILATION, 24-96 CONSECUTIVE HOURS: ICD-10-PCS | Performed by: INTERNAL MEDICINE

## 2022-01-01 PROCEDURE — 82947 ASSAY GLUCOSE BLOOD QUANT: CPT

## 2022-01-01 PROCEDURE — 31500 INSERT EMERGENCY AIRWAY: CPT

## 2022-01-01 RX ORDER — SODIUM CHLORIDE 9 MG/ML
20 INJECTION, SOLUTION INTRAVENOUS CONTINUOUS
Status: CANCELLED | OUTPATIENT
Start: 2022-01-01

## 2022-01-01 RX ORDER — SODIUM CHLORIDE 0.9 % (FLUSH) 0.9 %
5-40 SYRINGE (ML) INJECTION PRN
Status: DISCONTINUED | OUTPATIENT
Start: 2022-01-01 | End: 2022-01-01 | Stop reason: HOSPADM

## 2022-01-01 RX ORDER — 0.9 % SODIUM CHLORIDE 0.9 %
10 VIAL (ML) INJECTION ONCE
Status: CANCELLED | OUTPATIENT
Start: 2022-01-01 | End: 2022-01-01

## 2022-01-01 RX ORDER — METHYLPREDNISOLONE SODIUM SUCCINATE 125 MG/2ML
125 INJECTION, POWDER, LYOPHILIZED, FOR SOLUTION INTRAMUSCULAR; INTRAVENOUS ONCE
Status: CANCELLED | OUTPATIENT
Start: 2022-01-01 | End: 2022-01-01

## 2022-01-01 RX ORDER — ONDANSETRON 2 MG/ML
8 INJECTION INTRAMUSCULAR; INTRAVENOUS EVERY 8 HOURS PRN
Status: DISCONTINUED | OUTPATIENT
Start: 2022-01-01 | End: 2022-01-01 | Stop reason: HOSPADM

## 2022-01-01 RX ORDER — POTASSIUM CHLORIDE 29.8 MG/ML
20 INJECTION INTRAVENOUS PRN
Status: DISCONTINUED | OUTPATIENT
Start: 2022-01-01 | End: 2022-01-01 | Stop reason: HOSPADM

## 2022-01-01 RX ORDER — HEPARIN SODIUM (PORCINE) LOCK FLUSH IV SOLN 100 UNIT/ML 100 UNIT/ML
500 SOLUTION INTRAVENOUS PRN
Status: DISCONTINUED | OUTPATIENT
Start: 2022-01-01 | End: 2022-01-01 | Stop reason: HOSPADM

## 2022-01-01 RX ORDER — PETROLATUM, MENTHOL, UNSPECIFIED FORM, CAMPHOR (SYNTHETIC), AND PHENOL 59.14; 1; 1; .6 G/100G; G/100G; G/100G; G/100G
PASTE TOPICAL PRN
Status: CANCELLED | OUTPATIENT
Start: 2022-01-01

## 2022-01-01 RX ORDER — SODIUM CHLORIDE 0.9 % (FLUSH) 0.9 %
5-40 SYRINGE (ML) INJECTION PRN
Status: CANCELLED | OUTPATIENT
Start: 2022-01-01

## 2022-01-01 RX ORDER — SODIUM CHLORIDE 9 MG/ML
INJECTION, SOLUTION INTRAVENOUS CONTINUOUS
Status: CANCELLED | OUTPATIENT
Start: 2022-01-01

## 2022-01-01 RX ORDER — ALBUTEROL SULFATE 90 UG/1
4 AEROSOL, METERED RESPIRATORY (INHALATION) PRN
Status: CANCELLED | OUTPATIENT
Start: 2022-01-01

## 2022-01-01 RX ORDER — OXYCODONE HYDROCHLORIDE 5 MG/1
5 TABLET ORAL EVERY 4 HOURS PRN
Status: CANCELLED | OUTPATIENT
Start: 2022-01-01

## 2022-01-01 RX ORDER — HEPARIN SODIUM (PORCINE) LOCK FLUSH IV SOLN 100 UNIT/ML 100 UNIT/ML
500 SOLUTION INTRAVENOUS PRN
Status: CANCELLED | OUTPATIENT
Start: 2022-01-01

## 2022-01-01 RX ORDER — SODIUM CHLORIDE 9 MG/ML
20 INJECTION, SOLUTION INTRAVENOUS CONTINUOUS
Status: DISCONTINUED | OUTPATIENT
Start: 2022-01-01 | End: 2022-01-01 | Stop reason: HOSPADM

## 2022-01-01 RX ORDER — PANTOPRAZOLE SODIUM 40 MG/10ML
40 INJECTION, POWDER, LYOPHILIZED, FOR SOLUTION INTRAVENOUS ONCE
Status: COMPLETED | OUTPATIENT
Start: 2022-01-01 | End: 2022-01-01

## 2022-01-01 RX ORDER — SODIUM CHLORIDE 9 MG/ML
INJECTION, SOLUTION INTRAVENOUS CONTINUOUS
Status: DISCONTINUED | OUTPATIENT
Start: 2022-01-01 | End: 2022-01-01 | Stop reason: HOSPADM

## 2022-01-01 RX ORDER — CALCIUM GLUCONATE 10 MG/ML
1000 INJECTION, SOLUTION INTRAVENOUS ONCE
Status: COMPLETED | OUTPATIENT
Start: 2022-01-01 | End: 2022-01-01

## 2022-01-01 RX ORDER — ONDANSETRON 2 MG/ML
8 INJECTION INTRAMUSCULAR; INTRAVENOUS EVERY 8 HOURS PRN
Status: CANCELLED | OUTPATIENT
Start: 2022-01-01

## 2022-01-01 RX ORDER — 0.9 % SODIUM CHLORIDE 0.9 %
1000 INTRAVENOUS SOLUTION INTRAVENOUS ONCE
Status: COMPLETED | OUTPATIENT
Start: 2022-01-01 | End: 2022-01-01

## 2022-01-01 RX ORDER — SODIUM CHLORIDE 9 MG/ML
INJECTION, SOLUTION INTRAVENOUS PRN
Status: DISCONTINUED | OUTPATIENT
Start: 2022-01-01 | End: 2022-01-01 | Stop reason: HOSPADM

## 2022-01-01 RX ORDER — PROCHLORPERAZINE MALEATE 10 MG
10 TABLET ORAL EVERY 6 HOURS PRN
Status: CANCELLED | OUTPATIENT
Start: 2022-01-01

## 2022-01-01 RX ORDER — SODIUM CHLORIDE 9 MG/ML
INJECTION, SOLUTION INTRAVENOUS CONTINUOUS PRN
Status: CANCELLED | OUTPATIENT
Start: 2022-01-01

## 2022-01-01 RX ORDER — SODIUM CHLORIDE 9 MG/ML
25 INJECTION, SOLUTION INTRAVENOUS PRN
Status: CANCELLED | OUTPATIENT
Start: 2022-01-01

## 2022-01-01 RX ORDER — OXYCODONE HYDROCHLORIDE 5 MG/1
5 TABLET ORAL EVERY 4 HOURS PRN
Status: DISCONTINUED | OUTPATIENT
Start: 2022-01-01 | End: 2022-01-01 | Stop reason: HOSPADM

## 2022-01-01 RX ORDER — 0.9 % SODIUM CHLORIDE 0.9 %
30 INTRAVENOUS SOLUTION INTRAVENOUS ONCE
Status: COMPLETED | OUTPATIENT
Start: 2022-01-01 | End: 2022-01-01

## 2022-01-01 RX ORDER — DIPHENHYDRAMINE HYDROCHLORIDE 50 MG/ML
50 INJECTION INTRAMUSCULAR; INTRAVENOUS ONCE
Status: CANCELLED | OUTPATIENT
Start: 2022-01-01 | End: 2022-01-01

## 2022-01-01 RX ORDER — ONDANSETRON 2 MG/ML
8 INJECTION INTRAMUSCULAR; INTRAVENOUS
Status: CANCELLED | OUTPATIENT
Start: 2022-01-01

## 2022-01-01 RX ORDER — 0.9 % SODIUM CHLORIDE 0.9 %
1000 INTRAVENOUS SOLUTION INTRAVENOUS ONCE
Status: CANCELLED | OUTPATIENT
Start: 2022-01-01 | End: 2022-01-01

## 2022-01-01 RX ORDER — 0.9 % SODIUM CHLORIDE 0.9 %
1000 INTRAVENOUS SOLUTION INTRAVENOUS ONCE
Status: CANCELLED | OUTPATIENT
Start: 2022-01-01

## 2022-01-01 RX ORDER — DIPHENHYDRAMINE HYDROCHLORIDE 50 MG/ML
50 INJECTION INTRAMUSCULAR; INTRAVENOUS
Status: CANCELLED | OUTPATIENT
Start: 2022-01-01

## 2022-01-01 RX ORDER — PROCHLORPERAZINE MALEATE 10 MG
10 TABLET ORAL EVERY 6 HOURS PRN
Status: DISCONTINUED | OUTPATIENT
Start: 2022-01-01 | End: 2022-01-01 | Stop reason: HOSPADM

## 2022-01-01 RX ORDER — HEPARIN SODIUM (PORCINE) LOCK FLUSH IV SOLN 100 UNIT/ML 100 UNIT/ML
500 SOLUTION INTRAVENOUS PRN
Status: CANCELLED
Start: 2022-01-01

## 2022-01-01 RX ORDER — 0.9 % SODIUM CHLORIDE 0.9 %
1000 INTRAVENOUS SOLUTION INTRAVENOUS ONCE
Status: DISCONTINUED | OUTPATIENT
Start: 2022-01-01 | End: 2022-01-01 | Stop reason: HOSPADM

## 2022-01-01 RX ORDER — DEXAMETHASONE SODIUM PHOSPHATE 4 MG/ML
6 INJECTION, SOLUTION INTRA-ARTICULAR; INTRALESIONAL; INTRAMUSCULAR; INTRAVENOUS; SOFT TISSUE 2 TIMES DAILY
Status: DISCONTINUED | OUTPATIENT
Start: 2022-01-01 | End: 2022-01-01

## 2022-01-01 RX ORDER — SODIUM CHLORIDE 9 MG/ML
25 INJECTION, SOLUTION INTRAVENOUS PRN
Status: DISCONTINUED | OUTPATIENT
Start: 2022-01-01 | End: 2022-01-01 | Stop reason: HOSPADM

## 2022-01-01 RX ORDER — POTASSIUM CHLORIDE 29.8 MG/ML
80 INJECTION INTRAVENOUS PRN
Status: CANCELLED | OUTPATIENT
Start: 2022-01-01

## 2022-01-01 RX ORDER — PANTOPRAZOLE SODIUM 40 MG/1
40 TABLET, DELAYED RELEASE ORAL
Status: DISCONTINUED | OUTPATIENT
Start: 2022-01-01 | End: 2022-01-01 | Stop reason: HOSPADM

## 2022-01-01 RX ORDER — DIPHENHYDRAMINE HCL 25 MG
25 TABLET ORAL ONCE
Status: COMPLETED | OUTPATIENT
Start: 2022-01-01 | End: 2022-01-01

## 2022-01-01 RX ORDER — DICYCLOMINE HYDROCHLORIDE 10 MG/1
10 CAPSULE ORAL PRN
Status: ON HOLD | COMMUNITY
End: 2022-01-01 | Stop reason: CLARIF

## 2022-01-01 RX ORDER — ACETAMINOPHEN 325 MG/1
650 TABLET ORAL
Status: CANCELLED | OUTPATIENT
Start: 2022-01-01

## 2022-01-01 RX ORDER — SODIUM BICARBONATE 650 MG/1
650 TABLET ORAL DAILY
Status: DISCONTINUED | OUTPATIENT
Start: 2022-01-01 | End: 2022-01-01

## 2022-01-01 RX ORDER — FUROSEMIDE 40 MG/1
40 TABLET ORAL DAILY PRN
Qty: 5 TABLET | Refills: 0 | Status: ON HOLD | OUTPATIENT
Start: 2022-01-01 | End: 2022-01-01

## 2022-01-01 RX ORDER — POTASSIUM CHLORIDE 20 MEQ/1
40 TABLET, EXTENDED RELEASE ORAL PRN
Status: CANCELLED | OUTPATIENT
Start: 2022-01-01

## 2022-01-01 RX ORDER — ONDANSETRON 4 MG/1
4 TABLET, ORALLY DISINTEGRATING ORAL EVERY 8 HOURS PRN
Status: DISCONTINUED | OUTPATIENT
Start: 2022-01-01 | End: 2022-01-01 | Stop reason: HOSPADM

## 2022-01-01 RX ORDER — MAGNESIUM SULFATE IN WATER 40 MG/ML
4000 INJECTION, SOLUTION INTRAVENOUS PRN
Status: DISCONTINUED | OUTPATIENT
Start: 2022-01-01 | End: 2022-01-01 | Stop reason: HOSPADM

## 2022-01-01 RX ORDER — ACETAMINOPHEN 650 MG/1
650 SUPPOSITORY RECTAL EVERY 6 HOURS PRN
Status: DISCONTINUED | OUTPATIENT
Start: 2022-01-01 | End: 2022-01-01 | Stop reason: HOSPADM

## 2022-01-01 RX ORDER — DIMETHICONE, CAMPHOR (SYNTHETIC), MENTHOL, AND PHENOL 1.1; .5; .625; .5 G/100G; G/100G; G/100G; G/100G
OINTMENT TOPICAL PRN
Status: DISCONTINUED | OUTPATIENT
Start: 2022-01-01 | End: 2022-01-01 | Stop reason: HOSPADM

## 2022-01-01 RX ORDER — ONDANSETRON HYDROCHLORIDE 8 MG/1
16 TABLET, FILM COATED ORAL ONCE
Status: COMPLETED | OUTPATIENT
Start: 2022-01-01 | End: 2022-01-01

## 2022-01-01 RX ORDER — SODIUM CHLORIDE 9 MG/ML
INJECTION, SOLUTION INTRAVENOUS CONTINUOUS PRN
Status: DISCONTINUED | OUTPATIENT
Start: 2022-01-01 | End: 2022-01-01 | Stop reason: HOSPADM

## 2022-01-01 RX ORDER — DIPHENHYDRAMINE HCL 25 MG
25 TABLET ORAL ONCE
Status: CANCELLED | OUTPATIENT
Start: 2022-01-01

## 2022-01-01 RX ORDER — SODIUM CHLORIDE 9 MG/ML
500 INJECTION, SOLUTION INTRAVENOUS ONCE
Status: COMPLETED | OUTPATIENT
Start: 2022-01-01 | End: 2022-01-01

## 2022-01-01 RX ORDER — SODIUM CHLORIDE 0.9 % (FLUSH) 0.9 %
5-40 SYRINGE (ML) INJECTION EVERY 12 HOURS SCHEDULED
Status: DISCONTINUED | OUTPATIENT
Start: 2022-01-01 | End: 2022-01-01 | Stop reason: HOSPADM

## 2022-01-01 RX ORDER — PROCHLORPERAZINE EDISYLATE 5 MG/ML
5 INJECTION INTRAMUSCULAR; INTRAVENOUS EVERY 6 HOURS PRN
Status: CANCELLED | OUTPATIENT
Start: 2022-01-01

## 2022-01-01 RX ORDER — MAGNESIUM SULFATE IN WATER 40 MG/ML
4000 INJECTION, SOLUTION INTRAVENOUS PRN
Status: CANCELLED | OUTPATIENT
Start: 2022-01-01

## 2022-01-01 RX ORDER — POTASSIUM CHLORIDE 20 MEQ/1
40 TABLET, EXTENDED RELEASE ORAL PRN
Status: DISCONTINUED | OUTPATIENT
Start: 2022-01-01 | End: 2022-01-01 | Stop reason: HOSPADM

## 2022-01-01 RX ORDER — OXYCODONE HYDROCHLORIDE 5 MG/1
10 TABLET ORAL EVERY 4 HOURS PRN
Status: CANCELLED | OUTPATIENT
Start: 2022-01-01

## 2022-01-01 RX ORDER — SODIUM CHLORIDE 9 MG/ML
500 INJECTION, SOLUTION INTRAVENOUS CONTINUOUS PRN
Status: DISCONTINUED | OUTPATIENT
Start: 2022-01-01 | End: 2022-01-01 | Stop reason: HOSPADM

## 2022-01-01 RX ORDER — ACETAMINOPHEN 325 MG/1
650 TABLET ORAL EVERY 6 HOURS PRN
Status: DISCONTINUED | OUTPATIENT
Start: 2022-01-01 | End: 2022-01-01 | Stop reason: HOSPADM

## 2022-01-01 RX ORDER — DIPHENHYDRAMINE HYDROCHLORIDE 50 MG/ML
50 INJECTION INTRAMUSCULAR; INTRAVENOUS ONCE
Status: DISCONTINUED | OUTPATIENT
Start: 2022-01-01 | End: 2022-01-01 | Stop reason: HOSPADM

## 2022-01-01 RX ORDER — ONDANSETRON 4 MG/1
8 TABLET, FILM COATED ORAL EVERY 8 HOURS PRN
Status: DISCONTINUED | OUTPATIENT
Start: 2022-01-01 | End: 2022-01-01 | Stop reason: HOSPADM

## 2022-01-01 RX ORDER — SODIUM CHLORIDE AND POTASSIUM CHLORIDE .9; .15 G/100ML; G/100ML
SOLUTION INTRAVENOUS CONTINUOUS
Status: DISCONTINUED | OUTPATIENT
Start: 2022-01-01 | End: 2022-01-01

## 2022-01-01 RX ORDER — ONDANSETRON 4 MG/1
8 TABLET, FILM COATED ORAL EVERY 8 HOURS PRN
Status: CANCELLED | OUTPATIENT
Start: 2022-01-01

## 2022-01-01 RX ORDER — PROCHLORPERAZINE EDISYLATE 5 MG/ML
5 INJECTION INTRAMUSCULAR; INTRAVENOUS EVERY 6 HOURS PRN
Status: DISCONTINUED | OUTPATIENT
Start: 2022-01-01 | End: 2022-01-01 | Stop reason: HOSPADM

## 2022-01-01 RX ORDER — ACETAMINOPHEN 325 MG/1
650 TABLET ORAL ONCE
Status: CANCELLED | OUTPATIENT
Start: 2022-01-01

## 2022-01-01 RX ORDER — OXYCODONE HYDROCHLORIDE 5 MG/1
10 TABLET ORAL EVERY 4 HOURS PRN
Status: DISCONTINUED | OUTPATIENT
Start: 2022-01-01 | End: 2022-01-01 | Stop reason: HOSPADM

## 2022-01-01 RX ORDER — ONDANSETRON HYDROCHLORIDE 8 MG/1
8 TABLET, FILM COATED ORAL EVERY 8 HOURS PRN
Status: CANCELLED | OUTPATIENT
Start: 2022-01-01

## 2022-01-01 RX ORDER — SODIUM CHLORIDE 9 MG/ML
INJECTION, SOLUTION INTRAVENOUS CONTINUOUS
Status: DISCONTINUED | OUTPATIENT
Start: 2022-01-01 | End: 2022-01-01

## 2022-01-01 RX ORDER — FLUCONAZOLE 100 MG/1
100 TABLET ORAL DAILY
Qty: 30 TABLET | Refills: 2 | Status: SHIPPED | OUTPATIENT
Start: 2022-01-01 | End: 2022-01-01

## 2022-01-01 RX ORDER — FLUCONAZOLE 200 MG/1
200 TABLET ORAL DAILY
Qty: 30 TABLET | Refills: 2 | Status: SHIPPED | OUTPATIENT
Start: 2022-01-01 | End: 2022-01-01 | Stop reason: SDUPTHER

## 2022-01-01 RX ORDER — ACYCLOVIR 400 MG/1
400 TABLET ORAL 2 TIMES DAILY
Qty: 30 TABLET | Refills: 2 | Status: SHIPPED | OUTPATIENT
Start: 2022-01-01

## 2022-01-01 RX ORDER — LEVOFLOXACIN 250 MG/1
250 TABLET ORAL DAILY
COMMUNITY

## 2022-01-01 RX ORDER — PETROLATUM, MENTHOL, UNSPECIFIED FORM, CAMPHOR (SYNTHETIC), AND PHENOL 59.14; 1; 1; .6 G/100G; G/100G; G/100G; G/100G
PASTE TOPICAL PRN
Status: DISCONTINUED | OUTPATIENT
Start: 2022-01-01 | End: 2022-01-01 | Stop reason: HOSPADM

## 2022-01-01 RX ORDER — SODIUM CHLORIDE 9 MG/ML
INJECTION, SOLUTION INTRAVENOUS PRN
Status: DISCONTINUED | OUTPATIENT
Start: 2022-01-01 | End: 2022-01-01

## 2022-01-01 RX ORDER — LEVETIRACETAM 500 MG/1
500 TABLET ORAL EVERY 12 HOURS
Status: DISCONTINUED | OUTPATIENT
Start: 2022-01-01 | End: 2022-01-01

## 2022-01-01 RX ORDER — ACETAMINOPHEN 325 MG/1
650 TABLET ORAL EVERY 4 HOURS PRN
Status: DISCONTINUED | OUTPATIENT
Start: 2022-01-01 | End: 2022-01-01 | Stop reason: SDUPTHER

## 2022-01-01 RX ORDER — ALBUMIN (HUMAN) 12.5 G/50ML
25 SOLUTION INTRAVENOUS EVERY 8 HOURS
Status: COMPLETED | OUTPATIENT
Start: 2022-01-01 | End: 2022-01-01

## 2022-01-01 RX ORDER — ONDANSETRON 2 MG/ML
8 INJECTION INTRAMUSCULAR; INTRAVENOUS EVERY 8 HOURS PRN
Status: DISCONTINUED | OUTPATIENT
Start: 2022-01-01 | End: 2022-01-01

## 2022-01-01 RX ORDER — LOPERAMIDE HYDROCHLORIDE 2 MG/1
2 CAPSULE ORAL 4 TIMES DAILY PRN
COMMUNITY
Start: 2022-01-01 | End: 2022-01-01

## 2022-01-01 RX ORDER — ONDANSETRON HYDROCHLORIDE 8 MG/1
8 TABLET, FILM COATED ORAL EVERY 8 HOURS PRN
Status: DISCONTINUED | OUTPATIENT
Start: 2022-01-01 | End: 2022-01-01 | Stop reason: HOSPADM

## 2022-01-01 RX ORDER — ALLOPURINOL 300 MG/1
300 TABLET ORAL ONCE
Status: CANCELLED | OUTPATIENT
Start: 2022-01-01 | End: 2022-01-01

## 2022-01-01 RX ORDER — AMLODIPINE BESYLATE 5 MG/1
5 TABLET ORAL DAILY
Qty: 30 TABLET | Refills: 3 | Status: SHIPPED | OUTPATIENT
Start: 2022-01-01

## 2022-01-01 RX ORDER — SODIUM BICARBONATE 650 MG/1
650 TABLET ORAL DAILY
COMMUNITY

## 2022-01-01 RX ORDER — LOPERAMIDE HYDROCHLORIDE 2 MG/1
4 CAPSULE ORAL ONCE
Status: COMPLETED | OUTPATIENT
Start: 2022-01-01 | End: 2022-01-01

## 2022-01-01 RX ORDER — ATROPINE SULFATE 10 MG/ML
2 SOLUTION/ DROPS OPHTHALMIC EVERY 4 HOURS PRN
Status: DISCONTINUED | OUTPATIENT
Start: 2022-01-01 | End: 2022-01-01 | Stop reason: HOSPADM

## 2022-01-01 RX ORDER — LEVETIRACETAM 250 MG/1
500 TABLET ORAL EVERY 12 HOURS
Status: DISCONTINUED | OUTPATIENT
Start: 2022-01-01 | End: 2022-01-01 | Stop reason: HOSPADM

## 2022-01-01 RX ORDER — FAMOTIDINE 20 MG/1
20 TABLET, FILM COATED ORAL 2 TIMES DAILY
Status: DISCONTINUED | OUTPATIENT
Start: 2022-01-01 | End: 2022-01-01 | Stop reason: HOSPADM

## 2022-01-01 RX ORDER — PROCHLORPERAZINE MALEATE 10 MG
10 TABLET ORAL EVERY 6 HOURS PRN
COMMUNITY

## 2022-01-01 RX ORDER — PROCHLORPERAZINE MALEATE 5 MG/1
5 TABLET ORAL EVERY 6 HOURS PRN
Status: CANCELLED | OUTPATIENT
Start: 2022-01-01

## 2022-01-01 RX ORDER — SODIUM CHLORIDE, SODIUM LACTATE, POTASSIUM CHLORIDE, AND CALCIUM CHLORIDE .6; .31; .03; .02 G/100ML; G/100ML; G/100ML; G/100ML
1000 INJECTION, SOLUTION INTRAVENOUS ONCE
Status: COMPLETED | OUTPATIENT
Start: 2022-01-01 | End: 2022-01-01

## 2022-01-01 RX ORDER — ETOMIDATE 2 MG/ML
INJECTION INTRAVENOUS
Status: DISPENSED
Start: 2022-01-01 | End: 2022-01-01

## 2022-01-01 RX ORDER — SODIUM BICARBONATE 325 MG/1
650 TABLET ORAL DAILY
Status: ON HOLD | COMMUNITY
End: 2022-01-01 | Stop reason: CLARIF

## 2022-01-01 RX ORDER — 0.9 % SODIUM CHLORIDE 0.9 %
10 VIAL (ML) INJECTION ONCE
Status: DISCONTINUED | OUTPATIENT
Start: 2022-01-01 | End: 2022-01-01 | Stop reason: HOSPADM

## 2022-01-01 RX ORDER — CALCIUM GLUCONATE 20 MG/ML
2000 INJECTION, SOLUTION INTRAVENOUS ONCE
Status: DISCONTINUED | OUTPATIENT
Start: 2022-01-01 | End: 2022-01-01 | Stop reason: HOSPADM

## 2022-01-01 RX ORDER — DIPHENHYDRAMINE HCL 25 MG
50 TABLET ORAL ONCE
Status: COMPLETED | OUTPATIENT
Start: 2022-01-01 | End: 2022-01-01

## 2022-01-01 RX ORDER — HEPARIN SODIUM (PORCINE) LOCK FLUSH IV SOLN 100 UNIT/ML 100 UNIT/ML
300 SOLUTION INTRAVENOUS PRN
Status: DISCONTINUED | OUTPATIENT
Start: 2022-01-01 | End: 2022-01-01 | Stop reason: HOSPADM

## 2022-01-01 RX ORDER — ACETAMINOPHEN 325 MG/1
650 TABLET ORAL ONCE
Status: COMPLETED | OUTPATIENT
Start: 2022-01-01 | End: 2022-01-01

## 2022-01-01 RX ORDER — ACETAMINOPHEN 650 MG/1
650 SUPPOSITORY RECTAL EVERY 6 HOURS PRN
Status: DISCONTINUED | OUTPATIENT
Start: 2022-01-01 | End: 2022-01-01 | Stop reason: SDUPTHER

## 2022-01-01 RX ORDER — POTASSIUM CHLORIDE 29.8 MG/ML
80 INJECTION INTRAVENOUS PRN
Status: DISCONTINUED | OUTPATIENT
Start: 2022-01-01 | End: 2022-01-01 | Stop reason: HOSPADM

## 2022-01-01 RX ORDER — DIPHENHYDRAMINE HYDROCHLORIDE 50 MG/ML
25 INJECTION INTRAMUSCULAR; INTRAVENOUS
Status: DISCONTINUED | OUTPATIENT
Start: 2022-01-01 | End: 2022-01-01 | Stop reason: HOSPADM

## 2022-01-01 RX ORDER — POLYETHYLENE GLYCOL 3350 17 G/17G
17 POWDER, FOR SOLUTION ORAL DAILY PRN
Status: DISCONTINUED | OUTPATIENT
Start: 2022-01-01 | End: 2022-01-01 | Stop reason: HOSPADM

## 2022-01-01 RX ORDER — DIPHENHYDRAMINE HCL 25 MG
25 TABLET ORAL PRN
Status: CANCELLED
Start: 2022-01-01

## 2022-01-01 RX ORDER — CALCIUM CARBONATE/VITAMIN D3 500-10/5ML
400 LIQUID (ML) ORAL 2 TIMES DAILY
Qty: 60 CAPSULE | Refills: 2 | Status: SHIPPED | OUTPATIENT
Start: 2022-01-01

## 2022-01-01 RX ORDER — MAGNESIUM SULFATE IN WATER 40 MG/ML
4000 INJECTION, SOLUTION INTRAVENOUS ONCE
Status: COMPLETED | OUTPATIENT
Start: 2022-01-01 | End: 2022-01-01

## 2022-01-01 RX ORDER — ONDANSETRON HYDROCHLORIDE 8 MG/1
16 TABLET, FILM COATED ORAL ONCE
Status: CANCELLED | OUTPATIENT
Start: 2022-01-01

## 2022-01-01 RX ORDER — MORPHINE SULFATE 2 MG/ML
2 INJECTION, SOLUTION INTRAMUSCULAR; INTRAVENOUS EVERY 10 MIN PRN
Status: DISCONTINUED | OUTPATIENT
Start: 2022-01-01 | End: 2022-01-01 | Stop reason: HOSPADM

## 2022-01-01 RX ORDER — CALCIUM GLUCONATE 20 MG/ML
2000 INJECTION, SOLUTION INTRAVENOUS ONCE
Status: COMPLETED | OUTPATIENT
Start: 2022-01-01 | End: 2022-01-01

## 2022-01-01 RX ORDER — PANTOPRAZOLE SODIUM 40 MG/10ML
40 INJECTION, POWDER, LYOPHILIZED, FOR SOLUTION INTRAVENOUS DAILY
Status: DISCONTINUED | OUTPATIENT
Start: 2022-01-01 | End: 2022-01-01 | Stop reason: HOSPADM

## 2022-01-01 RX ORDER — MORPHINE SULFATE 4 MG/ML
4 INJECTION, SOLUTION INTRAMUSCULAR; INTRAVENOUS
Status: DISCONTINUED | OUTPATIENT
Start: 2022-01-01 | End: 2022-01-01 | Stop reason: HOSPADM

## 2022-01-01 RX ORDER — 0.9 % SODIUM CHLORIDE 0.9 %
500 INTRAVENOUS SOLUTION INTRAVENOUS ONCE
Status: CANCELLED | OUTPATIENT
Start: 2022-01-01

## 2022-01-01 RX ORDER — PROCHLORPERAZINE MALEATE 10 MG
5 TABLET ORAL EVERY 6 HOURS PRN
Status: CANCELLED | OUTPATIENT
Start: 2022-01-01

## 2022-01-01 RX ORDER — FLUCONAZOLE 100 MG/1
200 TABLET ORAL ONCE
Status: COMPLETED | OUTPATIENT
Start: 2022-01-01 | End: 2022-01-01

## 2022-01-01 RX ORDER — 0.9 % SODIUM CHLORIDE 0.9 %
500 INTRAVENOUS SOLUTION INTRAVENOUS ONCE
Status: COMPLETED | OUTPATIENT
Start: 2022-01-01 | End: 2022-01-01

## 2022-01-01 RX ORDER — PROCHLORPERAZINE MALEATE 10 MG
10 TABLET ORAL EVERY 4 HOURS PRN
Status: DISCONTINUED | OUTPATIENT
Start: 2022-01-01 | End: 2022-01-01 | Stop reason: HOSPADM

## 2022-01-01 RX ORDER — MIDAZOLAM HYDROCHLORIDE 1 MG/ML
INJECTION INTRAMUSCULAR; INTRAVENOUS
Status: COMPLETED
Start: 2022-01-01 | End: 2022-01-01

## 2022-01-01 RX ORDER — PROPOFOL 10 MG/ML
INJECTION, EMULSION INTRAVENOUS
Status: DISPENSED
Start: 2022-01-01 | End: 2022-01-01

## 2022-01-01 RX ORDER — ACYCLOVIR 400 MG/1
400 TABLET ORAL 2 TIMES DAILY
Status: DISCONTINUED | OUTPATIENT
Start: 2022-01-01 | End: 2022-01-01 | Stop reason: HOSPADM

## 2022-01-01 RX ORDER — LEVOFLOXACIN 250 MG/1
250 TABLET ORAL DAILY
Status: DISCONTINUED | OUTPATIENT
Start: 2022-01-01 | End: 2022-01-01

## 2022-01-01 RX ORDER — LOPERAMIDE HYDROCHLORIDE 2 MG/1
2 CAPSULE ORAL PRN
Status: CANCELLED | OUTPATIENT
Start: 2022-01-01

## 2022-01-01 RX ORDER — FLUCONAZOLE 100 MG/1
200 TABLET ORAL DAILY
Qty: 30 TABLET | Refills: 2
Start: 2022-01-01 | End: 2022-01-01 | Stop reason: SDUPTHER

## 2022-01-01 RX ORDER — SODIUM BICARBONATE 650 MG/1
650 TABLET ORAL DAILY
Status: DISCONTINUED | OUTPATIENT
Start: 2022-01-01 | End: 2022-01-01 | Stop reason: HOSPADM

## 2022-01-01 RX ORDER — PROCHLORPERAZINE MALEATE 10 MG
5 TABLET ORAL EVERY 6 HOURS PRN
Status: DISCONTINUED | OUTPATIENT
Start: 2022-01-01 | End: 2022-01-01 | Stop reason: HOSPADM

## 2022-01-01 RX ORDER — MORPHINE SULFATE 2 MG/ML
2 INJECTION, SOLUTION INTRAMUSCULAR; INTRAVENOUS PRN
Status: CANCELLED | OUTPATIENT
Start: 2022-01-01

## 2022-01-01 RX ORDER — ACYCLOVIR 400 MG/1
400 TABLET ORAL DAILY
Qty: 30 TABLET | Refills: 2
Start: 2022-01-01 | End: 2022-01-01 | Stop reason: SDUPTHER

## 2022-01-01 RX ORDER — ACYCLOVIR 200 MG/1
400 CAPSULE ORAL 2 TIMES DAILY
Status: DISCONTINUED | OUTPATIENT
Start: 2022-01-01 | End: 2022-01-01 | Stop reason: HOSPADM

## 2022-01-01 RX ORDER — FUROSEMIDE 10 MG/ML
80 INJECTION INTRAMUSCULAR; INTRAVENOUS ONCE
Status: COMPLETED | OUTPATIENT
Start: 2022-01-01 | End: 2022-01-01

## 2022-01-01 RX ORDER — ONDANSETRON 4 MG/1
16 TABLET, FILM COATED ORAL ONCE
Status: CANCELLED | OUTPATIENT
Start: 2022-01-01

## 2022-01-01 RX ORDER — ACYCLOVIR 400 MG/1
400 TABLET ORAL 2 TIMES DAILY
Qty: 30 TABLET | Refills: 2 | Status: SHIPPED | OUTPATIENT
Start: 2022-01-01 | End: 2022-01-01 | Stop reason: SDUPTHER

## 2022-01-01 RX ORDER — AMLODIPINE BESYLATE 5 MG/1
5 TABLET ORAL DAILY
Status: DISCONTINUED | OUTPATIENT
Start: 2022-01-01 | End: 2022-01-01 | Stop reason: HOSPADM

## 2022-01-01 RX ORDER — SODIUM CHLORIDE 9 MG/ML
INJECTION, SOLUTION INTRAVENOUS PRN
Status: COMPLETED | OUTPATIENT
Start: 2022-01-01 | End: 2022-01-01

## 2022-01-01 RX ORDER — ACETAMINOPHEN 325 MG/1
650 TABLET ORAL PRN
Status: CANCELLED
Start: 2022-01-01

## 2022-01-01 RX ORDER — SODIUM CHLORIDE, SODIUM LACTATE, POTASSIUM CHLORIDE, AND CALCIUM CHLORIDE .6; .31; .03; .02 G/100ML; G/100ML; G/100ML; G/100ML
1000 INJECTION, SOLUTION INTRAVENOUS ONCE
Status: DISCONTINUED | OUTPATIENT
Start: 2022-01-01 | End: 2022-01-01

## 2022-01-01 RX ORDER — PROPOFOL 10 MG/ML
5-50 INJECTION, EMULSION INTRAVENOUS CONTINUOUS
Status: DISCONTINUED | OUTPATIENT
Start: 2022-01-01 | End: 2022-01-01

## 2022-01-01 RX ORDER — LABETALOL HYDROCHLORIDE 5 MG/ML
10 INJECTION, SOLUTION INTRAVENOUS EVERY 6 HOURS PRN
Status: DISCONTINUED | OUTPATIENT
Start: 2022-01-01 | End: 2022-01-01 | Stop reason: HOSPADM

## 2022-01-01 RX ORDER — LORAZEPAM 2 MG/ML
1 CONCENTRATE ORAL
Status: DISCONTINUED | OUTPATIENT
Start: 2022-01-01 | End: 2022-01-01

## 2022-01-01 RX ORDER — HYDRALAZINE HYDROCHLORIDE 20 MG/ML
5 INJECTION INTRAMUSCULAR; INTRAVENOUS EVERY 6 HOURS PRN
Status: DISCONTINUED | OUTPATIENT
Start: 2022-01-01 | End: 2022-01-01 | Stop reason: HOSPADM

## 2022-01-01 RX ORDER — LEVETIRACETAM 500 MG/1
500 TABLET ORAL EVERY 12 HOURS
Status: DISCONTINUED | OUTPATIENT
Start: 2022-01-01 | End: 2022-01-01 | Stop reason: HOSPADM

## 2022-01-01 RX ORDER — PANTOPRAZOLE SODIUM 40 MG/1
40 GRANULE, DELAYED RELEASE ORAL
COMMUNITY

## 2022-01-01 RX ORDER — DIPHENHYDRAMINE HCL 25 MG
25 TABLET ORAL PRN
Status: DISCONTINUED | OUTPATIENT
Start: 2022-01-01 | End: 2022-01-01 | Stop reason: HOSPADM

## 2022-01-01 RX ORDER — LEVETIRACETAM 500 MG/1
500 TABLET ORAL EVERY 12 HOURS
Qty: 60 TABLET | Refills: 3 | Status: SHIPPED | OUTPATIENT
Start: 2022-01-01

## 2022-01-01 RX ORDER — DICYCLOMINE HCL 20 MG
20 TABLET ORAL 3 TIMES DAILY PRN
COMMUNITY
End: 2022-01-01 | Stop reason: HOSPADM

## 2022-01-01 RX ORDER — CALCIUM CARBONATE-CHOLECALCIFEROL TAB 250 MG-125 UNIT 250-125 MG-UNIT
1 TAB ORAL DAILY
Status: DISCONTINUED | OUTPATIENT
Start: 2022-01-01 | End: 2022-01-01 | Stop reason: SDUPTHER

## 2022-01-01 RX ORDER — FLUCONAZOLE 200 MG/1
200 TABLET ORAL DAILY
Status: DISCONTINUED | OUTPATIENT
Start: 2022-01-01 | End: 2022-01-01 | Stop reason: HOSPADM

## 2022-01-01 RX ORDER — OXYCODONE HYDROCHLORIDE 5 MG/1
5 TABLET ORAL EVERY 6 HOURS PRN
Status: DISCONTINUED | OUTPATIENT
Start: 2022-01-01 | End: 2022-01-01 | Stop reason: HOSPADM

## 2022-01-01 RX ORDER — HEPARIN SODIUM (PORCINE) LOCK FLUSH IV SOLN 100 UNIT/ML 100 UNIT/ML
1500 SOLUTION INTRAVENOUS PRN
Status: DISCONTINUED | OUTPATIENT
Start: 2022-01-01 | End: 2022-01-01 | Stop reason: HOSPADM

## 2022-01-01 RX ORDER — METHYLPREDNISOLONE SODIUM SUCCINATE 125 MG/2ML
125 INJECTION, POWDER, LYOPHILIZED, FOR SOLUTION INTRAMUSCULAR; INTRAVENOUS ONCE
Status: DISCONTINUED | OUTPATIENT
Start: 2022-01-01 | End: 2022-01-01 | Stop reason: HOSPADM

## 2022-01-01 RX ORDER — ACETAMINOPHEN 325 MG/1
650 TABLET ORAL EVERY 4 HOURS PRN
Status: DISCONTINUED | OUTPATIENT
Start: 2022-01-01 | End: 2022-01-01

## 2022-01-01 RX ORDER — FLUCONAZOLE 100 MG/1
100 TABLET ORAL DAILY
Qty: 30 TABLET | Refills: 2 | Status: ON HOLD | OUTPATIENT
Start: 2022-01-01 | End: 2022-01-01 | Stop reason: SDUPTHER

## 2022-01-01 RX ORDER — MIDAZOLAM HYDROCHLORIDE 1 MG/ML
5 INJECTION, SOLUTION INTRAMUSCULAR; INTRAVENOUS
Status: DISCONTINUED | OUTPATIENT
Start: 2022-01-01 | End: 2022-01-01 | Stop reason: HOSPADM

## 2022-01-01 RX ORDER — ACETAMINOPHEN 325 MG/1
650 TABLET ORAL EVERY 4 HOURS PRN
Status: DISCONTINUED | OUTPATIENT
Start: 2022-01-01 | End: 2022-01-01 | Stop reason: HOSPADM

## 2022-01-01 RX ORDER — LOPERAMIDE HYDROCHLORIDE 2 MG/1
2 CAPSULE ORAL PRN
Status: DISCONTINUED | OUTPATIENT
Start: 2022-01-01 | End: 2022-01-01 | Stop reason: HOSPADM

## 2022-01-01 RX ORDER — METHYLPREDNISOLONE SODIUM SUCCINATE 125 MG/2ML
125 INJECTION, POWDER, LYOPHILIZED, FOR SOLUTION INTRAMUSCULAR; INTRAVENOUS
Status: DISCONTINUED | OUTPATIENT
Start: 2022-01-01 | End: 2022-01-01 | Stop reason: HOSPADM

## 2022-01-01 RX ORDER — LANOLIN ALCOHOL/MO/W.PET/CERES
400 CREAM (GRAM) TOPICAL ONCE
Status: COMPLETED | OUTPATIENT
Start: 2022-01-01 | End: 2022-01-01

## 2022-01-01 RX ORDER — AMLODIPINE BESYLATE 5 MG/1
5 TABLET ORAL ONCE
Status: COMPLETED | OUTPATIENT
Start: 2022-01-01 | End: 2022-01-01

## 2022-01-01 RX ORDER — LANOLIN ALCOHOL/MO/W.PET/CERES
400 CREAM (GRAM) TOPICAL 2 TIMES DAILY
Qty: 60 TABLET | Refills: 2 | Status: SHIPPED
Start: 2022-01-01 | End: 2022-01-01 | Stop reason: HOSPADM

## 2022-01-01 RX ORDER — LEVOFLOXACIN 500 MG/1
500 TABLET, FILM COATED ORAL DAILY
Status: DISCONTINUED | OUTPATIENT
Start: 2022-01-01 | End: 2022-01-01 | Stop reason: HOSPADM

## 2022-01-01 RX ORDER — ALLOPURINOL 300 MG/1
300 TABLET ORAL DAILY
Status: DISCONTINUED | OUTPATIENT
Start: 2022-01-01 | End: 2022-01-01 | Stop reason: HOSPADM

## 2022-01-01 RX ORDER — ALLOPURINOL 100 MG/1
200 TABLET ORAL DAILY
Status: DISCONTINUED | OUTPATIENT
Start: 2022-01-01 | End: 2022-01-01

## 2022-01-01 RX ORDER — ACETAMINOPHEN 325 MG/1
650 TABLET ORAL EVERY 6 HOURS PRN
Status: DISCONTINUED | OUTPATIENT
Start: 2022-01-01 | End: 2022-01-01 | Stop reason: SDUPTHER

## 2022-01-01 RX ORDER — MORPHINE SULFATE 2 MG/ML
2 INJECTION, SOLUTION INTRAMUSCULAR; INTRAVENOUS PRN
Status: DISCONTINUED | OUTPATIENT
Start: 2022-01-01 | End: 2022-01-01

## 2022-01-01 RX ORDER — LOPERAMIDE HYDROCHLORIDE 2 MG/1
2 CAPSULE ORAL 4 TIMES DAILY PRN
Status: DISCONTINUED | OUTPATIENT
Start: 2022-01-01 | End: 2022-01-01 | Stop reason: HOSPADM

## 2022-01-01 RX ORDER — ALLOPURINOL 300 MG/1
300 TABLET ORAL ONCE
Status: DISCONTINUED | OUTPATIENT
Start: 2022-01-01 | End: 2022-01-01 | Stop reason: HOSPADM

## 2022-01-01 RX ORDER — PANTOPRAZOLE SODIUM 40 MG/1
40 TABLET, DELAYED RELEASE ORAL
Status: DISCONTINUED | OUTPATIENT
Start: 2022-01-01 | End: 2022-01-01

## 2022-01-01 RX ORDER — HEPARIN SODIUM (PORCINE) LOCK FLUSH IV SOLN 100 UNIT/ML 100 UNIT/ML
500 SOLUTION INTRAVENOUS ONCE
Status: COMPLETED | OUTPATIENT
Start: 2022-01-01 | End: 2022-01-01

## 2022-01-01 RX ORDER — MORPHINE SULFATE 2 MG/ML
2 INJECTION, SOLUTION INTRAMUSCULAR; INTRAVENOUS
Status: DISCONTINUED | OUTPATIENT
Start: 2022-01-01 | End: 2022-01-01 | Stop reason: HOSPADM

## 2022-01-01 RX ORDER — ONDANSETRON 2 MG/ML
INJECTION INTRAMUSCULAR; INTRAVENOUS
Status: COMPLETED
Start: 2022-01-01 | End: 2022-01-01

## 2022-01-01 RX ORDER — DIPHENHYDRAMINE HYDROCHLORIDE 50 MG/ML
25 INJECTION INTRAMUSCULAR; INTRAVENOUS PRN
Status: CANCELLED | OUTPATIENT
Start: 2022-01-01 | End: 2022-01-01

## 2022-01-01 RX ORDER — ACYCLOVIR 400 MG/1
400 TABLET ORAL 2 TIMES DAILY
Status: DISCONTINUED | OUTPATIENT
Start: 2022-01-01 | End: 2022-01-01

## 2022-01-01 RX ORDER — LORAZEPAM 2 MG/ML
1 INJECTION INTRAMUSCULAR EVERY 5 MIN PRN
Status: DISCONTINUED | OUTPATIENT
Start: 2022-01-01 | End: 2022-01-01 | Stop reason: HOSPADM

## 2022-01-01 RX ORDER — CYANOCOBALAMIN (VITAMIN B-12) 1000 MCG
1 TABLET, EXTENDED RELEASE ORAL 2 TIMES DAILY WITH MEALS
COMMUNITY
End: 2022-01-01 | Stop reason: HOSPADM

## 2022-01-01 RX ORDER — FENTANYL CITRATE-0.9 % NACL/PF 10 MCG/ML
25-200 PLASTIC BAG, INJECTION (ML) INTRAVENOUS CONTINUOUS
Status: DISCONTINUED | OUTPATIENT
Start: 2022-01-01 | End: 2022-01-01

## 2022-01-01 RX ORDER — FLUCONAZOLE 100 MG/1
100 TABLET ORAL DAILY
Status: DISCONTINUED | OUTPATIENT
Start: 2022-01-01 | End: 2022-01-01

## 2022-01-01 RX ORDER — FUROSEMIDE 10 MG/ML
100 INJECTION INTRAMUSCULAR; INTRAVENOUS ONCE
Status: COMPLETED | OUTPATIENT
Start: 2022-01-01 | End: 2022-01-01

## 2022-01-01 RX ORDER — ALLOPURINOL 100 MG/1
200 TABLET ORAL DAILY
Status: DISCONTINUED | OUTPATIENT
Start: 2022-01-01 | End: 2022-01-01 | Stop reason: HOSPADM

## 2022-01-01 RX ORDER — CALCIUM GLUCONATE 94 MG/ML
1000 INJECTION, SOLUTION INTRAVENOUS ONCE
Status: DISCONTINUED | OUTPATIENT
Start: 2022-01-01 | End: 2022-01-01

## 2022-01-01 RX ORDER — HEPARIN SODIUM (PORCINE) LOCK FLUSH IV SOLN 100 UNIT/ML 100 UNIT/ML
1500 SOLUTION INTRAVENOUS ONCE
Status: COMPLETED | OUTPATIENT
Start: 2022-01-01 | End: 2022-01-01

## 2022-01-01 RX ORDER — ACETAMINOPHEN 325 MG/1
650 TABLET ORAL PRN
Status: DISCONTINUED | OUTPATIENT
Start: 2022-01-01 | End: 2022-01-01 | Stop reason: HOSPADM

## 2022-01-01 RX ORDER — ONDANSETRON 2 MG/ML
4 INJECTION INTRAMUSCULAR; INTRAVENOUS EVERY 6 HOURS PRN
Status: DISCONTINUED | OUTPATIENT
Start: 2022-01-01 | End: 2022-01-01 | Stop reason: HOSPADM

## 2022-01-01 RX ORDER — CALCIUM GLUCONATE 94 MG/ML
1000 INJECTION, SOLUTION INTRAVENOUS ONCE
Status: COMPLETED | OUTPATIENT
Start: 2022-01-01 | End: 2022-01-01

## 2022-01-01 RX ORDER — ALLOPURINOL 100 MG/1
200 TABLET ORAL DAILY
Qty: 60 TABLET | Refills: 2 | Status: SHIPPED | OUTPATIENT
Start: 2022-01-01

## 2022-01-01 RX ORDER — LEVOFLOXACIN 250 MG/1
500 TABLET ORAL DAILY
Status: DISCONTINUED | OUTPATIENT
Start: 2022-01-01 | End: 2022-01-01 | Stop reason: HOSPADM

## 2022-01-01 RX ORDER — DIPHENHYDRAMINE HYDROCHLORIDE 50 MG/ML
25 INJECTION INTRAMUSCULAR; INTRAVENOUS PRN
Status: DISCONTINUED | OUTPATIENT
Start: 2022-01-01 | End: 2022-01-01

## 2022-01-01 RX ADMIN — DIBASIC SODIUM PHOSPHATE, MONOBASIC POTASSIUM PHOSPHATE AND MONOBASIC SODIUM PHOSPHATE 2 TABLET: 852; 155; 130 TABLET ORAL at 09:58

## 2022-01-01 RX ADMIN — ACYCLOVIR 400 MG: 400 TABLET ORAL at 20:24

## 2022-01-01 RX ADMIN — SODIUM CHLORIDE, PRESERVATIVE FREE 500 UNITS: 5 INJECTION INTRAVENOUS at 13:22

## 2022-01-01 RX ADMIN — DEXTROSE MONOHYDRATE 200 MG: 50 INJECTION, SOLUTION INTRAVENOUS at 07:52

## 2022-01-01 RX ADMIN — LEVETIRACETAM 500 MG: 500 TABLET, FILM COATED ORAL at 20:18

## 2022-01-01 RX ADMIN — NOREPINEPHRINE BITARTRATE 68 MCG/MIN: 1 SOLUTION INTRAVENOUS at 01:16

## 2022-01-01 RX ADMIN — ACETAMINOPHEN 650 MG: 325 TABLET ORAL at 00:35

## 2022-01-01 RX ADMIN — PROPOFOL 25 MCG/KG/MIN: 10 INJECTION, EMULSION INTRAVENOUS at 09:00

## 2022-01-01 RX ADMIN — ACETAMINOPHEN 650 MG: 325 TABLET ORAL at 19:30

## 2022-01-01 RX ADMIN — SODIUM ZIRCONIUM CYCLOSILICATE 10 G: 10 POWDER, FOR SUSPENSION ORAL at 11:36

## 2022-01-01 RX ADMIN — SODIUM CHLORIDE, SODIUM LACTATE, POTASSIUM CHLORIDE, AND CALCIUM CHLORIDE 1000 ML: .6; .31; .03; .02 INJECTION, SOLUTION INTRAVENOUS at 21:45

## 2022-01-01 RX ADMIN — SODIUM CHLORIDE, PRESERVATIVE FREE 10 ML: 5 INJECTION INTRAVENOUS at 12:27

## 2022-01-01 RX ADMIN — HEPARIN 500 UNITS: 100 SYRINGE at 11:27

## 2022-01-01 RX ADMIN — LEVETIRACETAM 500 MG: 500 TABLET, FILM COATED ORAL at 08:59

## 2022-01-01 RX ADMIN — SODIUM CHLORIDE, PRESERVATIVE FREE 10 ML: 5 INJECTION INTRAVENOUS at 10:47

## 2022-01-01 RX ADMIN — LEVETIRACETAM 500 MG: 500 TABLET, FILM COATED ORAL at 20:30

## 2022-01-01 RX ADMIN — LEVETIRACETAM 500 MG: 250 TABLET, FILM COATED ORAL at 08:51

## 2022-01-01 RX ADMIN — SODIUM CHLORIDE, PRESERVATIVE FREE 10 ML: 5 INJECTION INTRAVENOUS at 20:25

## 2022-01-01 RX ADMIN — FLUCONAZOLE 100 MG: 100 TABLET ORAL at 09:10

## 2022-01-01 RX ADMIN — HEPARIN 500 UNITS: 100 SYRINGE at 11:41

## 2022-01-01 RX ADMIN — CYCLOPHOSPHAMIDE 700 MG: 1 INJECTION, POWDER, FOR SOLUTION INTRAVENOUS; ORAL at 12:02

## 2022-01-01 RX ADMIN — ALLOPURINOL 200 MG: 100 TABLET ORAL at 08:21

## 2022-01-01 RX ADMIN — FLUCONAZOLE 100 MG: 100 TABLET ORAL at 08:35

## 2022-01-01 RX ADMIN — ACYCLOVIR 400 MG: 400 TABLET ORAL at 20:38

## 2022-01-01 RX ADMIN — Medication 500 UNITS: at 11:10

## 2022-01-01 RX ADMIN — ACETAMINOPHEN 650 MG: 325 TABLET ORAL at 13:59

## 2022-01-01 RX ADMIN — VANCOMYCIN HYDROCHLORIDE 1500 MG: 10 INJECTION, POWDER, LYOPHILIZED, FOR SOLUTION INTRAVENOUS at 06:02

## 2022-01-01 RX ADMIN — SODIUM CHLORIDE 20 ML/HR: 9 INJECTION, SOLUTION INTRAVENOUS at 10:55

## 2022-01-01 RX ADMIN — PANTOPRAZOLE SODIUM 40 MG: 40 TABLET, DELAYED RELEASE ORAL at 06:23

## 2022-01-01 RX ADMIN — ACETAMINOPHEN 325MG 650 MG: 325 TABLET ORAL at 23:52

## 2022-01-01 RX ADMIN — Medication 500 UNITS: at 10:45

## 2022-01-01 RX ADMIN — SODIUM CHLORIDE 1000 ML: 9 INJECTION, SOLUTION INTRAVENOUS at 10:15

## 2022-01-01 RX ADMIN — SODIUM CHLORIDE: 9 INJECTION, SOLUTION INTRAVENOUS at 23:39

## 2022-01-01 RX ADMIN — PHENYLEPHRINE HYDROCHLORIDE 300 MCG/MIN: 10 INJECTION INTRAVENOUS at 07:24

## 2022-01-01 RX ADMIN — HEPARIN SODIUM (PORCINE) LOCK FLUSH IV SOLN 100 UNIT/ML 500 UNITS: 100 SOLUTION at 12:39

## 2022-01-01 RX ADMIN — Medication 500 UNITS: at 10:30

## 2022-01-01 RX ADMIN — NOREPINEPHRINE BITARTRATE 67 MCG/MIN: 1 SOLUTION INTRAVENOUS at 20:52

## 2022-01-01 RX ADMIN — SODIUM CHLORIDE, PRESERVATIVE FREE 500 UNITS: 5 INJECTION INTRAVENOUS at 13:15

## 2022-01-01 RX ADMIN — Medication 500 UNITS: at 11:11

## 2022-01-01 RX ADMIN — ACYCLOVIR 400 MG: 400 TABLET ORAL at 08:21

## 2022-01-01 RX ADMIN — DIBASIC SODIUM PHOSPHATE, MONOBASIC POTASSIUM PHOSPHATE AND MONOBASIC SODIUM PHOSPHATE 2 TABLET: 852; 155; 130 TABLET ORAL at 20:19

## 2022-01-01 RX ADMIN — PROPOFOL 45 MCG/KG/MIN: 10 INJECTION, EMULSION INTRAVENOUS at 02:47

## 2022-01-01 RX ADMIN — HEPARIN 500 UNITS: 100 SYRINGE at 11:44

## 2022-01-01 RX ADMIN — NOREPINEPHRINE BITARTRATE 30 MCG/MIN: 1 SOLUTION INTRAVENOUS at 09:32

## 2022-01-01 RX ADMIN — SODIUM CHLORIDE 20 ML/HR: 9 INJECTION, SOLUTION INTRAVENOUS at 11:31

## 2022-01-01 RX ADMIN — SODIUM CHLORIDE, PRESERVATIVE FREE 500 UNITS: 5 INJECTION INTRAVENOUS at 14:15

## 2022-01-01 RX ADMIN — PANTOPRAZOLE SODIUM 40 MG: 40 TABLET, DELAYED RELEASE ORAL at 05:37

## 2022-01-01 RX ADMIN — LEVETIRACETAM 1000 MG: 100 INJECTION, SOLUTION INTRAVENOUS at 19:40

## 2022-01-01 RX ADMIN — FLUCONAZOLE 100 MG: 100 TABLET ORAL at 09:58

## 2022-01-01 RX ADMIN — ACETAMINOPHEN 325MG 650 MG: 325 TABLET ORAL at 05:00

## 2022-01-01 RX ADMIN — PHENYLEPHRINE HYDROCHLORIDE 100 MCG/MIN: 10 INJECTION INTRAVENOUS at 06:46

## 2022-01-01 RX ADMIN — SODIUM BICARBONATE 650 MG: 650 TABLET ORAL at 08:59

## 2022-01-01 RX ADMIN — LEVETIRACETAM 500 MG: 500 TABLET, FILM COATED ORAL at 20:42

## 2022-01-01 RX ADMIN — DIBASIC SODIUM PHOSPHATE, MONOBASIC POTASSIUM PHOSPHATE AND MONOBASIC SODIUM PHOSPHATE 2 TABLET: 852; 155; 130 TABLET ORAL at 20:25

## 2022-01-01 RX ADMIN — SODIUM BICARBONATE 50 MEQ: 84 INJECTION, SOLUTION INTRAVENOUS at 04:30

## 2022-01-01 RX ADMIN — PHENYLEPHRINE HYDROCHLORIDE 100 MCG/MIN: 10 INJECTION INTRAVENOUS at 12:19

## 2022-01-01 RX ADMIN — LEVOFLOXACIN 500 MG: 500 TABLET, FILM COATED ORAL at 13:53

## 2022-01-01 RX ADMIN — ALLOPURINOL 200 MG: 100 TABLET ORAL at 08:12

## 2022-01-01 RX ADMIN — ACYCLOVIR 400 MG: 400 TABLET ORAL at 08:07

## 2022-01-01 RX ADMIN — SODIUM CHLORIDE, PRESERVATIVE FREE 500 UNITS: 5 INJECTION INTRAVENOUS at 11:26

## 2022-01-01 RX ADMIN — ACETAMINOPHEN 650 MG: 325 TABLET ORAL at 00:16

## 2022-01-01 RX ADMIN — FLUDARABINE PHOSPHATE 55 MG: 25 INJECTION, SOLUTION INTRAVENOUS at 10:59

## 2022-01-01 RX ADMIN — SODIUM CHLORIDE: 9 INJECTION, SOLUTION INTRAVENOUS at 21:46

## 2022-01-01 RX ADMIN — SODIUM CHLORIDE, PRESERVATIVE FREE 10 ML: 5 INJECTION INTRAVENOUS at 21:46

## 2022-01-01 RX ADMIN — SODIUM CHLORIDE, PRESERVATIVE FREE 500 UNITS: 5 INJECTION INTRAVENOUS at 11:50

## 2022-01-01 RX ADMIN — PHENYLEPHRINE HYDROCHLORIDE 300 MCG/MIN: 10 INJECTION INTRAVENOUS at 04:21

## 2022-01-01 RX ADMIN — SODIUM CHLORIDE, PRESERVATIVE FREE 500 UNITS: 5 INJECTION INTRAVENOUS at 12:49

## 2022-01-01 RX ADMIN — ALLOPURINOL 200 MG: 100 TABLET ORAL at 08:19

## 2022-01-01 RX ADMIN — Medication 500 UNITS: at 12:14

## 2022-01-01 RX ADMIN — LEVOFLOXACIN 500 MG: 500 TABLET, FILM COATED ORAL at 08:51

## 2022-01-01 RX ADMIN — SODIUM CHLORIDE, PRESERVATIVE FREE 500 UNITS: 5 INJECTION INTRAVENOUS at 11:37

## 2022-01-01 RX ADMIN — CALCIUM GLUCONATE 1000 MG: 10 INJECTION, SOLUTION INTRAVENOUS at 11:13

## 2022-01-01 RX ADMIN — DIBASIC SODIUM PHOSPHATE, MONOBASIC POTASSIUM PHOSPHATE AND MONOBASIC SODIUM PHOSPHATE 2 TABLET: 852; 155; 130 TABLET ORAL at 09:36

## 2022-01-01 RX ADMIN — DIBASIC SODIUM PHOSPHATE, MONOBASIC POTASSIUM PHOSPHATE AND MONOBASIC SODIUM PHOSPHATE 2 TABLET: 852; 155; 130 TABLET ORAL at 20:42

## 2022-01-01 RX ADMIN — LEVETIRACETAM 500 MG: 250 TABLET, FILM COATED ORAL at 01:54

## 2022-01-01 RX ADMIN — SODIUM CHLORIDE 1000 ML: 9 INJECTION, SOLUTION INTRAVENOUS at 08:57

## 2022-01-01 RX ADMIN — PHENYLEPHRINE HYDROCHLORIDE 75 MCG/MIN: 10 INJECTION INTRAVENOUS at 02:26

## 2022-01-01 RX ADMIN — DIBASIC SODIUM PHOSPHATE, MONOBASIC POTASSIUM PHOSPHATE AND MONOBASIC SODIUM PHOSPHATE 2 TABLET: 852; 155; 130 TABLET ORAL at 09:10

## 2022-01-01 RX ADMIN — SODIUM BICARBONATE: 84 INJECTION, SOLUTION INTRAVENOUS at 11:29

## 2022-01-01 RX ADMIN — SODIUM CHLORIDE: 9 INJECTION, SOLUTION INTRAVENOUS at 09:07

## 2022-01-01 RX ADMIN — MEROPENEM 1000 MG: 1 INJECTION, POWDER, FOR SOLUTION INTRAVENOUS at 14:42

## 2022-01-01 RX ADMIN — OXYCODONE HYDROCHLORIDE 5 MG: 5 TABLET ORAL at 01:17

## 2022-01-01 RX ADMIN — DIBASIC SODIUM PHOSPHATE, MONOBASIC POTASSIUM PHOSPHATE AND MONOBASIC SODIUM PHOSPHATE 2 TABLET: 852; 155; 130 TABLET ORAL at 12:29

## 2022-01-01 RX ADMIN — Medication 500 UNITS: at 10:31

## 2022-01-01 RX ADMIN — LEVETIRACETAM 500 MG: 250 TABLET, FILM COATED ORAL at 08:19

## 2022-01-01 RX ADMIN — ONDANSETRON 8 MG: 2 INJECTION INTRAMUSCULAR; INTRAVENOUS at 19:31

## 2022-01-01 RX ADMIN — LEVETIRACETAM 500 MG: 500 TABLET, FILM COATED ORAL at 09:36

## 2022-01-01 RX ADMIN — VANCOMYCIN HYDROCHLORIDE 1500 MG: 10 INJECTION, POWDER, LYOPHILIZED, FOR SOLUTION INTRAVENOUS at 11:19

## 2022-01-01 RX ADMIN — SODIUM CHLORIDE, PRESERVATIVE FREE 500 UNITS: 5 INJECTION INTRAVENOUS at 12:50

## 2022-01-01 RX ADMIN — SODIUM CHLORIDE, PRESERVATIVE FREE 10 ML: 5 INJECTION INTRAVENOUS at 09:29

## 2022-01-01 RX ADMIN — FAMOTIDINE 20 MG: 20 TABLET ORAL at 01:55

## 2022-01-01 RX ADMIN — TOCILIZUMAB 800 MG: 20 INJECTION, SOLUTION, CONCENTRATE INTRAVENOUS at 10:28

## 2022-01-01 RX ADMIN — NOREPINEPHRINE BITARTRATE 32 MCG/MIN: 1 SOLUTION INTRAVENOUS at 19:55

## 2022-01-01 RX ADMIN — FILGRASTIM-AAFI 300 MCG: 480 INJECTION, SOLUTION SUBCUTANEOUS at 11:43

## 2022-01-01 RX ADMIN — PROPOFOL 40 MCG/KG/MIN: 10 INJECTION, EMULSION INTRAVENOUS at 23:48

## 2022-01-01 RX ADMIN — SODIUM CHLORIDE 500 ML: 9 INJECTION, SOLUTION INTRAVENOUS at 09:03

## 2022-01-01 RX ADMIN — DEXTROSE MONOHYDRATE 100 MG: 50 INJECTION, SOLUTION INTRAVENOUS at 06:23

## 2022-01-01 RX ADMIN — MEROPENEM 1000 MG: 1 INJECTION, POWDER, FOR SOLUTION INTRAVENOUS at 15:32

## 2022-01-01 RX ADMIN — HEPARIN 500 UNITS: 100 SYRINGE at 14:52

## 2022-01-01 RX ADMIN — PHENYLEPHRINE HYDROCHLORIDE 300 MCG/MIN: 10 INJECTION INTRAVENOUS at 16:40

## 2022-01-01 RX ADMIN — SODIUM BICARBONATE 650 MG: 650 TABLET ORAL at 08:07

## 2022-01-01 RX ADMIN — CALCIUM GLUCONATE 2000 MG: 20 INJECTION, SOLUTION INTRAVENOUS at 08:14

## 2022-01-01 RX ADMIN — SODIUM BICARBONATE 650 MG: 650 TABLET ORAL at 08:21

## 2022-01-01 RX ADMIN — NOREPINEPHRINE BITARTRATE 70 MCG/MIN: 1 SOLUTION INTRAVENOUS at 06:00

## 2022-01-01 RX ADMIN — CYCLOPHOSPHAMIDE 700 MG: 1 INJECTION, POWDER, FOR SOLUTION INTRAVENOUS; ORAL at 11:18

## 2022-01-01 RX ADMIN — Medication 500 UNITS: at 11:19

## 2022-01-01 RX ADMIN — SODIUM CHLORIDE, PRESERVATIVE FREE 10 ML: 5 INJECTION INTRAVENOUS at 10:35

## 2022-01-01 RX ADMIN — ACETAMINOPHEN 650 MG: 325 TABLET ORAL at 05:11

## 2022-01-01 RX ADMIN — SODIUM BICARBONATE: 84 INJECTION, SOLUTION INTRAVENOUS at 18:36

## 2022-01-01 RX ADMIN — ACYCLOVIR 400 MG: 400 TABLET ORAL at 20:50

## 2022-01-01 RX ADMIN — CALCIUM CARBONATE-VITAMIN D TAB 500 MG-200 UNIT 1 TABLET: 500-200 TAB at 09:39

## 2022-01-01 RX ADMIN — LEVETIRACETAM 500 MG: 500 TABLET, FILM COATED ORAL at 09:58

## 2022-01-01 RX ADMIN — DIBASIC SODIUM PHOSPHATE, MONOBASIC POTASSIUM PHOSPHATE AND MONOBASIC SODIUM PHOSPHATE 2 TABLET: 852; 155; 130 TABLET ORAL at 08:21

## 2022-01-01 RX ADMIN — SODIUM CHLORIDE, PRESERVATIVE FREE 500 UNITS: 5 INJECTION INTRAVENOUS at 10:24

## 2022-01-01 RX ADMIN — Medication 500 UNITS: at 12:15

## 2022-01-01 RX ADMIN — Medication 500 UNITS: at 11:08

## 2022-01-01 RX ADMIN — POTASSIUM CHLORIDE 40 MEQ: 1500 TABLET, EXTENDED RELEASE ORAL at 10:21

## 2022-01-01 RX ADMIN — CALCIUM CARBONATE-VITAMIN D TAB 500 MG-200 UNIT 1 TABLET: 500-200 TAB at 08:07

## 2022-01-01 RX ADMIN — SODIUM BICARBONATE 650 MG: 650 TABLET ORAL at 08:35

## 2022-01-01 RX ADMIN — ACETAMINOPHEN 650 MG: 325 TABLET ORAL at 16:53

## 2022-01-01 RX ADMIN — LEVETIRACETAM 500 MG: 500 TABLET, FILM COATED ORAL at 20:15

## 2022-01-01 RX ADMIN — SODIUM CHLORIDE, PRESERVATIVE FREE 10 ML: 5 INJECTION INTRAVENOUS at 08:29

## 2022-01-01 RX ADMIN — BARICITINIB 2 MG: 2 TABLET, FILM COATED ORAL at 07:56

## 2022-01-01 RX ADMIN — CALCIUM CARBONATE-VITAMIN D TAB 500 MG-200 UNIT 1 TABLET: 500-200 TAB at 20:30

## 2022-01-01 RX ADMIN — SODIUM CHLORIDE 1000 ML: 9 INJECTION, SOLUTION INTRAVENOUS at 09:00

## 2022-01-01 RX ADMIN — CALCIUM CARBONATE-VITAMIN D TAB 500 MG-200 UNIT 1 TABLET: 500-200 TAB at 08:21

## 2022-01-01 RX ADMIN — NOREPINEPHRINE BITARTRATE 100 MCG/MIN: 1 SOLUTION INTRAVENOUS at 12:28

## 2022-01-01 RX ADMIN — SODIUM CHLORIDE, PRESERVATIVE FREE 10 ML: 5 INJECTION INTRAVENOUS at 11:10

## 2022-01-01 RX ADMIN — SODIUM CHLORIDE, PRESERVATIVE FREE 500 UNITS: 5 INJECTION INTRAVENOUS at 14:33

## 2022-01-01 RX ADMIN — ACETAMINOPHEN 650 MG: 325 TABLET ORAL at 06:40

## 2022-01-01 RX ADMIN — ATROPINE SULFATE 2 DROP: 10 SOLUTION/ DROPS OPHTHALMIC at 16:10

## 2022-01-01 RX ADMIN — DIPHENHYDRAMINE HYDROCHLORIDE 25 MG: 25 TABLET ORAL at 10:35

## 2022-01-01 RX ADMIN — SODIUM CHLORIDE 1000 ML: 9 INJECTION, SOLUTION INTRAVENOUS at 08:19

## 2022-01-01 RX ADMIN — PHENYLEPHRINE HYDROCHLORIDE 300 MCG/MIN: 10 INJECTION INTRAVENOUS at 13:11

## 2022-01-01 RX ADMIN — DIPHENHYDRAMINE HCL 25 MG: 25 TABLET ORAL at 05:25

## 2022-01-01 RX ADMIN — SODIUM CHLORIDE, PRESERVATIVE FREE 10 ML: 5 INJECTION INTRAVENOUS at 10:49

## 2022-01-01 RX ADMIN — ACETAMINOPHEN 325MG 650 MG: 325 TABLET ORAL at 05:26

## 2022-01-01 RX ADMIN — ACYCLOVIR 400 MG: 400 TABLET ORAL at 08:18

## 2022-01-01 RX ADMIN — SODIUM CHLORIDE, PRESERVATIVE FREE 500 UNITS: 5 INJECTION INTRAVENOUS at 14:54

## 2022-01-01 RX ADMIN — Medication 400 MG: at 10:47

## 2022-01-01 RX ADMIN — SODIUM CHLORIDE: 9 INJECTION, SOLUTION INTRAVENOUS at 23:46

## 2022-01-01 RX ADMIN — SODIUM CHLORIDE 20 ML/HR: 9 INJECTION, SOLUTION INTRAVENOUS at 10:15

## 2022-01-01 RX ADMIN — SODIUM CHLORIDE 1000 ML: 9 INJECTION, SOLUTION INTRAVENOUS at 12:42

## 2022-01-01 RX ADMIN — HEPARIN 500 UNITS: 100 SYRINGE at 14:50

## 2022-01-01 RX ADMIN — SODIUM CHLORIDE, PRESERVATIVE FREE 10 ML: 5 INJECTION INTRAVENOUS at 11:49

## 2022-01-01 RX ADMIN — Medication 1500 UNITS: at 11:00

## 2022-01-01 RX ADMIN — PANTOPRAZOLE SODIUM 40 MG: 40 TABLET, DELAYED RELEASE ORAL at 05:27

## 2022-01-01 RX ADMIN — CEFEPIME 2000 MG: 2 INJECTION, POWDER, FOR SOLUTION INTRAVENOUS at 19:22

## 2022-01-01 RX ADMIN — SODIUM CHLORIDE, PRESERVATIVE FREE 10 ML: 5 INJECTION INTRAVENOUS at 19:49

## 2022-01-01 RX ADMIN — PROPOFOL 50 MCG/KG/MIN: 10 INJECTION, EMULSION INTRAVENOUS at 12:38

## 2022-01-01 RX ADMIN — CALCIUM GLUCONATE 1000 MG: 98 INJECTION, SOLUTION INTRAVENOUS at 09:32

## 2022-01-01 RX ADMIN — ALBUMIN (HUMAN) 25 G: 0.25 INJECTION, SOLUTION INTRAVENOUS at 03:40

## 2022-01-01 RX ADMIN — Medication 500 UNITS: at 15:31

## 2022-01-01 RX ADMIN — PHENYLEPHRINE HYDROCHLORIDE 100 MCG/MIN: 10 INJECTION INTRAVENOUS at 21:43

## 2022-01-01 RX ADMIN — ACETAMINOPHEN 650 MG: 325 TABLET, FILM COATED ORAL at 16:36

## 2022-01-01 RX ADMIN — SODIUM BICARBONATE 650 MG: 650 TABLET ORAL at 09:36

## 2022-01-01 RX ADMIN — CALCIUM CARBONATE-VITAMIN D TAB 500 MG-200 UNIT 1 TABLET: 500-200 TAB at 09:28

## 2022-01-01 RX ADMIN — CEFEPIME HYDROCHLORIDE 2000 MG: 2 INJECTION, POWDER, FOR SOLUTION INTRAVENOUS at 00:25

## 2022-01-01 RX ADMIN — ACETAMINOPHEN 650 MG: 325 TABLET ORAL at 10:51

## 2022-01-01 RX ADMIN — ACETAMINOPHEN 650 MG: 325 TABLET, FILM COATED ORAL at 09:35

## 2022-01-01 RX ADMIN — CALCIUM CARBONATE-VITAMIN D TAB 500 MG-200 UNIT 1 TABLET: 500-200 TAB at 20:15

## 2022-01-01 RX ADMIN — SODIUM CHLORIDE: 9 INJECTION, SOLUTION INTRAVENOUS at 00:24

## 2022-01-01 RX ADMIN — NOREPINEPHRINE BITARTRATE 100 MCG/MIN: 1 SOLUTION INTRAVENOUS at 09:39

## 2022-01-01 RX ADMIN — ACYCLOVIR 400 MG: 400 TABLET ORAL at 09:58

## 2022-01-01 RX ADMIN — SODIUM CHLORIDE, PRESERVATIVE FREE 10 ML: 5 INJECTION INTRAVENOUS at 10:48

## 2022-01-01 RX ADMIN — SODIUM CHLORIDE, PRESERVATIVE FREE 10 ML: 5 INJECTION INTRAVENOUS at 11:45

## 2022-01-01 RX ADMIN — SODIUM CHLORIDE, PRESERVATIVE FREE 10 ML: 5 INJECTION INTRAVENOUS at 20:33

## 2022-01-01 RX ADMIN — ACETAMINOPHEN 650 MG: 325 TABLET ORAL at 16:32

## 2022-01-01 RX ADMIN — CALCIUM CARBONATE-VITAMIN D TAB 500 MG-200 UNIT 1 TABLET: 500-200 TAB at 09:11

## 2022-01-01 RX ADMIN — SODIUM CHLORIDE, PRESERVATIVE FREE 10 ML: 5 INJECTION INTRAVENOUS at 11:11

## 2022-01-01 RX ADMIN — Medication 500 UNITS: at 11:25

## 2022-01-01 RX ADMIN — ACYCLOVIR 400 MG: 400 TABLET ORAL at 10:31

## 2022-01-01 RX ADMIN — PHENYLEPHRINE HYDROCHLORIDE 300 MCG/MIN: 10 INJECTION INTRAVENOUS at 22:13

## 2022-01-01 RX ADMIN — SODIUM CHLORIDE, PRESERVATIVE FREE 500 UNITS: 5 INJECTION INTRAVENOUS at 12:36

## 2022-01-01 RX ADMIN — ALLOPURINOL 200 MG: 100 TABLET ORAL at 09:10

## 2022-01-01 RX ADMIN — SODIUM CHLORIDE 1000 ML: 9 INJECTION, SOLUTION INTRAVENOUS at 12:31

## 2022-01-01 RX ADMIN — AMLODIPINE BESYLATE 5 MG: 5 TABLET ORAL at 10:57

## 2022-01-01 RX ADMIN — SODIUM CHLORIDE 1000 ML: 9 INJECTION, SOLUTION INTRAVENOUS at 09:02

## 2022-01-01 RX ADMIN — DIPHENHYDRAMINE HCL 25 MG: 25 TABLET ORAL at 04:59

## 2022-01-01 RX ADMIN — DIBASIC SODIUM PHOSPHATE, MONOBASIC POTASSIUM PHOSPHATE AND MONOBASIC SODIUM PHOSPHATE 2 TABLET: 852; 155; 130 TABLET ORAL at 20:15

## 2022-01-01 RX ADMIN — SODIUM CHLORIDE, PRESERVATIVE FREE 10 ML: 5 INJECTION INTRAVENOUS at 20:43

## 2022-01-01 RX ADMIN — HEPARIN SODIUM (PORCINE) LOCK FLUSH IV SOLN 100 UNIT/ML 500 UNITS: 100 SOLUTION at 12:16

## 2022-01-01 RX ADMIN — VANCOMYCIN HYDROCHLORIDE 1500 MG: 10 INJECTION, POWDER, LYOPHILIZED, FOR SOLUTION INTRAVENOUS at 15:29

## 2022-01-01 RX ADMIN — LOPERAMIDE HYDROCHLORIDE 2 MG: 2 CAPSULE ORAL at 09:06

## 2022-01-01 RX ADMIN — SODIUM CHLORIDE, PRESERVATIVE FREE 10 ML: 5 INJECTION INTRAVENOUS at 09:49

## 2022-01-01 RX ADMIN — CEFEPIME HYDROCHLORIDE 2000 MG: 2 INJECTION, POWDER, FOR SOLUTION INTRAVENOUS at 13:25

## 2022-01-01 RX ADMIN — PANTOPRAZOLE SODIUM 40 MG: 40 INJECTION, POWDER, FOR SOLUTION INTRAVENOUS at 09:03

## 2022-01-01 RX ADMIN — SODIUM CHLORIDE 1000 ML: 9 INJECTION, SOLUTION INTRAVENOUS at 09:18

## 2022-01-01 RX ADMIN — SODIUM BICARBONATE: 84 INJECTION, SOLUTION INTRAVENOUS at 08:46

## 2022-01-01 RX ADMIN — SODIUM CHLORIDE, PRESERVATIVE FREE 500 UNITS: 5 INJECTION INTRAVENOUS at 14:03

## 2022-01-01 RX ADMIN — ACETAMINOPHEN 650 MG: 325 TABLET, FILM COATED ORAL at 15:40

## 2022-01-01 RX ADMIN — ACETAMINOPHEN 650 MG: 325 TABLET ORAL at 10:34

## 2022-01-01 RX ADMIN — ONDANSETRON HYDROCHLORIDE 16 MG: 8 TABLET, FILM COATED ORAL at 10:05

## 2022-01-01 RX ADMIN — PROPOFOL 50 MCG/KG/MIN: 10 INJECTION, EMULSION INTRAVENOUS at 04:00

## 2022-01-01 RX ADMIN — FLUCONAZOLE 100 MG: 100 TABLET ORAL at 08:21

## 2022-01-01 RX ADMIN — CALCIUM CARBONATE-VITAMIN D TAB 500 MG-200 UNIT 1 TABLET: 500-200 TAB at 20:51

## 2022-01-01 RX ADMIN — PROPOFOL 50 MCG/KG/MIN: 10 INJECTION, EMULSION INTRAVENOUS at 10:11

## 2022-01-01 RX ADMIN — MEROPENEM 500 MG: 500 INJECTION, POWDER, FOR SOLUTION INTRAVENOUS at 16:18

## 2022-01-01 RX ADMIN — ACETAMINOPHEN 650 MG: 325 TABLET ORAL at 01:44

## 2022-01-01 RX ADMIN — Medication 50 MCG/HR: at 09:54

## 2022-01-01 RX ADMIN — CALCIUM CARBONATE-VITAMIN D TAB 500 MG-200 UNIT 1 TABLET: 500-200 TAB at 14:27

## 2022-01-01 RX ADMIN — Medication 75 MCG/HR: at 10:18

## 2022-01-01 RX ADMIN — Medication 500 UNITS: at 10:41

## 2022-01-01 RX ADMIN — PHENYLEPHRINE HYDROCHLORIDE 300 MCG/MIN: 10 INJECTION INTRAVENOUS at 10:35

## 2022-01-01 RX ADMIN — FLUCONAZOLE 200 MG: 100 TABLET ORAL at 14:17

## 2022-01-01 RX ADMIN — HEPARIN SODIUM (PORCINE) LOCK FLUSH IV SOLN 100 UNIT/ML 500 UNITS: 100 SOLUTION at 12:38

## 2022-01-01 RX ADMIN — SODIUM CHLORIDE, PRESERVATIVE FREE 10 ML: 5 INJECTION INTRAVENOUS at 09:37

## 2022-01-01 RX ADMIN — SODIUM CHLORIDE, PRESERVATIVE FREE 500 UNITS: 5 INJECTION INTRAVENOUS at 15:53

## 2022-01-01 RX ADMIN — FAMOTIDINE 20 MG: 20 TABLET ORAL at 08:18

## 2022-01-01 RX ADMIN — ACETAMINOPHEN 325MG 650 MG: 325 TABLET ORAL at 03:59

## 2022-01-01 RX ADMIN — ONDANSETRON HYDROCHLORIDE 16 MG: 8 TABLET, FILM COATED ORAL at 09:53

## 2022-01-01 RX ADMIN — HEPARIN 500 UNITS: 100 SYRINGE at 13:58

## 2022-01-01 RX ADMIN — SODIUM CHLORIDE, PRESERVATIVE FREE 10 ML: 5 INJECTION INTRAVENOUS at 11:50

## 2022-01-01 RX ADMIN — DIBASIC SODIUM PHOSPHATE, MONOBASIC POTASSIUM PHOSPHATE AND MONOBASIC SODIUM PHOSPHATE 2 TABLET: 852; 155; 130 TABLET ORAL at 09:28

## 2022-01-01 RX ADMIN — PANTOPRAZOLE SODIUM 40 MG: 40 TABLET, DELAYED RELEASE ORAL at 06:40

## 2022-01-01 RX ADMIN — VASOPRESSIN 0.03 UNITS/MIN: 20 INJECTION INTRAVENOUS at 22:14

## 2022-01-01 RX ADMIN — HEPARIN 500 UNITS: 100 SYRINGE at 09:54

## 2022-01-01 RX ADMIN — ACYCLOVIR 400 MG: 400 TABLET ORAL at 20:43

## 2022-01-01 RX ADMIN — SODIUM CHLORIDE, PRESERVATIVE FREE 500 UNITS: 5 INJECTION INTRAVENOUS at 12:15

## 2022-01-01 RX ADMIN — ALLOPURINOL 200 MG: 100 TABLET ORAL at 10:20

## 2022-01-01 RX ADMIN — ACYCLOVIR 400 MG: 400 TABLET ORAL at 20:46

## 2022-01-01 RX ADMIN — SODIUM CHLORIDE, PRESERVATIVE FREE 10 ML: 5 INJECTION INTRAVENOUS at 11:46

## 2022-01-01 RX ADMIN — SODIUM CHLORIDE: 9 INJECTION, SOLUTION INTRAVENOUS at 00:30

## 2022-01-01 RX ADMIN — HEPARIN SODIUM (PORCINE) LOCK FLUSH IV SOLN 100 UNIT/ML 500 UNITS: 100 SOLUTION at 10:47

## 2022-01-01 RX ADMIN — SODIUM CHLORIDE, PRESERVATIVE FREE 10 ML: 5 INJECTION INTRAVENOUS at 10:45

## 2022-01-01 RX ADMIN — LEVETIRACETAM 500 MG: 500 TABLET, FILM COATED ORAL at 20:51

## 2022-01-01 RX ADMIN — SODIUM CHLORIDE 20 ML/HR: 9 INJECTION, SOLUTION INTRAVENOUS at 10:43

## 2022-01-01 RX ADMIN — Medication 500 UNITS: at 12:57

## 2022-01-01 RX ADMIN — LEVOFLOXACIN 250 MG: 250 TABLET, FILM COATED ORAL at 09:37

## 2022-01-01 RX ADMIN — SODIUM CHLORIDE, PRESERVATIVE FREE 10 ML: 5 INJECTION INTRAVENOUS at 20:20

## 2022-01-01 RX ADMIN — SODIUM CHLORIDE, PRESERVATIVE FREE 10 ML: 5 INJECTION INTRAVENOUS at 13:21

## 2022-01-01 RX ADMIN — SODIUM CHLORIDE: 9 INJECTION, SOLUTION INTRAVENOUS at 06:41

## 2022-01-01 RX ADMIN — SODIUM CHLORIDE 1000 ML: 9 INJECTION, SOLUTION INTRAVENOUS at 09:45

## 2022-01-01 RX ADMIN — PROPOFOL 50 MCG/KG/MIN: 10 INJECTION, EMULSION INTRAVENOUS at 06:57

## 2022-01-01 RX ADMIN — DIPHENHYDRAMINE HCL 25 MG: 25 TABLET ORAL at 06:41

## 2022-01-01 RX ADMIN — SODIUM CHLORIDE 15 ML: 900 IRRIGANT IRRIGATION at 20:44

## 2022-01-01 RX ADMIN — SODIUM CHLORIDE, PRESERVATIVE FREE 500 UNITS: 5 INJECTION INTRAVENOUS at 11:43

## 2022-01-01 RX ADMIN — ALLOPURINOL 300 MG: 300 TABLET ORAL at 09:28

## 2022-01-01 RX ADMIN — CEFEPIME HYDROCHLORIDE 2000 MG: 2 INJECTION, POWDER, FOR SOLUTION INTRAVENOUS at 11:37

## 2022-01-01 RX ADMIN — PROPOFOL 30 MCG/KG/MIN: 10 INJECTION, EMULSION INTRAVENOUS at 04:01

## 2022-01-01 RX ADMIN — ALLOPURINOL 200 MG: 100 TABLET ORAL at 09:41

## 2022-01-01 RX ADMIN — SODIUM CHLORIDE 1000 ML: 9 INJECTION, SOLUTION INTRAVENOUS at 08:13

## 2022-01-01 RX ADMIN — SODIUM CHLORIDE 500 ML: 900 INJECTION, SOLUTION INTRAVENOUS at 12:25

## 2022-01-01 RX ADMIN — ACETAMINOPHEN 650 MG: 325 TABLET ORAL at 00:39

## 2022-01-01 RX ADMIN — SODIUM CHLORIDE, PRESERVATIVE FREE 10 ML: 5 INJECTION INTRAVENOUS at 10:02

## 2022-01-01 RX ADMIN — DEXTROSE MONOHYDRATE 100 MG: 50 INJECTION, SOLUTION INTRAVENOUS at 10:16

## 2022-01-01 RX ADMIN — PANTOPRAZOLE SODIUM 40 MG: 40 INJECTION, POWDER, FOR SOLUTION INTRAVENOUS at 04:50

## 2022-01-01 RX ADMIN — FLUCONAZOLE 200 MG: 200 TABLET ORAL at 09:28

## 2022-01-01 RX ADMIN — CALCIUM GLUCONATE 2000 MG: 20 INJECTION, SOLUTION INTRAVENOUS at 10:38

## 2022-01-01 RX ADMIN — CEFEPIME HYDROCHLORIDE 2000 MG: 2 INJECTION, POWDER, FOR SOLUTION INTRAVENOUS at 23:51

## 2022-01-01 RX ADMIN — SODIUM CHLORIDE 15 ML: 900 IRRIGANT IRRIGATION at 14:28

## 2022-01-01 RX ADMIN — FILGRASTIM-AAFI 300 MCG: 300 INJECTION, SOLUTION SUBCUTANEOUS at 09:35

## 2022-01-01 RX ADMIN — MEROPENEM 1000 MG: 1 INJECTION, POWDER, FOR SOLUTION INTRAVENOUS at 01:40

## 2022-01-01 RX ADMIN — PROPOFOL 40 MCG/KG/MIN: 10 INJECTION, EMULSION INTRAVENOUS at 00:08

## 2022-01-01 RX ADMIN — LEVETIRACETAM 500 MG: 250 TABLET, FILM COATED ORAL at 20:32

## 2022-01-01 RX ADMIN — SODIUM CHLORIDE: 9 INJECTION, SOLUTION INTRAVENOUS at 12:26

## 2022-01-01 RX ADMIN — ACYCLOVIR 400 MG: 400 TABLET ORAL at 09:28

## 2022-01-01 RX ADMIN — SODIUM BICARBONATE 650 MG: 650 TABLET ORAL at 07:56

## 2022-01-01 RX ADMIN — ACYCLOVIR 400 MG: 400 TABLET ORAL at 08:35

## 2022-01-01 RX ADMIN — HEPARIN 500 UNITS: 100 SYRINGE at 12:36

## 2022-01-01 RX ADMIN — ACETAMINOPHEN 650 MG: 325 TABLET ORAL at 19:36

## 2022-01-01 RX ADMIN — SODIUM CHLORIDE, PRESERVATIVE FREE 500 UNITS: 5 INJECTION INTRAVENOUS at 10:17

## 2022-01-01 RX ADMIN — ATROPINE SULFATE 2 DROP: 10 SOLUTION/ DROPS OPHTHALMIC at 16:01

## 2022-01-01 RX ADMIN — LEVETIRACETAM 500 MG: 250 TABLET, FILM COATED ORAL at 21:45

## 2022-01-01 RX ADMIN — NOREPINEPHRINE BITARTRATE 5 MCG/MIN: 1 SOLUTION INTRAVENOUS at 22:20

## 2022-01-01 RX ADMIN — SODIUM CHLORIDE, PRESERVATIVE FREE 500 UNITS: 5 INJECTION INTRAVENOUS at 11:38

## 2022-01-01 RX ADMIN — NOREPINEPHRINE BITARTRATE 30 MCG/MIN: 1 SOLUTION INTRAVENOUS at 00:29

## 2022-01-01 RX ADMIN — ALBUMIN (HUMAN) 25 G: 0.25 INJECTION, SOLUTION INTRAVENOUS at 19:39

## 2022-01-01 RX ADMIN — SODIUM CHLORIDE, PRESERVATIVE FREE 10 ML: 5 INJECTION INTRAVENOUS at 20:30

## 2022-01-01 RX ADMIN — VASOPRESSIN 0.03 UNITS/MIN: 20 INJECTION INTRAVENOUS at 02:33

## 2022-01-01 RX ADMIN — MIDAZOLAM HYDROCHLORIDE 5 MG: 1 INJECTION, SOLUTION INTRAMUSCULAR; INTRAVENOUS at 16:11

## 2022-01-01 RX ADMIN — Medication 50 MCG/HR: at 15:39

## 2022-01-01 RX ADMIN — MORPHINE SULFATE 4 MG: 4 INJECTION INTRAVENOUS at 16:10

## 2022-01-01 RX ADMIN — HEPARIN 500 UNITS: 100 SYRINGE at 11:45

## 2022-01-01 RX ADMIN — SODIUM CHLORIDE, PRESERVATIVE FREE 500 UNITS: 5 INJECTION INTRAVENOUS at 15:52

## 2022-01-01 RX ADMIN — AMLODIPINE BESYLATE 5 MG: 5 TABLET ORAL at 10:38

## 2022-01-01 RX ADMIN — SODIUM CHLORIDE 500 ML: 9 INJECTION, SOLUTION INTRAVENOUS at 10:16

## 2022-01-01 RX ADMIN — DIPHENHYDRAMINE HCL 25 MG: 25 TABLET ORAL at 05:26

## 2022-01-01 RX ADMIN — SODIUM CHLORIDE 500 ML: 9 INJECTION, SOLUTION INTRAVENOUS at 10:50

## 2022-01-01 RX ADMIN — CALCIUM CARBONATE-VITAMIN D TAB 500 MG-200 UNIT 1 TABLET: 500-200 TAB at 08:59

## 2022-01-01 RX ADMIN — SODIUM CHLORIDE, PRESERVATIVE FREE 10 ML: 5 INJECTION INTRAVENOUS at 11:48

## 2022-01-01 RX ADMIN — Medication 500 UNITS: at 11:09

## 2022-01-01 RX ADMIN — SODIUM CHLORIDE: 9 INJECTION, SOLUTION INTRAVENOUS at 16:21

## 2022-01-01 RX ADMIN — SODIUM CHLORIDE, PRESERVATIVE FREE 10 ML: 5 INJECTION INTRAVENOUS at 11:09

## 2022-01-01 RX ADMIN — FAMOTIDINE 20 MG: 20 TABLET ORAL at 08:28

## 2022-01-01 RX ADMIN — Medication 500 UNITS: at 10:16

## 2022-01-01 RX ADMIN — SODIUM CHLORIDE 20 ML/HR: 9 INJECTION, SOLUTION INTRAVENOUS at 10:00

## 2022-01-01 RX ADMIN — Medication 50 MCG/HR: at 03:30

## 2022-01-01 RX ADMIN — SODIUM CHLORIDE, PRESERVATIVE FREE 500 UNITS: 5 INJECTION INTRAVENOUS at 13:38

## 2022-01-01 RX ADMIN — HEPARIN 500 UNITS: 100 SYRINGE at 11:46

## 2022-01-01 RX ADMIN — SODIUM BICARBONATE: 84 INJECTION, SOLUTION INTRAVENOUS at 01:41

## 2022-01-01 RX ADMIN — Medication 500 UNITS: at 11:59

## 2022-01-01 RX ADMIN — SODIUM CHLORIDE, PRESERVATIVE FREE 500 UNITS: 5 INJECTION INTRAVENOUS at 13:21

## 2022-01-01 RX ADMIN — SODIUM CHLORIDE, PRESERVATIVE FREE 500 UNITS: 5 INJECTION INTRAVENOUS at 09:48

## 2022-01-01 RX ADMIN — SODIUM BICARBONATE 650 MG: 650 TABLET ORAL at 09:28

## 2022-01-01 RX ADMIN — PHENYLEPHRINE HYDROCHLORIDE 300 MCG/MIN: 10 INJECTION INTRAVENOUS at 13:40

## 2022-01-01 RX ADMIN — MAGNESIUM SULFATE HEPTAHYDRATE 4000 MG: 40 INJECTION, SOLUTION INTRAVENOUS at 22:25

## 2022-01-01 RX ADMIN — SODIUM CHLORIDE, PRESERVATIVE FREE 10 ML: 5 INJECTION INTRAVENOUS at 20:51

## 2022-01-01 RX ADMIN — FAMOTIDINE 20 MG: 20 TABLET ORAL at 08:51

## 2022-01-01 RX ADMIN — HEPARIN 500 UNITS: 100 SYRINGE at 11:36

## 2022-01-01 RX ADMIN — Medication 50 MCG/HR: at 18:49

## 2022-01-01 RX ADMIN — Medication 500 UNITS: at 12:13

## 2022-01-01 RX ADMIN — FLUDARABINE PHOSPHATE 55 MG: 25 INJECTION, SOLUTION INTRAVENOUS at 11:11

## 2022-01-01 RX ADMIN — SODIUM CHLORIDE, PRESERVATIVE FREE 10 ML: 5 INJECTION INTRAVENOUS at 08:35

## 2022-01-01 RX ADMIN — ACYCLOVIR 400 MG: 200 CAPSULE ORAL at 13:53

## 2022-01-01 RX ADMIN — ALLOPURINOL 200 MG: 100 TABLET ORAL at 08:59

## 2022-01-01 RX ADMIN — SODIUM CHLORIDE, PRESERVATIVE FREE 10 ML: 5 INJECTION INTRAVENOUS at 08:23

## 2022-01-01 RX ADMIN — CALCIUM GLUCONATE 1000 MG: 10 INJECTION, SOLUTION INTRAVENOUS at 08:24

## 2022-01-01 RX ADMIN — SODIUM CHLORIDE, PRESERVATIVE FREE 10 ML: 5 INJECTION INTRAVENOUS at 08:59

## 2022-01-01 RX ADMIN — DIBASIC SODIUM PHOSPHATE, MONOBASIC POTASSIUM PHOSPHATE AND MONOBASIC SODIUM PHOSPHATE 2 TABLET: 852; 155; 130 TABLET ORAL at 20:49

## 2022-01-01 RX ADMIN — ONDANSETRON HYDROCHLORIDE 16 MG: 8 TABLET, FILM COATED ORAL at 10:28

## 2022-01-01 RX ADMIN — CALCIUM CARBONATE-VITAMIN D TAB 500 MG-200 UNIT 1 TABLET: 500-200 TAB at 20:36

## 2022-01-01 RX ADMIN — PROPOFOL 50 MCG/KG/MIN: 10 INJECTION, EMULSION INTRAVENOUS at 06:22

## 2022-01-01 RX ADMIN — FILGRASTIM-AAFI 300 MCG: 480 INJECTION, SOLUTION SUBCUTANEOUS at 11:10

## 2022-01-01 RX ADMIN — DIPHENHYDRAMINE HCL 50 MG: 25 TABLET ORAL at 19:30

## 2022-01-01 RX ADMIN — NOREPINEPHRINE BITARTRATE 30 MCG/MIN: 1 SOLUTION INTRAVENOUS at 06:06

## 2022-01-01 RX ADMIN — PANTOPRAZOLE SODIUM 40 MG: 40 INJECTION, POWDER, FOR SOLUTION INTRAVENOUS at 10:21

## 2022-01-01 RX ADMIN — CALCIUM CARBONATE-VITAMIN D TAB 500 MG-200 UNIT 1 TABLET: 500-200 TAB at 20:19

## 2022-01-01 RX ADMIN — Medication 500 UNITS: at 14:41

## 2022-01-01 RX ADMIN — POTASSIUM CHLORIDE 20 MEQ: 29.8 INJECTION, SOLUTION INTRAVENOUS at 05:26

## 2022-01-01 RX ADMIN — PHENYLEPHRINE HYDROCHLORIDE 175 MCG/MIN: 10 INJECTION INTRAVENOUS at 20:20

## 2022-01-01 RX ADMIN — SODIUM BICARBONATE 650 MG: 650 TABLET ORAL at 09:58

## 2022-01-01 RX ADMIN — HEPARIN SODIUM (PORCINE) LOCK FLUSH IV SOLN 100 UNIT/ML 500 UNITS: 100 SOLUTION at 10:37

## 2022-01-01 RX ADMIN — SODIUM CHLORIDE 20 ML/HR: 9 INJECTION, SOLUTION INTRAVENOUS at 10:13

## 2022-01-01 RX ADMIN — MEROPENEM 2000 MG: 1 INJECTION, POWDER, FOR SOLUTION INTRAVENOUS at 03:44

## 2022-01-01 RX ADMIN — Medication 500 UNITS: at 11:04

## 2022-01-01 RX ADMIN — ACETAMINOPHEN 650 MG: 325 TABLET ORAL at 20:44

## 2022-01-01 RX ADMIN — SODIUM CHLORIDE, PRESERVATIVE FREE 500 UNITS: 5 INJECTION INTRAVENOUS at 11:48

## 2022-01-01 RX ADMIN — HEPARIN 500 UNITS: 100 SYRINGE at 14:51

## 2022-01-01 RX ADMIN — ACYCLOVIR 400 MG: 400 TABLET ORAL at 09:10

## 2022-01-01 RX ADMIN — PHENYLEPHRINE HYDROCHLORIDE 300 MCG/MIN: 10 INJECTION INTRAVENOUS at 19:04

## 2022-01-01 RX ADMIN — HEPARIN 1000 UNITS: 100 SYRINGE at 14:39

## 2022-01-01 RX ADMIN — FUROSEMIDE 100 MG: 10 INJECTION, SOLUTION INTRAMUSCULAR; INTRAVENOUS at 13:00

## 2022-01-01 RX ADMIN — PROPOFOL 50 MCG/KG/MIN: 10 INJECTION, EMULSION INTRAVENOUS at 19:05

## 2022-01-01 RX ADMIN — FILGRASTIM-AAFI 300 MCG: 480 INJECTION, SOLUTION SUBCUTANEOUS at 11:15

## 2022-01-01 RX ADMIN — HEPARIN 500 UNITS: 100 SYRINGE at 12:03

## 2022-01-01 RX ADMIN — SODIUM CHLORIDE 500 ML: 9 INJECTION, SOLUTION INTRAVENOUS at 11:25

## 2022-01-01 RX ADMIN — LEVETIRACETAM 500 MG: 500 TABLET, FILM COATED ORAL at 20:24

## 2022-01-01 RX ADMIN — AMLODIPINE BESYLATE 5 MG: 5 TABLET ORAL at 09:10

## 2022-01-01 RX ADMIN — Medication 1 TABLET: at 08:37

## 2022-01-01 RX ADMIN — PROPOFOL 40 MCG/KG/MIN: 10 INJECTION, EMULSION INTRAVENOUS at 19:15

## 2022-01-01 RX ADMIN — PHENYLEPHRINE HYDROCHLORIDE 300 MCG/MIN: 10 INJECTION INTRAVENOUS at 01:15

## 2022-01-01 RX ADMIN — IDECABTAGENE VICLEUCEL 32.25 ML: 300000000 SUSPENSION INTRAVENOUS at 11:23

## 2022-01-01 RX ADMIN — SODIUM CHLORIDE, PRESERVATIVE FREE 500 UNITS: 5 INJECTION INTRAVENOUS at 09:56

## 2022-01-01 RX ADMIN — SODIUM CHLORIDE, PRESERVATIVE FREE 10 ML: 5 INJECTION INTRAVENOUS at 20:38

## 2022-01-01 RX ADMIN — PROPOFOL 50 MCG/KG/MIN: 10 INJECTION, EMULSION INTRAVENOUS at 15:49

## 2022-01-01 RX ADMIN — LEVETIRACETAM 500 MG: 500 TABLET, FILM COATED ORAL at 08:35

## 2022-01-01 RX ADMIN — PHENYLEPHRINE HYDROCHLORIDE 25 MCG/MIN: 10 INJECTION INTRAVENOUS at 02:39

## 2022-01-01 RX ADMIN — DIPHENHYDRAMINE HCL 25 MG: 25 TABLET ORAL at 05:12

## 2022-01-01 RX ADMIN — ACETAMINOPHEN 650 MG: 325 TABLET ORAL at 14:57

## 2022-01-01 RX ADMIN — ACYCLOVIR 400 MG: 400 TABLET ORAL at 20:19

## 2022-01-01 RX ADMIN — ACYCLOVIR 400 MG: 400 TABLET ORAL at 08:59

## 2022-01-01 RX ADMIN — ACETAMINOPHEN 325MG 650 MG: 325 TABLET ORAL at 05:25

## 2022-01-01 RX ADMIN — ACYCLOVIR 400 MG: 200 CAPSULE ORAL at 21:46

## 2022-01-01 RX ADMIN — LEVETIRACETAM 500 MG: 250 TABLET, FILM COATED ORAL at 10:46

## 2022-01-01 RX ADMIN — PHENYLEPHRINE HYDROCHLORIDE 300 MCG/MIN: 10 INJECTION INTRAVENOUS at 10:07

## 2022-01-01 RX ADMIN — DEXAMETHASONE SODIUM PHOSPHATE 6 MG: 4 INJECTION, SOLUTION INTRAMUSCULAR; INTRAVENOUS at 07:54

## 2022-01-01 RX ADMIN — CALCIUM CARBONATE-VITAMIN D TAB 500 MG-200 UNIT 1 TABLET: 500-200 TAB at 09:58

## 2022-01-01 RX ADMIN — LEVOFLOXACIN 500 MG: 250 TABLET, FILM COATED ORAL at 09:28

## 2022-01-01 RX ADMIN — Medication 500 UNITS: at 11:20

## 2022-01-01 RX ADMIN — LEVETIRACETAM 500 MG: 500 TABLET, FILM COATED ORAL at 09:28

## 2022-01-01 RX ADMIN — PROPOFOL 30 MCG/KG/MIN: 10 INJECTION, EMULSION INTRAVENOUS at 14:55

## 2022-01-01 RX ADMIN — AMLODIPINE BESYLATE 5 MG: 5 TABLET ORAL at 09:28

## 2022-01-01 RX ADMIN — CEFEPIME HYDROCHLORIDE 2000 MG: 2 INJECTION, POWDER, FOR SOLUTION INTRAVENOUS at 23:36

## 2022-01-01 RX ADMIN — FUROSEMIDE 80 MG: 10 INJECTION, SOLUTION INTRAMUSCULAR; INTRAVENOUS at 14:34

## 2022-01-01 RX ADMIN — POTASSIUM CHLORIDE 20 MEQ: 29.8 INJECTION, SOLUTION INTRAVENOUS at 07:34

## 2022-01-01 RX ADMIN — FILGRASTIM-AAFI 300 MCG: 300 INJECTION, SOLUTION SUBCUTANEOUS at 08:18

## 2022-01-01 RX ADMIN — SODIUM CHLORIDE 1000 ML: 9 INJECTION, SOLUTION INTRAVENOUS at 08:43

## 2022-01-01 RX ADMIN — LEVOFLOXACIN 250 MG: 250 TABLET, FILM COATED ORAL at 09:09

## 2022-01-01 RX ADMIN — ALBUMIN (HUMAN) 25 G: 0.25 INJECTION, SOLUTION INTRAVENOUS at 11:49

## 2022-01-01 RX ADMIN — POTASSIUM CHLORIDE 20 MEQ: 29.8 INJECTION, SOLUTION INTRAVENOUS at 09:31

## 2022-01-01 RX ADMIN — SODIUM CHLORIDE, PRESERVATIVE FREE 10 ML: 5 INJECTION INTRAVENOUS at 09:11

## 2022-01-01 RX ADMIN — HEPARIN SODIUM (PORCINE) LOCK FLUSH IV SOLN 100 UNIT/ML 500 UNITS: 100 SOLUTION at 10:48

## 2022-01-01 RX ADMIN — CEFEPIME HYDROCHLORIDE 2000 MG: 2 INJECTION, POWDER, FOR SOLUTION INTRAVENOUS at 23:46

## 2022-01-01 RX ADMIN — DIBASIC SODIUM PHOSPHATE, MONOBASIC POTASSIUM PHOSPHATE AND MONOBASIC SODIUM PHOSPHATE 2 TABLET: 852; 155; 130 TABLET ORAL at 08:35

## 2022-01-01 RX ADMIN — FILGRASTIM-AAFI 300 MCG: 480 INJECTION, SOLUTION SUBCUTANEOUS at 14:37

## 2022-01-01 RX ADMIN — ACYCLOVIR 400 MG: 400 TABLET ORAL at 20:30

## 2022-01-01 RX ADMIN — MIDAZOLAM 5 MG: 1 INJECTION INTRAMUSCULAR; INTRAVENOUS at 16:11

## 2022-01-01 RX ADMIN — SODIUM CHLORIDE 1000 ML: 9 INJECTION, SOLUTION INTRAVENOUS at 11:45

## 2022-01-01 RX ADMIN — ACYCLOVIR 400 MG: 400 TABLET ORAL at 20:54

## 2022-01-01 RX ADMIN — FILGRASTIM-AAFI 300 MCG: 300 INJECTION, SOLUTION SUBCUTANEOUS at 09:39

## 2022-01-01 RX ADMIN — SODIUM CHLORIDE, PRESERVATIVE FREE 10 ML: 5 INJECTION INTRAVENOUS at 21:07

## 2022-01-01 RX ADMIN — DIBASIC SODIUM PHOSPHATE, MONOBASIC POTASSIUM PHOSPHATE AND MONOBASIC SODIUM PHOSPHATE 2 TABLET: 852; 155; 130 TABLET ORAL at 08:59

## 2022-01-01 RX ADMIN — FILGRASTIM-AAFI 300 MCG: 300 INJECTION, SOLUTION SUBCUTANEOUS at 09:38

## 2022-01-01 RX ADMIN — DIPHENHYDRAMINE HYDROCHLORIDE 25 MG: 25 TABLET, FILM COATED ORAL at 10:51

## 2022-01-01 RX ADMIN — SODIUM CHLORIDE, PRESERVATIVE FREE 10 ML: 5 INJECTION INTRAVENOUS at 21:08

## 2022-01-01 RX ADMIN — Medication 500 UNITS: at 12:27

## 2022-01-01 RX ADMIN — ACYCLOVIR 400 MG: 400 TABLET ORAL at 20:49

## 2022-01-01 RX ADMIN — HEPARIN SODIUM (PORCINE) LOCK FLUSH IV SOLN 100 UNIT/ML 500 UNITS: 100 SOLUTION at 10:49

## 2022-01-01 RX ADMIN — SODIUM BICARBONATE: 84 INJECTION, SOLUTION INTRAVENOUS at 04:44

## 2022-01-01 RX ADMIN — Medication 500 UNITS: at 10:08

## 2022-01-01 RX ADMIN — ALLOPURINOL 200 MG: 100 TABLET ORAL at 09:37

## 2022-01-01 RX ADMIN — Medication 1500 UNITS: at 11:54

## 2022-01-01 RX ADMIN — NOREPINEPHRINE BITARTRATE 28 MCG/MIN: 1 SOLUTION INTRAVENOUS at 14:53

## 2022-01-01 RX ADMIN — SODIUM CHLORIDE, PRESERVATIVE FREE 10 ML: 5 INJECTION INTRAVENOUS at 08:22

## 2022-01-01 RX ADMIN — ALLOPURINOL 200 MG: 100 TABLET ORAL at 09:58

## 2022-01-01 RX ADMIN — FLUCONAZOLE 100 MG: 100 TABLET ORAL at 08:59

## 2022-01-01 RX ADMIN — ALLOPURINOL 200 MG: 100 TABLET ORAL at 08:35

## 2022-01-01 RX ADMIN — LEVOFLOXACIN 250 MG: 250 TABLET, FILM COATED ORAL at 11:15

## 2022-01-01 RX ADMIN — SODIUM CHLORIDE 1000 ML: 0.9 INJECTION, SOLUTION INTRAVENOUS at 09:00

## 2022-01-01 RX ADMIN — FAMOTIDINE 20 MG: 20 TABLET ORAL at 21:46

## 2022-01-01 RX ADMIN — SODIUM CHLORIDE: 9 INJECTION, SOLUTION INTRAVENOUS at 21:58

## 2022-01-01 RX ADMIN — ALLOPURINOL 200 MG: 100 TABLET ORAL at 08:17

## 2022-01-01 RX ADMIN — SODIUM CHLORIDE, PRESERVATIVE FREE 10 ML: 5 INJECTION INTRAVENOUS at 11:44

## 2022-01-01 RX ADMIN — LEVETIRACETAM 500 MG: 500 TABLET, FILM COATED ORAL at 09:11

## 2022-01-01 RX ADMIN — SODIUM CHLORIDE, PRESERVATIVE FREE 500 UNITS: 5 INJECTION INTRAVENOUS at 15:51

## 2022-01-01 RX ADMIN — VASOPRESSIN 0.03 UNITS/MIN: 20 INJECTION INTRAVENOUS at 10:13

## 2022-01-01 RX ADMIN — ACYCLOVIR 400 MG: 400 TABLET ORAL at 09:37

## 2022-01-01 RX ADMIN — MEROPENEM 500 MG: 500 INJECTION, POWDER, FOR SOLUTION INTRAVENOUS at 05:09

## 2022-01-01 RX ADMIN — SODIUM CHLORIDE, PRESERVATIVE FREE 500 UNITS: 5 INJECTION INTRAVENOUS at 15:54

## 2022-01-01 RX ADMIN — CALCIUM CARBONATE-VITAMIN D TAB 500 MG-200 UNIT 1 TABLET: 500-200 TAB at 09:41

## 2022-01-01 RX ADMIN — SODIUM CHLORIDE, PRESERVATIVE FREE 500 UNITS: 5 INJECTION INTRAVENOUS at 11:40

## 2022-01-01 RX ADMIN — MEROPENEM 1000 MG: 1 INJECTION, POWDER, FOR SOLUTION INTRAVENOUS at 02:23

## 2022-01-01 RX ADMIN — LOPERAMIDE HYDROCHLORIDE 4 MG: 2 CAPSULE ORAL at 18:24

## 2022-01-01 RX ADMIN — PHENYLEPHRINE HYDROCHLORIDE 300 MCG/MIN: 10 INJECTION INTRAVENOUS at 16:13

## 2022-01-01 RX ADMIN — CALCIUM CARBONATE-VITAMIN D TAB 500 MG-200 UNIT 1 TABLET: 500-200 TAB at 20:43

## 2022-01-01 RX ADMIN — POTASSIUM CHLORIDE 20 MEQ: 29.8 INJECTION, SOLUTION INTRAVENOUS at 06:27

## 2022-01-01 RX ADMIN — POTASSIUM CHLORIDE AND SODIUM CHLORIDE: 900; 150 INJECTION, SOLUTION INTRAVENOUS at 09:30

## 2022-01-01 RX ADMIN — SODIUM CHLORIDE 2466 ML: 9 INJECTION, SOLUTION INTRAVENOUS at 19:17

## 2022-01-01 RX ADMIN — ALLOPURINOL 200 MG: 100 TABLET ORAL at 08:51

## 2022-01-01 RX ADMIN — Medication 500 UNITS: at 10:36

## 2022-01-01 RX ADMIN — ACETAMINOPHEN 325MG 650 MG: 325 TABLET ORAL at 07:41

## 2022-01-01 RX ADMIN — SODIUM BICARBONATE 650 MG: 650 TABLET ORAL at 09:11

## 2022-01-01 RX ADMIN — NOREPINEPHRINE BITARTRATE 60 MCG/MIN: 1 SOLUTION INTRAVENOUS at 15:51

## 2022-01-01 RX ADMIN — PANTOPRAZOLE SODIUM 40 MG: 40 TABLET, DELAYED RELEASE ORAL at 05:12

## 2022-01-01 RX ADMIN — DEXTROSE MONOHYDRATE 100 MG: 50 INJECTION, SOLUTION INTRAVENOUS at 06:50

## 2022-01-01 RX ADMIN — SODIUM CHLORIDE, PRESERVATIVE FREE 500 UNITS: 5 INJECTION INTRAVENOUS at 11:49

## 2022-01-01 RX ADMIN — CYCLOPHOSPHAMIDE 700 MG: 1 INJECTION, POWDER, FOR SOLUTION INTRAVENOUS; ORAL at 11:50

## 2022-01-01 RX ADMIN — FLUDARABINE PHOSPHATE 55 MG: 25 INJECTION, SOLUTION INTRAVENOUS at 10:27

## 2022-01-01 RX ADMIN — ACYCLOVIR 400 MG: 400 TABLET ORAL at 20:15

## 2022-01-01 RX ADMIN — ACYCLOVIR 400 MG: 200 CAPSULE ORAL at 08:50

## 2022-01-01 RX ADMIN — SODIUM CHLORIDE, PRESERVATIVE FREE 500 UNITS: 5 INJECTION INTRAVENOUS at 11:20

## 2022-01-01 RX ADMIN — OXYCODONE HYDROCHLORIDE 5 MG: 5 TABLET ORAL at 19:12

## 2022-01-01 RX ADMIN — SODIUM CHLORIDE 20 ML/HR: 9 INJECTION, SOLUTION INTRAVENOUS at 10:14

## 2022-01-01 RX ADMIN — SODIUM CHLORIDE 1000 ML: 9 INJECTION, SOLUTION INTRAVENOUS at 11:58

## 2022-01-01 RX ADMIN — SODIUM CHLORIDE, PRESERVATIVE FREE 10 ML: 5 INJECTION INTRAVENOUS at 20:16

## 2022-01-01 RX ADMIN — SODIUM CHLORIDE, PRESERVATIVE FREE 10 ML: 5 INJECTION INTRAVENOUS at 15:51

## 2022-01-01 RX ADMIN — FAMOTIDINE 20 MG: 20 TABLET ORAL at 20:32

## 2022-01-01 RX ADMIN — CALCIUM GLUCONATE 1000 MG: 10 INJECTION, SOLUTION INTRAVENOUS at 10:39

## 2022-01-01 RX ADMIN — FLUCONAZOLE 100 MG: 100 TABLET ORAL at 09:37

## 2022-01-01 RX ADMIN — SODIUM CHLORIDE, PRESERVATIVE FREE 500 UNITS: 5 INJECTION INTRAVENOUS at 13:20

## 2022-01-01 RX ADMIN — SODIUM CHLORIDE, PRESERVATIVE FREE 10 ML: 5 INJECTION INTRAVENOUS at 20:50

## 2022-01-01 RX ADMIN — ACYCLOVIR 400 MG: 400 TABLET ORAL at 09:41

## 2022-01-01 RX ADMIN — ACETAMINOPHEN 325MG 650 MG: 325 TABLET ORAL at 09:59

## 2022-01-01 RX ADMIN — LEVETIRACETAM 500 MG: 500 TABLET, FILM COATED ORAL at 20:49

## 2022-01-01 RX ADMIN — DIBASIC SODIUM PHOSPHATE, MONOBASIC POTASSIUM PHOSPHATE AND MONOBASIC SODIUM PHOSPHATE 2 TABLET: 852; 155; 130 TABLET ORAL at 20:30

## 2022-01-01 RX ADMIN — CEFEPIME HYDROCHLORIDE 2000 MG: 2 INJECTION, POWDER, FOR SOLUTION INTRAVENOUS at 12:22

## 2022-01-01 RX ADMIN — VASOPRESSIN 0.03 UNITS/MIN: 20 INJECTION INTRAVENOUS at 09:47

## 2022-01-01 RX ADMIN — ACETAMINOPHEN 650 MG: 325 TABLET ORAL at 20:55

## 2022-01-01 RX ADMIN — PHENYLEPHRINE HYDROCHLORIDE 300 MCG/MIN: 10 INJECTION INTRAVENOUS at 07:29

## 2022-01-01 RX ADMIN — PANTOPRAZOLE SODIUM 40 MG: 40 TABLET, DELAYED RELEASE ORAL at 06:42

## 2022-01-01 RX ADMIN — DIBASIC SODIUM PHOSPHATE, MONOBASIC POTASSIUM PHOSPHATE AND MONOBASIC SODIUM PHOSPHATE 2 TABLET: 852; 155; 130 TABLET ORAL at 20:51

## 2022-01-01 RX ADMIN — SODIUM CHLORIDE, PRESERVATIVE FREE 500 UNITS: 5 INJECTION INTRAVENOUS at 10:09

## 2022-01-01 RX ADMIN — SODIUM CHLORIDE, PRESERVATIVE FREE 500 UNITS: 5 INJECTION INTRAVENOUS at 11:47

## 2022-01-01 RX ADMIN — HEPARIN 500 UNITS: 100 SYRINGE at 12:02

## 2022-01-01 RX ADMIN — ALLOPURINOL 200 MG: 100 TABLET ORAL at 08:28

## 2022-01-01 RX ADMIN — FILGRASTIM-AAFI 300 MCG: 300 INJECTION, SOLUTION SUBCUTANEOUS at 11:47

## 2022-01-01 RX ADMIN — PANTOPRAZOLE SODIUM 40 MG: 40 TABLET, DELAYED RELEASE ORAL at 05:20

## 2022-01-01 RX ADMIN — LEVETIRACETAM 500 MG: 500 TABLET, FILM COATED ORAL at 08:21

## 2022-01-01 RX ADMIN — PROPOFOL 35 MCG/KG/MIN: 10 INJECTION, EMULSION INTRAVENOUS at 10:09

## 2022-01-01 RX ADMIN — NOREPINEPHRINE BITARTRATE 42 MCG/MIN: 1 SOLUTION INTRAVENOUS at 06:22

## 2022-01-01 RX ADMIN — SODIUM CHLORIDE: 9 INJECTION, SOLUTION INTRAVENOUS at 17:20

## 2022-01-01 RX ADMIN — SODIUM CHLORIDE 20 ML/HR: 9 INJECTION, SOLUTION INTRAVENOUS at 10:12

## 2022-01-01 ASSESSMENT — PULMONARY FUNCTION TESTS
PIF_VALUE: 21
PIF_VALUE: 21
PIF_VALUE: 19
PIF_VALUE: 33
PIF_VALUE: 19
PIF_VALUE: 20
PIF_VALUE: 24
PIF_VALUE: 15
PIF_VALUE: 19
PIF_VALUE: 18
PIF_VALUE: 19
PIF_VALUE: 24
PIF_VALUE: 18
PIF_VALUE: 34
PIF_VALUE: 18
PIF_VALUE: 22
PIF_VALUE: 18
PIF_VALUE: 26
PIF_VALUE: 18
PIF_VALUE: 18
PIF_VALUE: 15
PIF_VALUE: 28
PIF_VALUE: 20
PIF_VALUE: 23
PIF_VALUE: 17
PIF_VALUE: 19
PIF_VALUE: 24
PIF_VALUE: 23
PIF_VALUE: 22
PIF_VALUE: 19
PIF_VALUE: 25
PIF_VALUE: 18
PIF_VALUE: 29
PIF_VALUE: 25
PIF_VALUE: 20
PIF_VALUE: 16
PIF_VALUE: 27
PIF_VALUE: 18
PIF_VALUE: 26
PIF_VALUE: 19
PIF_VALUE: 16
PIF_VALUE: 28
PIF_VALUE: 24
PIF_VALUE: 16
PIF_VALUE: 27
PIF_VALUE: 20
PIF_VALUE: 17
PIF_VALUE: 18
PIF_VALUE: 22
PIF_VALUE: 20
PIF_VALUE: 17
PIF_VALUE: 17
PIF_VALUE: 18
PIF_VALUE: 18
PIF_VALUE: 28
PIF_VALUE: 9
PIF_VALUE: 15
PIF_VALUE: 21
PIF_VALUE: 19
PIF_VALUE: 19
PIF_VALUE: 24
PIF_VALUE: 15
PIF_VALUE: 22
PIF_VALUE: 15
PIF_VALUE: 23
PIF_VALUE: 18
PIF_VALUE: 27
PIF_VALUE: 20
PIF_VALUE: 27
PIF_VALUE: 19
PIF_VALUE: 22
PIF_VALUE: 24
PIF_VALUE: 11
PIF_VALUE: 23
PIF_VALUE: 27
PIF_VALUE: 26
PIF_VALUE: 24
PIF_VALUE: 18
PIF_VALUE: 24
PIF_VALUE: 28
PIF_VALUE: 25
PIF_VALUE: 15
PIF_VALUE: 8
PIF_VALUE: 18
PIF_VALUE: 24
PIF_VALUE: 29
PIF_VALUE: 18
PIF_VALUE: 26
PIF_VALUE: 23
PIF_VALUE: 16
PIF_VALUE: 15
PIF_VALUE: 24
PIF_VALUE: 21
PIF_VALUE: 24
PIF_VALUE: 17
PIF_VALUE: 19
PIF_VALUE: 15
PIF_VALUE: 27
PIF_VALUE: 14
PIF_VALUE: 19
PIF_VALUE: 15
PIF_VALUE: 21
PIF_VALUE: 24
PIF_VALUE: 21
PIF_VALUE: 22
PIF_VALUE: 18
PIF_VALUE: 22
PIF_VALUE: 18
PIF_VALUE: 27
PIF_VALUE: 30
PIF_VALUE: 27
PIF_VALUE: 21
PIF_VALUE: 20
PIF_VALUE: 23
PIF_VALUE: 18
PIF_VALUE: 21
PIF_VALUE: 23
PIF_VALUE: 15
PIF_VALUE: 19
PIF_VALUE: 18
PIF_VALUE: 18
PIF_VALUE: 23
PIF_VALUE: 29
PIF_VALUE: 16
PIF_VALUE: 18
PIF_VALUE: 19
PIF_VALUE: 18
PIF_VALUE: 21
PIF_VALUE: 21
PIF_VALUE: 15
PIF_VALUE: 24
PIF_VALUE: 22
PIF_VALUE: 22
PIF_VALUE: 18
PIF_VALUE: 16
PIF_VALUE: 25
PIF_VALUE: 20
PIF_VALUE: 20
PIF_VALUE: 29
PIF_VALUE: 25
PIF_VALUE: 18
PIF_VALUE: 27
PIF_VALUE: 22
PIF_VALUE: 16
PIF_VALUE: 28
PIF_VALUE: 21
PIF_VALUE: 15
PIF_VALUE: 23
PIF_VALUE: 19
PIF_VALUE: 19
PIF_VALUE: 21
PIF_VALUE: 14
PIF_VALUE: 27
PIF_VALUE: 22
PIF_VALUE: 27
PIF_VALUE: 19
PIF_VALUE: 19
PIF_VALUE: 25
PIF_VALUE: 23
PIF_VALUE: 20
PIF_VALUE: 18
PIF_VALUE: 20
PIF_VALUE: 22
PIF_VALUE: 16
PIF_VALUE: 18
PIF_VALUE: 15
PIF_VALUE: 24
PIF_VALUE: 20
PIF_VALUE: 24
PIF_VALUE: 18
PIF_VALUE: 22
PIF_VALUE: 29
PIF_VALUE: 19
PIF_VALUE: 15
PIF_VALUE: 16
PIF_VALUE: 30
PIF_VALUE: 18
PIF_VALUE: 10
PIF_VALUE: 22
PIF_VALUE: 21
PIF_VALUE: 18
PIF_VALUE: 15
PIF_VALUE: 18
PIF_VALUE: 16
PIF_VALUE: 18
PIF_VALUE: 26
PIF_VALUE: 22
PIF_VALUE: 29
PIF_VALUE: 15
PIF_VALUE: 21
PIF_VALUE: 25
PIF_VALUE: 20
PIF_VALUE: 21
PIF_VALUE: 20
PIF_VALUE: 18
PIF_VALUE: 26
PIF_VALUE: 18
PIF_VALUE: 26
PIF_VALUE: 15
PIF_VALUE: 18
PIF_VALUE: 24
PIF_VALUE: 17
PIF_VALUE: 18
PIF_VALUE: 26
PIF_VALUE: 18
PIF_VALUE: 20
PIF_VALUE: 15
PIF_VALUE: 20
PIF_VALUE: 23
PIF_VALUE: 15
PIF_VALUE: 26
PIF_VALUE: 20
PIF_VALUE: 18
PIF_VALUE: 23
PIF_VALUE: 20
PIF_VALUE: 9
PIF_VALUE: 18
PIF_VALUE: 16
PIF_VALUE: 22
PIF_VALUE: 39
PIF_VALUE: 19
PIF_VALUE: 19
PIF_VALUE: 25
PIF_VALUE: 20
PIF_VALUE: 18
PIF_VALUE: 21
PIF_VALUE: 28
PIF_VALUE: 19
PIF_VALUE: 15
PIF_VALUE: 18
PIF_VALUE: 26
PIF_VALUE: 23
PIF_VALUE: 16
PIF_VALUE: 18
PIF_VALUE: 25
PIF_VALUE: 21
PIF_VALUE: 20
PIF_VALUE: 25
PIF_VALUE: 24

## 2022-01-01 ASSESSMENT — PAIN DESCRIPTION - PAIN TYPE
TYPE: ACUTE PAIN
TYPE: CHRONIC PAIN
TYPE: ACUTE PAIN
TYPE: CHRONIC PAIN

## 2022-01-01 ASSESSMENT — PAIN SCALES - GENERAL
PAINLEVEL_OUTOF10: 0
PAINLEVEL_OUTOF10: 3
PAINLEVEL_OUTOF10: 0
PAINLEVEL_OUTOF10: 9
PAINLEVEL_OUTOF10: 0
PAINLEVEL_OUTOF10: 0
PAINLEVEL_OUTOF10: 4
PAINLEVEL_OUTOF10: 0
PAINLEVEL_OUTOF10: 4
PAINLEVEL_OUTOF10: 0
PAINLEVEL_OUTOF10: 3
PAINLEVEL_OUTOF10: 0
PAINLEVEL_OUTOF10: 5
PAINLEVEL_OUTOF10: 5
PAINLEVEL_OUTOF10: 0
PAINLEVEL_OUTOF10: 1
PAINLEVEL_OUTOF10: 4
PAINLEVEL_OUTOF10: 6
PAINLEVEL_OUTOF10: 0
PAINLEVEL_OUTOF10: 3
PAINLEVEL_OUTOF10: 0
PAINLEVEL_OUTOF10: 9
PAINLEVEL_OUTOF10: 2
PAINLEVEL_OUTOF10: 0
PAINLEVEL_OUTOF10: 0
PAINLEVEL_OUTOF10: 9
PAINLEVEL_OUTOF10: 4
PAINLEVEL_OUTOF10: 9
PAINLEVEL_OUTOF10: 0
PAINLEVEL_OUTOF10: 8
PAINLEVEL_OUTOF10: 4
PAINLEVEL_OUTOF10: 3
PAINLEVEL_OUTOF10: 0
PAINLEVEL_OUTOF10: 2
PAINLEVEL_OUTOF10: 0
PAINLEVEL_OUTOF10: 2
PAINLEVEL_OUTOF10: 2
PAINLEVEL_OUTOF10: 3
PAINLEVEL_OUTOF10: 5
PAINLEVEL_OUTOF10: 0
PAINLEVEL_OUTOF10: 4
PAINLEVEL_OUTOF10: 0
PAINLEVEL_OUTOF10: 0
PAINLEVEL_OUTOF10: 8
PAINLEVEL_OUTOF10: 4
PAINLEVEL_OUTOF10: 0
PAINLEVEL_OUTOF10: 2
PAINLEVEL_OUTOF10: 0
PAINLEVEL_OUTOF10: 3
PAINLEVEL_OUTOF10: 0
PAINLEVEL_OUTOF10: 2
PAINLEVEL_OUTOF10: 0
PAINLEVEL_OUTOF10: 0
PAINLEVEL_OUTOF10: 5
PAINLEVEL_OUTOF10: 0
PAINLEVEL_OUTOF10: 6
PAINLEVEL_OUTOF10: 9
PAINLEVEL_OUTOF10: 0
PAINLEVEL_OUTOF10: 3
PAINLEVEL_OUTOF10: 5
PAINLEVEL_OUTOF10: 0
PAINLEVEL_OUTOF10: 6
PAINLEVEL_OUTOF10: 0

## 2022-01-01 ASSESSMENT — PAIN - FUNCTIONAL ASSESSMENT
PAIN_FUNCTIONAL_ASSESSMENT: ACTIVITIES ARE NOT PREVENTED
PAIN_FUNCTIONAL_ASSESSMENT: 0-10
PAIN_FUNCTIONAL_ASSESSMENT: ACTIVITIES ARE NOT PREVENTED
PAIN_FUNCTIONAL_ASSESSMENT: ACTIVITIES ARE NOT PREVENTED
PAIN_FUNCTIONAL_ASSESSMENT: PREVENTS OR INTERFERES SOME ACTIVE ACTIVITIES AND ADLS
PAIN_FUNCTIONAL_ASSESSMENT: ACTIVITIES ARE NOT PREVENTED
PAIN_FUNCTIONAL_ASSESSMENT: PREVENTS OR INTERFERES SOME ACTIVE ACTIVITIES AND ADLS
PAIN_FUNCTIONAL_ASSESSMENT: ACTIVITIES ARE NOT PREVENTED
PAIN_FUNCTIONAL_ASSESSMENT: PREVENTS OR INTERFERES SOME ACTIVE ACTIVITIES AND ADLS
PAIN_FUNCTIONAL_ASSESSMENT: ACTIVITIES ARE NOT PREVENTED

## 2022-01-01 ASSESSMENT — PAIN DESCRIPTION - PROGRESSION
CLINICAL_PROGRESSION: NOT CHANGED
CLINICAL_PROGRESSION: GRADUALLY WORSENING
CLINICAL_PROGRESSION: NOT CHANGED
CLINICAL_PROGRESSION: GRADUALLY WORSENING
CLINICAL_PROGRESSION: NOT CHANGED
CLINICAL_PROGRESSION: GRADUALLY IMPROVING
CLINICAL_PROGRESSION: GRADUALLY IMPROVING
CLINICAL_PROGRESSION: GRADUALLY WORSENING

## 2022-01-01 ASSESSMENT — PAIN DESCRIPTION - ONSET
ONSET: ON-GOING
ONSET: PROGRESSIVE
ONSET: ON-GOING
ONSET: GRADUAL
ONSET: GRADUAL
ONSET: ON-GOING

## 2022-01-01 ASSESSMENT — PAIN DESCRIPTION - FREQUENCY
FREQUENCY: CONTINUOUS
FREQUENCY: INTERMITTENT
FREQUENCY: CONTINUOUS
FREQUENCY: INTERMITTENT
FREQUENCY: CONTINUOUS
FREQUENCY: CONTINUOUS
FREQUENCY: INTERMITTENT
FREQUENCY: CONTINUOUS
FREQUENCY: INTERMITTENT
FREQUENCY: CONTINUOUS
FREQUENCY: INTERMITTENT
FREQUENCY: CONTINUOUS

## 2022-01-01 ASSESSMENT — PAIN DESCRIPTION - DESCRIPTORS
DESCRIPTORS: ACHING
DESCRIPTORS: ACHING
DESCRIPTORS: HEADACHE
DESCRIPTORS: ACHING
DESCRIPTORS: CONSTANT;HEADACHE
DESCRIPTORS: ACHING;DISCOMFORT
DESCRIPTORS: CONSTANT
DESCRIPTORS: ACHING
DESCRIPTORS: HEADACHE
DESCRIPTORS: ACHING
DESCRIPTORS: CONSTANT;HEADACHE
DESCRIPTORS: ACHING;DISCOMFORT
DESCRIPTORS: ACHING
DESCRIPTORS: CONSTANT;HEADACHE
DESCRIPTORS: ACHING

## 2022-01-01 ASSESSMENT — PAIN DESCRIPTION - LOCATION
LOCATION: HEAD
LOCATION: BACK;SACRUM
LOCATION: BACK
LOCATION: SHOULDER
LOCATION: BACK
LOCATION: HEAD
LOCATION: SHOULDER
LOCATION: BACK;SACRUM
LOCATION: HEAD
LOCATION: BACK
LOCATION: HEAD
LOCATION: SHOULDER;KNEE
LOCATION: RIB CAGE
LOCATION: HEAD
LOCATION: SHOULDER
LOCATION: BACK
LOCATION: HEAD
LOCATION: BACK
LOCATION: SHOULDER
LOCATION: SHOULDER
LOCATION: BACK

## 2022-01-01 ASSESSMENT — ENCOUNTER SYMPTOMS
ABDOMINAL DISTENTION: 0
COUGH: 0
EYE DISCHARGE: 0
COUGH: 0
VOMITING: 0
VOMITING: 0
DIARRHEA: 0
NAUSEA: 0
SHORTNESS OF BREATH: 0
NAUSEA: 1
EYE ITCHING: 0
BACK PAIN: 0
ABDOMINAL PAIN: 0
ABDOMINAL PAIN: 0
RHINORRHEA: 0
RESPIRATORY NEGATIVE: 1
CONSTIPATION: 0
EYE PAIN: 0
SORE THROAT: 0
CONSTIPATION: 0
SHORTNESS OF BREATH: 0
WHEEZING: 0
VOMITING: 1

## 2022-01-01 ASSESSMENT — PAIN DESCRIPTION - ORIENTATION
ORIENTATION: MID
ORIENTATION: LOWER
ORIENTATION: MID
ORIENTATION: LOWER
ORIENTATION: LOWER
ORIENTATION: RIGHT;LEFT
ORIENTATION: LOWER
ORIENTATION: RIGHT
ORIENTATION: LOWER
ORIENTATION: ANTERIOR
ORIENTATION: RIGHT;LEFT
ORIENTATION: LOWER
ORIENTATION: LOWER
ORIENTATION: ANTERIOR
ORIENTATION: RIGHT;LEFT
ORIENTATION: LOWER
ORIENTATION: ANTERIOR
ORIENTATION: LOWER
ORIENTATION: LOWER
ORIENTATION: ANTERIOR
ORIENTATION: LOWER

## 2022-01-01 ASSESSMENT — PAIN DESCRIPTION - DIRECTION: RADIATING_TOWARDS: HIP

## 2022-01-12 NOTE — PROGRESS NOTES
Pre-CAR-T Psychological Evaluation- 1/12/2022    PSYCHOSOCIAL ASSESSMENT/RECOMMENDATIONS    Based on this evaluation, the recommendation is to continue with monitoring of identified risk factors. Lawrnce Oppenheim was informed of the following risk factors that have been identified and recommendations that are being made to the treatment team:      1) Mild levels of anxiety related to multiple myeloma and appears emotionally exhausted from treatment. May utilize venting as a primary coping strategy. Writer will follow-up as needed. Additional suggestions:  Utilize a pill box     Identified caregivers: Marco A Taylor (wife), Bartolo Pierre (sister), Wilfred Orr (son-in-law), Esperanza Quezada (daughter)   Protective factors: social supports, motivations for CAR-T (grandchildren)   DSM-5 Diagnoses: None  ____________________________________________________________________________________________    Lawrnce Oppenheim is a 59-year-old single  male with multiple myeloma referred for a psychological evaluation in preparation for CAR-T. He presented with his identified primary caregiver, Marco A Taylor (his partner of 30 years). PAST MEDICAL HISTORY  Lawrnce Oppenheim reports that he was diagnosed with multiple myeloma in 10/2018. He had an autologous stem cell transplant in 3/2020. He explains his experience with treatment thus far to be \"good considering that there is no cure''. Past medical history is significant for hernia.     KNOWLEDGE RELATED TO PROCEDURE    Diagnosis: Excellent  Procedure: Excellent  Risks and Benefits: Good  Outcome: Excellent  Lifestyle changes:    Hand-washing and hygiene:Excellent   People and Places: Excellent   Bleeding precautions: Good   Pets: Excellent   Sun exposure: Excellent   Driving restrictions: Excellent   Staying within 45 minutes of the hospital: Excellent   Physical activity: Excellent   Sexual activity: Excellent   Tobacco use: Excellent   Alcohol use: Excellent   Illicit drug use (including marijuana): Excellent   Food safety: Excellent   Medication management: Good   Frequency of appointments: Excellent   When to call BMT doctor: Excellent   [facility] in case of emergency: Excellent   Possibility for admission and re-admission to the hospital: Excellent    ADHERENCE    No-showed appointments: Denies  Reports forgetting medications: Denies  Using pill box: No; Use of pill box was recommended: Yes  Is willing to allow caregiver to manage medications immediately following procedure: Yes  Currently requires assistance with taking medications: Denies  Endorses history of going against the medical advise of a provider: Denies  History of issues with adherence to medical recommendations for comorbidities: some fast food after transplant  Motivation for procedure: 9/10    PSYCHOSOCIAL HISTORY    Marital Status: domestic partner of 27 years   If , describes marriage as: \"very good\"  Race/Ethnicity: \"\"  Lives: [place] ([place] from [facility] with partner  Reports stable housing: Yes  Reliable transportation: Yes  Children: 1 (age 45)  Employment: retired in 2018 after multiple myeloma diagnosis; transferring to social security  Financial concerns related to this procedure: Denies  Childhood history: He was raised by mother and father in [place], [place] and has 4 siblings (2 ). Yanci Roberson describes his childhood as \"a blast\"  Educational history: Denies difficulties in school (e.g., learning disability, accommodations). Highest level of education completed is bachelor's degree.    Buddhism/spiritual beliefs: has to have to believe in and denies Church beliefs that would affect his medical care   service: Denies  Legal history (e.g. DUI/DWI, long term/penitentiary, arrests, probation/parole, bankruptcy): Denies  Current stressors: political environment  Greatest stressor in life thus far: brother's death in  or so  Coping mechanisms: talk to partner, drive race cars, acceptance   Physical activity: Denies currently due to chemotherapy  Specific dietary needs: Denies; denies being a picky eater  Supplements or over-the-counter drugs: Denies    SUPPORT SYSTEM    Identified primary caregiver: Hans Paulino  Relationship to patient: partner  Age: 79  [address] [facility] to stay with patient: Yes  Employment: retired  Adequate knowledge regarding procedure: Yes  Diagnosis: Excellent  Procedure: Excellent  Risks and Benefits: Excellent  Outcome: Excellent  Lifestyle changes:     Hand-washing and hygiene:Excellent    People and Places: Excellent    Bleeding precautions: Excellent    Pets: Excellent    Sun exposure: Excellent    Driving restrictions: Excellent    Staying within 45 minutes of the hospital: Excellent    Physical activity: Excellent    Sexual activity: Excellent    Tobacco use: Excellent    Alcohol use: Excellent    Illicit drug use (including marijuana): Excellent    Food safety: Excellent    Medication management: Excellent    Frequency of appointments: Excellent    When to call BMT doctor: Excellent    Going to the [facility] in case of emergency: Excellent    Possibility for admission and re-admission to the hospital: Excellent  Willingness to be a caregiver: Yes  Significant mental health conditions that would affect care giving: Denies  Significant substance abuse that would affect care giving: Denies  Significant medical conditions that would affect care giving: Denies  Smoke tobacco in the home: Denies, smokes outside  Known cognitive impairment: Denies  COVID vaccine: Yes    CAREGIVER: RAPID ESTIMATE OF ADULT LITERACY IN MEDICINE  REALM-R: 8/8; REALM-SF Score: N/A  (0= <3rd grade reading level; 1-3= 4-6th grade reading level; 4-6= 7-8th grade reading level; >7= >9th grade reading level)    CAREGIVER: ONESIMO COGNITIVE ASSESSMENT (Version 1): 29/30 (Within normal limits)  Difficulty noted in the area(s) of: acceptance, seeking emotional support, and seeking informational support. Approach Coping is associated with more helpful responses to adversity, including adaptive practical adjustment, better physical health outcomes and more stable emotional responding. WORLD HEALTH ORGANIZATION QUALITY OF LIFE ASSESSMENT (WHOQOL-BREF):     Scores indicate his quality of life in the following areas in percentiles:  Physical Health: 40.2 percentile  Psychological Health: 45.5 percentile  Social Relationships: 69.9 percentile  Environment: 67.8 percentile    His quality of life appears highest in the area of social relationships and lowest in the area of physical health. SUBSTANCE USE HISTORY    Tobacco: Denies  Alcohol: Reports not drinking alcohol. Denies history of problematic alcohol use. Illicit drugs: Denies     Micah Levin does not foresee difficulties from abstaining from these substances immediately following his procedure. NEUROCOGNITIVE FUNCTIONING    Micah Levin denies a history of closed head injury, seizures, or stroke. PATIENT: RAPID ESTIMATE OF ADULT LITERACY IN MEDICINE  REALM-R: 7/8; REALM-SF Score: N/A  (0= <3rd grade reading level; 1-3= 4-6th grade reading level; 4-6= 7-8th grade reading level; >7= >9th grade reading level)    PATIENT ONESIMO COGNITIVE ASSESSMENT (Version 1): 26/30 (Within normal limits)  Difficulty noted in the area(s) of: delayed recall  Normal scores are ?26     BEHAVIORAL OBSERVATIONS    He arrived early for his scheduled appointment. He presented with his partner as his primary caregiver and was dressed appropriately and groomed neatly. During the evaluation, he presented with normal speech and logical/linear thought processes. He was cooperative and friendly. His mood was reported sometimes down, but overall good and affect was euthymic. Patient made jokes during evaluation and laughed. Patient's primary caregiver presented as cooperative and friendly.     STANDARD ASSESSMENT FOR INTEGRATING DATA  LINDEN INTEGRATED PSYCHOSOCIAL ASSESSMENT FOR TRANSPLANT (SIPAT)= 9 ((7-20) Good Candidate: Recommendation is to continue, although monitoring of identified risk factors may be required)   SIPAT Score Interpretation: 0-6 Excellent Candidate, 7-20 Good Candidate, 21-39 Minimally Acceptance Candidate, 40-69 Poor Candidate, >70 High Risk Candidate  The total score was obtained through summing patient's score on the following categories:    Patient's Readiness Level  Knowledge and Understanding of Medical Illness Process: 0) Excellent Understanding: Patient and support system are fully aware of the causes of illness leading to need for transplantation. Both patient and support system demonstrate a high degree of self-directed learning  Knowledge and Understanding of the Process of Transplantation: 0) Excellent Understanding: High degree of self-directed learning and excellent knowledge of treatment risks & benefits. Willingness/Desire for Treatment: 1) Good: Patient expresses interest and is actively involved in his/her care  Treatment Compliance/Adherence: 0) Excellent: Patient is fully compliant and is an effective partner with medical staff. Lifestyle Factors: 0) Able to modify & sustain needed changes- self initiated. Patient's Readiness Level Total Score: 1    Social Support System  Availability of Social Support System: 2) Good: Various individuals (e.g., minimum of two people) have been identified and are actively engaged in the patient's care. A back-up system, albeit limited, seems feasible. Functionality of Social Support System: 0) Excellent: Members of the support team have demonstrated initiative in learning and are already committed to and actively and effectively engaged in the patient's care. Appropriateness of Physical Living Space and Environment: 0) Excellent: Patient has excellent, long-term, permanent and adequate housing.    Social Support System Total Score: 2    Psychological Stability and Psychopathology   Presence of Psychopathology: 2) Mild Psychopathology - Present or History of mild psychopathology (e.g., Adjustment disorder). Usually a self-limited problem without significant negative impact on level of functioning. No hospitalization needed. No History of SI/SA. Assessment of Depression: 0) No Clinical Depression  Assessment of Anxiety: 1) Mild Clinical Anxiety  History of Organic Psychopathology or Neurocognitive Impairment: 0) None: No history of disease or treatment induced psychiatric problem. Assessment of Current Cognitive Functionin) Cognitive Functioning Within Normal Limits; or MoCA / MMSE . 26. Influence of Personality Traits vs. Disorder: 0) None: No history of significant personality disorder or psychopathology/traits. Effect of Truthfulness vs. Deceptive Behavior in Presentation: 0) No evidence of deceptive behavior in history or at present. Overall Risk for Psychopathology: 3) Moderate: History of problematic coping with current or previous medical challenges or psychosocial stressors. Patient has experienced some psychiatric complications to medical illness, interventions or treatment -OR- Presence of moderate psychopathology in family of origin. Psychological Stability and Psychopathology Total Score: 6    Lifestyle and Effect of Substance Use  Alcohol Use/Abuse/Dependence: 0) None: No history of alcohol use. Alcohol Risk for Recidivism: 0) None: No history of Alcohol use (Audit = 0). Substance Use/Abuse/Dependence: 0) None: No history of illicit substance use; or abuse of prescribed substances. Substance Use Risk for Recidivism: 0) None: No history of illicit substance Use; or abuse of prescribed substances (DAST = 0). Nicotine Use/Abuse/Dependence: 0) None: Never used tobacco in any form. No history of Nicotine Use/Abuse.    Lifestyle and Effect of Substance Use Total Score: 0    -------------------------------------------------------------------------------------    Arabella Dixon Psy.D., Clinical Psychologist          Micah Levin was seen for a pre-BMT/CAR-T psychological evaluation. There were no urgent psychological concerns (e.g. suicidal or homicidal ideation). Full report to follow. Prior to the evaluation, patient and/or support system was informed of the purposes of the evaluation, which include identification of any psychosocial barriers to successful BMT/CAR-T. Educated patient regarding psychologist's role in making treatment recommendations to patient's treatment team regarding patient's candidacy for BMT/CAR-T from a psychological standpoint. Discussed limits of confidentiality (e.g., documenting in patient's medical record, coordinating with team, abuse of vulnerable person, court subpoena, and/or being a risk to self or others). Also explained to patient that provider sees patient's while hospitalized in the [place] [address]. Patient expressed understanding and was agreeable to complete the evaluation. All measures completed today were sent to medical records to be uploaded into patient's chart.

## 2022-01-12 NOTE — LETTER
[address] OP INFUSION  [address]  [facility] [address]  Phone: [phone]    Elva Regan PSYD        January 12, 2022    [address] [address]      Dear Tom Workman:    Based on today's psychological evaluation, the following recommendations need to be addressed prior to your procedure:     No recommendations made    Here are some additional suggestions:   Utilize a pill box for medication management        If you have any questions or concerns, please don't hesitate to call.     Sincerely,        Elva Regan PSYD

## 2022-01-12 NOTE — PROGRESS NOTES
Patient's platelet count this AM:  7  Pt seen and assessed at [facility]. Seen at 0 [facility] today for platelet per standing orders for above lab values. Blood products transfused per [facility] policy. Pt tolerated transfusion well and without incident. Pt verbalizes understanding of discharge instructions. Discharged ambulatory to home.

## 2022-01-18 PROBLEM — S06.5XAA SUBDURAL HEMATOMA: Status: ACTIVE | Noted: 2022-01-01

## 2022-01-18 NOTE — ED PROVIDER NOTES
ED Attending Attestation Note     Date of evaluation: 1/18/2022    This patient was seen by the resident. I have seen and examined the patient, agree with the workup, evaluation, management and diagnosis. The care plan has been discussed. I have reviewed the ECG and concur with the resident's interpretation. My assessment reveals adult male, currently with grossly normal neurological exam moving all extremities with grossly preserved strength, coordination, and sensation, alert and orient x4 who presents with an episode of loss of consciousness today. The description of the patient having sonorous respirations with his eyes rolled back in his head but still breathing and followed by. Of confusion on waking does sound like a seizure, but no tonic-clonic movements were appreciated. He does not appear to fallen with this episode occurring while he was seated in a chair, but was noted on CT to have small subdural hemorrhage, likely exacerbated by his thrombocytopenia. Labs are otherwise largely unremarkable, the patient appears hemodynamically stable here. Afebrile. He will be admitted for continued evaluation, repeat head CT, and neurosurg eval.        Critical Care:  Due to the immediate potential for life-threatening deterioration due to neurological failure, I spent 31 minutes providing critical care. This time excludes time spent performing procedures but includes time spent on direct patient care, history retrieval, review of the chart, and discussions with patient, family, and consultant(s).        Elroy Alvarado MD  01/18/22 7014

## 2022-01-18 NOTE — ED PROVIDER NOTES
diarrhea, constipation, new rash, neck stiffness. Notably, patient is still undergoing treatment with Bendamustine, Velcade, and Dexamethasone with last event on 1/3/2022 notably, he has known thrombocytopenia with last platelet count of 13 on 1/10/22, which thought to be due to a side effect of his chemotherapy; he was previously prescribed dexamethasone for this. Patient sees Dr. Davon Dumont of Heme/Onc at [facility]. Review of Systems     Review of Systems   Constitutional: Positive for fever. Negative for activity change, chills and fatigue. HENT: Negative. Respiratory: Negative. Cardiovascular: Negative. Gastrointestinal: Positive for nausea. Negative for abdominal distention, abdominal pain, constipation, diarrhea and vomiting. Genitourinary: Negative. Musculoskeletal: Negative. Skin: Negative. Neurological: Positive for seizures and headaches. Negative for dizziness, tremors, syncope, facial asymmetry, weakness, light-headedness and numbness. Hematological: Bruises/bleeds easily. Psychiatric/Behavioral: Negative. Past Medical, Surgical, Family, and Social History     He has a past medical history of Cancer ([facility] [address] and ESRD (end stage renal disease) on dialysis ([facility] [address] [address] He has no past surgical history on file. His family history includes Cancer in his father. He reports that he has never smoked. He has never used smokeless tobacco. He reports previous alcohol use. He reports that he does not use drugs.     Medications     Previous Medications    ACYCLOVIR (ZOVIRAX) 400 MG TABLET    Take 400 mg by mouth 2 times daily     ALLOPURINOL (ZYLOPRIM) 100 MG TABLET    Take 200 mg by mouth daily     B COMPLEX-C-FOLIC ACID (DIALYVITE 908 PO)    Take 1 tablet by mouth daily    BENDAMUSTINE (TREANDA) 100 MG    To be given as an infusion on days 1 and 4 of 29 day chemotherapy cycle    BORTEZOMIB (VELCADE IV)    Infuse intravenously To be given as an infusion on days 1, 4, 8, and 11 of 28 day chemotherapy cycle    CALCIUM CITRATE-VITAMIN D (CITRICAL + D) 315-250 MG-UNIT TABS PER TABLET    Take 1 tablet by mouth 2 times daily (with meals)    DENOSUMAB (XGEVA) 120 MG/1.7ML SOLN SC INJECTION    Inject 120 mg into the skin every 30 days    DEXAMETHASONE 20 MG TABS    Take 20 mg by mouth - to be given with Velcade on days 1, 4, 8, and 11 of chemotherapy    DICYCLOMINE (BENTYL) 10 MG CAPSULE    Take 10 mg by mouth as needed    LEVOFLOXACIN (LEVAQUIN) 250 MG TABLET    Take 250 mg by mouth daily For prophylaxis during chemotherapy    PANTOPRAZOLE (PROTONIX) 40 MG TABLET    Take 1 tablet by mouth daily    PROCHLORPERAZINE (COMPAZINE) 10 MG TABLET    Take 1 tablet by mouth every 4 hours as needed (nausea)    SODIUM BICARBONATE 325 MG TABLET    Take 650 mg by mouth daily       Allergies     He has No Known Allergies. Physical Exam     INITIAL VITALS: BP: (!) 143/100, Temp: 98.3 °F (36.8 °C), Pulse: 101, Resp: 17, SpO2: 99 %   Physical Exam  Constitutional:       General: He is not in acute distress. Appearance: He is well-developed and normal weight. He is not ill-appearing or toxic-appearing. HENT:      Head: Normocephalic and atraumatic. Mouth/Throat:      Mouth: Mucous membranes are moist.   Eyes:      General: No visual field deficit or scleral icterus. Extraocular Movements: Extraocular movements intact. Pupils: Pupils are equal, round, and reactive to light. Pupils are equal.   Cardiovascular:      Rate and Rhythm: Normal rate and regular rhythm. Heart sounds: Normal heart sounds. No murmur heard. No friction rub. No gallop. Pulmonary:      Effort: Pulmonary effort is normal. No respiratory distress. Breath sounds: Normal breath sounds. No stridor. No wheezing, rhonchi or rales. Chest:      Chest wall: No tenderness. Abdominal:      General: There is no distension. Palpations: Abdomen is soft. There is no mass. Tenderness:  There is no abdominal tenderness. There is no guarding. Musculoskeletal:         General: No swelling or tenderness. Normal range of motion. Cervical back: Normal range of motion and neck supple. No rigidity. Skin:     General: Skin is warm and dry. Capillary Refill: Capillary refill takes less than 2 seconds. Coloration: Skin is not cyanotic or pale. Findings: No erythema or rash. Neurological:      Mental Status: He is alert and oriented to person, place, and time. Mental status is at baseline. GCS: GCS eye subscore is 4. GCS verbal subscore is 5. GCS motor subscore is 6. Cranial Nerves: No cranial nerve deficit, dysarthria or facial asymmetry. Sensory: No sensory deficit. Motor: No weakness. Coordination: Romberg sign negative. Coordination normal.   Psychiatric:         Mood and Affect: Mood normal.         Speech: Speech normal.         Behavior: Behavior normal.         DiagnosticResults     EKG   Interpreted in conjunction with emergencydepartment physician Mariann Greene, *  Rhythm: normal sinus   Rate: normal   Axis: normal  Ectopy: none  Conduction: normal  ST Segments: no acute change  T Waves:no acute change and inversion in  AVl, aVR  Q Waves: none  Clinical Impression: no acute changes  Comparison: No evidence of ischemic change, no change when compared to EKG from 12/20/2021    RADIOLOGY:  XR CHEST PORTABLE   Final Result      Clear lungs. Normal cardiomediastinal silhouette. CT Head WO Contrast   Final Result      Small, mixed density superior right frontoparietal subdural hematoma.                   CT HEAD WO CONTRAST    (Results Pending)       LABS:   Results for orders placed or performed during the hospital encounter of 01/18/22   CBC Auto Differential   Result Value Ref Range    WBC 3.8 (L) 4.0 - 11.0 K/uL    RBC 2.72 (L) 4.20 - 5.90 M/uL    Hemoglobin 9.4 (L) 13.5 - 17.5 g/dL    Hematocrit 28.0 (L) 40.5 - 52.5 %    .0 (H) 80.0 - 100.0 fL MCH 34.6 (H) 26.0 - 34.0 pg    MCHC 33.7 31.0 - 36.0 g/dL    RDW 17.4 (H) 12.4 - 15.4 %    Platelets 22 (L) 158 - 450 K/uL    MPV 8.9 5.0 - 10.5 fL    Neutrophils % 88.7 %    Lymphocytes % 2.6 %    Monocytes % 8.5 %    Eosinophils % 0.1 %    Basophils % 0.1 %    Neutrophils Absolute 3.4 1.7 - 7.7 K/uL    Lymphocytes Absolute 0.1 (L) 1.0 - 5.1 K/uL    Monocytes Absolute 0.3 0.0 - 1.3 K/uL    Eosinophils Absolute 0.0 0.0 - 0.6 K/uL    Basophils Absolute 0.0 0.0 - 0.2 K/uL    Anisocytosis Occasional (A)     Macrocytes 1+ (A)     Tear Drop Cells 1+ (A)    Basic Metabolic Panel w/ Reflex to MG   Result Value Ref Range    Sodium 136 136 - 145 mmol/L    Potassium reflex Magnesium 5.3 (H) 3.5 - 5.1 mmol/L    Chloride 106 99 - 110 mmol/L    CO2 19 (L) 21 - 32 mmol/L    Anion Gap 11 3 - 16    Glucose 93 70 - 99 mg/dL    BUN 31 (H) 7 - 20 mg/dL    CREATININE 1.7 (H) 0.8 - 1.3 mg/dL    GFR Non- 40 (A) >60    GFR  49 (A) >60    Calcium 7.5 (L) 8.3 - 10.6 mg/dL   Hepatic Function Panel   Result Value Ref Range    Total Protein 6.2 (L) 6.4 - 8.2 g/dL    Albumin 4.1 3.4 - 5.0 g/dL    Alkaline Phosphatase 80 40 - 129 U/L    ALT 20 10 - 40 U/L    AST 18 15 - 37 U/L    Total Bilirubin 1.1 (H) 0.0 - 1.0 mg/dL    Bilirubin, Direct <0.2 0.0 - 0.3 mg/dL    Bilirubin, Indirect see below 0.0 - 1.0 mg/dL   Blood Gas, Venous   Result Value Ref Range    pH, John 7.365 7.350 - 7.450    pCO2, John 40.7 (L) 41.0 - 51.0 mmHg    pO2, John <30.0 25.0 - 40.0 mmHg    HCO3, Venous 23.2 (L) 24.0 - 28.0 mmol/L    Base Excess, John -2.0 -2.0 - 3.0 mmol/L    O2 Sat, John 35 Not established %    Carboxyhemoglobin 2.0 (H) 0.0 - 1.5 %    MetHgb, John 0.6 0.0 - 1.5 %    TC02 (Calc), John 25 mmol/L    Hemoglobin, John, Reduced 63.50 %   Lactic Acid, Plasma   Result Value Ref Range    Lactic Acid 1.3 0.4 - 2.0 mmol/L   Protime-INR   Result Value Ref Range    Protime 12.1 9.9 - 12.7 sec    INR 1.07 0.88 - 1.12   EKG 12 Lead   Result Value and tongue biting concerning for possible seizure and headache concerning for possible intracranial pathology given known history of thrombocytopenia. Elected to secure noncontrast CT head imaging as well as basic labs. Labs remarkable for leukopenia with a WBC of 3.8, hemoglobin stable at 9.4, thrombocytopenia with platelet count of 22, which is slightly improved from platelet count of 13 secured at [facility] in early January. BMP with mild acidosis with CO2 of 19, BUN 31, creatinine of 1.7, which appeared to be at baseline, FTs unremarkable, VBG without significant acidosis/alkalosis, lactic acid normal.  PT 13.6, INR mildly elevated 1.17. EKG secured given reported history of alteration of consciousness concerning for seizure versus syncope and did not demonstrate any evidence of arrhythmia, ischemic change. Blood cultures also secured given known immunocompromise and with reported low-grade fever of 100.1 yesterday, but did not empirically administer antibiotics as patient did not meet trigger for doing so. Chest x-ray did not demonstrate any acute cardiopulmonary abnormality. CT head noncontrast demonstrated a small, mixed density superior right frontoparietal subdural hematoma. I suspect this is likely spontaneous in the context of patient's known thrombocytopenia and in the absence of recent trauma. I spoke to Dr. Caro Iverson of neurosurgery who recommended platelet transfusion with a goal platelet count greater than 50 at minimum, repeat CT head, initiation on Keppra prophylaxis, and admission either to ICU or BMT unit. Patient written for 1 unit of platelets and 1 g of Keppra with plan to reassess platelet count after first unit. I also spoke with patient's primary hematologist, Dr. Hernan Rucker who agreed with current plan. Patient has been accepted for admission to the ICU for monitoring by ICU resident and hospitalist at this time.   Patient will be monitored in the ED until he is moved to his new site of care    this patient was also evaluated by the attending physician. All care plans werediscussed and agreed upon. Clinical Impression     1. Subdural hemorrhage (HCC)        Disposition     PATIENT REFERRED TO:  No follow-up provider specified.     DISCHARGE MEDICATIONS:  New Prescriptions    No medications on file       DISPOSITION Decision To Admit 01/18/2022 06:08:13 PM        Gypsy Hammond MD  Resident  01/18/22 2392

## 2022-01-19 NOTE — CONSULTS
Neurology Consult Note  Reason for Consult: seizure in the setting of new SDH  Chief complaint: seizure  Dr GUTIERREZ EllsworthS [facility], [place] asked me to see Cooper Amado in consultation for evaluation of seizure    History of Present Illness:  Cooper Amado is a 77 y.o. male with multiple myeloma who presents with new onset seizure. He needed a platelet transfusion yesterday. Following his transfusion he reports he developed an insidious, frontal headache which was severe. The quality was typical for his normal headaches, so he didn't think much of it. He had to cancel an appointment he had with his oncologist, and was at home when he abruptly lost consciousness. He does not remember this. He went limp for about 10 minutes, with sonorous respirations. He bit his tongue and lost control of his bladder. He has never had a seizure before, no family history of seizure. He feels back to normal today with the exception of severe tongue hematoma, as well as generalized weakness. Of note, he tells me he is about to undergo CAR-T in about a month with Dr. Tonya Shaikh. His headache is about a 3/10 on the pain scale today. He denies any focal neurologic deficits including lateralized weakness, sensory changes, vision changes, or difficulty speaking. Medical History:  Past Medical History:   Diagnosis Date    [facility] ([facility]     ESRD (end stage renal disease) on dialysis ([facility]     Mon-Wed-Fri     History reviewed. No pertinent surgical history.   Medications Prior to Admission:   Dexamethasone 20 MG TABS, Take 20 mg by mouth - to be given with Velcade on days 1, 4, 8, and 11 of chemotherapy  Bortezomib (VELCADE IV), Infuse intravenously To be given as an infusion on days 1, 4, 8, and 11 of 28 day chemotherapy cycle  bendamustine (TREANDA) 100 MG, To be given as an infusion on days 1 and 4 of 29 day chemotherapy cycle  dicyclomine (BENTYL) 10 MG capsule, Take 10 mg by mouth as needed  levoFLOXacin (LEVAQUIN) 250 MG tablet, Take 250 mg by mouth daily For prophylaxis during chemotherapy  calcium citrate-vitamin D (CITRICAL + D) 315-250 MG-UNIT TABS per tablet, Take 1 tablet by mouth 2 times daily (with meals)  sodium bicarbonate 325 MG tablet, Take 650 mg by mouth daily  denosumab (XGEVA) 120 MG/1.7ML SOLN SC injection, Inject 120 mg into the skin every 30 days  pantoprazole (PROTONIX) 40 MG tablet, Take 1 tablet by mouth daily  prochlorperazine (COMPAZINE) 10 MG tablet, Take 1 tablet by mouth every 4 hours as needed (nausea)  B Complex-C-Folic Acid (DIALYVITE 464 PO), Take 1 tablet by mouth daily  allopurinol (ZYLOPRIM) 100 MG tablet, Take 200 mg by mouth daily   acyclovir (ZOVIRAX) 400 MG tablet, Take 400 mg by mouth 2 times daily   No Known Allergies  Family History   Problem Relation Age of Onset    Cancer Father      Social History     Tobacco Use   Smoking Status Never Smoker   Smokeless Tobacco Never Used     Social History     Substance and Sexual Activity   Drug Use Never     Social History     Substance and Sexual Activity   Alcohol Use Not Currently       ROS:  Constitutional- No weight loss or fevers  Eyes- No diplopia. No photophobia. Ears/nose/throat- No dysphagia. No Dysarthria  Cardiovascular- No palpitations. No chest pain  Respiratory- No dyspnea. No Cough  Gastrointestinal- No Abdominal pain. No Vomiting. Genitourinary- No incontinence. No urinary retention  Musculoskeletal- No myalgia. No arthralgia  Skin- No rash. No easy bruising. Psychiatric- No depression. No anxiety  Endocrine- No diabetes. No thyroid issues. Hematologic- No bleeding difficulty. No fatigue  Neurologic- No weakness. No Headache. Exam:  Blood pressure (!) 141/76, pulse 85, temperature 98 °F (36.7 °C), temperature source Oral, resp. rate 19, height 6' 4\" (1.93 m), weight 248 lb 6.4 oz (112.7 kg), SpO2 99 %.   Constitutional    Vital signs: BP, HR, and RR reviewed   General Alert, no distress, well-nourished  Eyes: unable to visualize fundi Cardiovascular: pulses symmetric in all 4 extremities. No peripheral edema. Psychiatric: cooperative with examination, no  psychotic behavior noted. Neurologic  Mental status:   orientation to person and place, month, and year   General fund of knowledge grossly intact   Memory grossly intac   Attention intact as able to attend well to the exam; able to read clock   Language fluent in conversation   Comprehension intact; follows simple commands and 2 step commands crossing midline  Cranial nerves:   CN2: Visual Fields grossly full but unable to fully assess given reduced visual acuity at baseline without his glasses  CN 3,4,6: extraocular muscles intact, pupils equal round and reactive  CN5: facial sensation symmetric   CN7: face symmetric without dysarthria  CN8: hearing grossly intact  CN9: palate elevated symmetrically  CN11: trap full strength on shoulder shrug  CN12: tongue midline with protrusion; VERY ecchymotic and edematous  Strength: No prontator drift. Good strength in all 4 extremities   Deep tendon reflexes: normal in all 4 extremities  Sensory: light touch intact in all 4 extremities.   No sensory extinction on double simultaneous stimulation  Cerebellar/coordination: finger nose finger normal without ataxia  Tone: normal in all 4 extremities  Gait: deferred for safety    Labs  Creatinine 1.6 mg/dL  WBC 2.1K  INR 1.07  COVID-19 NOT DETECTED  Platelets 58G    Studies  Head CT 1/18/22: unchanged small right cerebral convexity subdural hematoma; no midline shift    Scheduled Medications   influenza virus vaccine  0.5 mL IntraMUSCular Prior to discharge    sodium chloride flush  5-40 mL IntraVENous 2 times per day    famotidine  20 mg Oral BID    levETIRAcetam  500 mg Oral Q12H    allopurinol  200 mg Oral Daily     Vitals:    01/19/22 0610 01/19/22 0630 01/19/22 0700 01/19/22 0800   BP:  (!) 152/92 (!) 148/105 (!) 141/76   Pulse:  89 85    Resp:  16 19    Temp: 98.3 °F (36.8 °C)  98 °F (36.7 °C) TempSrc: Oral  Oral    SpO2:    99%   Weight:       Height:         Impression:  Micah Levin is a 77 y.o. male with multiple myeloma who presented with likely new onset seizure in the setting of thrombocytopenia. He remains thrombocytopenic with platelet count of 41,712 today which puts him at risk for hematoma expansion. 6h head CT stable. Recommendations:  - Continue Keppra 500 mg BID indefinitely  - INR goal < 1.4. Reverse any coagulopathy. Platelet goal > 30,793. Discussed thrombocytopenia with senior resident. -R4L neuro checks  - Systolic BP goal less than 140 mmHg. May use IVP labetolol and if no response after two doses, move on to infusion with nicardipine 2.5-20 mg, titrated every 5 minutes. Place arterial line for continuous BP monitoring.   - Neurosurgery consultation and evacuation if indicated  - Monitor for signs/symptoms of increased ICP. For awake patients: worsening headache, vomiting, declining mental status  - MRI brain and MR-A head  - Monitor airway, and intubate for respiratory distress, GCS < 8, or inability to protect airway. Monitor quality of speech given LARGE tongue hematoma. - SCDs for DVT prophylaxis given coagulopathy  - Electrolyte goals: glucose < 180 mg/dL. Sodium 135-145 mmol/L. Magnesium > 1.8 mg/dL. - Maintain normothermia. Culture for fever > 101.5F. Treat fever aggressively. - We discussed driving precautions and he expressed understanding  - Agree with hematology/oncology consultation  - No antiplatelets or anticoagulants x 2 weeks    A copy of this note was provided for Dr MATT ALVARADO [facility], MD. Assessment and planning including emergent interventions necessary were discussed with ALEXI Christian at [address] NP  Neurology and Neurocritical care  [facility] Neurology line: [phone]  [employer]: [facility] Neurology & Neurocritical Care NPs    I spent 60 minutes in the care of this patient.   Over 50% of that time was in face-to-face counseling regarding disease process, diagnostic testing, preventative measures, and answering patient and family questions.

## 2022-01-19 NOTE — PLAN OF CARE
Problem: Falls - Risk of:  Goal: Will remain free from falls  Description: Will remain free from falls  Outcome: Ongoing  Note: Fall risk protocol in place: Bed alarm on, fall risk bracelet applied, yellow indicator in hallway on, non-skid footwear in use. Patient/family educated on fall risk protocol, instructed to call for assistance when needed. Problem: Skin Integrity:  Goal: Absence of new skin breakdown  Description: Absence of new skin breakdown  Outcome: Ongoing  Note: Patient turns self adequately in bed, Sacral Heart Mepilex in place to protect, skin intact underneath.

## 2022-01-19 NOTE — PROGRESS NOTES
Physical Therapy    Facility/Department: [address] [place] [address] N ICU  Initial Assessment/Treatment    NAME: Lior Monroe  : 1955  MRN: [id number]    Date of Service: 2022    Discharge Recommendations: Lior Monroe scored a 19/24 on the AM-PAC short mobility form. Current research shows that an AM-PAC score of 18 or greater is typically associated with a discharge to the patient's home setting. Based on the patient's AM-PAC score and their current functional mobility deficits, it is recommended that the patient have 2-3 sessions per week of Physical Therapy at d/c to increase the patient's independence. At this time, this patient demonstrates the endurance and safety to discharge home with ** (home vs OP services) and a follow up treatment frequency of 2-3x/wk. Please see assessment section for further patient specific details. If patient discharges prior to next session this note will serve as a discharge summary. Please see below for the latest assessment towards goals. HOME HEALTH CARE: LEVEL 1 STANDARD    - Initial home health evaluation to occur within 24-48 hours, in patient home   - Therapy to evaluate with goal of regaining prior level of functioning   - Therapy to evaluate if patient has [address] needs for personal care      PT Equipment Recommendations  Equipment Needed: No    Assessment   Body structures, Functions, Activity limitations: Decreased functional mobility ; Decreased endurance;Decreased balance  Assessment: 76 yo admitted with SDH likely from thrombocytopenia and possible seizure. Pt demo fairly steady gait today with only occaisonal weaving/slight loss of balance that pt was able to recover on his own. Pt admits feeling weak & would benefit from ongoing IP PT at dc. Pt hopes for dc home with A from wife & other family near by. Will follow & progress as tolerated.   Treatment Diagnosis: impaired balance  Prognosis: Good  Decision Making: Medium Complexity  PT Education: Goals;PT Role;Plan of Care  Patient Education: need to be careful due to low platelets & to avoid falls- verbalized understanding  REQUIRES PT FOLLOW UP: Yes  Activity Tolerance  Activity Tolerance: Patient limited by pain; Patient limited by fatigue  Activity Tolerance: c/o fatigue & headache; declined sitting up in chair & only wanted to go back to bed       Patient Diagnosis(es): The encounter diagnosis was Subdural hemorrhage ([facility] has a past medical history of Cancer ([facility] [facility] and ESRD (end stage renal disease) on dialysis ([facility] [address] has no past surgical history on file. Restrictions  Position Activity Restriction  Other position/activity restrictions: up with A  Vision/Hearing  Vision: Impaired (reports its like wax paper over his eyes \"from the Dexamethasone I've been on for 3 years\")  Hearing: Within functional limits     Subjective  General  Chart Reviewed: Yes  Additional Pertinent Hx: [birth year]66 y.o. male with PMH of multiple myeloma s/p autologous stem cell transplant in 3/2020, currently undergoing evaluation for CAR-T, ESRD on who presents headache, altered mentation (went unresponsive on wife for 5-10 min). ? seizure activity. CT head noncontrast demonstrated a small, mixed density superior right frontoparietal subdural hematoma. Likely from thrombocytopenia per MD notes. Family / Caregiver Present: No  Referring Practitioner: Jaimee Chew MD  Diagnosis: subdural hemorrhage  Follows Commands: Within Functional Limits  Subjective  Subjective: Found in bed, agreeable to PT. \"I'm weak. \"  Reports feeling weaker than usual.  Pain Screening  Patient Currently in Pain: Yes (HA, 3 or 4/10, RN aware)  Vital Signs  Patient Currently in Pain: Yes (HA, 3 or 4/10, RN aware)       Orientation  Orientation  Overall Orientation Status: Within Normal Limits  Social/Functional History  Social/Functional History  Lives With: Spouse  Type of Home: House  Home Layout: Two level,1/2 bath on main level  Home Access: Stairs to enter with rails  Entrance Stairs - Number of Steps: 2-3 TODD; flight to upstairs bedroom  Entrance Stairs - Rails: Both  Bathroom Shower/Tub: Walk-in shower  Bathroom Toilet: Standard  Bathroom Equipment: Grab bars around toilet,Grab bars in [employer]  Home Equipment: Cane,4 wheeled walker  ADL Assistance: [address]: Needs assistance (wife does most)  Ambulation Assistance: Independent  Transfer Assistance: Independent  Active : Yes (wife usually takes him places though & he rarely drives)  Occupation: Retired  Leisure & Hobbies: cars- [employer]  Additional Comments: No falls PTA. Wife able to be with him majority of time. Objective          AROM RLE (degrees)  RLE AROM: WNL  AROM LLE (degrees)  LLE AROM : WNL  Strength RLE  Comment: MMT not performed due to low platelets but appears WFL  Strength LLE  Comment: MMT not performed due to low platelets but appears WFL     Sensation  Overall Sensation Status: WNL  Bed mobility  Supine to Sit: Stand by assistance  Sit to Supine: Stand by assistance  Transfers  Sit to Stand: Stand by assistance;Contact guard assistance  Stand to sit: Stand by assistance;Contact guard assistance  Ambulation  Ambulation?: Yes  Ambulation 1  Surface: level tile  Device: No Device  Assistance: Stand by assistance;Contact guard assistance  Quality of Gait: fairly steady gait but with occasional weaving/slight lateral LOB that pt recovered on his own  Distance: 10' x 2, 30', 80'  Stairs/Curb  Stairs?: No     Balance  Sitting - Static: Good  Standing - Static: Good (SBA at sink)    Treatment included gait/transfer training, pt education.       Plan   Plan  Times per week: 5-7  Current Treatment Recommendations: Balance Training,Functional Mobility Training,Transfer Training,Gait Training,Stair training,Patient/Caregiver Education & Training  Safety Devices  Type of devices: Bed alarm in place,Nurse notified,Left in bed,Call light within reach                                                      AM-PAC Score  AM-PAC Inpatient Mobility Raw Score : 19 (01/19/22 1558)  AM-PAC Inpatient T-Scale Score : 45.44 (01/19/22 1558)  Mobility Inpatient CMS 0-100% Score: 41.77 (01/19/22 1558)  Mobility Inpatient CMS G-Code Modifier : CK (01/19/22 1558)          Goals  Short term goals  Time Frame for Short term goals: dc  Short term goal 1: Sit<>stand S  Short term goal 2: Ambulate >100' SBA without LOB  Short term goal 3: up/down flight of steps with rail SBA  Patient Goals   Patient goals : home at dc       Therapy Time   Individual Concurrent Group Co-treatment   Time In 1419         Time Out 1451         Minutes 32           Timed Code Treatment Minutes:   15    Total Treatment Minutes:  Joie Knight 22, [address] [place]

## 2022-01-19 NOTE — CONSULTS
NEUROSURGERY CONSULT NOTE    Bev Paiz  [id number]   1955 1/19/2022    Requesting physician: Corona Graves MD    Reason for consultation: SDH    History of present illness: Patient is a 77 y.o. male that  has a past medical history of ESRD (end stage renal disease) on dialysis ([address] and multiple myeloma status post autologous stem cell transplant in 3/2020 and currently undergoing evaluation for CAR-T therapy who presented to [facility] ED w/ altered mental status. Patient reportedly had a phone call on 1/18/2022 and afterwards when his domestic partner was trying to speak to him she noted he was not responding. After a few moments of this she went to evaluate the patient and she found that his eyes had rolled back in his head and he was currently unconscious.  This period lasted reportedly about 5 minutes before he spontaneously regained consciousness however afterwards he was mildly disoriented.  During this event it appears that he also lost control of his bladder and bit his tongue. The patient has no previous history of seizures or family history of seizures. Upon evaluation in the emergency department a CT head noncontrast was performed which did demonstrate a new subdural hematoma in his right frontoparietal region measuring approximately 6 mm in diameter.  The patient denies any recent head trauma and is not currently on any anticoagulation or antiplatelet agents. He does have known thrombocytopenia in the setting of receiving chemotherapy for his multiple myeloma. ROS:   GENERAL:  Denies fever or recent illness.  Denies weight changes   EYES:  Denies vision change or diplopia  EARS:  Denies hearing loss  CARDIAC:  Denies chest pain  RESPIRATORY:  Denies shortness of breath  SKIN:  Denies rash or lesions   HEM:  Denies excessive bruising  PSYCH:  Denies anxiety or depression  NEURO:  Endorses headache; Denies numbness or tingling or lateralizing weakness   :  Denies urinary difficulty  GI: Denies nausea, vomiting, diarrhea or constipation  MUSCULOSKELETAL:  No arthralgias    No Known Allergies    Past Medical History:   Diagnosis Date    Cancer ([facility]     ESRD ([facility]     Mon-Wed-Fri        History reviewed. No pertinent surgical history.     Social History     Occupational History    Not on file   Tobacco Use    Smoking status: Never Smoker    Smokeless tobacco: Never Used   Substance and Sexual Activity    Alcohol use: Not Currently    Drug use: Never    Sexual activity: Not Currently     Partners: Female        Family History   Problem Relation Age of Onset    Cancer Father         Outpatient Medications Marked as Taking for the 1/18/22 encounter [facility] Encounter)   Medication Sig Dispense Refill    Dexamethasone 20 MG TABS Take 20 mg by mouth - to be given with Velcade on days 1, 4, 8, and 11 of chemotherapy      Bortezomib (VELCADE IV) Infuse intravenously To be given as an infusion on days 1, 4, 8, and 11 of 28 day chemotherapy cycle      bendamustine (TREANDA) 100 MG To be given as an infusion on days 1 and 4 of 29 day chemotherapy cycle      dicyclomine (BENTYL) 10 MG capsule Take 10 mg by mouth as needed      levoFLOXacin (LEVAQUIN) 250 MG tablet Take 250 mg by mouth daily For prophylaxis during chemotherapy      calcium citrate-vitamin D (CITRICAL + D) 315-250 MG-UNIT TABS per tablet Take 1 tablet by mouth 2 times daily (with meals)      sodium bicarbonate 325 MG tablet Take 650 mg by mouth daily      denosumab (XGEVA) 120 MG/1.7ML SOLN SC injection Inject 120 mg into the skin every 30 days      pantoprazole (PROTONIX) 40 MG tablet Take 1 tablet by mouth daily 30 tablet 0    prochlorperazine (COMPAZINE) 10 MG tablet Take 1 tablet by mouth every 4 hours as needed (nausea) 60 tablet 3    B Complex-C-Folic Acid (DIALYVITE 747 PO) Take 1 tablet by mouth daily      allopurinol (ZYLOPRIM) 100 MG tablet Take 200 mg by mouth daily       acyclovir (ZOVIRAX) 400 MG tablet Take 400 mg by mouth 2 times daily           Current Facility-Administered Medications   Medication Dose Route Frequency Provider Last Rate Last Admin    influenza quadrivalent split vaccine (FLUZONE;FLUARIX;FLULAVAL;AFLURIA) injection 0.5 mL  0.5 mL IntraMUSCular Prior to discharge Minta Bloch, MD        labetalol (NORMODYNE;TRANDATE) injection 10 mg  10 mg IntraVENous Q6H PRN Vicki Mukherjee MD        hydrALAZINE (APRESOLINE) injection 5 mg  5 mg IntraVENous Q6H PRN Vicki Mukherjee MD        0.9 % sodium chloride infusion   IntraVENous PRN Vicki Mukhrejee MD        0.9 % sodium chloride infusion   IntraVENous PRN Vicki Mukherjee MD        sodium chloride flush 0.9 % injection 5-40 mL  5-40 mL IntraVENous 2 times per day Vicki Mukherjee MD        sodium chloride flush 0.9 % injection 5-40 mL  5-40 mL IntraVENous PRN Vicki Mukherjee MD        0.9 % sodium chloride infusion  25 mL IntraVENous PRN Vicki Mukherjee MD        LORazepam (ATIVAN) injection 1 mg  1 mg IntraVENous Q5 Min PRN Vicki Mukherjee MD        ondansetron (ZOFRAN-ODT) disintegrating tablet 4 mg  4 mg Oral Q8H PRN Vicki Mukherjee MD        Or    [facility]dansetron [facility]) injection 4 mg  4 mg IntraVENous Q6H PRN Vicki Mukherjee MD        polyethylene glycol [facility]) packet 17 g  17 g Oral Daily PRN Vicki Mukherjee MD        acetaminophen (TYLENOL) tablet 650 mg  650 mg Oral Q6H PRN Vicki Mukherjee MD   650 mg at 01/19/22 0039    Or    acetaminophen (TYLENOL) suppository 650 mg  650 mg Rectal Q6H PRN Vicki Mukherjee MD        famotidine (PEPCID) tablet 20 mg  20 mg Oral BID Vicki Mukherjee MD   20 mg at 01/19/22 0155    levETIRAcetam (KEPPRA) tablet 500 mg  500 mg Oral Q12H Vicki Mukherjee MD   500 mg at 01/19/22 0154    allopurinol (ZYLOPRIM) tablet 200 mg  200 mg Oral Daily Vicki Mukherjee MD            Objective:  /84   Pulse 78   Temp 98.3 °F (36.8 °C) (Oral)   Resp 17   Ht 6' 4\" (1.93 m)   Wt 248 lb 6.4 oz (112.7 kg)   SpO2 100%   BMI 30.24 kg/m²     Physical Exam:   Patient seen and examined  GCS:  4 - Opens eyes on own  5 - Alert and oriented  6 - Follows simple motor commands  General: Well developed. Alert and cooperative in no acute distress. HENT: atraumatic, neck supple  Eyes: Optic discs: Not tested  Pulmonary: unlabored respiratory effort  Cardiovascular:  Warm well perfused. No peripheral edema  Gastrointestinal: abdomen soft, NT, ND    Neurological:  Mental Status: Awake, alert, oriented x 4, speech clear and appropriate  Attention: Intact  Language: No aphasia or dysarthria noted  Sensation: Intact to all extremities to light touch  Coordination: Intact    Cranial Nerves:  II: Visual acuity not tested, denies new visual changes / diplopia  III, IV, VI: PERRL, 3 mm bilaterally, EOMI, no nystagmus noted  V: Facial sensation intact bilaterally to touch  VII: Face symmetric  VIII: Hearing intact bilaterally to spoken voice  IX: Palate movement equal bilaterally  XI: Shoulder shrug equal bilaterally  XII: Tongue midline    Musculoskeletal:   Gait: Not tested   Assist devices: None   Tone: Normal  Motor strength:    Right  Left    Right  Left    Deltoid  5 5  Hip Flex  5 5   Biceps  5 5  Knee Extensors  5 5   Triceps  5 5  Knee Flexors  5 5   Wrist Ext  5 5  Ankle Dorsiflex. 5 5   Wrist Flex  5 5  Ankle Plantarflex. 5 5   Handgrip  5 5  Ext Scar Longus  5 5   Thumb Ext  5 5         Radiological Findings:  CT Head WO Contrast  Result Date: 1/18/2022  Small, mixed density superior right frontoparietal subdural hematoma. CT HEAD WO CONTRAST  Result Date: 1/18/2022  Unchanged small right cerebral convexity subdural hematoma. No midline shift.     Labs:  Recent Labs     01/19/22  0553   WBC 2.1*   HGB 9.2*   HCT 27.1*   PLT 20*       Recent Labs     01/19/22  0553      K 5.1      CO2 21   BUN 30*   CREATININE 1.6*   GLUCOSE 95   CALCIUM 7.7*       Recent Labs     01/18/22  1613   PROTIME 12.1   INR 1.07       Patient Active Problem List    Diagnosis Date Noted    Subdural hematoma ([facility] 01/18/2022    PVC (premature ventricular contraction)     Nonsustained ventricular tachycardia (HCC)     Trigeminy     Autologous bone marrow transplantation status ([facility] 03/11/2020    Neutropenic fever ([facility] 02/24/2020    Multiple myeloma in remission ([facility] 02/17/2020       Assessment:  Patient is a 77 y.o. male w/Unchanged small right cerebral convexity subdural hematoma. Etiology of SDH unknown as patient was found unresponsive after possible seizure and has history of thrombocytopenia 2/2 chemotherapy for MM. Plan:  1. No emergent neurosurgical intervention indicated  2. Neurologic exams frequency:  - Floor: Q4H  3. For change in exam MUST contact neurosurgery team along with critical care or primary team  4. SDH:  - Follow up head CT stable. No further imaging unless there is a decline in neurologic  - Maintain SBP <160; If PRN med insufficient, then may start Nicardipine infusion  - Keep Plt >50k, understand this may be difficult in setting of chemotherapy for multiple myeloma  - Hold all full dose anticoagulation & antiplatelet for 2 weeks  - Follow up in 2 weeks with repeat CT Head  5. Cerebral edema prophylaxis:  - Keep Na within normal limits  - HOB >30 degrees  - NO dextrose in IVF's or in IV drips  - If central venous access is needed please use subclavian vs femoral - No IJ as this can decrease venous return and worsen cerebral edema  6. Seizure:  - Keppra 1000 mg loading dose, then 500 mg BID  - Neurology consulted, appreciate recs  7. Pain: Managed by medical team  8. PT/OT consulted, appreciate recs  9. Advance diet / activity per primary team  10. SCDs for DVT prophylaxis  11. Appreciate critical care team assistance in management  12. Thank you for consult. Will follow peripherally while  inpatient.   Please call with any questions or decline in neurological status    DISPO: Dispo timing to be determined by primary team once patient is medically stable for discharge. Patient was seen and examined with Dr. Ab Costello who agrees with above assessment and plan.      Electronically signed by: KIMBERLEY Montes - [address] MaxwellCNP, 1/19/2022 6:57 AM  [phone]

## 2022-01-19 NOTE — PROGRESS NOTES
Clinical Pharmacy Progress Note  Medication History     Admit Date: 1/18/22    List of of current medications patient is taking is complete. Home Medication list in EPIC updated to reflect changes noted below.     Source of information: Phone interview with wife Byron Kim: [phone])    Changes made to medication list:   Medications removed (no longer taking):  Darbepoetin evangelina-polysorbate (Aranesp)  Metoprolol tartrate    Medications added:   Dexamethasone  Bortezomib (Velcade)  Bendamustine (Treanda)  Dicyclomine  levofloxacin    Medication doses / instructions adjusted:   none    Other notes:   Patient reported not taking the levofloxacin in awhile - still active in chemo treatment plan  Last infusion date (Velcade, bendamustine): 1/3/22  Last denosumab dose 12/18/21    Complete Home Medication List:  Medication Sig    Dexamethasone 20 MG TABS Take 20 mg by mouth - to be given with Velcade on days 1, 4, 8, and 11 of chemotherapy    Bortezomib (VELCADE IV) Infuse intravenously To be given as an infusion on days 1, 4, 8, and 11 of 28 day chemotherapy cycle    bendamustine (TREANDA) 100 MG To be given as an infusion on days 1 and 4 of 29 day chemotherapy cycle    dicyclomine (BENTYL) 10 MG capsule Take 10 mg by mouth as needed    levoFLOXacin (LEVAQUIN) 250 MG tablet Take 250 mg by mouth daily For prophylaxis during chemotherapy    calcium citrate-vitamin D (CITRICAL + D) 315-250 MG-UNIT TABS per tablet Take 1 tablet by mouth 2 times daily (with meals)    sodium bicarbonate 325 MG tablet Take 650 mg by mouth daily    denosumab (XGEVA) 120 MG/1.7ML SOLN SC injection Inject 120 mg into the skin every 30 days    pantoprazole (PROTONIX) 40 MG tablet Take 1 tablet by mouth daily    prochlorperazine (COMPAZINE) 10 MG tablet Take 1 tablet by mouth every 4 hours as needed (nausea)    B Complex-C-Folic Acid (DIALYVITE 726 PO) Take 1 tablet by mouth daily    allopurinol (ZYLOPRIM) 100 MG tablet Take 200 mg by mouth daily acyclovir (ZOVIRAX) 400 MG tablet Take 400 mg by mouth 2 times daily      Please call with questions!     [address] [facility] [address]  1/18/2022 7:05 PM

## 2022-01-19 NOTE — FLOWSHEET NOTE
Called lab to come draw platelet count. This RN attempted x2 and another RN attempted x2. Patient has a LUE fistula unable to use LUE for lab draw.

## 2022-01-19 NOTE — PLAN OF CARE
Problem: Falls - Risk of:  Goal: Will remain free from falls  Description: Will remain free from falls  1/19/2022 1428 by Yazmin Medina RN  Outcome: Ongoing  1/19/2022 0719 by Herson Bledsoe RN  Outcome: Ongoing  Note: Fall risk protocol in place: Bed alarm on, fall risk bracelet applied, yellow indicator in hallway on, non-skid footwear in use. Patient/family educated on fall risk protocol, instructed to call for assistance when needed. Goal: Absence of physical injury  Description: Absence of physical injury  Outcome: Ongoing     Problem: Skin Integrity:  Goal: Absence of new skin breakdown  Description: Absence of new skin breakdown  1/19/2022 1428 by Yazmin Medina RN  Outcome: Ongoing  1/19/2022 0719 by Herson Bledsoe RN  Outcome: Ongoing  Note: Patient turns self adequately in bed, Sacral Heart Mepilex in place to protect, skin intact underneath.

## 2022-01-19 NOTE — PROGRESS NOTES
night  Safety Devices  Safety Devices in place: Yes  Type of devices: Nurse notified;Call light within reach; Left in bed;Bed alarm in place           Patient Diagnosis(es): The encounter diagnosis was Subdural hemorrhage ([facility] has a past medical history of Cancer ([facility] and ESRD (end stage renal disease) on dialysis ([facility] [address] has no past surgical history on file. Treatment Diagnosis: decreased ADLs and transfers secondary to SDH      Restrictions  Position Activity Restriction  Other position/activity restrictions: up with A    Subjective   General  Chart Reviewed: Yes  Additional Pertinent Hx: Pt admitted to ED after c/o confusion. Head CT: Small, mixed density superior right frontoparietal subdural hematoma. PMHx includes: Cancer ([facility] and ESRD (end stage renal disease) on dialysis ([facility] [address] Family / Caregiver Present: No  Referring Practitioner: Sergio Duran MD  Diagnosis: ESBL  Subjective  Subjective: Pt semi supine in bed upon arrival, agreeable to OT eval and treat. Social/Functional History  Social/Functional History  Lives With: Spouse  Type of Home: House  Home Layout: Two level,1/2 bath on main level  Home Access: Stairs to enter with rails  Entrance Stairs - Number of Steps: 2-3 TODD; flight to upstairs bedroom  Entrance Stairs - Rails: Both  Bathroom Shower/Tub: Walk-in shower  Bathroom Toilet: Standard  Bathroom Equipment: Grab bars around toilet,Grab bars in [employer]  Home Equipment: Cane,4 wheeled walker  ADL Assistance: [facility] [address]: Needs assistance (wife does most)  Ambulation Assistance: Independent  Transfer Assistance: Independent  Active : Yes (wife usually takes him places though & he rarely drives)  Occupation: Retired  Leisure & Hobbies: cars- Mustangs  Additional Comments: No falls PTA. Wife able to be with him majority of time.        Objective        Orientation  Overall Orientation Status: Within Functional Limits     Balance  Sitting Balance: Supervision  Standing Balance: Contact guard assistance (to SBA at times- noted scissoring gait during mobiltiy in hallway however requiring CGA to recover)  Standing Balance  Time: 10 mins total  Activity: mobility in hallway, in room  Comment: no AE  Functional Mobility  Functional - Mobility Device: No device  Activity: To/from bathroom; Other (+ in hallway)  Assist Level: Contact guard assistance (to SBA at times)  Toilet Transfers  Toilet - Technique: Ambulating  Equipment Used: Standard toilet  Toilet Transfer: Contact guard assistance  ADL  Feeding: Beverage management;Setup  Grooming: Contact guard assistance (standing at sink for hand hygiene)  LE Dressing: Contact guard assistance (donning pants)  Toileting: Stand by assistance  Quality of Movement Other  Comment: noted some scissoring gait during mobility in hallway. Otherwise UEs WFL     Bed mobility  Supine to Sit: Stand by assistance  Sit to Supine: Stand by assistance  Transfers  Sit to stand: Contact guard assistance  Stand to sit: Contact guard assistance     Cognition  Overall Cognitive Status: WFL  Cognition Comment: pt lethargic however appearing WFL cognition        Sensation  Overall Sensation Status: WNL        LUE AROM (degrees)  LUE AROM : WFL  Left Hand AROM (degrees)  Left Hand AROM: WFL  RUE AROM (degrees)  RUE AROM : WFL  Right Hand AROM (degrees)  Right Hand AROM: WFL             Treatment included functional transfer training, ADLs, and patient education.             Plan   Plan  Times per week: 5-7  Times per day: Daily  Current Treatment Recommendations: Strengthening,Balance Training,Functional Mobility Training,Endurance Training,Self-Care / ADL,Neuromuscular Re-education    AM-PAC Score        AM-PAC Inpatient Daily Activity Raw Score: 20 (01/19/22 1558)  AM-PAC Inpatient ADL T-Scale Score : 42.03 (01/19/22 1558)  ADL Inpatient CMS 0-100% Score: 38.32 (01/19/22 1558)  ADL Inpatient CMS G-Code Modifier : Niecy Jennings (01/19/22 1558)    Goals  Short term goals  Time Frame for Short term goals: by dc  Short term goal 1: Pt will complete LB dressing wtih mod I  Short term goal 2: Pt will complete standing level grooming with mod I  Short term goal 3: Pt will complete toileting at mod I  Patient Goals   Patient goals : to go home       Therapy Time   Individual Concurrent Group Co-treatment   Time In 1419         Time Out 1452         Minutes 33         Timed Code Treatment Minutes: 18 Minutes (+15 min eval)    Jennifer CARDOZA  [facility] OTR/L [id number]

## 2022-01-19 NOTE — CONSULTS
[address] Consult note     Attending Physician: Jeni Yost MD    Primary Care: No primary care provider on file. Referring MD: No referring provider defined for this encounter. Name: Emerald Nelson :  1955  MRN:  [id number]    Admission: 2022      Date: 2022    Reason for Admission: Seizure     Reason for consult: cytopenia     History of Present Illness:  Mr. Devon Kaplan is a 57-year-old male with hx of multiple myeloma s/p multiple lines of therapy and ASCT in 3/2020, previously on dialysis currently on treatment with velcade, bendamustine who presents with altered mental status. Pt was non responsive at home and his eyes were rolled back. This episode lasted for 5 minutes. He had urinated on himself and also bite his tongue. Upon evaluation in the emergency department a CT head noncontrast was performed which did demonstrate a new subdural hematoma in his right frontoparietal region measuring approximately 6 mm in diameter. The patient denies any recent head trauma and is not currently on any anticoagulation or antiplatelet agents. He has thrombocytopenia from his myeloma an current treatment. He denies any further seizures. His headache is better         History reviewed. No pertinent surgical history. Past Medical History:   Diagnosis Date    Cancer ([facility]     ESRD (end stage renal disease) on dialysis ([facility]     Mon-Wed-Fri       Prior to Admission medications    Medication Sig Start Date End Date Taking?  Authorizing Provider   sodium bicarbonate 650 MG tablet Take 650 mg by mouth daily   Yes Historical Provider, MD   dicyclomine (BENTYL) 20 MG tablet Take 20 mg by mouth 3 times daily as needed   Yes Historical Provider, MD   prochlorperazine (COMPAZINE) 10 MG tablet Take 10 mg by mouth every 6 hours as needed   Yes Historical Provider, MD   Dexamethasone 20 MG TABS Take 20 mg by mouth - to be given with Velcade on days 1, 4, 8, and 11 of chemotherapy   Yes Historical Provider, MD   Bortezomib (VELCADE IV) Infuse intravenously To be given as an infusion on days 1, 4, 8, and 11 of 28 day chemotherapy cycle   Yes Historical Provider, MD   bendamustine (TREANDA) 100 MG To be given as an infusion on days 1 and 4 of 29 day chemotherapy cycle   Yes Historical Provider, MD   calcium citrate-vitamin D (CITRICAL + D) 315-250 MG-UNIT TABS per tablet Take 1 tablet by mouth 2 times daily (with meals)   Yes Historical Provider, MD   denosumab (XGEVA) 120 MG/1.7ML SOLN SC injection Inject 120 mg into the skin every 30 days   Yes Historical Provider, MD   pantoprazole (PROTONIX) 40 MG tablet Take 1 tablet by mouth daily 2/19/20  Yes Alejandro Rose MD   allopurinol (ZYLOPRIM) 100 MG tablet Take 200 mg by mouth daily    Yes Historical Provider, MD   acyclovir (ZOVIRAX) 400 MG tablet Take 400 mg by mouth 2 times daily    Yes Historical Provider, MD   levoFLOXacin (LEVAQUIN) 250 MG tablet Take 250 mg by mouth daily For prophylaxis during chemotherapy    Historical Provider, MD       No Known Allergies    Family History   Problem Relation Age of Onset    Cancer Father         Social History     Socioeconomic History    Marital status: Life Partner     Spouse name: Delanna Osgood Number of children: Not on file    Years of education: Not on file    Highest education level: Not on file   Occupational History    Not on file   Tobacco Use    Smoking status: Never Smoker    Smokeless tobacco: Never Used   Substance and Sexual Activity    Alcohol use: Not Currently    Drug use: Never    Sexual activity: Not Currently     Partners: Female   Other Topics Concern    Not on file   Social History Narrative    Not on file     Social Determinants of Health     Financial Resource Strain:     Difficulty of Paying Living Expenses: Not on file   Food Insecurity:     Worried About Running Out of Food in the Last Year: Not on file    Joel of Food in the Last Year: Not on file Transportation Needs:     Lack of Transportation (Medical): Not on file    Lack of Transportation (Non-Medical): Not on file   Physical Activity:     Days of Exercise per Week: Not on file    Minutes of Exercise per Session: Not on file   Stress:     Feeling of Stress : Not on file   Social Connections:     Frequency of Communication with Friends and Family: Not on file    Frequency of Social Gatherings with Friends and Family: Not on file    Attends Latter day Services: Not on file    Active Member of [address] [place] [address] or Organizations: Not on file    Attends Club or Organization Meetings: Not on file    Marital Status: Not on file   Intimate Partner Violence:     Fear of Current or Ex-Partner: Not on file    Emotionally Abused: Not on file    Physically Abused: Not on file    Sexually Abused: Not on file   Housing Stability:     Unable to Pay for Housing in the Last Year: Not on file    Number of [place] in the Last Year: Not on file    Unstable Housing in the Last Year: Not on file        ROS:  As noted above, otherwise remainder of 10-point ROS negative    Physical Exam:     Vital Signs:  BP (!) 144/81   Pulse 80   Temp 98.4 °F (36.9 °C) (Oral)   Resp 17   Ht 6' 4\" (1.93 m)   Wt 248 lb 6.4 oz (112.7 kg)   SpO2 98%   BMI 30.24 kg/m²     Weight:    Wt Readings from Last 3 Encounters:   01/18/22 248 lb 6.4 oz (112.7 kg)   03/27/20 228 lb 6.3 oz (103.6 kg)   03/02/20 238 lb 1.6 oz (108 kg)         General: Awake, alert and oriented.  Slightly drowsy  HEENT: normocephalic, PERRL, no scleral erythema or icterus, Oral mucosa moist and intact, throat clear  NECK: supple without palpable adenopathy  BACK: Straight negative CVAT  SKIN: warm dry and intact without lesions rashes or masses  CHEST: CTA bilaterally without use of accessory muscles  CV: Normal S1 S2, RRR, no MRG  ABD: NT ND normoactive BS, no palpable masses or hepatosplenomegaly  EXTREMITIES: without edema, denies calf tenderness  NEURO: CN II - XII grossly intact      Laboratory Data:   CBC:   Recent Labs     01/18/22  1614 01/19/22  0213 01/19/22  0553   WBC 3.8*  --  2.1*   HGB 9.4*  --  9.2*   HCT 28.0*  --  27.1*   .0*  --  102.3*   PLT 22* 21* 20*     BMP/Mag:  Recent Labs     01/18/22  1614 01/19/22  0553    136   K 5.3* 5.1    105   CO2 19* 21   BUN 31* 30*   CREATININE 1.7* 1.6*     LIVP:   Recent Labs     01/18/22  1614   AST 18   ALT 20   BILIDIR <0.2   BILITOT 1.1*   ALKPHOS 80     Coags:   Recent Labs     01/18/22  1613   PROTIME 12.1   INR 1.07     Uric Acid No results for input(s): LABURIC in the last 72 hours. PROBLEM LIST:                   TREATMENT:            1. CyBorD (started 10/30/18)  2. Revlimid / Mechele Kelby / Dex on clinical trial (started 2/1/19)  3. Carfilzomib / Pomalidomide / Dex x 6 cycles (Relapse #1 - started 8/26/19 - 2/2020)  4. Cytoxan Mobilization w/ 25% dose reduction (2/17/20)  5. Melphalan 140mg/m2 & Autologous SCT 3/12/20  6. Kyprolis/Ebony/Dex  7. Velcade/Ebony/Dex  8.   HD - [facility] at [facility]  9 Velcade/Dex/bendamustine             ASSESSMENT AND PLAN:           Mr. Sara Shipley is a 51-year-old male with hx of multiple myeloma s/p multiple lines of therapy and ASCT in 3/2020, previously on dialysis currently on treatment with velcade, bendamustine who presents with altered mental status andnoted to have subdural hematoma    1) Subdural hematoma  - no trauma or falls per patient  - likely related to thrombocytopenia   - NSR and neurology following   - Recommend to keep plts >50K  - pt is not on anticoagulation  - Will check INR, PTT and fibrinogen to ensure no coagulopathy    2) Cytopenia   - Related to myeloma and chemotherapy  - ANC is >1500 and afebrile   - continue acyclovir   - if ANC <1000 recommend fluconazole and levaquin  - Transfuse to keep hgb >7 and plts >50K in the setting of subdural hematoma    3) Multiple myeloma  - s/p multiple lines of treatment and ASCT in 2020  - Currently on bendamustine/velcade and dex  - plan for CAR-T abecma   - Follow up with his oncologist at discharge     Thank you for this consult   Milena Landry DO

## 2022-01-19 NOTE — CONSULTS
[facility] Day: 2  ICU Day: 2                                                         Code:Full Code  Admit Date: 1/18/2022  PCP: No primary care provider on file. CC: SDH, Concern for seizure    HISTORY OF PRESENT ILLNESS:     Mr. Anastasia Bryan is a 61-year-old male with a past medical history of multiple myeloma status post autologous stem cell transplant in 3/2020 and currently undergoing evaluation for CAR-T therapy, previously ESRD no longer on hemodialysis, and GERD who presents for a transient episode of altered mental status which occurred this morning. Patient reportedly had a phone call earlier this morning and afterwards when his domestic partner was trying to speak to him she noted that he was not responding. After a few moments of this she went to evaluate the patient and she found that his eyes had rolled back in his head and he was currently unconscious. This period lasted reportedly about 5 minutes before he spontaneously regained consciousness however afterwards he was mildly disoriented. During this event it appears that he also lost control of his bladder and bit his tongue. The patient has no previous history of seizures or family history of seizures. Upon evaluation in the emergency department a CT head noncontrast was performed which did demonstrate a new subdural hematoma in his right frontoparietal region measuring approximately 6 mm in diameter. The patient denies any recent head trauma and is not currently on any anticoagulation or antiplatelet agents. He does have known thrombocytopenia in the setting of receiving chemotherapy for his multiple myeloma and currently follows at [address] [place] [address] for this condition. Given his new subdural hematoma and concern for seizure activity the patient will be admitted for reevaluation of his ongoing bleed as well as platelet transfusion and empiric treatment for seizure prophylaxis.   He will be admitted to the ICU for further monitoring for these conditions.     Interval Hx:  NAONE. Patient was seen and examined this am. AOx3 this am. Denies any weakness, numbness, blurred vision, HA, CP, SOB, palpitations, N/V, abdominal pain, c/d or urinary symptoms. PAST HISTORY:     Past Medical History:   Diagnosis Date    Cancer ([facility] [address] [address]     [facility]     Mon-Wed-Fri       History reviewed. No pertinent surgical history. SocialHistory:   The patient lives at home    Alcohol: socially  Illicit drugs: no use  Tobacco:  none    Family History:  Family History   Problem Relation Age of Onset    Cancer Father        MEDICATIONS:     No current facility-administered medications on file prior to encounter.      Current Outpatient Medications on File Prior to Encounter   Medication Sig Dispense Refill    Dexamethasone 20 MG TABS Take 20 mg by mouth - to be given with Velcade on days 1, 4, 8, and 11 of chemotherapy      Bortezomib (VELCADE IV) Infuse intravenously To be given as an infusion on days 1, 4, 8, and 11 of 28 day chemotherapy cycle      bendamustine (TREANDA) 100 MG To be given as an infusion on days 1 and 4 of 29 day chemotherapy cycle      dicyclomine (BENTYL) 10 MG capsule Take 10 mg by mouth as needed      levoFLOXacin (LEVAQUIN) 250 MG tablet Take 250 mg by mouth daily For prophylaxis during chemotherapy      calcium citrate-vitamin D (CITRICAL + D) 315-250 MG-UNIT TABS per tablet Take 1 tablet by mouth 2 times daily (with meals)      sodium bicarbonate 325 MG tablet Take 650 mg by mouth daily      denosumab (XGEVA) 120 MG/1.7ML SOLN SC injection Inject 120 mg into the skin every 30 days      pantoprazole (PROTONIX) 40 MG tablet Take 1 tablet by mouth daily 30 tablet 0    prochlorperazine (COMPAZINE) 10 MG tablet Take 1 tablet by mouth every 4 hours as needed (nausea) 60 tablet 3    B Complex-C-Folic Acid (DIALYVITE 037 PO) Take 1 tablet by mouth daily      allopurinol (ZYLOPRIM) 100 MG tablet Take 200 mg by mouth daily       acyclovir (ZOVIRAX) 400 MG tablet Take 400 mg by mouth 2 times daily            Scheduled Meds:   influenza virus vaccine  0.5 mL IntraMUSCular Prior to discharge    sodium chloride flush  5-40 mL IntraVENous 2 times per day    famotidine  20 mg Oral BID    levETIRAcetam  500 mg Oral Q12H    allopurinol  200 mg Oral Daily      Continuous Infusions:   sodium chloride      sodium chloride      sodium chloride       PRN Meds:labetalol, hydrALAZINE, sodium chloride, sodium chloride, sodium chloride flush, sodium chloride, LORazepam, ondansetron **OR** ondansetron, polyethylene glycol, acetaminophen **OR** acetaminophen    Allergies: No Known Allergies    REVIEW OF SYSTEMS:       History obtained from chart review    Review of Systems - As above    PHYSICAL EXAM:       Vitals: /84   Pulse 78   Temp 98.3 °F (36.8 °C) (Oral)   Resp 17   Ht 6' 4\" (1.93 m)   Wt 248 lb 6.4 oz (112.7 kg)   SpO2 100%   BMI 30.24 kg/m²     I/O:      Intake/Output Summary (Last 24 hours) at 1/19/2022 0638  Last data filed at 1/19/2022 0610  Gross per 24 hour   Intake 680 ml   Output 450 ml   Net 230 ml     I/O this shift:  In: 680 [Blood:680]  Out: 450 [Urine:450]  No intake/output data recorded. Physical Examination:     Physical Exam  Constitutional:       General: He is not in acute distress. Eyes:      General: No scleral icterus. Conjunctiva/sclera: Conjunctivae normal.      Pupils: Pupils are equal, round, and reactive to light. Cardiovascular:      Rate and Rhythm: Normal rate and regular rhythm. Heart sounds: No murmur heard. Pulmonary:      Effort: No respiratory distress. Breath sounds: No wheezing or rales. Abdominal:      General: There is no distension. Palpations: Abdomen is soft. Tenderness: There is no abdominal tenderness. There is no guarding. Musculoskeletal:      Right lower leg: No edema. Left lower leg: No edema. Skin:     Capillary Refill: Capillary refill takes less than 2 seconds. Coloration: Skin is not pale. Findings: No rash. Neurological:      General: No focal deficit present. Mental Status: He is alert. Cranial Nerves: No cranial nerve deficit. Sensory: No sensory deficit. Motor: No weakness. Access:     -Peripheral Access Day#:2      DATA:       Labs:  CBC:   Recent Labs     01/18/22  1614 01/19/22  0213 01/19/22  0553   WBC 3.8*  --  2.1*   HGB 9.4*  --  9.2*   HCT 28.0*  --  27.1*   PLT 22* 21* 20*       BMP:   Recent Labs     01/18/22  1614 01/19/22  0553    136   K 5.3* 5.1    105   CO2 19* 21   BUN 31* 30*   CREATININE 1.7* 1.6*   GLUCOSE 93 95     LFT's:   Recent Labs     01/18/22  1614   AST 18   ALT 20   BILITOT 1.1*   ALKPHOS 80     Troponin: No results for input(s): TROPONINI in the last 72 hours. BNP:No results for input(s): BNP in the last 72 hours. ABGs: No results for input(s): PHART, XCB1LPF, PO2ART in the last 72 hours. INR:   Recent Labs     01/18/22  1613   INR 1.07       U/A:  Recent Labs     01/18/22  1929   COLORU Yellow   PHUR 7.0   WBCUA 3-5   RBCUA 0-2   CLARITYU Clear   SPECGRAV 1.020   LEUKOCYTESUR Negative   UROBILINOGEN 0.2   BILIRUBINUR Negative   BLOODU TRACE-LYSED*   GLUCOSEU Negative       CT HEAD WO CONTRAST   Final Result      1. Unchanged small right cerebral convexity subdural hematoma. No midline shift. XR CHEST PORTABLE   Final Result      Clear lungs. Normal cardiomediastinal silhouette. CT Head WO Contrast   Final Result      Small, mixed density superior right frontoparietal subdural hematoma. ASSESSMENT AND PLAN:   Emerald Nelson is a 77 y.o. male, here for SDH    Subdural Hematoma  CT head noncontrast imaging demonstrates a 6 mm hyperdense right frontoparietal subdural hematoma.   Patient denies any head trauma, and has no traumatic findings on physical exam.  He is not currently on any anticoagulation or antiplatelet agents. He does have a history of known thrombocytopenia secondary to his ongoing chemotherapy treatments for his multiple myeloma. - Repeat CTH with unchanged small R SDH with no midline shift.  - MRI/A head  -Transfusing Plts as needed  - SBP <164, PRN labetalol & Hydralazine ordered, consider cardene ggt if not improving  - Keppra BID  - Seizure and fall precautions  - PT/OT  - SCDs, no antiplatelets or anti-coags for 2 weeks  - Q4H neurochecks      Pancytopenia in the setting of Active Chemotherapy for Multiple Myeloma s/p Autologous Stem Cell Tx (3/2020)  He is now status post stem cell autologous transplant in 2020. Patient follows with oncology at [address] [place] [address], Dr. Carol Wright. Currently receiving Velcade, and Treanda. -Transfusing plts; Goal >= 50k, Following plts q4h  - Allopurinol 200mg QD   - Heme/onc c/s for ppx advice, pt reports not currently taking his levaquin     Concern for Seizures   Patient has no history of seizures or family history of seizures. Had 1 episode of seizure-like activity this morning with a postictal period of about 10 minutes and a period of urinary incontinence. Patient also states that he bit his tongue during this episode. Likely from SDH.   - Keppra loaded for ppx, 500mg BID  - Neurology c/s  - Seizure & Fall precautions  - Neurochecks    Chronic Issues:  ESRD on HD - Has not needed HD for almost 2 years, makes urine  GERD - Pepcid      Code Status:Full Code  FEN: ADULT DIET; Regular  PPX:  SCDs  DISPO: ICU    This patient has been staffed and discussed with Dr. Brett Lemus MD.   -----------------------------  Nicol Mathew MD, PGY-1  1/19/2022  6:38 AM       Patient was seen, examined and discussed with Dr. Stephane Reaves. I agree with the history of present illness, past medical/surgical histories, family history, social history, medication list and allergies as listed.  The review of systems is as noted above. My physical exam confirms the findings listed   Chart was reviewed including labs, CXR, CT scan, EKG and medical records confirm the findings noted      Small SDH - repeat CT is stable   Pancytopenia: hx of multiple myeloma s/p SCT.   Platelets count 65I even after platelets transfusion  New onset seizure - currently on [address]     Hematology consult pending   OK to transfer to the floor

## 2022-01-19 NOTE — H&P
ICU HISTORY AND PHYSICAL     Hospital Day: 1  ICU Day: 1               Code: FULL  Admit Date: 1/18/2022  PCP: No primary care provider on file. CC: SDH, Concern for seizure    HISTORY OF PRESENT ILLNESS:   Mr. Amelia Ruth is a 78-year-old male with a past medical history of multiple myeloma status post autologous stem cell transplant in 3/2020 and currently undergoing evaluation for CAR-T therapy, previously ESRD no longer on hemodialysis, and GERD who presents for a transient episode of altered mental status which occurred this morning. Patient reportedly had a phone call earlier this morning and afterwards when his domestic partner was trying to speak to him she noted that he was not responding. After a few moments of this she went to evaluate the patient and she found that his eyes had rolled back in his head and he was currently unconscious. This period lasted reportedly about 5 minutes before he spontaneously regained consciousness however afterwards he was mildly disoriented. During this event it appears that he also lost control of his bladder and bit his tongue. The patient has no previous history of seizures or family history of seizures. Upon evaluation in the emergency department a CT head noncontrast was performed which did demonstrate a new subdural hematoma in his right frontoparietal region measuring approximately 6 mm in diameter. The patient denies any recent head trauma and is not currently on any anticoagulation or antiplatelet agents. He does have known thrombocytopenia in the setting of receiving chemotherapy for his multiple myeloma and currently follows at [facility] for this condition. Given his new subdural hematoma and concern for seizure activity the patient will be admitted for reevaluation of his ongoing bleed as well as platelet transfusion and empiric treatment for seizure prophylaxis.   He will be admitted to the ICU for further monitoring for these conditions. PAST HISTORY:     Past Medical History:   Diagnosis Date    Cancer ([facility] [address]     ESRD (end stage renal disease) on dialysis ([facility] [address]     Mon-Wed-Fri     History reviewed. No pertinent surgical history. SocialHistory:   The patient lives at Home w/ Domestic Partner     Alcohol: Socially on limited occasions  Illicit drugs: Remote Hx of MJ  Tobacco: No tobacco use reported     Family History:  Family History   Problem Relation Age of Onset    Cancer Father        MEDICATIONS:     No current facility-administered medications on file prior to encounter.      Current Outpatient Medications on File Prior to Encounter   Medication Sig Dispense Refill    Dexamethasone 20 MG TABS Take 20 mg by mouth - to be given with Velcade on days 1, 4, 8, and 11 of chemotherapy      Bortezomib (VELCADE IV) Infuse intravenously To be given as an infusion on days 1, 4, 8, and 11 of 28 day chemotherapy cycle      bendamustine (TREANDA) 100 MG To be given as an infusion on days 1 and 4 of 29 day chemotherapy cycle      dicyclomine (BENTYL) 10 MG capsule Take 10 mg by mouth as needed      levoFLOXacin (LEVAQUIN) 250 MG tablet Take 250 mg by mouth daily For prophylaxis during chemotherapy      calcium citrate-vitamin D (CITRICAL + D) 315-250 MG-UNIT TABS per tablet Take 1 tablet by mouth 2 times daily (with meals)      sodium bicarbonate 325 MG tablet Take 650 mg by mouth daily      denosumab (XGEVA) 120 MG/1.7ML SOLN SC injection Inject 120 mg into the skin every 30 days      pantoprazole (PROTONIX) 40 MG tablet Take 1 tablet by mouth daily 30 tablet 0    prochlorperazine (COMPAZINE) 10 MG tablet Take 1 tablet by mouth every 4 hours as needed (nausea) 60 tablet 3    B Complex-C-Folic Acid (DIALYVITE 440 PO) Take 1 tablet by mouth daily      allopurinol (ZYLOPRIM) 100 MG tablet Take 200 mg by mouth daily       acyclovir (ZOVIRAX) 400 MG tablet Take 400 mg by mouth 2 times daily          Scheduled Meds:   levetiracetam  1,000 mg IntraVENous Once      Continuous Infusions:   sodium chloride       PRN Meds:sodium chloride    Allergies: No Known Allergies    REVIEW OF SYSTEMS:       History obtained from the patient    Review of Systems   Constitutional: Negative for chills, fatigue and fever. HENT: Negative for congestion. Eyes: Negative for visual disturbance. Respiratory: Negative for cough and shortness of breath. Cardiovascular: Negative for chest pain, palpitations and leg swelling. Gastrointestinal: Positive for nausea and vomiting. Negative for abdominal pain, constipation and diarrhea. Endocrine: Negative for polyuria. Genitourinary: Negative for decreased urine volume, difficulty urinating, dysuria, frequency, hematuria and urgency. Neurological: Positive for tremors and syncope. Negative for speech difficulty, weakness, light-headedness and headaches. Hematological: Bruises/bleeds easily. PHYSICAL EXAM:       Vitals: BP (!) 143/100   Pulse 101   Temp 98.3 °F (36.8 °C) (Oral)   Resp 17   Ht 6' 4\" (1.93 m)   Wt 248 lb 6.4 oz (112.7 kg)   SpO2 99%   BMI 30.24 kg/m²     I/O:  No intake or output data in the 24 hours ending 01/18/22 1945  No intake/output data recorded. No intake/output data recorded. Physical Examination:   Physical Exam  Constitutional:       General: He is not in acute distress. Appearance: Normal appearance. He is obese. He is not ill-appearing or diaphoretic. HENT:      Head: Normocephalic and atraumatic. Right Ear: External ear normal.      Left Ear: External ear normal.      Nose: Nose normal.      Mouth/Throat:      Mouth: Mucous membranes are moist.      Pharynx: Oropharynx is clear. No oropharyngeal exudate. Eyes:      General: No scleral icterus. Extraocular Movements: Extraocular movements intact. Conjunctiva/sclera: Conjunctivae normal.      Pupils: Pupils are equal, round, and reactive to light.    Cardiovascular:      Rate and Rhythm: Normal rate and regular rhythm. Heart sounds: Normal heart sounds. No murmur heard. No friction rub. No gallop. Pulmonary:      Effort: Pulmonary effort is normal. No respiratory distress. Breath sounds: Normal breath sounds. No wheezing, rhonchi or rales. Abdominal:      General: Abdomen is flat. There is no distension. Palpations: Abdomen is soft. Tenderness: There is no abdominal tenderness. There is no guarding or rebound. Musculoskeletal:      Right lower leg: No edema. Left lower leg: No edema. Skin:     General: Skin is warm and dry. Capillary Refill: Capillary refill takes less than 2 seconds. Neurological:      General: No focal deficit present. Mental Status: He is alert and oriented to person, place, and time. Cranial Nerves: No cranial nerve deficit. Motor: No weakness. Comments: Patient has occasional jerking tic that he claims has been occurring for many years. Psychiatric:         Mood and Affect: Mood normal.         Behavior: Behavior normal.         DATA:       Labs:  CBC:   Recent Labs     01/18/22  1614   WBC 3.8*   HGB 9.4*   HCT 28.0*   PLT 22*       BMP:   Recent Labs     01/18/22  1614      K 5.3*      CO2 19*   BUN 31*   CREATININE 1.7*   GLUCOSE 93     LFT's:   Recent Labs     01/18/22  1614   AST 18   ALT 20   BILITOT 1.1*   ALKPHOS 80     Troponin: No results for input(s): TROPONINI in the last 72 hours. BNP:No results for input(s): BNP in the last 72 hours. ABGs: No results for input(s): PHART, NFA3RDA, PO2ART in the last 72 hours. INR:   Recent Labs     01/18/22  1613   INR 1.07       U/A:  Recent Labs     01/18/22  1929   COLORU Yellow   PHUR 7.0   WBCUA 3-5   RBCUA 0-2   CLARITYU Clear   SPECGRAV 1.020   LEUKOCYTESUR Negative   UROBILINOGEN 0.2   BILIRUBINUR Negative   BLOODU TRACE-LYSED*   GLUCOSEU Negative       XR CHEST PORTABLE   Final Result      Clear lungs.       Normal cardiomediastinal silhouette. CT Head WO Contrast   Final Result      Small, mixed density superior right frontoparietal subdural hematoma. CT HEAD WO CONTRAST    (Results Pending)       ASSESSMENT AND PLAN:   Luis Enrique Goins is a 77 y.o. male     Subdural Hematoma c/b Thrombocytopenia   CT head noncontrast imaging demonstrates a 6 mm hyperdense right frontoparietal subdural hematoma. Patient denies any head trauma, and has no traumatic findings on physical exam.  He is not currently on any anticoagulation or antiplatelet agents. He does have a history of known thrombocytopenia secondary to his ongoing chemotherapy treatments for his multiple myeloma.  -Transfusing Plts, Goal >= 50k, Following plts Q4H  -SBP <160, PRN labetalol & Hydralazine ordered   -Repeat CTH ordered   -NSGY c/s  -Q1H neurochecks     Pancytopenia in the setting of Active Chemotherapy   Multiple Myeloma s/p Autologous Stem Cell Tx (3/2020)  He is now status post stem cell autologous transplant in 2020. Patient follows with oncology at [address] [place] [address], Dr. Alex Silva. Currently receiving Velcade, and Treanda. -Transfusing plts per above  -Allopurinol 200mg QD   -Heme/onc c/s for ppx advice, pt reports not currently taking his levaquin. Concern for Seizures   Patient has no history of seizures or family history of seizures. Had 1 episode of seizure-like activity this morning with a postictal period of about 10 minutes and a period of incontinence of his bladder. Patient also states that he bit his tongue during this episode.   -Keppra loaded for ppx, 500mg BID  -Neurology c/s  -Seizure & Fall precautions     Chronic Issues:  ESRD on HD- Has not needed HD for almost 2 years, makes urine  GERD- Pepcid    Code Status: FULL  FEN: Regular   PPX: SCDs, Pepcid  DISPO: ICU    This patient has been staffed and discussed w/ Justa Dyer MD.  -----------------------------  Wilber Escobar MD, PGY-1  1/18/2022  7:45 PM

## 2022-01-20 NOTE — PROGRESS NOTES
Occupational Therapy  Refusal/DISCHARGE  OT attempted to see pt this afternoon. OT talked w/ RN prior to treatment and RN stated pt was independent. OT followed up w/ pt to confirm pt did not have any concerns. Pt stated he's been getting up w/ no problem and he just worked w/ PT earlier. Pt stated he's been able to take care of his personal care needs and he reported that he feels back to his normal self. OT explained purpose of OT treatment however pt declined and stated he really just wanted to rest and he's not worried about anything. Pt declined additional OT services at this time.  Pt will be discharged from OT per pt request.     Esther Wu, OTR/L

## 2022-01-20 NOTE — PROGRESS NOTES
Initial Chief Complaint/Reason for Consult: seizure in the setting of new SDH    Interval History:  Patient napping on my arrival but is easily awakened. Alert, oriented, and following all commands. Plan for MRI/MRA head today. History of Present Illness:  Jh Arellano is a 77 y.o. male with multiple myeloma who presents with new onset seizure.     He needed a platelet transfusion yesterday. Following his transfusion he reports he developed an insidious, frontal headache which was severe. The quality was typical for his normal headaches, so he didn't think much of it. He had to cancel an appointment he had with his oncologist, and was at home when he abruptly lost consciousness. He does not remember this. He went limp for about 10 minutes, with sonorous respirations. He bit his tongue and lost control of his bladder. He has never had a seizure before, no family history of seizure.      He feels back to normal today with the exception of severe tongue hematoma, as well as generalized weakness. Of note, he tells me he is about to undergo CAR-T in about a month with Dr. Krishna Mancia. His headache is about a 3/10 on the pain scale today. He denies any focal neurologic deficits including lateralized weakness, sensory changes, vision changes, or difficulty speaking. Review of Systems:   Constitutional- No weight loss or fevers   Eyes- No diplopia. No photophobia. Ears/nose/throat- No dysphagia. No Dysarthria   Cardiovascular- No palpitations. No chest pain   Respiratory- No dyspnea. No Cough   Gastrointestinal- No Abdominal pain. No Vomiting. Genitourinary- No incontinence. No urinary retention   Musculoskeletal- No myalgia. No arthralgia   Skin- No rash. No easy bruising. Psychiatric- No depression. No anxiety   Endocrine- No diabetes. No thyroid issues. Hematologic- No bleeding difficulty. No fatigue   Neurologic- No weakness. No Headache.     Current Medications:    Current Facility-Administered Medications: Cardiovascular: pulses symmetric in all 4 extremities. No peripheral edema. Psychiatric: cooperative with examination, no  psychotic behavior noted. Neurologic  Mental status:   orientation to person and place, month, and year              General fund of knowledge grossly intact              Memory grossly intact              Attention intact as able to attend well to the exam; able to read clock              Language fluent in conversation              Comprehension intact; follows simple commands and 2 step commands crossing midline    Cranial nerves:   CN2: Visual Fields grossly intact  CN 3,4,6: extraocular muscles intact, pupils equal round and reactive  CN5: facial sensation symmetric   CN7: face symmetric without dysarthria  CN8: hearing grossly intact  CN9: palate elevated symmetrically  CN11: trap full strength on shoulder shrug  CN12: tongue midline with protrusion; VERY ecchymotic and edematous    Strength: No prontator drift. Good strength (5/5) in all 4 extremities through a full ROM    Sensory: light touch intact in all 4 extremities. No sensory extinction on double simultaneous stimulation  Cerebellar/coordination: finger nose finger normal without ataxia  Tone: normal in all 4 extremities  Gait: deferred for safety    Images:  CT Head WO Contrast:  FINDINGS:       A small predominantly hyperdense right frontoparietal convexity subdural hematoma measuring up to 5 mm in thickness appears unchanged. The ventricles are nondilated. The basilar cisterns are patent. There is no midline shift. The gray-white matter    differentiation is normal. Mild periventricular white matter hypoattenuation is indicative of chronic small vessel ischemic disease. Visualized portions of the orbits, paranasal sinuses, and mastoids appear normal. No acute fracture is identified.      MRI Brain WO contrast, MRA Head WO contrast:  INTRACRANIAL HEMORRHAGE: There is a thin subdural hematoma along the right cerebral convexity measuring 3 to 4 mm in thickness with slight extension along the right falx. No significant mass effect. Impression       1. Small subdural hematoma along the right cerebral convexity with slight extension along the falx. 2. Mild burden T2 hyperintense white matter disease which are nonspecific, may be attributed to chronic microvascular ischemia.               EXAM: MRI BRAIN WO CONTRAST, MRA HEAD WO CONTRAST       INDICATION: Seizure, subdural;           FINDINGS:       ANTERIOR CIRCULATION: Bilateral intracranial internal carotid arteries are patent. The proximal bilateral anterior and middle cerebral arteries are patent. No aneurysm or AVM.     POSTERIOR CIRCULATION: Bilateral vertebral arteries and basilar artery and proximal branch vessels are patent. Bilateral proximal posterior cerebral arteries are patent. No aneurysm or AVM.     IMPRESSION:       No evidence of intracranial aneurysm or significant stenosis. Pertinent Labs:   Platelets 829 - 174 K/uL 34 Low         Assessment:  Dede Copeland is a 77 y.o. male with multiple myeloma who presented with likely new onset seizure in the setting of thrombocytopenia. He remains thrombocytopenic with platelet count of 64,675 today which puts him at risk for hematoma expansion. 6h head CT stable. MRI Brain stable. Plan:  - Continue Keppra 500 mg BID indefinitely  - INR goal < 1.4. Reverse any coagulopathy. Platelet goal > 61,713  - MRI/MRA head reviewed and discussed with Neurosurgery  - Monitor quality of speech given LARGE tongue hematoma   - SCDs for DVT prophylaxis given coagulopathy  - Electrolyte goals: glucose < 180 mg/dL. Sodium 135-145 mmol/L.  Magnesium > 1.8 mg/dL  - No antiplatelets or anticoagulation x 2 weeks  - Call Neurology with any exam changes, especially worsening headache, vomiting, and/or declining mental status      KIMBERLEY Purcell-CNP  Neurology & Neurocritical Care   Neurology Line: [phone]  [employer]: [facility] NPs  1/20/22

## 2022-01-20 NOTE — PROGRESS NOTES
Hospitalist Progress Note      PCP: No primary care provider on file. Date of Admission: 1/18/2022    Chief Complaint: [address] Course: 27-year-old male with a past medical history of multiple myeloma status post autologous stem cell transplant in 3/2020 and currently undergoing evaluation for CAR-T therapy, previously ESRD no longer on hemodialysis, and GERD who presents for a transient episode of altered mental status    Subjective: No acute events reported overnight. Pt feels much better today besides a little headache. Denies any weakness, numbness tingling etc      Medications:  Reviewed    Infusion Medications    sodium chloride      sodium chloride       Scheduled Medications    influenza virus vaccine  0.5 mL IntraMUSCular Prior to discharge    sodium chloride flush  5-40 mL IntraVENous 2 times per day    famotidine  20 mg Oral BID    levETIRAcetam  500 mg Oral Q12H    allopurinol  200 mg Oral Daily     PRN Meds: labetalol, hydrALAZINE, sodium chloride, sodium chloride flush, sodium chloride, LORazepam, ondansetron **OR** ondansetron, polyethylene glycol, acetaminophen **OR** acetaminophen      Intake/Output Summary (Last 24 hours) at 1/19/2022 2041  Last data filed at 1/19/2022 1755  Gross per 24 hour   Intake 1030 ml   Output 450 ml   Net 580 ml       Physical Exam Performed:    BP (!) 146/90   Pulse 84   Temp 98.6 °F (37 °C) (Oral)   Resp 19   Ht 6' 4\" (1.93 m)   Wt 248 lb 6.4 oz (112.7 kg)   SpO2 97%   BMI 30.24 kg/m²     General appearance: No apparent distress, appears stated age and cooperative. HEENT: Pupils equal, round, and reactive to light. Conjunctivae/corneas clear. Neck: Supple, with full range of motion. No jugular venous distention. Trachea midline. Respiratory:  Normal respiratory effort. Clear to auscultation, bilaterally without Rales/Wheezes/Rhonchi. Cardiovascular: Regular rate and rhythm with normal S1/S2 without murmurs, rubs or gallops.   Abdomen: Soft, non-tender, non-distended with normal bowel sounds. Musculoskeletal: No clubbing, cyanosis or edema bilaterally. Full range of motion without deformity. Skin: Skin color, texture, turgor normal.  No rashes or lesions. Neurologic:  Neurovascularly intact without any focal sensory/motor deficits. Cranial nerves: II-XII intact, grossly non-focal.  Psychiatric: Alert and oriented, thought content appropriate, normal insight  Capillary Refill: Brisk,3 seconds, normal   Peripheral Pulses: +2 palpable, equal bilaterally       Labs:   Recent Labs     01/18/22  1614 01/18/22  1614 01/19/22  0213 01/19/22  0553 01/19/22  1552   WBC 3.8*  --   --  2.1*  --    HGB 9.4*  --   --  9.2*  --    HCT 28.0*  --   --  27.1*  --    PLT 22*   < > 21* 20* 21*    < > = values in this interval not displayed. Recent Labs     01/18/22  1614 01/19/22  0553    136   K 5.3* 5.1    105   CO2 19* 21   BUN 31* 30*   CREATININE 1.7* 1.6*   CALCIUM 7.5* 7.7*     Recent Labs     01/18/22  1614   AST 18   ALT 20   BILIDIR <0.2   BILITOT 1.1*   ALKPHOS 80     Recent Labs     01/18/22  1613 01/19/22  1552   INR 1.07 0.94     No results for input(s): Albaro Duglas in the last 72 hours. Urinalysis:      Lab Results   Component Value Date    NITRU Negative 01/18/2022    WBCUA 3-5 01/18/2022    BACTERIA Rare 03/22/2020    RBCUA 0-2 01/18/2022    BLOODU TRACE-LYSED 01/18/2022    SPECGRAV 1.020 01/18/2022    GLUCOSEU Negative 01/18/2022       Radiology:  CT HEAD WO CONTRAST   Final Result      1. Unchanged small right cerebral convexity subdural hematoma. No midline shift. XR CHEST PORTABLE   Final Result      Clear lungs. Normal cardiomediastinal silhouette. CT Head WO Contrast   Final Result      Small, mixed density superior right frontoparietal subdural hematoma.                           Assessment/Plan:    Active Hospital Problems    Diagnosis     Subdural hematoma ([address] [S06.5X9A]      Subdural Hematoma  -CT head noncontrast imaging demonstrates a 6 mm hyperdense right frontoparietal subdural hematoma. .  - Repeat CTH with unchanged small R SDH with no midline shift.  - MRI/A head  -Transfusing Plts as needed  - SBP <164, PRN labetalol & Hydralazine ordered, consider cardene ggt if not improving  - Keppra BID  - Seizure and fall precautions  - PT/OT  - SCDs, no antiplatelets or anti-coags for 2 weeks  - Q4H neurochecks      Pancytopenia in the setting of Active Chemotherapy for Multiple Myeloma s/p Autologous Stem Cell Tx (3/2020)  -status post stem cell autologous transplant in 2020.  Patient follows with oncology at [address] [place] [address], Dr. Antoinette De Luna. Currently receiving Velcade, and Treanda.   - continue to monitor platelet count  - Allopurinol 200mg QD   - Oncology consulted     Concern for Seizures   - continue keppra  - Neurology consult  - Seizure & Fall precautions  - Neurochecks       GERD   - Pepcid    DVT Prophylaxis: SCD  Diet: ADULT DIET;  Regular  Code Status: Full Code    PT/OT Eval Status: pending    Dispo -pending clinical course    Areta Fleischer, MD DISPLAY PLAN FREE TEXT

## 2022-01-20 NOTE — PROGRESS NOTES
Hospitalist Progress Note      PCP: No primary care provider on file. Date of Admission: 1/18/2022    Chief Complaint: [address] Course: 68-year-old male with a past medical history of multiple myeloma status post autologous stem cell transplant in 3/2020 and currently undergoing evaluation for CAR-T therapy, previously ESRD no longer on hemodialysis, and GERD who presents for a transient episode of altered mental status    Subjective: No acute events reported overnight. Patient denies any new complaint this morning. Feels well. Medications:  Reviewed    Infusion Medications    sodium chloride      sodium chloride      sodium chloride       Scheduled Medications    influenza virus vaccine  0.5 mL IntraMUSCular Prior to discharge    sodium chloride flush  5-40 mL IntraVENous 2 times per day    famotidine  20 mg Oral BID    levETIRAcetam  500 mg Oral Q12H    allopurinol  200 mg Oral Daily     PRN Meds: sodium chloride, labetalol, hydrALAZINE, sodium chloride, sodium chloride flush, sodium chloride, LORazepam, ondansetron **OR** ondansetron, polyethylene glycol, acetaminophen **OR** acetaminophen      Intake/Output Summary (Last 24 hours) at 1/20/2022 1238  Last data filed at 1/20/2022 1011  Gross per 24 hour   Intake 413.75 ml   Output 0 ml   Net 413.75 ml       Physical Exam Performed:    /76   Pulse 87   Temp 98.4 °F (36.9 °C) (Oral)   Resp 16   Ht 6' 4\" (1.93 m)   Wt 248 lb 6.4 oz (112.7 kg)   SpO2 96%   BMI 30.24 kg/m²     General appearance: No apparent distress, appears stated age and cooperative. HEENT: Pupils equal, round, and reactive to light. Conjunctivae/corneas clear. Neck: Supple, with full range of motion. No jugular venous distention. Trachea midline. Respiratory:  Normal respiratory effort. Clear to auscultation, bilaterally without Rales/Wheezes/Rhonchi. Cardiovascular: Regular rate and rhythm with normal S1/S2 without murmurs, rubs or gallops.   Abdomen: Soft, non-tender, non-distended with normal bowel sounds. Musculoskeletal: No clubbing, cyanosis or edema bilaterally. Full range of motion without deformity. Skin: Skin color, texture, turgor normal.  No rashes or lesions. Neurologic:  Neurovascularly intact without any focal sensory/motor deficits. Cranial nerves: II-XII intact, grossly non-focal.  Psychiatric: Alert and oriented, thought content appropriate, normal insight  Capillary Refill: Brisk,3 seconds, normal   Peripheral Pulses: +2 palpable, equal bilaterally       Labs:   Recent Labs     01/18/22  1614 01/19/22  0213 01/19/22  0553 01/19/22  1552 01/20/22  0104 01/20/22  0618 01/20/22  1047   WBC 3.8*  --  2.1*  --   --  1.8*  --    HGB 9.4*  --  9.2*  --   --  9.2*  --    HCT 28.0*  --  27.1*  --   --  26.2*  --    PLT 22*   < > 20*   < > 24* 24* 34*    < > = values in this interval not displayed. Recent Labs     01/18/22  1614 01/19/22  0553 01/20/22  0618    136 131*   K 5.3* 5.1 4.4    105 101   CO2 19* 21 21   BUN 31* 30* 32*   CREATININE 1.7* 1.6* 1.8*   CALCIUM 7.5* 7.7* 7.5*     Recent Labs     01/18/22  1614   AST 18   ALT 20   BILIDIR <0.2   BILITOT 1.1*   ALKPHOS 80     Recent Labs     01/18/22  1613 01/19/22  1552   INR 1.07 0.94     No results for input(s): CKTOTAL, TROPONINI in the last 72 hours. Urinalysis:      Lab Results   Component Value Date    NITRU Negative 01/18/2022    WBCUA 3-5 01/18/2022    BACTERIA Rare 03/22/2020    RBCUA 0-2 01/18/2022    BLOODU TRACE-LYSED 01/18/2022    SPECGRAV 1.020 01/18/2022    GLUCOSEU Negative 01/18/2022       Radiology:  CT HEAD WO CONTRAST   Final Result      1. Unchanged small right cerebral convexity subdural hematoma. No midline shift. XR CHEST PORTABLE   Final Result      Clear lungs. Normal cardiomediastinal silhouette. CT Head WO Contrast   Final Result      Small, mixed density superior right frontoparietal subdural hematoma.                   MRA HEAD WO CONTRAST    (Results Pending)   MRI BRAIN WO CONTRAST    (Results Pending)           Assessment/Plan:    Active Hospital Problems    Diagnosis     Subdural hematoma ([facility] [address] [S06.5X9A]      Subdural Hematoma  -CT head noncontrast imaging demonstrates a 6 mm hyperdense right frontoparietal subdural hematoma. .  - Repeat CTH with unchanged small R SDH with no midline shift.  - MRI/A head pending  -Transfusing Plts as needed  - SBP <164, PRN labetalol & Hydralazine ordered, consider cardene ggt if not improving  - Keppra BID  - Seizure and fall precautions  - PT/OT  - SCDs, no antiplatelets or anti-coags for 2 weeks  - Q4H neurochecks     Thrombocytopenia  - Continue to monitor platelet count. Will transfuse to keep platelets greater than 50,000     Pancytopenia in the setting of Active Chemotherapy for Multiple Myeloma s/p Autologous Stem Cell Tx (3/2020)  -status post stem cell autologous transplant in 2020.  Patient follows with oncology at [facility], Dr. Nessa Lynch. Currently receiving Velcade, and Treanda.   - continue to monitor platelet count  - Allopurinol 200mg QD   - Oncology following     Concern for Seizures   - continue keppra  - Neurology following  - Seizure & Fall precautions  - Neurochecks       GERD   - Pepcid    DVT Prophylaxis: SCD  Diet: ADULT DIET; Regular  Code Status: Full Code    PT/OT Eval Status: pending    Dispo -pending clearance from neurology/neurosurgery for discharge.   Await MRI/MRA head    Hollis Choi MD

## 2022-01-20 NOTE — PROGRESS NOTES
Report called to RN on [address], questions/concerns addressed at this time. Pt updated and agreeable to POC.

## 2022-01-20 NOTE — PROGRESS NOTES
Initial assessment completed, VSS, afebrile, pt currently denies having pain. Pt a/o x4, Plt count low, to replace platelets today. POC discussed with pt, questions/concerns addressed at this time.

## 2022-01-20 NOTE — PROGRESS NOTES
Consent obtained for pt to receive platelets. 1st unit of platelets started, pt monitored for 15 minutes, no s/s of reaction, will continue with rest of platelet administration. 1st unit completed, no s/s of reaction. 1315 - pt back from MRI, 2nd unit of platelets started, pt monitored for 15 minutes no s/s of reaction, will continue to the rest of platelet administration. 2nd unit of platelets completed, no s/s of reaction, pt resting comfortably in bed.

## 2022-01-20 NOTE — PROGRESS NOTES
Physical Therapy  Facility/Department: [facility] 5T ORTHO/NEURO  Daily Treatment Note  NAME: Darci Barton  : 1955  MRN: [id number]    Date of Service: 2022    Discharge Recommendations: Darci Barton scored a 20/24 on the AM-PAC short mobility form. Current research shows that an AM-PAC score of 18 or greater is typically associated with a discharge to the patient's home setting. Based on the patient's AM-PAC score and their current functional mobility deficits, it is recommended that the patient have 2-3 sessions per week of Physical Therapy at d/c to increase the patient's independence. At this time, this patient demonstrates the endurance and safety to discharge home with home or OP PT and a follow up treatment frequency of 2-3x/wk. Please see assessment section for further patient specific details. If patient discharges prior to next session this note will serve as a discharge summary. Please see below for the latest assessment towards goals. PT Equipment Recommendations  Equipment Needed: No    Assessment   Body structures, Functions, Activity limitations: Decreased functional mobility ; Decreased endurance;Decreased balance  Assessment: 78 yo admitted with spontaneous SDH from thrombocytopenia. Pt demo fairly steady gait today with only occaisonal weaving/slight loss of balance that pt was able to recover on his own. pt demonstrating dec motivation to participate. Pt admits feeling weak & would benefit from ongoing IP PT at dc. Pt hopes for dc home with A from wife & other family near by. Will follow & progress as tolerated.   Treatment Diagnosis: impaired balance  Prognosis: Good  Decision Making: Medium Complexity  PT Education: Goals;PT Role;Plan of Care  Patient Education: need to be careful due to low platelets & to avoid falls- verbalized understanding  REQUIRES PT FOLLOW UP: Yes  Activity Tolerance  Activity Tolerance: Patient limited by fatigue;Patient limited by endurance  Activity Tolerance: declined sitting up in chair & only wanted to go back to bed     Patient Diagnosis(es): The encounter diagnosis was Subdural hemorrhage ([facility] [address] has a past medical history of Cancer ([facility] [address] and ESRD (end stage renal disease) on dialysis ([facility] [address] has no past surgical history on file. Restrictions  Position Activity Restriction  Other position/activity restrictions: up with A  Subjective   General  Chart Reviewed: Yes  Additional Pertinent Hx: Adm [age] y.o. male with PMH of multiple myeloma s/p autologous stem cell transplant in 3/2020, currently undergoing evaluation for CAR-T, ESRD on who presents headache, altered mentation (went unresponsive on wife for 5-10 min). ? seizure activity. CT head noncontrast demonstrated a small, mixed density superior right frontoparietal subdural hematoma. Referring Practitioner: Mallika Figueroa MD  Subjective  Subjective: pt in bed upon arrival and hesistant to working with PT, agreeable to short walk in [place].   Pain Screening  Patient Currently in Pain: Denies  Vital Signs  Patient Currently in Pain: Denies       Orientation  Orientation  Overall Orientation Status: Within Normal Limits  Cognition      Objective   Bed mobility  Supine to Sit: Supervision  Sit to Supine: Supervision  Transfers  Sit to Stand: Stand by assistance  Stand to sit: Stand by assistance  Comment: from EOB and toilet  Ambulation  Ambulation?: Yes  Ambulation 1  Surface: level tile  Device: No Device  Assistance: Stand by assistance;Contact guard assistance  Quality of Gait: fairly steady gait but with occasional weaving/slight lateral LOB that pt recovered on his own  Distance: 150'  Stairs/Curb  Stairs?: Yes  Stairs  # Steps : 2  Rails: Bilateral  Assistance: Stand by assistance     Balance  Sitting - Static: Good  Standing - Static: Good (SBA at sink)            Other Activities: Other (see comment)  Comment: pt assisted <> bathroom performing transfers and activities at sink with SBA              G-Code     OutComes Score                                                     AM-PAC Score  AM-PAC Inpatient Mobility Raw Score : 20 (01/20/22 1123)  AM-PAC Inpatient T-Scale Score : 47.67 (01/20/22 1123)  Mobility Inpatient CMS 0-100% Score: 35.83 (01/20/22 1123)  Mobility Inpatient CMS G-Code Modifier : CJ (01/20/22 1123)          Goals  Short term goals  Time Frame for Short term goals: dc (all ongoing)  Short term goal 1: Sit<>stand S  Short term goal 2: Ambulate >100' SBA without LOB  Short term goal 3: up/down flight of steps with rail SBA  Patient Goals   Patient goals : home at [address] per week: 5-7  Current Treatment Recommendations: Balance Training,Functional Mobility Training,Transfer Training,Gait Training,Stair training,Patient/Caregiver Education & Andrew Aliment Re-education,Pain Management,Safety Education & Training  Safety Devices  Type of devices: Bed alarm in place,Nurse notified,Left in bed,Call light within reach,Gait belt     Therapy Time   Individual Concurrent Group Co-treatment   Time In 1043         Time Out 1058         Minutes 15                 Corrina Benitez, PT

## 2022-01-20 NOTE — CARE COORDINATION
Case Management Assessment           Initial Evaluation                Date / Time of Evaluation: 1/20/2022 3:06 PM                 Assessment Completed by: Sindi Toth RN     Patient is from home with his spouse and plans on returning to home at d/c. She is not able to transport him to home but his sister or brother-in-law can. He is independent with ADL's and expects no needs at d/c. Patient Name: Daljit Johnson     YOB: 1955  Diagnosis: Subdural hemorrhage ([facility] [address] [I62.00]  Subdural hematoma ([facility] [address] [Y44.9A8P]     Date / Time: 1/18/2022  2:29 PM    Patient Admission Status: Inpatient    If patient is discharged prior to next notation, then this note serves as note for discharge by case management. Current PCP: No primary care provider on file. Clinic Patient: No    Chart Reviewed: Yes  Patient/ Family Interviewed: Yes    Initial assessment completed at bedside with: patient    Hospitalization in the last 30 days: No    Emergency Contacts:  Extended Emergency Contact Information  Primary Emergency Contact: Niru Polk  Address: [address] [place] [address] Phone: [phone]  Mobile Phone: [phone]  Relation: Domestic Partner  Secondary Emergency Contact: Robbie Olivo[address] Phone: [phone]  Relation: Brother/Sister    Advance Directives:   Code Status: Full [address]: No  Agent: NA  Contact Number: NA    Financial  Payor: Jeannine Yañez / Plan: MEDICARE PART A AND B / Product Type: *No Product type* /     Pre-cert required for SNF: No    Pharmacy    [facility] [address] [facility], [address] [place] [address] [address]  [facility]  Phone: [phone] Fax: [phone]      Potential assistance Purchasing Medications:    Does Patient want to participate in local refill/ meds to beds program?:      Meds To Beds General Rules:  1. Can ONLY be done Monday- Friday between 8:30am-5pm  2. Prescription(s) must be in pharmacy by 3pm to be filled same day  3. Copy of patient's insurance/ prescription drug card and patient face sheet must be sent along with the prescription(s)  4. Cost of Rx cannot be added to hospital bill. If financial assistance is needed, please contact unit  or ;  or  CANNOT provide pharmacy voucher for patients co-pays  5. Patients can then  the prescription on their way out of the hospital at discharge, or pharmacy can deliver to the bedside if staff is available. (payment due at time of pick-up or delivery - cash, check, or card accepted)     Able to afford home medications/ co-pay costs: Yes    ADLS  Support Systems:      PT AM-PAC: 20 /24  OT AM-PAC: 20 /24    [place]: home  Steps: unsure    Plans to RETURN to current housing: Yes  Barriers to RETURNING to current housing: none noted      DISCHARGE PLAN:  Disposition: Home- No Services Needed    Transportation PLAN for discharge: family     Factors facilitating achievement of predicted outcomes: Family support, Cooperative and Pleasant    Barriers to discharge: MRA, MRI brain, platelets x 2    Additional Case Management Notes: NA    The Plan for Transition of Care is related to the following treatment goals of Subdural hemorrhage ([facility] [address] [I62.00]  Subdural hematoma ([facility] [address] [S06.5X9A]    The Patient and/or patient representative Chino Antonio and his family were provided with a choice of provider and agrees with the discharge plan Not Indicated    Freedom of choice list was provided with basic dialogue that supports the patient's individualized plan of care/goals and shares the quality data associated with the providers.  Not Indicated    Care Transition patient: No    Kendy Abraham RN  [facility] [employer] [facility]  Case Management Department  Ph: [phone]   Fax: [phone]

## 2022-01-21 NOTE — PLAN OF CARE
Problem: Falls - Risk of:  Goal: Will remain free from falls  Description: Will remain free from falls  1/21/2022 0031 by Rayo Vallejo RN  Outcome: Ongoing  1/20/2022 1915 by Trevor Cordova RN  Outcome: Ongoing  Goal: Absence of physical injury  Description: Absence of physical injury  1/21/2022 0031 by Rayo Vallejo RN  Outcome: Ongoing  1/20/2022 1915 by Trevor Cordova RN  Outcome: Ongoing     Problem: Skin Integrity:  Goal: Absence of new skin breakdown  Description: Absence of new skin breakdown  1/21/2022 0031 by Rayo Vallejo RN  Outcome: Ongoing  1/20/2022 1915 by Trevor Cordova RN  Outcome: Ongoing     Problem: Pain:  Goal: Pain level will decrease  Description: Pain level will decrease  1/21/2022 0031 by Rayo Vallejo RN  Outcome: Ongoing  1/20/2022 1915 by Trevor Cordova RN  Outcome: Ongoing  Goal: Control of acute pain  Description: Control of acute pain  1/21/2022 0031 by Rayo Vallejo RN  Outcome: Ongoing  1/20/2022 1915 by Trevor Cordova RN  Outcome: Ongoing  Goal: Control of chronic pain  Description: Control of chronic pain  1/21/2022 0031 by Rayo Vallejo RN  Outcome: Ongoing  1/20/2022 1915 by Trevor Cordova RN  Outcome: Ongoing

## 2022-01-21 NOTE — PROGRESS NOTES
Physical Therapy  Facility/Department: [address] [facility]  Daily Treatment Note/Discharge  NAME: Graham Main  : 1955  MRN: [id number]    Date of Service: 2022    Discharge Recommendations:    Graham Main scored a 22/24 on the AM-PAC short mobility form. At this time, no further PT is recommended upon discharge due to pt supervision. Recommend patient returns to prior setting with prior services. PT Equipment Recommendations  Equipment Needed: No    Assessment   Assessment: Decreased assist for mobility. Demonstrating mobility with supervision. Pt denies feeling weak or wobbly. Plans to go home with wife and has no concerns about managing. No further acute PT needs. PT Education: Goals;PT Role;Plan of Care  Patient Education: need to be careful due to low platelets & to avoid falls- verbalized understanding. Discussed activity at home and gradually increasing as tolerated  REQUIRES PT FOLLOW UP: No     Patient Diagnosis(es): The encounter diagnosis was Subdural hemorrhage ([facility] [address] has a past medical history of Cancer ([facility] [address] and ESRD (end stage renal disease) on dialysis ([facility] [address] has no past surgical history on file. Restrictions  Position Activity Restriction  Other position/activity restrictions: up with A  Subjective   General  Chart Reviewed: Yes  Additional Pertinent Hx: Adm [age] y.o. male with PMH of multiple myeloma s/p autologous stem cell transplant in 3/2020, currently undergoing evaluation for CAR-T, ESRD on who presents headache, altered mentation (went unresponsive on wife for 5-10 min). ? seizure activity. CT head noncontrast demonstrated a small, mixed density superior right frontoparietal subdural hematoma. Subjective  Subjective: Pt found supine. Agreeable to PT.   Reports going home today and has no concerns  Pain Screening  Patient Currently in Pain: Denies  Vital Signs  Patient Currently in Pain: Denies       Orientation     Cognition      Objective Bed mobility  Supine to Sit: Modified independent  Sit to Supine: Modified independent  Transfers  Sit to Stand: Independent  Stand to sit: Independent  Ambulation  Ambulation?: Yes  Ambulation 1  Device: No Device  Assistance: Supervision  Quality of Gait: fairly steady, 1 slight LOB when turning around self recovered  Distance: 50' in room  Stairs/Curb  Stairs? :  (Pt reports has no concerns about managing steps at home)                                 G-Code     OutComes Score                                                     AM-PAC Score  AM-PAC Inpatient Mobility Raw Score : 22 (01/21/22 1106)  AM-PAC Inpatient T-Scale Score : 53.28 (01/21/22 1106)  Mobility Inpatient CMS 0-100% Score: 20.91 (01/21/22 1106)  Mobility Inpatient CMS G-Code Modifier : CJ (01/21/22 1106)          Goals  Short term goals  Time Frame for Short term goals: dc (all ongoing)  Short term goal 1: Sit<>stand S. MET 1/21  Short term goal 2: Ambulate >100' SBA without LOB. Ongoing  Short term goal 3: up/down flight of steps with rail SBA.   Ongoing  Patient Goals   Patient goals : home at [address] per week: D/C PT  Current Treatment Recommendations: Balance Training,Functional Mobility Training,Transfer Training,Gait Training,Stair training,Patient/Caregiver Education & Lurene Solders Re-education,Pain [address] [facility] [address] Education & Training  Safety Devices  Type of devices: Call light within reach,Left in bed,Nurse notified (no alarm being used)     Therapy Time   Individual Concurrent Group Co-treatment   Time In 1051         Time Out 1100         Minutes 9              Timed Code Treatment Minutes:9       Total Treatment Minutes:  Chintan 24, PT

## 2022-01-21 NOTE — PROGRESS NOTES
Reviewed discharge instructions with patient. Reviewed discharge medications including dosing, schedule, indication, and adverse reactions. Reviewed which medications were already taken today and next dosage due for each medication. Reviewed signs and symptoms that prompt a call to the physician and appropriate phone numbers. Reviewed follow up appointments that have been made in [facility] and Outpatient Oncology. Low microbial diet, activity restrictions, and increased risk of infection were reviewed. Patient verbalized understanding of all instructions and questions were answered to his. satisfaction. Signed discharge instructions were given to the patient and a copy placed in the paper-lite chart. Patient discharged to home per wheelchair with family members.       Mundo Booker RN

## 2022-01-21 NOTE — PROGRESS NOTES
Neurology Progress Note    Reason for visit: SDH; seizure    Interval history   1) feels well, likely going home  2) d/w wife via phone - encouraged her to discuss Rx concerns with primary team  3) Driving precautions reiterated    Exam:  -Mental status: Alert, oriented to person, place, month, and year; pleasant & appropriate  -Speech & Language: no aphasia; no dysarthria. Follows simple commands.   -Cranial nerves: pupils symmetric; no notable dysconjugate gaze; eyes midline; no facial asymmetry  -Motor: moving all extremities symmetrically and fully  -Sensory: grossly intact to light touch all four limbs  -Other: no adventitious movements noted  Other Systems  -General Appearance: well-developed, well-nourished, no apparent distress  -Neck: supple  -Lungs: breathing unlabored, regular, no audible wheezes  -CV: pulses strong x4 extremities  -Abd: flat    Neurological ROS: denies headache, weakness    Labs:  Creatinine 2.0 mg/dL  Platelets 07D  INR 7.57    Studies:  MRI brain 1/20/22  1. Small subdural hematoma along the right cerebral convexity with slight extension along the falx. 2. Mild burden T2 hyperintense white matter disease which are nonspecific, may be attributed to chronic microvascular ischemia. MR-A head 1/20/22  No evidence of intracranial aneurysm or significant stenosis. Impression:  Jh Arellano is a 77 y.o. male with multiple myeloma who presented with likely new onset seizure in the setting of thrombocytopenia and SDH which has been stable on serial imaging. Platelet count up to 50K today. Recommendations:  - Continue Keppra 500 mg BID  - We discussed driving precautions and he and his wife expressed understanding  - No antiplatelets or anticoagulants x 2 weeks  - F/u with neurology in 3 months  - Will leave to Heme/onc discretion whether he should go forward with CAR-T. D/w wife that this can be neurotoxic. Will sign off. Call with questions.  OK to go home from neuro perspective    A copy of this note was provided for Dr Jae Nash MD.     I spent 60 minutes in the care of this patient. Over 50% of that time was in face-to-face counseling regarding disease process, diagnostic testing, preventative measures, and answering patient and family questions.      Miguelangel Munson NP  [facility] Neurology line: [phone]  Perfect[employer]erve: [facility] NPs

## 2022-01-21 NOTE — PROGRESS NOTES
[address] Progress Note    2022     Jh Arellano    MRN: [id number]    : 1955    Referring MD: No referring provider defined for this encounter. SUBJECTIVE:  Venus De La Cruz is doing fine. HE is going home today. Denies any headaches. No bleeding     Isolation: None    Medications    Scheduled Meds:   levoFLOXacin  500 mg Oral Daily    acyclovir  400 mg Oral BID    sodium chloride flush  5-40 mL IntraVENous 2 times per day    famotidine  20 mg Oral BID    levETIRAcetam  500 mg Oral Q12H    allopurinol  200 mg Oral Daily     Continuous Infusions:   sodium chloride      sodium chloride      sodium chloride       PRN Meds:.sodium chloride, labetalol, hydrALAZINE, sodium chloride, sodium chloride flush, sodium chloride, LORazepam, ondansetron **OR** ondansetron, polyethylene glycol, acetaminophen **OR** acetaminophen    ROS:  As noted above, otherwise remainder of 10-point ROS negative    Physical Exam:     I&O:      Intake/Output Summary (Last 24 hours) at 2022 1255  Last data filed at 2022 0830  Gross per 24 hour   Intake 1934.98 ml   Output 0 ml   Net 1934.98 ml       Vital Signs:  /80   Pulse 95   Temp 97.5 °F (36.4 °C) (Oral)   Resp 13   Ht 6' 4\" (1.93 m)   Wt 243 lb 12.8 oz (110.6 kg)   SpO2 100%   BMI 29.68 kg/m²     Weight:    Wt Readings from Last 3 Encounters:   22 243 lb 12.8 oz (110.6 kg)   20 228 lb 6.3 oz (103.6 kg)   20 238 lb 1.6 oz (108 kg)         General: Awake, alert and oriented.   HEENT: normocephalic, PERRL, no scleral erythema or icterus, Oral mucosa moist and intact, throat clear  NECK: supple without palpable adenopathy  BACK: Straight negative CVAT  SKIN: warm dry and intact without lesions rashes or masses  CHEST: CTA bilaterally without use of accessory muscles  CV: Normal S1 S2, RRR, no MRG  ABD: NT ND normoactive BS, no palpable masses or hepatosplenomegaly  EXTREMITIES: without edema, denies calf tenderness  NEURO: CN II - XII grossly intact      Data    CBC:   Recent Labs     01/19/22  0553 01/19/22  1552 01/20/22  0618 01/20/22  1047 01/20/22  2025 01/21/22  0011 01/21/22  0443   WBC 2.1*  --  1.8*  --   --   --  0.8*   HGB 9.2*  --  9.2*  --   --   --  8.5*   HCT 27.1*  --  26.2*  --   --   --  24.6*   .3*  --  99.9  --   --   --  100.9*   PLT 20*   < > 24*   < > 47* 57* 50*    < > = values in this interval not displayed. BMP/Mag:  Recent Labs     01/19/22  0553 01/20/22  0618 01/21/22  0443    131* 138   K 5.1 4.4 4.5    101 106   CO2 21 21 22   BUN 30* 32* 36*   CREATININE 1.6* 1.8* 2.0*     LIVP:   Recent Labs     01/18/22  1614   AST 18   ALT 20   BILIDIR <0.2   BILITOT 1.1*   ALKPHOS 80     Coags:   Recent Labs     01/18/22  1613 01/19/22  1552   PROTIME 12.1 10.6   INR 1.07 0.94   APTT  --  16.6*     Uric Acid No results for input(s): LABURIC in the last 72 hours. PROBLEM LIST:                   TREATMENT:            1. CyBorD (started 10/30/18)  2. Revlimid / Christofer Dolin / Dex on clinical trial (started 2/1/19)  3. Carfilzomib / Pomalidomide / Dex x 6 cycles (Relapse #1 - started 8/26/19 - 2/2020)  4. Cytoxan Mobilization w/ 25% dose reduction (2/17/20)  5. Melphalan 140mg/m2 & Autologous SCT 3/12/20  6. Kyprolis/Ebony/Dex  7. Velcade/Ebony/Dex  8.   HD - CTX at [place]  9 Velcade/Dex/bendamustine    ASSESSMENT AND PLAN:           Mr. Donn Luo a 59-year-old male with hx of multiple myeloma s/p multiple lines of therapy and ASCT in 3/2020, previously on dialysis currently on treatment with velcade, bendamustine who presents with altered mental status andnoted to have subdural hematoma     1) Subdural hematoma  - no trauma or falls per patient  - likely related to thrombocytopenia, PT/INR and fibrinogen normal  - no prior history of bleeding   - MRI and MRV negative for AVM or aneurysm, stable hematoma   - on keppra 500 mg BID   - Recommend to keep plts >50K  - pt is not on anticoagulation  - Will follow up with his oncologist on Monday for cbc check  - Will follow up with Dr Tyra Del Angel to discuss CAR-T    2) Cytopenia   - Related to myeloma and chemotherapy  - Resume levaquin, acyclovir and fluconazole at home   - if [address] <1000 recommend fluconazole and levaquin   Transfuse to keep hgb >7 and plts >50K in the setting of subdural hematoma     3) Multiple myeloma  - s/p multiple lines of treatment and ASCT in 2020  - Currently on bendamustine/velcade and dex  - plan for CAR-T abecma   - Follow up with his oncologist at discharge, has an appt Monday    4) Damon/CKD  - Cr is 2 today, mild elevation  - Per pt he is around 1.6-2  - needs a follow up Bmp on Monday           Jonathan Shelton DO

## 2022-01-21 NOTE — CARE COORDINATION
Case Management Assessment            Discharge Note                    Date / Time of Note: 1/21/2022 11:31 AM                  Discharge Note Completed by: DEEDEE Hernandez    Patient Name: Halley Portillo   YOB: 1955  Diagnosis: Subdural hemorrhage ([facility] [I62.00]  Subdural hematoma ([facility] [Q39.1C4L]   Date / Time: 1/18/2022  2:29 PM    Current PCP: No primary care provider on file. Clinic patient: No    Hospitalization in the last 30 days: No    Advance Directives:  Code Status: Full Code  [place] DNR form completed and on chart: No    Financial:  Payor: Kristopher Uriartee / Plan: MEDICARE PART A AND B / Product Type: *No Product type* /      Pharmacy:    [facility], [address] [place] [address] [address] [place] [address] [address] [place] [address] [address]  Phone: [phone] Fax: [phone]      Assistance purchasing medications?:    Assistance provided by Case Management: None at this time    Does patient want to participate in local refill/ meds to beds program?:      Meds To Beds General Rules:  1. Can ONLY be done Monday- Friday between 8:30am-5pm  2. Prescription(s) must be in pharmacy by 3pm to be filled same day  3. Copy of patient's insurance/ prescription drug card and patient face sheet must be sent along with the prescription(s)  4. Cost of Rx cannot be added to hospital bill. If financial assistance is needed, please contact unit  or ;  or  CANNOT provide pharmacy voucher for patients co-pays  5.  Patients can then  the prescription on their way out of the hospital at discharge, or pharmacy can deliver to the bedside if staff is available. (payment due at time of pick-up or delivery - cash, check, or card accepted)     Able to afford home medications/ co-pay costs: Yes    ADLS:  Current PT AM-PAC Score: 22 /24  Current OT AM-PAC Score: 20 /24      DISCHARGE Disposition: Home- No Lizzy Pa and his family were provided with a choice of provider and agrees with the discharge plan Yes    Freedom of choice list was provided with basic dialogue that supports the patient's individualized plan of care/goals and shares the quality data associated with the providers.  Yes    Care Transitions patient: No    Vidhya Boyd [place] [address]  Case Management Department  Ph: [phone]  Fax: [phone]

## 2022-01-21 NOTE — DISCHARGE SUMMARY
Hospitalist Discharge Summary    Patient ID:  Emerald Nelson  [id number]  3 y.o.  1955    Admit date: 1/18/2022    Discharge date: 1/21/2022    Disposition: home    Admission Diagnoses:   Patient Active Problem List   Diagnosis    [facility] myeloma in remission ([facility]    Neutropenic fever ([facility] [address] [facility]    Autologous bone marrow transplantation status ([facility] [address] [facility]    PVC (premature ventricular contraction)    Nonsustained ventricular tachycardia (HCC)    Trigeminy    Subdural hematoma ([facility] [address]       Discharge Diagnoses: Active Problems:    Subdural hematoma (HCC)  Resolved Problems:    * No resolved hospital problems. *      Code Status:  Full Code    Condition:  Stable    Discharge Diet: Diet:  ADULT DIET; Regular    PCP to do list: Follow for improvement of symptoms    Hospital Course: As per HPI : 63-year-old male with a past medical history of multiple myeloma status post autologous stem cell transplant in 3/2020 and currently undergoing evaluation for CAR-T therapy, previously ESRD no longer on hemodialysis, and GERD who presents for a transient episode of altered mental status which occurred this morning. Patient reportedly had a phone call earlier this morning and afterwards when his domestic partner was trying to speak to him she noted that he was not responding. After a few moments of this she went to evaluate the patient and she found that his eyes had rolled back in his head and he was currently unconscious. This period lasted reportedly about 5 minutes before he spontaneously regained consciousness however afterwards he was mildly disoriented. During this event it appears that he also lost control of his bladder and bit his tongue. The patient has no previous history of seizures or family history of seizures.   Upon evaluation in the emergency department a CT head noncontrast was performed which did demonstrate a new subdural hematoma in his right frontoparietal region measuring approximately 6 mm in diameter. The patient denies any recent head trauma and is not currently on any anticoagulation or antiplatelet agents. He does have known thrombocytopenia in the setting of receiving chemotherapy for his multiple myeloma and currently follows at [facility] for this condition. Given his new subdural hematoma and concern for seizure activity the patient will be admitted for reevaluation of his ongoing bleed as well as platelet transfusion and empiric treatment for seizure prophylaxis. He will be admitted to the ICU for further monitoring for these conditions    Following problems were addressed during his stay    Subdural Hematoma  -CT head noncontrast imaging demonstrates a 6 mm hyperdense right frontoparietal subdural hematoma. .  - Repeat CTH with unchanged small R SDH with no midline shift.  - MRI/A head  reviewed  - SBP <164, PRN labetalol & Hydralazine ordered, consider cardene ggt if not improving  - Keppra BID  - Seizure and fall precautions  - PT/OT  - SCDs, no antiplatelets or anti-coags for 2 weeks  -Once neurology clears the patient was discharged home in stable condition    Thrombocytopenia  -  Platelets were transfused to keep levels above 50,000     Pancytopenia in the setting of Active Chemotherapy for Multiple Myeloma s/p Autologous Stem Cell Tx (3/2020)  -status post stem cell autologous transplant in 2020.  Patient follows with oncology at [facility], Dr. José Manuel Garay.  Currently receiving Velcade, and Treanda.   - continue to monitor platelet count  - Allopurinol 200mg QD   - Oncology following     Concern for Seizures   - continue keppra  - Neurology following  - Seizure & Fall precautions  - Neurochecks          Discharge Medications:   Current Discharge Medication List      START taking these medications    Details   levETIRAcetam (KEPPRA) 500 MG tablet Take 1 tablet by mouth every 12 hours  Qty: 60 tablet, Refills: 3           Current Discharge Medication List Current Discharge Medication List      CONTINUE these medications which have NOT CHANGED    Details   sodium bicarbonate 650 MG tablet Take 650 mg by mouth daily      dicyclomine (BENTYL) 20 MG tablet Take 20 mg by mouth 3 times daily as needed      prochlorperazine (COMPAZINE) 10 MG tablet Take 10 mg by mouth every 6 hours as needed      Dexamethasone 20 MG TABS Take 20 mg by mouth - to be given with Velcade on days 1, 4, 8, and 11 of chemotherapy      Bortezomib (VELCADE IV) Infuse intravenously To be given as an infusion on days 1, 4, 8, and 11 of 28 day chemotherapy cycle      bendamustine (TREANDA) 100 MG To be given as an infusion on days 1 and 4 of 29 day chemotherapy cycle      calcium citrate-vitamin D (CITRICAL + D) 315-250 MG-UNIT TABS per tablet Take 1 tablet by mouth 2 times daily (with meals)      denosumab (XGEVA) 120 MG/1.7ML SOLN SC injection Inject 120 mg into the skin every 30 days      pantoprazole (PROTONIX) 40 MG tablet Take 1 tablet by mouth daily  Qty: 30 tablet, Refills: 0      allopurinol (ZYLOPRIM) 100 MG tablet Take 200 mg by mouth daily       acyclovir (ZOVIRAX) 400 MG tablet Take 400 mg by mouth 2 times daily       levoFLOXacin (LEVAQUIN) 250 MG tablet Take 250 mg by mouth daily For prophylaxis during chemotherapy           Current Discharge Medication List      STOP taking these medications       darbepoetin evangelina-polysorbate (ARANESP) 100 MCG/0.5ML SOSY injection Comments:   Reason for Stopping:         metoprolol tartrate (LOPRESSOR) 25 MG tablet Comments:   Reason for Stopping:                   Procedures: none    Assessment on Discharge: Stable, improved     Discharge ROS:  A complete review of systems was asked and negative except for none    Discharge Exam:  /81   Pulse 90   Temp 98.4 °F (36.9 °C) (Oral)   Resp 19   Ht 6' 4\" (1.93 m)   Wt 243 lb 12.8 oz (110.6 kg)   SpO2 100%   BMI 29.68 kg/m²     Gen: NAD  HEENT: NC/AT, moist mucous membranes, no oropharyngeal erythema or exudate  Neck: supple, trachea midline, no anterior cervical or SC LAD  Heart:  Normal s1/s2, RRR, no murmurs, gallops, or rubs. no leg edema  Lungs:  CTA bilaterally, no wheeze,no rales or rhonchi, no use of accessory muscles  Abd: bowel sounds present, soft, nontender, nondistended, no masses  Extrem:  No clubbing, cyanosis,  no edema  Skin: no lesion or masses  Psych:  A & O x3  Neuro: grossly intact, moves all four extremities    Pertinent Studies During Hospital Stay:  Radiology:  CT HEAD WO CONTRAST    Result Date: 1/18/2022  PROCEDURE: CT head without contrast 2200 hours INDICATION: Repeat CTH for SDH; COMPARISON: 1500 hours TECHNIQUE: CT head was performed without contrast according to standard protocol. Axial images and multiplanar reformatted images reviewed. Up-to-date CT equipment and radiation dose reduction techniques were employed. FINDINGS: A small predominantly hyperdense right frontoparietal convexity subdural hematoma measuring up to 5 mm in thickness appears unchanged. The ventricles are nondilated. The basilar cisterns are patent. There is no midline shift. The gray-white matter differentiation is normal. Mild periventricular white matter hypoattenuation is indicative of chronic small vessel ischemic disease. Visualized portions of the orbits, paranasal sinuses, and mastoids appear normal. No acute fracture is identified. 1.  Unchanged small right cerebral convexity subdural hematoma. No midline shift. CT Head WO Contrast    Result Date: 1/18/2022  CT head without contrast HISTORY: Headache COMPARISON: none CONTRAST:  none  TECHNIQUE: Individualized dose optimization technique was used in order to meet ALARA standards for radiation dose reduction.  In addition to vendor specific dose reduction algorithms, the dose reduction techniques vary based on the specific scanner utilized but frequently include automated exposure control, adjustment of the mA and/or kV according to patient size, and use of iterative reconstruction technique. COMMENTS: There is hyperdense right frontoparietal subdural hematoma measuring up to 6 mm maximum thickness on image [id number]. A posterior parietal hypodense component is seen measuring 7 mm maximum thickness on image 47. There is mild periventricular microangiopathy. There is no intraventricular hemorrhage or hydrocephalus. No skull fracture. Minimal right maxillary sinus mucosal thickening. Mastoid air cells are clear. Small, mixed density superior right frontoparietal subdural hematoma. MRA HEAD WO CONTRAST    Result Date: 1/20/2022  EXAM: MRI BRAIN WO CONTRAST, MRA HEAD WO CONTRAST INDICATION: Subdural hematoma, headache COMPARISON: CT head dated 1/19/2022 TECHNIQUE: Multiplanar, multisequence MR imaging of the head obtained. IV contrast: None. FINDINGS: MIDLINE STRUCTURES: The sella turcica and pituitary gland are normal. Craniovertebral alignment and cerebellar tonsils are normal. VENTRICLES: Normal size and configuration for patient age. INTRACRANIAL HEMORRHAGE: There is a thin subdural hematoma along the right cerebral convexity measuring 3 to 4 mm in thickness with slight extension along the right falx. No significant mass effect. BRAIN PARENCHYMA: There is no diffusion restriction. Thin subdural hematoma along the right cerebral convexity with slight extension along the right falx. Mild burden scattered T2 hyperintense white matter disease. No significant mass effect. No midline shift. INTRACRANIAL VASCULATURE: Major intracranial vessels are grossly patent. ORBITS: Normal. BONE MARROW: Bone marrow signal within normal limits. PARANASAL SINUSES/MASTOID BONES: No acute sinusitis or mastoiditis. 1. Small subdural hematoma along the right cerebral convexity with slight extension along the falx. 2. Mild burden T2 hyperintense white matter disease which are nonspecific, may be attributed to chronic microvascular ischemia.  EXAM: MRI BRAIN WO CONTRAST, MRA HEAD WO CONTRAST INDICATION: Seizure, subdural; COMPARISON: None TECHNICAL: 3-D time-of-flight MRA of the head obtained. MRA axial source images and reconstructed MIP images reviewed. FINDINGS: ANTERIOR CIRCULATION: Bilateral intracranial internal carotid arteries are patent. The proximal bilateral anterior and middle cerebral arteries are patent. No aneurysm or AVM. POSTERIOR CIRCULATION: Bilateral vertebral arteries and basilar artery and proximal branch vessels are patent. Bilateral proximal posterior cerebral arteries are patent. No aneurysm or AVM. IMPRESSION: No evidence of intracranial aneurysm or significant stenosis. XR CHEST PORTABLE    Result Date: 1/18/2022  Chest portable 1541 HISTORY: Infection     Clear lungs. Normal cardiomediastinal silhouette. MRI BRAIN WO CONTRAST    Result Date: 1/20/2022  EXAM: MRI BRAIN WO CONTRAST, MRA HEAD WO CONTRAST INDICATION: Subdural hematoma, headache COMPARISON: CT head dated 1/19/2022 TECHNIQUE: Multiplanar, multisequence MR imaging of the head obtained. IV contrast: None. FINDINGS: MIDLINE STRUCTURES: The sella turcica and pituitary gland are normal. Craniovertebral alignment and cerebellar tonsils are normal. VENTRICLES: Normal size and configuration for patient age. INTRACRANIAL HEMORRHAGE: There is a thin subdural hematoma along the right cerebral convexity measuring 3 to 4 mm in thickness with slight extension along the right falx. No significant mass effect. BRAIN PARENCHYMA: There is no diffusion restriction. Thin subdural hematoma along the right cerebral convexity with slight extension along the right falx. Mild burden scattered T2 hyperintense white matter disease. No significant mass effect. No midline shift. INTRACRANIAL VASCULATURE: Major intracranial vessels are grossly patent. ORBITS: Normal. BONE MARROW: Bone marrow signal within normal limits. PARANASAL SINUSES/MASTOID BONES: No acute sinusitis or mastoiditis.      1. Small subdural hematoma along the right cerebral convexity with slight extension along the falx. 2. Mild burden T2 hyperintense white matter disease which are nonspecific, may be attributed to chronic microvascular ischemia. EXAM: MRI BRAIN WO CONTRAST, MRA HEAD WO CONTRAST INDICATION: Seizure, subdural; COMPARISON: None TECHNICAL: 3-D time-of-flight MRA of the head obtained. MRA axial source images and reconstructed MIP images reviewed. FINDINGS: ANTERIOR CIRCULATION: Bilateral intracranial internal carotid arteries are patent. The proximal bilateral anterior and middle cerebral arteries are patent. No aneurysm or AVM. POSTERIOR CIRCULATION: Bilateral vertebral arteries and basilar artery and proximal branch vessels are patent. Bilateral proximal posterior cerebral arteries are patent. No aneurysm or AVM. IMPRESSION: No evidence of intracranial aneurysm or significant stenosis.            Last Labs on Discharge:     Recent Results (from the past 24 hour(s))   Platelet count    Collection Time: 01/20/22 10:47 AM   Result Value Ref Range    Platelets 34 (L) 638 - 450 K/uL   Platelet count    Collection Time: 01/20/22  4:19 PM   Result Value Ref Range    Platelets 47 (L) 863 - 450 K/uL   Platelet count    Collection Time: 01/20/22  8:25 PM   Result Value Ref Range    Platelets 47 (L) 506 - 450 K/uL   Platelet count    Collection Time: 01/21/22 12:11 AM   Result Value Ref Range    Platelets 57 (L) 346 - 450 K/uL   Basic Metabolic Panel w/ Reflex to MG    Collection Time: 01/21/22  4:43 AM   Result Value Ref Range    Sodium 138 136 - 145 mmol/L    Potassium reflex Magnesium 4.5 3.5 - 5.1 mmol/L    Chloride 106 99 - 110 mmol/L    CO2 22 21 - 32 mmol/L    Anion Gap 10 3 - 16    Glucose 81 70 - 99 mg/dL    BUN 36 (H) 7 - 20 mg/dL    CREATININE 2.0 (H) 0.8 - 1.3 mg/dL    GFR Non- 34 (A) >60    GFR  41 (A) >60    Calcium 7.9 (L) 8.3 - 10.6 mg/dL   CBC auto differential    Collection Time: 01/21/22 4:43 AM   Result Value Ref Range    WBC 0.8 (L) 4.0 - 11.0 K/uL    RBC 2.44 (L) 4.20 - 5.90 M/uL    Hemoglobin 8.5 (L) 13.5 - 17.5 g/dL    Hematocrit 24.6 (L) 40.5 - 52.5 %    .9 (H) 80.0 - 100.0 fL    MCH 35.0 (H) 26.0 - 34.0 pg    MCHC 34.7 31.0 - 36.0 g/dL    RDW 15.9 (H) 12.4 - 15.4 %    Platelets 50 (L) 964 - 450 K/uL    MPV 8.0 5.0 - 10.5 fL    Neutrophils % 60.8 %    Lymphocytes % 10.7 %    Monocytes % 25.6 %    Eosinophils % 2.6 %    Basophils % 0.3 %    Neutrophils Absolute 0.5 (LL) 1.7 - 7.7 K/uL    Lymphocytes Absolute 0.1 (L) 1.0 - 5.1 K/uL    Monocytes Absolute 0.2 0.0 - 1.3 K/uL    Eosinophils Absolute 0.0 0.0 - 0.6 K/uL    Basophils Absolute 0.0 0.0 - 0.2 K/uL         Follow up: with No primary care provider on file. Note that over 30 minutes was spent in preparing discharge papers, discussing discharge with patient, medication review, etc.    Thank you No primary care provider on file. for the opportunity to be involved in this patient's care. If you have any questions or concerns please feel free to contact me at [phone].     Electronically signed by Joan Lopez MD on 1/21/2022 at 10:22 AM

## 2022-01-21 NOTE — PLAN OF CARE
Problem: Falls - Risk of:  Goal: Will remain free from falls  Description: Will remain free from falls  Outcome: Ongoing  Pt bed is in low position, side rails up, call light and belongings are in reach. Fall risk light is on outside pts room. Pt encouraged to call for assistance as needed. Will continue with hourly rounds for PO intake, pain needs, toileting and repositioning as needed. Problem: Skin Integrity:  Goal: Absence of new skin breakdown  Description: Absence of new skin breakdown  Outcome: Ongoing   Pt skin remains intact. Bruising noted scattered and on tongue. Will monitor. Problem: Pain:  Goal: Pain level will decrease  Description: Pain level will decrease  Outcome: Ongoing   Pt reports headache this shift. Tylenol given per orders. Pt reports improvement.

## 2022-01-26 NOTE — PROGRESS NOTES
Patient's platelet count this AM: 37.0. Pt seen and assessed at [facility]. Seen at [facility] today for Platelet transfusion per standing orders for above lab values. Blood products transfused per [facility] policy. Pt tolerated transfusion well and without incident. Pt verbalizes understanding of discharge instructions. Discharged ambulatory to home.   Aurora Valentin RN

## 2022-01-27 NOTE — PROGRESS NOTES
Behavioral Health Note    Patient and caregiver attended online behavioral health group/class for preparation for CAR-T. Participant was engaged in the group.

## 2022-01-28 NOTE — PROGRESS NOTES
Pt seen and assessed at [facility]. Seen at [address] [place] [address] today for platelet transfusion per standing orders for above lab values. Blood products transfused per [facility] policy. Pt tolerated transfusion well and without incident. Pt verbalizes understanding of discharge instructions. Discharged ambulatory to home with caregiver.     Electronically signed by Rand Lo RN on 1/28/2022 at 3:47 PM

## 2022-01-30 NOTE — PROGRESS NOTES
Patient's platelet count this AM: 38.0. Pt seen and assessed at [facility]. Seen at [facility] today for Platelet transfusion per standing orders for above lab values. No pre-meds given. Pt also receive one time order of 500 NS bolus. Blood products transfused per [place] [id number] policy. Pt tolerated transfusion well and without incident. Pt verbalizes understanding of discharge instructions. Discharged ambulatory to home.   Gloria Burris RN

## 2022-01-31 NOTE — PRE-PROCEDURE INSTRUCTIONS
Called patient about procedure. Told to be here at 0900 for procedure at 1030. NPO after midnight, but can take morning medication with sips of water, patient stated they are not on blood thinners. To have a responsible adult be with patient take them home and stay with them afterwards, if they do not get admitted to [address] [place] [address]. And if available bring current list of medications. No other questions or concerns.

## 2022-02-01 NOTE — PROCEDURES
IR Brief Postoperative Note    Edilsonbaldomero Session  YOB: 1955  [id number]    Pre-operative Diagnosis: cancer    Post-operative Diagnosis: Same    Procedure: right subclav trifusion, ok for use    Anesthesia: moderate    Surgeons/Assistants: michael    Estimated Blood Loss: Minimal    Complications: none    Specimens: were not obtained    See full procedure dictation to follow      Karen Macias MD MD  2/1/2022

## 2022-02-01 NOTE — PROGRESS NOTES
Pt seen and assessed at [facility]. Seen at 0 [facility] today for 2 units platelet transfusion per standing orders for above lab values. Blood products transfused per [facility] policy. Pt tolerated transfusion well and without incident. Pt verbalizes understanding of discharge instructions. Discharged ambulatory to same day surgery with caregiver.     Electronically signed by Ramesh Gotti RN on 2/1/2022 at 8:10 AM

## 2022-02-01 NOTE — H&P
Patient:  Cherelle Brown   :   1955      Relevant clinical history, particularly as it involves the pending procedure, was reviewed and discussed. The procedure including risks and benefits was discussed at length with the patient (or designated family member) and all questions were answered. Informed consent to proceed with the procedure was given. Vital signs were monitored and documented by the Radiology nurse. Targeted physical examination  Heart : regular rate and rhythm  Lungs : clear, breathing easily  Condition : stable    [facility] nurses notes reviewed and agreed. Past Medical History:        Diagnosis Date    [facility] ([facility]     ESRD (end stage renal disease) on dialysis ([facility]     Mon-Wed-Fri       Past Surgical History:     No past surgical history on file. Allergies:  Patient has no known allergies.     Medications:   Home Meds  Current Outpatient Medications on File Prior to Encounter   Medication Sig Dispense Refill    levETIRAcetam (KEPPRA) 500 MG tablet Take 1 tablet by mouth every 12 hours 60 tablet 3    sodium bicarbonate 650 MG tablet Take 650 mg by mouth daily      Dexamethasone 20 MG TABS Take 20 mg by mouth - to be given with Velcade on days 1, 4, 8, and 11 of chemotherapy      Bortezomib (VELCADE IV) Infuse intravenously To be given as an infusion on days 1, 4, 8, and 11 of 28 day chemotherapy cycle      bendamustine (TREANDA) 100 MG To be given as an infusion on days 1 and 4 of 29 day chemotherapy cycle      levoFLOXacin (LEVAQUIN) 250 MG tablet Take 250 mg by mouth daily For prophylaxis during chemotherapy      calcium citrate-vitamin D (CITRICAL + D) 315-250 MG-UNIT TABS per tablet Take 1 tablet by mouth 2 times daily (with meals)      allopurinol (ZYLOPRIM) 100 MG tablet Take 200 mg by mouth daily       acyclovir (ZOVIRAX) 400 MG tablet Take 400 mg by mouth 2 times daily       dicyclomine (BENTYL) 20 MG tablet Take 20 mg by mouth 3 times daily as needed  prochlorperazine (COMPAZINE) 10 MG tablet Take 10 mg by mouth every 6 hours as needed      denosumab (XGEVA) 120 MG/1.7ML SOLN SC injection Inject 120 mg into the skin every 30 days      pantoprazole (PROTONIX) 40 MG tablet Take 1 tablet by mouth daily 30 tablet 0     No current facility-administered medications on file prior to encounter. Current Meds  No current facility-administered medications for this encounter.         ASA 2 - Patient with mild systemic disease with no functional limitations    II (soft palate, uvula, fauces visible)    Activity:  2 - Able to move 4 extremities voluntarily on command  Respiration:  2 - Able to breathe deeply and cough freely  Circulation:  2 - BP+/- 20mmHg of normal  Consciousness:  2 - Fully awake  Oxygen Saturation (color):  2 - Able to maintain oxygen saturation >92% on room air    Sedation : Moderate sedation planned

## 2022-02-02 NOTE — ONCOLOGY
Patient here for [facility] collection for Abecma CAR-T manufacturing. Name, MRN, DIN and KITE ID all verified with Bonita Gibbons RN ([facility]) Updated chemo calendar given to patient.  To report to [facility] on 2/3/22

## 2022-02-02 NOTE — H&P
OP Infusion History and Physical        Attending Physician: No att. providers found    Primary Care: No primary care provider on file. Referring MD: Fuentes Arce MD  [address] [facility] [address] [facility] [address]  [place],  [address]    Name: Opal Hurtado :  1955  MRN:  [id number]    Admission: 2022      Date: 2022    T-Cell collection    Dx: Multiple Myeloma     Indication: Anticipation of Abecma CAR-T cell Infusion     Day + 1 of T-cell collection     O : Vital Signs reviewed:     /83   Pulse 107   Temp 98.8 °F (37.1 °C) (Oral)   Resp 18   SpO2 96% . History of Present Illness: Eve Thomas is a 60 yo male w/ h/o kappa light chain multiple myeloma (Dx 10/2018) admitted today for high dose melphalan and autologous SCT (3/12/20).    He was initially diagnosed with myeloma 10/2018 after presenting to the ED following routine workplace physical and labs revealed JUDY, anemia and thrombocytopenia (SCr 4.6, Hgb 7.8 & platelets of 82L). He was admitted to [facility] for additional work-up and was found to have kappa light chain multiple myeloma and nephropathy. His myeloma work up (10/26/18) showed his serum kappa free light chains were 8353 and lambda free light chains 0.45 w/ a K/L ratio of 89920. SPEP monoclonal protein was barely detectable. A BM bx/asp (10/29/18) showed 80% plasma cells with a del 17 (TP53) and del 13q. He began treatment w/ Cytoxan, Velcade and dexamethasone (18) and had a good response. He then enrolled on clinical trial (19) w/ Ninlaro, Revlimid and dexamethasone, but his light chains started to increase. He then began treatment w/ carfilzomib, pomalidomide and dexamethasone (19). His restaging myeloma work -up (20) shows the followin hour urine - 228 mg protein per 24 hours, ANN MARIE positive for kappa light chains. SPEP showed minor m-spike consisting of free monoclonal kappa light chains.   His serum free light chain showed kappa of 127.00, lambda 1.09 w/ K/L ratio of  116.15. His BM bx/asp (1/10/20) showed 2.8% plasma cells w/ normal cytogenetics, but FISH is abnormal w/ one copy of CDKN2C (1p32.3) in 49.3%. , CCND1/IGH t(11:14) single fusion in 82% of cells, monosomy 13 in 87% of cells, one copy of MAF (16q23.2) in 85% of cells, TP53 (17p13.1) in 89% of cells and once copy of MAFB (20q12) in 78 % of cells. He is now s/p 6 cycles and has achieved a VGPR. He remains on hemodialysis 3 times weekly w/ Dr. Vidya Meeks.       He is now being admitted for high-dose melphalan & autologous SCT. He feels well today and denies fever, chills, sweats, change in appetite, visual changes, headache, sinus congestion, sore throat, mouth pain, cough, increased shortness of breath, chest pain, abdominal pain, nausea, vomiting, diarrhea, constipation, dysuria, hematuria, lower extremity pain or swelling. He has no complaints today. He will receive melphalan 140mg/m2 on 3/11/20 followed by autologous SCT - 5 x10^6 [id number]/kg in 280mL. He is in agreement with this plan and is ready to proceed. Past Surgical History:   Procedure Laterality Date    IR TUNNELED CATHETER PLACEMENT GREATER THAN 5 YEARS  2/1/2022    IR TUNNELED CATHETER PLACEMENT GREATER THAN 5 YEARS 2/1/2022 [facility] SPECIAL PROCEDURES       Past Medical History:   Diagnosis Date    Cancer ([facility]     ESRD (end stage renal disease) on dialysis ([facility]     Mon-Wed-Fri       Prior to Admission medications    Medication Sig Start Date End Date Taking?  Authorizing Provider   magnesium oxide (MAG-OX) 400 (240 Mg) MG tablet Take 1 tablet by mouth 2 times daily 2/2/22  Yes KIMBERLEY Donahue NP   levETIRAcetam (KEPPRA) 500 MG tablet Take 1 tablet by mouth every 12 hours 1/21/22  Yes Dawna Finnegan MD   sodium bicarbonate 650 MG tablet Take 650 mg by mouth daily   Yes Historical Provider, MD   levoFLOXacin (LEVAQUIN) 250 MG tablet Take 250 mg by mouth daily For prophylaxis during chemotherapy   Yes Historical Provider, MD   calcium citrate-vitamin D (CITRICAL + D) 315-250 MG-UNIT TABS per tablet Take 1 tablet by mouth 2 times daily (with meals)   Yes Historical Provider, MD   allopurinol (ZYLOPRIM) 100 MG tablet Take 200 mg by mouth daily    Yes Historical Provider, MD   acyclovir (ZOVIRAX) 400 MG tablet Take 400 mg by mouth 2 times daily    Yes Historical Provider, MD   dicyclomine (BENTYL) 20 MG tablet Take 20 mg by mouth 3 times daily as needed    Historical Provider, MD   prochlorperazine (COMPAZINE) 10 MG tablet Take 10 mg by mouth every 6 hours as needed    Historical Provider, MD   Dexamethasone 20 MG TABS Take 20 mg by mouth - to be given with Velcade on days 1, 4, 8, and 11 of chemotherapy    Historical Provider, MD   Bortezomib (VELCADE IV) Infuse intravenously To be given as an infusion on days 1, 4, 8, and 11 of 28 day chemotherapy cycle    Historical Provider, MD   bendamustine (TREANDA) 100 MG To be given as an infusion on days 1 and 4 of 29 day chemotherapy cycle    Historical Provider, MD   denosumab (XGEVA) 120 MG/1.7ML SOLN SC injection Inject 120 mg into the skin every 30 days    Historical Provider, MD   pantoprazole (PROTONIX) 40 MG tablet Take 1 tablet by mouth daily 2/19/20   Kerry Galindo MD       No Known Allergies    Family History   Problem Relation Age of Onset    Cancer Father         Social History     Socioeconomic History    Marital status: Life Partner     Spouse name: Cathy Guido Number of children: Not on file    Years of education: Not on file    Highest education level: Not on file   Occupational History    Not on file   Tobacco Use    Smoking status: Never Smoker    Smokeless tobacco: Never Used   Substance and Sexual Activity    Alcohol use: Not Currently    Drug use: Never    Sexual activity: Not Currently     Partners: Female   Other Topics Concern    Not on file   Social History Narrative    Not on file     Social Determinants of Health     Financial Resource Strain:     Difficulty of Paying Living Expenses: Not on file   Food Insecurity:     Worried About Running Out of Food in the Last Year: Not on file    Joel of Food in the Last Year: Not on file   Transportation Needs:     Lack of Transportation (Medical): Not on file    Lack of Transportation (Non-Medical): Not on file   Physical Activity:     Days of Exercise per Week: Not on file    Minutes of Exercise per Session: Not on file   Stress:     Feeling of Stress : Not on file   Social Connections:     Frequency of Communication with Friends and Family: Not on file    Frequency of Social Gatherings with Friends and Family: Not on file    Attends Yazdanism Services: Not on file    Active Member of [address] [place] [address] [employer] or Organizations: Not on file    Attends Club or Organization Meetings: Not on file    Marital Status: Not on file   Intimate Partner Violence:     Fear of Current or Ex-Partner: Not on file    Emotionally Abused: Not on file    Physically Abused: Not on file    Sexually Abused: Not on file   Housing Stability:     Unable to Pay for Housing in the Last Year: Not on file    Number of [place] in the Last Year: Not on file    Unstable Housing in the Last Year: Not on file        ROS:  As noted above, otherwise remainder of 10-point ROS negative      Physical Exam:     Vital Signs:  /83   Pulse 107   Temp 98.8 °F (37.1 °C) (Oral)   Resp 18   SpO2 96%     Weight:    Wt Readings from Last 3 Encounters:   02/01/22 236 lb (107 kg)   01/21/22 243 lb 12.8 oz (110.6 kg)   03/27/20 228 lb 6.3 oz (103.6 kg)       KPS: 80% Normal activity with effort; some signs or symptoms of disease    ECOG PS:  (1) Restricted in physically strenuous activity, ambulatory and able to do work of light nature    General: Awake, alert and oriented.   HEENT: normocephalic, alopecia, PERRL, no scleral erythema or icterus, Oral mucosa moist and intact, throat clear  NECK: supple without palpable adenopathy  BACK: Straight negative CVAT  SKIN: warm dry and intact without lesions rashes or masses  CHEST: CTA bilaterally without use of accessory muscles  CV: Normal S1 S2, RRR, no MRG  ABD: NT ND normoactive BS, no palpable masses or hepatosplenomegaly  EXTREMITIES: without edema, denies calf tenderness  NEURO: CN II - XII grossly intact  CATHETER: Right Subclavian Trifusion (IR: Rashad, 2/1/22): CDI    Laboratory Data:  CBC:   Recent Labs     02/01/22  0828 02/01/22  0859 02/02/22  0735   WBC 1.0* 0.7* 0.8*   HGB 9.5* 7.9* 8.3*   HCT 28.7* 23.8* 24.7*   .7* 100.9* 101.8*   PLT 74* 75* 48*     BMP/Mag:  Recent Labs     02/02/22  0735      K 3.9      CO2 24   BUN 32*   CREATININE 2.3*   MG 1.40*     LIVP:   Recent Labs     02/02/22  0735   AST 16   ALT 7*   BILITOT 0.4   ALKPHOS 76     Coags:   Recent Labs     02/01/22  0828   PROTIME 11.6   INR 1.03     Uric Acid No results for input(s): LABURIC in the last 72 hours. PROBLEM LIST:            1.  [place] Light Chain Multiple Myeloma   2.  ESRD on HD   3.  GERD  4.  HTN  5.  Hypokalemia       TREATMENT:            1. CyBorD (started 10/30/18)  2. Revlimid / Jennett Poncho / Dex on clinical trial (started 2/1/19)  3. Carfilzomib / Pomalidomide / Dex x 6 cycles (Relapse #1 - started 8/26/19 - 2/2020)  4. Cytoxan Mobilization w/ 25% dose reduction (2/17/20)  5. Melphalan 140mg/m2 & Autologous SCT 3/12/20  6. Kyprolis/Ebony/Dex  7. Velcade/Ebony/Dex  8. HD - CTX at [address] [place] [address] Street:            1. [place] Light Chain Multiple Myeloma:  Currently in VGPR  - S/p  reduced dose Melphalan (140 mg/M^2) followed by autologous stem cell rescue 3/12/20  - S/p Kyprolis/Pom/Dex with progression followed by Velcade/Ebony/Dex ending 8/311/21  - S/P HD CTX 9/27/21, 10/18, 10/19, 11/8 & 11/9  - Last CTX 11/8 and 11/9/21.   Receiving Bendamustine/Velcade at [address] [facility] [facility] 12/6/21 and C2 on 1/3/21  - S/p  C2 Velcade/Bendamustine - MMP panel sent 1/12/22. Light chains were improved. - BM biopsy: 1/12/22.  20% plasma cells . Thresa Abed p53 positive 79.5% of cells    Plan: Collect lymphocytes 2/2/22 and infusion of Abecma 3/21/22. [address] begin 3/16/22. - Plan on bridging chemotherapy once T cells collected. Begin C3D1 on 2/3/22.  - Last Velcade 2/17/22    Description of procedure:   Pt underwent a 20lt collection at a flow rate of 20mls/min. Pt tolerated the procedure well with no complications. He received Ca gluconate empirically. Access was via Right Subclavian Trifusion cath which is intact at the insertion site. Day 1 T-cell collection with neupogen/mozobil, goal is 5 mil cd 34+/kg. He will be notified of the cell number later this evening. Toxicities: mild weakness and fatigue     2. ID: afebrile no signs of infection  - Cont acyclovir 200 BID  - Vaccines: #1 (9/14/20), #2 (11/16/20), #3 (1/11/21)   - Flu shot 11/16/20      3. Heme: pancytopenia from chemotherapy   - Transfuse for Hgb < 7 and Platelets < 28X (subdural hematoma)  - Plt transfusion 1/26 and 1/27     4. ESRD / Metabolic: HypoPhos. Most recent creatinine 3.28 from 9/27/21.  - Followed by Dr. Balderas Counter  - S/p HD - creatinine stable approx mid 2\"s. 2.  ID:  Afebrile, no evidence of infection.    - Begin Valtrex, cont acyclovir at discharge for VZV positive titer.    - Start Diflucan prophylaxis on <1.5  - Start Levaquin when [address] < 1.5    3. Heme: Pancytopenic d/t recent chemotherapy   - Transfuse for Hgb < 7 and Platelets < 48K  - No transfusion today    4. Metabolic:  Electrolytes are WNL and renal fxn stable. - Start IVF hydration   - Replace potassium and magnesium per policy. 5. Nutrition:  Appetite and oral intake is good. - Start low microbial diet   - Follow closely with dietary    - Disposition:  home    The patient was seen and examined by Dr. Ang Young. This admission history and physical has been discussed and agreed upon by Dr. Ang Young.     Camille Fung DO

## 2022-02-02 NOTE — H&P
OP Infusion History and Physical        Attending Physician: No att. providers found    Primary Care: No primary care provider on file. Referring MD: Linda Mario MD  [address] [facility] [address]  [place],  [address]    Name: Inna Nguyễn :  1955  MRN:  [id number]    Admission: 2022      Date: 2022    T-Cell collection    Dx: Multiple Myeloma     Indication: Anticipation of Abecma CAR-T cell Infusion     Day + 1 of T-cell collection     O : Vital Signs reviewed:     /83   Pulse 107   Temp 98.8 °F (37.1 °C) (Oral)   Resp 18   SpO2 96% . History of Present Illness: Kerri Tejeda is a 60 yo male w/ h/o kappa light chain multiple myeloma (Dx 10/2018) admitted today for high dose melphalan and autologous SCT (3/12/20).    He was initially diagnosed with myeloma 10/2018 after presenting to the ED following routine workplace physical and labs revealed JUDY, anemia and thrombocytopenia (SCr 4.6, Hgb 7.8 & platelets of 34P). He was admitted to [facility] for additional work-up and was found to have kappa light chain multiple myeloma and nephropathy. His myeloma work up (10/26/18) showed his serum kappa free light chains were 8353 and lambda free light chains 0.45 w/ a K/L ratio of 85931. SPEP monoclonal protein was barely detectable. A BM bx/asp (10/29/18) showed 80% plasma cells with a del 17 (TP53) and del 13q. He began treatment w/ Cytoxan, Velcade and dexamethasone (18) and had a good response. He then enrolled on clinical trial (19) w/ Ninlaro, Revlimid and dexamethasone, but his light chains started to increase. He then began treatment w/ carfilzomib, pomalidomide and dexamethasone (19). His restaging myeloma work -up (20) shows the followin hour urine - 228 mg protein per 24 hours, ANN MARIE positive for kappa light chains. SPEP showed minor m-spike consisting of free monoclonal kappa light chains.   His serum free light chain showed kappa of 127.00, lambda 1.09 w/ K/L ratio of  116.15. His BM bx/asp (1/10/20) showed 2.8% plasma cells w/ normal cytogenetics, but FISH is abnormal w/ one copy of CDKN2C (1p32.3) in 49.3%. , CCND1/IGH t(11:14) single fusion in 82% of cells, monosomy 13 in 87% of cells, one copy of MAF (16q23.2) in 85% of cells, TP53 (17p13.1) in 89% of cells and once copy of MAFB (20q12) in 78 % of cells. He is now s/p 6 cycles and has achieved a VGPR. He remains on hemodialysis 3 times weekly w/ Dr. Flaco Melendez.       He is now being seen in OP Infusion for Abecma CAR-T cell collection. He feels well today and denies fever, chills, sweats, change in appetite, visual changes, headache, sinus congestion, sore throat, mouth pain, cough, increased shortness of breath, chest pain, abdominal pain, nausea, vomiting, diarrhea, constipation, dysuria, hematuria, lower extremity pain or swelling. He has no complaints today. Past Surgical History:   Procedure Laterality Date    IR TUNNELED CATHETER PLACEMENT GREATER THAN 5 YEARS  2/1/2022    IR TUNNELED CATHETER PLACEMENT GREATER THAN 5 YEARS 2/1/2022 [facility] SPECIAL PROCEDURES       Past Medical History:   Diagnosis Date    Cancer ([facility] [address]     ESRD (end stage renal disease) on dialysis ([facility] [address]     Mon-Wed-Fri       Prior to Admission medications    Medication Sig Start Date End Date Taking?  Authorizing Provider   levETIRAcetam (KEPPRA) 500 MG tablet Take 1 tablet by mouth every 12 hours 1/21/22  Yes [facility], MD   sodium bicarbonate 650 MG tablet Take 650 mg by mouth daily   Yes Historical Provider, MD   levoFLOXacin (LEVAQUIN) 250 MG tablet Take 250 mg by mouth daily For prophylaxis during chemotherapy   Yes Historical Provider, MD   calcium citrate-vitamin D (CITRICAL + D) 315-250 MG-UNIT TABS per tablet Take 1 tablet by mouth 2 times daily (with meals)   Yes Historical Provider, MD   allopurinol (ZYLOPRIM) 100 MG tablet Take 200 mg by mouth daily    Yes Historical Provider, MD   acyclovir (ZOVIRAX) 400 MG tablet Take 400 mg by mouth 2 times daily    Yes Historical Provider, MD   dicyclomine (BENTYL) 20 MG tablet Take 20 mg by mouth 3 times daily as needed    Historical Provider, MD   prochlorperazine (COMPAZINE) 10 MG tablet Take 10 mg by mouth every 6 hours as needed    Historical Provider, MD   Dexamethasone 20 MG TABS Take 20 mg by mouth - to be given with Velcade on days 1, 4, 8, and 11 of chemotherapy    Historical Provider, MD   Bortezomib (VELCADE IV) Infuse intravenously To be given as an infusion on days 1, 4, 8, and 11 of 28 day chemotherapy cycle    Historical Provider, MD   bendamustine (TREANDA) 100 MG To be given as an infusion on days 1 and 4 of 29 day chemotherapy cycle    Historical Provider, MD   denosumab (XGEVA) 120 MG/1.7ML SOLN SC injection Inject 120 mg into the skin every 30 days    Historical Provider, MD   pantoprazole (PROTONIX) 40 MG tablet Take 1 tablet by mouth daily 2/19/20   Fuentes Arce MD       No Known Allergies    Family History   Problem Relation Age of Onset    Cancer Father         Social History     Socioeconomic History    Marital status: Life Partner     Spouse name: Lavinia Camacho Number of children: Not on file    Years of education: Not on file    Highest education level: Not on file   Occupational History    Not on file   Tobacco Use    Smoking status: Never Smoker    Smokeless tobacco: Never Used   Substance and Sexual Activity    Alcohol use: Not Currently    Drug use: Never    Sexual activity: Not Currently     Partners: Female   Other Topics Concern    Not on file   Social History Narrative    Not on file     Social Determinants of Health     Financial Resource Strain:     Difficulty of Paying Living Expenses: Not on file   Food Insecurity:     Worried About Running Out of Food in the Last Year: Not on file    Joel of Food in the Last Year: Not on file   Transportation Needs:     Lack of Transportation (Medical): Not on file    Lack of Transportation (Non-Medical): Not on file   Physical Activity:     Days of Exercise per Week: Not on file    Minutes of Exercise per Session: Not on file   Stress:     Feeling of Stress : Not on file   Social Connections:     Frequency of Communication with Friends and Family: Not on file    Frequency of Social Gatherings with Friends and Family: Not on file    Attends Religion Services: Not on file    Active Member of [address] [place] [address] [employer] or Organizations: Not on file    Attends Club or Organization Meetings: Not on file    Marital Status: Not on file   Intimate Partner Violence:     Fear of Current or Ex-Partner: Not on file    Emotionally Abused: Not on file    Physically Abused: Not on file    Sexually Abused: Not on file   Housing Stability:     Unable to Pay for Housing in the Last Year: Not on file    Number of [place] in the Last Year: Not on file    Unstable Housing in the Last Year: Not on file        ROS:  As noted above, otherwise remainder of 10-point ROS negative      Physical Exam:     Vital Signs:  /83   Pulse 107   Temp 98.8 °F (37.1 °C) (Oral)   Resp 18   SpO2 96%     Weight:    Wt Readings from Last 3 Encounters:   02/01/22 236 lb (107 kg)   01/21/22 243 lb 12.8 oz (110.6 kg)   03/27/20 228 lb 6.3 oz (103.6 kg)       KPS: 80% Normal activity with effort; some signs or symptoms of disease    ECOG PS:  (1) Restricted in physically strenuous activity, ambulatory and able to do work of light nature    General: Awake, alert and oriented.   HEENT: normocephalic, alopecia, PERRL, no scleral erythema or icterus, Oral mucosa moist and intact, throat clear  NECK: supple without palpable adenopathy  BACK: Straight negative CVAT  SKIN: warm dry and intact without lesions rashes or masses  CHEST: CTA bilaterally without use of accessory muscles  CV: Normal S1 S2, RRR, no MRG  ABD: NT ND normoactive BS, no palpable masses or hepatosplenomegaly  EXTREMITIES: without edema, denies calf tenderness  NEURO: CN II - XII grossly intact  CATHETER: Right Subclavian Trifusion (IR: Rashad, 2/1/22): CDI    Laboratory Data:  CBC:   Recent Labs     02/01/22  0828 02/01/22  0859   WBC 1.0* 0.7*   HGB 9.5* 7.9*   HCT 28.7* 23.8*   .7* 100.9*   PLT 74* 75*     BMP/Mag:  Recent Labs     02/02/22  0735      K 3.9      CO2 24   BUN 32*   CREATININE 2.3*   MG 1.40*     LIVP:   Recent Labs     02/02/22  0735   AST 16   ALT 7*   BILITOT 0.4   ALKPHOS 76     Coags:   Recent Labs     02/01/22  0828   PROTIME 11.6   INR 1.03     Uric Acid No results for input(s): LABURIC in the last 72 hours. PROBLEM LIST:            1.  Radium Light Chain Multiple Myeloma   2.  ESRD on HD   3.  GERD  4.  HTN  5.  Hypokalemia       TREATMENT:            1. CyBorD (started 10/30/18)  2. Revlimid / Samantha Mt / Dex on clinical trial (started 2/1/19)  3. Carfilzomib / Pomalidomide / Dex x 6 cycles (Relapse #1 - started 8/26/19 - 2/2020)  4. Cytoxan Mobilization w/ 25% dose reduction (2/17/20)  5. Melphalan 140mg/m2 & Autologous SCT 3/12/20  6. Kyprolis/Eboyn/Dex  7. Velcade/Ebony/Dex  8. HD - CTX at [address] [place] [address]:            1. Radium Light Chain Multiple Myeloma:  Currently in VGPR  - S/p  reduced dose Melphalan (140 mg/M^2) followed by autologous stem cell rescue 3/12/20  - S/p Kyprolis/Pom/Dex with progression followed by Velcade/Ebony/Dex ending 8/311/21  - S/P HD CTX 9/27/21, 10/18, 10/19, 11/8 & 11/9  - Last CTX 11/8 and 11/9/21. Receiving Bendamustine/Velcade at [address] [facility] 12/6/21 and C2 on 1/3/21  - S/p  C2 Velcade/Bendamustine - MMP panel sent 1/12/22. Light chains were improved. - BM biopsy: 1/12/22.  20% plasma cells . Jayjay Singh p53 positive 79.5% of cells    Plan: Collect lymphocytes 2/2/22 and infusion of Abecma 3/21/22. [address] begin 3/16/22. - Plan on bridging chemotherapy once T cells collected.   Begin C3D1 on 2/3/22.  - Last Velcade 2/17/22    Description of procedure:   Pt underwent a 20lt collection at a flow rate of 20mls/min. Pt tolerated the procedure well with no complications. He received Ca gluconate empirically. Access was via Right Subclavian Trifusion cath which is intact at the insertion site. Day 1 T-cell collection with neupogen/mozobil, goal is 5 mil cd 34+/kg. He will be notified of the cell number later this evening. Toxicities: mild weakness and fatigue     2. ID: afebrile no signs of infection    3. Heme: pancytopenia from chemotherapy   - Transfuse for Hgb < 7 and Platelets < 08V (subdural hematoma)  - Plt transfusion 1/26 and 1/27     4. ESRD / Metabolic: HypoPhos. Most recent creatinine 3.28 from 9/27/21.  - Followed by Dr. Vijaya Fofana  - S/p HD - creatinine stable approx mid 2\"s. 5.  Metabolic:  Electrolytes are WNL and renal fxn stable. - Start IVF hydration   - Replace potassium and magnesium per policy. 6. Nutrition:  Appetite and oral intake is good. - Start low microbial diet   - Follow closely with dietary    - Disposition: Patient will return to [facility] for bridging chemotherapy tomorrow, 2/3/22. The patient was seen and examined by Dr. Rosita Santiago. This admission history and physical has been discussed and agreed upon by Dr. Rosita Santiago.     KIMBERLEY Sharma - NP

## 2022-02-02 NOTE — PROGRESS NOTES
Pt arrived to OPO today for scheduled stem cell pheresis procedure. Apheresis, line care, education, & lab draws all completed by Eugenia TRUONG, Raymonde Curling. Review Eugenia documentation for details.

## 2022-02-03 NOTE — PROGRESS NOTES
Patient's hemoglobin this AM: Recent Labs     02/03/22     HGB   8.0      Patient's platelet count this AM:   Recent Labs     02/03/22     PLT   48.0     Pt seen and assessed at [facility]. Seen at [address] [place] [address] today for 1 unit of Platelets per standing orders for above lab values. Blood products transfused per [facility] policy. Pt tolerated transfusion well and without incident. Pt verbalizes understanding of discharge instructions. Discharged ambulatory to home with his Wife.     Electronically signed by Na Rai RN on 2/3/2022 at 11:26 AM

## 2022-02-08 NOTE — PROGRESS NOTES
Patient's platelet count this AM: 36 parameters are to transfuse below 50. Pt seen and assessed at [facility]. Seen at [facility] today for 1 unit of Platelets per standing orders for above lab values. Blood products transfused per [facility] policy. Pt tolerated transfusion well and without incident. Pt verbalizes understanding of discharge instructions. Discharged ambulatory to home with his Wife.     Electronically signed by Petrona Aggarwal RN on 2/8/2022

## 2022-02-14 NOTE — PROGRESS NOTES
Patient's platelet count this AM: 33 parameters are to transfuse below 50. Pt seen and assessed at [facility]. Seen at [facility] today for 1 unit of Platelets per standing orders for above lab values. Blood products transfused per [facility] policy. Pt tolerated transfusion well and without incident. Pt verbalizes understanding of discharge instructions. Discharged ambulatory to home with his family friend.     Electronically signed by Wolf Mcfadden RN on 2/14/2022

## 2022-02-20 NOTE — PROGRESS NOTES
Patient's hemoglobin this AM: 9.2   Patient's platelet count this AM: 28  Pt seen and assessed at [facility]. Seen at [facility] today for 1 unit of Platelets per standing orders for above lab values. Desired platelet level is above 50. Blood products transfused per [facility] policy. Pt tolerated transfusion well and without incident. Pt verbalizes understanding of discharge instructions. Discharged ambulatory to home.       Electronically signed by Abdias Millan RN on 2/20/2022 at 10:34 AM

## 2022-02-24 NOTE — PROGRESS NOTES
Patient's hemoglobin this AM: 10.2   Patient's platelet count this AM: 31  Pt seen and assessed at [facility]. Seen at [facility] today for 1 unit of Platelets per standing orders for above lab values. Blood products transfused per [facility] policy. Pt tolerated transfusion well and without incident. Pt verbalizes understanding of discharge instructions. Discharged ambulatory to home.     Electronically signed by Marcelino Cole RN on 2/24/22 at 10:46 AM EST

## 2022-02-28 NOTE — PROGRESS NOTES
Patient's platelet count this AM: 24  Pt seen and assessed at [facility]. Seen at [address] [facility] today for 1 unit of Platelets per standing orders for above lab values. Blood products transfused per [facility] policy. Pt tolerated transfusion well and without incident. Pt verbalizes understanding of discharge instructions. Discharged ambulatory to home.

## 2022-03-03 NOTE — PROGRESS NOTES
Patient's platelet count this AM: 34  Pt seen and assessed at [facility]. Seen at [address] [facility] today for 1 unit of Platelets per standing orders for above lab values. Blood products transfused per [facility] policy. Pt tolerated transfusion well and without incident. Pt verbalizes understanding of discharge instructions. Discharged ambulatory to home.

## 2022-03-07 NOTE — PROGRESS NOTES
Patient's platelet count this AM: 43  Pt seen and assessed at [facility]. Seen at [address] [facility] today for 1 unit of Platelets per standing orders for above lab values. Blood products transfused per [facility] policy. Pt tolerated transfusion well and without incident. Pt verbalizes understanding of discharge instructions. Discharged ambulatory to home.

## 2022-03-10 NOTE — H&P
[facility] History and Physical        Attending Physician: Tim Hoffmann MD    Primary Care: No primary care provider on file. Referring MD: No referring provider defined for this encounter. Name: Agapito Villasenor :  1955  MRN:  [id number]    Admission: 3/21/2022      Date: 3/21/2022    Reason for Admission: CAR-T infusion and monitoring     History of Present Illness:   Darrius Pack a 76 yo male w/ h/o [place] Light Chain Multiple Myeloma (Dx 10/2018). He was initially diagnosed with myeloma 10/2018 after presenting to the ED following routine workplace physical and labs revealed JUDY, anemia and thrombocytopenia (SCr 4.6, Hgb 7.8 & platelets of 86O). 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 Patient has received multiple lines of therapy including Melphalan followed by an Autologous Stem Cell on 3/12/20. Unfortunately, patient progressed and was re-initiated on multiple lines of therapy. He most recently has received Bendamustine, Velcade, Dex starting 21.      He received lymphodepleting chemotherapy consisting of Fludarabine and Cytoxan and is now being admitted for Abecma CAR-T infusion (3/21/22). Patient feels well with no complaints of fever, cough, SOB, chest pain, dizziness, nausea, vomiting, diarrhea, or dysuria. Past Surgical History:   Procedure Laterality Date    IR TUNNELED CATHETER PLACEMENT GREATER THAN 5 YEARS  2022    IR TUNNELED CATHETER PLACEMENT GREATER THAN 5 YEARS 2022 [facility]       Past Medical History:   Diagnosis Date    Cancer [facility] [address]     ESRD (end stage renal disease) on dialysis ([facility] [address]     Mon-Wed-Fri       Prior to Admission medications    Medication Sig Start Date End Date Taking?  Authorizing Provider   pantoprazole sodium (PROTONIX) 40 MG PACK packet Take 40 mg by mouth every morning (before breakfast)   Yes Historical Provider, MD acyclovir (ZOVIRAX) 400 MG tablet Take 1 tablet by mouth 2 times daily 3/17/22   KIMBERLEY Kennedy NP   fluconazole (DIFLUCAN) 100 MG tablet Take 1 tablet by mouth daily 3/17/22 4/16/22  KIMBERLEY Kennedy NP   Calcium Citrate-Vitamin D (CALCIUM + VIT D, BARIATRIC ADVANTAGE, CHEWABLE TABLET) Take 1 tablet by mouth daily 3/16/22   KIMBERLEY Kennedy NP   levETIRAcetam (KEPPRA) 500 MG tablet Take 1 tablet by mouth every 12 hours 1/21/22   Rudi Pickard MD   sodium bicarbonate 650 MG tablet Take 650 mg by mouth daily    Historical Provider, MD   prochlorperazine (COMPAZINE) 10 MG tablet Take 10 mg by mouth every 6 hours as needed  Patient not taking: Reported on 3/21/2022    Historical Provider, MD   levoFLOXacin (LEVAQUIN) 250 MG tablet Take 250 mg by mouth daily For prophylaxis during chemotherapy    Historical Provider, MD   allopurinol (ZYLOPRIM) 100 MG tablet Take 200 mg by mouth daily     Historical Provider, MD       Allergies   Allergen Reactions    Decadron [Dexamethasone]      Patient is receiving CAR-T. No steroids unless approved by [address] physician.        Family History   Problem Relation Age of Onset    Cancer Father         Social History     Socioeconomic History    Marital status: Life Partner     Spouse name: Abel Busch Number of children: Not on file    Years of education: Not on file    Highest education level: Not on file   Occupational History    Not on file   Tobacco Use    Smoking status: Never Smoker    Smokeless tobacco: Never Used   Substance and Sexual Activity    Alcohol use: Not Currently    Drug use: Never    Sexual activity: Not Currently     Partners: Female   Other Topics Concern    Not on file   Social History Narrative    Not on file     Social Determinants of Health     Financial Resource Strain:     Difficulty of Paying Living Expenses: Not on file   Food Insecurity:     Worried About Running Out of Food in the Last Year: Not on file    [address] in the Last Year: Not on file   Transportation Needs:     Lack of Transportation (Medical): Not on file    Lack of Transportation (Non-Medical): Not on file   Physical Activity:     Days of Exercise per Week: Not on file    Minutes of Exercise per Session: Not on file   Stress:     Feeling of Stress : Not on file   Social Connections:     Frequency of Communication with Friends and Family: Not on file    Frequency of Social Gatherings with Friends and Family: Not on file    Attends Protestant Services: Not on file    Active Member of [address] [place] [address] IMT or Organizations: Not on file    Attends Club or Organization Meetings: Not on file    Marital Status: Not on file   Intimate Partner Violence:     Fear of Current or Ex-Partner: Not on file    Emotionally Abused: Not on file    Physically Abused: Not on file    Sexually Abused: Not on file   Housing Stability:     Unable to Pay for Housing in the Last Year: Not on file    Number of [place] in the Last Year: Not on file    Unstable Housing in the Last Year: Not on file        ROS:  As noted above, otherwise remainder of 10-point ROS negative      Physical Exam:     Vital Signs:  /78   Pulse 90   Temp 98.1 °F (36.7 °C) (Oral)   Resp 18   Wt 251 lb 6.4 oz (114 kg)   SpO2 97%   BMI 30.60 kg/m²     Weight:    Wt Readings from Last 3 Encounters:   03/21/22 251 lb 6.4 oz (114 kg)   03/18/22 255 lb 1.2 oz (115.7 kg)   03/17/22 258 lb 13.1 oz (117.4 kg)       KPS: 70% Cares for self; unable to carry on normal activity or to do active work    ECOG PS:  (1) Restricted in physically strenuous activity, ambulatory and able to do work of light nature    General: Awake, alert and oriented.   HEENT: normocephalic, alopecia, PERRL, no scleral erythema or icterus, Oral mucosa moist and intact, throat clear  NECK: supple without palpable adenopathy  BACK: Straight negative CVAT  SKIN: warm dry and intact without lesions rashes or masses  CHEST: CTA bilaterally without use of accessory muscles  CV: Normal S1 S2, RRR, no MRG  ABD: NT ND normoactive BS, no palpable masses or hepatosplenomegaly  EXTREMITIES: without edema, denies calf tenderness  NEURO: CN II - XII grossly intact  CATHETER: Right Tunneled HD catheter    Laboratory Data:  CBC: No results for input(s): WBC, HGB, HCT, MCV, PLT in the last 72 hours. BMP/Mag:No results for input(s): NA, K, CL, CO2, PHOS, BUN, CREATININE, CA, MG in the last 72 hours. LIVP: No results for input(s): AST, ALT, LIPASE, BILIDIR, BILITOT, ALKPHOS in the last 72 hours. Invalid input(s): AMYLASE,  ALB  Coags: No results for input(s): PROTIME, INR, APTT in the last 72 hours. Uric Acid No results for input(s): LABURIC in the last 72 hours. No results found for: CRP  No results found for: FERRITIN    PROBLEM LIST:          1.  [place] Light Chain Multiple Myeloma   2.  ESRD on HD   3.  GERD  4.  HTN  5.  Hypokalemia   6. Subdural Hematoma (1/2022)  7. Seizures (1/2022)      TREATMENT:            1. CyBorD (started 10/30/18)  2. Revlimid / Lei Coke / Dex on clinical trial (started 2/1/19)  3. Carfilzomib / Pomalidomide / Dex x 6 cycles (Relapse #1 - started 8/26/19 - 2/2020)  4. Cytoxan Mobilization w/ 25% dose reduction (2/17/20)  5. Melphalan 140mg/m2 & Autologous SCT 3/12/20  6. Kyprolis/Ebony/Dex (6/30/21-7/28/21)- progression  7. Velcade/Ebony/Dex (5/18/21-8/3/21)- progression  8. HD - CTX    -Cycle 1 9/27/21   -Cycle 2 10/18/21   -Cycle 3 11/8/21- stopped d/t severe fatigue  9. Bendamustine/Velcade/Dex 12/06/21  10. Velcade only 2/11/22  10. Fludarabine/Cytoxan followed by CAR-T infusion    ASSESSMENT AND PLAN:            1. [place] Light Chain Multiple Myeloma:  Currently in [facility]  - BM biopsy: 1/12/22.  20% plasma cells . Tom Boatengr  p53 positive 79.5% of cells  -1/28/22 Serum KFLC 1662.71, Lambda 3.05   K/L ratio 545.15  -2/24/22 Serum KFLC 1919.12, Lambda 2.54, K/L raio 755.56  -s/p Fludarabine and Cytoxan 3/16/22-3/18/22  -Post CAR-T infusion staging:  PET/CT, BM bx with Flow, FISH, CG, MMP  Dr. Nathen Evans has discussed the risks associated with CAR-T which include the risk of CRS toxicity, neurological toxicity, infection, bleeding,  inability to obtain a long term remission and death. He/She understands these risks and is willing to proceed. The consent is signed and on the chart. Two doses of Tocilizumab will remain available in the inpatient pharmacy until day + 28 post - infusion. Lymphodepleting Chemotherapy:  Fludarabine and cyclophosphamide   Disease Status at time of Infusion:  Progressive disease  CAR-T Infusion Date:  3/21/22  CAR-T Product:  Abecma  Batch ID Number:  [id number]      Day  0    2. CRS / Neuro:  No evidence  - Monitor CRP and Ferrtin closely   No results found for: CRP  No results found for: FERRITIN  - Neuro checks w/ CARTOX 10-point assessment Q4hrs    Toxicity Grading    CRS Grade: 0 (pre-treatment state)    ICANS Grade: 0 (pre-treatment state)    ICE Score: 0 (pre-treatment state)    3. ID:  Afebrile, no evidence of infection.    - Start Diflucan, Valtrex and Levaquin when ANC < 1.5    4. Heme:  Pancytopenia d/t recent chemotherapy   - Transfuse for Hgb < 7 and Platelets < 28V (recent subdural)  - No transfusion today. Given hx of subdural hematoma, will keep platelets >23Y. 5.  ESRD/Metabolic:  Electrolytes are WNL and renal fxn stable. ESRD- off HD currently. Followed by Dr. B[facility]  -Current baseline Cr 2.1-2.6  TLS:  No evidence, cont allopurinol and daily TLS labs   - Start IVF hydration   - Replace potassium and magnesium per policy. 6. Nutrition:  Appetite and oral intake is good. - Start low microbial diet   - Follow closely with dietary    7.   Neuro:  Recent seizure/subdural hematoma (1/2022)  -Continue Keppra 500 mg PO BID indefinitely  -Keep platelets >83,868      - DVT Prophylaxis: Platelets <35,681 cells/dL - prophylactic lovenox on hold and mechanical prophylaxis with bilateral SCDs while in bed in place. Contraindications to pharmacologic prophylaxis: Thrombocytopenia  Contraindications to mechanical prophylaxis: None    - Disposition:  Anticipating discharge in 2-3 days if no complication from CAR-T. The patient was seen and examined by Dr. Laly Mary. This admission history and physical has been discussed and agreed upon by Dr. Laly Mary.     Jennifer Penny MD PGY-5

## 2022-03-10 NOTE — PROGRESS NOTES
Patient's platelet count this AM: 44  Pt seen and assessed at [facility]. Seen at [facility] [facility] today for 1 unit of Platelets per standing orders for above lab values. Blood products transfused per [facility] policy. Pt tolerated transfusion well and without incident. Pt verbalizes understanding of discharge instructions. Discharged ambulatory to home.

## 2022-03-14 NOTE — PROGRESS NOTES
Patient's platelet count this AM: 34  Pt seen and assessed at [facility]. Seen at [address] [facility] today for 1 unit of Platelets per standing orders for above lab values. Blood products transfused per [facility] policy. Pt tolerated transfusion well and without incident. Pt verbalizes understanding of discharge instructions. Discharged ambulatory to home.

## 2022-03-15 PROBLEM — C90.00 MULTIPLE MYELOMA (HCC): Status: ACTIVE | Noted: 2022-01-01

## 2022-03-16 NOTE — ONCOLOGY
Original chemotherapy orders reviewed and acknowledged. Appropriateness of chemotherapy treatment regimen Fludarabine and cyclophosphamide for diagnosis of multiple myloma was verified. Patient educated on chemotherapy regimen. Consent for chemotherapy obtained. Estimated body surface area is 2.48 meters squared as calculated from the following:    Height as of this encounter: 6' 4\" (1.93 m). Weight as of this encounter: 251 lb 15.8 oz (114.3 kg). verified. Appropriate dosing calculations of chemotherapy based on above height, weight, and BSA verified.       Kelly Grubbs RN3/16/2022  12:04 PM

## 2022-03-16 NOTE — H&P
[facility] History and Physical        Attending Physician: No att. providers found    Primary Care: No primary care provider on file. Referring MD: Charlene Monroe MD  [address]  [place],  [address]    Name: Inna Nguyễn :  1955  MRN:  [id number]    Admission: 3/16/2022      Date: 3/16/2022    Reason for Admission: Lymphodepleting chemotherapy for Abecma CAR-T Infusion on 3/21/22 for Multiple Myeloma     History of Present Illness: Barb Brooke a 78 yo male w/ h/o kappa light chain multiple myeloma (Dx 10/2018). He was initially diagnosed with myeloma 10/2018 after presenting to the ED following routine workplace physical and labs revealed JUDY, anemia and thrombocytopenia (SCr 4.6, Hgb 7.8 & platelets of 34I). 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  His myeloma work up (10/26/18) showed his serum kappa free light chains were 8353 and lambda free light chains 0.45 w/ a K/L ratio of 99189.  SPEP monoclonal protein was barely detectable.  A BM bx/asp (10/29/18) showed 80% plasma cells with a del 17 (TP53) and del 13q.  He began treatment w/ Cytoxan, Velcade and dexamethasone (18) and had a good response. [facility] then enrolled on clinical trial (19) w/ Ninlaro, Revlimid and dexamethasone, but his light chains started to increase.  He then began treatment w/ carfilzomib, pomalidomide and dexamethasone (19).  His restaging myeloma work -up (20) shows the followin hour urine - 228 mg protein per 24 hours, ANN MARIE positive for kappa light chains.  SPEP showed minor m-spike consisting of free monoclonal kappa light chains.  His serum free light chain showed kappa of 127.00, lambda 1.09 w/ K/L ratio of  116. 15.  His BM bx/asp (1/10/20) showed 2.8% plasma cells w/ normal cytogenetics, but FISH is abnormal w/ one copy of CDKN2C (1p32.3) in 49.3%. , CCND1/IGH t(11:14) single fusion in 82% of cells, monosomy 13 in 87% of cells, one copy of MAF (16q23.2) in 85% of cells, TP53 (17p13.1) in 89% of cells and once copy of MAFB (20q12) in 78 % of cells. He then was s/p 6 cycles and had achieved a VGPR.  He remained on hemodialysis 3 times weekly w/ Dr. Davon Galloway and received high dose melphalan and autologous SCT (3/12/20). He then received Kyprolis, Pomalyst, dex (6/30/20-7/28/21) with progression of disease. He received Cytoxan on 9/27/21 and 2 cycles of Bendamustine + Velcade (12/6/21-1/3/21) and on 2/11/22-2/24/22 he received Velcade only. His past medical history includes a recent hospitalization for apparent seizure at home on 1/18/22. CT demonstrated new subdural hematoma in right frontoparietal region measuring 6mm. Placed on Keppra 500 mg b.i.d. His platelet parameters are to keep >50,000. Previously on hemodialysis. Creatinine maintains in the 2s, most recent 2.73. His BM biopsy on 1/12/22, showed Clustered plasma cell with 20% of cells, hypercellular marrow at 60% with a marked increase in red cell precursors and dysplasia in all 3 cell lines. The dysplasia is likely secondary to extensive and ongoing abnormal cells. His PET scan on 1/26/22 showed Hypermetabolic lesions consistent with active multiple myeloma. He was then worked up for SPEEDELO CAR-T Infusion. He collected [address] T-cells for Abecma CAR-T manufacturing on 2/2/22 and resumed bridging chemotherapy after collection. His Myeloma labs are consistent with progressive disease:   1/28/22 Serum KFLC 1662.71, K/L ratio 545.15, Lambda 3.05  2/24/22 Serum KFLC 1919.12, K/L ratio 755.56, Lambda 2.54  2/28/22 Urine KFLC 2285.53, K/L ratio 196.52, Lambda 11.63    He is now being seen in OP Infusion for lymphodepleting chemotherapy for Abecma CAR-T Infusion on 3/21/22.  He feels well today and denies fever, chills, sweats, change in appetite, visual changes, headache, sinus congestion, sore throat, mouth pain, cough, increased shortness of breath, chest pain, abdominal pain, nausea, vomiting, diarrhea, constipation, dysuria, hematuria, lower extremity pain or swelling. He has no complaints today.          Past Surgical History:   Procedure Laterality Date    IR TUNNELED CATHETER PLACEMENT GREATER THAN 5 YEARS  2/1/2022    IR TUNNELED CATHETER PLACEMENT GREATER THAN 5 YEARS 2/1/2022 [facility] SPECIAL PROCEDURES       Past Medical History:   Diagnosis Date    [facility]     ESRD (end stage renal disease) on dialysis ([facility]     Mon-Wed-Fri       Prior to Admission medications    Medication Sig Start Date End Date Taking?  Authorizing Provider   magnesium oxide (MAG-OX) 400 (240 Mg) MG tablet Take 1 tablet by mouth 2 times daily  Patient not taking: Reported on 3/16/2022 2/2/22   KIMBERLEY Reina NP   levETIRAcetam (KEPPRA) 500 MG tablet Take 1 tablet by mouth every 12 hours 1/21/22   Donald Chan MD   sodium bicarbonate 650 MG tablet Take 650 mg by mouth daily    Historical Provider, MD   dicyclomine (BENTYL) 20 MG tablet Take 20 mg by mouth 3 times daily as needed  Patient not taking: Reported on 3/16/2022    Historical Provider, MD   prochlorperazine (COMPAZINE) 10 MG tablet Take 10 mg by mouth every 6 hours as needed    Historical Provider, MD   Dexamethasone 20 MG TABS Take 20 mg by mouth - to be given with Velcade on days 1, 4, 8, and 11 of chemotherapy  Patient not taking: Reported on 3/16/2022    Historical Provider, MD   Bortezomib (VELCADE IV) Infuse intravenously To be given as an infusion on days 1, 4, 8, and 11 of 28 day chemotherapy cycle  Patient not taking: Reported on 3/16/2022    Historical Provider, MD   bendamustine (TREANDA) 100 MG To be given as an infusion on days 1 and 4 of 29 day chemotherapy cycle  Patient not taking: Reported on 3/16/2022    Historical Provider, MD   levoFLOXacin (LEVAQUIN) 250 MG tablet Take 250 mg by mouth daily For prophylaxis during chemotherapy    Historical Provider, MD   calcium citrate-vitamin D (CITRICAL + D) 315-250 MG-UNIT TABS per tablet Take 1 tablet by mouth 2 times daily (with meals)  Patient not taking: Reported on 3/16/2022    Historical Provider, MD   denosumab (XGEVA) 120 MG/1.7ML SOLN SC injection Inject 120 mg into the skin every 30 days    Historical Provider, MD   pantoprazole (PROTONIX) 40 MG tablet Take 1 tablet by mouth daily  Patient not taking: Reported on 3/16/2022 2/19/20   Shanel Almonte MD   allopurinol (ZYLOPRIM) 100 MG tablet Take 200 mg by mouth daily     Historical Provider, MD   acyclovir (ZOVIRAX) 400 MG tablet Take 400 mg by mouth 2 times daily     Historical Provider, MD       Allergies   Allergen Reactions    Decadron [Dexamethasone]      Patient is receiving CAR-T. No steroids unless approved by [address] [place], [place] [address] physician. Family History   Problem Relation Age of Onset    Cancer Father         Social History     Socioeconomic History    Marital status: Life Partner     Spouse name: Donn Winters Number of children: Not on file    Years of education: Not on file    Highest education level: Not on file   Occupational History    Not on file   Tobacco Use    Smoking status: Never Smoker    Smokeless tobacco: Never Used   Substance and Sexual Activity    Alcohol use: Not Currently    Drug use: Never    Sexual activity: Not Currently     Partners: Female   Other Topics Concern    Not on file   Social History Narrative    Not on file     Social Determinants of Health     Financial Resource Strain:     Difficulty of Paying Living Expenses: Not on file   Food Insecurity:     Worried About Running Out of Food in the Last Year: Not on file    Joel of Food in the Last Year: Not on file   Transportation Needs:     Lack of Transportation (Medical): Not on file    Lack of Transportation (Non-Medical):  Not on file   Physical Activity:     Days of Exercise per Week: Not on file    Minutes of Exercise per Session: Not on file   Stress:     Feeling of Stress : Not on file   Social Connections:     Frequency of Communication with Friends and Family: Not on file    Frequency of Social Gatherings with Friends and Family: Not on file    Attends Synagogue Services: Not on file    Active Member of Clubs or Organizations: Not on file    Attends Club or Organization Meetings: Not on file    Marital Status: Not on file   Intimate Partner Violence:     Fear of Current or Ex-Partner: Not on file    Emotionally Abused: Not on file    Physically Abused: Not on file    Sexually Abused: Not on file   Housing Stability:     Unable to Pay for Housing in the Last Year: Not on file    Number of [place] in the Last Year: Not on file    Unstable Housing in the Last Year: Not on file        ROS:  As noted above, otherwise remainder of 10-point ROS negative      Physical Exam:     Vital Signs:  BP (!) 145/96   Pulse 88   Temp 98.1 °F (36.7 °C) (Oral)   Resp 18   Ht 6' 4\" (1.93 m)   Wt 251 lb 15.8 oz (114.3 kg)   SpO2 99%   BMI 30.67 kg/m²     Weight:    Wt Readings from Last 3 Encounters:   03/16/22 251 lb 15.8 oz (114.3 kg)   02/01/22 236 lb (107 kg)   01/21/22 243 lb 12.8 oz (110.6 kg)       KPS: 70% Cares for self; unable to carry on normal activity or to do active work    ECOG PS:  (1) Restricted in physically strenuous activity, ambulatory and able to do work of light nature    General: Awake, alert and oriented.   HEENT: normocephalic, alopecia, PERRL, no scleral erythema or icterus, Oral mucosa moist and intact, throat clear  NECK: supple without palpable adenopathy  BACK: Straight negative CVAT  SKIN: warm dry and intact without lesions rashes or masses  CHEST: CTA bilaterally without use of accessory muscles  CV: Normal S1 S2, RRR, no MRG  ABD: NT ND normoactive BS, no palpable masses or hepatosplenomegaly  EXTREMITIES: without edema, denies calf tenderness  NEURO: CN II - XII grossly intact  CATHETER: Right Subclavian Trifusion (IR: Rashad, 2/1/22): Velcade/Bendamustine/Dex (last Velcade 2/24/22)     PLAN: Begin [facility] 3/16/22 and infusion of Abecma CAR-T 3/21/22    CAR-T Evaluation:  - CMI - 6 (age, pulm, renal, cerebrovascular)    Dr. Anu Silva has discussed the risks associated with CAR-T which include the risk of CRS toxicity, neurological toxicity, infection, bleeding,  inability to obtain a long term remission and death. He/She understands these risks and is willing to proceed. The consent is signed and on the chart. Two doses of Tocilizumab will remain available in the inpatient pharmacy until day + 28 post - infusion. Lymphodepleting Chemotherapy:  Fludarabine (Reduced intensity by 25% for CrCl of 35 mL/min and cyclophosphamide   Disease Status at time of Infusion: Progressive Disease  CAR-T Infusion Date: 3/21/22  CAR-T Product:  Abecma  Batch ID Number:  [id number] / Leesa Narvaezevans [id number]    Day - 5    2. CRS / Neuro:  No evidence  - Monitor CRP and Ferrtin closely   No results found for: CRP  No results found for: FERRITIN  - Neuro checks w/ CARTOX 10-point assessment Q4hrs  - Cont Keppra 500 mg BID d/t hx of seizures     Toxicity Grading:  CRS Grade: N/A  ICANS Grade: N/A  CAR-T Score: N/A     3. ID: afebrile no signs of infection  - Cont acyclovir 200 BID (Renal dosed: 200 mg BID)  - Completed post transplant vaccines November 2021  - Start Diflucan (Renal dosed: 200 mg daily) and Levaquin (Renal dosed: 250 mg daily) when ANC < 1.5 (3/16/22)      4. Heme: pancytopenia from chemotherapy   - Transfuse for Hgb < 7 and Platelets < 58M (subdural hematoma)  - Plt transfusion today      5. ESRD / Metabolic: HypoMag, HypoCa, mild Hyperglycemia, renal fxn: SCr: 2.9   ESRD- off HD currently.   Followed by Dr. Maikol Zambrano  -Current baseline Cr 2.1-2.9  TLS:  No evidence, cont allopurinol 200 mg daily and daily TLS labs   - Start IVF hydration: Will receive 2 L NS daily with LDC and Abecma CAR-T Infusion, then 1 L daily   - Replace potassium and magnesium per policy. 6. Subdural Hematoma (1/18/22)  - keep plts > 50K   - Repeat CT 2/24/22: Mild increase in size of small right subdural hematoma. 7.  Bone Lesions:  - Cont Xgeva (given 3/14/22) - previously been receiving at [place] [address]      8. Nutrition:  Appetite and oral intake is good. - Start low microbial diet   - Follow closely with dietary    - Disposition: Patient will receive LDC x 3 days (3/16/22 - 3/18/22) and will return for Abecma CAR-T Infusion on Monday, 3/21/22. He will then f/u rigoberto in OP Infusion for labs, CAR-T assessment and MD visit. The patient was seen and examined by Dr. Sadaf Baird. This admission history and physical has been discussed and agreed upon by Dr. Sadaf Baird.     KIMBERLEY Donahue - NP

## 2022-03-16 NOTE — ONCOLOGY
Chemotherapy drug Fludarabine independently verified with Asuncion Munguia RN prior to administration. Informed consent for chemotherapy administration verified. Original order, appropriateness of regimen, drug supplied, height, weight, BSA, dose calculations, expiration dates/times, drug appearance, and two patient identifiers were verified by both RNs. Drug checked for vesicant/irritant status and for risk of hypersensitivity. Most recent laboratory values and allergies, were reviewed. Positive, brisk blood return via CVC was confirmed prior to administration. Chest x-ray for correct line placement reviewed. Edmundo Trevino RN and Asuncion Munguia RN verified correct rate of chemotherapy and maintenance IV fluids. Monitoring during infusion done per policy, see DocFlowsheets. Blood return verified following infusion; no signs of extravasation. Pt tolerated chemotherapy well and without incident. Chemotherapy infusion end time on the [facility] Will continue to monitor. Chemotherapy drug Cyclophosphamide independently verified with Asuncion Munguia RN prior to administration. Informed consent for chemotherapy administration verified. Original order, appropriateness of regimen, drug supplied, height, weight, BSA, dose calculations, expiration dates/times, drug appearance, and two patient identifiers were verified by both RNs. Drug checked for vesicant/irritant status and for risk of hypersensitivity. Most recent laboratory values and allergies, were reviewed. Positive, brisk blood return via CVC was confirmed prior to administration. Chest x-ray for correct line placement reviewed. Edmundo Trevino RN and Asuncion Munguia RN verified correct rate of chemotherapy and maintenance IV fluids. Monitoring during infusion done per policy, see DocFlowsheets. Blood return verified following infusion; no signs of extravasation. Pt tolerating chemotherapy well and without incident.   Chemotherapy infusion end time on the [facility] Will continue to monitor.

## 2022-03-16 NOTE — PROGRESS NOTES
Patient's platelet count this AM:   Recent Labs     03/16/22  0822   PLT 40*     Pt seen and assessed at [facility]. Seen at [facility] today for PLTS per standing orders for above lab values. Blood products transfused per [facility] policy. Pt tolerated transfusion well and without incident. Pt verbalizes understanding of discharge instructions. Discharged ambulatory to home with sister.

## 2022-03-16 NOTE — ONCOLOGY
Patient here for Day 1 Lymphodepleting chemotherapy in preparation for [facility] CAR-T infusion on 3-21-22. Instructed to report to inpatient Blood Cancer Unit on 3-21-22 at 8:00 AM. Plan to infuse 3 bags Abecma at approx. 11:00 AM. Reviewed signs and symptoms of CRS and neurotoxicity. Educated on need to do every 4H neuro checks while in hospital. Patient verbalizes understanding.

## 2022-03-17 NOTE — PROGRESS NOTES
[employer] Progress Note      3/17/2022    Porfirio Asher    :  1955    MRN:  [id number]    Referring MD: George Graves MD  [place] [address],  [address] [facility] [address]      Subjective: Patient tolerating chemotherapy well, eating and drinking well, no complaints at this time. ECOG PS:  (2) Ambulatory and capable of self care, unable to carry out work activity, up and about > 50% or waking hours    KPS: 70% Cares for self; unable to carry on normal activity or to do active work    Isolation:  None     Medications    Scheduled Meds:   sodium chloride  1,000 mL IntraVENous Once    ondansetron  16 mg Oral Once    fludarabine (FLUDARA) chemo IVPB  55 mg IntraVENous Once    cyclophosphamide (CYTOXAN) chemo infusion  700 mg IntraVENous Once    sodium chloride  1,000 mL IntraVENous Once    allopurinol  300 mg Oral Once     Continuous Infusions:  PRN Meds:.prochlorperazine, prochlorperazine      ROS:  As noted above, otherwise remainder of 10-point ROS negative      Physical Exam:     Vital Signs:  /74   Pulse 97   Temp 98.3 °F (36.8 °C) (Oral)   Resp 18   Wt 259 lb 11.2 oz (117.8 kg)   SpO2 99%   BMI 31.61 kg/m²     Weight:    Wt Readings from Last 3 Encounters:   22 259 lb 11.2 oz (117.8 kg)   22 251 lb 15.8 oz (114.3 kg)   22 236 lb (107 kg)       General: Awake, alert and oriented.   HEENT: normocephalic, alopecia, PERRL, no scleral erythema or icterus, Oral mucosa moist and intact, throat clear  NECK: supple without palpable adenopathy  BACK: Straight negative CVAT  SKIN: warm dry and intact without lesions rashes or masses  CHEST: CTA bilaterally without use of accessory muscles  CV: Normal S1 S2, RRR, no MRG  ABD: NT ND normoactive BS, no palpable masses or hepatosplenomegaly  EXTREMITIES: BLE 1+ edema, denies calf tenderness  NEURO: CN II - XII grossly intact  CATHETER: Right Subclavian Trifusion (IR: Rashad, 22): CDI      Laboratory Data:  CBC:   Recent Labs     03/16/22  0822   WBC 1.0*   HGB 8.1*   HCT 24.1*   MCV 98.7   PLT 40*     BMP/Mag:  Recent Labs     03/16/22  0823      K 3.9      CO2 23   PHOS 2.8   BUN 31*   CREATININE 2.9*   MG 1.60*     LIVP:   Recent Labs     03/16/22  0823   AST 15   ALT 9*   BILIDIR <0.2   BILITOT 0.6   ALKPHOS 73     Coags: No results for input(s): PROTIME, INR, APTT in the last 72 hours. Uric Acid   Recent Labs     03/16/22  0823   LABURIC 7.1     No results found for: CRP  No results found for: FERRITIN      PROBLEM LIST:            1.  Linn Light Chain Multiple Myeloma   2.  ESRD on HD   3.  GERD  4.  HTN  5.  Hypokalemia   6.  Subdural Hematoma       TREATMENT:            1. CyBorD (started 10/30/18)  2. Revlimid / Samantha Mt / Dex on clinical trial (started 2/1/19)  3. Carfilzomib / Pomalidomide / Dex x 6 cycles (Relapse #1 - started 8/26/19 - 2/2020)  4. Cytoxan Mobilization w/ 25% dose reduction (2/17/20)  5. Melphalan 140mg/m2 & Autologous SCT 3/12/20  6. Kyprolis/Ebony/Dex  7. Velcade/Ebony/Dex  8.  HD - CTX at [facility]  9. Bendamustine + Velcade (1/3/22, 2/4/22)  - Velcade only (2/11/22, 2/24/22)   10. Lymphodepleting Chemotherapy:  Fludarabine (Reduced intensity by 25% for CrCl of 35 mL/min and cyclophosphamide   Disease Status at time of Infusion: Progressive Disease  CAR-T Infusion Date: 3/21/22  CAR-T Product:  Abecma  Batch ID Number:  [id number] / [id number]        ASSESSMENT AND PLAN:            1.  Kappa Light Chain Multiple Myeloma: Progressive Disease  - S/p  reduced dose Melphalan (140 mg/M^2) followed by autologous stem cell rescue 3/12/20  - S/p Kyprolis/Pom/Dex with progression followed by Velcade/Ebony/Dex ending 8/311/21  - S/P HD CTX 9/27/21, 10/18, 10/19, 11/8 & 11/9  - Last CTX 11/8 and 11/9/21  - MMP 1/12/22: Improvement in light chains  - BMBx 1/12/22: 20% plasma cells; p53 positive 79.5% of cells  - PET 1/26/22: Multiple bony mets  - S/p Cycle 3 of Velcade/Bendamustine/Dex (last Velcade 2/24/22)      PLAN: Begin [facility] 3/16/22 and infusion of Abecma CAR-T 3/21/22     CAR-T Evaluation:  - CMI - 5 (age, pulm, renal)     Dr. Laly Mary has discussed the risks associated with CAR-T which include the risk of CRS toxicity, neurological toxicity, infection, bleeding,  inability to obtain a long term remission and death. He/She understands these risks and is willing to proceed. The consent is signed and on the chart. Two doses of Tocilizumab will remain available in the inpatient pharmacy until day + 28 post - infusion.     Lymphodepleting Chemotherapy:  Fludarabine (Reduced intensity by 25% for CrCl of 35 mL/min and cyclophosphamide   Disease Status at time of Infusion: Progressive Disease  CAR-T Infusion Date: 3/21/22  CAR-T Product:  Abecma  Batch ID Number:  [id number] / Erasmo Gomez [id number]     Day - 4     2. CRS / Neuro:  No evidence  - Monitor CRP and Ferrtin closely   No results found for: CRP  No results found for: FERRITIN  - Neuro checks w/ CARTOX 10-point assessment Q4hrs  - Cont Keppra 500 mg BID d/t hx of seizures      Toxicity Grading:  CRS Grade: N/A  ICANS Grade: N/A  CAR-T Score: N/A      3. ID: afebrile no signs of infection  - Cont acyclovir 400 BID (Renal dosed: 400 mg BID)  - Completed post transplant vaccines November 2021  - Start Diflucan (Renal dosed: 100 mg daily) and Levaquin (Renal dosed: 250 mg daily) when ANC < 1.5 (3/16/22)       4. Heme: pancytopenia from chemotherapy   - Transfuse for Hgb < 7 and Platelets < 58F (subdural hematoma)  - No transfusion today      5. ESRD / Metabolic: Hypervolemic, HypoMag, HypoCa, renal fxn: SCr: 2.5   ESRD- off HD currently.  Followed by Dr. Rhonda Hopkins  -Current baseline Cr 2.1-2.9  TLS:  No evidence, cont allopurinol 200 mg daily and daily TLS labs   - Start IVF hydration: Will receive 2 L NS daily with LDC and Abecma CAR-T Infusion, then 1 L daily   - Replace potassium and magnesium per policy.   - Lasix 40 mg PO x 1 (3/17/22) after patient arrives home      6. Subdural Hematoma (1/18/22)  - keep plts > 50K   - Repeat CT 2/24/22: Mild increase in size of small right subdural hematoma.     7. Bone Lesions:  - Cont Xgeva (given 3/14/22) - previously been receiving at [address]  8. Nutrition:  Appetite and oral intake is good. - Start low microbial diet   - Follow closely with dietary     - Disposition: Patient will receive LDC x 3 days (3/16/22 - 3/18/22) and will return for Abecma CAR-T Infusion on Monday, 3/21/22. He will then f/u rigoberto in OP Infusion for labs, CAR-T assessment and MD visit. The patient was seen and examined by Dr. Hernandez Singh.        KIMBERLEY Castelan - NP

## 2022-03-17 NOTE — PROGRESS NOTES
Chemotherapy drug Fludarabine independently verified with Ariel Reyes RN prior to administration. Informed consent for chemotherapy administration verified. Original order, appropriateness of regimen, drug supplied, height, weight, BSA, dose calculations, expiration dates/times, drug appearance, and two patient identifiers were verified by both RNs. Drug checked for vesicant/irritant status and for risk of hypersensitivity. Most recent laboratory values and allergies, were reviewed. Positive, brisk blood return via CVC was confirmed prior to administration. Chest x-ray for correct line placement reviewed. , Thor Muñoz RN and Ariel Reyes RN verified correct rate of chemotherapy and maintenance IV fluids.      Monitoring during infusion done per policy, see DocFlowsheets. Blood return verified following infusion; no signs of extravasation. Pt tolerated chemotherapy well and without incident. Chemotherapy infusion end time on the [facility] Will continue to monitor.     Chemotherapy drug Cyclophosphamide independently verified with Ariel Reyes RN and Domonique Alvarado RN prior to administration. Informed consent for chemotherapy administration verified. Original order, appropriateness of regimen, drug supplied, height, weight, BSA, dose calculations, expiration dates/times, drug appearance, and two patient identifiers were verified by both RNs. Drug checked for vesicant/irritant status and for risk of hypersensitivity. Most recent laboratory values and allergies, were reviewed. Positive, brisk blood return via CVC was confirmed prior to administration. Chest x-ray for correct line placement reviewed. Domonique Alvarado RN and Ariel Reyes RN verified correct rate of chemotherapy and maintenance IV fluids.      Monitoring during infusion done per policy, see DocFlowsheets. Blood return verified following infusion; no signs of extravasation. Pt tolerating chemotherapy well and without incident. Chemotherapy infusion end time on the [facility] Will continue to monitor.   Kenya Oliver RN

## 2022-03-18 NOTE — PROGRESS NOTES
Chemotherapy drug Fludarabine independently verified with Bonnie Fraser RN prior to administration. Informed consent for chemotherapy administration verified. Original order, appropriateness of regimen, drug supplied, height, weight, BSA, dose calculations, expiration dates/times, drug appearance, and two patient identifiers were verified by both RNs. Drug checked for vesicant/irritant status and for risk of hypersensitivity. Most recent laboratory values and allergies, were reviewed. Positive, brisk blood return via CVC was confirmed prior to administration. Chest x-ray for correct line placement reviewed. Dwain Campos RN and Angelia Chavarria RN verified correct rate of chemotherapy and maintenance IV fluids.      Monitoring during infusion done per policy, see DocFlowsheets. Blood return verified following infusion; no signs of extravasation. Pt tolerated chemotherapy well and without incident. Chemotherapy infusion end time on the [facility] Will continue to monitor.     Chemotherapy drug Cyclophosphamide independently verified with Dwain Campos RN and Angelia Chavarria RN prior to administration. Informed consent for chemotherapy administration verified. Original order, appropriateness of regimen, drug supplied, height, weight, BSA, dose calculations, expiration dates/times, drug appearance, and two patient identifiers were verified by both RNs. Drug checked for vesicant/irritant status and for risk of hypersensitivity. Most recent laboratory values and allergies, were reviewed. Positive, brisk blood return via CVC was confirmed prior to administration. Chest x-ray for correct line placement reviewed. Angelia Chavarria RN and [employer], RN verified correct rate of chemotherapy and maintenance IV fluids.      Monitoring during infusion done per policy, see DocFlowsheets. Blood return verified following infusion; no signs of extravasation. Pt tolerating chemotherapy well and without incident. Chemotherapy infusion end time on the [facility] Will continue to monitor.       Marilyn Weeks RN

## 2022-03-18 NOTE — PROGRESS NOTES
[facility] Progress Note      3/18/2022    Brittany Hoganer    :  1955    MRN:  [id number]    Referring MD: MD Cesar Castillo 1943,  [address]      Subjective: Patient complains of fatigue this morning, loss of appetite, denies nausea, no fevers or diarrhea. Tolerating chemotherapy well, no other complaints at this time. ECOG PS:  (2) Ambulatory and capable of self care, unable to carry out work activity, up and about > 50% or waking hours    KPS: 70% Cares for self; unable to carry on normal activity or to do active work    Isolation:  None     Medications    Scheduled Meds:   sodium chloride  1,000 mL IntraVENous Once    ondansetron  16 mg Oral Once    fludarabine (FLUDARA) chemo IVPB  55 mg IntraVENous Once    cyclophosphamide (CYTOXAN) chemo infusion  700 mg IntraVENous Once    sodium chloride  1,000 mL IntraVENous Once    allopurinol  300 mg Oral Once     Continuous Infusions:   sodium chloride      sodium chloride       PRN Meds:.prochlorperazine, prochlorperazine, sodium chloride flush, sodium chloride, heparin flush      ROS:  As noted above, otherwise remainder of 10-point ROS negative      Physical Exam:     Vital Signs:  /85   Pulse 94   Temp 98.6 °F (37 °C) (Oral)   Resp 18   Wt 255 lb 1.2 oz (115.7 kg)   SpO2 98%   BMI 31.05 kg/m²     Weight:    Wt Readings from Last 3 Encounters:   22 255 lb 1.2 oz (115.7 kg)   22 258 lb 13.1 oz (117.4 kg)   22 251 lb 15.8 oz (114.3 kg)       General: Awake, alert and oriented.   HEENT: normocephalic, alopecia, PERRL, no scleral erythema or icterus, Oral mucosa moist and intact, throat clear  NECK: supple without palpable adenopathy  BACK: Straight negative CVAT  SKIN: warm dry and intact without lesions rashes or masses  CHEST: CTA bilaterally without use of accessory muscles  CV: Normal S1 S2, RRR, no MRG  ABD: NT ND normoactive BS, no palpable masses or hepatosplenomegaly  EXTREMITIES: BLE 1+ edema, denies calf tenderness  NEURO: CN II - XII grossly intact  CATHETER: Right Subclavian Trifusion (IR: Rashad, 2/1/22): CDI      Laboratory Data:  CBC:   Recent Labs     03/16/22  0822 03/17/22  0832 03/18/22  0815   WBC 1.0* 0.8* 1.0*   HGB 8.1* 7.7* 8.1*   HCT 24.1* 22.7* 23.7*   MCV 98.7 98.6 98.3   PLT 40* 52* 49*     BMP/Mag:  Recent Labs     03/16/22  0823 03/17/22  0832 03/18/22  0815    144 141   K 3.9 4.0 4.0    113* 110   CO2 23 20* 21   PHOS 2.8 2.9 2.5   BUN 31* 30* 27*   CREATININE 2.9* 2.5* 2.7*   MG 1.60* 1.70* 1.50*     LIVP:   Recent Labs     03/16/22  0823 03/17/22  0832 03/18/22  0815   AST 15 16 13*   ALT 9* 8* 7*   BILIDIR <0.2 <0.2 <0.2   BILITOT 0.6 0.4 0.5   ALKPHOS 73 71 80     Coags: No results for input(s): PROTIME, INR, APTT in the last 72 hours. Uric Acid   Recent Labs     03/16/22  0823 03/17/22  0832 03/18/22  0815   LABURIC 7.1 6.5 7.0     No results found for: CRP  No results found for: FERRITIN      PROBLEM LIST:            1.  [place] Light Chain Multiple Myeloma   2.  ESRD on HD   3.  GERD  4.  HTN  5.  Hypokalemia   6.  Subdural Hematoma       TREATMENT:            1. CyBorD (started 10/30/18)  2. Revlimid / Koleen Minks / Dex on clinical trial (started 2/1/19)  3. Carfilzomib / Pomalidomide / Dex x 6 cycles (Relapse #1 - started 8/26/19 - 2/2020)  4. Cytoxan Mobilization w/ 25% dose reduction (2/17/20)  5. Melphalan 140mg/m2 & Autologous SCT 3/12/20  6. Kyprolis/Ebony/Dex  7. Velcade/Ebony/Dex  8.  HD - CTX at [place]  9. Bendamustine + Velcade (1/3/22, 2/4/22)  - Velcade only (2/11/22, 2/24/22)   10. Lymphodepleting Chemotherapy:  Fludarabine (Reduced intensity by 25% for CrCl of 35 mL/min and cyclophosphamide   Disease Status at time of Infusion: Progressive Disease  CAR-T Infusion Date: 3/21/22  CAR-T Product:  Abecma  Batch ID Number:  [id number] / [id number]        ASSESSMENT AND PLAN:            1.  [place] Light Chain Multiple Myeloma: Progressive Disease  - S/p  reduced dose Melphalan (140 mg/M^2) followed by autologous stem cell rescue 3/12/20  - S/p Kyprolis/Pom/Dex with progression followed by Velcade/Ebony/Dex ending 8/311/21  - S/P HD CTX 9/27/21, 10/18, 10/19, 11/8 & 11/9  - Last CTX 11/8 and 11/9/21  - MMP 1/12/22: Improvement in light chains  - BMBx 1/12/22: 20% plasma cells; p53 positive 79.5% of cells  - PET 1/26/22: Multiple bony mets  - S/p Cycle 3 of Velcade/Bendamustine/Dex (last Velcade 2/24/22)      PLAN: Begin [facility] 3/16/22 and infusion of Abecma CAR-T 3/21/22     CAR-T Evaluation:  - CMI - 5 (age, pulm, renal)     Dr. Omar Paula has discussed the risks associated with CAR-T which include the risk of CRS toxicity, neurological toxicity, infection, bleeding,  inability to obtain a long term remission and death. He/She understands these risks and is willing to proceed. The consent is signed and on the chart. Two doses of Tocilizumab will remain available in the inpatient pharmacy until day + 28 post - infusion.     Lymphodepleting Chemotherapy:  Fludarabine (Reduced intensity by 25% for CrCl of 35 mL/min and cyclophosphamide   Disease Status at time of Infusion: Progressive Disease  CAR-T Infusion Date: 3/21/22  CAR-T Product:  Abecma  Batch ID Number:  [id number] / Jemima [id number]     Day - 3     2. CRS / Neuro:  No evidence  - Monitor CRP and Ferrtin closely   No results found for: CRP  No results found for: FERRITIN  - Neuro checks w/ CARTOX 10-point assessment Q4hrs  - Cont Keppra 500 mg BID d/t hx of seizures      Toxicity Grading:  CRS Grade: N/A  ICANS Grade: N/A  CAR-T Score: N/A      3.  ID: afebrile no signs of infection  - Cont acyclovir 400 BID (Renal dosed: 400 mg BID)  - Completed post transplant vaccines November 2021  - Start Diflucan (Renal dosed: 100 mg daily) and Levaquin (Renal dosed: 250 mg daily) when ANC < 1.5 (3/16/22)       4. Heme: pancytopenia from chemotherapy   - Transfuse for Hgb < 7 and Platelets < 50K (subdural hematoma)  - Plt transfusion today      5. ESRD / Metabolic: Hypervolemic: improving, HypoMag, HypoCa, renal fxn: SCr: 2.7  ESRD- off HD currently.  Followed by Dr. Sandip Jerome  -Current baseline Cr 2.1-2.9  TLS:  No evidence, cont allopurinol 200 mg daily and daily TLS labs   - Start IVF hydration: Will receive 2 L NS daily with LDC and Abecma CAR-T Infusion, then 1 L daily   - Replace potassium and magnesium per policy. - Lasix 40 mg PO x 1 (3/17/22) after patient arrives home   - Start PO Magnesium 400 mg BID 3/18/22     6. Subdural Hematoma (1/18/22)  - keep plts > 50K   - Repeat CT 2/24/22: Mild increase in size of small right subdural hematoma.     7. Bone Lesions:  - Cont Xgeva (given 3/14/22) - previously been receiving at [address]  8. Nutrition:  Appetite and oral intake is good. - Start low microbial diet   - Follow closely with dietary     - Disposition: Patient will receive LDC x 3 days (3/16/22 - 3/18/22) and will return for Abecma CAR-T Infusion on Monday, 3/21/22. He will then f/u rigoberto in OP Infusion for labs, CAR-T assessment and MD visit. The patient was seen and examined by Dr. Isabel Mohan.        KIMBERLEY Antunez - NP

## 2022-03-21 PROBLEM — C90.02 MULTIPLE MYELOMA IN RELAPSE (HCC): Status: ACTIVE | Noted: 2022-01-01

## 2022-03-21 NOTE — PROGRESS NOTES
4 Eyes Admission Assessment     I agree as the admission nurse that 2 RN's have performed a thorough Head to Toe Skin Assessment on the patient. ALL assessment sites listed below have been assessed on admission. Areas assessed by both nurses: Lonnie Ellsworth  [x]   Head, Face, and Ears   [x]   Shoulders, Back, and Chest  [x]   Arms, Elbows, and Hands   [x]   Coccyx, Sacrum, and Ischium  [x]   Legs, Feet, and Heels        Does the Patient have Skin Breakdown?   No         Johann Prevention initiated:  No   Wound Care Orders initiated:  No      [facility] nurse consulted for Pressure Injury (Stage 3,4, Unstageable, DTI, NWPT, and Complex wounds) or Johann score 18 or lower:  No      Nurse 1 eSignature: Electronically signed by Stephani Renee RN on 3/21/22 at 5:14 PM EDT    **SHARE this note so that the co-signing nurse is able to place an eSignature**    Nurse 2 eSignature: Electronically signed by Puma Oleary RN on 3/21/22 at 6:54 PM EDT

## 2022-03-21 NOTE — PROGRESS NOTES
Patient admitted to [facility] via ambulation from home for diagnosis of Multiple Myeloma. Patient is alert and oriented to room including call light and bed controls. Admission assessment completed - see admission flowsheet documentation. Patient is a low fall risk. Safety measures instituted per policy.     Psychosocial Distress screening completed, with a score of 4 or greater in Spiritual category. The patient has no  consult regarding their cause of distress.  Referral has been placed to Spiritual Care, Palliative Care, , Financial Services and Dietician.      Patient oriented to unit policies and procedures including: pain management practices, unit safety precautions, family rapid response, q4h vital signs and assessments, daily 4am lab draws, weekly chest x-rays, weekly VRE rectal swabs for surveillance, daily chlorhexidine bathing, standing transfusion orders, and routine central line care. Also discussed use of call light and how to get in touch with nursing staff. Stressed the importance of calling out immediately for any changes in condition including but not limited to: pain, chills, fever, nausea, vomiting, diarrhea, chest pain, sob/myles, assistance with toileting, bleeding, or any other symptoms that are out of the ordinary for the patient. Patient verbalizes understanding of all instructions and will call for assistance as needed.

## 2022-03-21 NOTE — PROGRESS NOTES
Availability of two 800 mg doses of Tocilizumab was verified prior to patient CAR-T infusion by this pharmacist.      Bailey Zuniga [facility] PharmD, BCPS, [facility] 3/21/2022 9:46 AM

## 2022-03-21 NOTE — PROGRESS NOTES
CAR-T(T0) Progress Note      Manuel Kuhn                           Blood Type: O pos    3/21/2022                           Start time:1126 Completion time: 8219    CAR-T Type: Abecma    Product Type: PBSC (peripheral blood stem cell)    Product Unit Number:   [id number] LOT NO: [id number] Volume: 32.25 mL  [id number] LOT NO: [id number] Volume: 32.25 mL  [id number] LOT NO:[id number]  Volume: 32.25 mL      Catheter lumen used for infusion: red             Positive Blood Return: Yes      Premeds Given: Tylenol and Benadryl    Adverse Reaction(s) and treatment: Baseline vital signs obtained prior to infusion and monitoring completed throughout per [facility] protocol - see flowsheets. All IVFs turned down to [facility] during stem cell infusion. Stem cell product(s) verified with 2nd RN Mark Acosta RN prior to infusion using 2 patient identifiers. Infused via gravity with rate controlled by Virgilio Godinez RN per [facility] protocols. Emergency medications epinephrine, benadryl, & hydrocortisone available as needed. Emergency medical equipment available as needed including telemetry monitoring throughout infusion, supplemental O2, suction equipment, and crash cart. RN at bedside throughout infusion.    Virgilio Godinez RN

## 2022-03-21 NOTE — PROGRESS NOTES
Patient developed chills and nausea around 1900. All vitals signs stable, afebrile at this time. Patient about 250ml into 280ml transfusion of PBRCs, transfusion stopped. MD notified, 50 mg PO benadryl, 650mg PO tylenol, and ondansetron 8mg IV PRN ordered. RN will pass information onto next shift and continue to monitor.

## 2022-03-22 NOTE — PROGRESS NOTES
Neutropenic Pathway  If patient oral temp > or = to 38.0 or if axillary temp > or = to 37.4 initiate protocol per orders. Draw 2 sets of blood cultures from different sites. If patient remains febrile, redraw PAN culture every 68-72 hours. Temp 103    Site(s) / Line Type Time Cultures obtained   Date 3/21/22  2344   [facility]     White  2346         Perfect serve sent to Dr. Dede Diaz.  No new orders at this time

## 2022-03-22 NOTE — PROGRESS NOTES
Pharmacy Note - Renal Dosing and Extended Infusion Beta-Lactam Adjustment    Cefepime 2000 every 8 hours ordered for patient. Per [facility] Renal Dose Adjustment Policy and Extended Infusion Beta-Lactam Policy, order will be changed to 2000 mg 12 hours. Estimated Creatinine Clearance: Estimated Creatinine Clearance: 46 mL/min (A) (based on SCr of 2.2 mg/dL (H)). Dialysis Status, JUDY, CKD:   BMI: Body mass index is 30.6 kg/m². Rationale for Adjustment: Agent is renally eliminated and demonstrates time-dependent effect on bacterial eradication. Extended-infusion dosing strategy aims to enhance microbiologic and clinical efficacy. Pharmacy will continue to monitor renal function, cultures and sensitivities (where available) and adjust dose as necessary. Please call with any questions. Thanks!   Tosha Shaikh, PharmD, BCPS

## 2022-03-22 NOTE — PROGRESS NOTES
[facility] Progress Note      3/22/2022    Gay Hdz    :  1955    MRN:  [id number]    Referring MD: No referring provider defined for this encounter. Subjective:  Patient received CAR-T yesterday. Developed high fever 103 x1 and 101.2 x1 overnight. He continues to have chills this morning. Mildly nauseated but denies vomiting. Denies headache or speech issue.      ECOG PS:  (1) Restricted in physically strenuous activity, ambulatory and able to do work of light nature    KPS: 80% Normal activity with effort; some signs or symptoms of disease    Isolation:  None     Medications    Scheduled Meds:   enoxaparin  40 mg SubCUTAneous Daily    Saline Mouthwash  15 mL Swish & Spit 4x Daily AC & HS    sodium chloride flush  5-40 mL IntraVENous 2 times per day    allopurinol  200 mg Oral Daily    acyclovir  400 mg Oral BID    calcium carb-cholecalciferol  1 tablet Oral Daily    fluconazole  100 mg Oral Daily    levETIRAcetam  500 mg Oral Q12H    levoFLOXacin  250 mg Oral Daily    pantoprazole  40 mg Oral QAM AC    sodium bicarbonate  650 mg Oral Daily    phosphorus  500 mg Oral BID    cefepime  2,000 mg IntraVENous Q12H     Continuous Infusions:   sodium chloride      sodium chloride 75 mL/hr at 22 0641    sodium chloride      sodium chloride       PRN Meds:.sodium chloride, sodium chloride, alteplase, magnesium hydroxide, magnesium sulfate, potassium chloride, sodium chloride flush, sodium chloride, diphenhydrAMINE, morphine, sodium chloride, ondansetron, acetaminophen      ROS:  As noted above, otherwise remainder of 10-point ROS negative      Physical Exam:     Vital Signs:  /74   Pulse 104   Temp 101.7 °F (38.7 °C) (Oral)   Resp 24   Ht 6' 4\" (1.93 m)   Wt 251 lb 6.4 oz (114 kg)   SpO2 96%   BMI 30.60 kg/m²     Weight:    Wt Readings from Last 3 Encounters:   22 251 lb 6.4 oz (114 kg)   22 255 lb 1.2 oz (115.7 kg)   22 258 lb 13.1 oz (117.4 kg) General: Awake, alert and oriented. HEENT: normocephalic, alopecia, PERRL, no scleral erythema or icterus, Oral mucosa moist and intact, throat clear  NECK: supple without palpable adenopathy  BACK: Straight negative CVAT  SKIN: warm dry and intact without lesions rashes or masses  CHEST: CTA bilaterally without use of accessory muscles  CV: Normal S1 S2, RRR, no MRG  ABD: NT ND normoactive BS, no palpable masses or hepatosplenomegaly  EXTREMITIES: without edema, denies calf tenderness  NEURO: CN II - XII grossly intact  CATHETER: Right Tunneled HD catheter    Laboratory Data:  CBC:   Recent Labs     03/21/22  1020 03/22/22  0422   WBC 0.4* 0.4*   HGB 6.7* 8.1*   HCT 19.3* 23.5*   MCV 98.1 96.3   PLT 32* 37*     BMP/Mag:  Recent Labs     03/21/22  1020 03/22/22  0422    138   K 4.0 4.2    106   CO2 21 19*   PHOS 1.5* 2.5   BUN 32* 29*   CREATININE 2.2* 2.4*   MG 1.80 1.60*     LIVP:   Recent Labs     03/21/22  1020 03/22/22  0422   AST 16 16   ALT 6* 6*   BILIDIR <0.2 <0.2   BILITOT 0.4 0.6   ALKPHOS 80 84     Coags:   Recent Labs     03/21/22  1020 03/22/22  0422   PROTIME 13.0* 13.4*   INR 1.14* 1.18*   APTT 48.0* 33.1     Uric Acid   Recent Labs     03/21/22  1020 03/22/22  0422   LABURIC 8.1* 7.4*     No results found for: CRP  Lab Results   Component Value Date    FERRITIN 812.1 (H) 03/22/2022    FERRITIN 772.7 (H) 03/21/2022         PROBLEM LIST:            1.  [place] Light Chain Multiple Myeloma   2.  ESRD on HD   3.  GERD  4.  HTN  5.  Hypokalemia   6.  Subdural Hematoma (1/2022)  7. Seizures (1/2022)      TREATMENT:            1. CyBorD (started 10/30/18)  2. Revlimid / Christofer Dolin / Dex on clinical trial (started 2/1/19)  3. Carfilzomib / Pomalidomide / Dex x 6 cycles (Relapse #1 - started 8/26/19 - 2/2020)  4. Cytoxan Mobilization w/ 25% dose reduction (2/17/20)  5. Melphalan 140mg/m2 & Autologous SCT 3/12/20  6. Kyprolis/Ebony/Dex (6/30/21-7/28/21)- progression  7.  Velcade/Ebony/Dex (5/18/21-8/3/21)- progression  8.  HD - CTX               -Cycle 1 9/27/21              -Cycle 2 10/18/21              -Cycle 3 11/8/21- stopped d/t severe fatigue  9. Bendamustine/Velcade/Dex 12/06/21  10. Velcade only 2/11/22  10. Fludarabine/Cytoxan followed by CAR-T infusion    ASSESSMENT AND PLAN:            1. [place] Light Chain Multiple Myeloma:  Currently in [facility]  - BM biopsy: 1/12/22.  20% plasma cells . Norma Hoguet p53 positive 79.5% of cells  -1/28/22 Serum KFLC 1662.71, Lambda 3.05   K/L ratio 545.15  -2/24/22 Serum KFLC 1919.12, Lambda 2.54, K/L raio 755.56  -s/p Fludarabine and Cytoxan 3/16/22-3/18/22  -Post CAR-T infusion staging:  PET/CT, BM bx with Flow, FISH, CG, MMP  Dr. Deric Hilton has discussed the risks associated with CAR-T which include the risk of CRS toxicity, neurological toxicity, infection, bleeding,  inability to obtain a long term remission and death. He/She understands these risks and is willing to proceed. The consent is signed and on the chart. Two doses of Tocilizumab will remain available in the inpatient pharmacy until day + 28 post - infusion.     Lymphodepleting Chemotherapy:  Fludarabine and cyclophosphamide   Disease Status at time of Infusion:  Progressive disease  CAR-T Infusion Date:  3/21/22  CAR-T Product:  Abecma  Batch ID Number:  [id number]     Day  +1    2. CRS / Neuro:  No evidence  - Monitor CRP and Ferrtin closely   No results found for: CRP  Lab Results   Component Value Date    FERRITIN 812.1 (H) 03/22/2022    FERRITIN 772.7 (H) 03/21/2022     - Neuro checks w/ CARTOX 10-point assessment Q4hrs    Toxicity Grading    CRS ndGndrndanddndend:nd nd2nd ICANS stGstrstastdstest:st st1st ICE Score:  CAR-T Score: 10    3. ID:  Fever 12 hr after infusion, no evidence of infection.    - On Diflucan, Valtrex. Started Cefepime give fever on 3/21/2022. 4.  Heme:  Pancytopenia d/t recent chemotherapy   - Transfuse for Hgb < 7 and Platelets < 25P (recent subdural)  - 1U platelet transfusion today.  Given hx of subdural hematoma, will keep platelets >57D.     5. ESRD/Metabolic:  Electrolytes are WNL and renal fxn stable. ESRD- off HD currently. Followed by Dr. Leopoldo Expose  -Current baseline Cr 2.1-2.6  TLS:  No evidence, cont allopurinol and daily TLS labs   - ivf's at 100 ml/liter  - Replace potassium and magnesium per policy.     6. Nutrition:  Appetite and oral intake is good. - Start low microbial diet   - Follow closely with dietary     7. Neuro:  Recent seizure/subdural hematoma (1/2022)  -Continue Keppra 500 mg PO BID indefinitely  -Keep platelets >16,875    - DVT Prophylaxis: Platelets <89,431 cells/dL - prophylactic lovenox on hold and mechanical prophylaxis with bilateral SCDs while in bed in place. Contraindications to pharmacologic prophylaxis: Thrombocytopenia  Contraindications to mechanical prophylaxis: None    - Disposition: Developed high fever, continue to monitor his symptoms for now. Possibly discharge in 2-3 days if no complication. The patient was seen and examined by Dr. [facility].     Chris Cox MD PGY-5  Marti Copeland MD  [facility]  Please contact me through [facility]

## 2022-03-22 NOTE — PLAN OF CARE
Problem: Diarrhea:  Goal: Bowel elimination is within specified parameters  Description: Bowel elimination is within specified parameters  Outcome: Ongoing     Pt experiencing diarrhea. Initiated diarrhea protocol, pt verbalized understanding. C Diff culture sent, pt placed in isolation. Culture came back negative, isolation removed, and imodium administered. Will continue to monitor.

## 2022-03-22 NOTE — PLAN OF CARE
Problem: Falls - Risk of:  Goal: Will remain free from falls  Description: Will remain free from falls  Outcome: Ongoing  Note: Orthostatic vital signs obtained at start of shift - see flowsheet for details. Pt does not meet criteria for orthostasis. Pt is a Med fall risk. See Alejo Ivans Fall Score and ABCDS Injury Risk assessments. - Screening for Orthostasis AND not a [place] Risk per LOUIS/ABCDS: Pt bed is in low position, side rails up, call light and belongings are in reach. Fall risk light is on outside pts room. Pt encouraged to call for assistance as needed. Will continue with hourly rounds for PO intake, pain needs, toileting and repositioning as needed. Problem: PROTECTIVE PRECAUTIONS  Goal: Patient will remain free of nosocomial Infections  Outcome: Ongoing  Note: Patient has been febrile. PAN cultured and cefepime started. Tylenol given. Will continue to monitor.

## 2022-03-22 NOTE — PROGRESS NOTES
Nutrition Note     RECOMMENDATIONS:  1. PO Diet: Continue current diet - general/low microbial diet; monitor renal labs and adjust as needed. 2. Nutrition Education: previous education during tpx; pt declined additional review at this time    NUTRITION ASSESSMENT:   Nutritional summary & status: Nutr eval for admit for car-T 3/21/22.    o Admission/PMH: Admit lymphodepleting chemotherapy consisting of Fludarabine and Cytoxan and is now being admitted for Abecma CAR-T infusion (3/21/22); h/o [place] Light Chain Multiple Myeloma (Dx 10/2018). Melphalan followed by an Autologous Stem Cell on 3/12/20. PMHx ESRD- off HD currently  o Nutrition Related Findings: lbm 3/20; Wounds: None  o Nutrition Goals: pt will maintain adequate PO intake by consuming greater than 75% of meals offered through admission to promote meeting increased nutrient needs s/p car-T     MALNUTRITION ASSESSMENT  Context of Malnutrition: Chronic Illness   Malnutrition Status: No malnutrition    NUTRITION DIAGNOSIS   Increased nutrient needs related to increase demand for energy/nutrients as evidenced by  (s/p chemtox w/car-T)    CURRENT NUTRITION THERAPIES  ADULT DIET; Regular; Low Microbial       ANTHROPOMETRICS  Current Height: 6' 4\" (193 cm)  Current Weight: 252 lb 4.8 oz (114.4 kg)    BMI: 30.8     The patient will still be monitored per nutrition standards of care. Consult dietitian if nutrition interventions essential to patient care is needed.      Anna Vale, 66 [address]  Jesús:  [phone]  Office:  [phone]

## 2022-03-22 NOTE — PROGRESS NOTES
Behavioral Health Intervention Note    Writer rounded with patient's treatment team and followed-up with patient afterwards. Patient reported utilizing coping skills like optimism in order to help him navigate through treatment. Patient also expressed a sense of purpose from receiving CAR-T for his multiple myeloma diagnosis. Interventions: Supportive listening, Assessment of current needs     --------------------------------------------------------------------------------------------    At initial encounter with patient, discussed role of psychologist including addressing emotional and behavioral needs while receiving care at the [facility] [address]. Discussed short-term nature of services. Also discussed with patient that a component of provider's role is completing the pre-surgical psychological evaluations for bone marrow transplant and CAR-T. Informed patient of psychologist's various roles in order for the patient to be fully informed when consenting to behavioral health treatment. Patient was agreeable to engaging in care with psychologist. Discussed limits of confidentiality including that psychologist coordinates with patient's treatment team and other limits of confidentiality were discussed as needed. Notified patient of supervisee status under the supervision of Stephanie Mccormick PsyD. Was provided with information on how to contact supervisor.

## 2022-03-22 NOTE — PROGRESS NOTES
Pt was febrile upon handoff, and was tachycardic. Tylenol was administered at handoff, with no affect on his fever. CAR-T neuro checks were a 10 the entire shift. Administered Toci, and temp and HR returned to defined limits. Pt reported feeling much improved. Will continue to monitor.

## 2022-03-22 NOTE — PLAN OF CARE
Problem: Venous Thromboembolism:  Goal: Will show no signs or symptoms of venous thromboembolism  Description: Will show no signs or symptoms of venous thromboembolism  Outcome: Ongoing     Pt is at risk for DVT related to decreased mobility while in the hospital along with cancer treatment. Pt educated on importance of activity as much as tolerated. Pt verbalizes understanding of need for prophylaxis while inpatient. Problem: Infection - Central Venous Catheter-Associated Bloodstream Infection:  Goal: Will show no infection signs and symptoms  Description: Will show no infection signs and symptoms  Outcome: Ongoing     CVC site remains free of signs/symptoms of infection. No drainage, edema, erythema, pain, or warmth noted at site. Dressing changes continue per protocol and on an as needed basis - see flowsheet. Problem: Falls - Risk of:  Goal: Will remain free from falls  Description: Will remain free from falls  3/22/2022 1515 by Johny Guerrier RN  Outcome: Ongoing   Orthostatic vital signs obtained at start of shift - see flowsheet for details. Pt does not meet criteria for orthostasis. Pt is a Med fall risk. See Geralene Naas Fall Score and ABCDS Injury Risk assessments. Pt bed is in low position, side rails up, call light and belongings are in reach. Fall risk light is on outside pts room. Pt encouraged to call for assistance as needed. Will continue with hourly rounds for PO intake, pain needs, toileting and repositioning as needed. Problem: Nutrition Deficit:  Goal: Ability to achieve adequate nutritional intake will improve  Description: Ability to achieve adequate nutritional intake will improve  Outcome: Ongoing     Pt educated on importance of staying hydrated and having as much intake as possible. Verbalized understanding. Will continue to monitor.          Problem: PROTECTIVE PRECAUTIONS  Goal: Patient will remain free of nosocomial Infections  3/22/2022 1515 by Johny Guerrier RN  Outcome: Ongoing     Pt remains in protective precautions. No living plants or fresh flowers in his/her room. Patient educated on wearing mask when in hallways. Patient, staff, and visitors adhering to handwashing guidelines. Patient cleansed with chlorhexidine wipes and linens changed daily per protocol. Pt verbalizes understanding of low microbial diet. Patient remains free of nosocomial infections.

## 2022-03-23 NOTE — PROGRESS NOTES
[address] Progress Note      3/23/2022    Merlin Bourgeois    :  1955    MRN:  [id number]    Referring MD: No referring provider defined for this encounter. Subjective:  Back pain last night, now resolved. ECOG PS:  (1) Restricted in physically strenuous activity, ambulatory and able to do work of light nature    KPS: 80% Normal activity with effort; some signs or symptoms of disease    Isolation:  None     Medications    Scheduled Meds:   oyster shell calcium w/D  1 tablet Oral BID    Saline Mouthwash  15 mL Swish & Spit 4x Daily AC & HS    sodium chloride flush  5-40 mL IntraVENous 2 times per day    allopurinol  200 mg Oral Daily    acyclovir  400 mg Oral BID    fluconazole  100 mg Oral Daily    levETIRAcetam  500 mg Oral Q12H    pantoprazole  40 mg Oral QAM AC    sodium bicarbonate  650 mg Oral Daily    phosphorus  500 mg Oral BID    cefepime  2,000 mg IntraVENous Q12H     Continuous Infusions:   sodium chloride      sodium chloride      sodium chloride 20 mL/hr at 22 0600    sodium chloride      sodium chloride       PRN Meds:.sodium chloride, sodium chloride, diphenhydrAMINE, acetaminophen, ondansetron **OR** ondansetron, prochlorperazine **OR** prochlorperazine (COMPAZINE) with normal saline injection, [COMPLETED] loperamide **FOLLOWED BY** loperamide, sodium chloride, alteplase, magnesium hydroxide, magnesium sulfate, potassium chloride, sodium chloride flush, sodium chloride      ROS:  As noted above, otherwise remainder of 10-point ROS negative      Physical Exam:     Vital Signs:  /80   Pulse 92   Temp 98.6 °F (37 °C) (Oral)   Resp 21   Ht 6' 4\" (1.93 m)   Wt 252 lb 4.8 oz (114.4 kg)   SpO2 96%   BMI 30.71 kg/m²     Weight:    Wt Readings from Last 3 Encounters:   22 252 lb 4.8 oz (114.4 kg)   22 255 lb 1.2 oz (115.7 kg)   22 258 lb 13.1 oz (117.4 kg)       General: Awake, alert and oriented.   HEENT: normocephalic, alopecia, PERRL, no scleral erythema or icterus, Oral mucosa moist and intact, throat clear  NECK: supple without palpable adenopathy  BACK: Straight negative CVAT  SKIN: warm dry and intact without lesions rashes or masses  CHEST: CTA bilaterally without use of accessory muscles  CV: Normal S1 S2, RRR, no MRG  ABD: NT ND normoactive BS, no palpable masses or hepatosplenomegaly  EXTREMITIES: without edema, denies calf tenderness  NEURO: CN II - XII grossly intact  CATHETER: Right Tunneled HD catheter    Laboratory Data:  CBC:   Recent Labs     03/21/22  1020 03/22/22  0422 03/23/22  0353   WBC 0.4* 0.4* 0.2*   HGB 6.7* 8.1* 6.0*   HCT 19.3* 23.5* 17.1*   MCV 98.1 96.3 95.8   PLT 32* 37* 28*     BMP/Mag:  Recent Labs     03/21/22  1020 03/22/22  0422 03/23/22  0353    138 137   K 4.0 4.2 3.8    106 108   CO2 21 19* 19*   PHOS 1.5* 2.5 2.4*   BUN 32* 29* 28*   CREATININE 2.2* 2.4* 2.5*   MG 1.80 1.60* 1.50*     LIVP:   Recent Labs     03/21/22  1020 03/22/22  0422 03/23/22  0353   AST 16 16 12*   ALT 6* 6* 7*   BILIDIR <0.2 <0.2 <0.2   BILITOT 0.4 0.6 0.4   ALKPHOS 80 84 62     Coags:   Recent Labs     03/21/22  1020 03/22/22  0422 03/23/22  0353   PROTIME 13.0* 13.4* 16.2*   INR 1.14* 1.18* 1.41*   APTT 48.0* 33.1 30.9     Uric Acid   Recent Labs     03/21/22  1020 03/22/22  0422 03/23/22  0353   LABURIC 8.1* 7.4* 6.5     Lab Results   Component Value Date    .4 (H) 03/23/2022    CRP 45.3 (H) 03/22/2022     Lab Results   Component Value Date    FERRITIN 760.6 (H) 03/23/2022    FERRITIN 812.1 (H) 03/22/2022    FERRITIN 772.7 (H) 03/21/2022       PROBLEM LIST:            1.  [place] Light Chain Multiple Myeloma   2.  ESRD on HD   3.  GERD  4.  HTN  5.  Hypokalemia   6.  Subdural Hematoma (1/2022)  7. Seizures (1/2022)      TREATMENT:            1. CyBorD (started 10/30/18)  2. Revlimid / Elibrookebeedelmira Yeh / Dex on clinical trial (started 2/1/19)  3.  Carfilzomib / Pomalidomide / Dex x 6 cycles (Relapse #1 - started 8/26/19 - 2/2020)  4. Cytoxan Mobilization w/ 25% dose reduction (2/17/20)  5. Melphalan 140mg/m2 & Autologous SCT 3/12/20  6. Kyprolis/Ebony/Dex (6/30/21-7/28/21)- progression  7. Velcade/Ebony/Dex (5/18/21-8/3/21)- progression  8.  HD - CTX               -Cycle 1 9/27/21              -Cycle 2 10/18/21              -Cycle 3 11/8/21- stopped d/t severe fatigue  9. Bendamustine/Velcade/Dex 12/06/21  10. Velcade only 2/11/22  10. Fludarabine/Cytoxan followed by CAR-T infusion    ASSESSMENT AND PLAN:            1. [place] Light Chain Multiple Myeloma:  Currently in [facility]  - BM biopsy: 1/12/22.  20% plasma cells . Jayjay Singh p53 positive 79.5% of cells  -1/28/22 Serum KFLC 1662.71, Lambda 3.05   K/L ratio 545.15  -2/24/22 Serum KFLC 1919.12, Lambda 2.54, K/L raio 755.56  -s/p Fludarabine and Cytoxan 3/16/22-3/18/22  -Post CAR-T infusion staging:  PET/CT, BM bx with Flow, FISH, CG, MMP     Lymphodepleting Chemotherapy:  Fludarabine and cyclophosphamide   Disease Status at time of Infusion:  Progressive disease  CAR-T Infusion Date:  3/21/22  CAR-T Product:  Abecma  Batch ID Number:  [id number]     Day +2    2. CRS / Neuro: Grade 1 CRS 3/22  CRS Grade: 1, fever only 3/22  - S/p toci 800mg 3/22/22  - Monitor CRP and Ferrtin closely   Lab Results   Component Value Date    .4 (H) 03/23/2022    CRP 45.3 (H) 03/22/2022     Lab Results   Component Value Date    FERRITIN 760.6 (H) 03/23/2022    FERRITIN 812.1 (H) 03/22/2022    FERRITIN 772.7 (H) 03/21/2022     ICANS Grade: 0  - Neuro checks w/ CARTOX 10-point assessment Q4hrs  ICE Score:  CAR-T Score: 10    3. ID:  Fever 12 hr after infusion, no evidence of infection but treating empirically while neutropenic. TMax 100  - On Diflucan, Valtrex. - Cont Cefepime Day +2 (3/21/22)    4. Heme:  Pancytopenia d/t recent chemotherapy   - Transfuse for Hgb < 7 and Platelets < 18A (recent subdural)  - PRBC transfusion today  - Given hx of subdural hematoma, will keep platelets >78Y.     5. ESRD/Metabolic:  Electrolytes are WNL and renal fxn stable. ESRD - off HD currently. Followed by Dr. Andressa Castorena  -Current baseline Cr 2.1-2.6  TLS:  No evidence, cont allopurinol and daily TLS labs   - Cont IVFs: NS at 100 ml/hr  - Replace potassium and magnesium per PRN orders     6. Nutrition:  Appetite and oral intake is good. - Cont low microbial diet   - Follow closely with dietary     7. Neuro:  Recent seizure/subdural hematoma (1/2022)  - Continue Keppra 500 mg PO BID indefinitely  - Keep platelets >29,977      - DVT Prophylaxis: Platelets <71,061 cells/dL - prophylactic lovenox on hold and mechanical prophylaxis with bilateral SCDs while in bed in place.   Contraindications to pharmacologic prophylaxis: Thrombocytopenia  Contraindications to mechanical prophylaxis: None    - Disposition: Cont to monitor until CRS resolved & no evidence of KIMBERLEY Da Silva - CNP   Tahir Purcell MD  [facility]  Please contact me through [facility]

## 2022-03-23 NOTE — PLAN OF CARE
Problem: Venous Thromboembolism:  Goal: Will show no signs or symptoms of venous thromboembolism  Description: Will show no signs or symptoms of venous thromboembolism  Outcome: Ongoing  Note: Pt is at risk for DVT d/t decreased mobility and cancer treatment. Pt educated on importance of activity. Pt has orders for SCDs while in bed. Pt verbalizes understanding of need for prophylaxis while inpatient. Problem: Infection - Central Venous Catheter-Associated Bloodstream Infection:  Goal: Will show no infection signs and symptoms  Description: Will show no infection signs and symptoms  Outcome: Ongoing  Note: CVC site remains free of signs/symptoms of infection. No drainage, edema, erythema, pain, or warmth noted at site. Dressing changes continue per protocol and on an as needed basis - see flowsheet. Problem: Falls - Risk of:  Goal: Will remain free from falls  Description: Will remain free from falls  Outcome: Ongoing  Note: Orthostatic vital signs obtained at start of shift - see flowsheet for details. Pt does not meet criteria for orthostasis. Pt is a Med fall risk. See Madelon Pummel Fall Score and ABCDS Injury Risk assessments. - Screening for Orthostasis AND not a [place] Risk per LOUIS/ABCDS: Pt bed is in low position, side rails up, call light and belongings are in reach. Fall risk light is on outside pts room. Pt encouraged to call for assistance as needed. Will continue with hourly rounds for PO intake, pain needs, toileting and repositioning as needed. Problem: PROTECTIVE PRECAUTIONS  Goal: Patient will remain free of nosocomial Infections  Outcome: Ongoing  Note: Pt remains in protective precautions. No living plants or fresh flowers in his/her room. Patient educated on wearing mask when in hallways. Patient, staff, and visitors adhering to handwashing guidelines. Patient cleansed with chlorhexidine wipes and linens changed daily per protocol.  Pt verbalizes understanding of low microbial diet. Patient remains free of nosocomial infections. Problem: Diarrhea:  Goal: Bowel elimination is within specified parameters  Description: Bowel elimination is within specified parameters  3/23/2022 0601 by Víctor Singleton RN  Outcome: Ongoing  Note: Patient verbalizes understanding the need to notify RN after bowel movements for imodium. Problem: Pain:  Goal: Pain level will decrease  Description: Pain level will decrease  Outcome: Ongoing  Note: Patient complains of lower back pain and correlates the pain to the CAR-T. This RN offered to get heat pack, ice pack, and reach out to MD for pain medication. Patient declining intervention at this time. Will continue to assess and educate.

## 2022-03-23 NOTE — CARE COORDINATION
Case Management Assessment           Initial Evaluation                Date / Time of Evaluation: 3/23/2022 10:34 AM                 Assessment Completed by: DEEDEE Ventura    Patient Name: Cooper Amado     YOB: 1955  Diagnosis: Multiple myeloma ([facility] [address] [C90.00]  Multiple myeloma in relapse [facility]) [C90.02]     Date / Time: 3/21/2022  8:14 AM    Patient Admission Status: Inpatient    If patient is discharged prior to next notation, then this note serves as note for discharge by case management. Current PCP: No primary care provider on file. Clinic Patient: No    Chart Reviewed: Yes  Patient/ Family Interviewed: Yes    Initial assessment completed at bedside with: Patient at last admission. Hospitalization in the last 30 days: No    Emergency Contacts:  Extended Emergency Contact Information  Primary Emergency Contact: Cristina Sparrow [facility]  Address: [address] [place] [address] Phone: [phone]  Mobile Phone: [phone]  Relation: Domestic Partner  Secondary Emergency Contact: Jaycee Irby[address] Phone: [phone]  Relation: Brother/Sister    Advance Directives:   Code Status: Full [address]: Yes  Agent: Nonda Mean  Contact Number: [phone]    Copy present: No     In paper Chart: No    Scanned into EMR Yes    Financial  Payor: MEDICARE / Plan: MEDICARE PART A AND B / Product Type: *No Product type* /     Pre-cert required for SNF: Michael Hernandez [address] [place] [address], [address] [place] [address] [address] [place] [address]  Phone: [phone] Fax: [phone]      Potential assistance Purchasing Medications: Potential Assistance Purchasing Medications: No  Does Patient want to participate in local refill/ meds to beds program?:      Meds To Beds General Rules:  1. Can ONLY be done Monday- Friday between 8:30am-5pm  2. Clearance     Additional Case Management Notes: Patient from home with spouse. Independent at baseline. Admitted for CAR-T infusion. Anticipating no needs at discharge. The Plan for Transition of Care is related to the following treatment goals of Multiple myeloma ([facility] [address] [C90.00]  Multiple myeloma in relapse ([facility] [address] [C90.02]    The Patient and/or patient representative Tavia and his family were provided with a choice of provider and agrees with the discharge plan Yes    Freedom of choice list was provided with basic dialogue that supports the patient's individualized plan of care/goals and shares the quality data associated with the providers.  Yes    Care Transition patient: No    Vidhya Mitchell   Case Management Department  Ph: [phone]   Fax: [phone]

## 2022-03-23 NOTE — CONSULTS
Clinical Pharmacy Progress Note    Vancomycin - Management by Pharmacy    Consult Date(s): 3/23  Consulting Provider(s): RUDDY CHU    Assessment / Plan    UTI in setting of neutropenia - Vancomycin   Concurrent Antimicrobials: cefepime - day #2   Day of Vanc Therapy: #1   Current Dosing Method: Intermittent dosing via levels   Therapeutic Goal: < 15 mcg/mL   Current Dose / Frequency/Plan / Rationale:   o Patient with hx of ESRD on HD. Not currently on HD; Scr this visit stable and at baseline (~2.1-2.6). o Will dose intermittently at this time. As Scr stable, likely can begin scheduled dosing after patient loaded. o Will order 1500mg IV x1 for today. Plan to obtain a random level 3/24 AM.      Will continue to monitor clinical condition and make adjustments to regimen as appropriate. Thank you for consulting Pharmacy! Jaun Akers PharmD., BCPS   3/23/2022 1:49 PM  Wireless: [phone]      Subjective/Objective: Mr. Cooper Amado is a 77 y.o. male with a PMHx significant for Multiple myeloma (dx 2018), HTN, GERD,  ESRD previously on HD but now off (baseline now 2.1-2.6), and seizures 2/2 SDH in Jan 2022. Given disease progression, patient admitted for Abecma CAR-T on 3/21/22. Patient developed chills, nausea, and fever later on 3/21, for which cefepime was started on 3/22. Urine culture from 3/22 now with Staph epi, sensitivity pending. Pharmacy has been consulted to dose vancomycin for UTI.     Height:   Ht Readings from Last 1 Encounters:   03/22/22 6' 4\" (1.93 m)     Weight:   Wt Readings from Last 1 Encounters:   03/22/22 252 lb 4.8 oz (114.4 kg)       Current & Prior Antimicrobial Regimen(s):  Cefepime 2000mg IV q12h ext inf (3/22-current)  Vancomycin -- pharmacy to dose    1500mg IV x1 (3/23)   Intermittent via levels (3/23-current)    Level(s) / Doses:  Date: Vanc Level: Vanc Dose:    3/23  1500mg IV x1   3/24 Ordered           Cultures & Sensitivities:    Date Site Micro Susceptibility / Result   3/21 Blood x2 NGTD    3/21 Throat In process    3/21 HSV In process    3/22 Urine Staph epidermidis  Pending   3/22 Urine fungal No Fungal elements seen    3/22 C.diff Negative            Labs / Ancillary Data:    Estimated Creatinine Clearance: 40 mL/min (A) (based on SCr of 2.5 mg/dL (H)). Recent Labs     03/21/22  1020 03/22/22  0422 03/23/22  0353   CREATININE 2.2* 2.4* 2.5*   BUN 32* 29* 28*   WBC 0.4* 0.4* 0.2*       Additional Lab Values / Findings of Note:    Procalcitonin: No results for input(s): PROCAL in the last 72 hours.

## 2022-03-24 NOTE — PROGRESS NOTES
Pt complained of acute back pain. Received orders for PRN pain medication. When the order was approved by pharmacy, pt denied pain and did not want the medication anymore.

## 2022-03-24 NOTE — PROGRESS NOTES
[address] [facility] Progress Note      3/24/2022    Ples Hipps    :  1955    MRN:  [id number]    Referring MD: No referring provider defined for this encounter. Subjective:  Back pain last night, now resolved.      ECOG PS:  (1) Restricted in physically strenuous activity, ambulatory and able to do work of light nature    KPS: 80% Normal activity with effort; some signs or symptoms of disease    Isolation:  None     Medications    Scheduled Meds:   vancomycin (VANCOCIN) intermittent dosing (placeholder)   Other See Admin Instructions    oyster shell calcium w/D  1 tablet Oral BID    Saline Mouthwash  15 mL Swish & Spit 4x Daily AC & HS    sodium chloride flush  5-40 mL IntraVENous 2 times per day    allopurinol  200 mg Oral Daily    acyclovir  400 mg Oral BID    fluconazole  100 mg Oral Daily    levETIRAcetam  500 mg Oral Q12H    pantoprazole  40 mg Oral QAM AC    sodium bicarbonate  650 mg Oral Daily    phosphorus  500 mg Oral BID    cefepime  2,000 mg IntraVENous Q12H     Continuous Infusions:   sodium chloride      sodium chloride      sodium chloride      sodium chloride 125 mL/hr at 22 0024    sodium chloride      sodium chloride 20 mL/hr at 22 0024     PRN Meds:.sodium chloride, sodium chloride, sodium chloride, diphenhydrAMINE, acetaminophen, ondansetron **OR** ondansetron, prochlorperazine **OR** prochlorperazine (COMPAZINE) with normal saline injection, [COMPLETED] loperamide **FOLLOWED BY** loperamide, sodium chloride, alteplase, magnesium hydroxide, magnesium sulfate, potassium chloride, sodium chloride flush, sodium chloride      ROS:  As noted above, otherwise remainder of 10-point ROS negative      Physical Exam:     Vital Signs:  BP (!) 146/92 Comment: plts complete  Pulse 87   Temp 97.6 °F (36.4 °C) (Oral)   Resp 23   Ht 6' 4\" (1.93 m)   Wt 252 lb 4.8 oz (114.4 kg)   SpO2 98%   BMI 30.71 kg/m²     Weight:    Wt Readings from Last 3 Encounters:   22 252 lb 4.8 oz (114.4 kg)   03/18/22 255 lb 1.2 oz (115.7 kg)   03/17/22 258 lb 13.1 oz (117.4 kg)       General: Awake, alert and oriented. HEENT: normocephalic, alopecia, PERRL, no scleral erythema or icterus, Oral mucosa moist and intact, throat clear  NECK: supple without palpable adenopathy  BACK: Straight negative CVAT  SKIN: warm dry and intact without lesions rashes or masses  CHEST: CTA bilaterally without use of accessory muscles  CV: Normal S1 S2, RRR, no MRG  ABD: NT ND normoactive BS, no palpable masses or hepatosplenomegaly  EXTREMITIES: without edema, denies calf tenderness  NEURO: CN II - XII grossly intact  CATHETER: Right Tunneled HD catheter    Laboratory Data:  CBC:   Recent Labs     03/22/22 0422 03/23/22  0353 03/24/22  0404   WBC 0.4* 0.2* 0.3*   HGB 8.1* 6.0* 6.7*   HCT 23.5* 17.1* 19.2*   MCV 96.3 95.8 96.6   PLT 37* 28* 32*     BMP/Mag:  Recent Labs     03/22/22 0422 03/23/22  0353 03/24/22  0404    137 140   K 4.2 3.8 3.6    108 109   CO2 19* 19* 20*   PHOS 2.5 2.4* 2.4*   BUN 29* 28* 26*   CREATININE 2.4* 2.5* 2.4*   MG 1.60* 1.50* 1.60*     LIVP:   Recent Labs     03/22/22 0422 03/23/22  0353 03/24/22  0404   AST 16 12* 10*   ALT 6* 7* 7*   BILIDIR <0.2 <0.2 <0.2   BILITOT 0.6 0.4 0.4   ALKPHOS 84 62 62     Coags:   Recent Labs     03/22/22 0422 03/23/22  0353 03/24/22  0404   PROTIME 13.4* 16.2* 13.8*   INR 1.18* 1.41* 1.21*   APTT 33.1 30.9 29.9     Uric Acid   Recent Labs     03/22/22 0422 03/23/22  0353 03/24/22  0404   LABURIC 7.4* 6.5 6.1     Lab Results   Component Value Date    .4 (H) 03/23/2022    CRP 45.3 (H) 03/22/2022     Lab Results   Component Value Date    FERRITIN 835.1 (H) 03/24/2022    FERRITIN 760.6 (H) 03/23/2022    FERRITIN 812.1 (H) 03/22/2022       PROBLEM LIST:            1.  [place] Light Chain Multiple Myeloma   2.  ESRD on HD   3.  GERD  4.  HTN  5.  Hypokalemia   6.  Subdural Hematoma (1/2022)  7.   Seizures (1/2022)      TREATMENT: 1. CyBorD (started 10/30/18)  2. Revlimid / Yessica Reinholds / Dex on clinical trial (started 2/1/19)  3. Carfilzomib / Pomalidomide / Dex x 6 cycles (Relapse #1 - started 8/26/19 - 2/2020)  4. Cytoxan Mobilization w/ 25% dose reduction (2/17/20)  5. Melphalan 140mg/m2 & Autologous SCT 3/12/20  6. Kyprolis/Ebony/Dex (6/30/21-7/28/21)- progression  7. Velcade/Ebony/Dex (5/18/21-8/3/21)- progression  8.  HD - CTX               -Cycle 1 9/27/21              -Cycle 2 10/18/21              -Cycle 3 11/8/21- stopped d/t severe fatigue  9. Bendamustine/Velcade/Dex 12/06/21  10. Velcade only 2/11/22  10. Fludarabine/Cytoxan followed by CAR-T infusion    ASSESSMENT AND PLAN:            1. [place] Light Chain Multiple Myeloma:  Currently in [facility]  - BM biopsy: 1/12/22.  20% plasma cells . Adrian Pelletier p53 positive 79.5% of cells  -1/28/22 Serum KFLC 1662.71, Lambda 3.05   K/L ratio 545.15  -2/24/22 Serum KFLC 1919.12, Lambda 2.54, K/L raio 755.56  -s/p Fludarabine and Cytoxan 3/16/22-3/18/22  -Post CAR-T infusion staging:  PET/CT, BM bx with Flow, FISH, CG, MMP     Lymphodepleting Chemotherapy:  Fludarabine and cyclophosphamide   Disease Status at time of Infusion:  Progressive disease  CAR-T Infusion Date:  3/21/22  CAR-T Product:  Abecma  Batch ID Number:  [id number]     Day +3    2. CRS / Neuro: Grade 1 CRS 3/22  CRS Grade: 1, fever only 3/22  - S/p toci 800mg 3/22/22  - Monitor CRP and Ferrtin closely   Lab Results   Component Value Date    .4 (H) 03/23/2022    CRP 45.3 (H) 03/22/2022     Lab Results   Component Value Date    FERRITIN 835.1 (H) 03/24/2022    FERRITIN 760.6 (H) 03/23/2022    FERRITIN 812.1 (H) 03/22/2022     ICANS Grade: 0  - Neuro checks w/ CARTOX 10-point assessment Q4hrs  ICE Score:  CAR-T Score: 10    3. ID:  Fever 12 hr after infusion, no evidence of infection but treating empirically while neutropenic. Afebrile  - On Diflucan, Valtrex.    - Cont Cefepime Day +3 (3/21/22)  -Cont Vancomycin Day +1(3/23/22)    4. Heme:  Pancytopenia d/t recent chemotherapy   - Transfuse for Hgb < 7 and Platelets < 79Z (recent subdural)  - PRBC and Plt transfusion today  - Given hx of subdural hematoma, will keep platelets >46I.     5. ESRD/Metabolic:  Metabolic acidosis, magnesium and renal fxn stable. ESRD - off HD currently. Followed by Dr. Leopoldo Expose  -Current baseline Cr 2.1-2.6  TLS:  No evidence, cont allopurinol and daily TLS labs   - Cont IVFs: NS at 75 ml/hr  - Replace potassium and magnesium per PRN orders     6. Nutrition:  Appetite and oral intake is good. - Cont low microbial diet   - Follow closely with dietary     7. Neuro:  Recent seizure/subdural hematoma (1/2022)  - Continue Keppra 500 mg PO BID indefinitely  - Keep platelets >90,771      - DVT Prophylaxis: Platelets <15,682 cells/dL - prophylactic lovenox on hold and mechanical prophylaxis with bilateral SCDs while in bed in place.   Contraindications to pharmacologic prophylaxis: Thrombocytopenia  Contraindications to mechanical prophylaxis: None    - Disposition: Cont to monitor until CRS resolved & no evidence of KIMBERLEY De La Cruz - CNP     Hema Liu MD  [facility]  Please contact me through [facility]

## 2022-03-24 NOTE — CONSULTS
Clinical Pharmacy Progress Note    Vancomycin - Management by Pharmacy    Consult Date(s): 3/23  Consulting Provider(s): RUDDY CHU    Vancomycin 1500 mg IV x1 additional dose to be given today per Dr. Josephine Begum and then vancomycin discontinued as discussed on rounds this morning.     Luther North, PharmD  3/24/22  6:22pm

## 2022-03-24 NOTE — PLAN OF CARE
Problem: Venous Thromboembolism:  Goal: Will show no signs or symptoms of venous thromboembolism  Description: Will show no signs or symptoms of venous thromboembolism  Outcome: Ongoing  Note: Refusing DVT Prevention: Pt is at risk for DVT d/t decreased mobility and cancer treatment. Pt educated on importance of activity. Pt has orders for SCDs while in bed, however pt currently refusing treatment. Reviewed risks of DVT & PE development while inpatient. Provider aware of patient's refusal and re-education of importance of prophylaxis. No new orders at this time. Will continue to re-instruct patient and intervene as appropriate. Problem: Infection - Central Venous Catheter-Associated Bloodstream Infection:  Goal: Will show no infection signs and symptoms  Description: Will show no infection signs and symptoms  Outcome: Ongoing  Note: CVC site remains free of signs/symptoms of infection. No drainage, edema, erythema, pain, or warmth noted at site. Dressing changes continue per protocol and on an as needed basis - see flowsheet. Refusing BCC Bath Protocol:  Despite multiple attempts by this RN, pt refusing shower or bed bath with CHG today. Discussed risks associated with not following BCC bath protocol including increased risk of CVC line infection & sepsis in an immunocompromised pt. Will discuss continued refusal with treatment team if pt continues to refuse daily bath protocol for 2 or more days. CVC site cleansed with CHG wipe over dressing, skin surrounding dressing, and first 6\" of IV tubing. Pt tolerated well. Continued to encourage daily CHG bathing per [facility] protocol. Problem: Falls - Risk of:  Goal: Will remain free from falls  Description: Will remain free from falls  Outcome: Ongoing  Note: Orthostatic vital signs obtained at start of shift - see flowsheet for details. Pt does not meet criteria for orthostasis. Pt is a Med fall risk.  See Wiley Cornea Fall Score and ABCDS Injury Risk assessments. - Screening for Orthostasis AND not a [place] Risk per LOUIS/ABCDS: Pt bed is in low position, side rails up, call light and belongings are in reach. Fall risk light is on outside pts room. Pt encouraged to call for assistance as needed. Will continue with hourly rounds for PO intake, pain needs, toileting and repositioning as needed. Problem: PROTECTIVE PRECAUTIONS  Goal: Patient will remain free of nosocomial Infections  Outcome: Ongoing  Note: Pt currently in a private, positive pressure room. Educated pt on wearing a mask when out of room. No living plants or flowers allowed. Also reinforced importance of hand hygiene. Pt following a low microbial diet. Surfaces throughout room cleaned with bleach wipes per unit policy. Visitors updated on current policies. Problem: Diarrhea:  Goal: Bowel elimination is within specified parameters  Description: Bowel elimination is within specified parameters  Outcome: Ongoing  Note: Pt hasn't had a BM this shift. Problem: Pain:  Goal: Pain level will decrease  Description: Pain level will decrease  Outcome: Ongoing  Note: Pt reports back pain, ice packs provided. Pt denies relief. Pt denies need for pharmacological intervention at this time, will continue to monitor. Problem: Bleeding:  Goal: Will show no signs and symptoms of excessive bleeding  Description: Will show no signs and symptoms of excessive bleeding  Outcome: Ongoing  Note: Patient's hemoglobin this AM:   Recent Labs     03/24/22  0404   HGB 6.7*     Patient's platelet count this AM:   Recent Labs     03/24/22  0404   PLT 32*    Thrombocytopenia Precautions in place. Patient showing no signs or symptoms of active bleeding. Patient transfused blood products per orders - see flowsheet. Patient verbalizes understanding of all instructions. Will continue to assess and implement POC. Call light within reach and hourly rounding in place.

## 2022-03-24 NOTE — PROGRESS NOTES
[facility] Progress Note      3/24/2022    Araceli Frazier    :  1955    MRN:  [id number]    Referring MD: No referring provider defined for this encounter. Subjective:  Feels okay, but have some neuropathic pain in the lower back improved with tylenol and benadryl. Denies fever or chills.      ECOG PS:  (1) Restricted in physically strenuous activity, ambulatory and able to do work of light nature    KPS: 80% Normal activity with effort; some signs or symptoms of disease    Isolation:  None     Medications    Scheduled Meds:   vancomycin (VANCOCIN) intermittent dosing (placeholder)   Other See Admin Instructions    oyster shell calcium w/D  1 tablet Oral BID    Saline Mouthwash  15 mL Swish & Spit 4x Daily AC & HS    sodium chloride flush  5-40 mL IntraVENous 2 times per day    allopurinol  200 mg Oral Daily    acyclovir  400 mg Oral BID    fluconazole  100 mg Oral Daily    levETIRAcetam  500 mg Oral Q12H    pantoprazole  40 mg Oral QAM AC    sodium bicarbonate  650 mg Oral Daily    phosphorus  500 mg Oral BID    cefepime  2,000 mg IntraVENous Q12H     Continuous Infusions:   sodium chloride      sodium chloride      sodium chloride      sodium chloride 75 mL/hr at 22 0804    sodium chloride      sodium chloride 20 mL/hr at 22 0024     PRN Meds:.sodium chloride, sodium chloride, sodium chloride, diphenhydrAMINE, acetaminophen, ondansetron **OR** ondansetron, prochlorperazine **OR** prochlorperazine (COMPAZINE) with normal saline injection, [COMPLETED] loperamide **FOLLOWED BY** loperamide, sodium chloride, alteplase, magnesium hydroxide, magnesium sulfate, potassium chloride, sodium chloride flush, sodium chloride      ROS:  As noted above, otherwise remainder of 10-point ROS negative      Physical Exam:     Vital Signs:  BP (!) 140/94   Pulse 85   Temp 98 °F (36.7 °C) (Oral)   Resp 18   Ht 6' 4\" (1.93 m)   Wt 252 lb 4.8 oz (114.4 kg)   SpO2 97%   BMI 30.71 kg/m² Weight:    Wt Readings from Last 3 Encounters:   03/22/22 252 lb 4.8 oz (114.4 kg)   03/18/22 255 lb 1.2 oz (115.7 kg)   03/17/22 258 lb 13.1 oz (117.4 kg)       General: Awake, alert and oriented. HEENT: normocephalic, alopecia, PERRL, no scleral erythema or icterus, Oral mucosa moist and intact, throat clear  NECK: supple without palpable adenopathy  BACK: Straight negative CVAT  SKIN: warm dry and intact without lesions rashes or masses  CHEST: CTA bilaterally without use of accessory muscles  CV: Normal S1 S2, RRR, no MRG  ABD: NT ND normoactive BS, no palpable masses or hepatosplenomegaly  EXTREMITIES: without edema, denies calf tenderness  NEURO: CN II - XII grossly intact  CATHETER: Right Tunneled HD catheter    Laboratory Data:  CBC:   Recent Labs     03/22/22 0422 03/23/22  0353 03/24/22  0404   WBC 0.4* 0.2* 0.3*   HGB 8.1* 6.0* 6.7*   HCT 23.5* 17.1* 19.2*   MCV 96.3 95.8 96.6   PLT 37* 28* 32*     BMP/Mag:  Recent Labs     03/22/22 0422 03/23/22  0353 03/24/22  0404    137 140   K 4.2 3.8 3.6    108 109   CO2 19* 19* 20*   PHOS 2.5 2.4* 2.4*   BUN 29* 28* 26*   CREATININE 2.4* 2.5* 2.4*   MG 1.60* 1.50* 1.60*     LIVP:   Recent Labs     03/22/22 0422 03/23/22  0353 03/24/22  0404   AST 16 12* 10*   ALT 6* 7* 7*   BILIDIR <0.2 <0.2 <0.2   BILITOT 0.6 0.4 0.4   ALKPHOS 84 62 62     Coags:   Recent Labs     03/22/22 0422 03/23/22  0353 03/24/22  0404   PROTIME 13.4* 16.2* 13.8*   INR 1.18* 1.41* 1.21*   APTT 33.1 30.9 29.9     Uric Acid   Recent Labs     03/22/22  0422 03/23/22  0353 03/24/22  0404   LABURIC 7.4* 6.5 6.1     Lab Results   Component Value Date    .4 (H) 03/23/2022    CRP 45.3 (H) 03/22/2022     Lab Results   Component Value Date    FERRITIN 835.1 (H) 03/24/2022    FERRITIN 760.6 (H) 03/23/2022    FERRITIN 812.1 (H) 03/22/2022     PROBLEM LIST:            1.  [place] Light Chain Multiple Myeloma   2.  ESRD on HD   3.  GERD  4.   HTN  5.  Hypokalemia   6. Marcellina Kee Hematoma (1/2022)  7.  Seizures (1/2022)      TREATMENT:            1. CyBorD (started 10/30/18)  2. Revlimid / Elizebeth Heymann / Dex on clinical trial (started 2/1/19)  3. Carfilzomib / Pomalidomide / Dex x 6 cycles (Relapse #1 - started 8/26/19 - 2/2020)  4. Cytoxan Mobilization w/ 25% dose reduction (2/17/20)  5. Melphalan 140mg/m2 & Autologous SCT 3/12/20  6. Kyprolis/Ebony/Dex (6/30/21-7/28/21)- progression  7. Velcade/Ebony/Dex (5/18/21-8/3/21)- progression  8.  HD - CTX               -Cycle 1 9/27/21              -Cycle 2 10/18/21              -Cycle 3 11/8/21- stopped d/t severe fatigue  9.  Bendamustine/Velcade/Dex 12/06/21  10.  Velcade only 2/11/22  10.  Fludarabine/Cytoxan followed by CAR-T infusion    ASSESSMENT AND PLAN:            1. [place] Light Chain Multiple Myeloma:  Currently in VGPR  - BM biopsy: 1/12/22.  20% plasma cells . Shaggy Records p53 positive 79.5% of cells  -1/28/22 Serum KFLC 1662.71, Lambda 3.05   K/L ratio 545.15  -2/24/22 Serum KFLC 1919.12, Lambda 2.54, K/L raio 755.56  -s/p Fludarabine and Cytoxan 3/16/22-3/18/22  -Post CAR-T infusion staging:  PET/CT, BM bx with Flow, FISH, CG, MMP    Lymphodepleting Chemotherapy:  Fludarabine and cyclophosphamide   Disease Status at time of Infusion:  Progressive disease  CAR-T Infusion Date: 3/21/2022  CAR-T Product:  Abecma  Batch ID Number:  [id number]    Day  +3    2. CRS / Neuro:  Grade 1 fever only 3/22/2022. - S/p toci 800mg 3/22/22  - Monitor CRP and Ferrtin closely   Lab Results   Component Value Date    .4 (H) 03/23/2022    CRP 45.3 (H) 03/22/2022     Lab Results   Component Value Date    FERRITIN 835.1 (H) 03/24/2022    FERRITIN 760.6 (H) 03/23/2022    FERRITIN 812.1 (H) 03/22/2022     - Neuro checks w/ CARTOX 10-point assessment Q4hrs    Toxicity Grading    CRS Grade: Grade 1 3/22/2022    ICANS stGstrstastdstest:st st1st ICE Score:  CAR-T Score: 10    3. ID:  Afebrile for last 24 hours, Ucx 3/22 grew Staph epi. - On Diflucan, Valtrex.    - Cont Cefepime Day +4 (3/21/22)  - Cont Vancomycin to cover staph epi UTI Day +2 (3/22/22)    4. Heme:  Anemia from chemotherapy   - Transfuse for Hgb < 7 and Platelets < 08E (recent subdural)  - 1U PRBC&Platelets transfusion today  - Given hx of subdural hematoma, will keep platelets >96I.     5.  ESRD/Metabolic:  Electrolytes are WNL and renal fxn stable.    ESRD - off HD currently.  Followed by Dr. Danette Gay  -Current baseline Cr 2.1-2.6  TLS:  No evidence, cont allopurinol and daily TLS labs   - Cont IVFs: NS at 75 ml/hr  - Replace potassium and magnesium per PRN orders     6. Nutrition:  Appetite and oral intake is good. - Cont low microbial diet   - Follow closely with dietary     7.  Neuro:  Recent seizure/subdural hematoma (1/2022)  - Continue Keppra 500 mg PO BID indefinitely  - Keep platelets >02,992      - DVT Prophylaxis: Platelets <67,852 cells/dL - prophylactic lovenox on hold and mechanical prophylaxis with bilateral SCDs while in bed in place. Contraindications to pharmacologic prophylaxis: Thrombocytopenia  Contraindications to mechanical prophylaxis: None    - Disposition: Cont to monitor until CRS resolved & no evidence of ICANS    The patient was seen and examined by Dr. Katarzyna Jonas.     Clark Kamara MD PGY-5

## 2022-03-25 NOTE — DISCHARGE SUMMARY
[facility] Discharge Summary             Attending Physician: Ember Stiles MD    Referring MD: No referring provider defined for this encounter. Name: Shantel Randall :  1955  MRN:  [id number]    Admission: 3/21/2022   Discharge:  3/28/2022    Date: 3/28/2022    Diagnosis on admit: [place] Light Chain Multiple Myeloma    Procedures: Routine chest x-ray, laboratories, EKG, IV fluid hydration, Panculture for fevers, IV antimicrobial therapy, Blood Product Infusions, CAR-T infusion. Medications:      Medication List      STOP taking these medications    furosemide 40 MG tablet  Commonly known as: Lasix        ASK your doctor about these medications    acyclovir 400 MG tablet  Commonly known as: ZOVIRAX  Take 1 tablet by mouth 2 times daily     allopurinol 100 MG tablet  Commonly known as: ZYLOPRIM     CALCIUM + VIT D (BARIATRIC ADVANTAGE) CHEWABLE TABLET  Take 1 tablet by mouth daily     fluconazole 100 MG tablet  Commonly known as: Diflucan  Take 1 tablet by mouth daily     levETIRAcetam 500 MG tablet  Commonly known as: KEPPRA  Take 1 tablet by mouth every 12 hours     levoFLOXacin 250 MG tablet  Commonly known as: LEVAQUIN     pantoprazole sodium 40 MG Pack packet  Commonly known as: PROTONIX     prochlorperazine 10 MG tablet  Commonly known as: COMPAZINE     sodium bicarbonate 650 MG tablet            Reason for Admission: CAR-T infusion and monitoring    Hospital Course: Anais Rivera a 76 yo male w/ h/o [place] Light Chain Multiple Myeloma (Dx 10/2018). He was initially diagnosed with myeloma 10/2018 after presenting to the ED following routine workplace physical and labs revealed JUDY, anemia and thrombocytopenia (SCr 4.6, Hgb 7.8 & platelets of 98B). He was admitted to [facility] for additional work-up and was found to have kappa light chain multiple myeloma and nephropathy.       Patient has received multiple lines of therapy including Melphalan followed by an Autologous Stem Cell on 3/12/20. Unfortunately, patient progressed and was re-initiated on multiple lines of therapy. He most recently has received Bendamustine, Velcade, Dex starting 12/6/21.      He received lymphodepleting chemotherapy consisting of Fludarabine and Cytoxan and was admitted for Abecma CAR-T which was infused on 3/21/22.    12 hours after the infusion on 3/21/22, he developed multiple episodes of high-fever and symptoms of chills. His respiratory and neurologic status were intact and his blood pressure also remained stable. He was drawn blood and urine cultures and started on empiric antibiotics with cefepime. His CRS score was grade 1 and ICANS grade was 0. He received tocilizumab 800 mg x1 on 3/22/2022 and his fever was then subsided. His first urine culture grew Staphylococcus epidermidis and received vancomycin x2. Repeat urine culture was negative. The rest of his hospital course was uneventful and no complication was reported. His kidney and liver function were monitored on daily basis. His electrolytes were also monitored and replaced as needed in this admission. He was discharged home with stable conditions. Physical Exam:     Vital Signs:  /84   Pulse 79   Temp 98 °F (36.7 °C) (Oral)   Resp 17   Ht 6' 4\" (1.93 m)   Wt 259 lb 3.2 oz (117.6 kg)   SpO2 98%   BMI 31.55 kg/m²     Weight:    Wt Readings from Last 3 Encounters:   03/28/22 259 lb 3.2 oz (117.6 kg)   03/18/22 255 lb 1.2 oz (115.7 kg)   03/17/22 258 lb 13.1 oz (117.4 kg)       KPS: 80% Normal activity with effort; some signs or symptoms of disease    General: Awake, alert and oriented    HEENT: normocephalic, alopecia, PERRL, no scleral erythema or icterus, Oral mucosa moist and intact, throat clear.    NECK: supple without palpable adenopathy  BACK: Straight, negative CVAT  SKIN: warm dry and intact without lesions rashes or masses  CHEST: CTA bilaterally without use of accessory muscles  CV: Normal S1 S2, RRR, no MRG  ABD: NT ND normoactive BS, no palpable masses or hepatosplenomegaly  EXTREMITIES: without edema, denies calf tenderness  NEURO: CN II - XII grossly intact  CATHETER: Right Tunneled HD catheter    Discharge Laboratory Data:  CBC:   Recent Labs     03/26/22 0320 03/27/22 0305 03/28/22  0339   WBC 0.3* 0.4* 0.4*   HGB 7.6* 7.9* 8.3*   HCT 21.9* 22.8* 24.2*   MCV 92.7 93.2 93.5   PLT 29* 33* 32*     BMP/Mag:  Recent Labs     03/26/22 0320 03/27/22  0305 03/28/22  0339    142 142   K 3.8 3.7 3.6    110 109   CO2 18* 21 23   PHOS 2.4* 2.9 3.0   BUN 18 17 16   CREATININE 2.0* 1.9* 1.9*   MG 1.50* 1.50* 1.60*     LIVP:   Recent Labs     03/26/22 0320 03/27/22 0305 03/28/22  0339   AST 12* 16 21   ALT <5* 11 18   BILIDIR <0.2 <0.2 <0.2   BILITOT 0.4 0.5 0.5   ALKPHOS 63 68 69     Coags:   Recent Labs     03/26/22 0320 03/27/22 0305 03/28/22  0339   PROTIME 14.0* 13.6* 13.8*   INR 1.23* 1.19* 1.21*   APTT 31.4 31.7 29.8     Uric Acid   Recent Labs     03/26/22 0320 03/27/22  0305 03/28/22  0339   LABURIC 4.9 4.8 5.0     Tacro:  No results for input(s): TACROLEV in the last 72 hours. CMV Quant DNA by PCR: No results found for: CMVDNAQNT  IgG: No results for input(s): IGG in the last 72 hours. Microbiology: Blood Cx 3/21/2022 negative. C diff 3/22/2022 negative. Urine Cx 3/22/2022 Staph epi. Repeat Urine Cx 3/23/2022 negative. PROBLEM LIST:            1.  [place] Light Chain Multiple Myeloma   2.  ESRD on HD   3.  GERD  4.  HTN  5.  Hypokalemia   6.  Subdural Hematoma (1/2022)  7.  Seizures (1/2022)      TREATMENT:            1. CyBorD (started 10/30/18)  2. Revlimid / Emil Route / Dex on clinical trial (started 2/1/19)  3. Carfilzomib / Pomalidomide / Dex x 6 cycles (Relapse #1 - started 8/26/19 - 2/2020)  4. Cytoxan Mobilization w/ 25% dose reduction (2/17/20)  5. Melphalan 140mg/m2 & Autologous SCT 3/12/20  6. Kyprolis/Ebony/Dex (6/30/21-7/28/21)- progression  7.  Velcade/Ebony/Dex (5/18/21-8/3/21)- progression  8.  HD - CTX               -Cycle 1 9/27/21              -Cycle 2 10/18/21              -Cycle 3 11/8/21- stopped d/t severe fatigue  9.  Bendamustine/Velcade/Dex 12/06/21  10.  Velcade only 2/11/22  10.  Fludarabine/Cytoxan followed by CAR-T infusion      ASSESSMENT AND PLAN:           1. [place] Light Chain Multiple Myeloma:  Currently in [facility]  - BM biopsy: 1/12/22.  20% plasma cells . Rometta Favre p53 positive 79.5% of cells  -1/28/22 Serum KFLC 1662.71, Lambda 3.05   K/L ratio 545.15  -2/24/22 Serum KFLC 1919.12, Lambda 2.54, K/L raio 755.56  -s/p Fludarabine and Cytoxan 3/16/22-3/18/22  -Post CAR-T infusion staging:  PET/CT, BM bx with Flow, FISH, CG, MMP     Lymphodepleting Chemotherapy:  Fludarabine and cyclophosphamide   Disease Status at time of Infusion:  Progressive disease  CAR-T Infusion Date:  3/21/22  CAR-T Product:  Abecma  Batch ID Number:  [id number]     Day +7     2. CRS / Neuro: Grade 1 CRS 3/22  CRS Grade: 1, fever only 3/22  - S/p toci 800mg 3/22/22  - Monitor CRP and Ferrtin closely         Lab Results   Component Value Date     CRP 25.2 (H) 03/27/2022     CRP 31.7 (H) 03/26/2022     CRP 32.3 (H) 03/25/2022            Lab Results   Component Value Date     FERRITIN 815.7 (H) 03/27/2022     FERRITIN 768.7 (H) 03/26/2022     FERRITIN 778.5 (H) 03/25/2022      ICANS Grade: 0  - Neuro checks w/ CARTOX 10-point assessment Q4hrs  ICE Score:  CAR-T Score: 10     3. ID:  Fever 12 hr after infusion, no evidence of infection but treating empirically while neutropenic. Afebrile  - On Levaquin, Diflucan, Valtrex.    - Vancomycin x2 (3/23/22-3/24/22)     Abx history  Cefepime 3/21/22-3/25/22     4.  Heme:  Pancytopenia d/t recent chemotherapy   - Transfuse for Hgb < 7 and Platelets < 19H (UIYEPW subdural)  - Plt transfusion today  - Given hx of subdural hematoma, will keep platelets >35Q.     5.  ESRD/Metabolic:  Metabolic acidosis, magnesium and renal fxn stable.    ESRD - off HD currently.  Followed by Dr. Florie Fothergill Synetta Servant  -Current baseline Cr 2.1-2.6  TLS:  No evidence, cont allopurinol and daily TLS labs   - Replace potassium and magnesium per PRN orders     6. Nutrition:  Appetite and oral intake is good. - Cont low microbial diet   - Follow closely with dietary     7.  Neuro:  Recent seizure/subdural hematoma (1/2022)  - Continue Keppra 500 mg PO BID indefinitely  - Keep platelets >83,141    Condition on discharge: Good    Discharge Instructions:  F/I in OPO 3/29/22    The patient was advised on activity and dietary restrictions. The patient was advised to follow up in the emergency department or contact the physician with any unresolved nausea/vomiting/diarrhea/pain or temperature greater than 100.5 F or any other unusual symptoms. This discharge summary and plan was discussed and agreed upon with Dr. Cortez Villegas.     Cony Ridley MD PGY-5  Krystle Pham MD  [facility]  Please contact me through [address]

## 2022-03-25 NOTE — PROGRESS NOTES
(1/2022)  7. Seizures (1/2022)      TREATMENT:            1. CyBorD (started 10/30/18)  2. Revlimid / Dayanna Dense / Dex on clinical trial (started 2/1/19)  3. Carfilzomib / Pomalidomide / Dex x 6 cycles (Relapse #1 - started 8/26/19 - 2/2020)  4. Cytoxan Mobilization w/ 25% dose reduction (2/17/20)  5. Melphalan 140mg/m2 & Autologous SCT 3/12/20  6. Kyprolis/Ebony/Dex (6/30/21-7/28/21)- progression  7. Velcade/Ebony/Dex (5/18/21-8/3/21)- progression  8.  HD - CTX               -Cycle 1 9/27/21              -Cycle 2 10/18/21              -Cycle 3 11/8/21- stopped d/t severe fatigue  9. Bendamustine/Velcade/Dex 12/06/21  10. Velcade only 2/11/22  10. Fludarabine/Cytoxan followed by CAR-T infusion    ASSESSMENT AND PLAN:            1. Kappa Light Chain Multiple Myeloma:  Currently in [facility]  - BM biopsy: 1/12/22.  20% plasma cells . Lilia Maid p53 positive 79.5% of cells  -1/28/22 Serum KFLC 1662.71, Lambda 3.05   K/L ratio 545.15  -2/24/22 Serum KFLC 1919.12, Lambda 2.54, K/L raio 755.56  -s/p Fludarabine and Cytoxan 3/16/22-3/18/22  -Post CAR-T infusion staging:  PET/CT, BM bx with Flow, FISH, CG, MMP     Lymphodepleting Chemotherapy:  Fludarabine and cyclophosphamide   Disease Status at time of Infusion:  Progressive disease  CAR-T Infusion Date:  3/21/22  CAR-T Product:  Abecma  Batch ID Number:  [id number]     Day +4    2. CRS / Neuro: Grade 1 CRS 3/22  CRS Grade: 1, fever only 3/22  - S/p toci 800mg 3/22/22  - Monitor CRP and Ferrtin closely   Lab Results   Component Value Date    CRP 32.3 (H) 03/25/2022    CRP 53.9 (H) 03/24/2022    .4 (H) 03/23/2022     Lab Results   Component Value Date    FERRITIN 778.5 (H) 03/25/2022    FERRITIN 835.1 (H) 03/24/2022    FERRITIN 760.6 (H) 03/23/2022     ICANS Grade: 0  - Neuro checks w/ CARTOX 10-point assessment Q4hrs  ICE Score:  CAR-T Score: 10    3. ID:  Fever 12 hr after infusion, no evidence of infection but treating empirically while neutropenic.  Afebrile  - On Diflucan, Valtrex. - Cont Cefepime Day +4 (3/21/22), stop and restart Levaquin 3/25/22  -Cont Vancomycin x2 (3/23/22-3/24/22)    4. Heme:  Pancytopenia d/t recent chemotherapy   - Transfuse for Hgb < 7 and Platelets < 75U (recent subdural)  - PRBC and Plt transfusion today  - Given hx of subdural hematoma, will keep platelets >59P.     5. ESRD/Metabolic:  Metabolic acidosis, magnesium and renal fxn stable. ESRD - off HD currently. Followed by Dr. Steve Howard  -Current baseline Cr 2.1-2.6  TLS:  No evidence, cont allopurinol and daily TLS labs   - Cont IVFs: NS at 75 ml/hr, lower to 20 ml/hr 3/25/22  - Replace potassium and magnesium per PRN orders     6. Nutrition:  Appetite and oral intake is good. - Cont low microbial diet   - Follow closely with dietary     7. Neuro:  Recent seizure/subdural hematoma (1/2022)  - Continue Keppra 500 mg PO BID indefinitely  - Keep platelets >06,498      - DVT Prophylaxis: Platelets <02,522 cells/dL - prophylactic lovenox on hold and mechanical prophylaxis with bilateral SCDs while in bed in place.   Contraindications to pharmacologic prophylaxis: Thrombocytopenia  Contraindications to mechanical prophylaxis: None    - Disposition: Cont to monitor until CRS resolved & no evidence of Mihaela Casey MD PGY-5    Danna Torres MD  [facility]  Please contact me through [address] [facility]

## 2022-03-25 NOTE — PLAN OF CARE
Problem: Venous Thromboembolism:  Goal: Will show no signs or symptoms of venous thromboembolism  Description: Will show no signs or symptoms of venous thromboembolism  Outcome: Ongoing  Note: Refusing DVT Prevention: Pt is at risk for DVT d/t decreased mobility and cancer treatment. Pt educated on importance of activity. Pt has orders for SCDs while in bed, however pt currently refusing treatment. Reviewed risks of DVT & PE development while inpatient. Provider aware of patient's refusal and re-education of importance of prophylaxis. No new orders at this time. Will continue to re-instruct patient and intervene as appropriate. Problem: Infection - Central Venous Catheter-Associated Bloodstream Infection:  Goal: Will show no infection signs and symptoms  Description: Will show no infection signs and symptoms  Outcome: Ongoing  Note: CVC site remains free of signs/symptoms of infection. No drainage, edema, erythema, pain, or warmth noted at site. Dressing changes continue per protocol and on an as needed basis - see flowsheet. Compliant with BCC Bath Protocol:  Performed CHG bath today per [facility] protocol utilizing CHG solution in the shower. CVC site cleansed with CHG wipe over dressing, skin surrounding dressing, and first 6\" of IV tubing. Pt tolerated well. Continued to encourage daily CHG bathing per [facility] protocol. Problem: Falls - Risk of:  Goal: Will remain free from falls  Description: Will remain free from falls  Outcome: Ongoing  Note: Orthostatic vital signs obtained at start of shift - see flowsheet for details. Pt does not meet criteria for orthostasis. Pt is a Med fall risk. See [place] Fall Score and ABCDS Injury Risk assessments. - Screening for Orthostasis AND not a [place] Risk per [place]/ABCDS: Pt bed is in low position, side rails up, call light and belongings are in reach. Fall risk light is on outside pts room. Pt encouraged to call for assistance as needed.  Will continue with hourly rounds for PO intake, pain needs, toileting and repositioning as needed. Problem: PROTECTIVE PRECAUTIONS  Goal: Patient will remain free of nosocomial Infections  Outcome: Ongoing  Note: Pt currently in a private, positive pressure room. Educated pt on wearing a mask when out of room. No living plants or flowers allowed. Also reinforced importance of hand hygiene. Pt following a low microbial diet. Surfaces throughout room cleaned with bleach wipes per unit policy. Visitors updated on current policies. Problem: Diarrhea:  Goal: Bowel elimination is within specified parameters  Description: Bowel elimination is within specified parameters  Outcome: Ongoing  Note: Pt hasn't had a BM this shift. Problem: Pain:  Goal: Pain level will decrease  Description: Pain level will decrease  Outcome: Ongoing  Note: Pt reports back pain, oxycodone administered. Pt reports relief, will continue to monitor. Problem: Bleeding:  Goal: Will show no signs and symptoms of excessive bleeding  Description: Will show no signs and symptoms of excessive bleeding  Outcome: Ongoing  Note:   Patient's hemoglobin this AM: Recent Labs     03/25/22  0345   HGB 7.0*     Patient's platelet count this AM:   Recent Labs     03/25/22  0345   PLT 30*    Thrombocytopenia Precautions in place. Patient showing no signs or symptoms of active bleeding. Patient transfused blood products per orders - see flowsheet. Patient verbalizes understanding of all instructions. Will continue to assess and implement POC. Call light within reach and hourly rounding in place.

## 2022-03-26 NOTE — PLAN OF CARE
Problem: Venous Thromboembolism:  Goal: Will show no signs or symptoms of venous thromboembolism  Description: Will show no signs or symptoms of venous thromboembolism  Outcome: Ongoing  Note: Patient is ambulatory and no s/sx of dvt. Problem: Infection - Central Venous Catheter-Associated Bloodstream Infection:  Goal: Will show no infection signs and symptoms  Description: Will show no infection signs and symptoms  Outcome: Ongoing  Note: Patient's CVC site shows no signs or symptoms of infection. No drainage, edema, erythema, pain, or warmth noted at site. Vital signs stable and all lines flushed with blood return noted. Dressing changes continue per protocol and on an as needed basis - see flowsheet. Will continue to monitor for symptoms of infection, pain and/or discomfort. Call light within reach and hourly rounding in place. Problem: Falls - Risk of:  Goal: Will remain free from falls  Description: Will remain free from falls  Outcome: Ongoing  Note: Patient remained free of falls during shift. Patient remains a [place] Fall Risk: Medium (25-44). Call light within reach and hourly rounding in place. Problem: Nutrition Deficit:  Goal: Ability to achieve adequate nutritional intake will improve  Description: Ability to achieve adequate nutritional intake will improve  Outcome: Ongoing  Note: Pt with adequate nutritional intake this shift. See doc flowsheets. Will continue to monitor and encourage PO consumption. Problem: PROTECTIVE PRECAUTIONS  Goal: Patient will remain free of nosocomial Infections  Outcome: Ongoing  Note: Patient is showing no signs or symptoms of nosocomial infections. Patient's temp during shift are as follows: Temp (8hrs), Av.4 °F (36.9 °C), Min:98.4 °F (36.9 °C), Max:98.4 °F (36.9 °C)  . Patient placed in private room and instructed on importance of handwashing and when to wear a mask when ambulating in hallway.  Will continue to assess for s/s of infection and implement POC. Call light within reach and hourly rounding in place. Problem: Pain:  Description: Pain management should include both nonpharmacologic and pharmacologic interventions. Goal: Pain level will decrease  Description: Pain level will decrease  Outcome: Ongoing  Note: Patient with complaints of  back pain during shift. Will continue to assess comfort and pain on 0-10 number scale and medicate per order while encouraging non pharmacological techniques as well. Call light within reach and hourly rounding in place. Problem: Bleeding:  Goal: Will show no signs and symptoms of excessive bleeding  Description: Will show no signs and symptoms of excessive bleeding  Note: Patient's hemoglobin this AM: No transfusion required. Recent Labs     03/26/22  0320   HGB 7.6*     Patient's platelet count this AM:   Recent Labs     03/26/22  0320   PLT 29*    Thrombocytopenia Precautions in place. Patient showing no signs or symptoms of active bleeding. Patient will receive Platelet transfusion this am.  Patient verbalizes understanding of all instructions. Will continue to assess and implement POC. Call light within reach and hourly rounding in place.

## 2022-03-26 NOTE — PROGRESS NOTES
(115.7 kg)   03/17/22 258 lb 13.1 oz (117.4 kg)       General: Awake, alert and oriented. HEENT: normocephalic, alopecia, PERRL, no scleral erythema or icterus, Oral mucosa moist and intact, throat clear  NECK: supple without palpable adenopathy  BACK: Straight negative CVAT  SKIN: warm dry and intact without lesions rashes or masses  CHEST: CTA bilaterally without use of accessory muscles  CV: Normal S1 S2, RRR, no MRG  ABD: NT ND normoactive BS, no palpable masses or hepatosplenomegaly  EXTREMITIES: trace edema, denies calf tenderness  NEURO: CN II - XII grossly intact  CATHETER: Right Tunneled HD catheter    Laboratory Data:  CBC:   Recent Labs     03/24/22  0404 03/25/22  0345 03/26/22  0320   WBC 0.3* 0.3* 0.3*   HGB 6.7* 7.0* 7.6*   HCT 19.2* 20.6* 21.9*   MCV 96.6 94.7 92.7   PLT 32* 30* 29*     BMP/Mag:  Recent Labs     03/24/22  0404 03/25/22  0345 03/26/22  0320    140 140   K 3.6 3.4* 3.8    111* 109   CO2 20* 19* 18*   PHOS 2.4* 2.4* 2.4*   BUN 26* 21* 18   CREATININE 2.4* 2.2* 2.0*   MG 1.60* 1.60* 1.50*     LIVP:   Recent Labs     03/24/22  0404 03/25/22  0345 03/26/22  0320   AST 10* 9* 12*   ALT 7* 6* <5*   BILIDIR <0.2 <0.2 <0.2   BILITOT 0.4 0.4 0.4   ALKPHOS 62 61 63     Coags:   Recent Labs     03/24/22  0404 03/25/22  0345 03/26/22  0320   PROTIME 13.8* 13.5* 14.0*   INR 1.21* 1.19* 1.23*   APTT 29.9 30.3 31.4     Uric Acid   Recent Labs     03/24/22  0404 03/25/22  0345 03/26/22  0320   LABURIC 6.1 5.6 4.9     Lab Results   Component Value Date    CRP 31.7 (H) 03/26/2022    CRP 32.3 (H) 03/25/2022    CRP 53.9 (H) 03/24/2022     Lab Results   Component Value Date    FERRITIN 768.7 (H) 03/26/2022    FERRITIN 778.5 (H) 03/25/2022    FERRITIN 835.1 (H) 03/24/2022       PROBLEM LIST:            1.  [place] Light Chain Multiple Myeloma   2.  ESRD on HD   3.  GERD  4.  HTN  5.  Hypokalemia   6.  Subdural Hematoma (1/2022)  7.   Seizures (1/2022)      TREATMENT:            1. CyBorD (started 10/30/18)  2. Revlimid / Laurent Rm / Dex on clinical trial (started 2/1/19)  3. Carfilzomib / Pomalidomide / Dex x 6 cycles (Relapse #1 - started 8/26/19 - 2/2020)  4. Cytoxan Mobilization w/ 25% dose reduction (2/17/20)  5. Melphalan 140mg/m2 & Autologous SCT 3/12/20  6. Kyprolis/Ebony/Dex (6/30/21-7/28/21)- progression  7. Velcade/Ebony/Dex (5/18/21-8/3/21)- progression  8.  HD - CTX               -Cycle 1 9/27/21              -Cycle 2 10/18/21              -Cycle 3 11/8/21- stopped d/t severe fatigue  9. Bendamustine/Velcade/Dex 12/06/21  10. Velcade only 2/11/22  10. Fludarabine/Cytoxan followed by CAR-T infusion    ASSESSMENT AND PLAN:            1. [place] Light Chain Multiple Myeloma:  Currently in VGPR  - BM biopsy: 1/12/22.  20% plasma cells . Reese Skates p53 positive 79.5% of cells  -1/28/22 Serum KFLC 1662.71, Lambda 3.05   K/L ratio 545.15  -2/24/22 Serum KFLC 1919.12, Lambda 2.54, K/L raio 755.56  -s/p Fludarabine and Cytoxan 3/16/22-3/18/22  -Post CAR-T infusion staging:  PET/CT, BM bx with Flow, FISH, CG, MMP     Lymphodepleting Chemotherapy:  Fludarabine and cyclophosphamide   Disease Status at time of Infusion:  Progressive disease  CAR-T Infusion Date:  3/21/22  CAR-T Product:  Abecma  Batch ID Number:  [id number]     Day +5    2. CRS / Neuro: Grade 1 CRS 3/22  CRS Grade: 1, fever only 3/22  - S/p toci 800mg 3/22/22  - Monitor CRP and Ferrtin closely   Lab Results   Component Value Date    CRP 31.7 (H) 03/26/2022    CRP 32.3 (H) 03/25/2022    CRP 53.9 (H) 03/24/2022     Lab Results   Component Value Date    FERRITIN 768.7 (H) 03/26/2022    FERRITIN 778.5 (H) 03/25/2022    FERRITIN 835.1 (H) 03/24/2022     ICANS Grade: 0  - Neuro checks w/ CARTOX 10-point assessment Q4hrs  ICE Score:  CAR-T Score: 10    3. ID:  Fever 12 hr after infusion, no evidence of infection but treating empirically while neutropenic.  Afebrile  - On Levaquin, Diflucan, Valtrex.   -Cont Vancomycin x2 (3/23/22-3/24/22)    Abx history  Cefepime 3/21/22-3/25/22    4. Heme:  Pancytopenia d/t recent chemotherapy   - Transfuse for Hgb < 7 and Platelets < 62S (recent subdural)  - Plt transfusion today  - Given hx of subdural hematoma, will keep platelets >77C.     5. ESRD/Metabolic:  Metabolic acidosis, magnesium and renal fxn stable. ESRD - off HD currently. Followed by Dr. Terri Beebe  -Current baseline Cr 2.1-2.6  TLS:  No evidence, cont allopurinol and daily TLS labs   - Replace potassium and magnesium per PRN orders     6. Nutrition:  Appetite and oral intake is good. - Cont low microbial diet   - Follow closely with dietary     7. Neuro:  Recent seizure/subdural hematoma (1/2022)  - Continue Keppra 500 mg PO BID indefinitely  - Keep platelets >61,291      - DVT Prophylaxis: Platelets <88,248 cells/dL - prophylactic lovenox on hold and mechanical prophylaxis with bilateral SCDs while in bed in place. Contraindications to pharmacologic prophylaxis: Thrombocytopenia  Contraindications to mechanical prophylaxis: None    - Disposition: Cont to monitor until CRS resolved & no evidence of ICANS    KIMBERLEY Cast - CNP     Mireya Cazares.  Kimberly Saavedra, [address]

## 2022-03-26 NOTE — PLAN OF CARE
Problem: Venous Thromboembolism:  Goal: Will show no signs or symptoms of venous thromboembolism  Description: Will show no signs or symptoms of venous thromboembolism  3/26/2022 1538 by Mj Vivar RN  Outcome: Ongoing     Problem: Infection - Central Venous Catheter-Associated Bloodstream Infection:  Goal: Will show no infection signs and symptoms  Description: Will show no infection signs and symptoms  3/26/2022 1538 by Mj Vivar RN  Outcome: Ongoing     Problem: Falls - Risk of:  Goal: Will remain free from falls  Description: Will remain free from falls  3/26/2022 1538 by Mj Vivar RN  Outcome: Ongoing   Orthostatic vital signs obtained at start of shift - see flowsheet for details. Pt does not meet criteria for orthostasis. Pt is a Med fall risk. See Vinod Hollow Fall Score and ABCDS Injury Risk assessments. - Screening for Orthostasis AND not a Meigs Risk per LOUIS/ABCDS: Pt bed is in low position, side rails up, call light and belongings are in reach. Fall risk light is on outside pts room. Pt encouraged to call for assistance as needed. Will continue with hourly rounds for PO intake, pain needs, toileting and repositioning as needed.       Problem: Nutrition Deficit:  Goal: Ability to achieve adequate nutritional intake will improve  Description: Ability to achieve adequate nutritional intake will improve  3/26/2022 1538 by Mj Vivar RN  Outcome: Ongoing

## 2022-03-27 NOTE — PROGRESS NOTES
[address] [facility] Progress Note      3/27/2022    Laura Jero    :  1955    MRN:  [id number]    Referring MD: No referring provider defined for this encounter. Subjective:  Feeling good, no fever or chills. Back pain much better.       ECOG PS:  (1) Restricted in physically strenuous activity, ambulatory and able to do work of light nature    KPS: 80% Normal activity with effort; some signs or symptoms of disease    Isolation:  None     Medications    Scheduled Meds:   amLODIPine  5 mg Oral Daily    levoFLOXacin  250 mg Oral Daily    oyster shell calcium w/D  1 tablet Oral BID    Saline Mouthwash  15 mL Swish & Spit 4x Daily AC & HS    sodium chloride flush  5-40 mL IntraVENous 2 times per day    allopurinol  200 mg Oral Daily    acyclovir  400 mg Oral BID    fluconazole  100 mg Oral Daily    levETIRAcetam  500 mg Oral Q12H    pantoprazole  40 mg Oral QAM AC    sodium bicarbonate  650 mg Oral Daily    phosphorus  500 mg Oral BID     Continuous Infusions:   sodium chloride      sodium chloride      sodium chloride      sodium chloride      sodium chloride      sodium chloride      sodium chloride      sodium chloride 20 mL/hr at 22 2146     PRN Meds:.sodium chloride, sodium chloride, sodium chloride, acetaminophen, sodium chloride, oxyCODONE, sodium chloride, sodium chloride, diphenhydrAMINE, ondansetron **OR** ondansetron, prochlorperazine **OR** prochlorperazine (COMPAZINE) with normal saline injection, [COMPLETED] loperamide **FOLLOWED BY** loperamide, sodium chloride, alteplase, magnesium hydroxide, magnesium sulfate, potassium chloride, sodium chloride flush, sodium chloride      ROS:  As noted above, otherwise remainder of 10-point ROS negative      Physical Exam:     Vital Signs:  BP (!) 152/98   Pulse 76   Temp 96 °F (35.6 °C) (Oral)   Resp 19   Ht 6' 4\" (1.93 m)   Wt 267 lb 11.2 oz (121.4 kg)   SpO2 95%   BMI 32.59 kg/m²     Weight:    Wt Readings from Last 3 Encounters:   03/26/22 267 lb 11.2 oz (121.4 kg)   03/18/22 255 lb 1.2 oz (115.7 kg)   03/17/22 258 lb 13.1 oz (117.4 kg)       General: Awake, alert and oriented. HEENT: normocephalic, alopecia, PERRL, no scleral erythema or icterus, Oral mucosa moist and intact, throat clear  NECK: supple without palpable adenopathy  BACK: Straight negative CVAT  SKIN: warm dry and intact without lesions rashes or masses  CHEST: CTA bilaterally without use of accessory muscles  CV: Normal S1 S2, RRR, no MRG  ABD: NT ND normoactive BS, no palpable masses or hepatosplenomegaly  EXTREMITIES: trace edema, denies calf tenderness  NEURO: CN II - XII grossly intact  CATHETER: Right Tunneled HD catheter    Laboratory Data:  CBC:   Recent Labs     03/25/22 0345 03/26/22  0320 03/27/22  0305   WBC 0.3* 0.3* 0.4*   HGB 7.0* 7.6* 7.9*   HCT 20.6* 21.9* 22.8*   MCV 94.7 92.7 93.2   PLT 30* 29* 33*     BMP/Mag:  Recent Labs     03/25/22 0345 03/26/22  0320 03/27/22  0305    140 142   K 3.4* 3.8 3.7   * 109 110   CO2 19* 18* 21   PHOS 2.4* 2.4* 2.9   BUN 21* 18 17   CREATININE 2.2* 2.0* 1.9*   MG 1.60* 1.50* 1.50*     LIVP:   Recent Labs     03/25/22 0345 03/26/22  0320 03/27/22  0305   AST 9* 12* 16   ALT 6* <5* 11   BILIDIR <0.2 <0.2 <0.2   BILITOT 0.4 0.4 0.5   ALKPHOS 61 63 68     Coags:   Recent Labs     03/25/22 0345 03/26/22  0320 03/27/22  0305   PROTIME 13.5* 14.0* 13.6*   INR 1.19* 1.23* 1.19*   APTT 30.3 31.4 31.7     Uric Acid   Recent Labs     03/25/22  0345 03/26/22  0320 03/27/22  0305   LABURIC 5.6 4.9 4.8     Lab Results   Component Value Date    CRP 25.2 (H) 03/27/2022    CRP 31.7 (H) 03/26/2022    CRP 32.3 (H) 03/25/2022     Lab Results   Component Value Date    FERRITIN 815.7 (H) 03/27/2022    FERRITIN 768.7 (H) 03/26/2022    FERRITIN 778.5 (H) 03/25/2022       PROBLEM LIST:            1.  [place] Light Chain Multiple Myeloma   2.  ESRD on HD   3.  GERD  4.   HTN  5.  Hypokalemia   6.  Subdural Hematoma (1/2022)  7. Seizures (1/2022)      TREATMENT:            1. CyBorD (started 10/30/18)  2. Revlimid / Samantha Mt / Dex on clinical trial (started 2/1/19)  3. Carfilzomib / Pomalidomide / Dex x 6 cycles (Relapse #1 - started 8/26/19 - 2/2020)  4. Cytoxan Mobilization w/ 25% dose reduction (2/17/20)  5. Melphalan 140mg/m2 & Autologous SCT 3/12/20  6. Kyprolis/Ebony/Dex (6/30/21-7/28/21)- progression  7. Velcade/Ebony/Dex (5/18/21-8/3/21)- progression  8.  HD - CTX               -Cycle 1 9/27/21              -Cycle 2 10/18/21              -Cycle 3 11/8/21- stopped d/t severe fatigue  9. Bendamustine/Velcade/Dex 12/06/21  10. Velcade only 2/11/22  10. Fludarabine/Cytoxan followed by CAR-T infusion    ASSESSMENT AND PLAN:            1. [place] Light Chain Multiple Myeloma:  Currently in [facility]  - BM biopsy: 1/12/22.  20% plasma cells . Jayjay Singh p53 positive 79.5% of cells  -1/28/22 Serum KFLC 1662.71, Lambda 3.05   K/L ratio 545.15  -2/24/22 Serum KFLC 1919.12, Lambda 2.54, K/L raio 755.56  -s/p Fludarabine and Cytoxan 3/16/22-3/18/22  -Post CAR-T infusion staging:  PET/CT, BM bx with Flow, FISH, CG, MMP     Lymphodepleting Chemotherapy:  Fludarabine and cyclophosphamide   Disease Status at time of Infusion:  Progressive disease  CAR-T Infusion Date:  3/21/22  CAR-T Product:  Abecma  Batch ID Number:  [id number]     Day +6    2. CRS / Neuro: Grade 1 CRS 3/22  CRS Grade: 1, fever only 3/22  - S/p toci 800mg 3/22/22  - Monitor CRP and Ferrtin closely   Lab Results   Component Value Date    CRP 25.2 (H) 03/27/2022    CRP 31.7 (H) 03/26/2022    CRP 32.3 (H) 03/25/2022     Lab Results   Component Value Date    FERRITIN 815.7 (H) 03/27/2022    FERRITIN 768.7 (H) 03/26/2022    FERRITIN 778.5 (H) 03/25/2022     ICANS Grade: 0  - Neuro checks w/ CARTOX 10-point assessment Q4hrs  ICE Score:  CAR-T Score: 10    3. ID:  Fever 12 hr after infusion, no evidence of infection but treating empirically while neutropenic.  Afebrile  - On Levaquin, Diflucan, Valtrex.   -Cont Vancomycin x2 (3/23/22-3/24/22)    Abx history  Cefepime 3/21/22-3/25/22    4. Heme:  Pancytopenia d/t recent chemotherapy   - Transfuse for Hgb < 7 and Platelets < 30H (recent subdural)  - Plt transfusion today  - Given hx of subdural hematoma, will keep platelets >80J.     5. ESRD/Metabolic:  Metabolic acidosis, magnesium and renal fxn stable. ESRD - off HD currently. Followed by Dr. Ruddy Mccormick  -Current baseline Cr 2.1-2.6  TLS:  No evidence, cont allopurinol and daily TLS labs   - Replace potassium and magnesium per PRN orders     6. Nutrition:  Appetite and oral intake is good. - Cont low microbial diet   - Follow closely with dietary     7. Neuro:  Recent seizure/subdural hematoma (1/2022)  - Continue Keppra 500 mg PO BID indefinitely  - Keep platelets >13,473      - DVT Prophylaxis: Platelets <40,904 cells/dL - prophylactic lovenox on hold and mechanical prophylaxis with bilateral SCDs while in bed in place. Contraindications to pharmacologic prophylaxis: Thrombocytopenia  Contraindications to mechanical prophylaxis: None    - Disposition: Cont to monitor until CRS resolved & no evidence of ICANS  prob Monday    KIMBERLEY Cast - CNP     Christ Claude.  Emily Figueroa, [address] [place] [address]

## 2022-03-27 NOTE — PLAN OF CARE
Problem: Infection - Central Venous Catheter-Associated Bloodstream Infection:  Goal: Will show no infection signs and symptoms  Description: Will show no infection signs and symptoms  3/27/2022 1323 by Mayra Garcia RN  Outcome: Ongoing  Note: . CVC site remains free of signs/symptoms of infection. No drainage, edema, erythema, pain, or warmth noted at site. Dressing changes continue per protocol and on an as needed basis - see flowsheet. Problem: Falls - Risk of:  Goal: Will remain free from falls  Description: Will remain free from falls  3/27/2022 1323 by Mayra Garcia RN  Outcome: Ongoing  Note: Orthostatic vital signs obtained at start of shift - see flowsheet for details. Pt does not meet criteria for orthostasis. Pt is a Med fall risk. See Earlean Post Fall Score and ABCDS Injury Risk assessments. Pt bed is in low position, side rails up, call light and belongings are in reach. Fall risk light is on outside pts room. Pt encouraged to call for assistance as needed. Will continue with hourly rounds for PO intake, pain needs, toileting and repositioning as needed. Problem: Nutrition Deficit:  Goal: Ability to achieve adequate nutritional intake will improve  Description: Ability to achieve adequate nutritional intake will improve  3/27/2022 1323 by Mayra Garcia RN  Outcome: Ongoing  Note: Pt educated on importance of staying hydrated and having as much intake as possible. Verbalized understanding. Will continue to monitor. Problem: PROTECTIVE PRECAUTIONS  Goal: Patient will remain free of nosocomial Infections  3/27/2022 1323 by Mayra Garcia RN  Outcome: Ongoing  Note: Pt remains in protective precautions. No living plants or fresh flowers in his/her room. Patient educated on wearing mask when in hallways. Patient, staff, and visitors adhering to handwashing guidelines. Patient cleansed with chlorhexidine wipes and linens changed daily per protocol.  Pt verbalizes understanding of low microbial diet. Patient remains free of nosocomial infections. Problem: Diarrhea:  Goal: Bowel elimination is within specified parameters  Description: Bowel elimination is within specified parameters  Outcome: Ongoing  Note: Pt reports moderate amount of diarrhea at this time. Pt has been tested for c-diff and it was negative. PRN imodium given upon pt request. Pt verbalized understanding but does not want to take immodium at this time. Problem: Pain:  Goal: Pain level will decrease  Description: Pain level will decrease  3/27/2022 1323 by Kaushik Mccord RN  Outcome: Ongoing  Note: Pt educated on importance of calling for pain meds when in pain. Pt verbalized understanding. Pain assessment completed at least once per shift. Will continue to monitor. Problem: Bleeding:  Goal: Will show no signs and symptoms of excessive bleeding  Description: Will show no signs and symptoms of excessive bleeding  3/27/2022 1323 by Kaushik Mccord RN  Outcome: Ongoing  Note: Patient's hemoglobin this AM:   Recent Labs     03/27/22  0305   HGB 7.9*     Patient's platelet count this AM:   Recent Labs     03/27/22  0305   PLT 33*    Thrombocytopenia Precautions in place. Patient showing no signs or symptoms of active bleeding. Patient received transfusions per orders prior to this shift. Patient verbalizes understanding of all instructions. Will continue to assess and implement POC. Call light within reach and hourly rounding in place.

## 2022-03-27 NOTE — PLAN OF CARE
Problem: Venous Thromboembolism:  Goal: Will show no signs or symptoms of venous thromboembolism  Description: Will show no signs or symptoms of venous thromboembolism  Outcome: Met This Shift     Problem: Infection - Central Venous Catheter-Associated Bloodstream Infection:  Goal: Will show no infection signs and symptoms  Description: Will show no infection signs and symptoms  Outcome: Ongoing  Note: Patient's CVC site shows no signs or symptoms of infection. No drainage, edema, erythema, pain, or warmth noted at site. Vital signs stable and all lines flushed with blood return noted. Dressing changes continue per protocol and on an as needed basis - see flowsheet. Will continue to monitor for symptoms of infection, pain and/or discomfort. Call light within reach and hourly rounding in place. Problem: Falls - Risk of:  Goal: Will remain free from falls  Description: Will remain free from falls  Outcome: Ongoing  Note: Patient remained free of falls during shift. Patient is a Ramirez Fall Risk: Medium (25-44); uses call light appropriately, ambulates with a steady gait and no assistive devices. Orthostatic blood pressures assessed and patient remains negative. Will continue to encourage ambulation and implement POC. Call light within reach and hourly rounding in place. Problem: Nutrition Deficit:  Goal: Ability to achieve adequate nutritional intake will improve  Description: Ability to achieve adequate nutritional intake will improve  Outcome: Ongoing  Note: Pt with adequate nutritional intake this shift. See doc flowsheets. Will continue to monitor and encourage PO consumption. Problem: PROTECTIVE PRECAUTIONS  Goal: Patient will remain free of nosocomial Infections  Outcome: Ongoing  Note: Patient is showing no signs or symptoms of nosocomial infections. Patient's temp during shift are as follows: Temp (8hrs), Av.9 °F (36.1 °C), Min:95.7 °F (35.4 °C), Max:98.2 °F (36.8 °C)  .  Patient placed in private room and instructed on importance of handwashing and when to wear a mask when ambulating in hallway. Will continue to assess for s/s of infection and implement POC. Call light within reach and hourly rounding in place. Problem: Pain:  Description: Pain management should include both nonpharmacologic and pharmacologic interventions. Goal: Pain level will decrease  Description: Pain level will decrease  Outcome: Ongoing  Note: Patient with ongoing complaints of  back pain during shift. Will continue to assess comfort and pain on 0-10 number scale and medicate per order while encouraging non pharmacological techniques as well, which patient does back stretches in addition to Tylenol. Call light within reach and hourly rounding in place. Problem: Bleeding:  Goal: Will show no signs and symptoms of excessive bleeding  Description: Will show no signs and symptoms of excessive bleeding  Note: Patient's hemoglobin this AM: No transfusion required. Recent Labs     03/27/22  0305   HGB 7.9*     Patient's platelet count this AM:   Recent Labs     03/27/22  0305   PLT 33*    Thrombocytopenia Precautions in place. Patient showing no signs or symptoms of active bleeding. Patient transfused blood products per orders - see flowsheet. Patient verbalizes understanding of all instructions. Will continue to assess and implement POC. Call light within reach and hourly rounding in place.

## 2022-03-28 NOTE — PLAN OF CARE
Problem: Venous Thromboembolism:  Goal: Will show no signs or symptoms of venous thromboembolism  Description: Will show no signs or symptoms of venous thromboembolism  Outcome: Ongoing  Note: Refusing DVT Prevention: Pt is at risk for DVT d/t decreased mobility and cancer treatment. Pt educated on importance of activity. Pt has orders for SCDs while in bed, however pt currently refusing treatment. Reviewed risks of DVT & PE development while inpatient. Provider aware of patient's refusal and re-education of importance of prophylaxis. No new orders at this time. Will continue to re-instruct patient and intervene as appropriate. Problem: Infection - Central Venous Catheter-Associated Bloodstream Infection:  Goal: Will show no infection signs and symptoms  Description: Will show no infection signs and symptoms  Outcome: Ongoing  Note: CVC site remains free of signs/symptoms of infection. No drainage, edema, erythema, pain, or warmth noted at site. Dressing changes continue per protocol and on an as needed basis - see flowsheet. Problem: Falls - Risk of:  Goal: Will remain free from falls  Description: Will remain free from falls  Outcome: Ongoing  Note: Orthostatic vital signs obtained at start of shift - see flowsheet for details. Pt does not meet criteria for orthostasis. Pt is a Med fall risk. See Raymona Hoose Fall Score and ABCDS Injury Risk assessments. - Screening for Orthostasis AND not a [place] Risk per LOUIS/ABCDS: Pt bed is in low position, side rails up, call light and belongings are in reach. Fall risk light is on outside pts room. Pt encouraged to call for assistance as needed. Will continue with hourly rounds for PO intake, pain needs, toileting and repositioning as needed. Problem: PROTECTIVE PRECAUTIONS  Goal: Patient will remain free of nosocomial Infections  Outcome: Ongoing  Note: Pt currently in a private, positive pressure room.   Educated pt on wearing a mask when out of room. No living plants or flowers allowed. Also reinforced importance of hand hygiene. Pt following a low microbial diet. Surfaces throughout room cleaned with bleach wipes per unit policy. Visitors updated on current policies. Problem: Diarrhea:  Goal: Bowel elimination is within specified parameters  Description: Bowel elimination is within specified parameters  Outcome: Ongoing  Note: Pt hasn't had a BM this shift. Problem: Pain:  Goal: Pain level will decrease  Description: Pain level will decrease  Outcome: Ongoing  Note: Pt denies pain, will continue to monitor. Problem: Bleeding:  Goal: Will show no signs and symptoms of excessive bleeding  Description: Will show no signs and symptoms of excessive bleeding  Outcome: Ongoing  Note: Patient's hemoglobin this AM:   Recent Labs     03/28/22  0339   HGB 8.3*     Patient's platelet count this AM:   Recent Labs     03/28/22  0339   PLT 32*    Thrombocytopenia Precautions in place. Patient showing no signs or symptoms of active bleeding. Patient transfused blood products per orders - see flowsheet. Patient verbalizes understanding of all instructions. Will continue to assess and implement POC. Call light within reach and hourly rounding in place.

## 2022-03-28 NOTE — PROGRESS NOTES
Pt refusing pre meds prior to platelets due to the benadryl making him drowsy.  He states he has never reacted to platelets in the past.

## 2022-03-28 NOTE — CARE COORDINATION
Reviewed discharge plan with patient. Reviewed medication information and follow up appointments. Reviewed emergency procedures. Pt will follow up in outpatient. Appointments provided.

## 2022-03-28 NOTE — CARE COORDINATION
Case Management Assessment            Discharge Note                    Date / Time of Note: 3/28/2022 10:52 AM                  Discharge Note Completed by: DEEDEE Arango    Patient Name: Graham Main   YOB: 1955  Diagnosis: Multiple myeloma ([facility] [address] [C90.00]  Multiple myeloma in relapse [facility]) [C90.02]   Date / Time: 3/21/2022  8:14 AM    Current PCP: No primary care provider on file. Clinic patient: No    Hospitalization in the last 30 days: No    Advance Directives:  Code Status: Full Code  [place] DNR form completed and on chart: No    Financial:  Payor: Mala Og / Plan: MEDICARE PART A AND B / Product Type: *No Product type* /      Pharmacy:    [address] [place] [address] Drive [address] [facility], [address] [place] [address] [address] [place] [address] [address] [place] [address] [address]  Phone: [phone] Fax: [phone]      Assistance purchasing medications?: Potential Assistance Purchasing Medications: No  Assistance provided by Case Management: None at this time    Does patient want to participate in local refill/ meds to beds program?:      Meds To Beds General Rules:  1. Can ONLY be done Monday- Friday between 8:30am-5pm  2. Prescription(s) must be in pharmacy by 3pm to be filled same day  3. Copy of patient's insurance/ prescription drug card and patient face sheet must be sent along with the prescription(s)  4. Cost of Rx cannot be added to hospital bill. If financial assistance is needed, please contact unit  or ;  or  CANNOT provide pharmacy voucher for patients co-pays  5.  Patients can then  the prescription on their way out of the hospital at discharge, or pharmacy can deliver to the bedside if staff is available. (payment due at time of pick-up or delivery - cash, check, or card accepted)     Able to afford home medications/ co-pay costs: Yes    ADLS:  Current PT AM-PAC Score:   /24  Current OT AM-PAC Score: /24      DISCHARGE Disposition: Home- No Services Needed    LOC at discharge: Not Applicable  JOSHUA Completed: No    Notification completed in HENS/PAS?:  Not Applicable    IMM Completed:   Yes, Case management has presented and reviewed IMM letter #2 to the patient and/or family/ POA. Patient and/or family/POA verbalized understanding of their medicare rights and appeal process if needed. Patient and/or family/POA has signed, initialed and placed today's date (3/28/2022) and time ([phone]) on IMM letter #2 on the the appropriate lines. Patient and/or family/POA, copy of letter offered and they are aware that this original copy of IMM letter #2 is available prior to discharge from the paper chart on the unit. Electronic documentation has been entered into epic for IMM letter #2 and original paper copy has been added to the paper chart at the nurses station. Transportation:  Transportation PLAN for discharge: family   Mode of Transport: Private Car  Reason for medical transport: Not Applicable  Name of [address] [place] [address], [address]: Not Applicable  Time of Transport: afternoon    Transport form completed: No    Home Care:  [address] ordered at discharge: No  [address]: Not Applicable  Orders faxed: No    Durable Medical Equipment:  DME Provider: none  Equipment obtained during hospitalization: none    Home Oxygen and Respiratory Equipment:  Oxygen needed at discharge?: No  [address]: Not Applicable  Portable tank available for discharge?: No    Dialysis:  Dialysis patient: No    Dialysis Center:  Not Applicable    Hospice Services:  Location: Not Applicable  Agency: Not Applicable    Consents signed: No    Referrals made at [facility] for outpatient continued care:  Not Applicable    Additional CM Notes: Patient to discharge home with no needs.     The Plan for Transition of Care is related to the following treatment goals of Multiple myeloma ([facility] [address] [C90.00]  Multiple myeloma in relapse ([facility] [address] [C90.02]    The Patient and/or patient representative Valente Gama and his family were provided with a choice of provider and agrees with the discharge plan Yes    Freedom of choice list was provided with basic dialogue that supports the patient's individualized plan of care/goals and shares the quality data associated with the providers.  Yes    Care Transitions patient: No    Vidhya [place] [address]  Case Management Department  Ph: [phone]  Fax: [phone]

## 2022-03-28 NOTE — PLAN OF CARE
Problem: Venous Thromboembolism:  Goal: Will show no signs or symptoms of venous thromboembolism  Description: Will show no signs or symptoms of venous thromboembolism  3/28/2022 1056 by Danette Lopez RN  Outcome: Completed  3/28/2022 0448 by Fredy Downey RN  Outcome: Ongoing  Note: Refusing DVT Prevention: Pt is at risk for DVT d/t decreased mobility and cancer treatment. Pt educated on importance of activity. Pt has orders for SCDs while in bed, however pt currently refusing treatment. Reviewed risks of DVT & PE development while inpatient. Provider aware of patient's refusal and re-education of importance of prophylaxis. No new orders at this time. Will continue to re-instruct patient and intervene as appropriate. Goal: Absence of signs or symptoms of impaired coagulation  Description: Absence of signs or symptoms of impaired coagulation  Outcome: Completed     Problem: Infection - Central Venous Catheter-Associated Bloodstream Infection:  Goal: Will show no infection signs and symptoms  Description: Will show no infection signs and symptoms  3/28/2022 1056 by Danette Lopez RN  Outcome: Completed  3/28/2022 0448 by Fredy Downey RN  Outcome: Ongoing  Note: CVC site remains free of signs/symptoms of infection. No drainage, edema, erythema, pain, or warmth noted at site. Dressing changes continue per protocol and on an as needed basis - see flowsheet. Problem: Falls - Risk of:  Goal: Will remain free from falls  Description: Will remain free from falls  3/28/2022 1056 by Danette Lopez RN  Outcome: Completed  3/28/2022 0448 by Fredy Downey RN  Outcome: Ongoing  Note: Orthostatic vital signs obtained at start of shift - see flowsheet for details. Pt does not meet criteria for orthostasis. Pt is a Med fall risk. See Jerel Inches Fall Score and ABCDS Injury Risk assessments.    - Screening for Orthostasis AND not a [place] Risk per [facility]/ABCDS: Pt bed is in low position, side rails up, call light and belongings are in reach. Fall risk light is on outside pts room. Pt encouraged to call for assistance as needed. Will continue with hourly rounds for PO intake, pain needs, toileting and repositioning as needed. Goal: Absence of physical injury  Description: Absence of physical injury  Outcome: Completed     Problem: Nutrition Deficit:  Goal: Ability to achieve adequate nutritional intake will improve  Description: Ability to achieve adequate nutritional intake will improve  Outcome: Completed     Problem: PROTECTIVE PRECAUTIONS  Goal: Patient will remain free of nosocomial Infections  3/28/2022 1056 by Ana Tejeda RN  Outcome: Completed  3/28/2022 0448 by Nathan Isabel RN  Outcome: Ongoing  Note: Pt currently in a private, positive pressure room. Educated pt on wearing a mask when out of room. No living plants or flowers allowed. Also reinforced importance of hand hygiene. Pt following a low microbial diet. Surfaces throughout room cleaned with bleach wipes per unit policy. Visitors updated on current policies. Problem: Diarrhea:  Goal: Bowel elimination is within specified parameters  Description: Bowel elimination is within specified parameters  3/28/2022 1056 by Ana Tejeda RN  Outcome: Completed  3/28/2022 0448 by Nathan Isabel RN  Outcome: Ongoing  Note: Pt hasn't had a BM this shift.   Goal: Passage of soft, formed stool  Description: Passage of soft, formed stool  Outcome: Completed  Goal: Establishment of normal bowel function will improve to within specified parameters  Description: Establishment of normal bowel function will improve to within specified parameters  Outcome: Completed     Problem: Pain:  Goal: Pain level will decrease  Description: Pain level will decrease  3/28/2022 1056 by Ana Tejeda RN  Outcome: Completed  3/28/2022 0448 by Nathan Isabel RN  Outcome: Ongoing  Note: Pt denies pain, will continue to monitor. Goal: Control of acute pain  Description: Control of acute pain  Outcome: Completed  Goal: Control of chronic pain  Description: Control of chronic pain  Outcome: Completed     Problem: Bleeding:  Goal: Will show no signs and symptoms of excessive bleeding  Description: Will show no signs and symptoms of excessive bleeding  3/28/2022 1056 by January Abel RN  Outcome: Completed  3/28/2022 0448 by Noy Gillis RN  Outcome: Ongoing  Note: Patient's hemoglobin this AM:   Recent Labs     03/28/22 0339   HGB 8.3*     Patient's platelet count this AM:   Recent Labs     03/28/22 0339   PLT 32*    Thrombocytopenia Precautions in place. Patient showing no signs or symptoms of active bleeding. Patient transfused blood products per orders - see flowsheet. Patient verbalizes understanding of all instructions. Will continue to assess and implement POC. Call light within reach and hourly rounding in place.

## 2022-03-28 NOTE — ONCOLOGY
[facility] CAR-T wallet card given to patients. Instructions reviewed for calling [facility] for any symptoms such as fever, chills, confusion, weakness, tremor (all symptoms listed on wallet card) [facility] contact information listed on card. Patient verbalizes understanding and aware will follow up in Outpatient Infusion for daily visits.

## 2022-03-28 NOTE — PROGRESS NOTES
Reviewed discharge instructions with patient and  spouse. Reviewed discharge medications including dosing, schedule, indication, and adverse reactions. Reviewed which medications were already taken today and next dosage due for each medication. Reviewed signs and symptoms that prompt a call to the physician and appropriate phone numbers. Purple ER card given to the patient with explanations of its use. Reviewed follow up appointments that have been made in [facility] and Outpatient Oncology. Low microbial diet, activity restrictions, and increased risk of infection were reviewed. Patient is being discharged with IV access d/t need for ongoing therapy:      Type:  R. Chest Trifusion                         Next dressing change due on: 4/4/2022  CVC care and maintenance was reviewed with patient and spouse. Pt verbalizes understanding of line care and maintenance. Patient verbalized understanding of all instructions and questions were answered to his. satisfaction. Signed discharge instructions were given to the patient and a copy placed in the paper-lite chart. Patient discharged to home per self with spouse. Purple Card for Emergency Department use reviewed and given to patient. Patient verbalized understanding.         Álvaro Hines RN

## 2022-03-29 NOTE — PROGRESS NOTES
Behavioral Health Intervention Note    Writer contacted patient in order to provide information on the caregiving resources offered through the [employer]. Patient did not answer, so writer left a voicemail encouraging patient to call back. Notified patient of supervisee status under the supervision of Reinaldo Arechiga PsyD. Was provided with information on how to contact supervisor.

## 2022-03-29 NOTE — PROGRESS NOTES
[facility]  Autologous Progress Note    3/29/2022    Colt Jordan    :  1955    MRN:  [id number]    Referring MD: KIMBERLEY Grande - NP  [place], [place] [address]      Subjective: Feeling well today, no fevers, no confusion, eating and drinking well. No complaints at this time. ECOG PS:  (1) Restricted in physically strenuous activity, ambulatory and able to do work of light nature    KPS: 80% Normal activity with effort; some signs or symptoms of disease    Isolation:  None     Medications    Scheduled Meds:  Continuous Infusions:  PRN Meds:. ROS:  As noted above, otherwise remainder of 10-point ROS negative    Physical Exam:     I&O:  No intake or output data in the 24 hours ending 22 1009    Vital Signs:  BP (!) 148/85   Pulse 94   Temp 98.1 °F (36.7 °C) (Oral)   Resp 17   Wt 259 lb 0.7 oz (117.5 kg)   SpO2 96%   BMI 31.53 kg/m²     Weight:    Wt Readings from Last 3 Encounters:   22 259 lb 0.7 oz (117.5 kg)   22 259 lb 3.2 oz (117.6 kg)   22 255 lb 1.2 oz (115.7 kg)       General: Awake, alert and oriented    HEENT: normocephalic, alopecia, PERRL, no scleral erythema or icterus, Oral mucosa moist and intact, throat clear.    NECK: supple without palpable adenopathy  BACK: Straight, negative CVAT  SKIN: warm dry and intact without lesions rashes or masses  CHEST: CTA bilaterally without use of accessory muscles  CV: Normal S1 S2, RRR, no MRG  ABD: NT ND normoactive BS, no palpable masses or hepatosplenomegaly  EXTREMITIES: BLL 2+ edema, denies calf tenderness  NEURO: CN II - XII grossly intact  CATHETER: Right Tunneled HD catheter    Data:   CBC:   Recent Labs     22  0305 22  0339 22  0906   WBC 0.4* 0.4* 0.5*   HGB 7.9* 8.3* 9.1*   HCT 22.8* 24.2* 26.5*   MCV 93.2 93.5 93.8   PLT 33* 32* 32*     BMP/Mag:  Recent Labs     22  0305 22  0339 22  0906    142 141   K 3.7 3.6 3.4*    109 108   CO2 21 23 22   PHOS 2.9 3.0 2.1*   BUN 17 16 18   CREATININE 1.9* 1.9* 1.8*   MG 1.50* 1.60*  --      LIVP:   Recent Labs     03/27/22  0305 03/28/22  0339   AST 16 21   ALT 11 18   BILIDIR <0.2 <0.2   BILITOT 0.5 0.5   ALKPHOS 68 69     Uric Acid:    Recent Labs     03/28/22  0339   LABURIC 5.0     Coags:   Recent Labs     03/27/22  0305 03/28/22  0339   PROTIME 13.6* 13.8*   INR 1.19* 1.21*   APTT 31.7 29.8         PROBLEM LIST:        1.  [place] Light Chain Multiple Myeloma   2.  ESRD on HD   3.  GERD  4.  HTN  5.  Hypokalemia   6.  Subdural Hematoma (1/2022)  7.  Seizures (1/2022)      TREATMENT:            1. CyBorD (started 10/30/18)  2. Revlimid / Merlynn Million / Dex on clinical trial (started 2/1/19)  3. Carfilzomib / Pomalidomide / Dex x 6 cycles (Relapse #1 - started 8/26/19 - 2/2020)  4. Cytoxan Mobilization w/ 25% dose reduction (2/17/20)  5. Melphalan 140mg/m2 & Autologous SCT 3/12/20  6. Kyprolis/Ebony/Dex (6/30/21-7/28/21)- progression  7. Velcade/Ebony/Dex (5/18/21-8/3/21)- progression  8.  HD - CTX               -Cycle 1 9/27/21              -Cycle 2 10/18/21              -Cycle 3 11/8/21- stopped d/t severe fatigue  9.  Bendamustine/Velcade/Dex 12/06/21  10.  Velcade only 2/11/22  10.  Fludarabine/Cytoxan followed by CAR-T infusion      ASSESSMENT AND PLAN:           1. [place] Light Chain Multiple Myeloma:  Currently in [facility]  - BM biopsy: 1/12/22.  20% plasma cells . Sergio Chris  p53 positive 79.5% of cells  -1/28/22 Serum KFLC 1662.71, Lambda 3.05   K/L ratio 545.15  -2/24/22 Serum KFLC 1919.12, Lambda 2.54, K/L raio 755.56  -s/p Fludarabine and Cytoxan 3/16/22-3/18/22  -Post CAR-T infusion staging:  PET/CT, BM bx with Flow, FISH, CG, MMP     Lymphodepleting Chemotherapy:  Fludarabine and cyclophosphamide   Disease Status at time of Infusion:  Progressive disease  CAR-T Infusion Date:  3/21/22  CAR-T Product:  [employer]  Batch ID Number:  [id number]     Day + 8     2.  CRS / Neuro: Grade 1 CRS 3/22  CRS Grade: 1, fever only 3/22  - S/p toci 800mg 3/22/22  - Monitor CRP and Ferrtin closely             Lab Results   Component Value Date     CRP 25.2 (H) 03/27/2022     CRP 31.7 (H) 03/26/2022     CRP 32.3 (H) 03/25/2022                Lab Results   Component Value Date     FERRITIN 815.7 (H) 03/27/2022     FERRITIN 768.7 (H) 03/26/2022     FERRITIN 778.5 (H) 03/25/2022      ICANS Grade: 0  - Neuro checks w/ CARTOX 10-point assessment Q4hrs  ICE Score:  CAR-T Score: 10     3. ID:  Fever 12 hr after infusion, no evidence of infection but treating empirically while neutropenic. Afebrile  - On Levaquin, Diflucan, Valtrex.      4.  Heme:  Pancytopenia d/t recent chemotherapy   - Transfuse for Hgb < 7 and Platelets < 33C (LRRQLD subdural)  - Plt transfusion today  - Given hx of subdural hematoma, will keep platelets >41V.     5.  ESRD/Metabolic: HypoK, HypoPhos, SCr: 1.8  ESRD - off HD currently.  Followed by Dr. Jigna Meek  -Current baseline Cr 2.1-2.6  - Fluids: 1 L NS in OP Infusion: Held 3/29/22 d/t edema   TLS:  No evidence, cont allopurinol and daily TLS labs   - Replace potassium and magnesium per PRN orders     6. Nutrition:  Appetite and oral intake is good. - Cont low microbial diet   - Follow closely with dietary     7.  Neuro:  Recent seizure/subdural hematoma (1/2022)  - Continue Keppra 500 mg PO BID indefinitely  - Keep platelets >67,876        - Disposition: Return to OP Infusion daily for labs, CAR-T assessment and MD visit. The patient was seen and examined by Dr. Livier Santiago.      Rohan Palacios, KIMBERLEY - NP     Justyn Fam MD  [facility]  Please contact me through [facility]

## 2022-03-29 NOTE — PROGRESS NOTES
Behavioral Health Intervention Note    Writer rounded with patient's treatment team and followed-up with patient afterwards. Patient denied having behavorial health needs however patient's wife reported stress due to caregiving, and asked for resources. Writer will follow-up to provide resources. Interventions: Supportive listening, Assessment of current needs         --------------------------------------------------------------------------------------------    At initial encounter with patient, discussed role of psychologist including addressing emotional and behavioral needs while receiving care at the [facility] and [place] [address]. Discussed short-term nature of services. Also discussed with patient that a component of provider's role is completing the pre-surgical psychological evaluations for bone marrow transplant and CAR-T. Informed patient of psychologist's various roles in order for the patient to be fully informed when consenting to behavioral health treatment. Patient was agreeable to engaging in care with psychologist. Discussed limits of confidentiality including that psychologist coordinates with patient's treatment team and other limits of confidentiality were discussed as needed. Notified patient of supervisee status under the supervision of Sugey Nixon PsyD. Was provided with information on how to contact supervisor.

## 2022-03-29 NOTE — PROGRESS NOTES
Pt seen in outpatient oncology today post early discharge from CAR-T infusion. Pt is day +*** from [address] [place] [address]. Pt accompanied by wife. Pt ambulatory with steady gait. Denies pain. VSS - afebrile. Denies any signs/symptoms of CRS. CAR-T assessment without change. Labs reviewed with pt and his wife. Specific treatments administered today included: labs, CXR, platelets, and 40 mEq of potassium. Reviewed medication schedule with pt and his caregiver. Both able to verbalize all medications and schedule. Pt to be seen again tomorrow.   Lilia Hendrickson RN

## 2022-03-30 NOTE — PROGRESS NOTES
[facility]  Autologous Progress Note    3/30/2022    Mario Sommer    :  1955    MRN:  [id number]    Referring MD: KIMBERLEY Canseco - NP  [place], [place] [address]      Subjective: He stated feeling good. Denies fever or chills. Eating well. No new complaints. ECOG PS:  (1) Restricted in physically strenuous activity, ambulatory and able to do work of light nature    KPS: 80% Normal activity with effort; some signs or symptoms of disease    Isolation:  None     Medications    Scheduled Meds:  Continuous Infusions:  PRN Meds:. ROS:  As noted above, otherwise remainder of 10-point ROS negative    Physical Exam:     I&O:      Intake/Output Summary (Last 24 hours) at 3/30/2022 0953  Last data filed at 3/30/2022 0930  Gross per 24 hour   Intake 2060.5 ml   Output --   Net 2060.5 ml       Vital Signs:  BP (!) 146/91   Pulse 92   Temp 97.6 °F (36.4 °C) (Oral)   Resp 20   Wt 259 lb 0.7 oz (117.5 kg)   SpO2 100%   BMI 31.53 kg/m²     Weight:    Wt Readings from Last 3 Encounters:   22 259 lb 0.7 oz (117.5 kg)   22 259 lb 0.7 oz (117.5 kg)   22 259 lb 3.2 oz (117.6 kg)       General: Awake, alert and oriented    HEENT: normocephalic, alopecia, PERRL, no scleral erythema or icterus, Oral mucosa moist and intact, throat clear.    NECK: supple without palpable adenopathy  BACK: Straight, negative CVAT  SKIN: warm dry and intact without lesions rashes or masses  CHEST: CTA bilaterally without use of accessory muscles  CV: Normal S1 S2, RRR, no MRG  ABD: NT ND normoactive BS, no palpable masses or hepatosplenomegaly  EXTREMITIES: BLL 2+ edema, denies calf tenderness  NEURO: CN II - XII grossly intact  CATHETER: Right Tunneled HD catheter    Data:   CBC:   Recent Labs     22  0339 22  0906 22  0851   WBC 0.4* 0.5* 0.4*   HGB 8.3* 9.1* 9.1*   HCT 24.2* 26.5* 26.8*   MCV 93.5 93.8 94.6   PLT 32* 32* 41*     BMP/Mag:  Recent Labs 03/28/22  0339 03/29/22  0906 03/30/22  0851    141 143   K 3.6 3.4* 3.9    108 111*   CO2 23 22 23   PHOS 3.0 2.1* 2.1*   BUN 16 18 18   CREATININE 1.9* 1.8* 1.8*   MG 1.60*  --  1.60*     LIVP:   Recent Labs     03/28/22  0339 03/30/22  0851   AST 21 20   ALT 18 18   BILIDIR <0.2 <0.2   BILITOT 0.5 0.5   ALKPHOS 69 74     Uric Acid:    Recent Labs     03/30/22  0851   LABURIC 5.0     Coags:   Recent Labs     03/28/22  0339   PROTIME 13.8*   INR 1.21*   APTT 29.8         PROBLEM LIST:        1.  [place] Light Chain Multiple Myeloma   2.  ESRD on HD   3.  GERD  4.  HTN  5.  Hypokalemia   6.  Subdural Hematoma (1/2022)  7.  Seizures (1/2022)      TREATMENT:            1. CyBorD (started 10/30/18)  2. Revlimid / Bonna Estuardo / Dex on clinical trial (started 2/1/19)  3. Carfilzomib / Pomalidomide / Dex x 6 cycles (Relapse #1 - started 8/26/19 - 2/2020)  4. Cytoxan Mobilization w/ 25% dose reduction (2/17/20)  5. Melphalan 140mg/m2 & Autologous SCT 3/12/20  6. Kyprolis/Ebony/Dex (6/30/21-7/28/21)- progression  7. Velcade/Ebony/Dex (5/18/21-8/3/21)- progression  8.  HD - CTX               -Cycle 1 9/27/21              -Cycle 2 10/18/21              -Cycle 3 11/8/21- stopped d/t severe fatigue  9.  Bendamustine/Velcade/Dex 12/06/21  10.  Velcade only 2/11/22  10.  Fludarabine/Cytoxan followed by CAR-T infusion      ASSESSMENT AND PLAN:           1. [place] Light Chain Multiple Myeloma:  Currently in [facility]  - BM biopsy: 1/12/22.  20% plasma cells . Crystal Ivonne  p53 positive 79.5% of cells  -1/28/22 Serum KFLC 1662.71, Lambda 3.05   K/L ratio 545.15  -2/24/22 Serum KFLC 1919.12, Lambda 2.54, K/L raio 755.56  -s/p Fludarabine and Cytoxan 3/16/22-3/18/22  -Post CAR-T infusion staging:  PET/CT, BM bx with Flow, FISH, CG, MMP     Lymphodepleting Chemotherapy:  Fludarabine and cyclophosphamide   Disease Status at time of Infusion:  Progressive disease  CAR-T Infusion Date:  3/21/22  CAR-T Product:  [facility]  Batch ID Number:  [id number]     Day + 9     2.  CRS / Neuro: Grade 1 CRS 3/22  CRS Grade: 1, fever only 3/22  - S/p toci 800mg 3/22/22  - Monitor CRP and Ferrtin closely             Lab Results   Component Value Date     CRP 25.2 (H) 03/27/2022     CRP 31.7 (H) 03/26/2022     CRP 32.3 (H) 03/25/2022                Lab Results   Component Value Date     FERRITIN 815.7 (H) 03/27/2022     FERRITIN 768.7 (H) 03/26/2022     FERRITIN 778.5 (H) 03/25/2022      ICANS Grade: 0  - Neuro checks w/ CARTOX 10-point assessment Q4hrs  ICE Score:  CAR-T Score: 10     3. ID:  Fever 12 hr after infusion, no evidence of infection but treating empirically while neutropenic. Afebrile  - On Levaquin, Diflucan, Valtrex.      4.  Heme:  Pancytopenia d/t recent chemotherapy   - Transfuse for Hgb < 7 and Platelets < 34Z (ENXLUN subdural)  - Plt transfusion today  - Given hx of subdural hematoma, will keep platelets >10T.     5.  ESRD/Metabolic: HypoK, HypoPhos, SCr: 1.8  ESRD - off HD currently.  Followed by Dr. Mey Andersen  -Current baseline Cr 2.1-2.6  - Fluids: 1 L NS in OP Infusion: Held 3/29/22 d/t edema   TLS:  No evidence, cont allopurinol and daily TLS labs   - Replace potassium and magnesium per PRN orders     6. Nutrition:  Appetite and oral intake is good. - Cont low microbial diet   - Follow closely with dietary     7.  Neuro:  Recent seizure/subdural hematoma (1/2022)  - Continue Keppra 500 mg PO BID indefinitely  - Keep platelets >95,907      - Disposition: Return to OP Infusion daily for labs, CAR-T assessment and MD visit. The patient was seen and examined by Dr. Denice Rick.      Clementine Rocha MD PGY-5    Oh Cruz MD  [facility]  Please contact me through [facility]

## 2022-03-30 NOTE — PROGRESS NOTES
Short Stay Communication Note  Jasmeet Miller  Diagnosis: Multiple Myeloma  Primary MD: Stephanie Amador  Treatment: Fludarabine and Cyclophosphamide  Day +9 of Car-T [facility]   Pt seen in outpatient infusion today. Labs drawn and reviewed. CBC: Recent Labs     03/28/22  0339 03/29/22  0906 03/30/22  0851   WBC 0.4* 0.5* 0.4*   HGB 8.3* 9.1* 9.1*   HCT 24.2* 26.5* 26.8*   MCV 93.5 93.8 94.6   PLT 32* 32* 41*     BMP/Mag:  Recent Labs     03/28/22  0339 03/29/22  0906 03/30/22  0851    141 143   K 3.6 3.4* 3.9    108 111*   CO2 23 22 23   PHOS 3.0 2.1* 2.1*   BUN 16 18 18   CREATININE 1.9* 1.8* 1.8*   MG 1.60*  --  1.60*     Standing parameters for replacement for this patient:   1 unit of pack red blood cells for a hemoglobin < or equal to 7  1 pack of platelets for a platelet count less than or equal to 50  40 MeQ of Potassium administered for a potassium level less than or equal to 3.4  4g of Magnesium Sulfate for a magnesium level less than or equal to 1.4  1pk platelets transfused per standing order for the above lab values. Urinalysis last done: 3/30/2022 Urinalysis next due: 4/4/2022    Chest X-Ray last done: 3/29/2022 Chest X-Ray next due: 4/4/2022    Symptoms addressed and reported to care team this date: none reported to RN  Treatments this date: sterile dressing and cap change, IV Fluids stopped due to swelling. Provider with re-evaluate during next visit. Reviewed medication schedule with pt and caregiver. Both able to verbalize all medications and schedule. Pt to be seen again tomorrow. Discharged ambulatory to home.

## 2022-03-30 NOTE — CONSENT
Informed Consent for Blood Component Transfusion Note    I have discussed with the patient the rationale for blood component transfusion; its benefits in treating or preventing fatigue, organ damage, or death; and its risk which includes mild transfusion reactions, rare risk of blood borne infection, or more serious but rare reactions. I have discussed the alternatives to transfusion, including the risk and consequences of not receiving transfusion. The patient had an opportunity to ask questions and had agreed to proceed with transfusion of blood components.     Electronically signed by Horton Najjar, RN on 3/30/22 at 0830 AM EDT

## 2022-03-31 NOTE — PROGRESS NOTES
[facility]  Autologous Progress Note    3/31/2022    Deandra Bhat    :  1955    MRN:  [id number]    Referring MD: Ada Alejandro APRN - NP  [place],  [address]      Subjective: Continues to feel well. He has mild swelling in arm and legs. Otherwise he said doing okay. ECOG PS:  (1) Restricted in physically strenuous activity, ambulatory and able to do work of light nature    KPS: 80% Normal activity with effort; some signs or symptoms of disease    Isolation:  None     Medications    Scheduled Meds:  Continuous Infusions:  PRN Meds:. ROS:  As noted above, otherwise remainder of 10-point ROS negative    Physical Exam:     I&O:    No intake or output data in the 24 hours ending 22 0852    Vital Signs:  /87   Pulse 94   Temp 97.9 °F (36.6 °C) (Oral)   Resp 20   Wt 260 lb 2.3 oz (118 kg)   SpO2 96%   BMI 31.67 kg/m²     Weight:    Wt Readings from Last 3 Encounters:   22 260 lb 2.3 oz (118 kg)   22 259 lb 0.7 oz (117.5 kg)   22 259 lb 0.7 oz (117.5 kg)       General: Awake, alert and oriented    HEENT: normocephalic, alopecia, PERRL, no scleral erythema or icterus, Oral mucosa moist and intact, throat clear.    NECK: supple without palpable adenopathy  BACK: Straight, negative CVAT  SKIN: warm dry and intact without lesions rashes or masses  CHEST: CTA bilaterally without use of accessory muscles  CV: Normal S1 S2, RRR, no MRG  ABD: NT ND normoactive BS, no palpable masses or hepatosplenomegaly  EXTREMITIES: BLL 2+ edema, denies calf tenderness  NEURO: CN II - XII grossly intact  CATHETER: Right Tunneled HD catheter    Data:   CBC:   Recent Labs     22  0906 22  0851   WBC 0.5* 0.4*   HGB 9.1* 9.1*   HCT 26.5* 26.8*   MCV 93.8 94.6   PLT 32* 41*     BMP/Mag:  Recent Labs     22  0906 22  0851    143   K 3.4* 3.9    111*   CO2 22 23   PHOS 2.1* 2.1*   BUN 18 18   CREATININE 1.8* 1.8*   MG  -- 1.60*     LIVP:   Recent Labs     03/30/22  0851   AST 20   ALT 18   BILIDIR <0.2   BILITOT 0.5   ALKPHOS 74     Uric Acid:    Recent Labs     03/30/22  0851   LABURIC 5.0     Coags:   No results for input(s): PROTIME, INR, APTT in the last 72 hours. PROBLEM LIST:        1.  [place] Chain Multiple Myeloma   2.  ESRD on HD   3.  GERD  4.  HTN  5.  Hypokalemia   6.  Subdural Hematoma (1/2022)  7.  Seizures (1/2022)      TREATMENT:            1. CyBorD (started 10/30/18)  2. Revlimid / Oletha Lisle / Dex on clinical trial (started 2/1/19)  3. Carfilzomib / Pomalidomide / Dex x 6 cycles (Relapse #1 - started 8/26/19 - 2/2020)  4. Cytoxan Mobilization w/ 25% dose reduction (2/17/20)  5. Melphalan 140mg/m2 & Autologous SCT 3/12/20  6. Kyprolis/Ebony/Dex (6/30/21-7/28/21)- progression  7. Velcade/Ebony/Dex (5/18/21-8/3/21)- progression  8.  HD - CTX               -Cycle 1 9/27/21              -Cycle 2 10/18/21              -Cycle 3 11/8/21- stopped d/t severe fatigue  9.  Bendamustine/Velcade/Dex 12/06/21  10.  Velcade only 2/11/22  10.  Fludarabine/Cytoxan followed by CAR-T infusion      ASSESSMENT AND PLAN:           1. [place] Multiple Myeloma:  Currently in [facility]  - BM biopsy: 1/12/22.  20% plasma cells . Andrew Munguia  p53 positive 79.5% of cells  -1/28/22 Serum KFLC 1662.71, Lambda 3.05   K/L ratio 545.15  -2/24/22 Serum KFLC 1919.12, Lambda 2.54, K/L raio 755.56  -s/p Fludarabine and Cytoxan 3/16/22-3/18/22  -Post CAR-T infusion staging:  PET/CT, BM bx with Flow, FISH, CG, MMP     Lymphodepleting Chemotherapy:  Fludarabine and cyclophosphamide   Disease Status at time of Infusion:  Progressive disease  CAR-T Infusion Date:  3/21/22  CAR-T Product:  Abthaniama  Batch ID Number:  [id number]     Day + 10     2.  CRS / Neuro: Grade 1 CRS 3/22  CRS Grade: 1, fever only 3/22  - S/p toci 800mg 3/22/22  - Monitor CRP and Ferrtin closely             Lab Results   Component Value Date     CRP 25.2 (H) 03/27/2022     CRP 31.7 (H) 03/26/2022     CRP 32.3 (H) 03/25/2022                Lab Results   Component Value Date     FERRITIN 815.7 (H) 03/27/2022     FERRITIN 768.7 (H) 03/26/2022     FERRITIN 778.5 (H) 03/25/2022      ICANS Grade: 0  - Neuro checks w/ CARTOX 10-point assessment Q4hrs  ICE Score:  CAR-T Score: 10     3. ID:  Fever 12 hr after infusion, no evidence of infection but treating empirically while neutropenic. Afebrile  - On Levaquin, Diflucan, Valtrex.      4.  Heme:  Pancytopenia d/t recent chemotherapy   - Transfuse for Hgb < 7 and Platelets < 07Q (EFGSMJ subdural)  - Plt transfusion today  - Given hx of subdural hematoma, will keep platelets >38K.     5.  ESRD/Metabolic: HypoK, HypoPhos, SCr: 1.9  ESRD - off HD currently.  Followed by Dr. Tawanna Cho  -Current baseline Cr 2.1-2.6  - Fluids: 1 L NS in OP Infusion: Held since 3/29/22 d/t edema   TLS:  No evidence, cont allopurinol and daily TLS labs   - Replace potassium and magnesium per PRN orders     6. Nutrition:  Appetite and oral intake is good. - Cont low microbial diet   - Follow closely with dietary     7.  Neuro:  Recent seizure/subdural hematoma (1/2022)  - Continue Keppra 500 mg PO BID indefinitely  - Keep platelets >52,552      - Disposition: Return to OP Infusion daily for labs, CAR-T assessment and MD visit. The patient was seen and examined by Dr. Becca Frost.      Jas Levin MD PGY-5  Asiya Jacques MD  [facility]  Please contact me through [facility]

## 2022-03-31 NOTE — DISCHARGE INSTR - COC
Continuity of Care Form    Patient Name: Giovani Durham   :  1955  MRN:  [id number]    Admit date:  3/31/2022  Discharge date:  ***    Code Status Order: Full Code   Advance Directives:      Admitting Physician:  No admitting provider for patient encounter. PCP: No primary care provider on file. Discharging Nurse: [facility] Unit/Room#: No information available for this encounter. Discharging Unit Phone Number: ***    Emergency Contact:   Extended Emergency Contact Information  Primary Emergency Contact: Evelyn Gillis  Address: [address] [place] [address] Phone: [phone]  Mobile Phone: [phone]  Relation: Domestic Partner  Secondary Emergency Contact: Tayler Estrada  Mobile Phone: [phone]  Relation: Brother/Sister    Past Surgical History:  Past Surgical History:   Procedure Laterality Date    IR TUNNELED CATHETER PLACEMENT GREATER THAN 5 YEARS  2022    IR TUNNELED CATHETER PLACEMENT GREATER THAN 5 YEARS 2022 [address] SPECIAL PROCEDURES       Immunization History: There is no immunization history for the selected administration types on file for this patient.     Active Problems:  Patient Active Problem List   Diagnosis Code    Multiple myeloma in remission ([facility] [address] C90.01    Neutropenic fever ([facility] [address] D70.9, R50.81    Autologous bone marrow transplantation status ([facility] [address] Z94.81    PVC (premature ventricular contraction) I49.3    Nonsustained ventricular tachycardia (HCC) I47.2    Trigeminy R00.8    Subdural hematoma (HCC) S06.5X9A    Multiple myeloma (HCC) C90.00    Abnormal results of liver function studies  R94.5    Abnormal coagulation profile  R79.1    Multiple myeloma in relapse (HCC) C90.02       Isolation/Infection:   Isolation            No Isolation          Patient Infection Status       Infection Onset Added Last Indicated Last Indicated By Review Planned Expiration Resolved Resolved By    None active    Resolved C-diff Rule Out 03/22/22 03/22/22 03/22/22 Clostridium difficile toxin/antigen (Ordered)   03/22/22 Rule-Out Test Resulted    COVID-19 (Rule Out) 03/17/22 03/17/22 03/17/22 COVID-19 (Ordered)   03/18/22 Rule-Out Test Resulted    ESBL (Extended Spectrum Beta Lactamase) 03/20/20 01/20/22 01/20/22 Case Dahl RN   03/22/22 Case Dahl RN    3/20/2020 ESBL blood cx  1/18/2022 NGTD bld cx    ESBL (Extended Spectrum Beta Lactamase) 03/20/20 03/23/20 03/20/20 Culture, Blood 1   01/20/22 Case Dahl RN    C-diff Rule Out 03/17/20 03/17/20 03/17/20 Clostridium difficile toxin/antigen (Ordered)   03/17/20 Rule-Out Test Resulted            Nurse Assessment:  Last Vital Signs: BP (!) 143/90   Pulse 86   Temp 97.4 °F (36.3 °C) (Oral)   Resp 20   Wt 260 lb 2.3 oz (118 kg)   SpO2 98%   BMI 31.67 kg/m²     Last documented pain score (0-10 scale):    Last Weight:   Wt Readings from Last 1 Encounters:   03/31/22 260 lb 2.3 oz (118 kg)     Mental Status:  {IP PT MENTAL STATUS:20030}    IV Access:  { JOSHUA IV ACCESS:045870036}    Nursing Mobility/ADLs:  Walking   {CHP DME XDDF:178058982}  Transfer  {CHP DME GBAT:683555098}  Bathing  {CHP DME ULIJ:563729159}  Dressing  {CHP DME VKVB:493514484}  Toileting  {CHP DME XBRW:240300876}  Feeding  {P DME IFCU:870900604}  Med Admin  {CHP DME YFPX:026275379}  Med Delivery   { JOSHUA MED Delivery:529479978}    Wound Care Documentation and Therapy:        Elimination:  Continence: Bowel: {YES / WW:73306}  Bladder: {YES / TD:46910}  Urinary Catheter: {Urinary Catheter:664822651}   Colostomy/Ileostomy/Ileal Conduit: {YES / IQ:01399}       Date of Last BM: ***  No intake or output data in the 24 hours ending 03/31/22 0934  No intake/output data recorded.     Safety Concerns:     [employer] Safety Concerns:647937765}    Impairments/Disabilities:      [employer] Impairments/Disabilities:521832590}    Nutrition Therapy:  Current Nutrition Therapy:   [employer] Diet List:375605111}    Routes of Feeding: {CHP DME Other Feedings:596100578}  Liquids: {Slp liquid thickness:60443}  Daily Fluid Restriction: {CHP DME Yes amt example:086325575}  Last Modified Barium Swallow with Video (Video Swallowing Test): {Done Not Done ERHQ:095655400}    Treatments at the Time of Hospital Discharge:   Respiratory Treatments: ***  Oxygen Therapy:  {Therapy; copd oxygen:15514}  Ventilator:    {[facility] Vent AWOQ:255207884}    Rehab Therapies: {THERAPEUTIC INTERVENTION:1138555724}  Weight Bearing Status/Restrictions: {[facility] Weight Bearin}  Other Medical Equipment (for information only, NOT a DME order):  {EQUIPMENT:649300688}  Other Treatments: ***    Patient's personal belongings (please select all that are sent with patient):  {[facility] DME Belongings:315788339}    RN SIGNATURE:  {Esignature:148662437}    CASE MANAGEMENT/SOCIAL WORK SECTION    Inpatient Status Date: ***    Readmission Risk Assessment Score:  Readmission Risk              Risk of Unplanned Readmission:  0           Discharging to Facility/ Agency   Name:   Address:  Phone:  Fax:    Dialysis Facility (if applicable)   Name:  Address:  Dialysis Schedule:  Phone:  Fax:    / signature: {Esignature:233190694}    PHYSICIAN SECTION    Prognosis: {Prognosis:8195494377}    Condition at Discharge: [facility] Patient Condition:577081110}    Rehab Potential (if transferring to Rehab): {Prognosis:2460170085}    Recommended Labs or Other Treatments After Discharge: ***    Physician Certification: I certify the above information and transfer of Nasir Aggarwal  is necessary for the continuing treatment of the diagnosis listed and that he requires {Admit to Appropriate Level of Care:25246} for {GREATER/LESS:975649612} 30 days.      Update Admission H&P: {[facility]158[facility]889}    PHYSICIAN SIGNATURE:  {Esignature:297273732}

## 2022-03-31 NOTE — PROGRESS NOTES
Short Stay Communication Note  Pam Fajardo  Diagnosis: Multiple Myeloma  Primary MD: Wendy Zavala  Treatment: Fludarabine and Cyclophosphamide  Day +10 of Car-T Abecma   Pt seen in outpatient infusion today. Labs drawn and reviewed. CBC: Recent Labs     03/29/22  0906 03/30/22  0851 03/31/22  0857   WBC 0.5* 0.4* 0.4*   HGB 9.1* 9.1* 9.2*   HCT 26.5* 26.8* 27.3*   MCV 93.8 94.6 94.9   PLT 32* 41* 48*     BMP/Mag:  Recent Labs     03/29/22  0906 03/30/22  0851 03/31/22  0851    143 142   K 3.4* 3.9 4.0    111* 109   CO2 22 23 22   PHOS 2.1* 2.1* 2.2*   BUN 18 18 20   CREATININE 1.8* 1.8* 1.9*   MG  --  1.60*  --      Standing parameters for replacement for this patient:   1 unit of pack red blood cells for a hemoglobin < or equal to 7  1 pack of platelets for a platelet count < or equal to 50  40 MeQ of Potassium administered for a potassium level < or equal to 3.4  4g of Magnesium Sulfate for a magnesum < or equal to 1.4  1pk platelets transfused per standing order for the above lab values. Urinalysis last done: 3/30/2022 Urinalysis next due: 4/4/2022    Chest X-Ray last done: 3/29/2022 Chest X-Ray next due: 4/4/2022    Symptoms addressed and reported to care team this date: none reported to RN  Treatments this date: Sterile dressing and cap change. Reviewed medication schedule with pt and caregiver. Both able to verbalize all medications and schedule. Pt to be seen again tomorrow. Discharged ambulatory to home.     Electronically signed by Pati Hopkins RN on 3/31/2022 at 11:17 AM

## 2022-04-01 NOTE — PROGRESS NOTES
Short Stay Communication Note  Brenden Meals  Diagnosis: Multiple Myeloma  Primary MD: Brenda Ballard  Treatment: Fludarabine and Cyclophosphamide  Day +11 of Car-T Abecma   Pt seen in outpatient infusion today. Labs drawn and reviewed. CBC: Recent Labs     03/30/22  0851 03/31/22  0857 04/01/22  0840   WBC 0.4* 0.4* 0.4*   HGB 9.1* 9.2* 9.3*   HCT 26.8* 27.3* 27.0*   MCV 94.6 94.9 94.4   PLT 41* 48* 48*     BMP/Mag:  Recent Labs     03/30/22  0851 03/31/22  0851 04/01/22  0840    142 142   K 3.9 4.0 4.2   * 109 108   CO2 23 22 23   PHOS 2.1* 2.2* 2.6   BUN 18 20 22*   CREATININE 1.8* 1.9* 1.8*   MG 1.60*  --  1.60*     Standing parameters for replacement for this patient:   1 unit of pack red blood cells for a hemoglobin < or equal to 7  1 pack of platelets for a platelet count < or equal to 50  40 MeQ of Potassium administered for a potassium level < or equal to 3.4  4g of Magnesium Sulfate for a magnesum level < or equal to 1.4  1pk platelets transfused per standing order for the above lab values. Urinalysis last done: 3/30/22 Urinalysis next due: 4/4/2022    Chest X-Ray last done: 3/29/22 Chest X-Ray next due: 4/4/22    Symptoms addressed and reported to care team this date: none reported to RN  Treatments this date: lab draw and platelet transfusion. Reviewed medication schedule with pt and caregiver. Both able to verbalize all medications and schedule. Pt to be seen again tomorrow. Discharged ambulatory to home.     Electronically signed by Pavel Randhawa RN on 4/1/2022 at 12:25 PM

## 2022-04-01 NOTE — PROGRESS NOTES
[facility] Progress [facility]      2022    Timothy Traylor    :  1955    MRN:  [id number]    Referring MD: Alison Mccann APRN - NP  [place], [place] [address]      Subjective:  He states doing well but continues to have swelling in hands and legs. His blood pressure is higher than normal as well. ECOG PS:  (1) Restricted in physically strenuous activity, ambulatory and able to do work of light nature    KPS: 80% Normal activity with effort; some signs or symptoms of disease    Isolation:  None     Medications    Scheduled Meds:  Continuous Infusions:   sodium chloride       PRN Meds:.sodium chloride, potassium chloride, potassium chloride, magnesium sulfate, magnesium hydroxide, medicated lip ointment, oxyCODONE, oxyCODONE, prochlorperazine, ondansetron, ondansetron, heparin flush, sodium chloride flush      ROS:  As noted above, otherwise remainder of 10-point ROS negative      Physical Exam:     Vital Signs:  BP (!) 153/95   Pulse 83   Temp 98.1 °F (36.7 °C) (Oral)   Resp 18   Wt 259 lb 11.2 oz (117.8 kg)   SpO2 100%   BMI 31.61 kg/m²     Weight:    Wt Readings from Last 3 Encounters:   22 259 lb 11.2 oz (117.8 kg)   22 260 lb 2.3 oz (118 kg)   22 259 lb 0.7 oz (117.5 kg)       General: Awake, alert and oriented.   HEENT: normocephalic, alopecia, PERRL, no scleral erythema or icterus, Oral mucosa moist and intact, throat clear  NECK: supple without palpable adenopathy  BACK: Straight negative CVAT  SKIN: warm dry and intact without lesions rashes or masses  CHEST: CTA bilaterally without use of accessory muscles  CV: Normal S1 S2, RRR, no MRG  ABD: NT ND normoactive BS, no palpable masses or hepatosplenomegaly  EXTREMITIES: BLL 2+ edema, denies calf tenderness  NEURO: CN II - XII grossly intact  CATHETER: Right Tunneled HD catheter    Laboratory Data:  CBC:   Recent Labs     22  0851 22  0857 22  0840   WBC 0.4* 0.4* 0.4*   HGB 9. 1* 9.2* 9.3*   HCT 26.8* 27.3* 27.0*   MCV 94.6 94.9 94.4   PLT 41* 48* 48*     BMP/Mag:  Recent Labs     03/30/22  0851 03/31/22  0851 04/01/22  0840    142 142   K 3.9 4.0 4.2   * 109 108   CO2 23 22 23   PHOS 2.1* 2.2* 2.6   BUN 18 20 22*   CREATININE 1.8* 1.9* 1.8*   MG 1.60*  --  1.60*     LIVP:   Recent Labs     03/30/22  0851 04/01/22  0840   AST 20 24   ALT 18 23   BILIDIR <0.2 <0.2   BILITOT 0.5 0.4   ALKPHOS 74 70     Coags:   Recent Labs     03/31/22  0851   PROTIME 12.7   INR 1.12   APTT 32.7     Uric Acid   Recent Labs     03/30/22  0851 04/01/22  0840   LABURIC 5.0 5.3     Lab Results   Component Value Date    CRP 3.2 04/01/2022    CRP 4.5 03/31/2022    CRP 6.2 (H) 03/30/2022     Lab Results   Component Value Date    FERRITIN 959.7 (H) 04/01/2022    FERRITIN 936.0 (H) 03/31/2022    FERRITIN 933.5 (H) 03/30/2022         PROBLEM LIST:            1.  [place] Light Chain Multiple Myeloma   2.  ESRD on HD   3.  GERD  4.  HTN  5.  Hypokalemia   6.  Subdural Hematoma (1/2022)  7.  Seizures (1/2022)      TREATMENT:            1. CyBorD (started 10/30/18)  2. Revlimid / Luwana Naalehu / Dex on clinical trial (started 2/1/19)  3. Carfilzomib / Pomalidomide / Dex x 6 cycles (Relapse #1 - started 8/26/19 - 2/2020)  4. Cytoxan Mobilization w/ 25% dose reduction (2/17/20)  5. Melphalan 140mg/m2 & Autologous SCT 3/12/20  6. Kyprolis/Ebony/Dex (6/30/21-7/28/21)- progression  7. Velcade/Ebony/Dex (5/18/21-8/3/21)- progression  8.  HD - CTX               -Cycle 1 9/27/21              -Cycle 2 10/18/21              -Cycle 3 11/8/21- stopped d/t severe fatigue  9.  Bendamustine/Velcade/Dex 12/06/21  10.  Velcade only 2/11/22  10.  Fludarabine/Cytoxan followed by CAR-T infusion    ASSESSMENT AND PLAN:            1. [place] Multiple Myeloma:  Currently in [facility]  - BM biopsy: 1/12/22.  20% plasma cells . Maggie Ramos  p53 positive 79.5% of cells  -1/28/22 Serum KFLC 1662.71, Lambda 3.05   K/L ratio 545.15  -2/24/22 Serum KFLC 1919.12, Lambda 2.54, K/L raio 755.56  -s/p Fludarabine and Cytoxan 3/16/22-3/18/22  -Post CAR-T infusion staging:  PET/CT, BM bx with Flow, FISH, CG, MMP     Lymphodepleting Chemotherapy:  Fludarabine and cyclophosphamide   Disease Status at time of Infusion:  Progressive disease  CAR-T Infusion Date:  3/21/22  CAR-T Product:  Abkenneth  Batch ID Number:  [id number]     Day + 11     2.  CRS / Neuro: Grade 1 CRS 3/22  CRS Grade: 1, fever only 3/22  - S/p toci 800mg 3/22/22  - Monitor CRP and Ferrtin closely     Lab Results   Component Value Date    CRP 3.2 04/01/2022    CRP 4.5 03/31/2022    CRP 6.2 (H) 03/30/2022     Lab Results   Component Value Date    FERRITIN 959.7 (H) 04/01/2022    FERRITIN 936.0 (H) 03/31/2022    FERRITIN 933.5 (H) 03/30/2022       ICANS Grade:  0  - Neuro checks w/ CARTOX 10-point assessment Q4hrs  ICE Score:  CAR-T Score: 10    3. ID:  Fever 12 hr after infusion, no evidence of infection but treating empirically while neutropenic. Afebrile  - On Levaquin, Diflucan, Valtrex.      4.  Heme:  Pancytopenia d/t recent chemotherapy   - Transfuse for Hgb < 7 and Platelets < 44F (ESLYHP subdural)  - Plt transfusion today  - Given hx of subdural hematoma, will keep platelets >40K.     5.  ESRD/Metabolic: HypoK, HypoPhos, SCr: 1.8  ESRD - off HD currently.  Followed by Dr. Odalys Talamantes  -Current baseline Cr 2.1-2.6  - Fluids: 1 L NS in OP Infusion: Held since 3/29/22 d/t edema   TLS:  No evidence, cont allopurinol and daily TLS labs   - Replace potassium and magnesium per PRN orders  - Given persistent/worsened peripheral edema, he will take Lasix 40 mg PO x1 at home today. Will assess his edema, electrolytes, weight and kidney function tomorrow.      6. Nutrition:  Appetite and oral intake is good.    - Cont low microbial diet   - Follow closely with dietary     7.  Neuro:  Recent seizure/subdural hematoma (1/2022)  - Continue Keppra 500 mg PO BID indefinitely  - Keep platelets >25,595        - Disposition: Return to OP Infusion daily for labs, CAR-T assessment and MD visit.      The patient was seen and examined by Dr. Jasmyn Da Silva MD, PGY-5

## 2022-04-02 NOTE — PROGRESS NOTES
Short Stay Communication Note  Deandra Bhat  Diagnosis:  Primary MD:  Treatment: Fludarabine and Cytoxan  Day +12 of Car-T Abecma   Pt seen in outpatient infusion today. Labs drawn and reviewed. CBC: Recent Labs     03/31/22  0857 04/01/22  0840 04/02/22  0836   WBC 0.4* 0.4* 0.5*   HGB 9.2* 9.3* 9.6*   HCT 27.3* 27.0* 28.4*   MCV 94.9 94.4 95.2   PLT 48* 48* 55*     BMP/Mag:  Recent Labs     03/31/22  0851 04/01/22  0840 04/02/22  0836    142 142   K 4.0 4.2 4.1    108 108   CO2 22 23 24   PHOS 2.2* 2.6 2.9   BUN 20 22* 24*   CREATININE 1.9* 1.8* 2.0*   MG  --  1.60*  --      Standing parameters for replacement for this patient:   1 unit of pack red blood cells for a hemoglobin < or equal to 7.   1 pack of platelets for a platelet count less than or equal to 50.  40 MeQ of Potassium administered for a potassium level less than or equal to 3.4.  4g of Magnesium Sulfate for a magnesum level less than or equal to 1.4. No transfusions required for the above lab values. Urinalysis last done: 3/30/22 Urinalysis next due: 4/4/2022    Chest X-Ray last done: 3/29/22 Chest X-Ray next due: 4/4/2022    Symptoms addressed and reported to care team this date: pt c/o continuing to feel swollen. Pt with generalized edema and + 2 pitting edema in BLE. Pt stated he was instructed to take Lasix yesterday but fell asleep when he got home and did not want to take at night. Pt stated he will take today. Treatments this date: none    Reviewed medication schedule with pt and caregiver. Both able to verbalize all medications and schedule. Pt to be seen again tomorrow. Discharged ambulatory to home.   Lequita Dubin, RN

## 2022-04-02 NOTE — PROGRESS NOTES
[address] [facility] Progress Note      2022    Luz Virgen    :  1955    MRN:  [id number]    Referring MD: KIMBERLEY Saravia - MARTHA  [place], [place] [address]      Subjective:  He is doing well. ECOG PS:  (1) Restricted in physically strenuous activity, ambulatory and able to do work of light nature    KPS: 80% Normal activity with effort; some signs or symptoms of disease    Isolation:  None     Medications    Scheduled Meds:  Continuous Infusions:    PRN Meds:.heparin flush      ROS:  As noted above, otherwise remainder of 10-point ROS negative      Physical Exam:     Vital Signs:  BP (!) 146/97   Pulse 108   Temp 98.1 °F (36.7 °C) (Oral)   Resp 18   Wt 260 lb 12.9 oz (118.3 kg)   SpO2 98%   BMI 31.75 kg/m²     Weight:    Wt Readings from Last 3 Encounters:   22 260 lb 12.9 oz (118.3 kg)   22 259 lb 11.2 oz (117.8 kg)   22 260 lb 2.3 oz (118 kg)       General: Awake, alert and oriented.   HEENT: normocephalic, alopecia, PERRL, no scleral erythema or icterus, Oral mucosa moist and intact, throat clear  NECK: supple without palpable adenopathy  BACK: Straight negative CVAT  SKIN: warm dry and intact without lesions rashes or masses  CHEST: CTA bilaterally without use of accessory muscles  CV: Normal S1 S2, RRR, no MRG  ABD: NT ND normoactive BS, no palpable masses or hepatosplenomegaly  EXTREMITIES: BLL 2+ edema, denies calf tenderness  NEURO: CN II - XII grossly intact  CATHETER: Right Tunneled HD catheter    Laboratory Data:  CBC:   Recent Labs     22  0857 22  0840 22  0836   WBC 0.4* 0.4* 0.5*   HGB 9.2* 9.3* 9.6*   HCT 27.3* 27.0* 28.4*   MCV 94.9 94.4 95.2   PLT 48* 48* 55*     BMP/Mag:  Recent Labs     22  0851 22  0840 22  0836    142 142   K 4.0 4.2 4.1    108 108   CO2 22 23 24   PHOS 2.2* 2.6 2.9   BUN 20 22* 24*   CREATININE 1.9* 1.8* 2.0*   MG  --  1.60*  --      LIVP:   Recent Labs 04/01/22  0840   AST 24   ALT 23   BILIDIR <0.2   BILITOT 0.4   ALKPHOS 70     Coags:   Recent Labs     03/31/22  0851   PROTIME 12.7   INR 1.12   APTT 32.7     Uric Acid   Recent Labs     04/01/22  0840   LABURIC 5.3     Lab Results   Component Value Date    CRP <3.0 04/02/2022    CRP 3.2 04/01/2022    CRP 4.5 03/31/2022     Lab Results   Component Value Date    FERRITIN 956.4 (H) 04/02/2022    FERRITIN 959.7 (H) 04/01/2022    FERRITIN 936.0 (H) 03/31/2022         PROBLEM LIST:            1.  [place] Light Chain Multiple Myeloma   2.  ESRD on HD   3.  GERD  4.  HTN  5.  Hypokalemia   6.  Subdural Hematoma (1/2022)  7.  Seizures (1/2022)      TREATMENT:            1. CyBorD (started 10/30/18)  2. Revlimid / Buchanan Huddle / Dex on clinical trial (started 2/1/19)  3. Carfilzomib / Pomalidomide / Dex x 6 cycles (Relapse #1 - started 8/26/19 - 2/2020)  4. Cytoxan Mobilization w/ 25% dose reduction (2/17/20)  5. Melphalan 140mg/m2 & Autologous SCT 3/12/20  6. Kyprolis/Ebony/Dex (6/30/21-7/28/21)- progression  7. Velcade/Ebony/Dex (5/18/21-8/3/21)- progression  8.  HD - CTX               -Cycle 1 9/27/21              -Cycle 2 10/18/21              -Cycle 3 11/8/21- stopped d/t severe fatigue  9.  Bendamustine/Velcade/Dex 12/06/21  10.  Velcade only 2/11/22  10.  Fludarabine/Cytoxan followed by CAR-T infusion    ASSESSMENT AND PLAN:            1. [place] Light Chain Multiple Myeloma:  Currently in [facility]  - BM biopsy: 1/12/22.  20% plasma cells . Travis Hilt p53 positive 79.5% of cells  -1/28/22 Serum KFLC 1662.71, Lambda 3.05   K/L ratio 545.15  -2/24/22 Serum KFLC 1919.12, Lambda 2.54, K/L raio 755.56  -s/p Fludarabine and Cytoxan 3/16/22-3/18/22  -Post CAR-T infusion staging:  PET/CT, BM bx with Flow, FISH, CG, MMP     Lymphodepleting Chemotherapy:  Fludarabine and cyclophosphamide   Disease Status at time of Infusion:  Progressive disease  CAR-T Infusion Date:  3/21/22  CAR-T Product:  [employer]  Batch ID Number:  [id number]     Day + 12     2.  CRS / Neuro: Grade 1 CRS 3/22  CRS Grade: 1, fever only 3/22  - S/p toci 800mg 3/22/22  - Monitor CRP and Ferrtin closely     Lab Results   Component Value Date    CRP <3.0 04/02/2022    CRP 3.2 04/01/2022    CRP 4.5 03/31/2022     Lab Results   Component Value Date    FERRITIN 956.4 (H) 04/02/2022    FERRITIN 959.7 (H) 04/01/2022    FERRITIN 936.0 (H) 03/31/2022       ICANS Grade:  0  - Neuro checks w/ CARTOX 10-point assessment Q4hrs  ICE Score:  CAR-T Score: 10    3. ID:  Fever 12 hr after infusion, no evidence of infection but treating empirically while neutropenic. Afebrile  - On Levaquin, Diflucan, Valtrex.      4.  Heme:  Pancytopenia d/t recent chemotherapy   - Transfuse for Hgb < 7 and Platelets < 20X (JVAJSG subdural)  - No transfusion today  - Given hx of subdural hematoma, will keep platelets >59Z.     5.  ESRD/Metabolic: HypoK, HypoPhos, SCr: 1.8  ESRD - off HD currently.  Followed by Dr. Katey Denise  -Current baseline Cr 2.1-2.6  - Fluids: 1 L NS in OP Infusion: Held since 3/29/22 d/t edema   TLS:  No evidence, cont allopurinol and daily TLS labs   - Replace potassium and magnesium per PRN orders  - Given persistent/worsened peripheral edema, he will take Lasix 40 mg PO x1 at home today. Will assess his edema, electrolytes, weight and kidney function tomorrow.      6. Nutrition:  Appetite and oral intake is good. - Cont low microbial diet   - Follow closely with dietary     7.  Neuro:  Recent seizure/subdural hematoma (1/2022)  - Continue Keppra 500 mg PO BID indefinitely  - Keep platelets >69,405        - Disposition: Return to OP Infusion daily for labs, CAR-T assessment and MD visit.      Mayo Fraser APRN - CNP   Rajesh Parmar MD  [facility]  Please contact me through [address] [facility]

## 2022-04-03 NOTE — PROGRESS NOTES
Short Stay Communication Note  Shakir Cordova  Diagnosis:  Primary MD:  Treatment: Fludarabine and Cytoxan  Day + 13 of Car-T Abecma   Pt seen in outpatient infusion today. Labs drawn and reviewed. CBC: Recent Labs     04/01/22  0840 04/02/22  0836 04/03/22  0849   WBC 0.4* 0.5* 0.7*   HGB 9.3* 9.6* 10.1*   HCT 27.0* 28.4* 30.4*   MCV 94.4 95.2 95.5   PLT 48* 55* 46*     BMP/Mag:  Recent Labs     04/01/22  0840 04/02/22  0836 04/03/22  0848    142 141   K 4.2 4.1 3.9    108 105   CO2 23 24 25   PHOS 2.6 2.9 3.0   BUN 22* 24* 31*   CREATININE 1.8* 2.0* 2.1*   MG 1.60*  --   --      Standing parameters for replacement for this patient:   1 unit of pack red blood cells for a hemoglobin < or equal to 7  1 pack of platelets for a platelet count less than or equal to 50  40 MeQ of Potassium administered for a potassium level less than or equal to 3.4.  4g of Magnesium Sulfate for a magnesum level less than or equal to 1.4.  1pk platelets transfused per standing order for the above lab values. Urinalysis last done: 3/30/2022 Urinalysis next due: 4/4/2022    Chest X-Ray last done: 3/29/2022 Chest X-Ray next due: 4/4/2022    Symptoms addressed and reported to care team this date: none. Pt did take lasix yesterday and is down 6 pounds this shift. Pt with only trace amount of edema to BUE and +2 pitting edema to BLE. Pt's bp elevated and stated he has not yet taken his blood pressure medication. Pt states he will take soon while here today. Treatments this date: labs and platelets. Reviewed medication schedule with pt and caregiver. Both able to verbalize all medications and schedule. Pt to be seen again tomorrow. Discharged ambulatory to home.   Greg Joyce RN

## 2022-04-03 NOTE — PROGRESS NOTES
[facility] Progress Note      4/3/2022    Vicki Conrad    :  1955    MRN:  [id number]    Referring MD: KIMBERLEY Barillas NP  [address] [place] [address]      Subjective:  No complaints. ECOG PS:  (1) Restricted in physically strenuous activity, ambulatory and able to do work of light nature    KPS: 80% Normal activity with effort; some signs or symptoms of disease    Isolation:  None     Medications    Scheduled Meds:  Continuous Infusions:    PRN Meds:.heparin flush      ROS:  As noted above, otherwise remainder of 10-point ROS negative      Physical Exam:     Vital Signs:  BP (!) 150/95   Pulse 106   Temp 97.6 °F (36.4 °C) (Oral)   Resp 18   Wt 254 lb 6.6 oz (115.4 kg)   SpO2 97%   BMI 30.97 kg/m²     Weight:    Wt Readings from Last 3 Encounters:   22 254 lb 6.6 oz (115.4 kg)   22 260 lb 12.9 oz (118.3 kg)   22 259 lb 11.2 oz (117.8 kg)       General: Awake, alert and oriented.   HEENT: normocephalic, alopecia, PERRL, no scleral erythema or icterus, Oral mucosa moist and intact, throat clear  NECK: supple without palpable adenopathy  BACK: Straight negative CVAT  SKIN: warm dry and intact without lesions rashes or masses  CHEST: CTA bilaterally without use of accessory muscles  CV: Normal S1 S2, RRR, no MRG  ABD: NT ND normoactive BS, no palpable masses or hepatosplenomegaly  EXTREMITIES: BLL 2+ edema, denies calf tenderness  NEURO: CN II - XII grossly intact  CATHETER: Right Tunneled HD catheter    Laboratory Data:  CBC:   Recent Labs     22  0840 22  0836 22  0849   WBC 0.4* 0.5* 0.7*   HGB 9.3* 9.6* 10.1*   HCT 27.0* 28.4* 30.4*   MCV 94.4 95.2 95.5   PLT 48* 55* 46*     BMP/Mag:  Recent Labs     22  0840 22  0836 22  0848    142 141   K 4.2 4.1 3.9    108 105   CO2 23 24 25   PHOS 2.6 2.9 3.0   BUN 22* 24* 31*   CREATININE 1.8* 2.0* 2.1*   MG 1.60*  --   --      LIVP:   Recent Labs 04/01/22  0840   AST 24   ALT 23   BILIDIR <0.2   BILITOT 0.4   ALKPHOS 70     Coags:   No results for input(s): PROTIME, INR, APTT in the last 72 hours. Uric Acid   Recent Labs     04/01/22  0840   LABURIC 5.3     Lab Results   Component Value Date    CRP <3.0 04/03/2022    CRP <3.0 04/02/2022    CRP 3.2 04/01/2022     Lab Results   Component Value Date    FERRITIN 955.5 (H) 04/03/2022    FERRITIN 956.4 (H) 04/02/2022    FERRITIN 959.7 (H) 04/01/2022         PROBLEM LIST:            1.  [place] Light Chain Multiple Myeloma   2.  ESRD on HD   3.  GERD  4.  HTN  5.  Hypokalemia   6.  Subdural Hematoma (1/2022)  7.  Seizures (1/2022)      TREATMENT:            1. CyBorD (started 10/30/18)  2. Revlimid / Carie Daring / Dex on clinical trial (started 2/1/19)  3. Carfilzomib / Pomalidomide / Dex x 6 cycles (Relapse #1 - started 8/26/19 - 2/2020)  4. Cytoxan Mobilization w/ 25% dose reduction (2/17/20)  5. Melphalan 140mg/m2 & Autologous SCT 3/12/20  6. Kyprolis/Ebony/Dex (6/30/21-7/28/21)- progression  7. Velcade/Ebony/Dex (5/18/21-8/3/21)- progression  8.  HD - CTX               -Cycle 1 9/27/21              -Cycle 2 10/18/21              -Cycle 3 11/8/21- stopped d/t severe fatigue  9.  Bendamustine/Velcade/Dex 12/06/21  10.  Velcade only 2/11/22  10.  Fludarabine/Cytoxan followed by CAR-T infusion    ASSESSMENT AND PLAN:            1. [place] Light Chain Multiple Myeloma:  Currently in [facility]  - BM biopsy: 1/12/22.  20% plasma cells . Shaggy Christianson  p53 positive 79.5% of cells  -1/28/22 Serum KFLC 1662.71, Lambda 3.05   K/L ratio 545.15  -2/24/22 Serum KFLC 1919.12, Lambda 2.54, K/L raio 755.56  -s/p Fludarabine and Cytoxan 3/16/22-3/18/22  -Post CAR-T infusion staging:  PET/CT, BM bx with Flow, FISH, CG, MMP     Lymphodepleting Chemotherapy:  Fludarabine and cyclophosphamide   Disease Status at time of Infusion:  Progressive disease  CAR-T Infusion Date:  3/21/22  CAR-T Product:  [employer]  Batch ID Number:  [id number]     Day + 13     2.  CRS / Neuro: Grade 1 CRS 3/22  CRS Grade: 1, fever only 3/22  - S/p toci 800mg 3/22/22  - Monitor CRP and Ferrtin closely     Lab Results   Component Value Date    CRP <3.0 04/03/2022    CRP <3.0 04/02/2022    CRP 3.2 04/01/2022     Lab Results   Component Value Date    FERRITIN 955.5 (H) 04/03/2022    FERRITIN 956.4 (H) 04/02/2022    FERRITIN 959.7 (H) 04/01/2022       ICANS Grade:  0  - Neuro checks w/ CARTOX 10-point assessment Q4hrs  ICE Score:  CAR-T Score: 10    3. ID:  Fever 12 hr after infusion, no evidence of infection but treating empirically while neutropenic. Afebrile  - On Levaquin, Diflucan, Valtrex.      4.  Heme:  Pancytopenia d/t recent chemotherapy   - Transfuse for Hgb < 7 and Platelets < 62C (KEJAZD subdural)  - Plt transfusion today  - Given hx of subdural hematoma, will keep platelets >30M.     5. ESRD/Metabolic: Renal fxn & electrolytes stable  ESRD - off HD currently.  Followed by Dr. Italo Hill  -Current baseline Cr 2.1-2.6  - Fluids: 1 L NS in OP Infusion: Held since 3/29/22 d/t edema   TLS:  No evidence, cont allopurinol and daily TLS labs   - Replace potassium and magnesium per PRN orders  - Given persistent/worsened peripheral edema, he will take Lasix 40 mg PO x1 at home today. Will assess his edema, electrolytes, weight and kidney function tomorrow.      6. Nutrition:  Appetite and oral intake is good. - Cont low microbial diet   - Follow closely with dietary     7.  Neuro:  Recent seizure/subdural hematoma (1/2022)  - Continue Keppra 500 mg PO BID indefinitely  - Keep platelets >21,501        - Disposition: Return to OP Infusion daily for labs, CAR-T assessment and MD visit.      KIMBERLEY Muhammad - CNP   Love Chan MD  [facility]  Please contact me through [facility]

## 2022-04-04 NOTE — PROGRESS NOTES
Short Stay Communication Note  Juana Lynn  Diagnosis:  Primary MD:  Treatment: Fludarabine/ and Cytoxan  Day + 14 of Car-T Abecma   Pt seen in outpatient infusion today. Labs drawn and reviewed. CBC: Recent Labs     04/02/22  0836 04/03/22  0849 04/04/22  0915   WBC 0.5* 0.7* 0.8*   HGB 9.6* 10.1* 10.0*   HCT 28.4* 30.4* 29.3*   MCV 95.2 95.5 95.0   PLT 55* 46* 51*     BMP/Mag:  Recent Labs     04/02/22  0836 04/03/22  0848 04/04/22  0916    141 140   K 4.1 3.9 3.9    105 105   CO2 24 25 26   PHOS 2.9 3.0 2.9   BUN 24* 31* 36*   CREATININE 2.0* 2.1* 2.4*   MG  --   --  1.50*     Standing parameters for replacement for this patient:   1 unit of pack red blood cells for a hemoglobin < or equal to 7.  1 pack of platelets for a platelet count less than or equal to 50.0  40 MeQ of Potassium administered for a potassium level less than or equal to 3.4.  4g of Magnesium Sulfate for a magnesum level less than or equal to 1.4. No transfusions required for the above lab values. Urinalysis last done: 4/4/2022 Urinalysis next due: 4/11/2022    Chest X-Ray last done: 4/4/2022 Chest X-Ray next due: 4/11/2022    Symptoms addressed and reported to care team this date:   Treatments this date: IV Fluids and labs. Reviewed medication schedule with pt and caregiver. Both able to verbalize all medications and schedule. Pt to be seen again tomorrow. Discharged ambulatory to home.   Lillian Vallejo RN

## 2022-04-04 NOTE — PROGRESS NOTES
[address] [facility] Progress [facility]      2022    Hamzah Gómez    :  1955    MRN:  [id number]    Referring MD: Delfina Rachel, APRN - NP  [place],  [address]      Subjective: He took lasix x1 on 22 and his edema have improved since then. He was slightly tachycardic and EKG was obtained and it was unremarkable. His blood pressure is better today. No major complains today. ECOG PS:  (1) Restricted in physically strenuous activity, ambulatory and able to do work of light nature    KPS: 80% Normal activity with effort; some signs or symptoms of disease    Isolation:  None     Medications    Scheduled Meds:   sodium chloride  1,000 mL IntraVENous Once     Continuous Infusions:    PRN Meds:.heparin flush      ROS:  As noted above, otherwise remainder of 10-point ROS negative      Physical Exam:     Vital Signs:  BP (!) 131/90   Pulse 107   Temp 97.8 °F (36.6 °C) (Oral)   Resp 18   SpO2 98%     Weight:    Wt Readings from Last 3 Encounters:   22 254 lb 6.6 oz (115.4 kg)   22 260 lb 12.9 oz (118.3 kg)   22 259 lb 11.2 oz (117.8 kg)       General: Awake, alert and oriented.   HEENT: normocephalic, alopecia, PERRL, no scleral erythema or icterus, Oral mucosa moist and intact, throat clear  NECK: supple without palpable adenopathy  BACK: Straight negative CVAT  SKIN: warm dry and intact without lesions rashes or masses  CHEST: CTA bilaterally without use of accessory muscles  CV: Normal S1 S2, RRR, no MRG  ABD: NT ND normoactive BS, no palpable masses or hepatosplenomegaly  EXTREMITIES: BLL 2+ edema slightly improved today, denies calf tenderness  NEURO: CN II - XII grossly intact  CATHETER: Right Tunneled HD catheter    Laboratory Data:  CBC:   Recent Labs     22  0836 22  0849 22  0915   WBC 0.5* 0.7* 0.8*   HGB 9.6* 10.1* 10.0*   HCT 28.4* 30.4* 29.3*   MCV 95.2 95.5 95.0   PLT 55* 46* 51*     BMP/Mag:  Recent Labs 04/02/22  0836 04/03/22  0848 04/04/22  0916    141 140   K 4.1 3.9 3.9    105 105   CO2 24 25 26   PHOS 2.9 3.0 2.9   BUN 24* 31* 36*   CREATININE 2.0* 2.1* 2.4*   MG  --   --  1.50*     LIVP:   Recent Labs     04/04/22  0916   AST 20   ALT 21   BILIDIR <0.2   BILITOT 0.7   ALKPHOS 73     Coags:   Recent Labs     04/04/22 0916   PROTIME 11.8   INR 1.04   APTT 45.8*     Uric Acid   Recent Labs     04/04/22  0916   LABURIC 7.2     Lab Results   Component Value Date    CRP <3.0 04/04/2022    CRP <3.0 04/03/2022    CRP <3.0 04/02/2022     Lab Results   Component Value Date    FERRITIN 981.4 (H) 04/04/2022    FERRITIN 955.5 (H) 04/03/2022    FERRITIN 956.4 (H) 04/02/2022         PROBLEM LIST:            1.  [place] Light Chain Multiple Myeloma   2.  ESRD on HD   3.  GERD  4.  HTN  5.  Hypokalemia   6.  Subdural Hematoma (1/2022)  7.  Seizures (1/2022)      TREATMENT:            1. CyBorD (started 10/30/18)  2. Revlimid / Cristal Duong / Dex on clinical trial (started 2/1/19)  3. Carfilzomib / Pomalidomide / Dex x 6 cycles (Relapse #1 - started 8/26/19 - 2/2020)  4. Cytoxan Mobilization w/ 25% dose reduction (2/17/20)  5. Melphalan 140mg/m2 & Autologous SCT 3/12/20  6. Kyprolis/Ebony/Dex (6/30/21-7/28/21)- progression  7. Velcade/Ebony/Dex (5/18/21-8/3/21)- progression  8.  HD - CTX               -Cycle 1 9/27/21              -Cycle 2 10/18/21              -Cycle 3 11/8/21- stopped d/t severe fatigue  9.  Bendamustine/Velcade/Dex 12/06/21  10.  Velcade only 2/11/22  10.  Fludarabine/Cytoxan followed by CAR-T infusion    ASSESSMENT AND PLAN:            1. [place] Light Chain Multiple Myeloma:  Currently in [facility]  - BM biopsy: 1/12/22.  20% plasma cells . Lynda Graven  p53 positive 79.5% of cells  -1/28/22 Serum KFLC 1662.71, Lambda 3.05   K/L ratio 545.15  -2/24/22 Serum KFLC 1919.12, Lambda 2.54, K/L raio 755.56  -s/p Fludarabine and Cytoxan 3/16/22-3/18/22  -Post CAR-T infusion staging:  PET/CT, BM bx with Flow, FISH, CG, MMP     Lymphodepleting Chemotherapy:  Fludarabine and cyclophosphamide   Disease Status at time of Infusion:  Progressive disease  CAR-T Infusion Date:  3/21/22  CAR-T Product:  [employer]  Batch ID Number:  [id number]     Day + 14     2.  CRS / Neuro: Grade 1 CRS 3/22  CRS Grade: 1, fever only 3/22  - S/p toci 800mg 3/22/22  - Monitor CRP and Ferrtin closely     Lab Results   Component Value Date    CRP <3.0 04/04/2022    CRP <3.0 04/03/2022    CRP <3.0 04/02/2022     Lab Results   Component Value Date    FERRITIN 981.4 (H) 04/04/2022    FERRITIN 955.5 (H) 04/03/2022    FERRITIN 956.4 (H) 04/02/2022       ICANS Grade:  0  - Neuro checks w/ CARTOX 10-point assessment Q4hrs  ICE Score:  CAR-T Score: 10    3. ID:  Fever 12 hr after infusion, no evidence of infection but treating empirically while neutropenic. Afebrile  - On Levaquin, Diflucan, Valtrex.      4.  Heme:  Pancytopenia d/t recent chemotherapy   - Transfuse for Hgb < 7 and Platelets < 94N (TQEWXF subdural)  - No transfusion today. - Given hx of subdural hematoma, will keep platelets >19R.     5. ESRD/Metabolic: Renal fxn & electrolytes stable  ESRD - off HD currently.  Followed by Dr. Augusta Merlin  -Current baseline Cr 2.1-2.6  - Fluids: 1 L NS in OP Infusion: Held since 3/29/22 d/t edema. Given increased Cr level, He will receive 1L NS today  TLS:  No evidence, cont allopurinol and daily TLS labs   - Replace potassium and magnesium per PRN orders  - Given persistent/worsened peripheral edema, instructed him to take lasix 40 mg PO x1 on 4/2/22.  - Continue to monitor his kidney function on daily basis.     6. Nutrition:  Appetite and oral intake is good. - Cont low microbial diet   - Follow closely with dietary     7.  Neuro:  Recent seizure/subdural hematoma (1/2022)  - Continue Keppra 500 mg PO BID indefinitely  - Keep platelets >27,354        - Disposition: Return to OP Infusion daily for labs, CAR-T assessment and MD visit.      Mikki Lara MD PGY-5

## 2022-04-05 NOTE — PROGRESS NOTES
Short Stay Communication Note  Jasmeet Miller  Diagnosis:  Primary MD:  Treatment: Melphalan Fludarabine/Cytoxan  Day + 15 of Car-T Abecma   Pt seen in outpatient infusion today. Labs drawn and reviewed. CBC: Recent Labs     04/03/22  0849 04/04/22  0915 04/05/22  0916   WBC 0.7* 0.8* 0.6*   HGB 10.1* 10.0* 9.4*   HCT 30.4* 29.3* 27.7*   MCV 95.5 95.0 95.6   PLT 46* 51* 36*     BMP/Mag:  Recent Labs     04/03/22  0848 04/04/22  0916 04/05/22  0916    140 141   K 3.9 3.9 4.2    105 107   CO2 25 26 22   PHOS 3.0 2.9 2.7   BUN 31* 36* 35*   CREATININE 2.1* 2.4* 2.5*   MG  --  1.50*  --      Standing parameters for replacement for this patient:   1 unit of pack red blood cells for a hemoglobin < or equal to   1 pack of platelets for a platelet count less than or equal to 5.0.0.  40 MeQ of Potassium administered for a potassium level less than or equal to 3.4.  4g of Magnesium Sulfate for a magnesum level less than or equal to 1.4.  1pk platelets transfused per standing order for the above lab values. Urinalysis last done: 4/4/2022 Urinalysis next due: 4/11/2022. Chest X-Ray last done: 4/4/2022 Chest X-Ray next due: 4/11/2022    Symptoms addressed and reported to care team this date:   Treatments this date: IV Fluids, I pack of platelets    Reviewed medication schedule with pt and caregiver. Both able to verbalize all medications and schedule. Pt to be seen again tomorrow. Discharged ambulatory to home.   Rafita Kiser RN

## 2022-04-05 NOTE — PROGRESS NOTES
[facility] Progress Note      2022    Juana Lynn    :  1955    MRN:  [id number]    Referring MD: KIMBERLEY Feliciano - NP  [place], [place] [address]      Subjective: Feeling well today, no fevers, no confusion, eating and drinking well, no complaints at this time. ECOG PS:  (1) Restricted in physically strenuous activity, ambulatory and able to do work of light nature    KPS: 80% Normal activity with effort; some signs or symptoms of disease    Isolation:  None     Medications    Scheduled Meds:   sodium chloride  1,000 mL IntraVENous Once     Continuous Infusions:     PRN Meds:.heparin flush      ROS:  As noted above, otherwise remainder of 10-point ROS negative      Physical Exam:     Vital Signs:  BP (!) 141/87   Pulse 88   Temp 98.2 °F (36.8 °C) (Oral)   Resp 18   Wt 255 lb 1.2 oz (115.7 kg)   SpO2 98%   BMI 31.05 kg/m²     Weight:    Wt Readings from Last 3 Encounters:   22 255 lb 1.2 oz (115.7 kg)   22 254 lb 13.6 oz (115.6 kg)   22 254 lb 6.6 oz (115.4 kg)       General: Awake, alert and oriented.   HEENT: normocephalic, alopecia, PERRL, no scleral erythema or icterus, Oral mucosa moist and intact, throat clear  NECK: supple without palpable adenopathy  BACK: Straight negative CVAT  SKIN: warm dry and intact without lesions rashes or masses  CHEST: CTA bilaterally without use of accessory muscles  CV: Normal S1 S2, RRR, no MRG  ABD: NT ND normoactive BS, no palpable masses or hepatosplenomegaly  EXTREMITIES: BLL 2+ edema slightly improved today, denies calf tenderness  NEURO: CN II - XII grossly intact  CATHETER: Right Tunneled HD catheter    Laboratory Data:  CBC:   Recent Labs     22  0849 22  0915   WBC 0.7* 0.8*   HGB 10.1* 10.0*   HCT 30.4* 29.3*   MCV 95.5 95.0   PLT 46* 51*     BMP/Mag:  Recent Labs     22  0848 22  0916    140   K 3.9 3.9    105   CO2 25 26   PHOS 3.0 2.9   BUN 31* 36* CREATININE 2.1* 2.4*   MG  --  1.50*     LIVP:   Recent Labs     04/04/22  0916   AST 20   ALT 21   BILIDIR <0.2   BILITOT 0.7   ALKPHOS 73     Coags:   Recent Labs     04/04/22  0916   PROTIME 11.8   INR 1.04   APTT 45.8*     Uric Acid   Recent Labs     04/04/22  0916   LABURIC 7.2     Lab Results   Component Value Date    CRP <3.0 04/04/2022    CRP <3.0 04/03/2022    CRP <3.0 04/02/2022     Lab Results   Component Value Date    FERRITIN 981.4 (H) 04/04/2022    FERRITIN 955.5 (H) 04/03/2022    FERRITIN 956.4 (H) 04/02/2022         PROBLEM LIST:            1. [place] Light Chain Multiple Myeloma   2. ESRD on HD   3. GERD  4. HTN  5. Hypokalemia   6. Subdural Hematoma (1/2022)  7. Seizures (1/2022)      TREATMENT:            1. CyBorD (started 10/30/18)  2. Revlimid / [place] / Dex on clinical trial (started 2/1/19)  3. Carfilzomib / Pomalidomide / Dex x 6 cycles (Relapse #1 - started 8/26/19 - 2/2020)  4. Cytoxan Mobilization w/ 25% dose reduction (2/17/20)  5. Melphalan 140mg/m2 & Autologous SCT 3/12/20  6. Kyprolis/[place]/Dex (6/30/21-7/28/21)- progression  7. Velcade/Ebony/Dex (5/18/21-8/3/21)- progression  8. HD - CTX               -Cycle 1 9/27/21              -Cycle 2 10/18/21              -Cycle 3 11/8/21- stopped d/t severe fatigue  9. Bendamustine/Velcade/Dex 12/06/21  10. Velcade only 2/11/22  10. Fludarabine/Cytoxan followed by CAR-T infusion    ASSESSMENT AND PLAN:            1. [place] Light Chain Multiple Myeloma:  Currently in [facility]  - BM biopsy: 1/12/22.  20% plasma cells . Maggie Ramos  p53 positive 79.5% of cells  -1/28/22 Serum KFLC 1662.71, Lambda 3.05   K/L ratio 545.15  -2/24/22 Serum KFLC 1919.12, Lambda 2.54, K/L raio 755.56  -s/p Fludarabine and Cytoxan 3/16/22-3/18/22  -Post CAR-T infusion staging:  PET/CT, BM bx with Flow, FISH, CG, MMP     Lymphodepleting Chemotherapy:  Fludarabine and cyclophosphamide   Disease Status at time of Infusion:  Progressive disease  CAR-T Infusion Date: 3/21/22  CAR-T Product:  Abkenneth  Batch ID Number:  [id number]     Day + 15     2. CRS / Neuro: Grade 1 CRS 3/22  CRS Grade: 1, fever only 3/22  - S/p toci 800mg 3/22/22  - Monitor CRP and Ferrtin closely     Lab Results   Component Value Date    CRP <3.0 04/04/2022    CRP <3.0 04/03/2022    CRP <3.0 04/02/2022     Lab Results   Component Value Date    FERRITIN 981.4 (H) 04/04/2022    FERRITIN 955.5 (H) 04/03/2022    FERRITIN 956.4 (H) 04/02/2022       ICANS Grade:  0  - Neuro checks w/ CARTOX 10-point assessment Q4hrs  ICE Score:  CAR-T Score: 10    3. ID:  Fever 12 hr after infusion, no evidence of infection but treating empirically while neutropenic. Afebrile  - On Levaquin, Diflucan, Valtrex. 4.  Heme:  Pancytopenia d/t recent chemotherapy   - Transfuse for Hgb < 7 and Platelets < 80G (recent subdural)  - Plt transfusion today. - Given hx of subdural hematoma, will keep platelets >62S. 5. ESRD/Metabolic: SCr: 2.5 (8/0/19) & electrolytes stable  ESRD - off HD currently. Followed by Dr. Zana Rivera  -Current baseline Cr 2.1-2.6  - Fluids: 1 L NS in OP Infusion: Held since 3/29/22 d/t edema. Given increased Cr level on 4/4/22 and 4/5/22  TLS:  No evidence, cont allopurinol and daily TLS labs   - Replace potassium and magnesium per PRN orders  - Given persistent/worsened peripheral edema, instructed him to take lasix 40 mg PO x1 on 4/2/22.  - Continue to monitor his kidney function on daily basis. 6. Nutrition:  Appetite and oral intake is good. - Cont low microbial diet   - Follow closely with dietary     7. Neuro:  Recent seizure/subdural hematoma (1/2022)  - Continue Keppra 500 mg PO BID indefinitely  - Keep platelets >40,741      - Disposition: Return to OP Infusion daily for labs, CAR-T assessment and MD visit.      KIMBERLEY Kauffman - CNP

## 2022-04-06 NOTE — PROGRESS NOTES
[facility] Progress Note      2022    Dow Rubinstein    :  1955    MRN:  [id number]    Referring MD: Helena Perez APRN - NP  [place],  [address]      Subjective: Feels well today, had tingling and weakness / pain in both knees when awakening from bed in the middle of the night, went to the restroom and pain resolved. No fevers, no confusion, eating and drinking well. ECOG PS:  (1) Restricted in physically strenuous activity, ambulatory and able to do work of light nature    KPS: 80% Normal activity with effort; some signs or symptoms of disease    Isolation:  None     Medications    Scheduled Meds:    Continuous Infusions:   sodium chloride       PRN Meds:.sodium chloride, potassium chloride, potassium chloride, magnesium sulfate, magnesium hydroxide, medicated lip ointment, oxyCODONE, oxyCODONE, prochlorperazine, ondansetron, ondansetron, heparin flush, sodium chloride flush      ROS:  As noted above, otherwise remainder of 10-point ROS negative      Physical Exam:     Vital Signs:  BP (!) 126/91   Pulse 100   Temp 97.9 °F (36.6 °C) (Oral)   Resp 20   Ht 6' 2.02\" (1.88 m)   Wt 253 lb 12 oz (115.1 kg)   SpO2 98%   BMI 32.57 kg/m²     Weight:    Wt Readings from Last 3 Encounters:   22 253 lb 12 oz (115.1 kg)   22 255 lb 1.2 oz (115.7 kg)   22 254 lb 13.6 oz (115.6 kg)       General: Awake, alert and oriented.   HEENT: normocephalic, alopecia, PERRL, no scleral erythema or icterus, Oral mucosa moist and intact, throat clear  NECK: supple without palpable adenopathy  BACK: Straight negative CVAT  SKIN: warm dry and intact without lesions rashes or masses  CHEST: CTA bilaterally without use of accessory muscles  CV: Normal S1 S2, RRR, no MRG  ABD: NT ND normoactive BS, no palpable masses or hepatosplenomegaly  EXTREMITIES: BLL 1+ edema slightly improved today, denies calf tenderness  NEURO: CN II - XII grossly intact  CATHETER: Right Tunneled HD catheter    Laboratory Data:  CBC:   Recent Labs     04/04/22  0915 04/05/22  0916 04/06/22  0900   WBC 0.8* 0.6* 0.5*   HGB 10.0* 9.4* 9.2*   HCT 29.3* 27.7* 26.6*   MCV 95.0 95.6 94.5   PLT 51* 36* 39*     BMP/Mag:  Recent Labs     04/04/22  0916 04/05/22  0916    141   K 3.9 4.2    107   CO2 26 22   PHOS 2.9 2.7   BUN 36* 35*   CREATININE 2.4* 2.5*   MG 1.50*  --      LIVP:   Recent Labs     04/04/22  0916   AST 20   ALT 21   BILIDIR <0.2   BILITOT 0.7   ALKPHOS 73     Coags:   Recent Labs     04/04/22  0916   PROTIME 11.8   INR 1.04   APTT 45.8*     Uric Acid   Recent Labs     04/04/22  0916   LABURIC 7.2     Lab Results   Component Value Date    CRP <3.0 04/05/2022    CRP <3.0 04/04/2022    CRP <3.0 04/03/2022     Lab Results   Component Value Date    FERRITIN 1,000.0 (H) 04/05/2022    FERRITIN 981.4 (H) 04/04/2022    FERRITIN 955.5 (H) 04/03/2022         PROBLEM LIST:            1. [place] Light Chain Multiple Myeloma   2. ESRD on HD   3. GERD  4. HTN  5. Hypokalemia   6. Subdural Hematoma (1/2022)  7. Seizures (1/2022)      TREATMENT:            1. CyBorD (started 10/30/18)  2. Revlimid / [place] / Dex on clinical trial (started 2/1/19)  3. Carfilzomib / Pomalidomide / Dex x 6 cycles (Relapse #1 - started 8/26/19 - 2/2020)  4. Cytoxan Mobilization w/ 25% dose reduction (2/17/20)  5. Melphalan 140mg/m2 & Autologous SCT 3/12/20  6. Kyprolis/Ebony/Dex (6/30/21-7/28/21)- progression  7. Velcade/Ebony/Dex (5/18/21-8/3/21)- progression  8. HD - CTX               -Cycle 1 9/27/21              -Cycle 2 10/18/21              -Cycle 3 11/8/21- stopped d/t severe fatigue  9. Bendamustine/Velcade/Dex 12/06/21  10. Velcade only 2/11/22  10. Fludarabine/Cytoxan followed by CAR-T infusion    ASSESSMENT AND PLAN:            1. [place] Light Chain Multiple Myeloma:  Currently in [facility]  - BM biopsy: 1/12/22.  20% plasma cells . Elizabeth Asher  p53 positive 79.5% of cells  -1/28/22 Serum KFLC 1662.71, Lambda 3.05 K/L ratio 545.15  -2/24/22 Serum KFLC 1919.12, Lambda 2.54, K/L raio 755.56  -s/p Fludarabine and Cytoxan 3/16/22-3/18/22  -Post CAR-T infusion staging:  PET/CT, BM bx with Flow, FISH, CG, MMP     Lymphodepleting Chemotherapy:  Fludarabine and cyclophosphamide   Disease Status at time of Infusion:  Progressive disease  CAR-T Infusion Date:  3/21/22  CAR-T Product:  Abecma  Batch ID Number:  [id number]     Day + 16     2. CRS / Neuro: Grade 1 CRS 3/22  CRS Grade: 1, fever only 3/22  - S/p toci 800mg 3/22/22  - Monitor CRP and Ferrtin closely     Lab Results   Component Value Date    CRP <3.0 04/05/2022    CRP <3.0 04/04/2022    CRP <3.0 04/03/2022     Lab Results   Component Value Date    FERRITIN 1,000.0 (H) 04/05/2022    FERRITIN 981.4 (H) 04/04/2022    FERRITIN 955.5 (H) 04/03/2022       ICANS Grade:  0  - Neuro checks w/ CARTOX 10-point assessment Q4hrs  ICE Score:  CAR-T Score: 10    3. ID: Afebrile, Recently hospitalized for Fever 12 hr after infusion, no evidence of infection but treating empirically while neutropenic.   - On Levaquin, Diflucan, Valtrex. 4.  Heme:  Pancytopenia d/t recent chemotherapy   - Transfuse for Hgb < 7 and Platelets < 68T (recent subdural)  - Plt transfusion today. - Given hx of subdural hematoma, will keep platelets >40Z. 5. ESRD/Metabolic: SCr: 2.4 (3/5/34) & HypoMag, otherwise electrolytes stable  ESRD - off HD currently. Followed by Dr. Tawanna Cho  -Current baseline Cr 2.1-2.6  - Fluids: 1 L NS in OP Infusion: Held since 3/29/22 d/t edema. Given increased Cr level on 4/4/22 and 4/5/22, Held 4/6/22  TLS:  No evidence, cont allopurinol and daily TLS labs   - Replace potassium and magnesium per PRN orders  - Given persistent/worsened peripheral edema, instructed him to take lasix 40 mg PO x1 on 4/2/22.  - Continue to monitor his kidney function on daily basis. 6. Nutrition:  Appetite and oral intake is good.    - Cont low microbial diet   - Follow closely with dietary     7. Neuro:  Recent seizure/subdural hematoma (1/2022)  - Continue Keppra 500 mg PO BID indefinitely  - Keep platelets >21,568      - Disposition: Return to OP Infusion daily for labs, CAR-T assessment and MD visit.      Elijah Client, APRN - CNP

## 2022-04-06 NOTE — PROGRESS NOTES
Short Stay Communication Note  Onyogi Aggarwal  Diagnosis:  Primary MD:  Treatment: Fludarabine and Cyclophosphamide  Day +16 of Car-T Abecma   Pt seen in outpatient infusion today. Labs drawn and reviewed. CBC: Recent Labs     04/04/22  0915 04/05/22  0916 04/06/22  0900   WBC 0.8* 0.6* 0.5*   HGB 10.0* 9.4* 9.2*   HCT 29.3* 27.7* 26.6*   MCV 95.0 95.6 94.5   PLT 51* 36* 39*     BMP/Mag:  Recent Labs     04/04/22  0916 04/05/22  0916 04/06/22  0900    141 139   K 3.9 4.2 4.2    107 106   CO2 26 22 26   PHOS 2.9 2.7 3.1   BUN 36* 35* 37*   CREATININE 2.4* 2.5* 2.4*   MG 1.50*  --  1.50*     Standing parameters for replacement for this patient:   1 unit of pack red blood cells for a hemoglobin < or equal to 7  1 pack of platelets for a platelet count < or equal to 50  40 MeQ of Potassium administered for a potassium < or equal to 3.4  4g of Magnesium Sulfate for a magnesum level < or equal to 1.4  1pk platelets transfused per standing order for the above lab values. Urinalysis last done: 4/4/2022 Urinalysis next due: 4/11/2022    Chest X-Ray last done: 4/4/2022 Chest X-Ray next due: 4/11/2022    Symptoms addressed and reported to care team this date:   Treatments this date: 1/2 liter of sodium chloride, platelets, lab draw and sterile dressing change. Reviewed medication schedule with pt and caregiver. Both able to verbalize all medications and schedule. Pt to be seen again tomorrow. Discharged ambulatory to home.     Electronically signed by Horton Najjar, RN on 4/6/2022 at 10:40 AM

## 2022-04-07 NOTE — PROGRESS NOTES
[facility] Progress Note      2022    Luz Virgen    :  1955    MRN:  [id number]    Referring MD: KIMBERLEY Saravia - NP  [place], [place] [address]      Subjective: Patient feeling well today, no fevers, no confusion, eating and drinking well. ECOG PS:  (1) Restricted in physically strenuous activity, ambulatory and able to do work of light nature    KPS: 80% Normal activity with effort; some signs or symptoms of disease    Isolation:  None     Medications    Scheduled Meds:    Continuous Infusions:   sodium chloride       PRN Meds:.sodium chloride, potassium chloride, potassium chloride, magnesium sulfate, magnesium hydroxide, medicated lip ointment, oxyCODONE, oxyCODONE, prochlorperazine, ondansetron, ondansetron, heparin flush, sodium chloride flush, heparin flush      ROS:  As noted above, otherwise remainder of 10-point ROS negative      Physical Exam:     Vital Signs:  BP (!) 133/90   Pulse 86   Temp 98 °F (36.7 °C) (Oral)   Resp 16   SpO2 99%     Weight:    Wt Readings from Last 3 Encounters:   22 253 lb 12 oz (115.1 kg)   22 255 lb 1.2 oz (115.7 kg)   22 254 lb 13.6 oz (115.6 kg)       General: Awake, alert and oriented.   HEENT: normocephalic, alopecia, PERRL, no scleral erythema or icterus, Oral mucosa moist and intact, throat clear  NECK: supple without palpable adenopathy  BACK: Straight negative CVAT  SKIN: warm dry and intact without lesions rashes or masses  CHEST: CTA bilaterally without use of accessory muscles  CV: Normal S1 S2, RRR, no MRG  ABD: NT ND normoactive BS, no palpable masses or hepatosplenomegaly  EXTREMITIES: BLL 1+ edema slightly improved today, denies calf tenderness  NEURO: CN II - XII grossly intact  CATHETER: Right Tunneled HD catheter    Laboratory Data:  CBC:   Recent Labs     22  0916 22  0916 22  0900 22  0900 22  1121   WBC 0.6*  --  0.5* 0.6*  --    HGB 9.4*  --  9.2* 9.2*  --    HCT 27.7*  --  26.6* 26.6*  --    MCV 95.6  --  94.5 94.8  --    PLT 36*   < > 39* 38* 50*    < > = values in this interval not displayed. BMP/Mag:  Recent Labs     04/05/22  0916 04/06/22  0900 04/07/22  0900    139 138   K 4.2 4.2 4.2    106 106   CO2 22 26 25   PHOS 2.7 3.1 2.7   BUN 35* 37* 40*   CREATININE 2.5* 2.4* 2.6*   MG  --  1.50*  --      LIVP:   Recent Labs     04/06/22  0900   AST 19   ALT 18   BILIDIR <0.2   BILITOT 0.6   ALKPHOS 69     Coags:   Recent Labs     04/07/22  0900   PROTIME 11.7   INR 1.03   APTT 33.6     Uric Acid   Recent Labs     04/06/22  0900   LABURIC 7.3*     Lab Results   Component Value Date    CRP <3.0 04/07/2022    CRP <3.0 04/06/2022    CRP <3.0 04/05/2022     Lab Results   Component Value Date    FERRITIN 1,059.0 (H) 04/07/2022    FERRITIN 1,010.0 (H) 04/06/2022    FERRITIN 1,000.0 (H) 04/05/2022         PROBLEM LIST:            1. [place] Light Chain Multiple Myeloma   2. ESRD on HD   3. GERD  4. HTN  5. Hypokalemia   6. Subdural Hematoma (1/2022)  7. Seizures (1/2022)      TREATMENT:            1. CyBorD (started 10/30/18)  2. Revlimid / [place] / Dex on clinical trial (started 2/1/19)  3. Carfilzomib / Pomalidomide / Dex x 6 cycles (Relapse #1 - started 8/26/19 - 2/2020)  4. Cytoxan Mobilization w/ 25% dose reduction (2/17/20)  5. Melphalan 140mg/m2 & Autologous SCT 3/12/20  6. Kyprolis/Ebony/Dex (6/30/21-7/28/21)- progression  7. Velcade/Ebony/Dex (5/18/21-8/3/21)- progression  8. HD - CTX               -Cycle 1 9/27/21              -Cycle 2 10/18/21              -Cycle 3 11/8/21- stopped d/t severe fatigue  9. Bendamustine/Velcade/Dex 12/06/21  10. Velcade only 2/11/22  10. Fludarabine/Cytoxan followed by CAR-T infusion    ASSESSMENT AND PLAN:            1. [place] Light Chain Multiple Myeloma:  Currently in [facility]  - BM biopsy: 1/12/22.  20% plasma cells . [place]  p53 positive 79.5% of cells  -1/28/22 Serum KFLC 1662.71, Lambda 3.05   K/L ratio 6. Nutrition:  Appetite and oral intake is good. - Cont low microbial diet   - Follow closely with dietary     7. Neuro:  Recent seizure/subdural hematoma (1/2022)  - Continue Keppra 500 mg PO BID indefinitely  - Keep platelets >68,210      - Disposition: Return to OP Infusion daily for labs, CAR-T assessment and MD visit.      KIMBERLEY aRo - CNP

## 2022-04-08 NOTE — PROGRESS NOTES
[facility] Progress Note      2022    Sydniesean Roach    :  1955    MRN:  [id number]    Referring MD: KIMBERLEY Jimenes - NP  [place], [place] [address]      Subjective: Doing well. No major issues to report     ECOG PS:  (1) Restricted in physically strenuous activity, ambulatory and able to do work of light nature    KPS: 80% Normal activity with effort; some signs or symptoms of disease    Isolation:  None     Medications    Scheduled Meds:    Continuous Infusions:   sodium chloride 20 mL/hr (22 1131)     PRN Meds:.heparin flush      ROS:  As noted above, otherwise remainder of 10-point ROS negative      Physical Exam:     Vital Signs:  BP (!) 150/93   Pulse 99   Temp 98.2 °F (36.8 °C) (Oral)   Resp 20   Wt 253 lb 1.4 oz (114.8 kg)   SpO2 99%   BMI 32.48 kg/m²     Weight:    Wt Readings from Last 3 Encounters:   22 253 lb 1.4 oz (114.8 kg)   22 253 lb 12 oz (115.1 kg)   22 255 lb 1.2 oz (115.7 kg)       General: Awake, alert and oriented. HEENT: normocephalic, alopecia, PERRL, no scleral erythema or icterus, Oral mucosa moist and intact, throat clear  NECK: supple without palpable adenopathy  BACK: Straight negative CVAT  SKIN: warm dry and intact without lesions rashes or masses  CHEST: CTA bilaterally without use of accessory muscles  CV: Normal S1 S2, RRR, no MRG  ABD: NT ND normoactive BS, no palpable masses or hepatosplenomegaly  EXTREMITIES: BLL 1+ edema slightly improved today, denies calf tenderness  NEURO: CN II - XII grossly intact  CATHETER: Right Tunneled HD catheter    Laboratory Data:  CBC:   Recent Labs     22  0900 22  0900 22  0900 22  1121 22  0858   WBC 0.5*  --  0.6*  --  0.6*   HGB 9.2*  --  9.2*  --  8.9*   HCT 26.6*  --  26.6*  --  25.7*   MCV 94.5  --  94.8  --  94.9   PLT 39*   < > 38* 50* 46*    < > = values in this interval not displayed.      BMP/Mag:  Recent Labs 04/06/22  0900 04/07/22  0900 04/08/22  0858    138 140   K 4.2 4.2 4.4    106 107   CO2 26 25 24   PHOS 3.1 2.7 2.9   BUN 37* 40* 40*   CREATININE 2.4* 2.6* 2.4*   MG 1.50*  --  1.60*     LIVP:   Recent Labs     04/06/22  0900 04/08/22  0858   AST 19 21   ALT 18 20   BILIDIR <0.2 <0.2   BILITOT 0.6 0.6   ALKPHOS 69 69     Coags:   Recent Labs     04/07/22  0900   PROTIME 11.7   INR 1.03   APTT 33.6     Uric Acid   Recent Labs     04/06/22  0900 04/08/22  0858   LABURIC 7.3* 7.1     Lab Results   Component Value Date    CRP <3.0 04/08/2022    CRP <3.0 04/07/2022    CRP <3.0 04/06/2022     Lab Results   Component Value Date    FERRITIN 1,070.0 (H) 04/08/2022    FERRITIN 1,059.0 (H) 04/07/2022    FERRITIN 1,010.0 (H) 04/06/2022         PROBLEM LIST:            1. [place] Light Chain Multiple Myeloma   2. ESRD on HD   3. GERD  4. HTN  5. Hypokalemia   6. Subdural Hematoma (1/2022)  7. Seizures (1/2022)      TREATMENT:            1. CyBorD (started 10/30/18)  2. Revlimid / [place] / Dex on clinical trial (started 2/1/19)  3. Carfilzomib / Pomalidomide / Dex x 6 cycles (Relapse #1 - started 8/26/19 - 2/2020)  4. Cytoxan Mobilization w/ 25% dose reduction (2/17/20)  5. Melphalan 140mg/m2 & Autologous SCT 3/12/20  6. Kyprolis/Ebony/Dex (6/30/21-7/28/21)- progression  7. Velcade/Ebony/Dex (5/18/21-8/3/21)- progression  8. HD - CTX               -Cycle 1 9/27/21              -Cycle 2 10/18/21              -Cycle 3 11/8/21- stopped d/t severe fatigue  9. Bendamustine/Velcade/Dex 12/06/21  10. Velcade only 2/11/22  10. Fludarabine/Cytoxan followed by CAR-T infusion 3/21/22    ASSESSMENT AND PLAN:            1. [place] Light Chain Multiple Myeloma:  Currently in [facility]  - BM biopsy: 1/12/22.  20% plasma cells . [place]  p53 positive 79.5% of cells  - 1/28/22 Serum KFLC 1662.71, Lambda 3.05   K/L ratio 545.15  - 2/24/22 Serum KFLC 1919.12, Lambda 2.54, K/L raio 755.56  - s/p Fludarabine and Cytoxan 3/16/22-3/18/22  - Post CAR-T infusion staging:  PET/CT, BM bx with Flow, FISH, CG, MMP     Lymphodepleting Chemotherapy:  Fludarabine and cyclophosphamide   Disease Status at time of Infusion:  Progressive disease  CAR-T Infusion Date:  3/21/22  CAR-T Product:  Abecma  Batch ID Number:  [id number]     Day + 18     2. CRS / Neuro: Grade 1 CRS 3/22  CRS Grade: 1, fever only 3/22  - S/p toci 800mg 3/22/22  - Monitor CRP and Ferrtin closely  Lab Results   Component Value Date    CRP <3.0 04/08/2022    CRP <3.0 04/07/2022    CRP <3.0 04/06/2022     Lab Results   Component Value Date    FERRITIN 1,070.0 (H) 04/08/2022    FERRITIN 1,059.0 (H) 04/07/2022    FERRITIN 1,010.0 (H) 04/06/2022     ICANS Grade:  0  - Neuro checks w/ CARTOX 10-point assessment Q4hrs  ICE Score:  CAR-T Score: 10    3. ID: Afebrile  - Cont Levaquin, Diflucan, Valtrex ppx     4. Heme:  Pancytopenia d/t recent chemotherapy   - Transfuse for Hgb < 7 and Platelets < 79T (recent subdural)  - No transfusion today  - PRA sent 4/7/22: Pending      5. ESRD/Metabolic: SCr: 2.6 (7/3/46) & HypoMag, otherwise electrolytes stable  ESRD   - off HD currently. Followed by Dr. Odalys Talamantes  - Baseline Cr 2.1-2.6  - Cont IVFs: 1000 mL given 4/7/22  - Cont Mag-Ox 400 mg BID 4/7/22  TLS:  No evidence, cont allopurinol and daily TLS labs   - Replace potassium and magnesium per PRN orders  - Given persistent/worsened peripheral edema, instructed him to take lasix 40 mg PO x1 on 4/2/22.  - Continue to monitor his kidney function on daily basis. 6. Nutrition:  Appetite and oral intake is good. - Cont low microbial diet   - Follow closely with dietary     7. Neuro:  Recent seizure/subdural hematoma (1/2022)  - Continue Keppra 500 mg PO BID indefinitely  - Keep platelets >23,350      - Disposition: Return to [facility] Sunday for labs, CAR-T assessment and MD visit.      Micki Bermudez, APRN - CNP

## 2022-04-08 NOTE — PROGRESS NOTES
Short Stay Communication Note  Britt Bishop  Diagnosis:  Primary MD:  Treatment: Fludarabine and Cytoxan  Day + 18 of Car-T Abecma   Pt seen in outpatient infusion today. Labs drawn and reviewed. CBC: Recent Labs     04/06/22  0900 04/06/22  0900 04/07/22  0900 04/07/22  1121 04/08/22  0858   WBC 0.5*  --  0.6*  --  0.6*   HGB 9.2*  --  9.2*  --  8.9*   HCT 26.6*  --  26.6*  --  25.7*   MCV 94.5  --  94.8  --  94.9   PLT 39*   < > 38* 50* 46*    < > = values in this interval not displayed. BMP/Mag:  Recent Labs     04/06/22  0900 04/07/22  0900 04/08/22  0858    138 140   K 4.2 4.2 4.4    106 107   CO2 26 25 24   PHOS 3.1 2.7 2.9   BUN 37* 40* 40*   CREATININE 2.4* 2.6* 2.4*   MG 1.50*  --  1.60*     Standing parameters for replacement for this patient:   1 unit of pack red blood cells for a hemoglobin < or equal to 7.0.  1 pack of platelets for a platelet count less than or equal to 50.0.  40 MeQ of Potassium administered for a potassium level less than or equal to 3.4.  4g of Magnesium Sulfate for a magnesum level less than or equal to 1.4.   1pk platelets transfused per standing order for the above lab values. Urinalysis last done: 4/4/2022 Urinalysis next due: 4/11/2022    Chest X-Ray last done: 4/4/2022 Chest X-Ray next due: 4/11/2022    Symptoms addressed and reported to care team this date: none. Treatments this date: IV Fluids and platelets. Reviewed medication schedule with pt and caregiver. Both able to verbalize all medications and schedule. Pt to be seen again Sunday. Discharged ambulatory to home.   Butch Forde RN

## 2022-04-10 NOTE — PROGRESS NOTES
Short Stay Communication Note  Scarlett Chamberlain  Diagnosis: Multiple Myeloma  Primary MD: Myesha Sim  Treatment: Fludarabine and Cytoxan  Day + 20 of Car-T Abecma   Pt seen in outpatient infusion today. Labs drawn and reviewed. CBC:   Recent Labs     04/07/22  1121 04/08/22  0858 04/10/22  0840   WBC  --  0.6* 0.7*   HGB  --  8.9* 9.3*   HCT  --  25.7* 27.2*   MCV  --  94.9 95.3   PLT 50* 46* 30*     BMP/Mag:  Recent Labs     04/08/22  0858 04/10/22  1005    140   K 4.4 4.3    107   CO2 24 22   PHOS 2.9 2.8   BUN 40* 38*   CREATININE 2.4* 2.6*   MG 1.60*  --      Standing parameters for replacement for this patient:   1 unit of pack red blood cells for a hemoglobin < or equal to 7.0.  1 pack of platelets for a platelet count less than or equal to 50.0.  40 MeQ of Potassium administered for a potassium level less than or equal to 3.4.  4g of Magnesium Sulfate for a magnesum level less than or equal to 1.4.   1pk platelets transfused per standing order for the above lab values. Urinalysis last done: 4/4/2022 Urinalysis next due: 4/11/2022    Chest X-Ray last done: 4/4/2022 Chest X-Ray next due: 4/11/2022    Symptoms addressed and reported to care team this date: none. Treatments this date: Labs and platelets. Reviewed medication schedule with pt and caregiver. Both able to verbalize all medications and schedule. Pt to be seen again Sunday. Discharged ambulatory to home.     Verdene Dakin, RN

## 2022-04-10 NOTE — PROGRESS NOTES
[address] Progress Note      4/10/2022    Giovani Delay    :  1955    MRN:  [id number]    Referring MD: Callie Brown APRN - NP  [place], [place] [address]      Subjective: He feels fine. No fever. Some joint pain. No bleeding        ECOG PS:  (1) Restricted in physically strenuous activity, ambulatory and able to do work of light nature    KPS: 80% Normal activity with effort; some signs or symptoms of disease    Isolation:  None     Medications    Scheduled Meds:   sodium chloride  1,000 mL IntraVENous Once     Continuous Infusions:   sodium chloride       PRN Meds:.sodium chloride, potassium chloride, potassium chloride, magnesium sulfate, magnesium hydroxide, medicated lip ointment, oxyCODONE, oxyCODONE, prochlorperazine, ondansetron, ondansetron, heparin flush      ROS:  As noted above, otherwise remainder of 10-point ROS negative      Physical Exam:     Vital Signs:  BP (!) 142/84   Pulse 105   Temp 98.1 °F (36.7 °C) (Oral)   Resp 20   Wt 251 lb 15.8 oz (114.3 kg)   SpO2 97%   BMI 32.34 kg/m²     Weight:    Wt Readings from Last 3 Encounters:   04/10/22 251 lb 15.8 oz (114.3 kg)   22 253 lb 1.4 oz (114.8 kg)   22 253 lb 12 oz (115.1 kg)       General: Awake, alert and oriented.   HEENT: normocephalic, alopecia, PERRL, no scleral erythema or icterus, Oral mucosa moist and intact, throat clear  NECK: supple without palpable adenopathy  BACK: Straight negative CVAT  SKIN: warm dry and intact without lesions rashes or masses  CHEST: CTA bilaterally without use of accessory muscles  CV: Normal S1 S2, RRR, no MRG  ABD: NT ND normoactive BS, no palpable masses or hepatosplenomegaly  EXTREMITIES: BLL 1+ edema slightly improved today, denies calf tenderness  NEURO: CN II - XII grossly intact  CATHETER: Right Tunneled HD catheter    Laboratory Data:  CBC:   Recent Labs     22  0900 22  1121 22  0858   WBC 0.6*  --  0.6*   HGB 9.2*  --  8.9* HCT 26.6*  --  25.7*   MCV 94.8  --  94.9   PLT 38* 50* 46*     BMP/Mag:  Recent Labs     04/07/22  0900 04/08/22  0858    140   K 4.2 4.4    107   CO2 25 24   PHOS 2.7 2.9   BUN 40* 40*   CREATININE 2.6* 2.4*   MG  --  1.60*     LIVP:   Recent Labs     04/08/22  0858   AST 21   ALT 20   BILIDIR <0.2   BILITOT 0.6   ALKPHOS 69     Coags:   Recent Labs     04/07/22  0900   PROTIME 11.7   INR 1.03   APTT 33.6     Uric Acid   Recent Labs     04/08/22  0858   LABURIC 7.1     Lab Results   Component Value Date    CRP <3.0 04/08/2022    CRP <3.0 04/07/2022    CRP <3.0 04/06/2022     Lab Results   Component Value Date    FERRITIN 1,070.0 (H) 04/08/2022    FERRITIN 1,059.0 (H) 04/07/2022    FERRITIN 1,010.0 (H) 04/06/2022         PROBLEM LIST:            1. [place] Light Chain Multiple Myeloma   2. ESRD on HD   3. GERD  4. HTN  5. Hypokalemia   6. Subdural Hematoma (1/2022)  7. Seizures (1/2022)      TREATMENT:            1. CyBorD (started 10/30/18)  2. Revlimid / [place] / Dex on clinical trial (started 2/1/19)  3. Carfilzomib / Pomalidomide / Dex x 6 cycles (Relapse #1 - started 8/26/19 - 2/2020)  4. Cytoxan Mobilization w/ 25% dose reduction (2/17/20)  5. Melphalan 140mg/m2 & Autologous SCT 3/12/20  6. Kyprolis/Ebony/Dex (6/30/21-7/28/21)- progression  7. Velcade/Ebony/Dex (5/18/21-8/3/21)- progression  8. HD - CTX               -Cycle 1 9/27/21              -Cycle 2 10/18/21              -Cycle 3 11/8/21- stopped d/t severe fatigue  9. Bendamustine/Velcade/Dex 12/06/21  10. Velcade only 2/11/22  10. Fludarabine/Cytoxan followed by CAR-T infusion 3/21/22    ASSESSMENT AND PLAN:            1. [place] Light Chain Multiple Myeloma:  Currently in [facility]  - BM biopsy: 1/12/22.  20% plasma cells . Jairo Jacques  p53 positive 79.5% of cells  - 1/28/22 Serum KFLC 1662.71, Lambda 3.05   K/L ratio 545.15  - 2/24/22 Serum KFLC 1919.12, Lambda 2.54, K/L raio 755.56  - s/p Fludarabine and Cytoxan 3/16/22-3/18/22  - Post CAR-T infusion staging:  PET/CT, BM bx with Flow, FISH, CG, MMP     Lymphodepleting Chemotherapy:  Fludarabine and cyclophosphamide   Disease Status at time of Infusion:  Progressive disease  CAR-T Infusion Date:  3/21/22  CAR-T Product:  Abkenneth  Batch ID Number:  [id number]     Day + 20     2. CRS / Neuro: Grade 1 CRS 3/22  CRS Grade: 1, fever only 3/22  - S/p toci 800mg 3/22/22  - Monitor CRP and Ferrtin closely  Lab Results   Component Value Date    CRP <3.0 04/08/2022    CRP <3.0 04/07/2022    CRP <3.0 04/06/2022     Lab Results   Component Value Date    FERRITIN 1,070.0 (H) 04/08/2022    FERRITIN 1,059.0 (H) 04/07/2022    FERRITIN 1,010.0 (H) 04/06/2022     ICANS Grade:  0  - Neuro checks w/ CARTOX 10-point assessment Q4hrs  ICE Score:  CAR-T Score: 10    3. ID: Afebrile  - Cont Levaquin, Diflucan, Valtrex ppx     4. Heme:  Pancytopenia d/t recent chemotherapy   - Transfuse for Hgb < 7 and Platelets < 53G (recent subdural)  - No transfusion today  - PRA sent (4/7/22): Pending      5. ESRD/Metabolic: SCr: 2.4 (7/5/72) & HypoMag, otherwise electrolytes stable  ESRD   - off HD currently. Followed by Dr. Tawanna Cho  - Baseline Cr 2.1-2.6  - Cont IVFs: 1000 mL given 4/7/22  - Cont Mag-Ox 400 mg BID 4/7/22  TLS:  No evidence, cont allopurinol and daily TLS labs   - Replace potassium and magnesium per PRN orders  - Given persistent/worsened peripheral edema, instructed him to take lasix 40 mg PO x1 on 4/2/22.  - Continue to monitor his kidney function on daily basis. 6. Nutrition:  Appetite and oral intake is good. - Cont low microbial diet   - Follow closely with dietary     7.   Neuro:  Recent seizure/subdural hematoma (1/2022)  - Continue Keppra 500 mg PO BID indefinitely  - Keep platelets >17,679      - Disposition: return to clinic tomorrow     Jong Smith PA-C

## 2022-04-11 PROBLEM — D69.6 THROMBOCYTOPENIA (HCC): Status: ACTIVE | Noted: 2022-01-01

## 2022-04-11 NOTE — PROGRESS NOTES
Short Stay Communication Note  Loyda Sultana  Diagnosis: Multiple Myeloma  Primary MD: Shannon Encarnacion  Treatment: Fludarabine and Cytoxan  Day + 21 of Car-T Abecma   Pt seen in outpatient infusion today. Labs drawn and reviewed. CBC:   Recent Labs     04/10/22  0840 04/11/22  1004   WBC 0.7* 0.5*   HGB 9.3* 8.3*   HCT 27.2* 23.8*   MCV 95.3 95.2   PLT 30* 26*     BMP/Mag:  Recent Labs     04/10/22  1005 04/11/22  1004    142   K 4.3 4.0    108   CO2 22 23   PHOS 2.8 2.8   BUN 38* 40*   CREATININE 2.6* 2.7*   MG  --  1.60*     Standing parameters for replacement for this patient:   1 unit of pack red blood cells for a hemoglobin < or equal to 7.0.  1 pack of platelets for a platelet count less than or equal to 50.0.  40 MeQ of Potassium administered for a potassium level less than or equal to 3.4.  4g of Magnesium Sulfate for a magnesum level less than or equal to 1.4.   1pk platelets transfused per standing order for the above lab values. Urinalysis last done: 4/11/2022 Urinalysis next due: 4/18/2022    Chest X-Ray last done: 4/11/2022 Chest X-Ray next due: 4/18/2022    Symptoms addressed and reported to care team this date: none. Treatments this date: Labs and fluids. Pt to be seen out-patient in a bed in [facility] in-patient unit     Reviewed medication schedule with pt and caregiver. Both able to verbalize all medications and schedule. Pt to be seen again tomorrow. Discharged ambulatory to [facility].     Surinder Arndt RN

## 2022-04-11 NOTE — PROGRESS NOTES
[facility] Progress Note      2022    Tobias [place]    :  1955    MRN:  [id number]    Referring MD: KIMBERLEY Ward - NP  [place], [place] [address]      Subjective: He feels fine. Has some arthritic pain diffusely. Still has some fatigue. Denies any fever. No bleeding      ECOG PS:  (1) Restricted in physically strenuous activity, ambulatory and able to do work of light nature    KPS: 80% Normal activity with effort; some signs or symptoms of disease    Isolation:  None     Medications    Scheduled Meds:   sodium chloride  1,000 mL IntraVENous Once    alteplase (CATHFLO) with sterile water injection  2 mg IntraCATHeter Once     Continuous Infusions:   sodium chloride       PRN Meds:.sodium chloride, potassium chloride, potassium chloride, magnesium sulfate, magnesium hydroxide, medicated lip ointment, oxyCODONE, oxyCODONE, prochlorperazine, ondansetron, ondansetron, heparin flush, heparin flush      ROS:  As noted above, otherwise remainder of 10-point ROS negative      Physical Exam:     Vital Signs:  /82   Pulse 99   Temp 97.7 °F (36.5 °C) (Oral)   Resp 18   Wt 253 lb 8.5 oz (115 kg)   SpO2 97%   BMI 32.54 kg/m²     Weight:    Wt Readings from Last 3 Encounters:   22 253 lb 8.5 oz (115 kg)   04/10/22 251 lb 15.8 oz (114.3 kg)   22 253 lb 1.4 oz (114.8 kg)       General: Awake, alert and oriented.   HEENT: normocephalic, alopecia, PERRL, no scleral erythema or icterus, Oral mucosa moist and intact, throat clear  NECK: supple without palpable adenopathy  BACK: Straight negative CVAT  SKIN: warm dry and intact without lesions rashes or masses  CHEST: CTA bilaterally without use of accessory muscles  CV: Normal S1 S2, RRR, no MRG  ABD: NT ND normoactive BS, no palpable masses or hepatosplenomegaly  EXTREMITIES: BLL 1+ edema slightly improved today, denies calf tenderness  NEURO: CN II - XII grossly intact  CATHETER: Right Tunneled HD catheter    Laboratory Data:  CBC:   Recent Labs     04/10/22  0840 04/11/22  1004   WBC 0.7* 0.5*   HGB 9.3* 8.3*   HCT 27.2* 23.8*   MCV 95.3 95.2   PLT 30* 26*     BMP/Mag:  Recent Labs     04/10/22  1005 04/11/22  1004    142   K 4.3 4.0    108   CO2 22 23   PHOS 2.8 2.8   BUN 38* 40*   CREATININE 2.6* 2.7*   MG  --  1.60*     LIVP:   Recent Labs     04/11/22  1004   AST 21   ALT 20   BILIDIR <0.2   BILITOT 0.8   ALKPHOS 71     Coags:   Recent Labs     04/11/22  1004   PROTIME 11.9   INR 1.05   APTT 55.2*     Uric Acid   Recent Labs     04/11/22  1004   LABURIC 6.9     Lab Results   Component Value Date    CRP <3.0 04/11/2022    CRP <3.0 04/10/2022    CRP <3.0 04/08/2022     Lab Results   Component Value Date    FERRITIN 1,154.0 (H) 04/11/2022    FERRITIN 1,021.0 (H) 04/10/2022    FERRITIN 1,070.0 (H) 04/08/2022         PROBLEM LIST:            1. [place] Light Chain Multiple Myeloma   2. ESRD on HD   3. GERD  4. HTN  5. Hypokalemia   6. Subdural Hematoma (1/2022)  7. Seizures (1/2022)      TREATMENT:            1. CyBorD (started 10/30/18)  2. Revlimid / Cristal Duong / Dex on clinical trial (started 2/1/19)  3. Carfilzomib / Pomalidomide / Dex x 6 cycles (Relapse #1 - started 8/26/19 - 2/2020)  4. Cytoxan Mobilization w/ 25% dose reduction (2/17/20)  5. Melphalan 140mg/m2 & Autologous SCT 3/12/20  6. Kyprolis/Ebony/Dex (6/30/21-7/28/21)- progression  7. Velcade/Ebony/Dex (5/18/21-8/3/21)- progression  8. HD - CTX               -Cycle 1 9/27/21              -Cycle 2 10/18/21              -Cycle 3 11/8/21- stopped d/t severe fatigue  9. Bendamustine/Velcade/Dex 12/06/21  10. Velcade only 2/11/22  10. Fludarabine/Cytoxan followed by CAR-T infusion 3/21/22    ASSESSMENT AND PLAN:            1. [place] Light Chain Multiple Myeloma:  Currently in [facility]  - BM biopsy: 1/12/22.  20% plasma cells . Lynda Graven  p53 positive 79.5% of cells  - 1/28/22 Serum KFLC 1662.71, Lambda 3.05   K/L ratio 545.15  - 2/24/22 Serum KFLC 1919.12, Lambda 2.54, K/L raio 755.56  - s/p Fludarabine and Cytoxan 3/16/22-3/18/22  - Post CAR-T infusion staging:  PET/CT, BM bx with Flow, FISH, CG, MMP     Lymphodepleting Chemotherapy:  Fludarabine and cyclophosphamide   Disease Status at time of Infusion:  Progressive disease  CAR-T Infusion Date:  3/21/22  CAR-T Product:  [place]  Batch ID Number:  [id number]     Day + 21     2. CRS / Neuro: Grade 1 CRS 3/22  CRS Grade: 1, fever only 3/22  - S/p toci 800mg 3/22/22  - Monitor CRP and Ferrtin closely  Lab Results   Component Value Date    CRP <3.0 04/11/2022    CRP <3.0 04/10/2022    CRP <3.0 04/08/2022     Lab Results   Component Value Date    FERRITIN 1,154.0 (H) 04/11/2022    FERRITIN 1,021.0 (H) 04/10/2022    FERRITIN 1,070.0 (H) 04/08/2022     ICANS Grade:  0  - Neuro checks w/ CARTOX 10-point assessment Q4hrs  ICE Score:  CAR-T Score: 10    3. ID: Afebrile  - Cont Levaquin, Diflucan, Valtrex ppx     4. Heme:  Pancytopenia d/t recent chemotherapy   - Transfuse for Hgb < 7 and Platelets < 53G (recent subdural)  - No transfusion today  - PRA sent (4/7/22): Pending   - Will attempt to obtain HLA-matched or cross-matched platelets     5. ESRD/Metabolic: SCr: 2.4 (4/5/06) & HypoMag, otherwise electrolytes stable  ESRD   - off HD currently. Followed by Dr. Ar Fajardo  - Baseline Cr 2.1-2.6  - Cont IVFs: 1000 mL given 4/7/22  - Cont Mag-Ox 400 mg BID 4/7/22  TLS:  No evidence, cont allopurinol and daily TLS labs   - Replace potassium and magnesium per PRN orders  - Given persistent/worsened peripheral edema, instructed him to take lasix 40 mg PO x1 on 4/2/22.  - Continue to monitor his kidney function on daily basis. 6. Nutrition:  Appetite and oral intake is good. - Cont low microbial diet   - Follow closely with dietary     7.   Neuro:  Recent seizure/subdural hematoma (1/2022)  - Continue Keppra 500 mg PO BID indefinitely  - Keep platelets >67,817      - Disposition: return to clinic tomorrow Jeffry Severs, APRN - CNP     Tai Marks, DO

## 2022-04-11 NOTE — PROGRESS NOTES
[address] Observation Note      2022    Britt Bishop    :  1955    MRN:  [id number]    Referring MD: Benita Hagen, APRN - NP  [place], [place] [address]      Subjective: Patient placed inpatient for platelet transfusion for plt count of 26. Patient's hemoglobin this AM: Recent Labs     22  1004   HGB 8.3*     Patient's platelet count this AM:   Recent Labs     22  1004   PLT 26*     Patient tolerated platelet transfusion well. Patient showing no signs or symptoms of active bleeding. Patient verbalizes understanding of all instructions. ECOG PS:  (1) Restricted in physically strenuous activity, ambulatory and able to do work of light nature    KPS: 80% Normal activity with effort; some signs or symptoms of disease    Isolation:  None     Medications    Scheduled Meds:    Continuous Infusions:    PRN Meds:. ROS:  As noted above, otherwise remainder of 10-point ROS negative      Physical Exam:     Vital Signs:  BP (!) 148/96   Pulse 95   Temp 98.3 °F (36.8 °C)   Resp 18   SpO2 100%     Weight:    Wt Readings from Last 3 Encounters:   22 253 lb 8.5 oz (115 kg)   04/10/22 251 lb 15.8 oz (114.3 kg)   22 253 lb 1.4 oz (114.8 kg)       General: Awake, alert and oriented.   HEENT: normocephalic, alopecia, PERRL, no scleral erythema or icterus, Oral mucosa moist and intact, throat clear  NECK: supple without palpable adenopathy  BACK: Straight negative CVAT  SKIN: warm dry and intact without lesions rashes or masses  CHEST: CTA bilaterally without use of accessory muscles  CV: Normal S1 S2, RRR, no MRG  ABD: NT ND normoactive BS, no palpable masses or hepatosplenomegaly  EXTREMITIES: BLL 1+ edema slightly improved today, denies calf tenderness  NEURO: CN II - XII grossly intact  CATHETER: Right Tunneled HD catheter    Laboratory Data:  CBC:   Recent Labs     04/10/22  0840 22  1004   WBC 0.7* 0.5*   HGB 9.3* 8.3*   HCT 27.2* 23.8* MCV 95.3 95.2   PLT 30* 26*     BMP/Mag:  Recent Labs     04/10/22  1005 04/11/22  1004    142   K 4.3 4.0    108   CO2 22 23   PHOS 2.8 2.8   BUN 38* 40*   CREATININE 2.6* 2.7*   MG  --  1.60*     LIVP:   Recent Labs     04/11/22  1004   AST 21   ALT 20   BILIDIR <0.2   BILITOT 0.8   ALKPHOS 71     Coags:   Recent Labs     04/11/22  1004   PROTIME 11.9   INR 1.05   APTT 55.2*     Uric Acid   Recent Labs     04/11/22  1004   LABURIC 6.9     Lab Results   Component Value Date    CRP <3.0 04/11/2022    CRP <3.0 04/10/2022    CRP <3.0 04/08/2022     Lab Results   Component Value Date    FERRITIN 1,154.0 (H) 04/11/2022    FERRITIN 1,021.0 (H) 04/10/2022    FERRITIN 1,070.0 (H) 04/08/2022         PROBLEM LIST:            1. [place] Light Chain Multiple Myeloma   2. ESRD on HD   3. GERD  4. HTN  5. Hypokalemia   6. Subdural Hematoma (1/2022)  7. Seizures (1/2022)      TREATMENT:            1. CyBorD (started 10/30/18)  2. Revlimid / [place] / Dex on clinical trial (started 2/1/19)  3. Carfilzomib / Pomalidomide / Dex x 6 cycles (Relapse #1 - started 8/26/19 - 2/2020)  4. Cytoxan Mobilization w/ 25% dose reduction (2/17/20)  5. Melphalan 140mg/m2 & Autologous SCT 3/12/20  6. Kyprolis/Ebony/Dex (6/30/21-7/28/21)- progression  7. Velcade/Ebony/Dex (5/18/21-8/3/21)- progression  8. HD - CTX               -Cycle 1 9/27/21              -Cycle 2 10/18/21              -Cycle 3 11/8/21- stopped d/t severe fatigue  9. Bendamustine/Velcade/Dex 12/06/21  10. Velcade only 2/11/22  10. Fludarabine/Cytoxan followed by CAR-T infusion 3/21/22    ASSESSMENT AND PLAN:            1. [place] Light Chain Multiple Myeloma:  Currently in [facility]  - BM biopsy: 1/12/22.  20% plasma cells . Fransico Confer  p53 positive 79.5% of cells  - 1/28/22 Serum KFLC 1662.71, Lambda 3.05   K/L ratio 545.15  - 2/24/22 Serum KFLC 1919.12, Lambda 2.54, K/L raio 755.56  - s/p Fludarabine and Cytoxan 3/16/22-3/18/22  - Post CAR-T infusion staging:  PET/CT, BM bx with Flow, FISH, CG, MMP     Lymphodepleting Chemotherapy:  Fludarabine and cyclophosphamide   Disease Status at time of Infusion:  Progressive disease  CAR-T Infusion Date:  3/21/22  CAR-T Product:  Enzo  Batch ID Number:  [id number]     Day + 21     2. CRS / Neuro: Grade 1 CRS 3/22  CRS Grade: 1, fever only 3/22  - S/p toci 800mg 3/22/22  - Monitor CRP and Ferrtin closely  Lab Results   Component Value Date    CRP <3.0 04/11/2022    CRP <3.0 04/10/2022    CRP <3.0 04/08/2022     Lab Results   Component Value Date    FERRITIN 1,154.0 (H) 04/11/2022    FERRITIN 1,021.0 (H) 04/10/2022    FERRITIN 1,070.0 (H) 04/08/2022     ICANS Grade:  0  - Neuro checks w/ CARTOX 10-point assessment Q4hrs  ICE Score:  CAR-T Score: 10    3. ID: Afebrile  - Cont Levaquin, Diflucan, Valtrex ppx     4. Heme:  Pancytopenia d/t recent chemotherapy   - Transfuse for Hgb < 7 and Platelets < 93I (recent subdural)  - No transfusion today  - PRA sent (4/7/22): Pending      5. ESRD/Metabolic: SCr: 2.4 (0/6/77) & HypoMag, otherwise electrolytes stable  ESRD   - off HD currently. Followed by Dr. Tiffany Carlos  - Baseline Cr 2.1-2.6  - Cont IVFs: 1000 mL given 4/7/22  - Cont Mag-Ox 400 mg BID 4/7/22  TLS:  No evidence, cont allopurinol and daily TLS labs   - Replace potassium and magnesium per PRN orders  - Given persistent/worsened peripheral edema, instructed him to take lasix 40 mg PO x1 on 4/2/22.  - Continue to monitor his kidney function on daily basis. 6. Nutrition:  Appetite and oral intake is good. - Cont low microbial diet   - Follow closely with dietary     7.   Neuro:  Recent seizure/subdural hematoma (1/2022)  - Continue Keppra 500 mg PO BID indefinitely  - Keep platelets >64,834      - Disposition: return to clinic tomorrow     Gonzales Kd, APRN - CNP

## 2022-04-12 NOTE — PROGRESS NOTES
[facility] Progress Note      2022    Nasir Aggarwal    :  1955    MRN:  [id number]    Referring MD: Real Capone, KIMBERLEY - NP  [address] [place] [address]      Subjective: No complaints. ECOG PS:  (1) Restricted in physically strenuous activity, ambulatory and able to do work of light nature    KPS: 80% Normal activity with effort; some signs or symptoms of disease    Isolation:  None     Medications    Scheduled Meds:   sodium chloride  1,000 mL IntraVENous Once     Continuous Infusions:   sodium chloride       PRN Meds:.sodium chloride, potassium chloride, potassium chloride, magnesium sulfate, magnesium hydroxide, medicated lip ointment, oxyCODONE, oxyCODONE, prochlorperazine, ondansetron, ondansetron, heparin flush      ROS:  As noted above, otherwise remainder of 10-point ROS negative      Physical Exam:     Vital Signs:  BP (!) 146/92   Pulse 99   Temp 97.8 °F (36.6 °C) (Oral)   Resp 18   Wt 254 lb 10.1 oz (115.5 kg)   SpO2 100%   BMI 32.68 kg/m²     Weight:    Wt Readings from Last 3 Encounters:   22 254 lb 10.1 oz (115.5 kg)   22 253 lb 8.5 oz (115 kg)   04/10/22 251 lb 15.8 oz (114.3 kg)       General: Awake, alert and oriented.   HEENT: normocephalic, alopecia, PERRL, no scleral erythema or icterus, Oral mucosa moist and intact, throat clear  NECK: supple without palpable adenopathy  BACK: Straight negative CVAT  SKIN: warm dry and intact without lesions rashes or masses  CHEST: CTA bilaterally without use of accessory muscles  CV: Normal S1 S2, RRR, no MRG  ABD: NT ND normoactive BS, no palpable masses or hepatosplenomegaly  EXTREMITIES: BLL 1+ edema slightly improved today, denies calf tenderness  NEURO: CN II - XII grossly intact  CATHETER: Right Tunneled HD catheter    Laboratory Data:  CBC:   Recent Labs     04/10/22  0840 22  1004   WBC 0.7* 0.5*   HGB 9.3* 8.3*   HCT 27.2* 23.8*   MCV 95.3 95.2   PLT 30* 26* BMP/Mag:  Recent Labs     04/10/22  1005 04/11/22  1004    142   K 4.3 4.0    108   CO2 22 23   PHOS 2.8 2.8   BUN 38* 40*   CREATININE 2.6* 2.7*   MG  --  1.60*     LIVP:   Recent Labs     04/11/22  1004   AST 21   ALT 20   BILIDIR <0.2   BILITOT 0.8   ALKPHOS 71     Coags:   Recent Labs     04/11/22  1004   PROTIME 11.9   INR 1.05   APTT 55.2*     Uric Acid   Recent Labs     04/11/22  1004   LABURIC 6.9     Lab Results   Component Value Date    CRP <3.0 04/11/2022    CRP <3.0 04/10/2022    CRP <3.0 04/08/2022     Lab Results   Component Value Date    FERRITIN 1,154.0 (H) 04/11/2022    FERRITIN 1,021.0 (H) 04/10/2022    FERRITIN 1,070.0 (H) 04/08/2022         PROBLEM LIST:            1. [place] Light Chain Multiple Myeloma   2. ESRD on HD   3. GERD  4. HTN  5. Hypokalemia   6. Subdural Hematoma (1/2022)  7. Seizures (1/2022)      TREATMENT:            1. CyBorD (started 10/30/18)  2. Revlimid / Tally Nim / Dex on clinical trial (started 2/1/19)  3. Carfilzomib / Pomalidomide / Dex x 6 cycles (Relapse #1 - started 8/26/19 - 2/2020)  4. Cytoxan Mobilization w/ 25% dose reduction (2/17/20)  5. Melphalan 140mg/m2 & Autologous SCT 3/12/20  6. Kyprolis/Ebony/Dex (6/30/21-7/28/21)- progression  7. Velcade/Ebony/Dex (5/18/21-8/3/21)- progression  8. HD - CTX               -Cycle 1 9/27/21              -Cycle 2 10/18/21              -Cycle 3 11/8/21- stopped d/t severe fatigue  9. Bendamustine/Velcade/Dex 12/06/21  10. Velcade only 2/11/22  10. Fludarabine/Cytoxan followed by CAR-T infusion 3/21/22    ASSESSMENT AND PLAN:            1. [place] Chain Multiple Myeloma:  Currently in [facility]  - BM biopsy: 1/12/22.  20% plasma cells . Carol Child  p53 positive 79.5% of cells  - 1/28/22 Serum KFLC 1662.71, Lambda 3.05   K/L ratio 545.15  - 2/24/22 Serum KFLC 1919.12, Lambda 2.54, K/L raio 755.56  - s/p Fludarabine and Cytoxan 3/16/22-3/18/22  - Post CAR-T infusion staging:  PET/CT, BM bx with Flow, FISH, CG, MMP Lymphodepleting Chemotherapy:  Fludarabine and cyclophosphamide   Disease Status at time of Infusion:  Progressive disease  CAR-T Infusion Date:  3/21/22  CAR-T Product:  Abkenneth  Batch ID Number:  [id number]     Day + 22     2. CRS / Neuro: Grade 1 CRS 3/22  CRS Grade: 1, fever only 3/22  - S/p toci 800mg 3/22/22  - Monitor CRP and Ferrtin closely  Lab Results   Component Value Date    CRP <3.0 04/11/2022    CRP <3.0 04/10/2022    CRP <3.0 04/08/2022     Lab Results   Component Value Date    FERRITIN 1,154.0 (H) 04/11/2022    FERRITIN 1,021.0 (H) 04/10/2022    FERRITIN 1,070.0 (H) 04/08/2022     ICANS Grade:  0  - Neuro checks w/ CARTOX 10-point assessment Q4hrs  ICE Score:  CAR-T Score: 10    3. ID: Afebrile  - Cont Levaquin, Diflucan, Valtrex ppx     4. Heme:  Pancytopenia d/t recent chemotherapy   - Transfuse for Hgb < 7 and Platelets < 74U (recent subdural)  - Plt transfusion today  - PRA sent (4/7/22): Pending   - Will attempt to obtain HLA-matched or cross-matched platelets     5. ESRD/Metabolic: SCr: 2.4 (5/7/47) & HypoMag, otherwise electrolytes stable  ESRD   - off HD currently. Followed by Dr. Virgilio Gregory  - Baseline Cr 2.1-2.6  - Cont IVFs: 1000 mL given 4/7/22  - Cont Mag-Ox 400 mg BID 4/7/22  TLS:  No evidence, cont allopurinol and daily TLS labs   - Replace potassium and magnesium per PRN orders  - Given persistent/worsened peripheral edema, instructed him to take lasix 40 mg PO x1 on 4/2/22.  - Continue to monitor his kidney function on daily basis. 6. Nutrition:  Appetite and oral intake is good. - Cont low microbial diet   - Follow closely with dietary     7.   Neuro:  Recent seizure/subdural hematoma (1/2022)  - Continue Keppra 500 mg PO BID indefinitely  - Keep platelets >36,821      - Disposition: return to clinic tomorrow       KIMBERLEY Hayes - CNP

## 2022-04-12 NOTE — PROGRESS NOTES
Short Stay Communication Note  Dede Milner  Diagnosis: Multiple Myeloma  Primary MD: Augustus Alejo  Treatment: Fludarabine and Cytoxan  Day + 22 of Car-T [facility]   Pt seen in outpatient infusion today. Labs drawn and reviewed. CBC:   Recent Labs     04/10/22  0840 04/11/22  1004 04/12/22  0855   WBC 0.7* 0.5* 0.6*   HGB 9.3* 8.3* 7.8*   HCT 27.2* 23.8* 23.0*   MCV 95.3 95.2 95.7   PLT 30* 26* 22*     BMP/Mag:  Recent Labs     04/10/22  1005 04/11/22  1004 04/12/22  0855    142 144   K 4.3 4.0 4.0    108 111*   CO2 22 23 24   PHOS 2.8 2.8 2.5   BUN 38* 40* 38*   CREATININE 2.6* 2.7* 2.4*   MG  --  1.60*  --      Standing parameters for replacement for this patient:   1 unit of pack red blood cells for a hemoglobin < or equal to 7.0.  1 pack of platelets for a platelet count less than or equal to 50.0.  40 MeQ of Potassium administered for a potassium level less than or equal to 3.4.  4g of Magnesium Sulfate for a magnesum level less than or equal to 1.4.   1pk platelets transfused per standing order for the above lab values. PRA results came back and care team will work to obtain HLA matched platelets. Patient will receive 2 units of crossmatched platelets on 9/54/79. Urinalysis last done: 4/11/2022 Urinalysis next due: 4/18/2022    Chest X-Ray last done: 4/12/2022 Chest X-Ray next due: 4/18/2022    Symptoms addressed and reported to care team this date: none. Treatments this date: Labs and platelets. Reviewed medication schedule with pt and caregiver. Both able to verbalize all medications and schedule. Pt to be seen again Sunday. Discharged ambulatory to home.     Justine Schaefer RN

## 2022-04-13 NOTE — PROGRESS NOTES
Short Stay Communication Note  Shannon Soto  Diagnosis: Multiple Myeloma  Primary MD: Dr. Christian Martines  Treatment: Fludarabine and Cytoxan  Day + 23 of Car-T [facility]   Pt seen in outpatient infusion today. Labs drawn and reviewed. CBC: Recent Labs     04/11/22  1004 04/12/22  0855 04/13/22  0854   WBC 0.5* 0.6* 0.6*   HGB 8.3* 7.8* 7.6*   HCT 23.8* 23.0* 22.2*   MCV 95.2 95.7 96.0   PLT 26* 22* 25*     BMP/Mag:  Recent Labs     04/11/22  1004 04/12/22  0855 04/13/22  0854    144 142   K 4.0 4.0 4.0    111* 110   CO2 23 24 23   PHOS 2.8 2.5 2.5   BUN 40* 38* 35*   CREATININE 2.7* 2.4* 2.5*   MG 1.60*  --  1.70*     Standing parameters for replacement for this patient:   1 unit of pack red blood cells for a hemoglobin < or equal to 7.0.  1 pack of platelets for a platelet count less than or equal to 50.0.  40 MeQ of Potassium administered for a potassium level less than or equal to 3.4.  4g of Magnesium Sulfate for a magnesum level less than or equal to 1.4.  1pk platelets transfused per standing order for the above lab values. Urinalysis last done: 4/13/2022 Urinalysis next due: 4/20/2022. Chest X-Ray last done: 4/12/2022. Chest X-Ray next due: 4/19/2022. Symptoms addressed and reported to care team this date:   Treatments this date: labs and 1pack of platelets. Reviewed medication schedule with pt and caregiver. Both able to verbalize all medications and schedule. Pt to be seen again tomorrow. Discharged ambulatory to home.   Catherine Cobian RN

## 2022-04-13 NOTE — PROGRESS NOTES
[facility] Progress Note      2022    Lupe Richards    :  1955    MRN:  [id number]    Referring MD: MD JOVITA Glaser [address], [place] [address],  [address]      Subjective: Patient feels well today, no fevers, no confusion, eating and drinking well. Discussed with patient about Stem Cell Boost on Monday d/t prolonged pancytopenia. Patient agreeable, will set up for Monday in OP Infusion     ECOG PS:  (1) Restricted in physically strenuous activity, ambulatory and able to do work of light nature    KPS: 80% Normal activity with effort; some signs or symptoms of disease    Isolation:  None     Medications    Scheduled Meds:    Continuous Infusions:    PRN Meds:. ROS:  As noted above, otherwise remainder of 10-point ROS negative      Physical Exam:     Vital Signs:  BP (!) 147/95   Pulse 96   Temp 97.6 °F (36.4 °C) (Oral)   Resp 18   SpO2 100%     Weight:    Wt Readings from Last 3 Encounters:   22 254 lb 10.1 oz (115.5 kg)   22 253 lb 8.5 oz (115 kg)   04/10/22 251 lb 15.8 oz (114.3 kg)       General: Awake, alert and oriented.   HEENT: normocephalic, alopecia, PERRL, no scleral erythema or icterus, Oral mucosa moist and intact, throat clear  NECK: supple without palpable adenopathy  BACK: Straight negative CVAT  SKIN: warm dry and intact without lesions rashes or masses  CHEST: CTA bilaterally without use of accessory muscles  CV: Normal S1 S2, RRR, no MRG  ABD: NT ND normoactive BS, no palpable masses or hepatosplenomegaly  EXTREMITIES: BLL 1+ edema slightly improved today, denies calf tenderness  NEURO: CN II - XII grossly intact  CATHETER: Right Tunneled HD catheter    Laboratory Data:  CBC:   Recent Labs     22  1004 22  0855 22  0854   WBC 0.5* 0.6* 0.6*   HGB 8.3* 7.8* 7.6*   HCT 23.8* 23.0* 22.2*   MCV 95.2 95.7 96.0   PLT 26* 22* 25*     BMP/Mag:  Recent Labs     22  1004 22  0855 22  0854    144 142   K 4.0 4.0 4.0    111* 110   CO2 23 24 23   PHOS 2.8 2.5 2.5   BUN 40* 38* 35*   CREATININE 2.7* 2.4* 2.5*   MG 1.60*  --  1.70*     LIVP:   Recent Labs     04/11/22  1004 04/13/22  0854   AST 21 21   ALT 20 18   BILIDIR <0.2 <0.2   BILITOT 0.8 0.7   ALKPHOS 71 71     Coags:   Recent Labs     04/11/22  1004   PROTIME 11.9   INR 1.05   APTT 55.2*     Uric Acid   Recent Labs     04/11/22  1004 04/13/22  0854   LABURIC 6.9 6.7     Lab Results   Component Value Date    CRP <3.0 04/13/2022    CRP <3.0 04/12/2022    CRP <3.0 04/11/2022     Lab Results   Component Value Date    FERRITIN 1,166.0 (H) 04/13/2022    FERRITIN 1,190.0 (H) 04/12/2022    FERRITIN 1,154.0 (H) 04/11/2022         PROBLEM LIST:            1. [place] Light Chain Multiple Myeloma   2. ESRD on HD   3. GERD  4. HTN  5. Hypokalemia   6. Subdural Hematoma (1/2022)  7. Seizures (1/2022)      TREATMENT:            1. CyBorD (started 10/30/18)  2. Revlimid / Jearld Pane / Dex on clinical trial (started 2/1/19)  3. Carfilzomib / Pomalidomide / Dex x 6 cycles (Relapse #1 - started 8/26/19 - 2/2020)  4. Cytoxan Mobilization w/ 25% dose reduction (2/17/20)  5. Melphalan 140mg/m2 & Autologous SCT 3/12/20  6. Kyprolis/Ebony/Dex (6/30/21-7/28/21)- progression  7. Velcade/Ebony/Dex (5/18/21-8/3/21)- progression  8. HD - CTX               -Cycle 1 9/27/21              -Cycle 2 10/18/21              -Cycle 3 11/8/21- stopped d/t severe fatigue  9. Bendamustine/Velcade/Dex 12/06/21  10. Velcade only 2/11/22  11. Fludarabine/Cytoxan followed by CAR-T infusion 3/21/22  12. Stem Cell Boost 4/18/22      ASSESSMENT AND PLAN:            1. [place] Light Chain Multiple Myeloma:  Currently in [facility]  - BM biopsy: 1/12/22.  20% plasma cells . Evelyne Weston  p53 positive 79.5% of cells  - 1/28/22 Serum KFLC 1662.71, Lambda 3.05   K/L ratio 545.15  - 2/24/22 Serum KFLC 1919.12, Lambda 2.54, K/L raio 755.56  - s/p Fludarabine and Cytoxan 3/16/22-3/18/22  - Post CAR-T infusion staging:  PET/CT, BM bx with Flow, FISH, CG, MM    Lymphodepleting Chemotherapy:  Fludarabine and cyclophosphamide   Disease Status at time of Infusion:  Progressive disease  CAR-T Infusion Date:  3/21/22  CAR-T Product:  Enzo  Batch ID Number:  [id number]     Day + 23    Plan: Give Stem Cell Boost 4/18/22 in OP Infusion      2. CRS / Neuro: Grade 1 CRS 3/22  CRS Grade: 1, fever only 3/22  - S/p toci 800mg 3/22/22  - Monitor CRP and Ferrtin closely  Lab Results   Component Value Date    CRP <3.0 04/13/2022    CRP <3.0 04/12/2022    CRP <3.0 04/11/2022     Lab Results   Component Value Date    FERRITIN 1,166.0 (H) 04/13/2022    FERRITIN 1,190.0 (H) 04/12/2022    FERRITIN 1,154.0 (H) 04/11/2022     ICANS Grade:  0  - Neuro checks w/ CARTOX 10-point assessment Q4hrs  ICE Score:  CAR-T Score: 10    3. ID: Afebrile  - Cont Levaquin, Diflucan, Valtrex ppx     4. Heme:  Pancytopenia d/t recent chemotherapy   - Transfuse for Hgb < 7 and Platelets < 37E (recent subdural)  - Plt transfusion today  - 1 hour Post Plt count 4/13/22: Pending  - PRA sent (4/7/22): Positive (Will receive Matched Crossed Plts)   - Will attempt to obtain HLA-matched or cross-matched platelets     5. ESRD/Metabolic: SCr: 2.5 (0/70/50), Mild Hyperglycemia & HypoMag  ESRD:  - off HD currently. Followed by Dr. Kristal Haider  - Baseline Cr 2.1-2.6  - Cont IVFs: 1000 mL given 4/7/22  - Cont Mag-Ox 400 mg BID 4/7/22  TLS:  No evidence, cont allopurinol and daily TLS labs   - Replace potassium and magnesium per PRN orders  - Given persistent/worsened peripheral edema, instructed him to take lasix 40 mg PO x1 on 4/2/22: Much Improved   - Continue to monitor his kidney function on daily basis. 6. Nutrition:  Appetite and oral intake is good. - Cont low microbial diet   - Follow closely with dietary     7.   Neuro:  Recent seizure/subdural hematoma (1/2022)  - Continue Keppra 500 mg PO BID indefinitely  - Keep platelets >98,046      - Disposition: return to clinic tomorrow     Vida Banda, APRN - CNP

## 2022-04-13 NOTE — PROGRESS NOTES
Nutrition Note     RECOMMENDATIONS:  1. PO Diet: Continue Low Microbial/General diet w/close monitor of renal labs for need of renal diet restrictions   2. ONS: not indicated at this time. 3. Nutrition Education: declined additional;     NUTRITION ASSESSMENT:   Nutritional summary & status: S/P Car-t infusion 3/21/22; pt reports stable PO nutrition, consuming regular portions, but does note he is \"bored\" with food options. Further dietary recall reveals pt initially had decreased appetite, along with his food prefences of a limited variety of foods, (per wife: \"mostly eats meat/potatoes and doesn't branch out from his food preferences\"). RD confirimed, pt no longer with lower appetite. Pt reports eating consistently, and typically most of his meals. RD offered to provide options to enhance diet variety, but pt declined. Weight is slowing trending down r/t pt receiving increased fluids while hospitalized. Pt denies any nutrition needs or concerns at this time. RD continues to monitor nutritional adequacy.  Admission/PMH: Admit lymphodepleting chemotherapy consisting of Fludarabine and Cytoxan and is now being admitted for [facility] CAR-T infusion (3/21/22); h/o [place] Light Chain Multiple Myeloma (Dx 10/2018). Melphalan followed by an Autologous Stem Cell on 3/12/20.   PMHx ESRD- off HD currently   Nutrition Related Findings:   Wounds: None   Nutrition Goals: pt will maintain adequate PO intake by consuming greater than 75% of meals offered through admission to promote meeting increased nutrient needs s/p car-T      MALNUTRITION ASSESSMENT  Context of Malnutrition: Acute Illness (on chronic )   Malnutrition Status: No malnutrition    NUTRITION DIAGNOSIS   Increased nutrient needs related to increase demand for energy/nutrients as evidenced by  (s/p chemotx w/car-t )    CURRENT NUTRITION THERAPIES  DIET GENERAL; Low Microbial       ANTHROPOMETRICS  Current Height: 6' 2.02\" (188 cm)  Current Weight: 254 lb 10.1 oz (115.5 kg) (wt from 4/12)    Admission weight: 254 lb 10.1 oz (115.5 kg) (wt from 4/12)  Ideal Body Weight (IBW): 190 lbs  (86 kg)    Usual Bodyweight 240 lb (108.9 kg) (per pt )   Weight Changes pt reports 20 lb wt gain since infusion/hospitalization from fluids        BMI: 32.7    Wt Readings from Last 50 Encounters:   04/12/22 254 lb 10.1 oz (115.5 kg)   04/11/22 253 lb 8.5 oz (115 kg)   04/10/22 251 lb 15.8 oz (114.3 kg)   04/08/22 253 lb 1.4 oz (114.8 kg)   04/06/22 253 lb 12 oz (115.1 kg)   04/05/22 255 lb 1.2 oz (115.7 kg)   04/04/22 254 lb 13.6 oz (115.6 kg)   04/03/22 254 lb 6.6 oz (115.4 kg)   04/02/22 260 lb 12.9 oz (118.3 kg)   04/01/22 259 lb 11.2 oz (117.8 kg)   03/31/22 260 lb 2.3 oz (118 kg)   03/30/22 259 lb 0.7 oz (117.5 kg)   03/29/22 259 lb 0.7 oz (117.5 kg)   03/28/22 259 lb 3.2 oz (117.6 kg)   03/18/22 255 lb 1.2 oz (115.7 kg)   03/17/22 258 lb 13.1 oz (117.4 kg)   03/16/22 251 lb 15.8 oz (114.3 kg)   02/01/22 236 lb (107 kg)   01/21/22 243 lb 12.8 oz (110.6 kg)   03/27/20 228 lb 6.3 oz (103.6 kg)     The patient will still be monitored per nutrition standards of care. Consult dietitian if nutrition interventions essential to patient care is needed.      Ramon Moncada RD, LD  Office:  [phone]

## 2022-04-14 NOTE — PROGRESS NOTES
Pt presents day +24 Ab[facility]ma infusion. Pt denies n/v/d, denies pain. Pt LS CTA, bowel sounds active/present. BLE pitting +2 edema noted at this time. Neuro check WDL, CAR-T 10. Pt sitting up in chair, call light in reach, nonskid footwear on pt, steady gait, independent in room, hourly rounding in place.  Electronically signed by Jacquie Davis RN on 4/14/2022 at 9:20 AM

## 2022-04-14 NOTE — PROGRESS NOTES
[facility]      2022    Luz Virgen    :  1955    MRN:  [id number]    Referring MD: KIMBERLEY Saravia - MARTHA  Jennifer,  [address] [address]      Subjective: Patient feels well today, no fevers, no confusion, eating and drinking well. Discussed with patient about Stem Cell Boost on Monday d/t prolonged pancytopenia. Patient agreeable, will set up for Monday in OP Infusion     ECOG PS:  (1) Restricted in physically strenuous activity, ambulatory and able to do work of light nature    KPS: 80% Normal activity with effort; some signs or symptoms of disease    Isolation:  None     Medications    Scheduled Meds:    Continuous Infusions:    PRN Meds:. ROS:  As noted above, otherwise remainder of 10-point ROS negative      Physical Exam:     Vital Signs:  BP (!) 148/54   Pulse 102   Temp 98 °F (36.7 °C) (Oral)   Resp 16   Wt 251 lb 1.7 oz (113.9 kg)   SpO2 100%   BMI 32.22 kg/m²     Weight:    Wt Readings from Last 3 Encounters:   22 251 lb 1.7 oz (113.9 kg)   22 254 lb 10.1 oz (115.5 kg)   22 254 lb 10.1 oz (115.5 kg)       General: Awake, alert and oriented.   HEENT: normocephalic, alopecia, PERRL, no scleral erythema or icterus, Oral mucosa moist and intact, throat clear  NECK: supple without palpable adenopathy  BACK: Straight negative CVAT  SKIN: warm dry and intact without lesions rashes or masses  CHEST: CTA bilaterally without use of accessory muscles  CV: Normal S1 S2, RRR, no MRG  ABD: NT ND normoactive BS, no palpable masses or hepatosplenomegaly  EXTREMITIES: BLL 1+ edema slightly improved today, denies calf tenderness  NEURO: CN II - XII grossly intact  CATHETER: Right Tunneled HD catheter    Laboratory Data:  CBC:   Recent Labs     22  0854 22  1136 22  0911   WBC 0.6* 0.6* 0.8*   HGB 7.6* 7.3* 7.6*   HCT 22.2* 21.1* 22.4*   MCV 96.0 95.2 95.5   PLT 25* 36* 28*     BMP/Mag:  Recent Labs     22  0855 04/13/22  0854 04/14/22  0911    142 144   K 4.0 4.0 4.1   * 110 110   CO2 24 23 23   PHOS 2.5 2.5 2.3*   BUN 38* 35* 35*   CREATININE 2.4* 2.5* 2.4*   MG  --  1.70*  --      LIVP:   Recent Labs     04/13/22  0854   AST 21   ALT 18   BILIDIR <0.2   BILITOT 0.7   ALKPHOS 71     Coags:   Recent Labs     04/14/22  0911   PROTIME 12.0   INR 1.06   APTT 31.8     Uric Acid   Recent Labs     04/13/22  0854   LABURIC 6.7     Lab Results   Component Value Date    CRP <3.0 04/14/2022    CRP <3.0 04/13/2022    CRP <3.0 04/12/2022     Lab Results   Component Value Date    FERRITIN 1,156.0 (H) 04/14/2022    FERRITIN 1,166.0 (H) 04/13/2022    FERRITIN 1,190.0 (H) 04/12/2022         PROBLEM LIST:            1. [place] Light Chain Multiple Myeloma   2. ESRD on HD   3. GERD  4. HTN  5. Hypokalemia   6. Subdural Hematoma (1/2022)  7. Seizures (1/2022)      TREATMENT:            1. CyBorD (started 10/30/18)  2. Revlimid / [employer] / Dex on clinical trial (started 2/1/19)  3. Carfilzomib / Pomalidomide / Dex x 6 cycles (Relapse #1 - started 8/26/19 - 2/2020)  4. Cytoxan Mobilization w/ 25% dose reduction (2/17/20)  5. Melphalan 140mg/m2 & Autologous SCT 3/12/20  6. Kyprolis/Ebony/Dex (6/30/21-7/28/21)- progression  7. Velcade/Ebony/Dex (5/18/21-8/3/21)- progression  8. HD - CTX               -Cycle 1 9/27/21              -Cycle 2 10/18/21              -Cycle 3 11/8/21- stopped d/t severe fatigue  9. Bendamustine/Velcade/Dex 12/06/21  10. Velcade only 2/11/22  11. Fludarabine/Cytoxan followed by CAR-T infusion 3/21/22  12. Stem Cell Boost 4/18/22      ASSESSMENT AND PLAN:            1. [place] Light Chain Multiple Myeloma:  Currently in [facility]  - BM biopsy: 1/12/22.  20% plasma cells . Heddy   p53 positive 79.5% of cells  - 1/28/22 Serum KFLC 1662.71, Lambda 3.05   K/L ratio 545.15  - 2/24/22 Serum KFLC 1919.12, Lambda 2.54, K/L raio 755.56  - s/p Fludarabine and Cytoxan 3/16/22-3/18/22  - Post CAR-T infusion staging:  PET/CT, BM bx with Flow, FISH, CG, MM    Lymphodepleting Chemotherapy:  Fludarabine and cyclophosphamide   Disease Status at time of Infusion:  Progressive disease  CAR-T Infusion Date:  3/21/22  CAR-T Product:  Enzo  Batch ID Number:  [id number]     Day + 23    Plan: Give Stem Cell Boost 4/18/22 in OP Infusion      2. CRS / Neuro: Grade 1 CRS 3/22  CRS Grade: 1, fever only 3/22  - S/p toci 800mg 3/22/22  - Monitor CRP and Ferrtin closely  Lab Results   Component Value Date    CRP <3.0 04/14/2022    CRP <3.0 04/13/2022    CRP <3.0 04/12/2022     Lab Results   Component Value Date    FERRITIN 1,156.0 (H) 04/14/2022    FERRITIN 1,166.0 (H) 04/13/2022    FERRITIN 1,190.0 (H) 04/12/2022     ICANS Grade:  0  - Neuro checks w/ CARTOX 10-point assessment Q4hrs  ICE Score:  CAR-T Score: 10    3. ID: Afebrile  - Cont Levaquin, Diflucan, Valtrex ppx     4. Heme:  Pancytopenia d/t recent chemotherapy   - Transfuse for Hgb < 7 and Platelets < 68U (recent subdural)  - Plt transfusion today  - 1 hour Post Plt count 4/13/22: Pending  - PRA sent (4/7/22): Positive (Will receive Matched Crossed Plts)   - Will attempt to obtain HLA-matched or cross-matched platelets     5. ESRD/Metabolic: SCr: 2.5 (1/37/64), Mild Hyperglycemia & HypoMag  ESRD:  - off HD currently. Followed by Dr. Ar Fajardo  - Baseline Cr 2.1-2.6  - Cont IVFs: 1000 mL given 4/7/22  - Cont Mag-Ox 400 mg BID 4/7/22  TLS:  No evidence, cont allopurinol and daily TLS labs   - Replace potassium and magnesium per PRN orders  - Given persistent/worsened peripheral edema, instructed him to take lasix 40 mg PO x1 on 4/2/22: Much Improved   - Continue to monitor his kidney function on daily basis. 6. Nutrition:  Appetite and oral intake is good. - Cont low microbial diet   - Follow closely with dietary     7.   Neuro:  Recent seizure/subdural hematoma (1/2022)  - Continue Keppra 500 mg PO BID indefinitely  - Keep platelets >62,270      - Disposition: return to clinic tomorrow     Ghada Proctor, APRN - CNP    Art Piggs.  [place], [place]  [address] [facility]

## 2022-04-14 NOTE — TELEPHONE ENCOUNTER
1:19 PM  Received an email from [employer] [place] [address] that the patient needs updated records faxed to his disability company. Records are needed from October 2021 through present. Printed release of information the patient had signed. Faxed to medical records and included the fax number for the disability company and the dates that are being requested.

## 2022-04-14 NOTE — PROGRESS NOTES
Patient's hemoglobin this AM: Recent Labs     04/14/22  0911   HGB 7.6*      Patient's platelet count this AM:   Recent Labs     04/14/22  0911   PLT 28*     Pt seen at [address] [place] [address] today for  Platelet transfusion  for above lab values. Informed consent verified. No Pre-medications ordered with no previous transfusion reaction history. Transfused per policy. Pt tolerated transfusion well and without incident. Pt verbalizes understanding of discharge instructions. Discharged to home with spouse.  Electronically signed by Kaylee Aggarwal RN on 4/14/2022 at 11:42 AM

## 2022-04-15 NOTE — PROGRESS NOTES
[facility] [employer] Progress Note      4/15/2022    Sydnie Roach    :  1955    MRN:  [id number]    Referring MD: KIMBERLEY Jimenes - NP  Jennifer,  [address]      Subjective: Feels well today, no fevers, no confusion, eating and drinking well, no complaints at this time. ECOG PS:  (1) Restricted in physically strenuous activity, ambulatory and able to do work of light nature    KPS: 80% Normal activity with effort; some signs or symptoms of disease    Isolation:  None     Medications    Scheduled Meds:    Continuous Infusions:    PRN Meds:. ROS:  As noted above, otherwise remainder of 10-point ROS negative      Physical Exam:     Vital Signs:  /86   Pulse 95   Temp 97.4 °F (36.3 °C) (Oral)   Resp 18   Wt 250 lb 6.4 oz (113.6 kg)   SpO2 100%   BMI 32.13 kg/m²     Weight:    Wt Readings from Last 3 Encounters:   04/15/22 250 lb 6.4 oz (113.6 kg)   22 251 lb 1.7 oz (113.9 kg)   22 254 lb 10.1 oz (115.5 kg)       General: Awake, alert and oriented.   HEENT: normocephalic, alopecia, PERRL, no scleral erythema or icterus, Oral mucosa moist and intact, throat clear  NECK: supple without palpable adenopathy  BACK: Straight negative CVAT  SKIN: warm dry and intact without lesions rashes or masses  CHEST: CTA bilaterally without use of accessory muscles  CV: Normal S1 S2, RRR, no MRG  ABD: NT ND normoactive BS, no palpable masses or hepatosplenomegaly  EXTREMITIES: BLL 1+ edema slightly improved today, denies calf tenderness  NEURO: CN II - XII grossly intact  CATHETER: Right Tunneled HD catheter    Laboratory Data:  CBC:   Recent Labs     22  1136 22  0911 04/15/22  0851   WBC 0.6* 0.8* 0.8*   HGB 7.3* 7.6* 7.4*   HCT 21.1* 22.4* 21.7*   MCV 95.2 95.5 95.8   PLT 36* 28* 32*     BMP/Mag:  Recent Labs     22  0854 22  0911 04/15/22  0851    144 144   K 4.0 4.1 4.1    110 111*   CO2 23 23 23   PHOS 2.5 2.3* 2.7   BUN 35* 35* 32*   CREATININE 2.5* 2.4* 2.4*   MG 1.70*  --  1.80     LIVP:   Recent Labs     04/13/22  0854 04/15/22  0851   AST 21 20   ALT 18 16   BILIDIR <0.2 <0.2   BILITOT 0.7 0.8   ALKPHOS 71 73     Coags:   Recent Labs     04/14/22  0911   PROTIME 12.0   INR 1.06   APTT 31.8     Uric Acid   Recent Labs     04/13/22  0854 04/15/22  0851   LABURIC 6.7 6.7     Lab Results   Component Value Date    CRP <3.0 04/15/2022    CRP <3.0 04/14/2022    CRP <3.0 04/13/2022     Lab Results   Component Value Date    FERRITIN 1,222.0 (H) 04/15/2022    FERRITIN 1,156.0 (H) 04/14/2022    FERRITIN 1,166.0 (H) 04/13/2022         PROBLEM LIST:            1. [place] Light Chain Multiple Myeloma   2. ESRD on HD   3. GERD  4. HTN  5. Hypokalemia   6. Subdural Hematoma (1/2022)  7. Seizures (1/2022)      TREATMENT:            1. CyBorD (started 10/30/18)  2. Revlimid / Araseli Stabs / Dex on clinical trial (started 2/1/19)  3. Carfilzomib / Pomalidomide / Dex x 6 cycles (Relapse #1 - started 8/26/19 - 2/2020)  4. Cytoxan Mobilization w/ 25% dose reduction (2/17/20)  5. Melphalan 140mg/m2 & Autologous SCT 3/12/20  6. Kyprolis/Ebony/Dex (6/30/21-7/28/21)- progression  7. Velcade/Ebony/Dex (5/18/21-8/3/21)- progression  8. HD - CTX               -Cycle 1 9/27/21              -Cycle 2 10/18/21              -Cycle 3 11/8/21- stopped d/t severe fatigue  9. Bendamustine/Velcade/Dex 12/06/21  10. Velcade only 2/11/22  11. Fludarabine/Cytoxan followed by CAR-T infusion 3/21/22  12. Stem Cell Boost 4/18/22      ASSESSMENT AND PLAN:            1. [place] Light Chain Multiple Myeloma:  Currently in [facility]  - BM biopsy: 1/12/22.  20% plasma cells . Beti Redman  p53 positive 79.5% of cells  - 1/28/22 Serum KFLC 1662.71, Lambda 3.05   K/L ratio 545.15  - 2/24/22 Serum KFLC 1919.12, Lambda 2.54, K/L raio 755.56  - s/p Fludarabine and Cytoxan 3/16/22-3/18/22  - Post CAR-T infusion staging:  PET/CT, BM bx with Flow, FISH, CG, MM    Lymphodepleting Chemotherapy: Fludarabine and cyclophosphamide   Disease Status at time of Infusion:  Progressive disease  CAR-T Infusion Date:  3/21/22  CAR-T Product:  Abecma  Batch ID Number:  [id number]     Day + 24    Plan: Give Stem Cell Boost 4/18/22 in OP Infusion      2. CRS / Neuro: Grade 1 CRS 3/22  CRS Grade: 1, fever only 3/22  - S/p toci 800mg 3/22/22  - Monitor CRP and Ferrtin closely  Lab Results   Component Value Date    CRP <3.0 04/15/2022    CRP <3.0 04/14/2022    CRP <3.0 04/13/2022     Lab Results   Component Value Date    FERRITIN 1,222.0 (H) 04/15/2022    FERRITIN 1,156.0 (H) 04/14/2022    FERRITIN 1,166.0 (H) 04/13/2022     ICANS Grade:  0  - Neuro checks w/ CARTOX 10-point assessment Q4hrs  ICE Score:  CAR-T Score: 10    3. ID: Afebrile  - Cont Levaquin, Diflucan, Valtrex ppx     4. Heme:  Pancytopenia d/t recent chemotherapy   - Transfuse for Hgb < 7 and Platelets < 10U (recent subdural)  - Plt transfusion today  - 1 hour Post Plt count 4/13/22: 32  - PRA sent (4/7/22): Positive (Will receive Matched Crossed Plts)   - Will order cross-matched platelets     5. ESRD/Metabolic: SCr: 2.5 (3/67/05), Mild Hyperglycemia & HypoMag  ESRD:  - off HD currently. Followed by Dr. Marquis Dillon  - Baseline Cr 2.1-2.6  - Cont IVFs: 1000 mL given 4/7/22  - Cont Mag-Ox 400 mg BID 4/7/22  TLS:  No evidence, cont allopurinol and daily TLS labs   - Replace potassium and magnesium per PRN orders  - Given persistent/worsened peripheral edema, instructed him to take lasix 40 mg PO x1 on 4/2/22: Much Improved   - Continue to monitor his kidney function on daily basis. 6. Nutrition:  Appetite and oral intake is good. - Cont low microbial diet   - Follow closely with dietary     7. Neuro:  Recent seizure/subdural hematoma (1/2022)  - Continue Keppra 500 mg PO BID indefinitely  - Keep platelets >50,363      - Disposition: return to clinic Sunday, 4/17/22     KIMBERLEY Faustin - MINDI Nogueira.  Margaret Plunkett, [address] [place] [address]

## 2022-04-15 NOTE — PROGRESS NOTES
Patient's hemoglobin this AM: Recent Labs     04/15/22  0851   HGB 7.4*      Patient's platelet count this AM:   Recent Labs     04/15/22  0851   PLT 32*     Pt seen at [address] [place] [address] today for  Platelet transfusion  for above lab values. Informed consent verified. No Pre-medications ordered with no previous transfusion reaction history. Transfused per policy. Pt tolerated transfusion well and without incident. Pt verbalizes understanding of discharge instructions. Discharged to home with self.  Electronically signed by Arti Mcdonald RN on 4/15/2022 at 12:48 PM

## 2022-04-15 NOTE — PROGRESS NOTES
Patient's hemoglobin this AM: Recent Labs     04/15/22  0851   HGB 7.4*      Patient's platelet count this AM:   Recent Labs     04/15/22  0851   PLT 32*     Pt seen at [address] [place] [address] today for  Platelet transfusion  for above lab values. Informed consent verified. No Pre-medications ordered with no previous transfusion reaction history. Transfused per policy. Pt tolerated transfusion well and without incident. Pt verbalizes understanding of discharge instructions.  Electronically signed by Yari Ellison RN on 4/15/2022 at 12:49 PM

## 2022-04-15 NOTE — PROGRESS NOTES
Pt presents day +25 Abecma infusion. Pt denies n/v/d, denies pain. Pt LS CTA, bowel sounds active/present. BLE pitting +1 edema noted at this time. Neuro check WDL, CAR-T 10. Pt sitting up in chair, call light in reach, nonskid footwear on pt, steady gait, independent in room, hourly rounding in place.  Electronically signed by Gisele Reynoso RN on 4/15/2022 at 9:58 AM

## 2022-04-17 NOTE — PROGRESS NOTES
Pt presents day +27 Abecma infusion. Pt denies n/v/d, denies pain. Pt LS CTA, bowel sounds active/present. BLE trace edema noted at this time. Neuro check WDL, CAR-T 10. Fluids held today.     Pt scheduled to come back 4/18/2022 for stem cell boost.    Electronically signed by Marianna Lutz RN on 4/17/2022

## 2022-04-17 NOTE — PROGRESS NOTES
[facility] Progress Note      2022    Tobias Walker    :  1955    MRN:  [id number]    Referring MD: KIMBERLEY Ward - NP  [address] [place] [address]    Subjective: he reports gen weakness and fatigue but overall feels well     ECOG PS:  (1) Restricted in physically strenuous activity, ambulatory and able to do work of light nature    KPS: 80% Normal activity with effort; some signs or symptoms of disease    Isolation:  None     Medications    Scheduled Meds:    Continuous Infusions:    PRN Meds:. ROS:  As noted above, otherwise remainder of 10-point ROS negative      Physical Exam:     Vital Signs:  BP (!) 157/81   Pulse 96   Temp 97.7 °F (36.5 °C) (Oral)   Resp 20   Wt 249 lb 5.4 oz (113.1 kg)   SpO2 99%   BMI 32.00 kg/m²     Weight:    Wt Readings from Last 3 Encounters:   22 249 lb 5.4 oz (113.1 kg)   04/15/22 250 lb 6.4 oz (113.6 kg)   22 251 lb 1.7 oz (113.9 kg)       General: Awake, alert and oriented.   HEENT: normocephalic, alopecia, PERRL, no scleral erythema or icterus, Oral mucosa moist and intact, throat clear  NECK: supple without palpable adenopathy  BACK: Straight negative CVAT  SKIN: warm dry and intact without lesions rashes or masses  CHEST: CTA bilaterally without use of accessory muscles  CV: Normal S1 S2, RRR, no MRG  ABD: NT ND normoactive BS, no palpable masses or hepatosplenomegaly  EXTREMITIES: BLL 1+ edema slightly improved today, denies calf tenderness  NEURO: CN II - XII grossly intact  CATHETER: Right Tunneled HD catheter    Laboratory Data:  CBC:   Recent Labs     04/15/22  0851 22  0840   WBC 0.8* 1.7*   HGB 7.4* 7.9*   HCT 21.7* 23.3*   MCV 95.8 96.6   PLT 32* 35*     BMP/Mag:  Recent Labs     04/15/22  0851 22  0840    145   K 4.1 4.0   * 111*   CO2 23 22   PHOS 2.7 2.8   BUN 32* 37*   CREATININE 2.4* 2.6*   MG 1.80  --      LIVP:   Recent Labs     04/15/22  0851   AST 20   ALT 16   BILIDIR <0. 2   BILITOT 0.8   ALKPHOS 73     Coags:   No results for input(s): PROTIME, INR, APTT in the last 72 hours. Uric Acid   Recent Labs     04/15/22  0851   LABURIC 6.7     Lab Results   Component Value Date    CRP <3.0 04/17/2022    CRP <3.0 04/15/2022    CRP <3.0 04/14/2022     Lab Results   Component Value Date    FERRITIN 1,456.0 (H) 04/17/2022    FERRITIN 1,222.0 (H) 04/15/2022    FERRITIN 1,156.0 (H) 04/14/2022         PROBLEM LIST:            1. [place] Light Chain Multiple Myeloma   2. ESRD on HD   3. GERD  4. HTN  5. Hypokalemia   6. Subdural Hematoma (1/2022)  7. Seizures (1/2022)      TREATMENT:            1. CyBorD (started 10/30/18)  2. Revlimid / Araseli Stabs / Dex on clinical trial (started 2/1/19)  3. Carfilzomib / Pomalidomide / Dex x 6 cycles (Relapse #1 - started 8/26/19 - 2/2020)  4. Cytoxan Mobilization w/ 25% dose reduction (2/17/20)  5. Melphalan 140mg/m2 & Autologous SCT 3/12/20  6. Kyprolis/Ebony/Dex (6/30/21-7/28/21)- progression  7. Velcade/Ebony/Dex (5/18/21-8/3/21)- progression  8. HD - CTX               -Cycle 1 9/27/21              -Cycle 2 10/18/21              -Cycle 3 11/8/21- stopped d/t severe fatigue  9. Bendamustine/Velcade/Dex 12/06/21  10. Velcade only 2/11/22  11. Fludarabine/Cytoxan followed by CAR-T infusion 3/21/22  12. Stem Cell Boost 4/18/22      ASSESSMENT AND PLAN:            1. [place] Light Chain Multiple Myeloma:  Currently in [facility]  - BM biopsy: 1/12/22.  20% plasma cells . Beti Redman  p53 positive 79.5% of cells  - 1/28/22 Serum KFLC 1662.71, Lambda 3.05   K/L ratio 545.15  - 2/24/22 Serum KFLC 1919.12, Lambda 2.54, K/L raio 755.56  - s/p Fludarabine and Cytoxan 3/16/22-3/18/22  - Post CAR-T infusion staging:  PET/CT, BM bx with Flow, FISH, CG, MM    Lymphodepleting Chemotherapy:  Fludarabine and cyclophosphamide   Disease Status at time of Infusion:  Progressive disease  CAR-T Infusion Date:  3/21/22  CAR-T Product:  AbD1G  Batch ID Number:  [id number]     Day + 26    Plan: Give Stem Cell Boost 4/18/22 in OP Infusion      2. CRS / Neuro: Grade 1 CRS 3/22  CRS Grade: 1, fever only 3/22  - S/p toci 800mg 3/22/22  - Monitor CRP and Ferrtin closely  Lab Results   Component Value Date    CRP <3.0 04/17/2022    CRP <3.0 04/15/2022    CRP <3.0 04/14/2022     Lab Results   Component Value Date    FERRITIN 1,456.0 (H) 04/17/2022    FERRITIN 1,222.0 (H) 04/15/2022    FERRITIN 1,156.0 (H) 04/14/2022     ICANS Grade:  0  - Neuro checks w/ CARTOX 10-point assessment Q4hrs  ICE Score:  CAR-T Score: 10    3. ID: Afebrile  - Cont Levaquin, Diflucan, Valtrex ppx     4. Heme:  Pancytopenia d/t recent chemotherapy   - Transfuse for Hgb < 7 and Platelets < 44U (recent subdural)  - Plt transfusion today  - 1 hour Post Plt count 4/13/22: 32  - PRA sent (4/7/22): Positive (Will receive Matched Crossed Plts)   - Will order cross-matched platelets    - scheduled for stem cell infusion in am      5. ESRD/Metabolic: SCr: 2.5 (6/06/17), Mild Hyperglycemia & HypoMag  ESRD:  - off HD currently. Followed by Dr. Skye Mane  - Baseline Cr 2.1-2.6  - Cont IVFs: 1000 mL given 4/7/22  - Cont Mag-Ox 400 mg BID 4/7/22  TLS:  No evidence, cont allopurinol and daily TLS labs   - Replace potassium and magnesium per PRN orders  - Given persistent/worsened peripheral edema, instructed him to take lasix 40 mg PO x1 on 4/2/22: Much Improved   - Continue to monitor his kidney function on daily basis. 6. Nutrition:  Appetite and oral intake is good. - Cont low microbial diet   - Follow closely with dietary     7.   Neuro:  Recent seizure/subdural hematoma (1/2022)  - Continue Keppra 500 mg PO BID indefinitely  - Keep platelets >96,152      - Disposition: return to clinic in am

## 2022-04-17 NOTE — PROGRESS NOTES
Patient's hemoglobin this AM: Recent Labs     04/17/22  0851   HGB 7.9*      Patient's platelet count this AM:   Recent Labs     04/17/22  0851   PLT 35*     Pt seen at [address] [place] [address] today for  Platelet transfusion  for above lab values. Informed consent verified. No Pre-medications ordered with no previous transfusion reaction history. Transfused per policy. Pt tolerated transfusion well and without incident. Pt verbalizes understanding of discharge instructions.  Electronically signed by Marianna Lutz RN on 4/17/2022 at 10:29 AM

## 2022-04-18 NOTE — PROGRESS NOTES
Short Stay Communication Note  Tobias Walker  Diagnosis: Multiple Myeloma   Primary MD: Carmelo Irizarry  Treatment:  Fludarabine and Cyclophosphamide  Day +27 of Car-T Abecma   Pt seen in outpatient infusion today. Labs drawn and reviewed. CBC: Recent Labs     04/17/22  0840 04/18/22  0929   WBC 1.7* 0.9*   HGB 7.9* 7.2*   HCT 23.3* 20.7*   MCV 96.6 96.1   PLT 35* 38*     BMP/Mag:  Recent Labs     04/17/22  0840 04/18/22  0929    143   K 4.0 4.1   * 112*   CO2 22 22   PHOS 2.8 2.2*   BUN 37* 37*   CREATININE 2.6* 2.5*   MG  --  1.80     Standing parameters for replacement for this patient:   1 unit of pack red blood cells for a hemoglobin < or equal to 7.0  1 pack of platelets for a platelet count < or equal to 50  40 MeQ of Potassium administered for a potassium < or equal to 3.4  4g of Magnesium Sulfate for a magnesum level < or equal to 1.4  1pk platelets transfused per standing order for the above lab values. Urinalysis last done: 4/18/2022 Urinalysis next due: 4/18/2022    Chest X-Ray last done: 4/25/2022 Chest X-Ray next due: 4/25/2022    Symptoms addressed and reported to care team this date:   Treatments this date: IV Fluids, sterile dressing change and lab draw. Reviewed medication schedule with pt and caregiver. Both able to verbalize all medications and schedule. Pt to be seen again tomorrow 4/19/2022. Discharged ambulatory to home.     Electronically signed by Facundo Cherry RN on 4/18/2022 at 4:02 PM

## 2022-04-18 NOTE — PROGRESS NOTES
Patient's hemoglobin this AM: Recent Labs     04/18/22  0929   HGB 7.2*      Patient's platelet count this AM:   Recent Labs     04/18/22  0929   PLT 38*     Pt seen at [address] [place] [address] today for  Platelet transfusion  for above lab values. Informed consent verified. No Pre-medications ordered with no previous transfusion reaction history. Transfused per policy. Pt tolerated transfusion well and without incident. Pt verbalizes understanding of discharge instructions. Discharged to ambulatory with his Wife.     Electronically signed by Amarilis Theodore RN on 4/18/2022 at 4:02 PM

## 2022-04-18 NOTE — PROGRESS NOTES
[address] [facility] Progress Note      2022    Colt Jordan    :  1955    MRN:  [id number]    Referring MD: KIMBERLEY Grande - NP  [place],  [address]    Subjective: Feels well today, receiving Stem Cell Boost today, no fevers, no confusion, eating and drinking well, no complaints at this time. ECOG PS:  (1) Restricted in physically strenuous activity, ambulatory and able to do work of light nature    KPS: 80% Normal activity with effort; some signs or symptoms of disease    Isolation:  None     Medications    Scheduled Meds:    Continuous Infusions:    PRN Meds:. ROS:  As noted above, otherwise remainder of 10-point ROS negative      Physical Exam:     Vital Signs:  /76   Pulse 107   Temp 97.9 °F (36.6 °C) (Oral)   Resp 18   Wt 250 lb 9.6 oz (113.7 kg)   SpO2 99%   BMI 32.16 kg/m²     Weight:    Wt Readings from Last 3 Encounters:   22 250 lb 9.6 oz (113.7 kg)   22 249 lb 5.4 oz (113.1 kg)   04/15/22 250 lb 6.4 oz (113.6 kg)       General: Awake, alert and oriented.   HEENT: normocephalic, alopecia, PERRL, no scleral erythema or icterus, Oral mucosa moist and intact, throat clear  NECK: supple without palpable adenopathy  BACK: Straight negative CVAT  SKIN: warm dry and intact without lesions rashes or masses  CHEST: CTA bilaterally without use of accessory muscles  CV: Normal S1 S2, RRR, no MRG  ABD: NT ND normoactive BS, no palpable masses or hepatosplenomegaly  EXTREMITIES: BLL 1+ edema slightly improved today, denies calf tenderness  NEURO: CN II - XII grossly intact  CATHETER: Right Tunneled HD catheter    Laboratory Data:  CBC:   Recent Labs     22  0840 22  0929   WBC 1.7* 0.9*   HGB 7.9* 7.2*   HCT 23.3* 20.7*   MCV 96.6 96.1   PLT 35* 38*     BMP/Mag:  Recent Labs     22  0840 22  0929    143   K 4.0 4.1   * 112*   CO2 22 22   PHOS 2.8 2.2*   BUN 37* 37*   CREATININE 2.6* 2.5*   MG  --  1.80 LIVP:   Recent Labs     04/18/22  0929   AST 22   ALT 16   BILIDIR <0.2   BILITOT 0.6   ALKPHOS 67     Coags:   Recent Labs     04/18/22  0929   PROTIME 12.5   INR 1.10   APTT 30.9     Uric Acid   Recent Labs     04/18/22  0929   LABURIC 7.0     Lab Results   Component Value Date    CRP <3.0 04/18/2022    CRP <3.0 04/17/2022    CRP <3.0 04/15/2022     Lab Results   Component Value Date    FERRITIN 1,325.0 (H) 04/18/2022    FERRITIN 1,456.0 (H) 04/17/2022    FERRITIN 1,222.0 (H) 04/15/2022         PROBLEM LIST:            1. [place] Light Chain Multiple Myeloma   2. ESRD on HD   3. GERD  4. HTN  5. Hypokalemia   6. Subdural Hematoma (1/2022)  7. Seizures (1/2022)      TREATMENT:            1. CyBorD (started 10/30/18)  2. Revlimid / Til[place] All / Dex on clinical trial (started 2/1/19)  3. Carfilzomib / Pomalidomide / Dex x 6 cycles (Relapse #1 - started 8/26/19 - 2/2020)  4. Cytoxan Mobilization w/ 25% dose reduction (2/17/20)  5. Melphalan 140mg/m2 & Autologous SCT 3/12/20  6. Kyprolis/Ebony/Dex (6/30/21-7/28/21)- progression  7. Velcade/Ebony/Dex (5/18/21-8/3/21)- progression  8. HD - CTX               -Cycle 1 9/27/21              -Cycle 2 10/18/21              -Cycle 3 11/8/21- stopped d/t severe fatigue  9. Bendamustine/Velcade/Dex 12/06/21  10. Velcade only 2/11/22  11. Fludarabine/Cytoxan followed by CAR-T infusion 3/21/22  12. Stem Cell Boost 4/18/22      ASSESSMENT AND PLAN:            1. [place] Light Chain Multiple Myeloma:  Currently in [facility]  - BM biopsy: 1/12/22.  20% plasma cells . [place]  p53 positive 79.5% of cells  - 1/28/22 Serum KFLC 1662.71, Lambda 3.05   K/L ratio 545.15  - 2/24/22 Serum KFLC 1919.12, Lambda 2.54, K/L raio 755.56  - s/p Fludarabine and Cytoxan 3/16/22-3/18/22  - Post CAR-T infusion staging:  PET/CT, BM bx with Flow, FISH, CG, MM    Lymphodepleting Chemotherapy:  Fludarabine and cyclophosphamide   Disease Status at time of Infusion:  Progressive disease  CAR-T Infusion Date: 3/21/22  CAR-T Product:  Deven Harris  Batch ID Number:  [id number]     Day + 27    Plan:   - Give Stem Cell Boost 4/18/22 in OP Infusion  - M. M. labs 4/18/22: Pending     Day 0     2. CRS / Neuro: Grade 1 CRS 3/22  CRS Grade: 1, fever only 3/22  - S/p toci 800mg 3/22/22  - Monitor CRP and Ferrtin closely  Lab Results   Component Value Date    CRP <3.0 04/18/2022    CRP <3.0 04/17/2022    CRP <3.0 04/15/2022     Lab Results   Component Value Date    FERRITIN 1,325.0 (H) 04/18/2022    FERRITIN 1,456.0 (H) 04/17/2022    FERRITIN 1,222.0 (H) 04/15/2022     ICANS Grade:  0  - Neuro checks w/ CARTOX 10-point assessment Q4hrs  ICE Score:  CAR-T Score: 10    3. ID: Afebrile  - Cont Levaquin, Diflucan, Valtrex ppx     4. Heme:  Pancytopenia d/t recent chemotherapy   - Transfuse for Hgb < 7 and Platelets < 71S (recent subdural)  - Plt transfusion today  - 1 hour Post Plt count 4/13/22: 32  - PRA sent (4/7/22): Positive (Will receive Matched Crossed Plts)   - Will order cross-matched platelets    - scheduled for stem cell infusion in am      5. ESRD/Metabolic: SCr: 2.5 (9/81/98), Mild Hyperglycemia & HypoMag  ESRD:  - off HD currently. Followed by Dr. Valencia Vanna  - Baseline Cr 2.1-2.6  - Cont IVFs: 1000 mL given 4/7/22  - Cont Mag-Ox 400 mg BID 4/7/22  TLS:  No evidence, cont allopurinol and daily TLS labs   - Replace potassium and magnesium per PRN orders  - Given persistent/worsened peripheral edema, instructed him to take lasix 40 mg PO x1 on 4/2/22: Much Improved   - Continue to monitor his kidney function on daily basis. 6. Nutrition:  Appetite and oral intake is good. - Cont low microbial diet   - Follow closely with dietary     7.   Neuro:  Recent seizure/subdural hematoma (1/2022)  - Continue Keppra 500 mg PO BID indefinitely  - Keep platelets >11,062      - Disposition: return to clinic in am     KIMBERLEY Tracey - MINDI Levin DO

## 2022-04-18 NOTE — PROCEDURES
Transplant (T0) Progress Note      [facility]  Blood Type: O pos    4/18/2022  Start time: 11:22 am Completion time: 11:39 am    Transplant Type: Autologous Boost    Product Type: PBSC (peripheral blood stem cell)    Product Unit Number: [id number]     [id number] C0    Cell Count: 2.0  x 10^8  Volume: 140 mL      Product Manipulation:      Volume Reduction  No  Plasma Depletion  No  RBC Depletion  No    Catheter lumen used for infusion: red            Positive Blood Return: Yes    Donor Name: [facility]                                      Blood Type: O pos    Relationship: Self                                     Donor Sex: Male    Premeds Given: 650 mg of tylenol, 50 mg solu-cortef, 25 mg of benadryl    Adverse Reaction(s) and treatment: Baseline vital signs obtained prior to infusion and monitoring completed throughout per [facility] protocol - see flowsheets. All IVFs turned down to [facility] during stem cell infusion. Stem cell product(s) verified with 2nd RN Yaniv Dickerson prior to infusion using 2 patient identifiers. Infused via gravity with rate controlled by Bhavesh Baxter RN per [facility] protocols. Emergency medications epinephrine, benadryl, & hydrocortisone available as needed. Emergency medical equipment available as needed including telemetry monitoring throughout infusion, supplemental O2, suction equipment, and crash cart. RN at bedside throughout infusion.    Wandy Malone RN    Electronically signed by Wandy Malone RN on 4/18/2022 at 4:02 PM

## 2022-04-19 NOTE — PROGRESS NOTES
Pt presents day +28 Abecma infusion with stem cell booster on 4/18/2022. Pt denies n/v/d, reports #3/10 pain to bilateral shoulders. Pt LS CTA, pt reports chronic cough non-productive, bowel sounds active/present, last BM 4/19/2022 0700. BLE pitting +1 edema noted at this time, BUE non-pitting edema noted at this time. Neuro check WDL, CAR-T 10. Pt sitting up in chair, call light in reach, nonskid footwear on pt, steady gait, independent in room, hourly rounding in place.  Electronically signed by Cheri Hernandez RN on 4/19/2022 at 8:59 AM

## 2022-04-19 NOTE — PROGRESS NOTES
[address] Progress Note      2022    Giovani Delay    :  1955    MRN:  [id number]    Referring MD: Callie Brown APRN - NP  [place], [place] [address]    Subjective: Feeling well today, no fevers, no confusion, eating and drinking well, tolerated stem cell boost well yesterday. No complaints at this time. ECOG PS:  (1) Restricted in physically strenuous activity, ambulatory and able to do work of light nature    KPS: 80% Normal activity with effort; some signs or symptoms of disease    Isolation:  None     Medications    Scheduled Meds:    Continuous Infusions:    PRN Meds:. ROS:  As noted above, otherwise remainder of 10-point ROS negative      Physical Exam:     Vital Signs:  /87   Pulse 98   Temp 97.9 °F (36.6 °C) (Oral)   Resp 20   Wt 253 lb 8.5 oz (115 kg)   SpO2 99%   BMI 32.53 kg/m²     Weight:    Wt Readings from Last 3 Encounters:   22 253 lb 8.5 oz (115 kg)   22 250 lb 9.6 oz (113.7 kg)   22 249 lb 5.4 oz (113.1 kg)       General: Awake, alert and oriented.   HEENT: normocephalic, alopecia, PERRL, no scleral erythema or icterus, Oral mucosa moist and intact, throat clear  NECK: supple without palpable adenopathy  BACK: Straight negative CVAT  SKIN: warm dry and intact without lesions rashes or masses  CHEST: CTA bilaterally without use of accessory muscles  CV: Normal S1 S2, RRR, no MRG  ABD: NT ND normoactive BS, no palpable masses or hepatosplenomegaly  EXTREMITIES: BLL 1+ edema slightly improved today, denies calf tenderness  NEURO: CN II - XII grossly intact  CATHETER: Right Tunneled HD catheter    Laboratory Data:  CBC:   Recent Labs     22  0840 22  0929 22  0858   WBC 1.7* 0.9* 1.1*   HGB 7.9* 7.2* 7.0*   HCT 23.3* 20.7* 20.5*   MCV 96.6 96.1 97.1   PLT 35* 38* 43*     BMP/Mag:  Recent Labs     22  0840 22  0929 22  0858    143 143   K 4.0 4.1 3.8   * 112* 113*   CO2 22 22 21   PHOS 2.8 2.2* 2.7   BUN 37* 37* 33*   CREATININE 2.6* 2.5* 2.4*   MG  --  1.80  --      LIVP:   Recent Labs     04/18/22  0929   AST 22   ALT 16   BILIDIR <0.2   BILITOT 0.6   ALKPHOS 67     Coags:   Recent Labs     04/18/22  0929   PROTIME 12.5   INR 1.10   APTT 30.9     Uric Acid   Recent Labs     04/18/22  0929   LABURIC 7.0     Lab Results   Component Value Date    CRP <3.0 04/19/2022    CRP <3.0 04/18/2022    CRP <3.0 04/17/2022     Lab Results   Component Value Date    FERRITIN 1,470.0 (H) 04/19/2022    FERRITIN 1,325.0 (H) 04/18/2022    FERRITIN 1,456.0 (H) 04/17/2022         PROBLEM LIST:            1. [place] Light Chain Multiple Myeloma   2. ESRD on HD   3. GERD  4. HTN  5. Hypokalemia   6. Subdural Hematoma (1/2022)  7. Seizures (1/2022)      TREATMENT:            1. CyBorD (started 10/30/18)  2. Revlimid / [place] Beard / Dex on clinical trial (started 2/1/19)  3. Carfilzomib / Pomalidomide / Dex x 6 cycles (Relapse #1 - started 8/26/19 - 2/2020)  4. Cytoxan Mobilization w/ 25% dose reduction (2/17/20)  5. Melphalan 140mg/m2 & Autologous SCT 3/12/20  6. Kyprolis/Ebony/Dex (6/30/21-7/28/21)- progression  7. Velcade/Ebony/Dex (5/18/21-8/3/21)- progression  8. HD - CTX               -Cycle 1 9/27/21              -Cycle 2 10/18/21              -Cycle 3 11/8/21- stopped d/t severe fatigue  9. Bendamustine/Velcade/Dex 12/06/21  10. Velcade only 2/11/22  11. Fludarabine/Cytoxan followed by CAR-T infusion 3/21/22  12. Stem Cell Boost 4/18/22      ASSESSMENT AND PLAN:            1. [place] Light Chain Multiple Myeloma:  Currently in [facility]  - BM biopsy: 1/12/22.  20% plasma cells . Ace Odell  p53 positive 79.5% of cells  - 1/28/22 Serum KFLC 1662.71, Lambda 3.05   K/L ratio 545.15  - 2/24/22 Serum KFLC 1919.12, Lambda 2.54, K/L raio 755.56  - s/p Fludarabine and Cytoxan 3/16/22-3/18/22  - Post CAR-T infusion staging:  PET/CT, BM bx with Flow, FISH, CG, MM    Lymphodepleting Chemotherapy:  Fludarabine and cyclophosphamide   Disease Status at time of Infusion:  Progressive disease  CAR-T Infusion Date:  3/21/22  CAR-T Product:  Abecma  Batch ID Number:  [id number]     Day + 28    Day + 1 Stem Cell Boost 4/18/22     - M. M. labs 4/18/22: Pending       2. CRS / Neuro: Grade 1 CRS 3/22  CRS Grade: 1, fever only 3/22  - S/p toci 800mg 3/22/22  - Monitor CRP and Ferrtin closely  Lab Results   Component Value Date    CRP <3.0 04/19/2022    CRP <3.0 04/18/2022    CRP <3.0 04/17/2022     Lab Results   Component Value Date    FERRITIN 1,470.0 (H) 04/19/2022    FERRITIN 1,325.0 (H) 04/18/2022    FERRITIN 1,456.0 (H) 04/17/2022     ICANS Grade:  0  - Neuro checks w/ CARTOX 10-point assessment Q4hrs  ICE Score:  CAR-T Score: 10    3. ID: Afebrile  - Cont Levaquin, Diflucan, Valtrex ppx     4. Heme:  Pancytopenia d/t recent chemotherapy   - Transfuse for Hgb < 7 and Platelets < 42P (recent subdural)  - Plt & PRBC transfusion today  - PRA sent (4/7/22): Positive (Will receive Matched Crossed Plts): Type O compatible, will receive Type O moving forward 4/18/22  - Start Daily Granix (4/19/22)        5. ESRD/Metabolic: SCr: 2.5 (7/92/95), Mild Hyperglycemia & HypoMag  ESRD:  - off HD currently. Followed by Dr. Marquis Dillon  - Baseline Cr 2.1-2.6  -  IVFs: PRN   - Cont Mag-Ox 400 mg BID 4/7/22  TLS:  No evidence, cont allopurinol and daily TLS labs   - Replace potassium and magnesium per PRN orders  - Given persistent/worsened peripheral edema, instructed him to take lasix 40 mg PO x1 on 4/2/22: Much Improved   - Continue to monitor his kidney function on daily basis. 6. Nutrition:  Appetite and oral intake is good. - Cont low microbial diet   - Follow closely with dietary     7.   Neuro:  Recent seizure/subdural hematoma (1/2022)  - Continue Keppra 500 mg PO BID indefinitely  - Keep platelets >27,034      - Disposition: return to clinic in am     KIMBERLEY Faustin - MINDI Brothers DO

## 2022-04-19 NOTE — PROGRESS NOTES
Short Stay Communication Note  Adeel Parker  Diagnosis: multiple myeloma  Primary MD: Dr. Hilary Barclay  Treatment: Melphalan Fludarabine/Cytoxan  Day +28 of Car-T Abecma   Pt seen in outpatient infusion today. Labs drawn and reviewed. CBC: Recent Labs     04/17/22  0840 04/18/22  0929 04/19/22  0858   WBC 1.7* 0.9* 1.1*   HGB 7.9* 7.2* 7.0*   HCT 23.3* 20.7* 20.5*   MCV 96.6 96.1 97.1   PLT 35* 38* 43*     BMP/Mag:  Recent Labs     04/17/22  0840 04/18/22  0929 04/19/22  0858    143 143   K 4.0 4.1 3.8   * 112* 113*   CO2 22 22 21   PHOS 2.8 2.2* 2.7   BUN 37* 37* 33*   CREATININE 2.6* 2.5* 2.4*   MG  --  1.80  --      Standing parameters for replacement for this patient:   1 unit of pack red blood cells for a hemoglobin < or equal to 7  1 pack of platelets for a platelet count less than or equal to 50  40 MeQ of Potassium administered for a potassium level less than or equal to 3.4  4g of Magnesium Sulfate for a magnesum level less than or equal to 1.4  1pk platelets transfused per standing order for the above lab values. Urinalysis last done: 4/18/2022 Urinalysis next due: 4/25/2022    Chest X-Ray last done: 4/18/2022 Chest X-Ray next due: 4/25/2022    Symptoms addressed and reported to care team this date:   Treatments this date: IV Fluids    Reviewed medication schedule with pt and caregiver. Both able to verbalize all medications and schedule. Pt to be seen again tomorrow. Discharged ambulatory to home. Patient's hemoglobin this AM: Recent Labs     04/19/22  0858   HGB 7.0*      Patient's platelet count this AM:   Recent Labs     04/19/22  0858   PLT 43*     Pt seen at [address] [place] [address] today for  Platelet transfusion  for above lab values. Informed consent verified. No Pre-medications ordered with no previous transfusion reaction history. Transfused per policy. Pt tolerated transfusion well and without incident. Pt verbalizes understanding of discharge instructions.   Discharged to home with spouse.  PRBC to be transfused tomorrow morning upon arrival. Electronically signed by Divya Rock RN on 4/19/2022 at 12:41 PM

## 2022-04-20 NOTE — DISCHARGE SUMMARY
[facility] Discharge Summary             Attending Physician: No att. providers found    Referring MD: Kris Srinivasan MD  [address] [facility] [address] [facility] [address]  [place],  [address]    Name: Carlos Johnson :  1955  MRN:  [id number]    Admission: 2022   Discharge: 2022      Date: 2022    Reason for Admission:   Physical Exam: Thrombocytopenia     Hospital Course: Patient seen in OP Infusion for Plt transfusion. Tolerated well with no signs or symptoms. Will be discharged to home to today and follow up at [facility]. The patient understands all discharge instructions. Feels well with no complaints.        Vital Signs:  BP (!) 131/94   Pulse 97   Temp 97.8 °F (36.6 °C) (Oral)   Resp 18   SpO2 100%     Weight:    Wt Readings from Last 3 Encounters:   22 254 lb 6.6 oz (115.4 kg)   22 253 lb 8.5 oz (115 kg)   22 250 lb 9.6 oz (113.7 kg)       KPS: 90% Able to carry on normal activity; minor signs or symptoms of disease    General: Awake, alert and oriented    HEENT: normocephalic, alopecia, PERRL, no scleral erythema or icterus, Oral mucosa moist and intact, throat clear.    NECK: supple without palpable adenopathy  BACK: Straight, negative CVAT  SKIN: warm dry and intact without lesions rashes or masses  CHEST: CTA bilaterally without use of accessory muscles  CV: Normal S1 S2, RRR, no MRG  ABD: NT ND normoactive BS, no palpable masses or hepatosplenomegaly  EXTREMITIES: without edema, denies calf tenderness  NEURO: CN II - XII grossly intact  CATHETER: CVC    Discharge Laboratory Data:  CBC:   Recent Labs     22  0929 22  0858 22  0845   WBC 0.9* 1.1* 3.5*   HGB 7.2* 7.0* 7.4*   HCT 20.7* 20.5* 21.4*   MCV 96.1 97.1 97.3   PLT 38* 43* 42*     BMP/Mag:  Recent Labs     22  0929 22  0858 22  0845    143 146*   K 4.1 3.8 4.0   * 113* 113*   CO2 22 21 18*   PHOS 2.2* 2.7 2.5   BUN 37* 33* 29*   CREATININE 2.5* 2.4* 2.4*   MG 1.80  --  1.70*     LIVP:

## 2022-04-20 NOTE — DISCHARGE SUMMARY
[address] [facility] Discharge Summary             Attending Physician: No att. providers found    Referring MD: Alexia Qureshi, APRN - NP  [address] [address] [address],  [address]    Name: Elissa Lindsey :  1955  MRN:  [id number]    Admission: 2022   Discharge: 2022      Date: 2022    Reason for Admission: Thrombocytopenia    Hospital Course: Patient seen in OP Infusion for Plt transfusion. Tolerated well with no signs or symptoms. Will be discharged to home to today and follow up at [facility]. The patient understands all discharge instructions. Feels well with no complaints. Physical Exam:     Vital Signs:  BP (!) 148/96   Pulse 95   Temp 98.3 °F (36.8 °C)   Resp 18   SpO2 100%     Weight:    Wt Readings from Last 3 Encounters:   22 254 lb 6.6 oz (115.4 kg)   22 253 lb 8.5 oz (115 kg)   22 250 lb 9.6 oz (113.7 kg)       KPS: 90% Able to carry on normal activity; minor signs or symptoms of disease    General: Awake, alert and oriented    HEENT: normocephalic, alopecia, PERRL, no scleral erythema or icterus, Oral mucosa moist and intact, throat clear.    NECK: supple without palpable adenopathy  BACK: Straight, negative CVAT  SKIN: warm dry and intact without lesions rashes or masses  CHEST: CTA bilaterally without use of accessory muscles  CV: Normal S1 S2, RRR, no MRG  ABD: NT ND normoactive BS, no palpable masses or hepatosplenomegaly  EXTREMITIES: without edema, denies calf tenderness  NEURO: CN II - XII grossly intact  CATHETER: CVC    Discharge Laboratory Data:  CBC:   Recent Labs     22  0929 22  0858 22  0845   WBC 0.9* 1.1* 3.5*   HGB 7.2* 7.0* 7.4*   HCT 20.7* 20.5* 21.4*   MCV 96.1 97.1 97.3   PLT 38* 43* 42*     BMP/Mag:  Recent Labs     22  0929 22  0858 22  0845    143 146*   K 4.1 3.8 4.0   * 113* 113*   CO2 22 21 18*   PHOS 2.2* 2.7 2.5   BUN 37* 33* 29*   CREATININE 2.5* 2.4* 2.4*   MG 1.80  --  1.70*     LIVP:   Recent Labs     04/18/22  0929 04/20/22  0845   AST 22 27   ALT 16 16   BILIDIR <0.2 <0.2   BILITOT 0.6 0.8   ALKPHOS 67 70     Coags:   Recent Labs     04/18/22  0929   PROTIME 12.5   INR 1.10   APTT 30.9     Uric Acid   Recent Labs     04/18/22  0929 04/20/22  0845   LABURIC 7.0 6.5     Tacro:  No results for input(s): TACROLEV in the last 72 hours. CMV Quant DNA by PCR: No results found for: CMVDNAQNT  IgG:   Recent Labs     04/18/22  0929   .0*         PROBLEM LIST:            1. Thrombocytopenia       TREATMENT:                1. Plt transfusion       ASSESSMENT AND PLAN:           1. [place] Light Chain Multiple Myeloma:  Currently in [facility]  - BM biopsy: 1/12/22.  20% plasma cells . Ace Billing p53 positive 79.5% of cells  -1/28/22 Serum KFLC 1662.71, Lambda 3.05   K/L ratio 545.15  -2/24/22 Serum KFLC 1919.12, Lambda 2.54, K/L raio 755.56  -s/p Fludarabine and Cytoxan 3/16/22-3/18/22  -Post CAR-T infusion staging:  PET/CT, BM bx with Flow, FISH, CG, MMP     2. Heme:   - Transfuse for Hgb < 7 and Platelets < 10 K.   - Plt transfusion today    Thrombocytopenia Precautions in place. Patient showing no signs or symptoms of active bleeding. Patient transfused blood products per orders - see flowsheet. Patient verbalizes understanding of all instructions.         Condition on discharge: stable     Discharge Instructions: The patient was advised on activity and dietary restrictions. The patient was advised to follow up in the emergency department or contact the physician with any unresolved nausea/vomiting/diarrhea/pain or temperature greater than 100.5 F or any other unusual symptoms. This discharge summary and plan was discussed and agreed upon with Dr. Mihaela Thompson.     Magdalene Gr, APRN - NP, CNP

## 2022-04-20 NOTE — PROGRESS NOTES
Patient's hemoglobin this AM: Recent Labs     04/20/22  0845   HGB 7.4*      Patient's platelet count this AM:   Recent Labs     04/20/22  0845   PLT 42*     Pt seen at [address] [place] [address] today for  Platelet transfusion  for above lab values. Informed consent verified. No Pre-medications ordered with no previous transfusion reaction history. Transfused per policy. Pt tolerated transfusion well and without incident. Pt verbalizes understanding of discharge instructions. Discharged to home with wife.     Nabila Garcia RN

## 2022-04-20 NOTE — H&P
[facility] History and Physical       Attending Physician: No att. providers found    Primary Care: No primary care provider on file. Referring MD: KIMBERLEY Delaney NP  [address] [facility]  [address],  [address]    Name: Shannon Soto :  1955  MRN:  [id number]    Admission: 2022      Date: 2022    Reason for Admission: Thrombocytopenia     History of Present Illness: Farhan Griffin a 66 yo male w/ h/o Kappa Light Chain Multiple Myeloma (Dx 10/2018).   He was initially diagnosed with myeloma 10/2018 after presenting to the ED following routine workplace physical and labs revealed JUDY, anemia and thrombocytopenia (SCr 4.6, Hgb 7.8 & platelets of 63Y). He was admitted to [facility] for additional work-up and was found to have kappa light chain multiple myeloma and nephropathy.      He presents to OP Infusion today with Thrombocytopenia, will receive Plt transfusion. He feels well today, denies fever, chills, bleeding, or GI changes. Eating and drinking well. Past Surgical History:   Procedure Laterality Date    IR TUNNELED CATHETER PLACEMENT GREATER THAN 5 YEARS  2022    IR TUNNELED CATHETER PLACEMENT GREATER THAN 5 YEARS 2022 [facility]       Past Medical History:   Diagnosis Date    Cancer ([facility] [address]     ESRD (end stage renal disease) on dialysis ([facility] [address]     Mon-Wed-Fri       Prior to Admission medications    Medication Sig Start Date End Date Taking?  Authorizing Provider   Magnesium Oxide 400 MG CAPS Take 400 mg by mouth 2 times daily 22   KIMBERLEY Delaney NP   allopurinol (ZYLOPRIM) 100 MG tablet Take 2 tablets by mouth daily 22   KIMBERLEY Delaney NP   acyclovir (ZOVIRAX) 400 MG tablet Take 1 tablet by mouth 2 times daily 3/30/22   KIMBERLEY Delaney NP   fluconazole (DIFLUCAN) 100 MG tablet Take 1 tablet by mouth daily 3/29/22 4/28/22  KIMBERLEY Delaney NP   amLODIPine ([address]) 5 MG tablet Take 1 tablet by mouth daily 3/28/22   Enma Lucio MD   pantoprazole sodium (PROTONIX) 40 MG PACK packet Take 40 mg by mouth every morning (before breakfast)  Patient not taking: Reported on 4/18/2022    Historical Provider, MD   Calcium Citrate-Vitamin D (CALCIUM + VIT D, BARIATRIC ADVANTAGE, CHEWABLE TABLET) Take 1 tablet by mouth daily 3/16/22   KIMBERLEY Barillas - NP   levETIRAcetam (KEPPRA) 500 MG tablet Take 1 tablet by mouth every 12 hours 1/21/22   Wendy Mata MD   sodium bicarbonate 650 MG tablet Take 650 mg by mouth daily    Historical Provider, MD   prochlorperazine (COMPAZINE) 10 MG tablet Take 10 mg by mouth every 6 hours as needed   Patient not taking: Reported on 4/18/2022    Historical Provider, MD   levoFLOXacin (LEVAQUIN) 250 MG tablet Take 250 mg by mouth daily For prophylaxis during chemotherapy    Historical Provider, MD       Allergies   Allergen Reactions    Decadron [Dexamethasone]      Patient is receiving CAR-T. No steroids unless approved by [address] physician.        Family History   Problem Relation Age of Onset    Cancer Father         Social History     Socioeconomic History    Marital status: Life Partner     Spouse name: Ottie Aase Number of children: Not on file    Years of education: Not on file    Highest education level: Not on file   Occupational History    Not on file   Tobacco Use    Smoking status: Never Smoker    Smokeless tobacco: Never Used   Substance and Sexual Activity    Alcohol use: Not Currently    Drug use: Never    Sexual activity: Not Currently     Partners: Female   Other Topics Concern    Not on file   Social History Narrative    Not on file     Social Determinants of Health     Financial Resource Strain:     Difficulty of Paying Living Expenses: Not on file   Food Insecurity:     Worried About Running Out of Food in the Last Year: Not on file    Joel of Food in the Last Year: Not on file   Transportation Needs:     Lack of Transportation (Medical): Not on file    Lack of Transportation (Non-Medical): Not on file   Physical Activity:     Days of Exercise per Week: Not on file    Minutes of Exercise per Session: Not on file   Stress:     Feeling of Stress : Not on file   Social Connections:     Frequency of Communication with Friends and Family: Not on file    Frequency of Social Gatherings with Friends and Family: Not on file    Attends Mandaen Services: Not on file    Active Member of [address] [place] [address] or Organizations: Not on file    Attends Club or Organization Meetings: Not on file    Marital Status: Not on file   Intimate Partner Violence:     Fear of Current or Ex-Partner: Not on file    Emotionally Abused: Not on file    Physically Abused: Not on file    Sexually Abused: Not on file   Housing Stability:     Unable to Pay for Housing in the Last Year: Not on file    Number of [place] in the Last Year: Not on file    Unstable Housing in the Last Year: Not on file        ROS:  As noted above, otherwise remainder of 10-point ROS negative    Physical Exam:     Vital Signs:  BP (!) 148/96   Pulse 95   Temp 98.3 °F (36.8 °C)   Resp 18   SpO2 100%     Weight:    Wt Readings from Last 3 Encounters:   04/20/22 254 lb 6.6 oz (115.4 kg)   04/19/22 253 lb 8.5 oz (115 kg)   04/18/22 250 lb 9.6 oz (113.7 kg)       KPS: 90% Able to carry on normal activity; minor signs or symptoms of disease    ECOG PS:  (1) Restricted in physically strenuous activity, ambulatory and able to do work of light nature    General: Awake, alert and oriented.   HEENT: normocephalic, PERRL, no scleral erythema or icterus, Oral mucosa moist and intact, throat clear  NECK: supple without palpable adenopathy  BACK: Straight negative CVAT  SKIN: warm dry and intact without lesions rashes or masses  CHEST: CTA bilaterally without use of accessory muscles  CV: Normal S1 S2, RRR, no MRG  ABD: NT ND normoactive BS, no palpable masses or

## 2022-04-20 NOTE — PROGRESS NOTES
Short Stay Communication Note    Marco Lindseysharyn  Diagnosis: multiple myeloma  Primary MD: Dr. Marcelina Anderson  Treatment: Melphalan Fludarabine/Cytoxan  Day +29 of Car-T Abecma / Day +3 stem cell boost  Pt seen in outpatient infusion today. Labs drawn and reviewed. CBC:   Recent Labs     04/18/22  0929 04/19/22  0858 04/20/22  0845   WBC 0.9* 1.1* 3.5*   HGB 7.2* 7.0* 7.4*   HCT 20.7* 20.5* 21.4*   MCV 96.1 97.1 97.3   PLT 38* 43* 42*     BMP/Mag:  Recent Labs     04/18/22  0929 04/19/22  0858 04/20/22  0845    143 146*   K 4.1 3.8 4.0   * 113* 113*   CO2 22 21 18*   PHOS 2.2* 2.7 2.5   BUN 37* 33* 29*   CREATININE 2.5* 2.4* 2.4*   MG 1.80  --  1.70*     Standing parameters for replacement for this patient:   1 unit of pack red blood cells for a hemoglobin < or equal to 7  1 pack of platelets for a platelet count less than or equal to 50  40 MeQ of Potassium administered for a potassium level less than or equal to 3.4  4g of Magnesium Sulfate for a magnesum level less than or equal to 1.4      Urinalysis last done: 4/18/2022 Urinalysis next due: 4/25/2022    Chest X-Ray last done: 4/18/2022 Chest X-Ray next due: 4/25/2022    Symptoms addressed and reported to care team this date: None  Treatments this date: labs, blood, platelets    Reviewed medication schedule with pt and caregiver. Both able to verbalize all medications and schedule. Pt to be seen again tomorrow. Discharged ambulatory to home. Patient's hemoglobin this AM:      Recent Labs     04/20/22  0845   HGB 7.4*      Patient's platelet count this AM:   Recent Labs     04/20/22  0845   PLT 42*     Pt seen at [address] [place] [address] today for  Blood transfusion for above lab values. Informed consent verified. No Pre-medications ordered with no previous transfusion reaction history. Transfused per policy. Pt tolerated transfusion well and without incident. Pt verbalizes understanding of discharge instructions. Discharged to home with spouse.  PRBC to be

## 2022-04-20 NOTE — H&P
[address] History and Physical       Attending Physician: No att. providers found    Primary Care: No primary care provider on file. Referring MD: Magdaleno Torres MD  [address] [facility] [address] [facility] [address]  [place],  [address]    Name: Hal Clinton :  1955  MRN:  [id number]    Admission: 2022      Date: 2022    Reason for Admission: Thrombocytopenia      History of Present Illness: Hudson Courts a 66 yo male w/ h/o Kappa Light Chain Multiple Myeloma (Dx 10/2018).   He was initially diagnosed with myeloma 10/2018 after presenting to the ED following routine workplace physical and labs revealed JUDY, anemia and thrombocytopenia (SCr 4.6, Hgb 7.8 & platelets of 61X). He was admitted to [facility] for additional work-up and was found to have kappa light chain multiple myeloma and nephropathy.       He presents to OP Infusion today with Thrombocytopenia, will receive Plt transfusion. He feels well today, denies fever, chills, bleeding, or GI changes. Eating and drinking well.        Past Surgical History:   Procedure Laterality Date    IR TUNNELED CATHETER PLACEMENT GREATER THAN 5 YEARS  2022    IR TUNNELED CATHETER PLACEMENT GREATER THAN 5 YEARS 2022 [facility]       Past Medical History:   Diagnosis Date    Cancer ([facility] [address] [facility]     ESRD (end stage renal disease) on dialysis ([facility] [address]     Mon-Wed-Fri       Prior to Admission medications    Medication Sig Start Date End Date Taking?  Authorizing Provider   Magnesium Oxide 400 MG CAPS Take 400 mg by mouth 2 times daily 22   Asa Haver, APRN - NP   allopurinol (ZYLOPRIM) 100 MG tablet Take 2 tablets by mouth daily 22   Asa Haver, APRN - NP   acyclovir (ZOVIRAX) 400 MG tablet Take 1 tablet by mouth 2 times daily 3/30/22   Asa Haver, APRN - NP   fluconazole (DIFLUCAN) 100 MG tablet Take 1 tablet by mouth daily 3/29/22 4/28/22  Asa Haver, APRN - NP   amLODIPine ([address]) 5 MG tablet Take 1 tablet by mouth daily 3/28/22   Noris Castro MD   pantoprazole sodium (PROTONIX) 40 MG PACK packet Take 40 mg by mouth every morning (before breakfast)  Patient not taking: Reported on 4/18/2022    Historical Provider, MD   Calcium Citrate-Vitamin D (CALCIUM + VIT D, BARIATRIC ADVANTAGE, CHEWABLE TABLET) Take 1 tablet by mouth daily 3/16/22   KIMBERLEY Nava - NP   levETIRAcetam (KEPPRA) 500 MG tablet Take 1 tablet by mouth every 12 hours 1/21/22   Kaden Thomas MD   sodium bicarbonate 650 MG tablet Take 650 mg by mouth daily    Historical Provider, MD   prochlorperazine (COMPAZINE) 10 MG tablet Take 10 mg by mouth every 6 hours as needed   Patient not taking: Reported on 4/18/2022    Historical Provider, MD   levoFLOXacin (LEVAQUIN) 250 MG tablet Take 250 mg by mouth daily For prophylaxis during chemotherapy    Historical Provider, MD       Allergies   Allergen Reactions    Decadron [Dexamethasone]      Patient is receiving CAR-T. No steroids unless approved by [facility] physician.        Family History   Problem Relation Age of Onset    Cancer Father         Social History     Socioeconomic History    Marital status: Life Partner     Spouse name: Nicole Gagnon Number of children: Not on file    Years of education: Not on file    Highest education level: Not on file   Occupational History    Not on file   Tobacco Use    Smoking status: Never Smoker    Smokeless tobacco: Never Used   Substance and Sexual Activity    Alcohol use: Not Currently    Drug use: Never    Sexual activity: Not Currently     Partners: Female   Other Topics Concern    Not on file   Social History Narrative    Not on file     Social Determinants of Health     Financial Resource Strain:     Difficulty of Paying Living Expenses: Not on file   Food Insecurity:     Worried About Running Out of Food in the Last Year: Not on file    Joel of Food in the Last Year: Not on file   Transportation Needs:  Lack of Transportation (Medical): Not on file    Lack of Transportation (Non-Medical): Not on file   Physical Activity:     Days of Exercise per Week: Not on file    Minutes of Exercise per Session: Not on file   Stress:     Feeling of Stress : Not on file   Social Connections:     Frequency of Communication with Friends and Family: Not on file    Frequency of Social Gatherings with Friends and Family: Not on file    Attends Evangelical Services: Not on file    Active Member of [address] [place] [address] or Organizations: Not on file    Attends Club or Organization Meetings: Not on file    Marital Status: Not on file   Intimate Partner Violence:     Fear of Current or Ex-Partner: Not on file    Emotionally Abused: Not on file    Physically Abused: Not on file    Sexually Abused: Not on file   Housing Stability:     Unable to Pay for Housing in the Last Year: Not on file    Number of [place] in the Last Year: Not on file    Unstable Housing in the Last Year: Not on file        ROS:  As noted above, otherwise remainder of 10-point ROS negative    Physical Exam:     Vital Signs:  BP (!) 131/94   Pulse 97   Temp 97.8 °F (36.6 °C) (Oral)   Resp 18   SpO2 100%     Weight:    Wt Readings from Last 3 Encounters:   04/20/22 254 lb 6.6 oz (115.4 kg)   04/19/22 253 lb 8.5 oz (115 kg)   04/18/22 250 lb 9.6 oz (113.7 kg)       KPS: 90% Able to carry on normal activity; minor signs or symptoms of disease    ECOG PS:  (1) Restricted in physically strenuous activity, ambulatory and able to do work of light nature    General: Awake, alert and oriented.   HEENT: normocephalic, PERRL, no scleral erythema or icterus, Oral mucosa moist and intact, throat clear  NECK: supple without palpable adenopathy  BACK: Straight negative CVAT  SKIN: warm dry and intact without lesions rashes or masses  CHEST: CTA bilaterally without use of accessory muscles  CV: Normal S1 S2, RRR, no MRG  ABD: NT ND normoactive BS, no palpable masses or hepatosplenomegaly  EXTREMITIES: without edema, denies calf tenderness  NEURO: CN II - XII grossly intact  CATHETER: CVC    Laboratory Data:   CBC:   Recent Labs     04/18/22  0929 04/19/22  0858 04/20/22  0845   WBC 0.9* 1.1* 3.5*   HGB 7.2* 7.0* 7.4*   HCT 20.7* 20.5* 21.4*   MCV 96.1 97.1 97.3   PLT 38* 43* 42*     BMP/Mag:  Recent Labs     04/18/22  0929 04/19/22  0858 04/20/22  0845    143 146*   K 4.1 3.8 4.0   * 113* 113*   CO2 22 21 18*   PHOS 2.2* 2.7 2.5   BUN 37* 33* 29*   CREATININE 2.5* 2.4* 2.4*   MG 1.80  --  1.70*     LIVP:   Recent Labs     04/18/22  0929 04/20/22  0845   AST 22 27   ALT 16 16   BILIDIR <0.2 <0.2   BILITOT 0.6 0.8   ALKPHOS 67 70     Coags:   Recent Labs     04/18/22  0929   PROTIME 12.5   INR 1.10   APTT 30.9     Uric Acid   Recent Labs     04/18/22  0929 04/20/22  0845   LABURIC 7.0 6.5       PROBLEM LIST:          1. Thrombocytopenia       TREATMENT:                1. Plt transfusion       ASSESSMENT AND PLAN:           1. Kappa Light Chain Multiple Myeloma:  Currently in [facility]  - BM biopsy: 1/12/22.  20% plasma cells . Evelyne Weston p53 positive 79.5% of cells  -1/28/22 Serum KFLC 1662.71, Lambda 3.05   K/L ratio 545.15  -2/24/22 Serum KFLC 1919.12, Lambda 2.54, K/L raio 755.56  -s/p Fludarabine and Cytoxan 3/16/22-3/18/22  -Post CAR-T infusion staging:  PET/CT, BM bx with Flow, FISH, CG, MMP     2. Heme:   - Transfuse for Hgb < 7 and Platelets < 10 K.   - Plt transfusion today    Thrombocytopenia Precautions in place. Patient showing no signs or symptoms of active bleeding. Patient transfused blood products per orders - see flowsheet. Patient verbalizes understanding of all instructions.             - Disposition: Once blood products infused, D/C to follow up at [facility]      The patient was seen and examined by Dr. Reinier Evans. This admission history and physical has been discussed and agreed upon by Dr. Reinier Evans.      KIMBERLEY Roger - NP

## 2022-04-20 NOTE — PROGRESS NOTES
Nutrition Note     RECOMMENDATIONS:  1. PO Diet: Continue General diet w/Low Microbial modifier    NUTRITION ASSESSMENT:   Nutritional summary & status: D+27 s/p Abecma CAR-t; Pt nutrition remains stable per pt; denies any additional nutritio needs/concerns at this time.  Admission/PMH: Admit lymphodepleting chemotherapy consisting of Fludarabine and Cytoxan and is now being admitted for [facility]ecma CAR-T infusion (3/21/22); h/o [place] Light Chain Multiple Myeloma (Dx 10/2018). Melphalan followed by an Autologous Stem Cell on 3/12/20.   PMHx ESRD- off HD currently   Nutrition Goals: pt will maintain adequate po nutrition by consuming greater than 75% of meals and snacks s/p car-t to promote meeting incresed nutrient needs     MALNUTRITION ASSESSMENT  Context of Malnutrition: Acute Illness (on chronic )   Malnutrition Status: No malnutrition    NUTRITION DIAGNOSIS   Increased nutrient needs related to increase demand for energy/nutrients as evidenced by  (s/p chemotx then car-t )    CURRENT NUTRITION THERAPIES  General/Low Microbial diet     ANTHROPOMETRICS  Current Height: 6' 2.02\" (188 cm)  Current Weight: 253 lb 8.5 oz (115 kg)    BMI: 32.6     ANTHROPOMETRICS  Current Height: 6' 2.02\" (188 cm)  Current Weight: 253 lb 8.5 oz (115 kg)    Admission weight: 253 lb 8.5 oz (115 kg)  Ideal Body Weight (IBW): 190 lbs  (86 kg)    Usual Bodyweight 240 lb (108.9 kg) (per pt )   Weight Changes no significant changes      BMI: 32.6    Wt Readings from Last 50 Encounters:   04/20/22 254 lb 6.6 oz (115.4 kg)   04/19/22 253 lb 8.5 oz (115 kg)   04/18/22 250 lb 9.6 oz (113.7 kg)   04/17/22 249 lb 5.4 oz (113.1 kg)   04/15/22 250 lb 6.4 oz (113.6 kg)   04/14/22 251 lb 1.7 oz (113.9 kg)   04/13/22 254 lb 10.1 oz (115.5 kg)   04/12/22 254 lb 10.1 oz (115.5 kg)   04/11/22 253 lb 8.5 oz (115 kg)   04/10/22 251 lb 15.8 oz (114.3 kg)   04/08/22 253 lb 1.4 oz (114.8 kg)   04/06/22 253 lb 12 oz (115.1 kg)   04/05/22 255 lb 1.2 oz (115.7 kg)   04/04/22 254 lb 13.6 oz (115.6 kg)   04/03/22 254 lb 6.6 oz (115.4 kg)   04/02/22 260 lb 12.9 oz (118.3 kg)   04/01/22 259 lb 11.2 oz (117.8 kg)   03/31/22 260 lb 2.3 oz (118 kg)   03/30/22 259 lb 0.7 oz (117.5 kg)   03/29/22 259 lb 0.7 oz (117.5 kg)   03/28/22 259 lb 3.2 oz (117.6 kg)   03/18/22 255 lb 1.2 oz (115.7 kg)   03/17/22 258 lb 13.1 oz (117.4 kg)   03/16/22 251 lb 15.8 oz (114.3 kg)   02/01/22 236 lb (107 kg)   01/21/22 243 lb 12.8 oz (110.6 kg)   03/27/20 228 lb 6.3 oz (103.6 kg)       The patient will still be monitored per nutrition standards of care. Consult dietitian if nutrition interventions essential to patient care is needed.      Naina Parikh RD, LD  Office:  [phone]

## 2022-04-20 NOTE — PROGRESS NOTES
[facility] Progress Note      2022    Brenden Meals    :  1955    MRN:  [id number]    Referring MD: MD Cesar Hahn 1943,  [address]    Subjective: Feeling well today, no fevers, no confusion, eating and drinking well. No complaints at this time. ECOG PS:  (1) Restricted in physically strenuous activity, ambulatory and able to do work of light nature    KPS: 80% Normal activity with effort; some signs or symptoms of disease    Isolation:  None     Medications    Scheduled Meds:    Continuous Infusions:    PRN Meds:. ROS:  As noted above, otherwise remainder of 10-point ROS negative      Physical Exam:     Vital Signs:  /87   Pulse 86   Temp 98.2 °F (36.8 °C) (Oral)   Resp 16   Wt 254 lb 6.6 oz (115.4 kg)   SpO2 96%   BMI 32.65 kg/m²     Weight:    Wt Readings from Last 3 Encounters:   22 254 lb 6.6 oz (115.4 kg)   22 253 lb 8.5 oz (115 kg)   22 250 lb 9.6 oz (113.7 kg)       General: Awake, alert and oriented.   HEENT: normocephalic, alopecia, PERRL, no scleral erythema or icterus, Oral mucosa moist and intact, throat clear  NECK: supple without palpable adenopathy  BACK: Straight negative CVAT  SKIN: warm dry and intact without lesions rashes or masses  CHEST: CTA bilaterally without use of accessory muscles  CV: Normal S1 S2, RRR, no MRG  ABD: NT ND normoactive BS, no palpable masses or hepatosplenomegaly  EXTREMITIES: BLL 1+ edema slightly improved today, denies calf tenderness  NEURO: CN II - XII grossly intact  CATHETER: Right Tunneled HD catheter    Laboratory Data:  CBC:   Recent Labs     22  0929 22  0858 22  0845   WBC 0.9* 1.1* 3.5*   HGB 7.2* 7.0* 7.4*   HCT 20.7* 20.5* 21.4*   MCV 96.1 97.1 97.3   PLT 38* 43* 42*     BMP/Mag:  Recent Labs     22  0929 22  0858 22  0845    143  --    K 4.1 3.8  --    * 113*  --    CO2 22 21 18*   PHOS 2.2* 2.7 2.5   BUN 37* 33* 29*   CREATININE 2.5* 2.4* 2.4*   MG 1.80  --  1.70*     LIVP:   Recent Labs     04/18/22  0929 04/20/22  0845   AST 22 27   ALT 16 16   BILIDIR <0.2 <0.2   BILITOT 0.6 0.8   ALKPHOS 67 70     Coags:   Recent Labs     04/18/22  0929   PROTIME 12.5   INR 1.10   APTT 30.9     Uric Acid   Recent Labs     04/18/22  0929 04/20/22  0845   LABURIC 7.0 6.5     Lab Results   Component Value Date    CRP <3.0 04/20/2022    CRP <3.0 04/19/2022    CRP <3.0 04/18/2022     Lab Results   Component Value Date    FERRITIN 1,794.0 (H) 04/20/2022    FERRITIN 1,470.0 (H) 04/19/2022    FERRITIN 1,325.0 (H) 04/18/2022         PROBLEM LIST:            1. [place] Light Chain Multiple Myeloma   2. ESRD on HD   3. GERD  4. HTN  5. Hypokalemia   6. Subdural Hematoma (1/2022)  7. Seizures (1/2022)      TREATMENT:            1. CyBorD (started 10/30/18)  2. Revlimid / Bonna Estuardo / Dex on clinical trial (started 2/1/19)  3. Carfilzomib / Pomalidomide / Dex x 6 cycles (Relapse #1 - started 8/26/19 - 2/2020)  4. Cytoxan Mobilization w/ 25% dose reduction (2/17/20)  5. Melphalan 140mg/m2 & Autologous SCT 3/12/20  6. Kyprolis/Ebony/Dex (6/30/21-7/28/21)- progression  7. Velcade/Ebony/Dex (5/18/21-8/3/21)- progression  8. HD - CTX               -Cycle 1 9/27/21              -Cycle 2 10/18/21              -Cycle 3 11/8/21- stopped d/t severe fatigue  9. Bendamustine/Velcade/Dex 12/06/21  10. Velcade only 2/11/22  11. Fludarabine/Cytoxan followed by CAR-T infusion 3/21/22  12. Stem Cell Boost 4/18/22      ASSESSMENT AND PLAN:            1. Kappa Light Chain Multiple Myeloma:  Currently in [facility]  - BM biopsy: 1/12/22.  20% plasma cells . Eun Coronado  p53 positive 79.5% of cells  - 1/28/22 Serum KFLC 1662.71, Lambda 3.05   K/L ratio 545.15  - 2/24/22 Serum KFLC 1919.12, Lambda 2.54, K/L ratio 755.56  - 3/10/22 Serum KFLC 2040.00, Lambda 3.54, K/L ratio 576.27  - 4/18/22 Serum KFLC 3230.60, Lambda 6.64, K/L ratio 486.54  - s/p Fludarabine and Cytoxan 3/16/22-3/18/22  - Post CAR-T infusion staging:  PET/CT, BM bx with Flow, FISH, CG, MM  - Post Stem Cell Boost 4/18/22    Lymphodepleting Chemotherapy:  Fludarabine and cyclophosphamide   Disease Status at time of Infusion:  Progressive disease  CAR-T Infusion Date:  3/21/22  CAR-T Product:  Abecma  Batch ID Number:  [id number]     Day + 29    Day + 2 Stem Cell Boost 4/18/22        2. CRS / Neuro: Grade 1 CRS 3/22  CRS Grade: 1, fever only 3/22  - S/p toci 800mg 3/22/22  - Monitor CRP and Ferrtin closely  Lab Results   Component Value Date    CRP <3.0 04/20/2022    CRP <3.0 04/19/2022    CRP <3.0 04/18/2022     Lab Results   Component Value Date    FERRITIN 1,794.0 (H) 04/20/2022    FERRITIN 1,470.0 (H) 04/19/2022    FERRITIN 1,325.0 (H) 04/18/2022     ICANS Grade:  0  - Neuro checks w/ CARTOX 10-point assessment Q4hrs  ICE Score:  CAR-T Score: 10    3. ID: Afebrile  - Cont Levaquin, Diflucan, Valtrex ppx     4. Heme:  Pancytopenia d/t recent chemotherapy   - Transfuse for Hgb < 7 and Platelets < 24I (recent subdural)  - Plt & PRBC transfusion today  - PRA sent (4/7/22): Positive (Will receive Matched Crossed Plts): Type O compatible, will receive Type O moving forward 4/18/22  - Cont Daily Granix (4/19/22)        5. ESRD/Metabolic: Labs pending 8/47/08, SCr: 2.4 (4/20/22), Mild Hyperglycemia & HypoMag  ESRD:  - off HD currently. Followed by Dr. Perla Musa  - Baseline Cr 2.1-2.6  -  IVFs: PRN   - Cont Mag-Ox 400 mg BID 4/7/22  TLS:  No evidence, cont allopurinol and daily TLS labs   - Replace potassium and magnesium per PRN orders  - Given persistent/worsened peripheral edema, instructed him to take lasix 40 mg PO x1 on 4/2/22: Much Improved   - Continue to monitor his kidney function on daily basis. 6. Nutrition:  Appetite and oral intake is good. - Cont low microbial diet   - Follow closely with dietary     7.   Neuro:  Recent seizure/subdural hematoma (1/2022)  - Continue Keppra 500 mg PO BID indefinitely  - Keep platelets >70,335      - Disposition: return to clinic in am     KIMBERLEY Brownlee - MINDI    Gagandeep Dearth, DO

## 2022-04-21 NOTE — H&P
[address] [employer] History and Physical       Attending Physician: No att. providers found    Primary Care: No primary care provider on file. Referring MD: Deborah Santacruz MD  [address] [facility] [address]  [place],  [address]    Name: Rosa Maria Nash :  1955  MRN:  [id number]    Admission: 4/15/2022      Date: 2022    Reason for Admission: Thrombocytopenia      History of Present Illness: Polly Rowland a 66 yo male w/ h/o [place] Light Chain Multiple Myeloma (Dx 10/2018).   He was initially diagnosed with myeloma 10/2018 after presenting to the ED following routine workplace physical and labs revealed JUDY, anemia and thrombocytopenia (SCr 4.6, Hgb 7.8 & platelets of 59A). He was admitted to [facility] for additional work-up and was found to have kappa light chain multiple myeloma and nephropathy.       He presents to OP Infusion today with Thrombocytopenia, will receive Plt transfusion. He feels well today, denies fever, chills, bleeding, or GI changes. Eating and drinking well.        Past Surgical History:   Procedure Laterality Date    IR TUNNELED CATHETER PLACEMENT GREATER THAN 5 YEARS  2022    IR TUNNELED CATHETER PLACEMENT GREATER THAN 5 YEARS 2022 [facility]       Past Medical History:   Diagnosis Date    Cancer ([facility] [place] [address]     ESRD (end stage renal disease) on dialysis ([facility] [place] [address]     Mon-Wed-Fri       Prior to Admission medications    Medication Sig Start Date End Date Taking?  Authorizing Provider   Magnesium Oxide 400 MG CAPS Take 400 mg by mouth 2 times daily 22   Lory Gustafson APRN - NP   allopurinol (ZYLOPRIM) 100 MG tablet Take 2 tablets by mouth daily 22   Lory Gustafson APRN - NP   acyclovir (ZOVIRAX) 400 MG tablet Take 1 tablet by mouth 2 times daily 3/30/22   Lory Gustafson APRN - NP   fluconazole (DIFLUCAN) 100 MG tablet Take 1 tablet by mouth daily 3/29/22 4/28/22  Lory Gustafson APRN - NP   amLODIPine ([address])  Lack of Transportation (Medical): Not on file    Lack of Transportation (Non-Medical): Not on file   Physical Activity:     Days of Exercise per Week: Not on file    Minutes of Exercise per Session: Not on file   Stress:     Feeling of Stress : Not on file   Social Connections:     Frequency of Communication with Friends and Family: Not on file    Frequency of Social Gatherings with Friends and Family: Not on file    Attends Anabaptist Services: Not on file    Active Member of [address] [place] [address] or Organizations: Not on file    Attends Club or Organization Meetings: Not on file    Marital Status: Not on file   Intimate Partner Violence:     Fear of Current or Ex-Partner: Not on file    Emotionally Abused: Not on file    Physically Abused: Not on file    Sexually Abused: Not on file   Housing Stability:     Unable to Pay for Housing in the Last Year: Not on file    Number of [place] in the Last Year: Not on file    Unstable Housing in the Last Year: Not on file        ROS:  As noted above, otherwise remainder of 10-point ROS negative    Physical Exam:     Vital Signs:  BP (!) 152/96   Pulse 84   Temp 97.6 °F (36.4 °C) (Oral)   Resp 16   SpO2 99%     Weight:    Wt Readings from Last 3 Encounters:   04/21/22 254 lb 3.1 oz (115.3 kg)   04/20/22 254 lb 6.6 oz (115.4 kg)   04/19/22 253 lb 8.5 oz (115 kg)       KPS: 90% Able to carry on normal activity; minor signs or symptoms of disease    ECOG PS:  (1) Restricted in physically strenuous activity, ambulatory and able to do work of light nature    General: Awake, alert and oriented.   HEENT: normocephalic, PERRL, no scleral erythema or icterus, Oral mucosa moist and intact, throat clear  NECK: supple without palpable adenopathy  BACK: Straight negative CVAT  SKIN: warm dry and intact without lesions rashes or masses  CHEST: CTA bilaterally without use of accessory muscles  CV: Normal S1 S2, RRR, no MRG  ABD: NT ND normoactive BS, no palpable masses or hepatosplenomegaly  EXTREMITIES: without edema, denies calf tenderness  NEURO: CN II - XII grossly intact  CATHETER: CVC    Laboratory Data:   CBC:   Recent Labs     04/19/22  0858 04/20/22  0845 04/21/22  0900   WBC 1.1* 3.5* 2.2*   HGB 7.0* 7.4* 7.7*   HCT 20.5* 21.4* 22.7*   MCV 97.1 97.3 96.5   PLT 43* 42* 40*     BMP/Mag:  Recent Labs     04/19/22  0858 04/20/22  0845 04/21/22  0900    146* 143   K 3.8 4.0 4.0   * 113* 112*   CO2 21 18* 22   PHOS 2.7 2.5 2.4*   BUN 33* 29* 29*   CREATININE 2.4* 2.4* 2.4*   MG  --  1.70*  --      LIVP:   Recent Labs     04/20/22  0845   AST 27   ALT 16   BILIDIR <0.2   BILITOT 0.8   ALKPHOS 70     Coags:   Recent Labs     04/21/22  0900   PROTIME 13.3*   INR 1.17*   APTT 31.6     Uric Acid   Recent Labs     04/20/22  0845   LABURIC 6.5       PROBLEM LIST:          1. Thrombocytopenia       TREATMENT:                1. Plt transfusion       ASSESSMENT AND PLAN:           1. Kappa Light Chain Multiple Myeloma:  Currently in [facility]  - BM biopsy: 1/12/22.  20% plasma cells . Lizbeth Anirudh p53 positive 79.5% of cells  -1/28/22 Serum KFLC 1662.71, Lambda 3.05   K/L ratio 545.15  -2/24/22 Serum KFLC 1919.12, Lambda 2.54, K/L raio 755.56  -s/p Fludarabine and Cytoxan 3/16/22-3/18/22  -Post CAR-T infusion staging:  PET/CT, BM bx with Flow, FISH, CG, MMP     2. Heme:   - Transfuse for Hgb < 7 and Platelets < 10 K.   - Plt transfusion today    Thrombocytopenia Precautions in place. Patient showing no signs or symptoms of active bleeding. Patient transfused blood products per orders - see flowsheet. Patient verbalizes understanding of all instructions.             - Disposition: Once blood products infused, D/C to follow up at [facility]      The patient was seen and examined by Dr. Gertrude Donahue. This admission history and physical has been discussed and agreed upon by Dr. Gertrude Donahue.      Lexi Horowitz, APRN - NP

## 2022-04-21 NOTE — DISCHARGE SUMMARY
[facility] Discharge Summary             Attending Physician: No att. providers found    Referring MD: Kasey العراقي MD  [address] [facility] [address]  [place],  [address]    Name: Elissa Lindsey :  1955  MRN:  [id number]    Admission: 4/15/2022   Discharge: 4/15/2022      Date: 2022    Reason for Admission:   Physical Exam: Thrombocytopenia     Hospital Course: Patient seen in OP Infusion for Plt transfusion. Tolerated well with no signs or symptoms. Will be discharged to home to today and follow up at [facility]. The patient understands all discharge instructions. Feels well with no complaints.        Vital Signs:  BP (!) 152/96   Pulse 84   Temp 97.6 °F (36.4 °C) (Oral)   Resp 16   SpO2 99%     Weight:    Wt Readings from Last 3 Encounters:   22 254 lb 3.1 oz (115.3 kg)   22 254 lb 6.6 oz (115.4 kg)   22 253 lb 8.5 oz (115 kg)       KPS: 90% Able to carry on normal activity; minor signs or symptoms of disease    General: Awake, alert and oriented    HEENT: normocephalic, alopecia, PERRL, no scleral erythema or icterus, Oral mucosa moist and intact, throat clear.    NECK: supple without palpable adenopathy  BACK: Straight, negative CVAT  SKIN: warm dry and intact without lesions rashes or masses  CHEST: CTA bilaterally without use of accessory muscles  CV: Normal S1 S2, RRR, no MRG  ABD: NT ND normoactive BS, no palpable masses or hepatosplenomegaly  EXTREMITIES: without edema, denies calf tenderness  NEURO: CN II - XII grossly intact  CATHETER: CVC    Discharge Laboratory Data:  CBC:   Recent Labs     22  0858 22  0845 22  0900   WBC 1.1* 3.5* 2.2*   HGB 7.0* 7.4* 7.7*   HCT 20.5* 21.4* 22.7*   MCV 97.1 97.3 96.5   PLT 43* 42* 40*     BMP/Mag:  Recent Labs     22  0858 22  0845 22  0900    146* 143   K 3.8 4.0 4.0   * 113* 112*   CO2 21 18* 22   PHOS 2.7 2.5 2.4*   BUN 33* 29* 29*   CREATININE 2.4* 2.4* 2.4*   MG  --  1.70*  --      LIVP: Recent Labs     04/20/22  0845   AST 27   ALT 16   BILIDIR <0.2   BILITOT 0.8   ALKPHOS 70     Coags:   Recent Labs     04/21/22  0900   PROTIME 13.3*   INR 1.17*   APTT 31.6     Uric Acid   Recent Labs     04/20/22  0845   LABURIC 6.5     Tacro:  No results for input(s): TACROLEV in the last 72 hours. CMV Quant DNA by PCR: No results found for: CMVDNAQNT  IgG:   No results for input(s): IGG in the last 72 hours. PROBLEM LIST:            1. Thrombocytopenia       TREATMENT:                1. Plt transfusion       ASSESSMENT AND PLAN:           1. [place]appa Light Chain Multiple Myeloma:  Currently in [facility]  - BM biopsy: 1/12/22.  20% plasma cells . Arielle Estrada p53 positive 79.5% of cells  -1/28/22 Serum KFLC 1662.71, Lambda 3.05   K/L ratio 545.15  -2/24/22 Serum KFLC 1919.12, Lambda 2.54, K/L raio 755.56  -s/p Fludarabine and Cytoxan 3/16/22-3/18/22  -Post CAR-T infusion staging:  PET/CT, BM bx with Flow, FISH, CG, MMP     2. Heme:   - Transfuse for Hgb < 7 and Platelets < 71 K.   - Plt transfusion today    Thrombocytopenia Precautions in place.  Patient showing no signs or symptoms of active bleeding.  Patient transfused blood products per orders - see flowsheet.  Patient verbalizes understanding of all instructions.          Condition on discharge: stable       Discharge Instructions: The patient was advised on activity and dietary restrictions. The patient was advised to follow up in the emergency department or contact the physician with any unresolved nausea/vomiting/diarrhea/pain or temperature greater than 100.5 F or any other unusual symptoms. This discharge summary and plan was discussed and agreed upon with Dr. Rios Duggan.     Helen Theodore, APRN - NP, CNP

## 2022-04-21 NOTE — DISCHARGE SUMMARY
[address] Discharge Summary             Attending Physician: No att. providers found    Referring MD: Enma Lucio MD  [address] [facility]. [place] [address] [place] [address],  [address]    Name: Vicki Conrad :  1955  MRN:  [id number]    Admission: 2022   Discharge: 2022      Date: 2022    Reason for Admission:   Physical Exam: Thrombocytopenia     Hospital Course: Patient seen in OP Infusion for Plt transfusion. Tolerated well with no signs or symptoms. Will be discharged to home to today and follow up at [facility]. The patient understands all discharge instructions. Feels well with no complaints.        Vital Signs:  BP (!) 169/99   Pulse 90   Temp 98 °F (36.7 °C) (Oral)   Resp 18   SpO2 99%     Weight:    Wt Readings from Last 3 Encounters:   22 254 lb 3.1 oz (115.3 kg)   22 254 lb 6.6 oz (115.4 kg)   22 253 lb 8.5 oz (115 kg)       KPS: 90% Able to carry on normal activity; minor signs or symptoms of disease    General: Awake, alert and oriented    HEENT: normocephalic, alopecia, PERRL, no scleral erythema or icterus, Oral mucosa moist and intact, throat clear.    NECK: supple without palpable adenopathy  BACK: Straight, negative CVAT  SKIN: warm dry and intact without lesions rashes or masses  CHEST: CTA bilaterally without use of accessory muscles  CV: Normal S1 S2, RRR, no MRG  ABD: NT ND normoactive BS, no palpable masses or hepatosplenomegaly  EXTREMITIES: without edema, denies calf tenderness  NEURO: CN II - XII grossly intact  CATHETER: CVC    Discharge Laboratory Data:  CBC:   Recent Labs     22  0858 22  0845 22  0900   WBC 1.1* 3.5* 2.2*   HGB 7.0* 7.4* 7.7*   HCT 20.5* 21.4* 22.7*   MCV 97.1 97.3 96.5   PLT 43* 42* 40*     BMP/Mag:  Recent Labs     22  0858 22  0845 22  0900    146* 143   K 3.8 4.0 4.0   * 113* 112*   CO2 21 18* 22   PHOS 2.7 2.5 2.4*   BUN 33* 29* 29*   CREATININE 2.4* 2.4* 2.4*   MG  --  1.70*  --      LIVP: Recent Labs     04/20/22  0845   AST 27   ALT 16   BILIDIR <0.2   BILITOT 0.8   ALKPHOS 70     Coags:   Recent Labs     04/21/22  0900   PROTIME 13.3*   INR 1.17*   APTT 31.6     Uric Acid   Recent Labs     04/20/22  0845   LABURIC 6.5     Tacro:  No results for input(s): TACROLEV in the last 72 hours. CMV Quant DNA by PCR: No results found for: CMVDNAQNT  IgG:   No results for input(s): IGG in the last 72 hours. PROBLEM LIST:            1. Thrombocytopenia       TREATMENT:                1. Plt transfusion       ASSESSMENT AND PLAN:           1. Kappa Light Chain Multiple Myeloma:  Currently in [facility]  - BM biopsy: 1/12/22.  20% plasma cells . Sergio Cruzodore p53 positive 79.5% of cells  -1/28/22 Serum KFLC 1662.71, Lambda 3.05   K/L ratio 545.15  -2/24/22 Serum KFLC 1919.12, Lambda 2.54, K/L raio 755.56  -s/p Fludarabine and Cytoxan 3/16/22-3/18/22  -Post CAR-T infusion staging:  PET/CT, BM bx with Flow, FISH, CG, MMP     2. Heme:   - Transfuse for Hgb < 7 and Platelets < 67 K.   - Plt transfusion today    Thrombocytopenia Precautions in place.  Patient showing no signs or symptoms of active bleeding.  Patient transfused blood products per orders - see flowsheet.  Patient verbalizes understanding of all instructions.          Condition on discharge: stable       Discharge Instructions: The patient was advised on activity and dietary restrictions. The patient was advised to follow up in the emergency department or contact the physician with any unresolved nausea/vomiting/diarrhea/pain or temperature greater than 100.5 F or any other unusual symptoms. This discharge summary and plan was discussed and agreed upon with Dr. Amanda Simms.     Gordy Rush, APRN - NP, CNP

## 2022-04-21 NOTE — DISCHARGE SUMMARY
[facility] Discharge Summary             Attending Physician: No att. providers found    Referring MD: Cassandra Braden MD  [address] [facility] [address]  [place],  [address]    Name: Nasir Aggarwal :  1955  MRN:  [id number]    Admission: 2022   Discharge: 2022      Date: 2022    Reason for Admission:   Physical Exam: Thrombocytopenia     Hospital Course: Patient seen in OP Infusion for Plt transfusion. Tolerated well with no signs or symptoms. Will be discharged to home to today and follow up at [facility]. The patient understands all discharge instructions. Feels well with no complaints.        Vital Signs:  BP (!) 142/82   Pulse 97   Temp 97.7 °F (36.5 °C) (Oral)   Resp 16   SpO2 100%     Weight:    Wt Readings from Last 3 Encounters:   22 254 lb 3.1 oz (115.3 kg)   22 254 lb 6.6 oz (115.4 kg)   22 253 lb 8.5 oz (115 kg)       KPS: 90% Able to carry on normal activity; minor signs or symptoms of disease    General: Awake, alert and oriented    HEENT: normocephalic, alopecia, PERRL, no scleral erythema or icterus, Oral mucosa moist and intact, throat clear.    NECK: supple without palpable adenopathy  BACK: Straight, negative CVAT  SKIN: warm dry and intact without lesions rashes or masses  CHEST: CTA bilaterally without use of accessory muscles  CV: Normal S1 S2, RRR, no MRG  ABD: NT ND normoactive BS, no palpable masses or hepatosplenomegaly  EXTREMITIES: without edema, denies calf tenderness  NEURO: CN II - XII grossly intact  CATHETER: CVC    Discharge Laboratory Data:  CBC:   Recent Labs     22  0858 22  0845 22  0900   WBC 1.1* 3.5* 2.2*   HGB 7.0* 7.4* 7.7*   HCT 20.5* 21.4* 22.7*   MCV 97.1 97.3 96.5   PLT 43* 42* 40*     BMP/Mag:  Recent Labs     22  0858 22  0845 22  0900    146* 143   K 3.8 4.0 4.0   * 113* 112*   CO2 21 18* 22   PHOS 2.7 2.5 2.4*   BUN 33* 29* 29*   CREATININE 2.4* 2.4* 2.4*   MG  --  1.70*  --      LIVP: Recent Labs     04/20/22  0845   AST 27   ALT 16   BILIDIR <0.2   BILITOT 0.8   ALKPHOS 70     Coags:   Recent Labs     04/21/22  0900   PROTIME 13.3*   INR 1.17*   APTT 31.6     Uric Acid   Recent Labs     04/20/22  0845   LABURIC 6.5     Tacro:  No results for input(s): TACROLEV in the last 72 hours. CMV Quant DNA by PCR: No results found for: CMVDNAQNT  IgG:   No results for input(s): IGG in the last 72 hours. PROBLEM LIST:            1. Thrombocytopenia       TREATMENT:                1. Plt transfusion       ASSESSMENT AND PLAN:           1. Kappa Light Chain Multiple Myeloma:  Currently in [facility]  - BM biopsy: 1/12/22.  20% plasma cells . Ainsley Du p53 positive 79.5% of cells  -1/28/22 Serum KFLC 1662.71, Lambda 3.05   K/L ratio 545.15  -2/24/22 Serum KFLC 1919.12, Lambda 2.54, K/L raio 755.56  -s/p Fludarabine and Cytoxan 3/16/22-3/18/22  -Post CAR-T infusion staging:  PET/CT, BM bx with Flow, FISH, CG, MMP     2. Heme:   - Transfuse for Hgb < 7 and Platelets < 84 K.   - Plt transfusion today    Thrombocytopenia Precautions in place.  Patient showing no signs or symptoms of active bleeding.  Patient transfused blood products per orders - see flowsheet.  Patient verbalizes understanding of all instructions.          Condition on discharge: stable       Discharge Instructions: The patient was advised on activity and dietary restrictions. The patient was advised to follow up in the emergency department or contact the physician with any unresolved nausea/vomiting/diarrhea/pain or temperature greater than 100.5 F or any other unusual symptoms. This discharge summary and plan was discussed and agreed upon with Dr. Leora Fofana.     Daniel Jaimes, APRN - NP, CNP

## 2022-04-21 NOTE — H&P
[address] History and Physical       Attending Physician: No att. providers found    Primary Care: No primary care provider on file. Referring MD: Luther Kahn MD  [address] [facility] [address]  [place],  [address]    Name: Sydnie Roach :  1955  MRN:  [id number]    Admission: 2022      Date: 2022    Reason for Admission: Thrombocytopenia      History of Present Illness: Val Fernandez a 68 yo male w/ h/o Kappa Light Chain Multiple Myeloma (Dx 10/2018).   He was initially diagnosed with myeloma 10/2018 after presenting to the ED following routine workplace physical and labs revealed JUDY, anemia and thrombocytopenia (SCr 4.6, Hgb 7.8 & platelets of 75E). He was admitted to [facility] for additional work-up and was found to have kappa light chain multiple myeloma and nephropathy.       He presents to OP Infusion today with Thrombocytopenia, will receive Plt transfusion. He feels well today, denies fever, chills, bleeding, or GI changes. Eating and drinking well.        Past Surgical History:   Procedure Laterality Date    IR TUNNELED CATHETER PLACEMENT GREATER THAN 5 YEARS  2022    IR TUNNELED CATHETER PLACEMENT GREATER THAN 5 YEARS 2022 [facility]       Past Medical History:   Diagnosis Date    Cancer [facility] [address]     ESRD (end stage renal disease) on dialysis ([facility] [address]     Mon-Wed-Fri       Prior to Admission medications    Medication Sig Start Date End Date Taking?  Authorizing Provider   Magnesium Oxide 400 MG CAPS Take 400 mg by mouth 2 times daily 22   KIMBERLEY Jimenes NP   allopurinol (ZYLOPRIM) 100 MG tablet Take 2 tablets by mouth daily 22   KIMBERLEY Jimenes NP   acyclovir (ZOVIRAX) 400 MG tablet Take 1 tablet by mouth 2 times daily 3/30/22   KIMBERLEY Jimenes NP   fluconazole (DIFLUCAN) 100 MG tablet Take 1 tablet by mouth daily 3/29/22 4/28/22  KIMBERLEY Jimenes NP   amLODIPine ([address]) 5 MG tablet Take 1 tablet by mouth daily 3/28/22   Elaine Gomez MD   pantoprazole sodium (PROTONIX) 40 MG PACK packet Take 40 mg by mouth every morning (before breakfast)  Patient not taking: Reported on 4/18/2022    Historical Provider, MD   Calcium Citrate-Vitamin D (CALCIUM + VIT D, BARIATRIC ADVANTAGE, CHEWABLE TABLET) Take 1 tablet by mouth daily 3/16/22   KIMBERLEY Nava - NP   levETIRAcetam (KEPPRA) 500 MG tablet Take 1 tablet by mouth every 12 hours 1/21/22   Kaden Thomas MD   sodium bicarbonate 650 MG tablet Take 650 mg by mouth daily    Historical Provider, MD   prochlorperazine (COMPAZINE) 10 MG tablet Take 10 mg by mouth every 6 hours as needed   Patient not taking: Reported on 4/18/2022    Historical Provider, MD   levoFLOXacin (LEVAQUIN) 250 MG tablet Take 250 mg by mouth daily For prophylaxis during chemotherapy    Historical Provider, MD       Allergies   Allergen Reactions    Decadron [Dexamethasone]      Patient is receiving CAR-T. No steroids unless approved by [facility] physician.        Family History   Problem Relation Age of Onset    Cancer Father         Social History     Socioeconomic History    Marital status: Life Partner     Spouse name: Nicole Gagnon Number of children: Not on file    Years of education: Not on file    Highest education level: Not on file   Occupational History    Not on file   Tobacco Use    Smoking status: Never Smoker    Smokeless tobacco: Never Used   Substance and Sexual Activity    Alcohol use: Not Currently    Drug use: Never    Sexual activity: Not Currently     Partners: Female   Other Topics Concern    Not on file   Social History Narrative    Not on file     Social Determinants of Health     Financial Resource Strain:     Difficulty of Paying Living Expenses: Not on file   Food Insecurity:     Worried About Running Out of Food in the Last Year: Not on file    Joel of Food in the Last Year: Not on file   Transportation Needs:  Lack of Transportation (Medical): Not on file    Lack of Transportation (Non-Medical): Not on file   Physical Activity:     Days of Exercise per Week: Not on file    Minutes of Exercise per Session: Not on file   Stress:     Feeling of Stress : Not on file   Social Connections:     Frequency of Communication with Friends and Family: Not on file    Frequency of Social Gatherings with Friends and Family: Not on file    Attends Faith Services: Not on file    Active Member of [address] [place] [address] or Organizations: Not on file    Attends Club or Organization Meetings: Not on file    Marital Status: Not on file   Intimate Partner Violence:     Fear of Current or Ex-Partner: Not on file    Emotionally Abused: Not on file    Physically Abused: Not on file    Sexually Abused: Not on file   Housing Stability:     Unable to Pay for Housing in the Last Year: Not on file    Number of [place] in the Last Year: Not on file    Unstable Housing in the Last Year: Not on file        ROS:  As noted above, otherwise remainder of 10-point ROS negative    Physical Exam:     Vital Signs:  BP (!) 142/82   Pulse 97   Temp 97.7 °F (36.5 °C) (Oral)   Resp 16   SpO2 100%     Weight:    Wt Readings from Last 3 Encounters:   04/21/22 254 lb 3.1 oz (115.3 kg)   04/20/22 254 lb 6.6 oz (115.4 kg)   04/19/22 253 lb 8.5 oz (115 kg)       KPS: 90% Able to carry on normal activity; minor signs or symptoms of disease    ECOG PS:  (1) Restricted in physically strenuous activity, ambulatory and able to do work of light nature    General: Awake, alert and oriented.   HEENT: normocephalic, PERRL, no scleral erythema or icterus, Oral mucosa moist and intact, throat clear  NECK: supple without palpable adenopathy  BACK: Straight negative CVAT  SKIN: warm dry and intact without lesions rashes or masses  CHEST: CTA bilaterally without use of accessory muscles  CV: Normal S1 S2, RRR, no MRG  ABD: NT ND normoactive BS, no palpable masses or hepatosplenomegaly  EXTREMITIES: without edema, denies calf tenderness  NEURO: CN II - XII grossly intact  CATHETER: CVC    Laboratory Data:   CBC:   Recent Labs     04/19/22  0858 04/20/22  0845 04/21/22  0900   WBC 1.1* 3.5* 2.2*   HGB 7.0* 7.4* 7.7*   HCT 20.5* 21.4* 22.7*   MCV 97.1 97.3 96.5   PLT 43* 42* 40*     BMP/Mag:  Recent Labs     04/19/22  0858 04/20/22  0845 04/21/22  0900    146* 143   K 3.8 4.0 4.0   * 113* 112*   CO2 21 18* 22   PHOS 2.7 2.5 2.4*   BUN 33* 29* 29*   CREATININE 2.4* 2.4* 2.4*   MG  --  1.70*  --      LIVP:   Recent Labs     04/20/22  0845   AST 27   ALT 16   BILIDIR <0.2   BILITOT 0.8   ALKPHOS 70     Coags:   Recent Labs     04/21/22  0900   PROTIME 13.3*   INR 1.17*   APTT 31.6     Uric Acid   Recent Labs     04/20/22  0845   LABURIC 6.5       PROBLEM LIST:          1. Thrombocytopenia       TREATMENT:                1. Plt transfusion       ASSESSMENT AND PLAN:           1. Kappa Light Chain Multiple Myeloma:  Currently in [facility]  - BM biopsy: 1/12/22.  20% plasma cells . Zulma Curtis p53 positive 79.5% of cells  -1/28/22 Serum KFLC 1662.71, Lambda 3.05   K/L ratio 545.15  -2/24/22 Serum KFLC 1919.12, Lambda 2.54, K/L raio 755.56  -s/p Fludarabine and Cytoxan 3/16/22-3/18/22  -Post CAR-T infusion staging:  PET/CT, BM bx with Flow, FISH, CG, MMP     2. Heme:   - Transfuse for Hgb < 7 and Platelets < 10 K.   - Plt transfusion today    Thrombocytopenia Precautions in place. Patient showing no signs or symptoms of active bleeding. Patient transfused blood products per orders - see flowsheet. Patient verbalizes understanding of all instructions.             - Disposition: Once blood products infused, D/C to follow up at [facility]      The patient was seen and examined by Dr. Elian Matthews. This admission history and physical has been discussed and agreed upon by Dr. Elian Matthews.      KIMBERLEY Lee NP

## 2022-04-21 NOTE — H&P
[facility] History and Physical       Attending Physician: No att. providers found    Primary Care: No primary care provider on file. Referring MD: Abelardo Goyal MD  [address] [facility] [address]  [place],  [address]    Name: Adeel Parker :  1955  MRN:  [id number]    Admission: 2022      Date: 2022    Reason for Admission: Thrombocytopenia      History of Present Illness: Tammy Koroma a 66 yo male w/ h/o Kappa Light Chain Multiple Myeloma (Dx 10/2018).   He was initially diagnosed with myeloma 10/2018 after presenting to the ED following routine workplace physical and labs revealed JUDY, anemia and thrombocytopenia (SCr 4.6, Hgb 7.8 & platelets of 60I). He was admitted to [facility] for additional work-up and was found to have kappa light chain multiple myeloma and nephropathy.       He presents to OP Infusion today with Thrombocytopenia, will receive Plt transfusion. He feels well today, denies fever, chills, bleeding, or GI changes. Eating and drinking well.        Past Surgical History:   Procedure Laterality Date    IR TUNNELED CATHETER PLACEMENT GREATER THAN 5 YEARS  2022    IR TUNNELED CATHETER PLACEMENT GREATER THAN 5 YEARS 2022 [facility]       Past Medical History:   Diagnosis Date    Cancer ([facility] [address]     ESRD (end stage renal disease) on dialysis ([facility] [address]     Mon-Wed-Fri       Prior to Admission medications    Medication Sig Start Date End Date Taking?  Authorizing Provider   Magnesium Oxide 400 MG CAPS Take 400 mg by mouth 2 times daily 22   Candis Selvin, APRN - NP   allopurinol (ZYLOPRIM) 100 MG tablet Take 2 tablets by mouth daily 22   Candis Coosa, APRN - NP   acyclovir (ZOVIRAX) 400 MG tablet Take 1 tablet by mouth 2 times daily 3/30/22   Candis Selvin, APRN - NP   fluconazole (DIFLUCAN) 100 MG tablet Take 1 tablet by mouth daily 3/29/22 4/28/22  Candis Coosa, APRN - NP   amLODIPine ([address]) 5 MG tablet Take 1 tablet by mouth daily 3/28/22   Kayla Hill MD   pantoprazole sodium (PROTONIX) 40 MG PACK packet Take 40 mg by mouth every morning (before breakfast)  Patient not taking: Reported on 4/18/2022    Historical Provider, MD   Calcium Citrate-Vitamin D (CALCIUM + VIT D, BARIATRIC ADVANTAGE, CHEWABLE TABLET) Take 1 tablet by mouth daily 3/16/22   KIMBERLEY Cohen - NP   levETIRAcetam (KEPPRA) 500 MG tablet Take 1 tablet by mouth every 12 hours 1/21/22   Malcolm Araujo MD   sodium bicarbonate 650 MG tablet Take 650 mg by mouth daily    Historical Provider, MD   prochlorperazine (COMPAZINE) 10 MG tablet Take 10 mg by mouth every 6 hours as needed   Patient not taking: Reported on 4/18/2022    Historical Provider, MD   levoFLOXacin (LEVAQUIN) 250 MG tablet Take 250 mg by mouth daily For prophylaxis during chemotherapy    Historical Provider, MD       Allergies   Allergen Reactions    Decadron [Dexamethasone]      Patient is receiving CAR-T. No steroids unless approved by [facility] physician.        Family History   Problem Relation Age of Onset    Cancer Father         Social History     Socioeconomic History    Marital status: Life Partner     Spouse name: Domonique Sarah Number of children: Not on file    Years of education: Not on file    Highest education level: Not on file   Occupational History    Not on file   Tobacco Use    Smoking status: Never Smoker    Smokeless tobacco: Never Used   Substance and Sexual Activity    Alcohol use: Not Currently    Drug use: Never    Sexual activity: Not Currently     Partners: Female   Other Topics Concern    Not on file   Social History Narrative    Not on file     Social Determinants of Health     Financial Resource Strain:     Difficulty of Paying Living Expenses: Not on file   Food Insecurity:     Worried About Running Out of Food in the Last Year: Not on file    Joel of Food in the Last Year: Not on file   Transportation Needs:  Lack of Transportation (Medical): Not on file    Lack of Transportation (Non-Medical): Not on file   Physical Activity:     Days of Exercise per Week: Not on file    Minutes of Exercise per Session: Not on file   Stress:     Feeling of Stress : Not on file   Social Connections:     Frequency of Communication with Friends and Family: Not on file    Frequency of Social Gatherings with Friends and Family: Not on file    Attends Orthodox Services: Not on file    Active Member of [address] [place] [address] or Organizations: Not on file    Attends Club or Organization Meetings: Not on file    Marital Status: Not on file   Intimate Partner Violence:     Fear of Current or Ex-Partner: Not on file    Emotionally Abused: Not on file    Physically Abused: Not on file    Sexually Abused: Not on file   Housing Stability:     Unable to Pay for Housing in the Last Year: Not on file    Number of [place] in the Last Year: Not on file    Unstable Housing in the Last Year: Not on file        ROS:  As noted above, otherwise remainder of 10-point ROS negative    Physical Exam:     Vital Signs:  BP (!) 147/95   Pulse 96   Temp 97.6 °F (36.4 °C) (Oral)   Resp 18   SpO2 100%     Weight:    Wt Readings from Last 3 Encounters:   04/21/22 254 lb 3.1 oz (115.3 kg)   04/20/22 254 lb 6.6 oz (115.4 kg)   04/19/22 253 lb 8.5 oz (115 kg)       KPS: 90% Able to carry on normal activity; minor signs or symptoms of disease    ECOG PS:  (1) Restricted in physically strenuous activity, ambulatory and able to do work of light nature    General: Awake, alert and oriented.   HEENT: normocephalic, PERRL, no scleral erythema or icterus, Oral mucosa moist and intact, throat clear  NECK: supple without palpable adenopathy  BACK: Straight negative CVAT  SKIN: warm dry and intact without lesions rashes or masses  CHEST: CTA bilaterally without use of accessory muscles  CV: Normal S1 S2, RRR, no MRG  ABD: NT ND normoactive BS, no palpable masses or hepatosplenomegaly  EXTREMITIES: without edema, denies calf tenderness  NEURO: CN II - XII grossly intact  CATHETER: CVC    Laboratory Data:   CBC:   Recent Labs     04/19/22  0858 04/20/22  0845 04/21/22  0900   WBC 1.1* 3.5* 2.2*   HGB 7.0* 7.4* 7.7*   HCT 20.5* 21.4* 22.7*   MCV 97.1 97.3 96.5   PLT 43* 42* 40*     BMP/Mag:  Recent Labs     04/19/22  0858 04/20/22  0845 04/21/22  0900    146* 143   K 3.8 4.0 4.0   * 113* 112*   CO2 21 18* 22   PHOS 2.7 2.5 2.4*   BUN 33* 29* 29*   CREATININE 2.4* 2.4* 2.4*   MG  --  1.70*  --      LIVP:   Recent Labs     04/20/22  0845   AST 27   ALT 16   BILIDIR <0.2   BILITOT 0.8   ALKPHOS 70     Coags:   Recent Labs     04/21/22  0900   PROTIME 13.3*   INR 1.17*   APTT 31.6     Uric Acid   Recent Labs     04/20/22  0845   LABURIC 6.5       PROBLEM LIST:          1. Thrombocytopenia       TREATMENT:                1. Plt transfusion       ASSESSMENT AND PLAN:           1. Kappa Light Chain Multiple Myeloma:  Currently in [facility]  - BM biopsy: 1/12/22.  20% plasma cells . Evelyne Weston p53 positive 79.5% of cells  -1/28/22 Serum KFLC 1662.71, Lambda 3.05   K/L ratio 545.15  -2/24/22 Serum KFLC 1919.12, Lambda 2.54, K/L raio 755.56  -s/p Fludarabine and Cytoxan 3/16/22-3/18/22  -Post CAR-T infusion staging:  PET/CT, BM bx with Flow, FISH, CG, MMP     2. Heme:   - Transfuse for Hgb < 7 and Platelets < 10 K.   - Plt transfusion today    Thrombocytopenia Precautions in place. Patient showing no signs or symptoms of active bleeding. Patient transfused blood products per orders - see flowsheet. Patient verbalizes understanding of all instructions.             - Disposition: Once blood products infused, D/C to follow up at [facility]      The patient was seen and examined by Dr. Reinier Evans. This admission history and physical has been discussed and agreed upon by Dr. Reinier Evans.      KIMBERLEY Roger NP

## 2022-04-21 NOTE — DISCHARGE SUMMARY
[address] [facility] Discharge Summary             Attending Physician: No att. providers found    Referring MD: Lori Whittaker MD  [address] [facility] [address]  [place],  [address]    Name: Shannon Soto :  1955  MRN:  [id number]    Admission: 2022   Discharge: 2022      Date: 2022    Reason for Admission:   Physical Exam: Thrombocytopenia     Hospital Course: Patient seen in OP Infusion for Plt transfusion. Tolerated well with no signs or symptoms. Will be discharged to home to today and follow up at [facility]. The patient understands all discharge instructions. Feels well with no complaints.        Vital Signs:  BP (!) 149/88   Pulse 86   Temp 98.4 °F (36.9 °C) (Oral)   Resp 18   SpO2 100%     Weight:    Wt Readings from Last 3 Encounters:   22 254 lb 3.1 oz (115.3 kg)   22 254 lb 6.6 oz (115.4 kg)   22 253 lb 8.5 oz (115 kg)       KPS: 90% Able to carry on normal activity; minor signs or symptoms of disease    General: Awake, alert and oriented    HEENT: normocephalic, alopecia, PERRL, no scleral erythema or icterus, Oral mucosa moist and intact, throat clear.    NECK: supple without palpable adenopathy  BACK: Straight, negative CVAT  SKIN: warm dry and intact without lesions rashes or masses  CHEST: CTA bilaterally without use of accessory muscles  CV: Normal S1 S2, RRR, no MRG  ABD: NT ND normoactive BS, no palpable masses or hepatosplenomegaly  EXTREMITIES: without edema, denies calf tenderness  NEURO: CN II - XII grossly intact  CATHETER: CVC    Discharge Laboratory Data:  CBC:   Recent Labs     22  0858 22  0845 22  0900   WBC 1.1* 3.5* 2.2*   HGB 7.0* 7.4* 7.7*   HCT 20.5* 21.4* 22.7*   MCV 97.1 97.3 96.5   PLT 43* 42* 40*     BMP/Mag:  Recent Labs     22  0858 22  0845 22  0900    146* 143   K 3.8 4.0 4.0   * 113* 112*   CO2 21 18* 22   PHOS 2.7 2.5 2.4*   BUN 33* 29* 29*   CREATININE 2.4* 2.4* 2.4*   MG  --  1.70*  --      LIVP: Recent Labs     04/20/22  0845   AST 27   ALT 16   BILIDIR <0.2   BILITOT 0.8   ALKPHOS 70     Coags:   Recent Labs     04/21/22  0900   PROTIME 13.3*   INR 1.17*   APTT 31.6     Uric Acid   Recent Labs     04/20/22  0845   LABURIC 6.5     Tacro:  No results for input(s): TACROLEV in the last 72 hours. CMV Quant DNA by PCR: No results found for: CMVDNAQNT  IgG:   No results for input(s): IGG in the last 72 hours. PROBLEM LIST:            1. Thrombocytopenia       TREATMENT:                1. Plt transfusion       ASSESSMENT AND PLAN:           1. Kappa Light Chain Multiple Myeloma:  Currently in [facility]  - BM biopsy: 1/12/22.  20% plasma cells . Lizbeth Anirudh p53 positive 79.5% of cells  -1/28/22 Serum KFLC 1662.71, Lambda 3.05   K/L ratio 545.15  -2/24/22 Serum KFLC 1919.12, Lambda 2.54, K/L raio 755.56  -s/p Fludarabine and Cytoxan 3/16/22-3/18/22  -Post CAR-T infusion staging:  PET/CT, BM bx with Flow, FISH, CG, MMP     2. Heme:   - Transfuse for Hgb < 7 and Platelets < 66 K.   - Plt transfusion today    Thrombocytopenia Precautions in place.  Patient showing no signs or symptoms of active bleeding.  Patient transfused blood products per orders - see flowsheet.  Patient verbalizes understanding of all instructions.          Condition on discharge: stable       Discharge Instructions: The patient was advised on activity and dietary restrictions. The patient was advised to follow up in the emergency department or contact the physician with any unresolved nausea/vomiting/diarrhea/pain or temperature greater than 100.5 F or any other unusual symptoms. This discharge summary and plan was discussed and agreed upon with Dr. Gertrude Donahue.     Lexi Horowitz, APRN - NP, CNP

## 2022-04-21 NOTE — H&P
[facility] History and Physical       Attending Physician: No att. providers found    Primary Care: No primary care provider on file. Referring MD: Barbara Winters MD  [address] [facility] [address]  [place],  [address]    Name: Debbie Hickman :  1955  MRN:  [id number]    Admission: 2022      Date: 2022    Reason for Admission: Thrombocytopenia      History of Present Illness: Nancy Kirkpatrick a 68 yo male w/ h/o Kappa Light Chain Multiple Myeloma (Dx 10/2018).   He was initially diagnosed with myeloma 10/2018 after presenting to the ED following routine workplace physical and labs revealed JUDY, anemia and thrombocytopenia (SCr 4.6, Hgb 7.8 & platelets of 40E). He was admitted to [facility] for additional work-up and was found to have kappa light chain multiple myeloma and nephropathy.       He presents to OP Infusion today with Thrombocytopenia, will receive Plt transfusion. He feels well today, denies fever, chills, bleeding, or GI changes. Eating and drinking well.        Past Surgical History:   Procedure Laterality Date    IR TUNNELED CATHETER PLACEMENT GREATER THAN 5 YEARS  2022    IR TUNNELED CATHETER PLACEMENT GREATER THAN 5 YEARS 2022 [facility]       Past Medical History:   Diagnosis Date    Cancer ([facility]     ESRD (end stage renal disease) on dialysis ([facility]     Mon-Wed-Fri       Prior to Admission medications    Medication Sig Start Date End Date Taking?  Authorizing Provider   Magnesium Oxide 400 MG CAPS Take 400 mg by mouth 2 times daily 22   KIMBERLEY Alberto - NP   allopurinol (ZYLOPRIM) 100 MG tablet Take 2 tablets by mouth daily 22   Ami Allison APRN - NP   acyclovir (ZOVIRAX) 400 MG tablet Take 1 tablet by mouth 2 times daily 3/30/22   Ami Allison APRN - NP   fluconazole (DIFLUCAN) 100 MG tablet Take 1 tablet by mouth daily 3/29/22 4/28/22  KIMBERLEY Alberto - NP   amLODIPine ([address]) 5 MG tablet Take 1 tablet by mouth daily 3/28/22   Magdaleno Torres MD   pantoprazole sodium (PROTONIX) 40 MG PACK packet Take 40 mg by mouth every morning (before breakfast)  Patient not taking: Reported on 4/18/2022    Historical Provider, MD   Calcium Citrate-Vitamin D (CALCIUM + VIT D, BARIATRIC ADVANTAGE, CHEWABLE TABLET) Take 1 tablet by mouth daily 3/16/22   KIMBERLEY Roger - NP   levETIRAcetam (KEPPRA) 500 MG tablet Take 1 tablet by mouth every 12 hours 1/21/22   Koki Ovalles MD   sodium bicarbonate 650 MG tablet Take 650 mg by mouth daily    Historical Provider, MD   prochlorperazine (COMPAZINE) 10 MG tablet Take 10 mg by mouth every 6 hours as needed   Patient not taking: Reported on 4/18/2022    Historical Provider, MD   levoFLOXacin (LEVAQUIN) 250 MG tablet Take 250 mg by mouth daily For prophylaxis during chemotherapy    Historical Provider, MD       Allergies   Allergen Reactions    Decadron [Dexamethasone]      Patient is receiving CAR-T. No steroids unless approved by [facility] physician.        Family History   Problem Relation Age of Onset    Cancer Father         Social History     Socioeconomic History    Marital status: Life Partner     Spouse name: Alice Cook Number of children: Not on file    Years of education: Not on file    Highest education level: Not on file   Occupational History    Not on file   Tobacco Use    Smoking status: Never Smoker    Smokeless tobacco: Never Used   Substance and Sexual Activity    Alcohol use: Not Currently    Drug use: Never    Sexual activity: Not Currently     Partners: Female   Other Topics Concern    Not on file   Social History Narrative    Not on file     Social Determinants of Health     Financial Resource Strain:     Difficulty of Paying Living Expenses: Not on file   Food Insecurity:     Worried About Running Out of Food in the Last Year: Not on file    Joel of Food in the Last Year: Not on file   Transportation Needs:  Lack of Transportation (Medical): Not on file    Lack of Transportation (Non-Medical): Not on file   Physical Activity:     Days of Exercise per Week: Not on file    Minutes of Exercise per Session: Not on file   Stress:     Feeling of Stress : Not on file   Social Connections:     Frequency of Communication with Friends and Family: Not on file    Frequency of Social Gatherings with Friends and Family: Not on file    Attends Yarsanism Services: Not on file    Active Member of [address] [place] [address] or Organizations: Not on file    Attends Club or Organization Meetings: Not on file    Marital Status: Not on file   Intimate Partner Violence:     Fear of Current or Ex-Partner: Not on file    Emotionally Abused: Not on file    Physically Abused: Not on file    Sexually Abused: Not on file   Housing Stability:     Unable to Pay for Housing in the Last Year: Not on file    Number of [place] in the Last Year: Not on file    Unstable Housing in the Last Year: Not on file        ROS:  As noted above, otherwise remainder of 10-point ROS negative    Physical Exam:     Vital Signs:  BP (!) 169/99   Pulse 90   Temp 98 °F (36.7 °C) (Oral)   Resp 18   SpO2 99%     Weight:    Wt Readings from Last 3 Encounters:   04/21/22 254 lb 3.1 oz (115.3 kg)   04/20/22 254 lb 6.6 oz (115.4 kg)   04/19/22 253 lb 8.5 oz (115 kg)       KPS: 90% Able to carry on normal activity; minor signs or symptoms of disease    ECOG PS:  (1) Restricted in physically strenuous activity, ambulatory and able to do work of light nature    General: Awake, alert and oriented.   HEENT: normocephalic, PERRL, no scleral erythema or icterus, Oral mucosa moist and intact, throat clear  NECK: supple without palpable adenopathy  BACK: Straight negative CVAT  SKIN: warm dry and intact without lesions rashes or masses  CHEST: CTA bilaterally without use of accessory muscles  CV: Normal S1 S2, RRR, no MRG  ABD: NT ND normoactive BS, no palpable masses or hepatosplenomegaly  EXTREMITIES: without edema, denies calf tenderness  NEURO: CN II - XII grossly intact  CATHETER: CVC    Laboratory Data:   CBC:   Recent Labs     04/19/22  0858 04/20/22  0845 04/21/22  0900   WBC 1.1* 3.5* 2.2*   HGB 7.0* 7.4* 7.7*   HCT 20.5* 21.4* 22.7*   MCV 97.1 97.3 96.5   PLT 43* 42* 40*     BMP/Mag:  Recent Labs     04/19/22  0858 04/20/22  0845 04/21/22  0900    146* 143   K 3.8 4.0 4.0   * 113* 112*   CO2 21 18* 22   PHOS 2.7 2.5 2.4*   BUN 33* 29* 29*   CREATININE 2.4* 2.4* 2.4*   MG  --  1.70*  --      LIVP:   Recent Labs     04/20/22  0845   AST 27   ALT 16   BILIDIR <0.2   BILITOT 0.8   ALKPHOS 70     Coags:   Recent Labs     04/21/22  0900   PROTIME 13.3*   INR 1.17*   APTT 31.6     Uric Acid   Recent Labs     04/20/22  0845   LABURIC 6.5       PROBLEM LIST:          1. Thrombocytopenia       TREATMENT:                1. Plt transfusion       ASSESSMENT AND PLAN:           1. Kappa Light Chain Multiple Myeloma:  Currently in [facility]  - BM biopsy: 1/12/22.  20% plasma cells . Beti Redman p53 positive 79.5% of cells  -1/28/22 Serum KFLC 1662.71, Lambda 3.05   K/L ratio 545.15  -2/24/22 Serum KFLC 1919.12, Lambda 2.54, K/L raio 755.56  -s/p Fludarabine and Cytoxan 3/16/22-3/18/22  -Post CAR-T infusion staging:  PET/CT, BM bx with Flow, FISH, CG, MMP     2. Heme:   - Transfuse for Hgb < 7 and Platelets < 10 K.   - Plt transfusion today    Thrombocytopenia Precautions in place. Patient showing no signs or symptoms of active bleeding. Patient transfused blood products per orders - see flowsheet. Patient verbalizes understanding of all instructions.             - Disposition: Once blood products infused, D/C to follow up at [facility]      The patient was seen and examined by Dr. Sandra Horton. This admission history and physical has been discussed and agreed upon by Dr. Sandra Horton.      KIMBERLEY Quigley NP

## 2022-04-21 NOTE — DISCHARGE SUMMARY
[facility] Discharge Summary             Attending Physician: No att. providers found    Referring MD: Cassandra Braden MD  [address] [facility] [address]  [place],  [address]    Name: Nasir Aggarwal :  1955  MRN:  [id number]    Admission: 2022   Discharge: 2022      Date: 2022    Reason for Admission:   Physical Exam: Thrombocytopenia     Hospital Course: Patient seen in OP Infusion for Plt transfusion. Tolerated well with no signs or symptoms. Will be discharged to home to today and follow up at [facility]. The patient understands all discharge instructions. Feels well with no complaints.        Vital Signs:  BP (!) 147/95   Pulse 96   Temp 97.6 °F (36.4 °C) (Oral)   Resp 18   SpO2 100%     Weight:    Wt Readings from Last 3 Encounters:   22 254 lb 3.1 oz (115.3 kg)   22 254 lb 6.6 oz (115.4 kg)   22 253 lb 8.5 oz (115 kg)       KPS: 90% Able to carry on normal activity; minor signs or symptoms of disease    General: Awake, alert and oriented    HEENT: normocephalic, alopecia, PERRL, no scleral erythema or icterus, Oral mucosa moist and intact, throat clear.    NECK: supple without palpable adenopathy  BACK: Straight, negative CVAT  SKIN: warm dry and intact without lesions rashes or masses  CHEST: CTA bilaterally without use of accessory muscles  CV: Normal S1 S2, RRR, no MRG  ABD: NT ND normoactive BS, no palpable masses or hepatosplenomegaly  EXTREMITIES: without edema, denies calf tenderness  NEURO: CN II - XII grossly intact  CATHETER: CVC    Discharge Laboratory Data:  CBC:   Recent Labs     22  0858 22  0845 22  0900   WBC 1.1* 3.5* 2.2*   HGB 7.0* 7.4* 7.7*   HCT 20.5* 21.4* 22.7*   MCV 97.1 97.3 96.5   PLT 43* 42* 40*     BMP/Mag:  Recent Labs     22  0858 22  0845 22  0900    146* 143   K 3.8 4.0 4.0   * 113* 112*   CO2 21 18* 22   PHOS 2.7 2.5 2.4*   BUN 33* 29* 29*   CREATININE 2.4* 2.4* 2.4*   MG  --  1.70*  --      LIVP: Recent Labs     04/20/22  0845   AST 27   ALT 16   BILIDIR <0.2   BILITOT 0.8   ALKPHOS 70     Coags:   Recent Labs     04/21/22  0900   PROTIME 13.3*   INR 1.17*   APTT 31.6     Uric Acid   Recent Labs     04/20/22  0845   LABURIC 6.5     Tacro:  No results for input(s): TACROLEV in the last 72 hours. CMV Quant DNA by PCR: No results found for: CMVDNAQNT  IgG:   No results for input(s): IGG in the last 72 hours. PROBLEM LIST:            1. Thrombocytopenia       TREATMENT:                1. Plt transfusion       ASSESSMENT AND PLAN:           1. Kappa Light Chain Multiple Myeloma:  Currently in [facility]  - BM biopsy: 1/12/22.  20% plasma cells . Teena Dye p53 positive 79.5% of cells  -1/28/22 Serum KFLC 1662.71, Lambda 3.05   K/L ratio 545.15  -2/24/22 Serum KFLC 1919.12, Lambda 2.54, K/L raio 755.56  -s/p Fludarabine and Cytoxan 3/16/22-3/18/22  -Post CAR-T infusion staging:  PET/CT, BM bx with Flow, FISH, CG, MMP     2. Heme:   - Transfuse for Hgb < 7 and Platelets < 65 K.   - Plt transfusion today    Thrombocytopenia Precautions in place.  Patient showing no signs or symptoms of active bleeding.  Patient transfused blood products per orders - see flowsheet.  Patient verbalizes understanding of all instructions.          Condition on discharge: stable       Discharge Instructions: The patient was advised on activity and dietary restrictions. The patient was advised to follow up in the emergency department or contact the physician with any unresolved nausea/vomiting/diarrhea/pain or temperature greater than 100.5 F or any other unusual symptoms. This discharge summary and plan was discussed and agreed upon with Dr. Chica Marley.     Zeina Oliva, APRN - NP, CNP

## 2022-04-21 NOTE — DISCHARGE SUMMARY
[facility] Discharge Summary             Attending Physician: No att. providers found    Referring MD: Rajesh Parmar MD  [address] [facility] [place] [address],  [address]    Name: Loyda Sultana :  1955  MRN:  [id number]    Admission: 2022   Discharge: 2022      Date: 2022    Reason for Admission:   Physical Exam: Thrombocytopenia     Hospital Course: Patient seen in OP Infusion for Plt transfusion. Tolerated well with no signs or symptoms. Will be discharged to home to today and follow up at [facility]. The patient understands all discharge instructions. Feels well with no complaints.        Vital Signs:  BP (!) 155/87   Pulse 89   Temp 97.8 °F (36.6 °C) (Oral)   Resp 16   SpO2 100%     Weight:    Wt Readings from Last 3 Encounters:   22 254 lb 3.1 oz (115.3 kg)   22 254 lb 6.6 oz (115.4 kg)   22 253 lb 8.5 oz (115 kg)       KPS: 90% Able to carry on normal activity; minor signs or symptoms of disease    General: Awake, alert and oriented    HEENT: normocephalic, alopecia, PERRL, no scleral erythema or icterus, Oral mucosa moist and intact, throat clear.    NECK: supple without palpable adenopathy  BACK: Straight, negative CVAT  SKIN: warm dry and intact without lesions rashes or masses  CHEST: CTA bilaterally without use of accessory muscles  CV: Normal S1 S2, RRR, no MRG  ABD: NT ND normoactive BS, no palpable masses or hepatosplenomegaly  EXTREMITIES: without edema, denies calf tenderness  NEURO: CN II - XII grossly intact  CATHETER: CVC    Discharge Laboratory Data:  CBC:   Recent Labs     22  0858 22  0845 22  0900   WBC 1.1* 3.5* 2.2*   HGB 7.0* 7.4* 7.7*   HCT 20.5* 21.4* 22.7*   MCV 97.1 97.3 96.5   PLT 43* 42* 40*     BMP/Mag:  Recent Labs     22  0858 22  0845 22  0900    146* 143   K 3.8 4.0 4.0   * 113* 112*   CO2 21 18* 22   PHOS 2.7 2.5 2.4*   BUN 33* 29* 29*   CREATININE 2.4* 2.4* 2.4*   MG  --  1.70*  --      LIVP: Recent Labs     04/20/22  0845   AST 27   ALT 16   BILIDIR <0.2   BILITOT 0.8   ALKPHOS 70     Coags:   Recent Labs     04/21/22  0900   PROTIME 13.3*   INR 1.17*   APTT 31.6     Uric Acid   Recent Labs     04/20/22  0845   LABURIC 6.5     Tacro:  No results for input(s): TACROLEV in the last 72 hours. CMV Quant DNA by PCR: No results found for: CMVDNAQNT  IgG:   No results for input(s): IGG in the last 72 hours. PROBLEM LIST:            1. Thrombocytopenia       TREATMENT:                1. Plt transfusion       ASSESSMENT AND PLAN:           1. Kappa Light Chain Multiple Myeloma:  Currently in [facility]  - BM biopsy: 1/12/22.  20% plasma cells . Beti Redman p53 positive 79.5% of cells  -1/28/22 Serum KFLC 1662.71, Lambda 3.05   K/L ratio 545.15  -2/24/22 Serum KFLC 1919.12, Lambda 2.54, K/L raio 755.56  -s/p Fludarabine and Cytoxan 3/16/22-3/18/22  -Post CAR-T infusion staging:  PET/CT, BM bx with Flow, FISH, CG, MMP     2. Heme:   - Transfuse for Hgb < 7 and Platelets < 47 K.   - Plt transfusion today    Thrombocytopenia Precautions in place.  Patient showing no signs or symptoms of active bleeding.  Patient transfused blood products per orders - see flowsheet.  Patient verbalizes understanding of all instructions.          Condition on discharge: stable       Discharge Instructions: The patient was advised on activity and dietary restrictions. The patient was advised to follow up in the emergency department or contact the physician with any unresolved nausea/vomiting/diarrhea/pain or temperature greater than 100.5 F or any other unusual symptoms. This discharge summary and plan was discussed and agreed upon with Dr. Sandra Horton.     Luis Montalvo, APRN - NP, CNP

## 2022-04-21 NOTE — PROGRESS NOTES
Short Stay Communication Note    Britt Bishop  Diagnosis: multiple myeloma  Primary MD: Dr. Rosita Kohli  Treatment: Melphalan Fludarabine/Cytoxan  Day +29 of Car-T [place] / Day +3 stem cell boost  Pt seen in outpatient infusion today. Labs drawn and reviewed. CBC:   Recent Labs     04/19/22  0858 04/20/22  0845 04/21/22  0900   WBC 1.1* 3.5* 2.2*   HGB 7.0* 7.4* 7.7*   HCT 20.5* 21.4* 22.7*   MCV 97.1 97.3 96.5   PLT 43* 42* 40*     BMP/Mag:  Recent Labs     04/19/22  0858 04/20/22  0845 04/21/22  0900    146* 143   K 3.8 4.0 4.0   * 113* 112*   CO2 21 18* 22   PHOS 2.7 2.5 2.4*   BUN 33* 29* 29*   CREATININE 2.4* 2.4* 2.4*   MG  --  1.70*  --      Standing parameters for replacement for this patient:   1 unit of pack red blood cells for a hemoglobin < or equal to 7  1 pack of platelets for a platelet count less than or equal to 50  40 MeQ of Potassium administered for a potassium level less than or equal to 3.4  4g of Magnesium Sulfate for a magnesum level less than or equal to 1.4      Urinalysis last done: 4/18/2022 Urinalysis next due: 4/25/2022    Chest X-Ray last done: 4/18/2022 Chest X-Ray next due: 4/25/2022    Symptoms addressed and reported to care team this date: None  Treatments this date: labs, granix    Reviewed medication schedule with pt and caregiver. Both able to verbalize all medications and schedule. Pt to be seen again tomorrow for labs, platelets. Discharged ambulatory to home. Patient's hemoglobin this AM:      Recent Labs     04/21/22  0900   HGB 7.7*      Patient's platelet count this AM:   Recent Labs     04/21/22  0900   PLT 40*     Pt seen at [facility] today for  Blood transfusion for above lab values. Crossmatched platelets unavailable until later today. MD and NP aware. Pt to receive platelets tomorrow. Blood bank aware.     Electronically signed by Mariia Page RN on 4/21/2022 at 11:55 AM

## 2022-04-21 NOTE — H&P
[facility] History and Physical       Attending Physician: No att. providers found    Primary Care: No primary care provider on file. Referring MD: Ulysses Fredrickson, MD  [address] [facility] [address]  [place],  [address]    Name: Dede Milner :  1955  MRN:  [id number]    Admission: 2022      Date: 2022    Reason for Admission: Thrombocytopenia      History of Present Illness: Jimmy Sosa a 68 yo male w/ h/o Kappa Light Chain Multiple Myeloma (Dx 10/2018).   He was initially diagnosed with myeloma 10/2018 after presenting to the ED following routine workplace physical and labs revealed JUDY, anemia and thrombocytopenia (SCr 4.6, Hgb 7.8 & platelets of 64N). He was admitted to [facility] for additional work-up and was found to have kappa light chain multiple myeloma and nephropathy.       He presents to OP Infusion today with Thrombocytopenia, will receive Plt transfusion. He feels well today, denies fever, chills, bleeding, or GI changes. Eating and drinking well.        Past Surgical History:   Procedure Laterality Date    IR TUNNELED CATHETER PLACEMENT GREATER THAN 5 YEARS  2022    IR TUNNELED CATHETER PLACEMENT GREATER THAN 5 YEARS 2022 [facility]       Past Medical History:   Diagnosis Date    [facility] [address]     ESRD (end stage renal disease) on dialysis ([facility] [address]     Mon-Wed-Fri       Prior to Admission medications    Medication Sig Start Date End Date Taking?  Authorizing Provider   Magnesium Oxide 400 MG CAPS Take 400 mg by mouth 2 times daily 22   Franchester Cassette, APRN - NP   allopurinol (ZYLOPRIM) 100 MG tablet Take 2 tablets by mouth daily 22   Franchester Cassette, APRN - NP   acyclovir (ZOVIRAX) 400 MG tablet Take 1 tablet by mouth 2 times daily 3/30/22   Franchester Cassette, APRN - NP   fluconazole (DIFLUCAN) 100 MG tablet Take 1 tablet by mouth daily 3/29/22 4/28/22  Franchester Cassette, APRN - NP   amLODIPine ([address]) 5 MG tablet Take 1 tablet by mouth daily 3/28/22   Robson SportsMD brigida   pantoprazole sodium (PROTONIX) 40 MG PACK packet Take 40 mg by mouth every morning (before breakfast)  Patient not taking: Reported on 4/18/2022    Historical Provider, MD   Calcium Citrate-Vitamin D (CALCIUM + VIT D, BARIATRIC ADVANTAGE, CHEWABLE TABLET) Take 1 tablet by mouth daily 3/16/22   Brandt Castaneda, APRN - NP   levETIRAcetam (KEPPRA) 500 MG tablet Take 1 tablet by mouth every 12 hours 1/21/22   [facility], MD   sodium bicarbonate 650 MG tablet Take 650 mg by mouth daily    Historical Provider, MD   prochlorperazine (COMPAZINE) 10 MG tablet Take 10 mg by mouth every 6 hours as needed   Patient not taking: Reported on 4/18/2022    Historical Provider, MD   levoFLOXacin (LEVAQUIN) 250 MG tablet Take 250 mg by mouth daily For prophylaxis during chemotherapy    Historical Provider, MD       Allergies   Allergen Reactions    Decadron [Dexamethasone]      Patient is receiving CAR-T. No steroids unless approved by [address] physician.        Family History   Problem Relation Age of Onset    Cancer Father         Social History     Socioeconomic History    Marital status: Life Partner     Spouse name: Stephane Amaro Number of children: Not on file    Years of education: Not on file    Highest education level: Not on file   Occupational History    Not on file   Tobacco Use    Smoking status: Never Smoker    Smokeless tobacco: Never Used   Substance and Sexual Activity    Alcohol use: Not Currently    Drug use: Never    Sexual activity: Not Currently     Partners: Female   Other Topics Concern    Not on file   Social History Narrative    Not on file     Social Determinants of Health     Financial Resource Strain:     Difficulty of Paying Living Expenses: Not on file   Food Insecurity:     Worried About Running Out of Food in the Last Year: Not on file    Joel of Food in the Last Year: Not on file   Transportation Needs:  Lack of Transportation (Medical): Not on file    Lack of Transportation (Non-Medical): Not on file   Physical Activity:     Days of Exercise per Week: Not on file    Minutes of Exercise per Session: Not on file   Stress:     Feeling of Stress : Not on file   Social Connections:     Frequency of Communication with Friends and Family: Not on file    Frequency of Social Gatherings with Friends and Family: Not on file    Attends Christian Services: Not on file    Active Member of [address] [place] [address] or Organizations: Not on file    Attends Club or Organization Meetings: Not on file    Marital Status: Not on file   Intimate Partner Violence:     Fear of Current or Ex-Partner: Not on file    Emotionally Abused: Not on file    Physically Abused: Not on file    Sexually Abused: Not on file   Housing Stability:     Unable to Pay for Housing in the Last Year: Not on file    Number of [place] in the Last Year: Not on file    Unstable Housing in the Last Year: Not on file        ROS:  As noted above, otherwise remainder of 10-point ROS negative    Physical Exam:     Vital Signs:  BP (!) 155/87   Pulse 89   Temp 97.8 °F (36.6 °C) (Oral)   Resp 16   SpO2 100%     Weight:    Wt Readings from Last 3 Encounters:   04/21/22 254 lb 3.1 oz (115.3 kg)   04/20/22 254 lb 6.6 oz (115.4 kg)   04/19/22 253 lb 8.5 oz (115 kg)       KPS: 90% Able to carry on normal activity; minor signs or symptoms of disease    ECOG PS:  (1) Restricted in physically strenuous activity, ambulatory and able to do work of light nature    General: Awake, alert and oriented.   HEENT: normocephalic, PERRL, no scleral erythema or icterus, Oral mucosa moist and intact, throat clear  NECK: supple without palpable adenopathy  BACK: Straight negative CVAT  SKIN: warm dry and intact without lesions rashes or masses  CHEST: CTA bilaterally without use of accessory muscles  CV: Normal S1 S2, RRR, no MRG  ABD: NT ND normoactive BS, no palpable masses or hepatosplenomegaly  EXTREMITIES: without edema, denies calf tenderness  NEURO: CN II - XII grossly intact  CATHETER: CVC    Laboratory Data:   CBC:   Recent Labs     04/19/22  0858 04/20/22  0845 04/21/22  0900   WBC 1.1* 3.5* 2.2*   HGB 7.0* 7.4* 7.7*   HCT 20.5* 21.4* 22.7*   MCV 97.1 97.3 96.5   PLT 43* 42* 40*     BMP/Mag:  Recent Labs     04/19/22  0858 04/20/22  0845 04/21/22  0900    146* 143   K 3.8 4.0 4.0   * 113* 112*   CO2 21 18* 22   PHOS 2.7 2.5 2.4*   BUN 33* 29* 29*   CREATININE 2.4* 2.4* 2.4*   MG  --  1.70*  --      LIVP:   Recent Labs     04/20/22  0845   AST 27   ALT 16   BILIDIR <0.2   BILITOT 0.8   ALKPHOS 70     Coags:   Recent Labs     04/21/22  0900   PROTIME 13.3*   INR 1.17*   APTT 31.6     Uric Acid   Recent Labs     04/20/22  0845   LABURIC 6.5       PROBLEM LIST:          1. Thrombocytopenia       TREATMENT:                1. Plt transfusion       ASSESSMENT AND PLAN:           1. Kappa Light Chain Multiple Myeloma:  Currently in [facility]  - BM biopsy: 1/12/22.  20% plasma cells . Lizbeth Anirudh p53 positive 79.5% of cells  -1/28/22 Serum KFLC 1662.71, Lambda 3.05   K/L ratio 545.15  -2/24/22 Serum KFLC 1919.12, Lambda 2.54, K/L raio 755.56  -s/p Fludarabine and Cytoxan 3/16/22-3/18/22  -Post CAR-T infusion staging:  PET/CT, BM bx with Flow, FISH, CG, MMP     2. Heme:   - Transfuse for Hgb < 7 and Platelets < 10 K.   - Plt transfusion today    Thrombocytopenia Precautions in place. Patient showing no signs or symptoms of active bleeding. Patient transfused blood products per orders - see flowsheet. Patient verbalizes understanding of all instructions.             - Disposition: Once blood products infused, D/C to follow up at [facility]      The patient was seen and examined by Dr. Gertrude Donahue. This admission history and physical has been discussed and agreed upon by Dr. Gertrude Donahue.      Lexi Horowitz, APRN - NP

## 2022-04-21 NOTE — H&P
[facility] [facility] History and Physical       Attending Physician: No att. providers found    Primary Care: No primary care provider on file. Referring MD: Kayla Hill MD  [address] [place] [address] [address]  [place],  [address] [place] [address] Ave    Name: Shannon Spears :  1955  MRN:  [id number]    Admission: 2022      Date: 2022    Reason for Admission: Thrombocytopenia      History of Present Illness: Mervat Gaston a 68 yo male w/ h/o Kappa Light Chain Multiple Myeloma (Dx 10/2018).   He was initially diagnosed with myeloma 10/2018 after presenting to the ED following routine workplace physical and labs revealed JUDY, anemia and thrombocytopenia (SCr 4.6, Hgb 7.8 & platelets of 88L). He was admitted to [facility] for additional work-up and was found to have kappa light chain multiple myeloma and nephropathy.       He presents to OP Infusion today with Thrombocytopenia, will receive Plt transfusion. He feels well today, denies fever, chills, bleeding, or GI changes. Eating and drinking well.        Past Surgical History:   Procedure Laterality Date    IR TUNNELED CATHETER PLACEMENT GREATER THAN 5 YEARS  2022    IR TUNNELED CATHETER PLACEMENT GREATER THAN 5 YEARS 2022 [facility]       Past Medical History:   Diagnosis Date    Cancer ([facility] [address]     ESRD (end stage renal disease) on dialysis ([facility] [address]     Mon-Wed-Fri       Prior to Admission medications    Medication Sig Start Date End Date Taking?  Authorizing Provider   Magnesium Oxide 400 MG CAPS Take 400 mg by mouth 2 times daily 22   KIMBERLEY Cohen NP   allopurinol (ZYLOPRIM) 100 MG tablet Take 2 tablets by mouth daily 22   KIMBERLEY Cohen NP   acyclovir (ZOVIRAX) 400 MG tablet Take 1 tablet by mouth 2 times daily 3/30/22   KIMBERLEY Cohen - NP   fluconazole (DIFLUCAN) 100 MG tablet Take 1 tablet by mouth daily 3/29/22 4/28/22  KIMBERLEY Cohen NP   amLODIPine ([address]) 5 MG tablet Take 1 tablet by mouth daily 3/28/22   Sebastian Johnson MD   pantoprazole sodium (PROTONIX) 40 MG PACK packet Take 40 mg by mouth every morning (before breakfast)  Patient not taking: Reported on 4/18/2022    Historical Provider, MD   Calcium Citrate-Vitamin D (CALCIUM + VIT D, BARIATRIC ADVANTAGE, CHEWABLE TABLET) Take 1 tablet by mouth daily 3/16/22   KIMBERLEY Champion - NP   levETIRAcetam (KEPPRA) 500 MG tablet Take 1 tablet by mouth every 12 hours 1/21/22   [facility]   sodium bicarbonate 650 MG tablet Take 650 mg by mouth daily    Historical Provider, MD   prochlorperazine (COMPAZINE) 10 MG tablet Take 10 mg by mouth every 6 hours as needed   Patient not taking: Reported on 4/18/2022    Historical Provider, MD   levoFLOXacin (LEVAQUIN) 250 MG tablet Take 250 mg by mouth daily For prophylaxis during chemotherapy    Historical Provider, MD       Allergies   Allergen Reactions    Decadron [Dexamethasone]      Patient is receiving CAR-T. No steroids unless approved by [facility] physician.        Family History   Problem Relation Age of Onset    Cancer Father         Social History     Socioeconomic History    Marital status: Life Partner     Spouse name: Melia Suh Number of children: Not on file    Years of education: Not on file    Highest education level: Not on file   Occupational History    Not on file   Tobacco Use    Smoking status: Never Smoker    Smokeless tobacco: Never Used   Substance and Sexual Activity    Alcohol use: Not Currently    Drug use: Never    Sexual activity: Not Currently     Partners: Female   Other Topics Concern    Not on file   Social History Narrative    Not on file     Social Determinants of Health     Financial Resource Strain:     Difficulty of Paying Living Expenses: Not on file   Food Insecurity:     Worried About Running Out of Food in the Last Year: Not on file    Joel of Food in the Last Year: Not on file   Transportation Needs:  Lack of Transportation (Medical): Not on file    Lack of Transportation (Non-Medical): Not on file   Physical Activity:     Days of Exercise per Week: Not on file    Minutes of Exercise per Session: Not on file   Stress:     Feeling of Stress : Not on file   Social Connections:     Frequency of Communication with Friends and Family: Not on file    Frequency of Social Gatherings with Friends and Family: Not on file    Attends [employer] Services: Not on file    Active Member of [address] [place] [address] or Organizations: Not on file    Attends Club or Organization Meetings: Not on file    Marital Status: Not on file   Intimate Partner Violence:     Fear of Current or Ex-Partner: Not on file    Emotionally Abused: Not on file    Physically Abused: Not on file    Sexually Abused: Not on file   Housing Stability:     Unable to Pay for Housing in the Last Year: Not on file    Number of [place] in the Last Year: Not on file    Unstable Housing in the Last Year: Not on file        ROS:  As noted above, otherwise remainder of 10-point ROS negative    Physical Exam:     Vital Signs:  BP (!) 149/88   Pulse 86   Temp 98.4 °F (36.9 °C) (Oral)   Resp 18   SpO2 100%     Weight:    Wt Readings from Last 3 Encounters:   04/21/22 254 lb 3.1 oz (115.3 kg)   04/20/22 254 lb 6.6 oz (115.4 kg)   04/19/22 253 lb 8.5 oz (115 kg)       KPS: 90% Able to carry on normal activity; minor signs or symptoms of disease    ECOG PS:  (1) Restricted in physically strenuous activity, ambulatory and able to do work of light nature    General: Awake, alert and oriented.   HEENT: normocephalic, PERRL, no scleral erythema or icterus, Oral mucosa moist and intact, throat clear  NECK: supple without palpable adenopathy  BACK: Straight negative CVAT  SKIN: warm dry and intact without lesions rashes or masses  CHEST: CTA bilaterally without use of accessory muscles  CV: Normal S1 S2, RRR, no MRG  ABD: NT ND normoactive BS, no palpable masses or hepatosplenomegaly  EXTREMITIES: without edema, denies calf tenderness  NEURO: CN II - XII grossly intact  CATHETER: CVC    Laboratory Data:   CBC:   Recent Labs     04/19/22  0858 04/20/22  0845 04/21/22  0900   WBC 1.1* 3.5* 2.2*   HGB 7.0* 7.4* 7.7*   HCT 20.5* 21.4* 22.7*   MCV 97.1 97.3 96.5   PLT 43* 42* 40*     BMP/Mag:  Recent Labs     04/19/22  0858 04/20/22  0845 04/21/22  0900    146* 143   K 3.8 4.0 4.0   * 113* 112*   CO2 21 18* 22   PHOS 2.7 2.5 2.4*   BUN 33* 29* 29*   CREATININE 2.4* 2.4* 2.4*   MG  --  1.70*  --      LIVP:   Recent Labs     04/20/22  0845   AST 27   ALT 16   BILIDIR <0.2   BILITOT 0.8   ALKPHOS 70     Coags:   Recent Labs     04/21/22  0900   PROTIME 13.3*   INR 1.17*   APTT 31.6     Uric Acid   Recent Labs     04/20/22  0845   LABURIC 6.5       PROBLEM LIST:          1. Thrombocytopenia       TREATMENT:                1. Plt transfusion       ASSESSMENT AND PLAN:           1. Kappa Light Chain Multiple Myeloma:  Currently in [facility]  - BM biopsy: 1/12/22.  20% plasma cells . Cate Guzmán p53 positive 79.5% of cells  -1/28/22 Serum KFLC 1662.71, Lambda 3.05   K/L ratio 545.15  -2/24/22 Serum KFLC 1919.12, Lambda 2.54, K/L raio 755.56  -s/p Fludarabine and Cytoxan 3/16/22-3/18/22  -Post CAR-T infusion staging:  PET/CT, BM bx with Flow, FISH, CG, MMP     2. Heme:   - Transfuse for Hgb < 7 and Platelets < 10 K.   - Plt transfusion today    Thrombocytopenia Precautions in place. Patient showing no signs or symptoms of active bleeding. Patient transfused blood products per orders - see flowsheet. Patient verbalizes understanding of all instructions.             - Disposition: Once blood products infused, D/C to [facility] up at [facility]      The patient was seen and examined by Dr. Chad Quevedo. This admission history and physical has been discussed and agreed upon by Dr. Chad Quevedo.      KIMBERLEY Champion NP

## 2022-04-21 NOTE — PROGRESS NOTES
[facility]      2022    Rimma Mcnally    :  1955    MRN:  [id number]    Referring MD: Demetria Serrano MD  [address], [address],  [address]    Subjective: Feels well today, discouraged about Myeloma labs and worried about next steps of treatment but still remains positive, no fevers, no confusion, eating and drinking well. ECOG PS:  (1) Restricted in physically strenuous activity, ambulatory and able to do work of light nature    KPS: 80% Normal activity with effort; some signs or symptoms of disease    Isolation:  None     Medications    Scheduled Meds:    Continuous Infusions:    PRN Meds:. ROS:  As noted above, otherwise remainder of 10-point ROS negative      Physical Exam:     Vital Signs: There were no vitals taken for this visit. Weight:    Wt Readings from Last 3 Encounters:   22 254 lb 6.6 oz (115.4 kg)   22 253 lb 8.5 oz (115 kg)   22 250 lb 9.6 oz (113.7 kg)       General: Awake, alert and oriented.   HEENT: normocephalic, alopecia, PERRL, no scleral erythema or icterus, Oral mucosa moist and intact, throat clear  NECK: supple without palpable adenopathy  BACK: Straight negative CVAT  SKIN: warm dry and intact without lesions rashes or masses  CHEST: CTA bilaterally without use of accessory muscles  CV: Normal S1 S2, RRR, no MRG  ABD: NT ND normoactive BS, no palpable masses or hepatosplenomegaly  EXTREMITIES: BLL 1+ edema slightly improved today, denies calf tenderness  NEURO: CN II - XII grossly intact  CATHETER: Right Tunneled HD catheter    Laboratory Data:  CBC:   Recent Labs     22  0929 22  0858 22  0845   WBC 0.9* 1.1* 3.5*   HGB 7.2* 7.0* 7.4*   HCT 20.7* 20.5* 21.4*   MCV 96.1 97.1 97.3   PLT 38* 43* 42*     BMP/Mag:  Recent Labs     22  0929 22  0858 22  0845    143 146*   K 4.1 3.8 4.0   * 113* 113*   CO2 22 21 18*   PHOS 2.2* 2.7 2.5   BUN 37* 33* 29* CREATININE 2.5* 2.4* 2.4*   MG 1.80  --  1.70*     LIVP:   Recent Labs     04/18/22  0929 04/20/22  0845   AST 22 27   ALT 16 16   BILIDIR <0.2 <0.2   BILITOT 0.6 0.8   ALKPHOS 67 70     Coags:   Recent Labs     04/18/22  0929   PROTIME 12.5   INR 1.10   APTT 30.9     Uric Acid   Recent Labs     04/18/22  0929 04/20/22  0845   LABURIC 7.0 6.5     Lab Results   Component Value Date    CRP <3.0 04/20/2022    CRP <3.0 04/19/2022    CRP <3.0 04/18/2022     Lab Results   Component Value Date    FERRITIN 1,794.0 (H) 04/20/2022    FERRITIN 1,470.0 (H) 04/19/2022    FERRITIN 1,325.0 (H) 04/18/2022         PROBLEM LIST:            1. [place] Light Chain Multiple Myeloma   2. ESRD on HD   3. GERD  4. HTN  5. Hypokalemia   6. Subdural Hematoma (1/2022)  7. Seizures (1/2022)      TREATMENT:            1. CyBorD (started 10/30/18)  2. Revlimid / [place] / Dex on clinical trial (started 2/1/19)  3. Carfilzomib / Pomalidomide / Dex x 6 cycles (Relapse #1 - started 8/26/19 - 2/2020)  4. Cytoxan Mobilization w/ 25% dose reduction (2/17/20)  5. Melphalan 140mg/m2 & Autologous SCT 3/12/20  6. Kyprolis/Ebony/Dex (6/30/21-7/28/21)- progression  7. Velcade/Ebony/Dex (5/18/21-8/3/21)- progression  8. HD - CTX               -Cycle 1 9/27/21              -Cycle 2 10/18/21              -Cycle 3 11/8/21- stopped d/t severe fatigue  9. Bendamustine/Velcade/Dex 12/06/21  10. Velcade only 2/11/22  11. Fludarabine/Cytoxan followed by CAR-T infusion 3/21/22  12. Stem Cell Boost 4/18/22      ASSESSMENT AND PLAN:            1. Kappa Light Chain Multiple Myeloma:  Currently in [facility]  - BM biopsy: 1/12/22.  20% plasma cells . Jairo Jacques  p53 positive 79.5% of cells  - 1/28/22 Serum KFLC 1662.71, Lambda 3.05   K/L ratio 545.15  - 2/24/22 Serum KFLC 1919.12, Lambda 2.54, K/L ratio 755.56  - 3/10/22 Serum KFLC 2040.00, Lambda 3.54, K/L ratio 576.27  - 4/18/22 Serum KFLC 3230.60, Lambda 6.64, K/L ratio 486.54  - s/p Fludarabine and Cytoxan 3/16/22-3/18/22  - Post CAR-T infusion staging:  PET/CT, BM bx with Flow, FISH, CG, MM  - Post Stem Cell Boost 4/18/22    Lymphodepleting Chemotherapy:  Fludarabine and cyclophosphamide   Disease Status at time of Infusion:  Progressive disease  CAR-T Infusion Date:  3/21/22  CAR-T Product:  Abecma  Batch ID Number:  [id number]     Day + 30    Day + 3 Stem Cell Boost 4/18/22     2. CRS / Neuro: Grade 1 CRS 3/22  CRS Grade: 1, fever only 3/22  - S/p toci 800mg 3/22/22  - Monitor CRP and Ferrtin closely  Lab Results   Component Value Date    CRP <3.0 04/20/2022    CRP <3.0 04/19/2022    CRP <3.0 04/18/2022     Lab Results   Component Value Date    FERRITIN 1,794.0 (H) 04/20/2022    FERRITIN 1,470.0 (H) 04/19/2022    FERRITIN 1,325.0 (H) 04/18/2022     ICANS Grade:  0  - Neuro checks w/ CARTOX 10-point assessment Q4hrs  ICE Score:  CAR-T Score: 10    3. ID: Afebrile  - Cont Levaquin, Diflucan, Valtrex ppx     4. Heme:  Pancytopenia d/t recent chemotherapy   - Transfuse for Hgb < 7 and Platelets < 99R (recent subdural)  - Plt transfusion today (no O platelets in blood bank, will receive plt tomorrow)  - PRA sent (4/7/22): Positive (Will receive Matched Crossed Plts): Type O compatible, will receive Type O moving forward 4/18/22  - Cont Daily Granix (4/19/22)        5. ESRD/Metabolic: SCr: 2.4 (0/72/58), Mild Hyperglycemia, HypoPhos & HypoMag  ESRD:  - off HD currently. Followed by Dr. Hamzah Sloan  - Baseline Cr 2.1-2.6  -  IVFs: PRN   - Cont Mag-Ox 400 mg BID 4/7/22  TLS:  No evidence, cont allopurinol and daily TLS labs   - Replace potassium and magnesium per PRN orders  - Given persistent/worsened peripheral edema, instructed him to take lasix 40 mg PO x1 on 4/2/22: Much Improved   - Continue to monitor his kidney function on daily basis. 6. Nutrition:  Appetite and oral intake is good. - Cont low microbial diet   - Follow closely with dietary     7.   Neuro:  Recent seizure/subdural hematoma (1/2022)  - Continue Keppra 500 mg PO BID indefinitely  - Keep platelets >17,724      - Disposition: return to clinic in am     KIMBERLEY Faustin - MINDI Brothers DO

## 2022-04-22 NOTE — PROGRESS NOTES
Short Stay Communication Note    Pam Fajardo  Diagnosis: multiple myeloma  Primary MD: Dr. Subha Benson  Treatment: Melphalan Fludarabine/Cytoxan  Day +30 of Car-T Abecma / Day +4 stem cell boost  Pt seen in outpatient infusion today. Labs drawn and reviewed. CBC:   Recent Labs     04/20/22  0845 04/21/22  0900 04/22/22  0855   WBC 3.5* 2.2* 1.7*   HGB 7.4* 7.7* 7.3*   HCT 21.4* 22.7* 21.5*   MCV 97.3 96.5 97.0   PLT 42* 40* 24*     BMP/Mag:  Recent Labs     04/20/22  0845 04/21/22  0900 04/22/22  1005   * 143 142   K 4.0 4.0 4.1   * 112* 110   CO2 18* 22 22   PHOS 2.5 2.4* 2.5   BUN 29* 29* 31*   CREATININE 2.4* 2.4* 2.3*   MG 1.70*  --  1.70*     Standing parameters for replacement for this patient:   1 unit of pack red blood cells for a hemoglobin < or equal to 7  1 pack of platelets for a platelet count less than or equal to 50  40 MeQ of Potassium administered for a potassium level less than or equal to 3.4  4g of Magnesium Sulfate for a magnesum level less than or equal to 1.4      Urinalysis last done: 4/18/2022 Urinalysis next due: 4/25/2022    Chest X-Ray last done: 4/18/2022 Chest X-Ray next due: 4/25/2022    Symptoms addressed and reported to care team this date: None  Treatments this date: labs, granix and platelets    Reviewed medication schedule with pt and caregiver. Both able to verbalize all medications and schedule. Pt to be seen again Sunday 4/24/22 . Discharged ambulatory to home. Patient's hemoglobin this AM:      Recent Labs     04/22/22  0855   HGB 7.3*      Patient's platelet count this AM:   Recent Labs     04/22/22  0855   PLT 24*     Pt seen at [address] [place] [address] today for  Platelet transfusion for above lab values. Informed consent verified. No Pre-medications ordered with no previous transfusion reaction history. Transfused per policy. Pt tolerated transfusion well and without incident. Pt verbalizes understanding of discharge instructions.   Discharged to home with spouse.      Electronically signed by Annemarie Lyn RN on 4/22/2022 at 11:09 AM

## 2022-04-22 NOTE — PROGRESS NOTES
[facility] [employer] Progress Note      2022    Elissa Lindsey    :  1955    MRN:  [id number]    Referring MD: Kasey العراقي MD  [address] [place] [address] 264, [address] [address],  [address]    Subjective: Patient feels well today, had slight Headache last night, from seasonal allergies, went away quickly without medication. No fevers, no confusion, eating and drinking well. No complaints at this time. ECOG PS:  (1) Restricted in physically strenuous activity, ambulatory and able to do work of light nature    KPS: 80% Normal activity with effort; some signs or symptoms of disease    Isolation:  None     Medications    Scheduled Meds:    Continuous Infusions:    PRN Meds:. ROS:  As noted above, otherwise remainder of 10-point ROS negative      Physical Exam:     Vital Signs:  BP (!) 155/86   Pulse 93   Temp 98.2 °F (36.8 °C) (Oral)   Resp 16   SpO2 100%     Weight:    Wt Readings from Last 3 Encounters:   22 254 lb 3.1 oz (115.3 kg)   22 254 lb 6.6 oz (115.4 kg)   22 253 lb 8.5 oz (115 kg)       General: Awake, alert and oriented.   HEENT: normocephalic, alopecia, PERRL, no scleral erythema or icterus, Oral mucosa moist and intact, throat clear  NECK: supple without palpable adenopathy  BACK: Straight negative CVAT  SKIN: warm dry and intact without lesions rashes or masses  CHEST: CTA bilaterally without use of accessory muscles  CV: Normal S1 S2, RRR, no MRG  ABD: NT ND normoactive BS, no palpable masses or hepatosplenomegaly  EXTREMITIES: BLL 1+ edema slightly improved today, denies calf tenderness  NEURO: CN II - XII grossly intact  CATHETER: Right Tunneled HD catheter    Laboratory Data:  CBC:   Recent Labs     22  0845 22  0900 22  0855   WBC 3.5* 2.2* 1.7*   HGB 7.4* 7.7* 7.3*   HCT 21.4* 22.7* 21.5*   MCV 97.3 96.5 97.0   PLT 42* 40* 24*     BMP/Mag:  Recent Labs     22  0845 22  0900 22  1005   * 143 142   K 4.0 4.0 4.1   * 112* 110   CO2 18* 22 22   PHOS 2.5 2.4* 2.5   BUN 29* 29* 31*   CREATININE 2.4* 2.4* 2.3*   MG 1.70*  --  1.70*     LIVP:   Recent Labs     04/20/22  0845 04/22/22  1005   AST 27 22   ALT 16 15   BILIDIR <0.2 <0.2   BILITOT 0.8 0.9   ALKPHOS 70 68     Coags:   Recent Labs     04/21/22  0900   PROTIME 13.3*   INR 1.17*   APTT 31.6     Uric Acid   Recent Labs     04/20/22  0845 04/22/22  1005   LABURIC 6.5 6.6     Lab Results   Component Value Date    CRP <3.0 04/22/2022    CRP <3.0 04/21/2022    CRP <3.0 04/20/2022     Lab Results   Component Value Date    FERRITIN 1,678.0 (H) 04/22/2022    FERRITIN 1,688.0 (H) 04/21/2022    FERRITIN 1,794.0 (H) 04/20/2022         PROBLEM LIST:            1. [place] Light Chain Multiple Myeloma   2. ESRD on HD   3. GERD  4. HTN  5. Hypokalemia   6. Subdural Hematoma (1/2022)  7. Seizures (1/2022)      TREATMENT:            1. CyBorD (started 10/30/18)  2. Revlimid / Wendy Bread / Dex on clinical trial (started 2/1/19)  3. Carfilzomib / Pomalidomide / Dex x 6 cycles (Relapse #1 - started 8/26/19 - 2/2020)  4. Cytoxan Mobilization w/ 25% dose reduction (2/17/20)  5. Melphalan 140mg/m2 & Autologous SCT 3/12/20  6. Kyprolis/Ebony/Dex (6/30/21-7/28/21)- progression  7. Velcade/Ebony/Dex (5/18/21-8/3/21)- progression  8. HD - CTX               -Cycle 1 9/27/21              -Cycle 2 10/18/21              -Cycle 3 11/8/21- stopped d/t severe fatigue  9. Bendamustine/Velcade/Dex 12/06/21  10. Velcade only 2/11/22  11. Fludarabine/Cytoxan followed by CAR-T infusion 3/21/22  12. Stem Cell Boost 4/18/22      ASSESSMENT AND PLAN:            1. Kappa Light Chain Multiple Myeloma:  Currently in [facility]  - BM biopsy: 1/12/22.  20% plasma cells . Stockham Glow  p53 positive 79.5% of cells  - 1/28/22 Serum KFLC 1662.71, Lambda 3.05   K/L ratio 545.15  - 2/24/22 Serum KFLC 1919.12, Lambda 2.54, K/L ratio 755.56  - 3/10/22 Serum KFLC 2040.00, Lambda 3.54, K/L ratio 576.27  - 4/18/22 Serum KFLC 3230.60, Lambda 6.64, K/L ratio 486.54  - s/p Fludarabine and Cytoxan 3/16/22-3/18/22  - Post CAR-T infusion staging:  PET/CT, BM bx with Flow, FISH, CG, MM  - Post Stem Cell Boost 4/18/22    Lymphodepleting Chemotherapy:  Fludarabine and cyclophosphamide   Disease Status at time of Infusion:  Progressive disease  CAR-T Infusion Date:  3/21/22  CAR-T Product:  Abecma  Batch ID Number:  [id number]     Day + 31    Day + 4 Stem Cell Boost 4/18/22     2. CRS / Neuro: Grade 1 CRS 3/22  CRS Grade: 1, fever only 3/22  - S/p toci 800mg 3/22/22  - Monitor CRP and Ferrtin closely  Lab Results   Component Value Date    CRP <3.0 04/22/2022    CRP <3.0 04/21/2022    CRP <3.0 04/20/2022     Lab Results   Component Value Date    FERRITIN 1,678.0 (H) 04/22/2022    FERRITIN 1,688.0 (H) 04/21/2022    FERRITIN 1,794.0 (H) 04/20/2022     ICANS Grade:  0  - Neuro checks w/ CARTOX 10-point assessment Q4hrs  ICE Score:  CAR-T Score: 10    3. ID: Afebrile  - Cont Levaquin, Diflucan, Valtrex ppx     4. Heme:  Pancytopenia d/t recent chemotherapy   - Transfuse for Hgb < 7 and Platelets < 32R (recent subdural)  - Plt transfusion today   - PRA sent (4/7/22): Positive (Will receive Matched Crossed Plts): Type O compatible, will receive Type O moving forward 4/18/22  - Cont Daily Granix (4/19/22)        5. ESRD/Metabolic: SCr: 2.3 (8/16/47), Mild Hyperglycemia, HypoPhos & HypoMag  ESRD:  - off HD currently. Followed by Dr. Marquis Dillon  - Baseline Cr 2.1-2.6  -  IVFs: PRN   - Cont Mag-Ox 400 mg BID 4/7/22  TLS:  No evidence, cont allopurinol and daily TLS labs   - Replace potassium and magnesium per PRN orders  - Given persistent/worsened peripheral edema, instructed him to take lasix 40 mg PO x1 on 4/2/22: Much Improved   - Continue to monitor his kidney function on daily basis. 6. Nutrition:  Appetite and oral intake is good. - Cont low microbial diet   - Follow closely with dietary     7.   Neuro:  Recent seizure/subdural hematoma (1/2022)  - Continue Keppra 500 mg PO BID indefinitely  - Keep platelets >63,573      - Disposition: return to clinic on Sunday, 4/24/22 for labs, CAR-T toxicity assessment and MD visit.      KIMBERLEY Mcdowell - MINDI Howard, DO

## 2022-04-24 NOTE — PROGRESS NOTES
[address] Progress Note      2022    Dow Rubinstein    :  1955    MRN:  [id number]    Referring MD: MD Cesar Oconnor 1943,  [address]    Subjective: No complaints. Went to a car show yesterday. ECOG PS:  (1) Restricted in physically strenuous activity, ambulatory and able to do work of light nature    KPS: 80% Normal activity with effort; some signs or symptoms of disease    Isolation:  None     Medications    Scheduled Meds:    Continuous Infusions:    PRN Meds:. ROS:  As noted above, otherwise remainder of 10-point ROS negative      Physical Exam:     Vital Signs:  /83   Pulse 91   Temp (P) 98.2 °F (36.8 °C) (Oral)   Resp 16   SpO2 98%     Weight:    Wt Readings from Last 3 Encounters:   22 251 lb 12.3 oz (114.2 kg)   22 254 lb 3.1 oz (115.3 kg)   22 254 lb 6.6 oz (115.4 kg)       General: Awake, alert and oriented.   HEENT: normocephalic, alopecia, PERRL, no scleral erythema or icterus, Oral mucosa moist and intact, throat clear  NECK: supple without palpable adenopathy  BACK: Straight negative CVAT  SKIN: warm dry and intact without lesions rashes or masses  CHEST: CTA bilaterally without use of accessory muscles  CV: Normal S1 S2, RRR, no MRG  ABD: NT ND normoactive BS, no palpable masses or hepatosplenomegaly  EXTREMITIES: BLL 1+ edema slightly improved today, denies calf tenderness  NEURO: CN II - XII grossly intact  CATHETER: Right Tunneled HD catheter    Laboratory Data:  CBC:   Recent Labs     22  0855 22  0843   WBC 1.7* 1.0*   HGB 7.3* 7.3*   HCT 21.5* 21.2*   MCV 97.0 95.9   PLT 24* 22*     BMP/Mag:  Recent Labs     22  1005 22  0843    143   K 4.1 3.9    111*   CO2    PHOS 2.5 3.3   BUN 31* 38*   CREATININE 2.3* 2.7*   MG 1.70*  --      LIVP:   Recent Labs     22  1005   AST 22   ALT 15   BILIDIR <0.2   BILITOT 0.9   ALKPHOS 68     Coags:   No results for input(s): PROTIME, INR, APTT in the last 72 hours. Uric Acid   Recent Labs     04/22/22  1005   LABURIC 6.6     Lab Results   Component Value Date    CRP <3.0 04/24/2022    CRP <3.0 04/22/2022    CRP <3.0 04/21/2022     Lab Results   Component Value Date    FERRITIN 1,742.0 (H) 04/24/2022    FERRITIN 1,678.0 (H) 04/22/2022    FERRITIN 1,688.0 (H) 04/21/2022         PROBLEM LIST:            1. [place] Light Chain Multiple Myeloma   2. ESRD on HD   3. GERD  4. HTN  5. Hypokalemia   6. Subdural Hematoma (1/2022)  7. Seizures (1/2022)      TREATMENT:            1. CyBorD (started 10/30/18)  2. Revlimid / Georjean Smoke / Dex on clinical trial (started 2/1/19)  3. Carfilzomib / Pomalidomide / Dex x 6 cycles (Relapse #1 - started 8/26/19 - 2/2020)  4. Cytoxan Mobilization w/ 25% dose reduction (2/17/20)  5. Melphalan 140mg/m2 & Autologous SCT 3/12/20  6. Kyprolis/Ebony/Dex (6/30/21-7/28/21)- progression  7. Velcade/Ebony/Dex (5/18/21-8/3/21)- progression  8. HD - CTX               -Cycle 1 9/27/21              -Cycle 2 10/18/21              -Cycle 3 11/8/21- stopped d/t severe fatigue  9. Bendamustine/Velcade/Dex 12/06/21  10. Velcade only 2/11/22  11. Fludarabine/Cytoxan followed by CAR-T infusion 3/21/22  12. Stem Cell Boost 4/18/22      ASSESSMENT AND PLAN:            1. [place] Light Chain Multiple Myeloma:  Currently in [facility]  - BM biopsy: 1/12/22.  20% plasma cells . Bertha Money  p53 positive 79.5% of cells  - 1/28/22 Serum KFLC 1662.71, Lambda 3.05   K/L ratio 545.15  - 2/24/22 Serum KFLC 1919.12, Lambda 2.54, K/L ratio 755.56  - 3/10/22 Serum KFLC 2040.00, Lambda 3.54, K/L ratio 576.27  - 4/18/22 Serum KFLC 3230.60, Lambda 6.64, K/L ratio 486.54  - s/p Fludarabine and Cytoxan 3/16/22-3/18/22  - Post CAR-T infusion staging:  PET/CT, BM bx with Flow, FISH, CG, MM  - Post Stem Cell Boost 4/18/22    Lymphodepleting Chemotherapy:  Fludarabine and cyclophosphamide   Disease Status at time of Infusion:  Progressive disease  CAR-T

## 2022-04-24 NOTE — PROGRESS NOTES
Patient's hemoglobin this AM: Recent Labs     04/24/22  0843   HGB 7.3*      Patient's platelet count this AM:   Recent Labs     04/24/22  0843   PLT 22*     Pt seen and assessed at [facility]. Seen at [address] [place] [address] today for Platelet transfusion per standing orders for above lab values. Blood products transfused per [place] [address] policy. Pt tolerated transfusion well and without incident. Pt verbalizes understanding of discharge instructions. Discharged ambulatory to home.   Adrián Forrester RN

## 2022-04-24 NOTE — PROGRESS NOTES
Short Stay Communication Note  Debbie Hickman  Diagnosis: Multiple Myeloma  Primary MD: Dr. Cristian Montero  Treatment: Fludarabine and Cytoxan  Day + 32 of Car-T Abecma   Pt seen in outpatient infusion today. Labs drawn and reviewed. CBC: Recent Labs     04/22/22  0855 04/24/22  0843   WBC 1.7* 1.0*   HGB 7.3* 7.3*   HCT 21.5* 21.2*   MCV 97.0 95.9   PLT 24* 22*     BMP/Mag:  Recent Labs     04/22/22  1005 04/24/22  0843    143   K 4.1 3.9    111*   CO2 22 22   PHOS 2.5 3.3   BUN 31* 38*   CREATININE 2.3* 2.7*   MG 1.70*  --      Standing parameters for replacement for this patient:   1 unit of pack red blood cells for a hemoglobin < or equal to 7.0.  1 pack of platelets for a platelet count less than or equal to 50.0.  40 MeQ of Potassium administered for a potassium level less than or equal to 3.4.  4g of Magnesium Sulfate for a magnesum level less than or equal to 1.4.  1pk platelets transfused per standing order for the above lab values. Urinalysis last done: 4/18/2022 Urinalysis next due: 4/25/2022. Chest X-Ray last done: 4/18/2022 Chest X-Ray next due: 4/25/2022    Symptoms addressed and reported to care team this date:   Treatments this date: labs, platelets, and Nivestym injection. Reviewed medication schedule with pt and caregiver. Both able to verbalize all medications and schedule. Pt to be seen again Tuesday. Discharged ambulatory to home.   Ion Dumont RN

## 2022-04-26 NOTE — DISCHARGE SUMMARY
[facility] Discharge Summary             Attending Physician: No att. providers found    Referring MD: Haris Charlton MD  [address] [facility] [address]  [place],  [address]    Name: Pam Fajardo :  1955  MRN:  [id number]    Admission: 2022   Discharge: 2022      Date: 2022    Reason for Admission:   Physical Exam: Thrombocytopenia     Hospital Course: Patient seen in OP Infusion for Plt transfusion. Tolerated well with no signs or symptoms. Will be discharged to home to today and follow up at [facility]. The patient understands all discharge instructions. Feels well with no complaints.        Vital Signs:  BP (!) 151/83   Pulse 80   Temp 98.2 °F (36.8 °C) (Oral)   Resp 16   SpO2 98%     Weight:    Wt Readings from Last 3 Encounters:   22 251 lb 8.7 oz (114.1 kg)   22 251 lb 12.3 oz (114.2 kg)   22 254 lb 3.1 oz (115.3 kg)       KPS: 90% Able to carry on normal activity; minor signs or symptoms of disease    General: Awake, alert and oriented    HEENT: normocephalic, alopecia, PERRL, no scleral erythema or icterus, Oral mucosa moist and intact, throat clear. NECK: supple without palpable adenopathy  BACK: Straight, negative CVAT  SKIN: warm dry and intact without lesions rashes or masses  CHEST: CTA bilaterally without use of accessory muscles  CV: Normal S1 S2, RRR, no MRG  ABD: NT ND normoactive BS, no palpable masses or hepatosplenomegaly  EXTREMITIES: without edema, denies calf tenderness  NEURO: CN II - XII grossly intact  CATHETER: CVC    Discharge Laboratory Data:  CBC:   Recent Labs     22  0843 22  0914   WBC 1.0* 1.0*   HGB 7.3* 7.2*   HCT 21.2* 21.5*   MCV 95.9 97.6   PLT 22* 22*     BMP/Mag:  Recent Labs     22  0843      K 3.9   *   CO2 22   PHOS 3.3   BUN 38*   CREATININE 2.7*     LIVP:   No results for input(s): AST, ALT, LIPASE, BILIDIR, BILITOT, ALKPHOS in the last 72 hours. Invalid input(s):   AMYLASE,  ALB  Coags:   Recent Labs 04/26/22  0914   PROTIME 13.2*   INR 1.16*   APTT 67.3*     Uric Acid   No results for input(s): LABURIC in the last 72 hours. [place]:  No results for input(s): TACROLEV in the last 72 hours. CMV Quant DNA by PCR: No results found for: CMVDNAQNT  IgG:   No results for input(s): IGG in the last 72 hours. PROBLEM LIST:            1. Thrombocytopenia       TREATMENT:                1. Plt transfusion       ASSESSMENT AND PLAN:           1. [place] Light Chain Multiple Myeloma:  Currently in [facility]  - BM biopsy: 1/12/22.  20% plasma cells . Eun Coronado p53 positive 79.5% of cells  -1/28/22 Serum KFLC 1662.71, Lambda 3.05   K/L ratio 545.15  -2/24/22 Serum KFLC 1919.12, Lambda 2.54, K/L raio 755.56  -s/p Fludarabine and Cytoxan 3/16/22-3/18/22  -Post CAR-T infusion staging:  PET/CT, BM bx with Flow, FISH, CG, MMP     2. Heme:   - Transfuse for Hgb < 7 and Platelets < 47 K.   - Plt transfusion today    Thrombocytopenia Precautions in place.  Patient showing no signs or symptoms of active bleeding.  Patient transfused blood products per orders - see flowsheet.  Patient verbalizes understanding of all instructions.          Condition on discharge: stable       Discharge Instructions: The patient was advised on activity and dietary restrictions. The patient was advised to follow up in the emergency department or contact the physician with any unresolved nausea/vomiting/diarrhea/pain or temperature greater than 100.5 F or any other unusual symptoms. This discharge summary and plan was discussed and agreed upon with Dr. Manuelito Morgan.     George Lia, APRN - NP, CNP

## 2022-04-26 NOTE — H&P
[facility] History and Physical       Attending Physician: No att. providers found    Primary Care: No primary care provider on file. Referring MD: Gregg Mcneill MD  [address] [facility] [address]  [place],  [address]    Name: Britt Bishop :  1955  MRN:  [id number]    Admission: 2022      Date: 2022    Reason for Admission: Thrombocytopenia      History of Present Illness: Corrine Mclean a 68 yo male w/ h/o Kappa Light Chain Multiple Myeloma (Dx 10/2018).   He was initially diagnosed with myeloma 10/2018 after presenting to the ED following routine workplace physical and labs revealed JUDY, anemia and thrombocytopenia (SCr 4.6, Hgb 7.8 & platelets of 92X). He was admitted to [facility] for additional work-up and was found to have kappa light chain multiple myeloma and nephropathy.       He presents to OP Infusion today with Thrombocytopenia, will receive Plt transfusion. He feels well today, denies fever, chills, bleeding, or GI changes. Eating and drinking well.        Past Surgical History:   Procedure Laterality Date    IR TUNNELED CATHETER PLACEMENT GREATER THAN 5 YEARS  2022    IR TUNNELED CATHETER PLACEMENT GREATER THAN 5 YEARS 2022 [facility]       Past Medical History:   Diagnosis Date    Cancer ([facility]     ESRD (end stage renal disease) on dialysis ([facility]     Mon-Wed-Fri       Prior to Admission medications    Medication Sig Start Date End Date Taking?  Authorizing Provider   Magnesium Oxide 400 MG CAPS Take 400 mg by mouth 2 times daily 22   KIMBERLEY Pro NP   allopurinol (ZYLOPRIM) 100 MG tablet Take 2 tablets by mouth daily 22   KIMBERLEY Pro NP   acyclovir (ZOVIRAX) 400 MG tablet Take 1 tablet by mouth 2 times daily 3/30/22   KIMBERLEY Pro NP   fluconazole (DIFLUCAN) 100 MG tablet Take 1 tablet by mouth daily 3/29/22 4/28/22  KIMBERLEY Pro NP   amLODIPine ([address]) 5 MG tablet Take 1 tablet by mouth daily 3/28/22   Deborah Santacruz MD   pantoprazole sodium (PROTONIX) 40 MG PACK packet Take 40 mg by mouth every morning (before breakfast)  Patient not taking: Reported on 4/18/2022    Historical Provider, MD   Calcium Citrate-Vitamin D (CALCIUM + VIT D, BARIATRIC ADVANTAGE, CHEWABLE TABLET) Take 1 tablet by mouth daily 3/16/22   KIMBERLEY Amaya - NP   levETIRAcetam (KEPPRA) 500 MG tablet Take 1 tablet by mouth every 12 hours 1/21/22   Herrick Campus, MD   sodium bicarbonate 650 MG tablet Take 650 mg by mouth daily    Historical Provider, MD   prochlorperazine (COMPAZINE) 10 MG tablet Take 10 mg by mouth every 6 hours as needed   Patient not taking: Reported on 4/18/2022    Historical Provider, MD   levoFLOXacin (LEVAQUIN) 250 MG tablet Take 250 mg by mouth daily For prophylaxis during chemotherapy    Historical Provider, MD       Allergies   Allergen Reactions    Decadron [Dexamethasone]      Patient is receiving CAR-T. No steroids unless approved by [facility] physician.        Family History   Problem Relation Age of Onset    Cancer Father         Social History     Socioeconomic History    Marital status: Life Partner     Spouse name: Kuldip Hanley Number of children: Not on file    Years of education: Not on file    Highest education level: Not on file   Occupational History    Not on file   Tobacco Use    Smoking status: Never Smoker    Smokeless tobacco: Never Used   Substance and Sexual Activity    Alcohol use: Not Currently    Drug use: Never    Sexual activity: Not Currently     Partners: Female   Other Topics Concern    Not on file   Social History Narrative    Not on file     Social Determinants of Health     Financial Resource Strain:     Difficulty of Paying Living Expenses: Not on file   Food Insecurity:     Worried About Running Out of Food in the Last Year: Not on file    Joel of Food in the Last Year: Not on file   Transportation Needs:  Lack of Transportation (Medical): Not on file    Lack of Transportation (Non-Medical): Not on file   Physical Activity:     Days of Exercise per Week: Not on file    Minutes of Exercise per Session: Not on file   Stress:     Feeling of Stress : Not on file   Social Connections:     Frequency of Communication with Friends and Family: Not on file    Frequency of Social Gatherings with Friends and Family: Not on file    Attends Jehovah's witness Services: Not on file    Active Member of [address] [place] [address] or Organizations: Not on file    Attends Club or Organization Meetings: Not on file    Marital Status: Not on file   Intimate Partner Violence:     Fear of Current or Ex-Partner: Not on file    Emotionally Abused: Not on file    Physically Abused: Not on file    Sexually Abused: Not on file   Housing Stability:     Unable to Pay for Housing in the Last Year: Not on file    Number of [place] in the Last Year: Not on file    Unstable Housing in the Last Year: Not on file        ROS:  As noted above, otherwise remainder of 10-point ROS negative    Physical Exam:     Vital Signs:  BP (!) 151/83   Pulse 80   Temp 98.2 °F (36.8 °C) (Oral)   Resp 16   SpO2 98%     Weight:    Wt Readings from Last 3 Encounters:   04/26/22 251 lb 8.7 oz (114.1 kg)   04/24/22 251 lb 12.3 oz (114.2 kg)   04/21/22 254 lb 3.1 oz (115.3 kg)       KPS: 90% Able to carry on normal activity; minor signs or symptoms of disease    ECOG PS:  (1) Restricted in physically strenuous activity, ambulatory and able to do work of light nature    General: Awake, alert and oriented.   HEENT: normocephalic, PERRL, no scleral erythema or icterus, Oral mucosa moist and intact, throat clear  NECK: supple without palpable adenopathy  BACK: Straight negative CVAT  SKIN: warm dry and intact without lesions rashes or masses  CHEST: CTA bilaterally without use of accessory muscles  CV: Normal S1 S2, RRR, no MRG  ABD: NT ND normoactive BS, no palpable masses or hepatosplenomegaly  EXTREMITIES: without edema, denies calf tenderness  NEURO: CN II - XII grossly intact  CATHETER: CVC    Laboratory Data:   CBC:   Recent Labs     04/24/22  0843 04/26/22  0914   WBC 1.0* 1.0*   HGB 7.3* 7.2*   HCT 21.2* 21.5*   MCV 95.9 97.6   PLT 22* 22*     BMP/Mag:  Recent Labs     04/24/22  0843      K 3.9   *   CO2 22   PHOS 3.3   BUN 38*   CREATININE 2.7*     LIVP:   No results for input(s): AST, ALT, LIPASE, BILIDIR, BILITOT, ALKPHOS in the last 72 hours. Invalid input(s): AMYLASE,  ALB  Coags:   Recent Labs     04/26/22  0914   PROTIME 13.2*   INR 1.16*   APTT 67.3*     Uric Acid   No results for input(s): LABURIC in the last 72 hours. PROBLEM LIST:          1. Thrombocytopenia       TREATMENT:                1. Plt transfusion       ASSESSMENT AND PLAN:           1. [place] Light Chain Multiple Myeloma:  Currently in [facility]  - BM biopsy: 1/12/22.  20% plasma cells . Zulma Curtis p53 positive 79.5% of cells  -1/28/22 Serum KFLC 1662.71, Lambda 3.05   K/L ratio 545.15  -2/24/22 Serum KFLC 1919.12, Lambda 2.54, K/L raio 755.56  -s/p Fludarabine and Cytoxan 3/16/22-3/18/22  -Post CAR-T infusion staging:  PET/CT, BM bx with Flow, FISH, CG, MMP     2. Heme:   - Transfuse for Hgb < 7 and Platelets < 10 K.   - Plt transfusion today    Thrombocytopenia Precautions in place. Patient showing no signs or symptoms of active bleeding. Patient transfused blood products per orders - see flowsheet. Patient verbalizes understanding of all instructions.             - Disposition: Once blood products infused, D/C to follow up at [facility]      The patient was seen and examined by Dr. Elian Matthews. This admission history and physical has been discussed and agreed upon by Dr. Elian Matthews.      Carole Poe, APRN - NP

## 2022-04-26 NOTE — PROGRESS NOTES
Short Stay Communication Note  Umm David  Diagnosis: Multiple Myeloma  Primary MD: Dr. Janneth Slaughter  Treatment: Fludarabine and Cytoxan  Day + 34 of Car-T Abecma   Pt seen in outpatient infusion today. Labs drawn and reviewed. CBC:   Recent Labs     04/24/22  0843 04/26/22  0914   WBC 1.0* 1.0*   HGB 7.3* 7.2*   HCT 21.2* 21.5*   MCV 95.9 97.6   PLT 22* 22*     BMP/Mag:  Recent Labs     04/24/22  0843 04/26/22  0914    147*   K 3.9 4.1   * 112*   CO2 22 22   PHOS 3.3 3.3   BUN 38* 34*   CREATININE 2.7* 2.5*   MG  --  1.70*     Standing parameters for replacement for this patient:   1 unit of pack red blood cells for a hemoglobin < or equal to 7.0.  1 pack of platelets for a platelet count less than or equal to 30.0.  40 MeQ of Potassium administered for a potassium level less than or equal to 3.4.  4g of Magnesium Sulfate for a magnesum level less than or equal to 1.4.  1pk platelets transfused per standing order for the above lab values. Urinalysis last done: 4/26/2022 Urinalysis next due: 5/3/2022. Chest X-Ray last done: 4/26/2022 Chest X-Ray next due: 5/3/2022    Pt platelet parameters changed to equal to or less then 30.0      Symptoms addressed and reported to care team this date:   Treatments this date: labs, platelets, and Nivestym injection, dressing and cap change done. Reviewed medication schedule with pt and caregiver. Both able to verbalize all medications and schedule. Pt to be seen again Thursday 4/28/22. Discharged ambulatory to home.   Clementine Laguerre RN

## 2022-04-26 NOTE — DISCHARGE SUMMARY
[facility] Discharge Summary             Attending Physician: No att. providers found    Referring MD: Emeli Laureano MD  [address] [facility] [address]  [place],  [address]    Name: Juana Lynn :  1955  MRN:  [id number]    Admission: 2022   Discharge: 2022      Date: 2022    Reason for Admission:   Physical Exam: Thrombocytopenia     Hospital Course: Patient seen in OP Infusion for Plt transfusion. Tolerated well with no signs or symptoms. Will be discharged to home to today and follow up at [facility]. The patient understands all discharge instructions. Feels well with no complaints.        Vital Signs:  BP (!) 145/75   Pulse 88   Temp 98.7 °F (37.1 °C) (Oral)   Resp 16   SpO2 100%     Weight:    Wt Readings from Last 3 Encounters:   22 251 lb 12.3 oz (114.2 kg)   22 254 lb 3.1 oz (115.3 kg)   22 254 lb 6.6 oz (115.4 kg)       KPS: 90% Able to carry on normal activity; minor signs or symptoms of disease    General: Awake, alert and oriented    HEENT: normocephalic, alopecia, PERRL, no scleral erythema or icterus, Oral mucosa moist and intact, throat clear. NECK: supple without palpable adenopathy  BACK: Straight, negative CVAT  SKIN: warm dry and intact without lesions rashes or masses  CHEST: CTA bilaterally without use of accessory muscles  CV: Normal S1 S2, RRR, no MRG  ABD: NT ND normoactive BS, no palpable masses or hepatosplenomegaly  EXTREMITIES: without edema, denies calf tenderness  NEURO: CN II - XII grossly intact  CATHETER: CVC    Discharge Laboratory Data:  CBC:   Recent Labs     22  0843   WBC 1.0*   HGB 7.3*   HCT 21.2*   MCV 95.9   PLT 22*     BMP/Mag:  Recent Labs     22  0843      K 3.9   *   CO2 22   PHOS 3.3   BUN 38*   CREATININE 2.7*     LIVP:   No results for input(s): AST, ALT, LIPASE, BILIDIR, BILITOT, ALKPHOS in the last 72 hours. Invalid input(s):   AMYLASE,  ALB  Coags:   No results for input(s): PROTIME, INR, APTT in the last 72 hours. Uric Acid   No results for input(s): LABURIC in the last 72 hours. [place]:  No results for input(s): TACROLEV in the last 72 hours. CMV Quant DNA by PCR: No results found for: CMVDNAQNT  IgG:   No results for input(s): IGG in the last 72 hours. PROBLEM LIST:            1. Thrombocytopenia       TREATMENT:                1. Plt transfusion       ASSESSMENT AND PLAN:           1. [place] Light Chain Multiple Myeloma:  Currently in [facility]  - BM biopsy: 1/12/22.  20% plasma cells . Cate Guzmán p53 positive 79.5% of cells  -1/28/22 Serum KFLC 1662.71, Lambda 3.05   K/L ratio 545.15  -2/24/22 Serum KFLC 1919.12, Lambda 2.54, K/L raio 755.56  -s/p Fludarabine and Cytoxan 3/16/22-3/18/22  -Post CAR-T infusion staging:  PET/CT, BM bx with Flow, FISH, CG, MMP     2. Heme:   - Transfuse for Hgb < 7 and Platelets < 91 K.   - Plt transfusion today    Thrombocytopenia Precautions in place.  Patient showing no signs or symptoms of active bleeding.  Patient transfused blood products per orders - see flowsheet.  Patient verbalizes understanding of all instructions.          Condition on discharge: stable       Discharge Instructions: The patient was advised on activity and dietary restrictions. The patient was advised to follow up in the emergency department or contact the physician with any unresolved nausea/vomiting/diarrhea/pain or temperature greater than 100.5 F or any other unusual symptoms. This discharge summary and plan was discussed and agreed upon with Dr. Chad Quevedo.     KIMBERLEY Champion - NP, CNP

## 2022-04-26 NOTE — PROGRESS NOTES
[address] Progress Note      2022    Loyda Sultana    :  1955    MRN:  [id number]    Referring MD: No referring provider defined for this encounter. Subjective: feels well. Has diffuse body aches. No fever or chillls    ECOG PS:  (1) Restricted in physically strenuous activity, ambulatory and able to do work of light nature    KPS: 80% Normal activity with effort; some signs or symptoms of disease    Isolation:  None     Medications    Scheduled Meds:    Continuous Infusions:    PRN Meds:. ROS:  As noted above, otherwise remainder of 10-point ROS negative      Physical Exam:     Vital Signs: There were no vitals taken for this visit. Weight:    Wt Readings from Last 3 Encounters:   22 251 lb 12.3 oz (114.2 kg)   22 254 lb 3.1 oz (115.3 kg)   22 254 lb 6.6 oz (115.4 kg)       General: Awake, alert and oriented. HEENT: normocephalic, alopecia, PERRL, no scleral erythema or icterus, Oral mucosa moist and intact, throat clear  NECK: supple without palpable adenopathy  BACK: Straight negative CVAT  SKIN: warm dry and intact without lesions rashes or masses  CHEST: CTA bilaterally without use of accessory muscles  CV: Normal S1 S2, RRR, no MRG  ABD: NT ND normoactive BS, no palpable masses or hepatosplenomegaly  EXTREMITIES: BLL 1+ edema slightly improved today, denies calf tenderness  NEURO: CN II - XII grossly intact  CATHETER: Right Tunneled HD catheter    Laboratory Data:  CBC:   Recent Labs     22  0843   WBC 1.0*   HGB 7.3*   HCT 21.2*   MCV 95.9   PLT 22*     BMP/Mag:  Recent Labs     22  0843      K 3.9   *   CO2 22   PHOS 3.3   BUN 38*   CREATININE 2.7*     LIVP:   No results for input(s): AST, ALT, LIPASE, BILIDIR, BILITOT, ALKPHOS in the last 72 hours. Invalid input(s): AMYLASE,  ALB  Coags:   No results for input(s): PROTIME, INR, APTT in the last 72 hours. Uric Acid   No results for input(s): LABURIC in the last 72 hours.   Lab Results   Component Value Date    CRP <3.0 04/24/2022    CRP <3.0 04/22/2022    CRP <3.0 04/21/2022     Lab Results   Component Value Date    FERRITIN 1,742.0 (H) 04/24/2022    FERRITIN 1,678.0 (H) 04/22/2022    FERRITIN 1,688.0 (H) 04/21/2022         PROBLEM LIST:            1. [place] Light Chain Multiple Myeloma   2. ESRD on HD   3. GERD  4. HTN  5. Hypokalemia   6. Subdural Hematoma (1/2022)  7. Seizures (1/2022)      TREATMENT:            1. CyBorD (started 10/30/18)  2. Revlimid / [place] Chasity / Dex on clinical trial (started 2/1/19)  3. Carfilzomib / Pomalidomide / Dex x 6 cycles (Relapse #1 - started 8/26/19 - 2/2020)  4. Cytoxan Mobilization w/ 25% dose reduction (2/17/20)  5. Melphalan 140mg/m2 & Autologous SCT 3/12/20  6. Kyprolis/Ebony/Dex (6/30/21-7/28/21)- progression  7. Velcade/Ebony/Dex (5/18/21-8/3/21)- progression  8. HD - CTX               -Cycle 1 9/27/21              -Cycle 2 10/18/21              -Cycle 3 11/8/21- stopped d/t severe fatigue  9. Bendamustine/Velcade/Dex 12/06/21  10. Velcade only 2/11/22  11. Fludarabine/Cytoxan followed by CAR-T infusion 3/21/22  12. Stem Cell Boost 4/18/22      ASSESSMENT AND PLAN:            1. [place]appa Light Chain Multiple Myeloma:  Currently in [facility]  - BM biopsy: 1/12/22.  20% plasma cells . Graylon Griffin  p53 positive 79.5% of cells  - 1/28/22 Serum KFLC 1662.71, Lambda 3.05   K/L ratio 545.15  - 2/24/22 Serum KFLC 1919.12, Lambda 2.54, K/L ratio 755.56  - 3/10/22 Serum KFLC 2040.00, Lambda 3.54, K/L ratio 576.27  - 4/18/22 Serum KFLC 3230.60, Lambda 6.64, K/L ratio 486.54  - s/p Fludarabine and Cytoxan 3/16/22-3/18/22  - Post CAR-T infusion staging:  PET/CT, BM bx with Flow, FISH, CG, MM  - Post Stem Cell Boost 4/18/22    Lymphodepleting Chemotherapy:  Fludarabine and cyclophosphamide   Disease Status at time of Infusion:  Progressive disease  CAR-T Infusion Date:  3/21/22  CAR-T Product:  Novogen  Batch ID Number:  [id number]     Day + 35    Day + 8 Stem Cell Boost 4/18/22     2. CRS / Neuro: Grade 1 CRS 3/22  CRS Grade: 1, fever only 3/22  - S/p toci 800mg 3/22/22  - Monitor CRP and Ferrtin closely  Lab Results   Component Value Date    CRP <3.0 04/24/2022    CRP <3.0 04/22/2022    CRP <3.0 04/21/2022     Lab Results   Component Value Date    FERRITIN 1,742.0 (H) 04/24/2022    FERRITIN 1,678.0 (H) 04/22/2022    FERRITIN 1,688.0 (H) 04/21/2022     ICANS Grade:  0  - Neuro checks w/ CARTOX 10-point assessment Q4hrs  ICE Score:  CAR-T Score: 10    3. ID: Afebrile  - Cont Levaquin, Diflucan, Valtrex ppx     4. Heme:  Pancytopenia d/t recent chemotherapy   - Transfuse for Hgb < 7 and Platelets < 06T (recent subdural)  - Plt transfusion today   - PRA sent (4/7/22): Positive (Will receive Matched Crossed Plts): Type O compatible, will receive Type O moving forward 4/18/22  - Cont Daily Granix (4/19/22), missed on \"day off\". Repeat 4/26/22     5. ESRD/Metabolic: SCr: 2.3 (4/31/85), Mild Hyperglycemia, HypoPhos & HypoMag  ESRD:  - off HD currently. Followed by Dr. Kelly Salgado  - Baseline Cr 2.1-2.6  -  IVFs: PRN   - Cont Mag-Ox 400 mg BID 4/7/22  TLS:  No evidence, cont allopurinol and daily TLS labs   - Replace potassium and magnesium per PRN orders  - Given persistent/worsened peripheral edema, instructed him to take lasix 40 mg PO x1 on 4/2/22: Much Improved   - Continue to monitor his kidney function on daily basis. 6. Nutrition:  Appetite and oral intake is good. - Cont low microbial diet   - Follow closely with dietary     7. Neuro:  Recent seizure/subdural hematoma (1/2/022)  - Continue Keppra 500 mg PO BID indefinitely  - Keep platelets >60,320      - Disposition: return to clinic on Thursday, 4/28/22 for labs, CAR-T toxicity assessment and MD visit and possible platelet transfusion.      [place] Radish, APRN-CNP     Concepción Gudino DO

## 2022-04-28 NOTE — PROGRESS NOTES
Patient's hemoglobin this AM: Recent Labs     04/28/22  0855   HGB 6.8*      Patient's platelet count this AM:   Recent Labs     04/28/22  0855   PLT 24*     Pt seen at [facility] today for  RBCs and Platelet Transfusion  for above lab values. Informed consent verified. No Pre-medications ordered with no previous transfusion reaction history. Transfused per policy. Pt tolerated transfusion well and without incident. Pt verbalizes understanding of discharge instructions. Discharged to ambulatory with his Wife.     Electronically signed by Moody Melton RN on 4/28/2022 at 12:24 PM

## 2022-04-28 NOTE — PROGRESS NOTES
Short Stay Communication Note  [facility]  Diagnosis: Multiple Myeloma  Primary MD: Elisabeth Moreira  Treatment: Fludarabine and Cyclophosphamide  Day +36 of Car-T Abecma   Pt seen in outpatient infusion today. Labs drawn and reviewed. CBC: Recent Labs     04/26/22  0914 04/28/22  0855   WBC 1.0* 0.9*   HGB 7.2* 6.8*   HCT 21.5* 19.8*   MCV 97.6 98.0   PLT 22* 24*     BMP/Mag:  Recent Labs     04/26/22  0914 04/28/22  0855   * 144   K 4.1 3.9   * 110   CO2 22 22   PHOS 3.3 3.3   BUN 34* 37*   CREATININE 2.5* 2.7*   MG 1.70* 1.60*     Standing parameters for replacement for this patient:   1 unit of pack red blood cells for a hemoglobin < or equal to 7  1 pack of platelets for a platelet count < or equal to 50  40 MeQ of Potassium administered for a potassium level < or equal to 3.4  4g of Magnesium Sulfate for a magnesum level < or equal to 1.4  1pk platelets transfused per standing order and 1 unit of RBCs for the above lab values. Urinalysis last done: 4/26/2022 Urinalysis next due: 5//2/2022    Chest X-Ray last done: 4/26/2022 Chest X-Ray next due: 5/2/2022    Symptoms addressed and reported to care team this date:   Treatments this date: Sterile dressing change. Reviewed medication schedule with pt and caregiver. Both able to verbalize all medications and schedule. Pt to be seen again on Saturday 4/30/2022. Discharged ambulatory to home.     Electronically signed by Wandy Malone RN on 4/28/2022 at 1:28 PM

## 2022-04-28 NOTE — PROGRESS NOTES
[address] [facility] [facility] Progress Note      2022    Juana Lynn    :  1955    MRN:  [id number]    Referring MD: Emeli Laureano MD  [address] [facility] [address] [place] [address],  [address]    Subjective: feels tired. Has bilateral shoulder pain. Eating well. Denies any bleeding     ECOG PS:  (1) Restricted in physically strenuous activity, ambulatory and able to do work of light nature    KPS: 80% Normal activity with effort; some signs or symptoms of disease    Isolation:  None     Medications    Scheduled Meds:    Continuous Infusions:    PRN Meds:. ROS:  As noted above, otherwise remainder of 10-point ROS negative      Physical Exam:     Vital Signs:  BP (!) 146/86   Pulse 84   Temp 97.8 °F (36.6 °C) (Oral)   Resp 20   SpO2 100%     Weight:    Wt Readings from Last 3 Encounters:   22 248 lb 14.4 oz (112.9 kg)   22 251 lb 8.7 oz (114.1 kg)   22 251 lb 12.3 oz (114.2 kg)       General: Awake, alert and oriented.   HEENT: normocephalic, alopecia, PERRL, no scleral erythema or icterus, Oral mucosa moist and intact, throat clear  NECK: supple without palpable adenopathy  BACK: Straight negative CVAT  SKIN: warm dry and intact without lesions rashes or masses  CHEST: CTA bilaterally without use of accessory muscles  CV: Normal S1 S2, RRR, no MRG  ABD: NT ND normoactive BS, no palpable masses or hepatosplenomegaly  EXTREMITIES: BLL 1+ edema slightly improved today, denies calf tenderness  NEURO: CN II - XII grossly intact  CATHETER: Right Tunneled HD catheter    Laboratory Data:  CBC:   Recent Labs     22  0914 22  0855   WBC 1.0* 0.9*   HGB 7.2* 6.8*   HCT 21.5* 19.8*   MCV 97.6 98.0   PLT 22* 24*     BMP/Mag:  Recent Labs     22  0914 22  0855   * 144   K 4.1 3.9   * 110   CO2    PHOS 3.3 3.3   BUN 34* 37*   CREATININE 2.5* 2.7*   MG 1.70* 1.60*     LIVP:   Recent Labs     22  0914 22  0855   AST 21 19   ALT 14 14   BILIDIR <0.2 <0.2 BILITOT 0.7 0.5   ALKPHOS 69 73     Coags:   Recent Labs     04/26/22  0914 04/28/22  0855   PROTIME 13.2* 13.0*   INR 1.16* 1.14*   APTT 67.3* 32.1     Uric Acid   Recent Labs     04/26/22  0914 04/28/22  0855   LABURIC 7.6* 8.1*     Lab Results   Component Value Date    CRP <3.0 04/28/2022    CRP <3.0 04/26/2022    CRP <3.0 04/24/2022     Lab Results   Component Value Date    FERRITIN 1,723.0 (H) 04/28/2022    FERRITIN 2,049.0 (H) 04/26/2022    FERRITIN 1,742.0 (H) 04/24/2022         PROBLEM LIST:            1. [place] Light Chain Multiple Myeloma   2. ESRD on HD   3. GERD  4. HTN  5. Hypokalemia   6. Subdural Hematoma (1/2022)  7. Seizures (1/2022)      TREATMENT:            1. CyBorD (started 10/30/18)  2. Revlimid / Cristal Duong / Dex on clinical trial (started 2/1/19)  3. Carfilzomib / Pomalidomide / Dex x 6 cycles (Relapse #1 - started 8/26/19 - 2/2020)  4. Cytoxan Mobilization w/ 25% dose reduction (2/17/20)  5. Melphalan 140mg/m2 & Autologous SCT 3/12/20  6. Kyprolis/Ebony/Dex (6/30/21-7/28/21)- progression  7. Velcade/Ebony/Dex (5/18/21-8/3/21)- progression  8. HD - CTX               -Cycle 1 9/27/21              -Cycle 2 10/18/21              -Cycle 3 11/8/21- stopped d/t severe fatigue  9. Bendamustine/Velcade/Dex 12/06/21  10. Velcade only 2/11/22  11. Fludarabine/Cytoxan followed by CAR-T infusion 3/21/22  12. Stem Cell Boost 4/18/22      ASSESSMENT AND PLAN:            1. Kappa Light Chain Multiple Myeloma:  Currently in [facility]GPR  - BM biopsy: 1/12/22.  20% plasma cells . Lynda Graven  p53 positive 79.5% of cells  - 1/28/22 Serum KFLC 1662.71, Lambda 3.05   K/L ratio 545.15  - 2/24/22 Serum KFLC 1919.12, Lambda 2.54, K/L ratio 755.56  - 3/10/22 Serum KFLC 2040.00, Lambda 3.54, K/L ratio 576.27  - 4/18/22 Serum KFLC 3230.60, Lambda 6.64, K/L ratio 486.54  - s/p Fludarabine and Cytoxan 3/16/22-3/18/22  - Post CAR-T infusion staging:  PET/CT, BM bx with Flow, FISH, CG, MM  - Post Stem Cell Boost 4/18/22    Lymphodepleting Chemotherapy:  Fludarabine and cyclophosphamide   Disease Status at time of Infusion:  Progressive disease  CAR-T Infusion Date:  3/21/22  CAR-T Product:  Enzo  Batch ID Number:  [id number]     Day + 36    Day + 9 Stem Cell Boost 4/18/22     2. CRS / Neuro: Grade 1 CRS 3/22  CRS Grade: 1, fever only 3/22  - S/p toci 800mg 3/22/22  - Monitor CRP and Ferrtin closely  Lab Results   Component Value Date    CRP <3.0 04/28/2022    CRP <3.0 04/26/2022    CRP <3.0 04/24/2022     Lab Results   Component Value Date    FERRITIN 1,723.0 (H) 04/28/2022    FERRITIN 2,049.0 (H) 04/26/2022    FERRITIN 1,742.0 (H) 04/24/2022     ICANS Grade:  0  - Neuro checks w/ CARTOX 10-point assessment Q4hrs  ICE Score:  CAR-T Score: 10    3. ID: Afebrile  - Cont Levaquin, Diflucan, Valtrex ppx     4. Heme:  Pancytopenia d/t recent chemotherapy   - Transfuse for Hgb < 7 and Platelets < 89Q (recent subdural)  - Plt transfusion today   - PRA sent (4/7/22): Positive (Will receive Matched Crossed Plts): Type O compatible, will receive Type O moving forward 4/18/22  - Cont Granix every other day  Repeat 4/28/22  - PRBC transfusion today      5. ESRD/Metabolic: SCr: 2.3 (9/38/41), Mild Hyperglycemia, HypoPhos & HypoMag  ESRD:  - off HD currently. Followed by Dr. Valencia Vanna  - Baseline Cr 2.1-2.6  -  IVFs: PRN   - Cont Mag-Ox 400 mg BID 4/7/22  TLS:  No evidence, cont allopurinol and daily TLS labs   - Replace potassium and magnesium per PRN orders  - Given persistent/worsened peripheral edema, instructed him to take lasix 40 mg PO x1 on 4/2/22: Much Improved   - Continue to monitor his kidney function on daily basis. 6. Nutrition:  Appetite and oral intake is good. - Cont low microbial diet   - Follow closely with dietary     7.   Neuro:  Recent seizure/subdural hematoma (1/2/022)  - Continue Keppra 500 mg PO BID indefinitely  - Keep platelets >46,520      - Disposition: return to clinic on saturday,  for labs, CAR-T toxicity assessment and MD visit and possible platelet transfusion.      Anna Castle, DO

## 2022-04-30 NOTE — PROGRESS NOTES
[facility] [employer] Progress Note      2022    Vicki Conrad    :  1955    MRN:  [id number]    Referring MD: MD Sahil Mathewhiro Garciaca 1943,  [address]    Subjective: Has bilateral shoulder pain. Eating well. Denies any bleeding     ECOG PS:  (1) Restricted in physically strenuous activity, ambulatory and able to do work of light nature    KPS: 80% Normal activity with effort; some signs or symptoms of disease    Isolation:  None     Medications    Scheduled Meds:    Continuous Infusions:    PRN Meds:. ROS:  As noted above, otherwise remainder of 10-point ROS negative      Physical Exam:     Vital Signs:  BP (!) 142/92   Pulse 95   Temp 98.2 °F (36.8 °C) (Oral)   Resp 18   Wt 251 lb 12.3 oz (114.2 kg)   SpO2 100%   BMI 32.31 kg/m²     Weight:    Wt Readings from Last 3 Encounters:   22 251 lb 12.3 oz (114.2 kg)   22 248 lb 14.4 oz (112.9 kg)   22 251 lb 8.7 oz (114.1 kg)       General: Awake, alert and oriented.   HEENT: normocephalic, alopecia, PERRL, no scleral erythema or icterus, Oral mucosa moist and intact, throat clear  NECK: supple without palpable adenopathy  BACK: Straight negative CVAT  SKIN: warm dry and intact without lesions rashes or masses  CHEST: CTA bilaterally without use of accessory muscles  CV: Normal S1 S2, RRR, no MRG  ABD: NT ND normoactive BS, no palpable masses or hepatosplenomegaly  EXTREMITIES: BLL 1+ edema slightly improved today, denies calf tenderness  NEURO: CN II - XII grossly intact  CATHETER: Right Tunneled HD catheter    Laboratory Data:  CBC:   Recent Labs     22  0855 22  0913   WBC 0.9* 0.9*   HGB 6.8* 7.3*   HCT 19.8* 21.3*   MCV 98.0 98.9   PLT 24* 23*     BMP/Mag:  Recent Labs     22  0855 22  0913    141   K 3.9 3.8    109   CO2 22 20*   PHOS 3.3 2.9   BUN 37* 40*   CREATININE 2.7* 2.9*   MG 1.60* 1.60*     LIVP:   Recent Labs     22  0855 22  0913   AST 19 17   ALT 14 13   BILIDIR <0.2 <0.2   BILITOT 0.5 0.6   ALKPHOS 73 67     Coags:   Recent Labs     04/28/22  0855   PROTIME 13.0*   INR 1.14*   APTT 32.1     Uric Acid   Recent Labs     04/28/22  0855 04/30/22  0913   LABURIC 8.1* 8.0*     Lab Results   Component Value Date    CRP <3.0 04/28/2022    CRP <3.0 04/26/2022    CRP <3.0 04/24/2022     Lab Results   Component Value Date    FERRITIN 1,723.0 (H) 04/28/2022    FERRITIN 2,049.0 (H) 04/26/2022    FERRITIN 1,742.0 (H) 04/24/2022         PROBLEM LIST:            1. [place] Light Chain Multiple Myeloma   2. ESRD on HD   3. GERD  4. HTN  5. Hypokalemia   6. Subdural Hematoma (1/2022)  7. Seizures (1/2022)      TREATMENT:            1. CyBorD (started 10/30/18)  2. Revlimid / Georjean Smoke / Dex on clinical trial (started 2/1/19)  3. Carfilzomib / Pomalidomide / Dex x 6 cycles (Relapse #1 - started 8/26/19 - 2/2020)  4. Cytoxan Mobilization w/ 25% dose reduction (2/17/20)  5. Melphalan 140mg/m2 & Autologous SCT 3/12/20  6. Kyprolis/Ebony/Dex (6/30/21-7/28/21)- progression  7. Velcade/Ebony/Dex (5/18/21-8/3/21)- progression  8. HD - CTX               -Cycle 1 9/27/21              -Cycle 2 10/18/21              -Cycle 3 11/8/21- stopped d/t severe fatigue  9. Bendamustine/Velcade/Dex 12/06/21  10. Velcade only 2/11/22  11. Fludarabine/Cytoxan followed by CAR-T infusion 3/21/22  12. Stem Cell Boost 4/18/22      ASSESSMENT AND PLAN:            1. [place] Light Chain Multiple Myeloma:  Currently in [facility]  - BM biopsy: 1/12/22.  20% plasma cells . Bertha Money  p53 positive 79.5% of cells  - 1/28/22 Serum KFLC 1662.71, Lambda 3.05   K/L ratio 545.15  - 2/24/22 Serum KFLC 1919.12, Lambda 2.54, K/L ratio 755.56  - 3/10/22 Serum KFLC 2040.00, Lambda 3.54, K/L ratio 576.27  - 4/18/22 Serum KFLC 3230.60, Lambda 6.64, K/L ratio 486.54  - s/p Fludarabine and Cytoxan 3/16/22-3/18/22  - Post CAR-T infusion staging:  PET/CT, BM bx with Flow, FISH, CG, MM  - Post Stem Cell Boost 4/18/22    Lymphodepleting Chemotherapy:  Fludarabine and cyclophosphamide   Disease Status at time of Infusion:  Progressive disease  CAR-T Infusion Date:  3/21/22  CAR-T Product:  Enzo  Batch ID Number:  [id number]     Day + 38    Day + 11 Stem Cell Boost 4/18/22     2. CRS / Neuro: Grade 1 CRS 3/22  CRS Grade: 1, fever only 3/22  - S/p toci 800mg 3/22/22  - Monitor CRP and Ferrtin closely  Lab Results   Component Value Date    CRP <3.0 04/28/2022    CRP <3.0 04/26/2022    CRP <3.0 04/24/2022     Lab Results   Component Value Date    FERRITIN 1,723.0 (H) 04/28/2022    FERRITIN 2,049.0 (H) 04/26/2022    FERRITIN 1,742.0 (H) 04/24/2022     ICANS Grade:  0  - Neuro checks w/ CARTOX 10-point assessment Q4hrs  ICE Score:  CAR-T Score: 10    3. ID: Afebrile  - Cont Levaquin, Diflucan, Valtrex ppx     4. Heme:  Pancytopenia d/t recent chemotherapy   - Transfuse for Hgb < 7 and Platelets < 04P (recent subdural)  - Plt transfusion today   - PRA sent (4/7/22): Positive (Will receive Matched Crossed Plts): Type O compatible, will receive Type O moving forward 4/18/22  - Cont Granix every other day  - plt transfusion today      5. ESRD/Metabolic: SCr: 2.3 (9/11/49), Mild Hyperglycemia, HypoPhos & HypoMg  ESRD:  - off HD currently. Followed by Dr. Mey Andersen  - Baseline Cr 2.1-2.6  -  IVFs: PRN   - Cont Mag-Ox 400 mg BID 4/7/22  TLS:  No evidence, cont allopurinol and daily TLS labs   - Replace potassium and magnesium per PRN orders  - Given persistent/worsened peripheral edema, instructed him to take lasix 40 mg PO x1 on 4/2/22: Much Improved   - Continue to monitor his kidney function on daily basis. 6. Nutrition:  Appetite and oral intake is good. - Cont low microbial diet   - Follow closely with dietary     7.   Neuro:  Recent seizure/subdural hematoma (1/2/022)  - Cont Keppra 500 mg PO BID indefinitely  - Keep platelets >26,496      - Disposition: return to clinic on Monday for labs, CAR-T toxicity assessment and MD visit and possible platelet transfusion. Shey Layton, APRN - CNP     Dorian Nithya.  Ke Mireles, [facility] [address]

## 2022-04-30 NOTE — PROGRESS NOTES
Short Stay Communication Note  Pam Fajardo  Diagnosis: Multiple Myeloma  Primary MD: Wendy Zavala  Treatment: Fludarabine and Cyclophosphamide  Day + 38 of Car-T [place]   Pt seen in outpatient infusion today. Labs drawn and reviewed. CBC: Recent Labs     04/28/22  0855 04/30/22  0913   WBC 0.9* 0.9*   HGB 6.8* 7.3*   HCT 19.8* 21.3*   MCV 98.0 98.9   PLT 24* 23*     BMP/Mag:  Recent Labs     04/28/22  0855 04/30/22  0913    141   K 3.9 3.8    109   CO2 22 20*   PHOS 3.3 2.9   BUN 37* 40*   CREATININE 2.7* 2.9*   MG 1.60* 1.60*     Standing parameters for replacement for this patient:   1 unit of pack red blood cells for a hemoglobin < or equal to 7  1 pack of platelets for a platelet count < or equal to 30  40 MeQ of Potassium administered for a potassium level < or equal to 3.4  4g of Magnesium Sulfate for a magnesum level < or equal to 1.4  1pk platelets transfused per standing order for the above lab values. Urinalysis last done: 4/26/2022 Urinalysis next due: 5/2/2022    Chest X-Ray last done: 4/26/2022 Chest X-Ray next due: 5/2/2022    Symptoms addressed and reported to care team this date:   Treatments this date: Platelet infusion    Reviewed medication schedule with pt and caregiver. Both able to verbalize all medications and schedule. Pt to be seen again on Monday 5/2/2022. Discharged ambulatory to home.     Electronically signed by Pati Hopkins RN on 4/30/2022 at 11:56 AM

## 2022-04-30 NOTE — PROGRESS NOTES
Patient's hemoglobin this AM: Recent Labs     04/30/22  0913   HGB 7.3*      Patient's platelet count this AM:   Recent Labs     04/30/22  0913   PLT 23*     Pt seen at [address] [place] [address] today for  Platelet transfusion  for above lab values. Informed consent verified. No Pre-medications ordered with no previous transfusion reaction history. Transfused per policy. Pt tolerated transfusion well and without incident. Pt verbalizes understanding of discharge instructions. Discharged to ambulatory with his Wife.

## 2022-05-02 PROBLEM — D61.818 PANCYTOPENIA (HCC): Status: ACTIVE | Noted: 2022-01-01

## 2022-05-02 NOTE — PROGRESS NOTES
Patient's hemoglobin this AM:   Recent Labs     05/02/22  0858   HGB 7.0*      Patient's platelet count this AM:   Recent Labs     05/02/22  0858   PLT 33*     Pt seen at [address] [place] [address] today for blood transfusion for above lab values. Informed consent verified. No Pre-medications ordered with no previous transfusion reaction history. Transfused per policy. Pt tolerated transfusion well and without incident. Pt verbalizes understanding of discharge instructions. Discharged stable and ambulatory to home with s/o.     Cindy Madrid RN

## 2022-05-02 NOTE — DISCHARGE SUMMARY
[facility] Discharge Summary             Attending Physician: Alice Lewis MD    Referring MD: Alice Lewis MD  [facility] [address],  [address]    Name: Debbie Arrieta :  1955  MRN:  [id number]    Admission: 2022   Discharge:  22     Date: 2022    Diagnosis on admit: Pancytopenia    Medications:      Medication List      ASK your doctor about these medications    acyclovir 400 MG tablet  Commonly known as: ZOVIRAX  Take 1 tablet by mouth 2 times daily     allopurinol 100 MG tablet  Commonly known as: ZYLOPRIM  Take 2 tablets by mouth daily     amLODIPine 5 MG tablet  Commonly known as: NORVASC  Take 1 tablet by mouth daily     CALCIUM + VIT D (BARIATRIC ADVANTAGE) CHEWABLE TABLET  Take 1 tablet by mouth daily     levETIRAcetam 500 MG tablet  Commonly known as: KEPPRA  Take 1 tablet by mouth every 12 hours     levoFLOXacin 250 MG tablet  Commonly known as: LEVAQUIN     Magnesium Oxide 400 MG Caps  Take 400 mg by mouth 2 times daily     pantoprazole sodium 40 MG Pack packet  Commonly known as: PROTONIX     prochlorperazine 10 MG tablet  Commonly known as: COMPAZINE     sodium bicarbonate 650 MG tablet            Reason for Admission: Blood Transfusion    Hospital Course:   Ingrid Thomas a 66 yo male w/ h/o [place] Light Chain Multiple Myeloma (Dx 10/2018) s/p CAR-T & stem cell boost.      He presented to OP Infusion today with ongoing pancytopenia. He will receive PRBC transfusion today. He feels well today, denies fever, chills, bleeding, or GI changes. Eating and drinking well. He will be discharged home following PRBC transfusion. Physical Exam:     Vital Signs: There were no vitals taken for this visit.     Weight:    Wt Readings from Last 3 Encounters:   22 248 lb 14.4 oz (112.9 kg)   22 251 lb 12.3 oz (114.2 kg)   22 248 lb 14.4 oz (112.9 kg)       KPS: 80% Normal activity with effort; some signs or symptoms of disease    General: Awake, alert and oriented. HEENT: normocephalic, PERRL, no scleral erythema or icterus, Oral mucosa moist and intact, throat clear  NECK: supple without palpable adenopathy  BACK: Straight negative CVAT  SKIN: warm dry and intact without lesions rashes or masses  CHEST: CTA bilaterally without use of accessory muscles  CV: Normal S1 S2, RRR, no MRG  ABD: NT ND normoactive BS, no palpable masses or hepatosplenomegaly  EXTREMITIES: without edema, denies calf tenderness  NEURO: CN II - XII grossly intact  CATHETER: CVC    Discharge Laboratory Data:  CBC:   Recent Labs     04/30/22  0913 05/02/22  0858   WBC 0.9* 1.0*   HGB 7.3* 7.0*   HCT 21.3* 20.5*   MCV 98.9 99.1   PLT 23* 33*     BMP/Mag:  Recent Labs     04/30/22  0913 05/02/22  0858    141   K 3.8 4.1    110   CO2 20* 21   PHOS 2.9 3.4   BUN 40* 43*   CREATININE 2.9* 3.1*   MG 1.60* 1.70*     LIVP:   Recent Labs     04/30/22  0913 05/02/22  0858   AST 17 13*   ALT 13 11   BILIDIR <0.2 <0.2   BILITOT 0.6 0.6   ALKPHOS 67 68     Coags:   Recent Labs     05/02/22  0858   PROTIME 12.5   INR 1.10   APTT 47.0*     Uric Acid   Recent Labs     04/30/22  0913 05/02/22  0858   LABURIC 8.0* 7.6*        PROBLEM LIST:            1.  [place] Light Chain Multiple Myeloma   2.  ESRD on HD   3.  GERD  4.  HTN  5.  Hypokalemia   6.  Subdural Hematoma (1/2022)  7.  Seizures (1/2022)      TREATMENT:            1. CyBorD (started 10/30/18)  2. Revlimid / Merlynn Million / Dex on clinical trial (started 2/1/19)  3. Carfilzomib / Pomalidomide / Dex x 6 cycles (Relapse #1 - started 8/26/19 - 2/2020)  4. Cytoxan Mobilization w/ 25% dose reduction (2/17/20)  5. Melphalan 140mg/m2 & Autologous SCT 3/12/20  6. Kyprolis/Ebony/Dex (6/30/21-7/28/21)- progression  7.  Velcade/Ebony/Dex (5/18/21-8/3/21)- progression  8.  HD - CTX               - Cycle 1 9/27/21              - Cycle 2 10/18/21              - Cycle 3 11/8/21- stopped d/t severe fatigue  9.  Bendamustine/Velcade/Dex 12/06/21  10.  Velcade only 2/11/22  11.  Fludarabine/Cytoxan followed by CAR-T infusion 3/21/22  12. Stem Cell Boost 4/18/22     ASSESSMENT AND PLAN:            1. [place] Light Chain Multiple Myeloma:  Currently in [facility]  - BM biopsy: 1/12/22.  20% plasma cells . Beti Redman p53 positive 79.5% of cells  - 1/28/22 Serum KFLC 1662.71, Lambda 3.05   K/L ratio 545.15  - 2/24/22 Serum KFLC 1919.12, Lambda 2.54, K/L ratio 755.56  - 3/10/22 Serum KFLC 2040.00, Lambda 3.54, K/L ratio 576.27  - 4/18/22 Serum KFLC 3230.60, Lambda 6.64, K/L ratio 486.54  - s/p Fludarabine and Cytoxan 3/16/22-3/18/22  - Post CAR-T infusion staging:  PET/CT, BM bx with Flow, FISH, CG, MM  - Post Stem Cell Boost 4/18/22     Lymphodepleting Chemotherapy:  Fludarabine and cyclophosphamide   Disease Status at time of Infusion:  Progressive disease  CAR-T Infusion Date:  3/21/22  CAR-T Product:  Abecma  Batch ID Number:  [id number]     Day + 40     Day + 13 Stem Cell Boost 4/18/22      2.  CRS / Neuro: Grade 1 CRS 3/22. No issues at this time  CRS Grade: 1, fever only 3/22  - S/p toci 800mg 3/22/22  - Monitor CRP and Ferrtin closely            Lab Results   Component Value Date     CRP <3.0 04/30/2022     CRP <3.0 04/28/2022     CRP <3.0 04/26/2022                Lab Results   Component Value Date     FERRITIN 1,492.0 (H) 04/30/2022     FERRITIN 1,723.0 (H) 04/28/2022     FERRITIN 2,049.0 (H) 04/26/2022      ICANS Grade:  0  - Neuro checks w/ CARTOX 10-point assessment Q4hrs  ICE Score:  CAR-T Score: 10     3. ID: Afebrile  - Cont Levaquin, Diflucan, Valtrex ppx     4.  Heme:  Pancytopenia d/t recent chemotherapy   - Transfuse for Hgb < 7 and Platelets < 75E (MPNTJL subdural)  - PRBC transfusion today   - PRA sent (4/7/22): Positive (Will receive Matched Crossed Plts):  Type O compatible, will receive Type O moving forward 4/18/22  - Cont Granix every other day      5. ESRD/Metabolic: SCr: 2.3 (5/79/60), Mild Hyperglycemia, HypoMg, Hyperuricemia  ESRD:  - Off HD currently.  Followed by  Kristal Haider  - Baseline Cr 2.1-2.6  -  IVFs: PRN   - Cont Mag-Ox 400 mg BID 4/7/22  TLS:  No evidence, cont allopurinol and daily TLS labs   - Replace potassium and magnesium per PRN orders  - Given persistent/worsened peripheral edema, instructed him to take lasix 40 mg PO x1 on 4/2/22: Much Improved   - Continue to monitor his kidney function on labs     6. Nutrition:  Appetite and oral intake is good. - Cont low microbial diet   - Follow closely with dietary     7.  Neuro:  Recent seizure/subdural hematoma (1/2/022)  - Cont Keppra 500 mg PO BID indefinitely  - Keep platelets >13O      - Disposition: At this point, he has no adverse effects of CAR-T infusion and is recovered physically but with delayed count recovery s/p stem cell boost. There is concern, however, that his myeloma may be relapsing.  At this point, we will send him back to [facility] for closer f/u with Dr. Sumit Olson since he is recovered from CAR-T toxicities aside from cytopenias, which can be somewhat chronic and can be managed at  Bell Lam, KIMBERLEY - CNP

## 2022-05-02 NOTE — H&P
[facility] History and Physical       Attending Physician: Barbara Winters MD    Primary Care: No primary care provider on file. Referring MD: Barbara Winters MD  [address] [facility] [address] [address]  [place],  [address]    Name: Debbie Hickman :  1955  MRN:  [id number]    Admission: 2022      Date: 2022    Reason for Admission: Thrombocytopenia      History of Present Illness: Nancy Kirkpatrick a 66 yo male w/ h/o [place] Light Chain Multiple Myeloma (Dx 10/2018).   He was initially diagnosed with myeloma 10/2018 after presenting to the ED following routine workplace physical and labs revealed JUDY, anemia and thrombocytopenia (SCr 4.6, Hgb 7.8 & platelets of 20E). He was admitted to [facility] for additional work-up and was found to have kappa light chain multiple myeloma and nephropathy.       He presents to OP Infusion today with ongoing pancytopenia. He will receive PRBC transfusion today. He feels well today, denies fever, chills, bleeding, or GI changes. Eating and drinking well.        Past Surgical History:   Procedure Laterality Date    IR TUNNELED CATHETER PLACEMENT GREATER THAN 5 YEARS  2022    IR TUNNELED CATHETER PLACEMENT GREATER THAN 5 YEARS 2022 [facility]       Past Medical History:   Diagnosis Date    Cancer ([facility] [address]     ESRD (end stage renal disease) on dialysis ([facility] [address]     Mon-Wed-Fri       Prior to Admission medications    Medication Sig Start Date End Date Taking?  Authorizing Provider   Magnesium Oxide 400 MG CAPS Take 400 mg by mouth 2 times daily 22   KIMBERLEY Alberto NP   allopurinol (ZYLOPRIM) 100 MG tablet Take 2 tablets by mouth daily 22   KIMBERLEY Alberto NP   acyclovir (ZOVIRAX) 400 MG tablet Take 1 tablet by mouth 2 times daily 3/30/22   KIMBERLEY Alberto NP   amLODIPine (NORVASC) 5 MG tablet Take 1 tablet by mouth daily 3/28/22   Barbara Winters MD   pantoprazole sodium (PROTONIX) 40 MG PACK packet Take 40 mg by mouth every morning (before breakfast)  Patient not taking: Reported on 4/18/2022    Historical Provider, MD   Calcium Citrate-Vitamin D (CALCIUM + VIT D, BARIATRIC ADVANTAGE, CHEWABLE TABLET) Take 1 tablet by mouth daily 3/16/22   KIMBERLEY Champion - NP   levETIRAcetam (KEPPRA) 500 MG tablet Take 1 tablet by mouth every 12 hours 1/21/22   Jaron Nash MD   sodium bicarbonate 650 MG tablet Take 650 mg by mouth daily    Historical Provider, MD   prochlorperazine (COMPAZINE) 10 MG tablet Take 10 mg by mouth every 6 hours as needed   Patient not taking: Reported on 4/18/2022    Historical Provider, MD   levoFLOXacin (LEVAQUIN) 250 MG tablet Take 250 mg by mouth daily For prophylaxis during chemotherapy    Historical Provider, MD       Allergies   Allergen Reactions    Decadron [Dexamethasone]      Patient is receiving CAR-T. No steroids unless approved by [facility] physician. Family History   Problem Relation Age of Onset    Cancer Father         Social History     Socioeconomic History    Marital status: Life Partner     Spouse name: Melia Suh Number of children: Not on file    Years of education: Not on file    Highest education level: Not on file   Occupational History    Not on file   Tobacco Use    Smoking status: Never Smoker    Smokeless tobacco: Never Used   Substance and Sexual Activity    Alcohol use: Not Currently    Drug use: Never    Sexual activity: Not Currently     Partners: Female   Other Topics Concern    Not on file   Social History Narrative    Not on file     Social Determinants of Health     Financial Resource Strain:     Difficulty of Paying Living Expenses: Not on file   Food Insecurity:     Worried About Running Out of Food in the Last Year: Not on file    Joel of Food in the Last Year: Not on file   Transportation Needs:     Lack of Transportation (Medical): Not on file    Lack of Transportation (Non-Medical):  Not on file Physical Activity:     Days of Exercise per Week: Not on file    Minutes of Exercise per Session: Not on file   Stress:     Feeling of Stress : Not on file   Social Connections:     Frequency of Communication with Friends and Family: Not on file    Frequency of Social Gatherings with Friends and Family: Not on file    Attends Mosque Services: Not on file    Active Member of [address] [place] [address] or Organizations: Not on file    Attends Club or Organization Meetings: Not on file    Marital Status: Not on file   Intimate Partner Violence:     Fear of Current or Ex-Partner: Not on file    Emotionally Abused: Not on file    Physically Abused: Not on file    Sexually Abused: Not on file   Housing Stability:     Unable to Pay for Housing in the Last Year: Not on file    Number of [place] in the Last Year: Not on file    Unstable Housing in the Last Year: Not on file        ROS:  As noted above, otherwise remainder of 10-point ROS negative    Physical Exam:     Vital Signs: There were no vitals taken for this visit. Weight:    Wt Readings from Last 3 Encounters:   05/02/22 248 lb 14.4 oz (112.9 kg)   04/30/22 251 lb 12.3 oz (114.2 kg)   04/28/22 248 lb 14.4 oz (112.9 kg)       KPS: 90% Able to carry on normal activity; minor signs or symptoms of disease    ECOG PS:  (1) Restricted in physically strenuous activity, ambulatory and able to do work of light nature    General: Awake, alert and oriented.   HEENT: normocephalic, PERRL, no scleral erythema or icterus, Oral mucosa moist and intact, throat clear  NECK: supple without palpable adenopathy  BACK: Straight negative CVAT  SKIN: warm dry and intact without lesions rashes or masses  CHEST: CTA bilaterally without use of accessory muscles  CV: Normal S1 S2, RRR, no MRG  ABD: NT ND normoactive BS, no palpable masses or hepatosplenomegaly  EXTREMITIES: without edema, denies calf tenderness  NEURO: CN II - XII grossly intact  CATHETER: CVC    Laboratory Data: CBC:   Recent Labs     04/30/22  0913 05/02/22  0858   WBC 0.9* 1.0*   HGB 7.3* 7.0*   HCT 21.3* 20.5*   MCV 98.9 99.1   PLT 23* 33*     BMP/Mag:  Recent Labs     04/30/22  0913 05/02/22  0858    141   K 3.8 4.1    110   CO2 20* 21   PHOS 2.9 3.4   BUN 40* 43*   CREATININE 2.9* 3.1*   MG 1.60* 1.70*     LIVP:   Recent Labs     04/30/22  0913 05/02/22  0858   AST 17 13*   ALT 13 11   BILIDIR <0.2 <0.2   BILITOT 0.6 0.6   ALKPHOS 67 68     Coags:   Recent Labs     05/02/22  0858   PROTIME 12.5   INR 1.10   APTT 47.0*     Uric Acid   Recent Labs     04/30/22  0913 05/02/22  0858   LABURIC 8.0* 7.6*        PROBLEM LIST:            1.  [place] Light Chain Multiple Myeloma   2.  ESRD on HD   3.  GERD  4.  HTN  5.  Hypokalemia   6.  Subdural Hematoma (1/2022)  7.  Seizures (1/2022)      TREATMENT:            1. CyBorD (started 10/30/18)  2. Revlimid / Jas Fray / Dex on clinical trial (started 2/1/19)  3. Carfilzomib / Pomalidomide / Dex x 6 cycles (Relapse #1 - started 8/26/19 - 2/2020)  4. Cytoxan Mobilization w/ 25% dose reduction (2/17/20)  5. Melphalan 140mg/m2 & Autologous SCT 3/12/20  6. Kyprolis/Ebony/Dex (6/30/21-7/28/21)- progression  7. Velcade/Ebony/Dex (5/18/21-8/3/21)- progression  8.  HD - CTX               - Cycle 1 9/27/21              - Cycle 2 10/18/21              - Cycle 3 11/8/21- stopped d/t severe fatigue  9.  Bendamustine/Velcade/Dex 12/06/21  10.  Velcade only 2/11/22  11.  Fludarabine/Cytoxan followed by CAR-T infusion 3/21/22  12. Stem Cell Boost 4/18/22     ASSESSMENT AND PLAN:            1. Kappa Light Chain Multiple Myeloma:  Currently in [facility]  - BM biopsy: 1/12/22.  20% plasma cells . Thony Ra  p53 positive 79.5% of cells  - 1/28/22 Serum KFLC 1662.71, Lambda 3.05   K/L ratio 545.15  - 2/24/22 Serum KFLC 1919.12, Lambda 2.54, K/L ratio 755.56  - 3/10/22 Serum KFLC 2040.00, Lambda 3.54, K/L ratio 576.27  - 4/18/22 Serum KFLC 3230.60, Lambda 6.64, K/L ratio 486.54  - s/p Fludarabine and Cytoxan 3/16/22-3/18/22  - Post CAR-T infusion staging:  PET/CT, BM bx with Flow, FISH, CG, MM  - Post Stem Cell Boost 4/18/22     Lymphodepleting Chemotherapy:  Fludarabine and cyclophosphamide   Disease Status at time of Infusion:  Progressive disease  CAR-T Infusion Date:  3/21/22  CAR-T Product:  Abecma  Batch ID Number:  [id number]     Day + 40     Day + 13 Stem Cell Boost 4/18/22      2.  CRS / Neuro: Grade 1 CRS 3/22. No issues at this time  CRS Grade: 1, fever only 3/22  - S/p toci 800mg 3/22/22  - Monitor CRP and Ferrtin closely        Lab Results   Component Value Date     CRP <3.0 04/30/2022     CRP <3.0 04/28/2022     CRP <3.0 04/26/2022            Lab Results   Component Value Date     FERRITIN 1,492.0 (H) 04/30/2022     FERRITIN 1,723.0 (H) 04/28/2022     FERRITIN 2,049.0 (H) 04/26/2022      ICANS Grade:  0  - Neuro checks w/ CARTOX 10-point assessment Q4hrs  ICE Score:  CAR-T Score: 10     3. ID: Afebrile  - Cont Levaquin, Diflucan, Valtrex ppx     4.  Heme:  Pancytopenia d/t recent chemotherapy   - Transfuse for Hgb < 7 and Platelets < 39E (HMGGQO subdural)  - PRBC transfusion today   - PRA sent (4/7/22): Positive (Will receive Matched Crossed Plts): Type O compatible, will receive Type O moving forward 4/18/22  - Cont Granix every other day     5. ESRD/Metabolic: SCr: 2.3 (1/92/63), Mild Hyperglycemia, HypoMg, Hyperuricemia  ESRD:  - Off HD currently.  Followed by Dr. Geneva Diehl  - Baseline Cr 2.1-2.6  -  IVFs: PRN   - Cont Mag-Ox 400 mg BID 4/7/22  TLS:  No evidence, cont allopurinol and daily TLS labs   - Replace potassium and magnesium per PRN orders  - Given persistent/worsened peripheral edema, instructed him to take lasix 40 mg PO x1 on 4/2/22: Much Improved   - Continue to monitor his kidney function on labs     6. Nutrition:  Appetite and oral intake is good.    - Cont low microbial diet   - Follow closely with dietary     7.  Neuro:  Recent seizure/subdural hematoma (1/2/022)  - Cont Keppra 500 mg PO BID indefinitely  - Keep platelets >88Q      - Disposition: At this point, he has no adverse effects of CAR-T infusion and is recovered physically but with delayed count recovery s/p stem cell boost. There is concern, however, that his myeloma may be relapsing. At this point, we will send him back to [facility] for closer f/u with Dr. Stephanie Amador since he is recovered from CAR-T toxicities aside from cytopenias, which can be somewhat chronic and can be managed at [facility].        - Disposition: Once blood products infused, D/C to follow up at [facility]      The patient was seen and examined by Dr. Mary Herr. This admission history and physical has been discussed and agreed upon by Dr. Mary Herr.      KIMBERLEY Orellana - CNP

## 2022-05-02 NOTE — PROGRESS NOTES
Short Stay Communication Note  Marco Gil  Diagnosis: Multiple Myeloma  Primary MD: Dr. Winnie Reynoso  Treatment: Fludarabine and Cytoxan  Day + 40 of Car-T Abecma   Pt seen in outpatient infusion today. Labs drawn and reviewed. CBC:   Recent Labs     04/30/22  0913 05/02/22  0858   WBC 0.9* 1.0*   HGB 7.3* 7.0*   HCT 21.3* 20.5*   MCV 98.9 99.1   PLT 23* 33*     BMP/Mag:  Recent Labs     04/30/22  0913 05/02/22  0858    141   K 3.8 4.1    110   CO2 20* 21   PHOS 2.9 3.4   BUN 40* 43*   CREATININE 2.9* 3.1*   MG 1.60* 1.70*     Standing parameters for replacement for this patient:   1 unit of pack red blood cells for a hemoglobin < or equal to 7.0.  1 pack of platelets for a platelet count less than or equal to 30.0.  40 MeQ of Potassium administered for a potassium level less than or equal to 3.4.  4g of Magnesium Sulfate for a magnesum level less than or equal to 1.4.  1pk platelets transfused per standing order for the above lab values. Urinalysis last done: 5/2/2022 Urinalysis next due: 5/9/2022    Chest X-Ray last done: 5/2/2022 Chest X-Ray next due: 5/9/2022    Pt platelet parameters changed to equal to or less then 30.0      Symptoms addressed and reported to care team this date:   Treatments this date: labs, chest X ray, UA, prbc, and Nivestym injection. Reviewed medication schedule with pt and caregiver. Both able to verbalize all medications and schedule. Pt to be seen again Thursday 4/28/22. Discharged ambulatory to home.     Carey Warner RN

## 2022-05-09 NOTE — ADDENDUM NOTE
Encounter addended by: KIMBERLEY Donahue NP on: 5/9/2022 2:20 PM   Actions taken: Clinical Note Signed

## 2022-05-11 NOTE — PROGRESS NOTES
Patient's hemoglobin this AM: 6.9  Pt seen at 0 [facility] for  Packed red blood cell transfusion  for above lab values. Informed consent verified. No Pre-medications ordered with no previous transfusion reaction history. Transfused per policy. Pt tolerated transfusion well and without incident. Pt verbalizes understanding of discharge instructions. Discharged to home with self. CVC line flushed, heparin locked.  Electronically signed by Creighton Mohs, RN on 5/11/2022 at 2:59 PM

## 2022-05-22 NOTE — PROGRESS NOTES
HgB at [facility] on 5/21 6.9. Pt seen at 0 [facility] for  Packed red blood cell transfusion  for above lab values. Informed consent verified. No Pre-medications ordered with no previous transfusion reaction history. Transfused per policy. Pt tolerated transfusion well and without incident. Pt verbalizes understanding of discharge instructions. Discharged to home with S/O.       Cisco Mane RN

## 2022-06-27 NOTE — PROGRESS NOTES
Patient's hemoglobin this AM: 6.6. Patient's platelet count this AM: 13  Pt seen at 0 [facility] today for  Packed red blood cell transfusion  And platelet tranfusion for above lab values. Informed consent verified. No Pre-medications ordered with no previous transfusion reaction history. Transfused per policy. Pt tolerated transfusion well and without incident. Pt verbalizes understanding of discharge instructions. Discharged to ambulatory to home with wife.    Victorina Contreras RN

## 2022-06-30 NOTE — PROGRESS NOTES
Patient's hemoglobin this AM: 6.5  Patient's platelet count this AM: 16.0  Pt seen at 0 [facility] today for  Packed red blood cell transfusion and platelets for above lab values. Informed consent verified. No Pre-medications ordered with no previous transfusion reaction history. Transfused per policy. Pt tolerated transfusion well and without incident. Pt verbalizes understanding of discharge instructions. Discharged to home with wife. CVC line flushed, heparin locked.      Electronically signed by Mario Alberto Bravo RN on 6/30/2022 at 12:33 PM

## 2022-07-02 NOTE — PROGRESS NOTES
Patient's hemoglobin this AM: 6.9  Patient's platelet count this AM: 24  Pt seen at 0 [facility] today for  Packed red blood cell transfusion and platelets for above lab values. Informed consent verified. No Pre-medications ordered with no previous transfusion reaction history. Transfused per policy. Pt tolerated transfusion well and without incident. Pt verbalizes understanding of discharge instructions. Discharged to home with wife. CVC line flushed, heparin locked.      Electronically signed by Michelle Nunn RN on 7/2/2022 at 11:52 AM

## 2022-07-04 NOTE — PROGRESS NOTES
Patient's hemoglobin this AM: 6.7  Patient's platelet count this AM: 19  Pt seen at 0 [facility] today for  Packed red blood cell transfusion and platelets for above lab values. Informed consent verified. No Pre-medications ordered with no previous transfusion reaction history. Transfused per policy. Pt tolerated transfusion well and without incident. Pt verbalizes understanding of discharge instructions. Discharged to home with wife. CVC line flushed, heparin locked.      Electronically signed by Poornima Watt RN on 7/4/2022 at 10:05 AM

## 2022-07-12 NOTE — PROGRESS NOTES
Patient's hemoglobin 7/11/2022: 8.6. Patient's platelet count 7/28/8971: 11.0. Pt seen at [address] [place] [address] today for  Platelet transfusion  for above lab values. Informed consent verified. No Pre-medications ordered with no previous transfusion reaction history. Transfused per policy. Pt tolerated transfusion well and without incident. Pt verbalizes understanding of discharge instructions. Discharged to home with self.   [place] ALEXI Sykes

## 2022-07-13 NOTE — PROGRESS NOTES
Patient's platelet count 7/67/8189: 20.0  Pt seen at [address] [place] [address] for  Platelet transfusion  for above lab values. Informed consent verified. No Pre-medications ordered with no previous transfusion reaction history. Transfused per policy. Pt tolerated transfusion well and without incident. Pt verbalizes understanding of discharge instructions. Discharged to home with self.   Danita Vicente RN

## 2022-07-14 NOTE — PROGRESS NOTES
Pt seen at [facility]- labs collected. HgB 6.9 Plt 26. Pt seen at [address] [place] [address] for blood and platelet trans  for above lab values. Informed consent verified. No Pre-medications ordered with no previous transfusion reaction history. Transfused per policy. Pt tolerated transfusion well and without incident. Pt verbalizes understanding of discharge instructions. Discharged to home with self.   Kaushik Lewis RN

## 2022-07-16 NOTE — PROGRESS NOTES
Patient's platelet count 0/04/2001: 18.0. Pt seen at [address] [place] [address] for platelet transfusion  for above lab values. Informed consent verified. No Pre-medications ordered with no previous transfusion reaction history. Transfused per policy. Pt tolerated transfusion well and without incident. Pt verbalizes understanding of discharge instructions. Discharged to home with self.   Melissa Reilly RN

## 2022-07-18 NOTE — PROGRESS NOTES
Patient's platelet count 9/70/3010: 16   Pt seen at [address] [place] [address] for platelet transfusion  for above lab values. Informed consent verified. No Pre-medications ordered with no previous transfusion reaction history. Transfused per policy. Pt tolerated transfusion well and without incident. Pt verbalizes understanding of discharge instructions. Discharged to home with self.   Jeannie Dobson RN

## 2022-07-20 NOTE — PROGRESS NOTES
Pt seen today in OP infusion. Blood Bank did not have O platelets for pt so they will need to reschedule. MD notified.      Dilan Adrian RN

## 2022-07-21 NOTE — PROGRESS NOTES
Patient's hemoglobin this AM:  6.7  Patient's platelet count this AM:  14  Pt seen at 0 [facility] today for  Packed red blood cell transfusion  for above lab values. Informed consent verified. No Pre-medications ordered with no previous transfusion reaction history. Transfused per policy. Pt tolerated transfusion well and without incident. Pt verbalizes understanding of discharge instructions. Discharged to his home.     Electronically signed by Amarilis Cole RN on 7/21/2022 at 1:25 PM

## 2022-07-23 NOTE — PROGRESS NOTES
Patient's platelet count this AM:  4  Pt seen at 0 [facility] for  o platelet transfusion for above lab values. Informed consent verified. No Pre-medications ordered with no previous transfusion reaction history. Transfused per policy. Pt tolerated transfusion well and without incident. Pt verbalizes understanding of discharge instructions. Discharged to his home.     Electronically signed by Kyle Gagnon RN on 7/23/2022 at 10:56 AM

## 2022-07-25 NOTE — PROGRESS NOTES
Patient's platelet count this AM: 4.0  Pt seen at 0 [facility] for  Platelet transfusion  for above lab values. Informed consent verified. No Pre-medications ordered with no previous transfusion reaction history. Transfused per policy. Pt tolerated transfusion well and without incident. Pt verbalizes understanding of discharge instructions. Discharged to home with self. CVC line care completed, heparin locked.  Electronically signed by Jayne Moseley RN on 7/25/2022 at 10:19 AM

## 2022-07-27 NOTE — PROGRESS NOTES
Patient's hemoglobin this AM:  7.6  Patient's platelet count this AM:  8  Pt seen at 0 [facility] today for  Packed red blood cell transfusion  for above lab values. Informed consent verified. No Pre-medications ordered with no previous transfusion reaction history. Transfused per policy. Pt tolerated transfusion well and without incident. Pt verbalizes understanding of discharge instructions. Discharged to his home.     Electronically signed by Aleena Ba RN on 7/27/2022 at 3:15 PM

## 2022-07-29 NOTE — PROGRESS NOTES
Pt seen at [address] [facility] for  I unit of platelets per orders. Informed consent verified. No Pre-medications ordered with no previous transfusion reaction history. Transfused per policy. Pt tolerated transfusion well and without incident. Pt verbalizes understanding of discharge instructions. Discharged to his home.     Electronically signed by Guerrero Sorto RN on 7/29/2022 at 12:26 PM

## 2022-08-01 NOTE — PROGRESS NOTES
Pt seen at [address] [facility] for  Packed red blood cell transfusion  for Hgb 6.7. Informed consent verified. No Pre-medications ordered with no previous transfusion reaction history. Transfused per policy. Pt tolerated transfusion well and without incident. Pt verbalizes understanding of discharge instructions. Discharged to home with wife. CVC line care, heparin locked.  Electronically signed by Capo Guerrier RN on 8/1/2022 at 2:16 PM

## 2022-08-04 NOTE — PROGRESS NOTES
Pt seen at [address] [facility] for  I unit of platelets per orders. Informed consent verified. No Pre-medications ordered with no previous transfusion reaction history. Transfused per policy. Pt tolerated transfusion well and without incident. Pt verbalizes understanding of discharge instructions. Discharged to his home.     Electronically signed by Flaco Greenfield RN on 8/4/2022 at 1:21 PM

## 2022-08-05 PROBLEM — J96.01 ACUTE RESPIRATORY FAILURE WITH HYPOXIA (HCC): Status: ACTIVE | Noted: 2022-01-01

## 2022-08-05 NOTE — ED PROVIDER NOTES
ED Attending Attestation Note     Date of evaluation: 8/5/2022    This patient was seen by the advance practice provider. I have seen and examined the patient, agree with the workup, evaluation, management and diagnosis. The care plan has been discussed. My assessment reveals patient presents the emergency department history of multiple myeloma status post bone marrow status post CAR-T who comes in with pulse 160 reported temperature to 103 at home neutropenia and thrombocytopenia which is being treated for. He is receiving chemotherapy with last chemo yesterday. On exam he is pale tachypneic tachycardic. Neutropenic fever order set initiated along with fluid bolus of 30 mill per kilo and antibiotics. Patient discussed with hematology. Agree with plan for admission and order stat COVID and influenza. Naina Jackson is a 76 yo male w/ h/o [place] Light Chain Multiple Myeloma (Dx 10/2018) s/p CAR-T & stem cell boost.    Patient did have this right-sided pain in his chest.  He is very thrombocytopenic at baseline and has kidney insufficiency with elevated creatinine is at baseline. It was decided that CT would not be obtained here and that there is further decide this after he is stabilized hemodynamically with fluids and for sepsis. The care time of 35 minutes based on bedside care resuscitation discussions with consults and team with IV fluids antibiotics and neutropenic work-up. ED stay patient had drop in blood pressure despite 2 to 3 L of IV fluids. Levophed was started and a call was placed back to the oncology hematology team as patient will require an ICU admission. Patient is COVID-positive.      Cathi Garcia MD  08/05/22 [address] Claire Ruelas MD  08/05/22 [address] Claire Ruelas MD  08/05/22 [id number]

## 2022-08-06 NOTE — H&P
ICU HISTORY AND 2025 [facility] Day:   ICU Day:                                                          Code:Full Code  Admit Date: 8/5/2022  PCP: No primary care provider on file. CC: Fever and right shoulder pain    HISTORY OF PRESENT ILLNESS:   Yudelka MckinneyDarya Montes is a 77year old male who presented to the ED on 8/6/22 with complaints of a fever and right shoulder pain (he denies any injury). His past medical history includes kappa light chain multiple myeloma (dx 10/2018), autologous bone marrow transplantation, chemotherapy induced thrombocytopenia, CKD stage 4,, anemia 2/2 CKD, T2DM, and IgA deficiency. Per chart review, he presented with complaints of pain in his right shoulder but said he did injure it. The pain is worse with movement and deep breathing. There is still full range of motion of the shoulder and there are no overlying skin changes. He also noticed diffuse tremors and felt unwell. He noted a fever of 103F. He reported no chest pain, abdominal pain, nausea, vomiting, diarrhea, lower extremity swelling, orthopnea, or rashes. He had chemotherapy yesterday with bendamustine and bortezomib. He follows with Dr. Noah Ludwig with [facility]. He has a tunneled central line in his right chest.       In the ED, he was found to be COVID positive and had increasing oxygen requirements. Despite using a non-rebreather mask, he was still having significant work of breathing. He was also given fluids per sepsis protocols and 2 units of packed RBCs. Neutropenic fever protocol was initiated and he was given vancomycin and cefepime. He was started on levophed for hypotension. The patient required intubation and will be monitored in the ICU.     PAST HISTORY:     Past Medical History:   Diagnosis Date    Cancer ([facility] [address]     ESRD (end stage renal disease) on dialysis ([facility] [address]     Mon-Wed-Fri       Past Surgical History:   Procedure Laterality Date    IR TUNNELED CATHETER PLACEMENT GREATER THAN 5 YEARS Once    Followed by    meropenem  1,000 mg IntraVENous Q12H    propofol        sodium bicarbonate  50 mEq IntraVENous Once    dexamethasone  6 mg IntraVENous BID    pantoprazole  40 mg IntraVENous Once    baricitinib  2 mg Oral Daily    vancomycin  15 mg/kg IntraVENous Once    propofol        etomidate          Continuous Infusions:   sodium chloride      sodium chloride      sodium chloride      sodium chloride 125 mL/hr at 08/06/22 0030    fentaNYL 50 mcg/hr (08/06/22 0330)    vasopressin (Septic Shock) infusion 0.03 Units/min (08/06/22 0233)    phenylephrine (JOHN-SYNEPHRINE) 50mg/250mL infusion 25 mcg/min (08/06/22 0239)    propofol      norepinephrine 5 mcg/min (08/05/22 2220)    sodium chloride       PRN Meds:sodium chloride, sodium chloride flush, sodium chloride, potassium chloride, magnesium sulfate, magnesium hydroxide, Saline Mouthwash, alteplase (CATHFLO) with sterile water injection, sodium chloride flush, sodium chloride, acetaminophen **OR** acetaminophen, sodium chloride    Allergies: Allergies   Allergen Reactions    Decadron [Dexamethasone]      Patient is receiving CAR-T. No steroids unless approved by [facility] physician. REVIEW OF SYSTEMS:       History unobtainable from patient due to intubation and sedation. Review of Systems   Reason unable to perform ROS: Intubated and sedated. PHYSICAL EXAM:       Vitals: /73   Pulse (!) 141   Temp 100 °F (37.8 °C)   Resp 21   Ht 6' 2.02\" (1.88 m)   Wt 235 lb (106.6 kg)   SpO2 100%   BMI 30.16 kg/m²     I/O:  No intake or output data in the 24 hours ending 08/06/22 0410  No intake/output data recorded. No intake/output data recorded. Physical Examination:     Physical Exam  Constitutional:       Appearance: He is obese. Eyes:      Pupils: Pupils are equal, round, and reactive to light. Cardiovascular:      Rate and Rhythm: Regular rhythm. Tachycardia present.    Pulmonary:      Comments: Mechanical breath sounds  Abdominal: General: Bowel sounds are normal.         Access:   -Central Access Day #:  chemo triple lumen right chest                                   -Peripheral Access Day#: none  -Arterial line Day#:1                                  Harvey Day#:1  NGT Day#:  none                                             ETT Day#:1  Vent Settings: AC/PRVC, Rate 24, Vt 500, PIP 19, PEEP 8. Recent Labs     08/06/22  0244   PHART 7.205*   KNF4UCI 32.2*   PO2ART 136.4*           DATA:       Labs:  CBC:   Recent Labs     08/05/22 2045 08/06/22  0258   WBC 0.2* 0.3*   HGB 6.8* 6.8*   HCT 19.9* 19.5*   PLT 11* 13*       BMP:   Recent Labs     08/05/22 2045 08/06/22  0258    139   K 5.4* 5.0    114*   CO2 14* 13*   BUN 66* 68*   CREATININE 3.1* 4.0*   GLUCOSE 137* 119*   PHOS  --  1.9*     LFT's:   Recent Labs     08/05/22 2045 08/06/22  0258   AST 14* 24   ALT 19 18   BILITOT 0.3 1.2*   ALKPHOS 72 72     Troponin: No results for input(s): TROPONINI in the last 72 hours. BNP:No results for input(s): BNP in the last 72 hours. ABGs:   Recent Labs     08/06/22  0244   PHART 7.205*   OLD9SBU 32.2*   PO2ART 136.4*     INR:   Recent Labs     08/06/22  0258   INR 1.55*       U/A:No results for input(s): NITRITE, COLORU, PHUR, LABCAST, WBCUA, RBCUA, MUCUS, TRICHOMONAS, YEAST, BACTERIA, CLARITYU, SPECGRAV, LEUKOCYTESUR, UROBILINOGEN, BILIRUBINUR, BLOODU, GLUCOSEU, AMORPHOUS in the last 72 hours. Invalid input(s): KETONESU    XR CHEST PORTABLE   Final Result      Mild bibasilar atelectasis. XR ABDOMEN (KUB) (SINGLE AP VIEW)   Final Result   Findings/Impression:    The tip of the enteric tube terminates in the body of the stomach. XR CHEST PORTABLE   Final Result      No acute pulmonary disease.          CT CHEST WO CONTRAST    (Results Pending)       EKG: Sinus tachycardia  Echo: EF 55-60%, 12/20/21  Micro: +COVID PCR, blood and throat cultures pending     ASSESSMENT AND PLAN:   Damian Archer is a 77 y.o. male, who presented to the ED with fever and right shoulder pain. He was found to be COVID positive and required intubation for respiratory failure. Acute respiratory failure due to septic shock  -Ventilator for respiratory failure. Sedated on propofol and fentanyl.   -For COVID-19 infection - Decadron. Baricitinib as pharmacy advised it was better for this patient than remdesivir. Septic Shock 2/2 E. Coli ESBL bacteremia   -Meropenem, vancomycin, eraxis.  -Positive blood cultures for E. Coli ESBL  -Positive COVID PCR. -Pressors:  70 of levo, 300 of [place], 0.03 of vasopressin. -Maintain MAP > 65.   -F/u blood and throat cultures. Multiple Myeloma  Thrombocytopenia 2/2 chemo. Receives platelet infusions.  -Oncology consulted. -S/p autologous bone marrow transplantation  -Actively on chemo (bendamustine and bortezomib). CKD Stage 4  -Avoid contrast and nephrotoxic agents.        Code Status:Full Code  FEN: Diet NPO  PPX:  SCDs  DISPO: ICU    This patient has been staffed and discussed with Zaira Mukherjee MD.   -----------------------------  Kelsy Rees MD, PGY-1  8/6/2022  4:10 AM

## 2022-08-06 NOTE — PLAN OF CARE
Problem: Discharge Planning  Goal: Discharge to home or other facility with appropriate resources  Outcome: Progressing  Flowsheets (Taken 8/6/2022 0005)  Discharge to home or other facility with appropriate resources: Identify barriers to discharge with patient and caregiver     Problem: Pain  Goal: Verbalizes/displays adequate comfort level or baseline comfort level  Outcome: Progressing  Flowsheets (Taken 8/6/2022 0005)  Verbalizes/displays adequate comfort level or baseline comfort level: Assess pain using appropriate pain scale     Problem: Safety - Medical Restraint  Goal: Remains free of injury from restraints (Restraint for Interference with Medical Device)  Description: INTERVENTIONS:  1. Determine that other, less restrictive measures have been tried or would not be effective before applying the restraint  2. Evaluate the patient's condition at the time of restraint application  3. Inform patient/family regarding the reason for restraint  4.  Q2H: Monitor safety, psychosocial status, comfort, nutrition and hydration  Outcome: Not Progressing  Flowsheets (Taken 8/6/2022 0726)  Remains free of injury from restraints (restraint for interference with medical device): Every 2 hours: Monitor safety, psychosocial status, comfort, nutrition and hydration Cheek Interpolation Flap Text: A decision was made to reconstruct the defect utilizing an interpolation axial flap and a staged reconstruction.  A telfa template was made of the defect.  This telfa template was then used to outline the Cheek Interpolation flap.  The donor area for the pedicle flap was then injected with anesthesia.  The flap was excised through the skin and subcutaneous tissue down to the layer of the underlying musculature.  The interpolation flap was carefully excised within this deep plane to maintain its blood supply.  The edges of the donor site were undermined.   The donor site was closed in a primary fashion.  The pedicle was then rotated into position and sutured.  Once the tube was sutured into place, adequate blood supply was confirmed with blanching and refill.  The pedicle was then wrapped with xeroform gauze and dressed appropriately with a telfa and gauze bandage to ensure continued blood supply and protect the attached pedicle.

## 2022-08-06 NOTE — PROGRESS NOTES
Department of Pharmacy    Notification received from laboratory of positive blood culture results. Organism(s) detected: E. Coli, ESBL   Applicable Antimicrobial Resistance Genes: ESBL    Recommendation changing empiric antibiotics to:  No change-- on appropriate carbapenem therapy     Adenike Segovia, PharmD  8/6/2022 9:06 AM

## 2022-08-06 NOTE — PROGRESS NOTES
4 Eyes Admission Assessment     I agree as the admission nurse that 2 RN's have performed a thorough Head to Toe Skin Assessment on the patient. ALL assessment sites listed below have been assessed on admission. Areas assessed by both nurses:   [x]   Head, Face, and Ears   [x]   Shoulders, Back, and Chest  [x]   Arms, Elbows, and Hands   [x]   Coccyx, Sacrum, and Ischium  [x]   Legs, Feet, and Heels        Does the Patient have Skin Breakdown?   No         Johann Prevention initiated:  Yes   Wound Care Orders initiated:  No      C nurse consulted for Pressure Injury (Stage 3,4, Unstageable, DTI, NWPT, and Complex wounds) or Johann score 18 or lower:  NA      Nurse 1 eSignature: Electronically signed by Cosimo Schwab, RN on 8/6/22 at 7:43 AM EDT    **SHARE this note so that the co-signing nurse is able to place an eSignature**    Nurse 2 eSignature: Electronically signed by Abhishek Mc RN on 8/6/22 at 7:44 AM EDT

## 2022-08-06 NOTE — PROGRESS NOTES
Pt maxed on 3 pressors to maintain MAP above 65. LEVO 100/ VASO 0.03/ JOHN 300. Residents aware. This RN updated pt at bedside. Family requests pt remain full code. Positive blood cultures for E.coli/ESBL. ICU team aware. Pt placed in contact isolation.

## 2022-08-06 NOTE — CONSULTS
Nephrology Consult Note  [facility]  [phone]   [facility]. [facility]        Reason for Consult:  JUDY on CKD    HISTORY OF PRESENT ILLNESS:                This is a patient with significant past medical history of CKD stage 4 who presented with shortness of breath and fevers. He is followed by my partner Dr. Delia Yang. He has a history of JUDY requiring dialysis for almost a year back in 2020. His Cr has been in the upper 2's at baseline recently. 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 He presented this morning with fevers and right shoulder pain. He was found to have COVID. He got chemo yesterday with bendamustine and bortezomib. He was intubated and is now on 3 pressors in the ICU. Past Medical History:        Diagnosis Date    Cancer ([facility] [address]     ESRD (end stage renal disease) on dialysis ([facility] [address]     Mon-Wed-Fri       Past Surgical History:        Procedure Laterality Date    IR TUNNELED CATHETER PLACEMENT GREATER THAN 5 YEARS  2/1/2022    IR TUNNELED CATHETER PLACEMENT GREATER THAN 5 YEARS 2/1/2022 [facility]       Current Medications:    No current facility-administered medications on file prior to encounter.      Current Outpatient Medications on File Prior to Encounter   Medication Sig Dispense Refill    Magnesium Oxide 400 MG CAPS Take 400 mg by mouth 2 times daily (Patient not taking: Reported on 8/1/2022) 60 capsule 2    allopurinol (ZYLOPRIM) 100 MG tablet Take 2 tablets by mouth daily 60 tablet 2    acyclovir (ZOVIRAX) 400 MG tablet Take 1 tablet by mouth 2 times daily 30 tablet 2    amLODIPine (NORVASC) 5 MG tablet Take 1 tablet by mouth daily 30 tablet 3    pantoprazole sodium (PROTONIX) 40 MG PACK packet Take 40 mg by mouth every morning (before breakfast)      Calcium Citrate-Vitamin D (CALCIUM + VIT D, BARIATRIC ADVANTAGE, CHEWABLE TABLET) Take 1 tablet by mouth daily 30 tablet 2    levETIRAcetam (KEPPRA) 500 MG tablet Take 1 tablet by mouth every 12 hours (Patient not taking: Reported on 8/1/2022) 60 tablet 3    sodium bicarbonate 650 MG tablet Take 650 mg by mouth daily      prochlorperazine (COMPAZINE) 10 MG tablet Take 10 mg by mouth every 6 hours as needed  (Patient not taking: No sig reported)      levoFLOXacin (LEVAQUIN) 250 MG tablet Take 250 mg by mouth daily For prophylaxis during chemotherapy         Allergies:  Decadron [dexamethasone]    Social History:    Social History     Socioeconomic History    Marital status: Life Partner     Spouse name: Bradley Medel    Number of children: Not on file    Years of education: Not on file    Highest education level: Not on file   Occupational History    Not on file   Tobacco Use    Smoking status: Never    Smokeless tobacco: Never   Substance and Sexual Activity    Alcohol use: Not Currently    Drug use: Never    Sexual activity: Not Currently     Partners: Female   Other Topics Concern    Not on file   Social History Narrative    Not on file     Social Determinants of Health     Financial Resource Strain: Not on file   Food Insecurity: Not on file   Transportation Needs: Not on file   Physical Activity: Not on file   Stress: Not on file   Social Connections: Not on file   Intimate Partner Violence: Not on file   Housing Stability: Not on file       Family History:       Problem Relation Age of Onset    Cancer Father          REVIEW OF SYSTEMS:    Limited d/t pt factors. PHYSICAL EXAM:    Vitals:    BP (!) 89/52   Pulse (!) 109   Temp 99.3 °F (37.4 °C) (Bladder)   Resp (!) 31   Ht 6' 2.02\" (1.88 m)   Wt 235 lb (106.6 kg)   SpO2 99%   BMI 30.16 kg/m²   I/O last 3 completed shifts: In: 425 [Blood:425]  Out: -   I/O this shift: In: 36 [I.V.:5536]  Out: 350 [Urine:50; Emesis/NG output:300]    Physical Exam:  Gen: Resting in bed, in ICU. On pressors, intubated. HEENT: +ET tube. CV: Tachycardic, regular, no S3. .  Lungs: +vent, decreased BS bilaterally.   Abd: Soft, decreased BS.  Ext: No edema, no cyanosis  Skin: Cool. No rashes appreciated. : +peters  Neuro: Sedated. Joints: No erythema noted over joints. DATA:    CBC:   Lab Results   Component Value Date/Time    WBC 0.3 08/06/2022 02:58 AM    RBC 2.14 08/06/2022 02:58 AM    HGB 6.8 08/06/2022 02:58 AM    HCT 19.5 08/06/2022 02:58 AM    MCV 91.2 08/06/2022 02:58 AM    MCH 31.7 08/06/2022 02:58 AM    MCHC 34.7 08/06/2022 02:58 AM    RDW 16.7 08/06/2022 02:58 AM    PLT 13 08/06/2022 02:58 AM    MPV 8.2 08/06/2022 02:58 AM     BMP:    Lab Results   Component Value Date/Time     08/06/2022 02:58 AM    K 5.0 08/06/2022 02:58 AM    K 4.5 01/21/2022 04:43 AM     08/06/2022 02:58 AM    CO2 13 08/06/2022 02:58 AM    BUN 68 08/06/2022 02:58 AM    LABALBU 2.8 08/06/2022 02:58 AM    CREATININE 4.0 08/06/2022 02:58 AM    CALCIUM 7.1 08/06/2022 02:58 AM    GFRAA 18 08/06/2022 02:58 AM    LABGLOM 15 08/06/2022 02:58 AM    GLUCOSE 119 08/06/2022 02:58 AM       IMPRESSION/RECOMMENDATIONS:      1. JUDY on CKD stage 4: He follows with my partner Dr. Lalo Rivers. 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 He was on dialysis for almost a year back in 2020. His JUDY is due to hemodynamic ATN from his shock. He has no urgent need for dialysis but I did discuss the situation with his family. They tell me he would not want dialysis again under any circumstances and would rather pass away than get dialysis again. At this point I'm not sure he would be even be stable to tolerate CRRT with his pressor requirement anyway. 2. Acute respiratory failure d/t COVID: On vent. Getting steroids and baricitinib. 3. Septic shock with neutropenia: On 3 pressors. Empiric antibiotics. 4. Multiple myeloma: S/p transplant the past.  Got bendumustine and bortezomib recently. 5. Pancytopenia: From recent chemo. 6. Acidosis: On bicarb gtt. Disposition: The patient's family is considering no escalation of care.   At this point his prognosis is poor. The family indicated that they do not want dialysis/CRRT. Should this change we can readdress. Thank you for allowing me to participate in the care of this patient. I will continue to follow along. Please call with questions. Critical care time 34 minutes. Care discussed with the patient's family, ICU team, and ICU nursing team.    Vivian Tejada MD  [facility]  [phone]  [facility]. [facility]

## 2022-08-06 NOTE — PLAN OF CARE
Care plan reviewed and implemented during shift. Problem: Discharge Planning  Goal: Discharge to home or other facility with appropriate resources  8/6/2022 1827 by Samantha Orozco RN  Outcome: Not Progressing  Flowsheets  Taken 8/6/2022 1715  Discharge to home or other facility with appropriate resources:   Arrange for needed discharge resources and transportation as appropriate   Identify barriers to discharge with patient and caregiver  Taken 8/6/2022 1200  Discharge to home or other facility with appropriate resources:   Identify barriers to discharge with patient and caregiver   Arrange for needed discharge resources and transportation as appropriate     Problem: Pain  Goal: Verbalizes/displays adequate comfort level or baseline comfort level  8/6/2022 1827 by Samantha Orozco RN  Outcome: Not Progressing     Problem: Safety - Medical Restraint  Goal: Remains free of injury from restraints (Restraint for Interference with Medical Device)  Description: INTERVENTIONS:  1. Determine that other, less restrictive measures have been tried or would not be effective before applying the restraint  2. Evaluate the patient's condition at the time of restraint application  3. Inform patient/family regarding the reason for restraint  4.  Q2H: Monitor safety, psychosocial status, comfort, nutrition and hydration  8/6/2022 1827 by Samantha Orozco RN  Outcome: Not Progressing  Flowsheets  Taken 8/6/2022 1400  Remains free of injury from restraints (restraint for interference with medical device):   Determine that other, less restrictive measures have been tried or would not be effective before applying the restraint   Evaluate the patient's condition at the time of restraint application   Every 2 hours: Monitor safety, psychosocial status, comfort, nutrition and hydration  Taken 8/6/2022 1200  Remains free of injury from restraints (restraint for interference with medical device): Determine that other, less function  Outcome: Not Progressing  Flowsheets (Taken 8/6/2022 1715)  Return Mobility to Safest Level of Function:   Assist with transfers and ambulation using safe patient handling equipment as needed   Assess patient stability and activity tolerance for standing, transferring and ambulating with or without assistive devices     Problem: Gastrointestinal - Adult  Goal: Minimal or absence of nausea and vomiting  Outcome: Not Progressing  Flowsheets (Taken 8/6/2022 1715)  Minimal or absence of nausea and vomiting:   Administer IV fluids as ordered to ensure adequate hydration   Maintain NPO status until nausea and vomiting are resolved     Problem: Genitourinary - Adult  Goal: Absence of urinary retention  Outcome: Not Progressing  Flowsheets (Taken 8/6/2022 1715)  Absence of urinary retention: Assess patients ability to void and empty bladder  Goal: Urinary catheter remains patent  Outcome: Not Progressing  Flowsheets (Taken 8/6/2022 1715)  Urinary catheter remains patent: Assess patency of urinary catheter     Problem: Infection - Adult  Goal: Absence of infection at discharge  Outcome: Not Progressing  Flowsheets (Taken 8/6/2022 1715)  Absence of infection at discharge:   Assess and monitor for signs and symptoms of infection   Monitor lab/diagnostic results   Monitor all insertion sites i.e., indwelling lines, tubes and drains  Goal: Absence of infection during hospitalization  Outcome: Not Progressing  Flowsheets (Taken 8/6/2022 1715)  Absence of infection during hospitalization:   Assess and monitor for signs and symptoms of infection   Monitor lab/diagnostic results   Monitor all insertion sites i.e., indwelling lines, tubes and drains  Goal: Absence of fever/infection during anticipated neutropenic period  Outcome: Not Progressing  Flowsheets (Taken 8/6/2022 1715)  Absence of fever/infection during anticipated neutropenic period:   Monitor white blood cell count   Administer growth factors as ordered request assistance   Assess pain using appropriate pain scale  Taken 8/6/2022 0005 by Natalie Morales RN  Verbalizes/displays adequate comfort level or baseline comfort level: Assess pain using appropriate pain scale     Problem: Safety - Medical Restraint  Goal: Remains free of injury from restraints (Restraint for Interference with Medical Device)  Description: INTERVENTIONS:  1. Determine that other, less restrictive measures have been tried or would not be effective before applying the restraint  2. Evaluate the patient's condition at the time of restraint application  3. Inform patient/family regarding the reason for restraint  4.  Q2H: Monitor safety, psychosocial status, comfort, nutrition and hydration  8/6/2022 1827 by Noy Luz RN  Outcome: Not Progressing  Flowsheets  Taken 8/6/2022 1400  Remains free of injury from restraints (restraint for interference with medical device):   Determine that other, less restrictive measures have been tried or would not be effective before applying the restraint   Evaluate the patient's condition at the time of restraint application   Every 2 hours: Monitor safety, psychosocial status, comfort, nutrition and hydration  Taken 8/6/2022 1200  Remains free of injury from restraints (restraint for interference with medical device): Determine that other, less restrictive measures have been tried or would not be effective before applying the restraint  Taken 8/6/2022 1000  Remains free of injury from restraints (restraint for interference with medical device): Determine that other, less restrictive measures have been tried or would not be effective before applying the restraint  8/6/2022 0806 by Natalie Morales RN  Outcome: Not Progressing  Flowsheets  Taken 8/6/2022 0800 by Noy Luz RN  Remains free of injury from restraints (restraint for interference with medical device):   Determine that other, less restrictive measures have been tried or would not be effective before applying the restraint   Evaluate the patient's condition at the time of restraint application  Taken 1/5/6688 0726 by Lizzy Corbin RN  Remains free of injury from restraints (restraint for interference with medical device): Every 2 hours: Monitor safety, psychosocial status, comfort, nutrition and hydration     Problem: Nutrition Deficit:  Goal: Optimize nutritional status  Outcome: Not Progressing     Problem: Safety - Adult  Goal: Free from fall injury  Outcome: Not Progressing     Problem: ABCDS Injury Assessment  Goal: Absence of physical injury  Outcome: Not Progressing     Problem: Skin/Tissue Integrity  Goal: Absence of new skin breakdown  Description: 1. Monitor for areas of redness and/or skin breakdown  2. Assess vascular access sites hourly  3. Every 4-6 hours minimum:  Change oxygen saturation probe site  4. Every 4-6 hours:  If on nasal continuous positive airway pressure, respiratory therapy assess nares and determine need for appliance change or resting period.   Outcome: Not Progressing     Problem: Neurosensory - Adult  Goal: Achieves stable or improved neurological status  Outcome: Not Progressing  Flowsheets (Taken 8/6/2022 1715)  Achieves stable or improved neurological status:   Assess for and report changes in neurological status   Initiate measures to prevent increased intracranial pressure     Problem: Cardiovascular - Adult  Goal: Maintains optimal cardiac output and hemodynamic stability  Outcome: Not Progressing  Flowsheets (Taken 8/6/2022 1715)  Maintains optimal cardiac output and hemodynamic stability:   Monitor blood pressure and heart rate   Monitor urine output and notify Licensed Independent Practitioner for values outside of normal range  Goal: Absence of cardiac dysrhythmias or at baseline  Outcome: Not Progressing  Flowsheets (Taken 8/6/2022 1715)  Absence of cardiac dysrhythmias or at baseline:   Monitor cardiac rate and rhythm   Assess for signs of anticipated neutropenic period:   Monitor white blood cell count   Administer growth factors as ordered     Problem: Metabolic/Fluid and Electrolytes - Adult  Goal: Electrolytes maintained within normal limits  Outcome: Not Progressing  Flowsheets (Taken 8/6/2022 1715)  Electrolytes maintained within normal limits:   Monitor labs and assess patient for signs and symptoms of electrolyte imbalances   Administer electrolyte replacement as ordered  Goal: Hemodynamic stability and optimal renal function maintained  Outcome: Not Progressing     Problem: Hematologic - Adult  Goal: Maintains hematologic stability  Outcome: Not Progressing  Flowsheets (Taken 8/6/2022 1715)  Maintains hematologic stability:   Assess for signs and symptoms of bleeding or hemorrhage   Monitor labs for bleeding or clotting disorders

## 2022-08-06 NOTE — CONSULTS
[facility] Consult Note       Attending Physician: Caridad Ugalde DO    Primary Care: No primary care provider on file. Referring MD: No referring provider defined for this encounter. Name: Alina Nava YOB: 1955  MRN:  [id number]    Admission: 2022      Date: 2022    Reason for Admission:   Fever     Reason for consult: Hx of multiple myeloma     History of Present Illness:   Micki Escobar is a 58-year-old male with history of relapsed refractory multiple myeloma status post multiple lines of treatment including CAR-T and stem cell boost 2022 currently on treatment with Isatuximab/Kyprolis/Dex who presented to the hospital with fever and right shoulder pain. Patient called into the clinic with chills and was sent to the ER. Per wife he was doing fine and suddenly started to have right shoulder pain and was shaking and chills. He was brought to the ER and was febrile to 103. 2. He was cultured and started on antibiotics. While he was in the emergency room department patient declined and required a nonrebreather became hypotensive and was started on pressors. Patient eventually was intubated and transferred to the ICU. Patient's blood cultures are now positive for ESBL E. coli. He also tested positive for COVID-19. Patient is currently on 3 pressors, intubated and sedated. Oncology is consulted for recommendations. Patient is heavily pretreated for his multiple myeloma as listed below in his treatment history. Currently he is on treatment with isatuximab/Kyprolis/Dex which she started in 2022. He is pancytopenic secondary to his disease and as well as treatment. He is neutropenic and requires blood and platelets almost every week.       Past Surgical History:   Procedure Laterality Date    IR TUNNELED CATHETER PLACEMENT GREATER THAN 5 YEARS  2022    IR TUNNELED CATHETER PLACEMENT GREATER THAN 5 YEARS 2022 [address] PROCEDURES       Past Medical Not on file   Tobacco Use    Smoking status: Never    Smokeless tobacco: Never   Substance and Sexual Activity    Alcohol use: Not Currently    Drug use: Never    Sexual activity: Not Currently     Partners: Female   Other Topics Concern    Not on file   Social History Narrative    Not on file     Social Determinants of Health     Financial Resource Strain: Not on file   Food Insecurity: Not on file   Transportation Needs: Not on file   Physical Activity: Not on file   Stress: Not on file   Social Connections: Not on file   Intimate Partner Violence: Not on file   Housing Stability: Not on file        ROS:  As noted above, otherwise remainder of 10-point ROS negative    Physical Exam:     Vital Signs:  /64   Pulse 98   Temp 99.7 °F (37.6 °C) (Bladder)   Resp 20   Ht 6' 2.02\" (1.88 m)   Wt 244 lb 11.4 oz (111 kg)   SpO2 100%   BMI 31.40 kg/m²     Weight:    Wt Readings from Last 3 Encounters:   08/06/22 244 lb 11.4 oz (111 kg)   05/02/22 248 lb 14.4 oz (112.9 kg)   04/30/22 251 lb 12.3 oz (114.2 kg)       KPS: 20% Very sick; hospital admission necessary; active supportive treatment necessary    ECOG PS:  (4) Completely disabled, unable to carry out self-care and confined to bed or chair    General: intubated and sedated   HEENT: normocephalic, no scleral erythema or icterus, Oral mucosa moist and intact, throat clear, ET tube in place  NECK: supple without palpable adenopathy  BACK: Straight negative CVAT  SKIN: warm dry and intact without lesions rashes or masses  CHEST: CTA bilaterally without use of accessory muscles  CV: Normal S1 S2, RRR, no MRG  ABD: NT ND normoactive BS, no palpable masses or hepatosplenomegaly  EXTREMITIES: without edema, denies calf tenderness  NEURO: CN II - XII grossly intact,sedated, not following commands       Laboratory Data:   CBC:   Recent Labs     08/05/22  2045 08/06/22  0258 08/06/22  0926   WBC 0.2* 0.3* 0.9*   HGB 6.8* 6.8* 8.2*   HCT 19.9* 19.5* 22.3*   MCV 91.3 91.2 89.3   PLT 11* 13* 11*     BMP/Mag:  Recent Labs     08/05/22 2045 08/06/22  0258    139   K 5.4* 5.0    114*   CO2 14* 13*   PHOS  --  1.9*   BUN 66* 68*   CREATININE 3.1* 4.0*   MG 1.60* 2.30     LIVP:   Recent Labs     08/05/22 2045 08/06/22  0258   AST 14* 24   ALT 19 18   BILIDIR <0.2 1.1*   BILITOT 0.3 1.2*   ALKPHOS 72 72     Coags:   Recent Labs     08/06/22  0258   PROTIME 18.5*   INR 1.55*   APTT 31.6     Uric Acid   Recent Labs     08/05/22 2045 08/06/22  0258   LABURIC 7.3* 7.3*       PROBLEM LIST:                   TREATMENT:            1. CyBorD (started 10/30/18)  2. Revlimid / Carol Fritter / Dex on clinical trial (started 2/1/19)  3. Carfilzomib / Pomalidomide / Dex x 6 cycles (Relapse #1 - started 8/26/19 - 2/2020)  4. Cytoxan Mobilization w/ 25% dose reduction (2/17/20)  5. Melphalan 140mg/m2 & Autologous SCT 3/12/20  6. Kyprolis/Ebony/Dex (6/30/21-7/28/21)- progression  7. Velcade/Ebony/Dex (5/18/21-8/3/21)- progression  8. HD - CTX               - Cycle 1 9/27/21              - Cycle 2 10/18/21              - Cycle 3 11/8/21- stopped d/t severe fatigue  9. Bendamustine/Velcade/Dex 12/06/21  10. Velcade only 2/11/22  11. Fludarabine/Cytoxan followed by CAR-T infusion 3/21/22  12. Stem Cell Boost 4/18/22   13. Selinexor/Velcade/Dex (C1D1 5/18/22). 14. 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  20% plasma cells . Amelia Major p53 positive 79.5% of cells  - s/p Fludarabine and Cytoxan 3/16/22-3/18/22, followed by [facility] (3/21/22)    - Post Stem Cell Boost 4/18/22  - S/P Dex 40 mg daily for 4 days ending 7/4/22.  - Selinexor/Velcade/Dex (C1D1 5/18/22). With disease progression noted on MMP 6/27/22.     Light chains  - 5/18/22: [facility], ratio 1572    - 6/27/22: [address] [address].13; ratio 4106  - 7/27/22: [facility] [address].16; ratio 5522.29   Currently on treated with Isatuximab/Kyprolis/Dex (C1D1 7/6/22), holding treatment while admitted     # ID: febrile to 103.2   - ESBL bacteremia, on meropenem  - Covid 19 positive as well, CT chest pending   - continue meropenem, vancomycin, eraxis,   - Cont Acyclovir ppx      # Heme:  Pancytopenia d/t recent chemotherapy  - Transfuse for Hgb < 7 and Platelets < 55O (recent subdural)  - Platelet transfusion today 8/3/22   - PRA sent (4/7/22): Positive (Will receive Matched Crossed Plts): Type O compatible, will receive Type O moving forward 4/18/22.   - started GCSF 8/6/22     #. CKDMetabolic:    Was on dialysis prior   - Off HD currently.   Followed by Dr. Héctor Michael  - Baseline Cr 2.1-2.6  - Cont allopurinol  - in renal failure now, nephrology following, Will assess for CRRT      # Pulmonary  - acute respiratpry failure on mechanical ventilation  - secondary to septic shock  - manage vent per ICU  - ct chest pending       Neuro:  Recent seizure/subdural hematoma (1/2/022)  - Cont Keppra 500 mg PO BID indefinitely  - Keep platelets >17F  - currenty on sedation and not responding       Mihir Burnett DO

## 2022-08-06 NOTE — CONSULTS
ICU Progress Note    Admit Date: 8/5/2022  Day: 1  Vent Day: 1  IV Access:Trifusion catheter  IV Fluids: N.S.  Vasopressors: Levo 70, Brandon 300, Vasopressin 0.03                Antibiotics: Vanco, merrem, eraxis  Diet: Diet NPO    CC: Fever and shoulder pain    Interval history: Patient had increasing pressor requirements overnight. He is now on levo, brandon, and vasopressin. HPI: Alanisjohan ThomasDarya Simpson is a 77year old male who presented to the ED on 8/6/22 with complaints of a fever and right shoulder pain (he denies any injury). His past medical history includes kappa light chain multiple myeloma (dx 10/2018), autologous bone marrow transplantation, chemotherapy induced thrombocytopenia, CKD stage 4,, anemia 2/2 CKD, T2DM, and IgA deficiency. Per chart review, he presented with complaints of pain in his right shoulder but said he did injure it. The pain is worse with movement and deep breathing. There is still full range of motion of the shoulder and there are no overlying skin changes. He also noticed diffuse tremors and felt unwell. He noted a fever of 103F. He reported no chest pain, abdominal pain, nausea, vomiting, diarrhea, lower extremity swelling, orthopnea, or rashes. He had chemotherapy yesterday with bendamustine and bortezomib. He follows with Dr. Henry Soriano with [facility]. He has a tunneled central line in his right chest.       In the ED, he was found to be COVID positive and had increasing oxygen requirements. Despite using a non-rebreather mask, he was still having significant work of breathing. He was also given fluids per sepsis protocols and 2 units of packed RBCs. Neutropenic fever protocol was initiated and he was given vancomycin and cefepime. He was started on levophed for hypotension. The patient required intubation and will be monitored in the ICU.     Medications:     Scheduled Meds:   acyclovir  400 mg Oral BID    allopurinol  200 mg Oral Daily    oyster shell calcium w/D  1 tablet Oral Daily    pantoprazole 40 mg Oral QAM AC    sodium bicarbonate  650 mg Oral Daily    sodium chloride flush  5-40 mL IntraVENous 2 times per day    Saline Mouthwash  15 mL Swish & Spit 4x Daily AC & HS    sodium chloride flush  5-40 mL IntraVENous 2 times per day    meropenem  1,000 mg IntraVENous Q12H    propofol        anidulafungin  200 mg IntraVENous Once    And    [START ON 8/7/2022] anidulafungin  100 mg IntraVENous Q24H    filgrastim-aafi  300 mcg SubCUTAneous Once    dexamethasone  6 mg IntraVENous BID    baricitinib  2 mg Oral Daily    propofol        etomidate         Continuous Infusions:   sodium chloride      sodium chloride      sodium chloride      fentaNYL 50 mcg/hr (08/06/22 0330)    vasopressin (Septic Shock) infusion 0.03 Units/min (08/06/22 0233)    phenylephrine (JOHN-SYNEPHRINE) 50mg/250mL infusion 300 mcg/min (08/06/22 0724)    propofol 50 mcg/kg/min (08/06/22 0657)    IV infusion builder 150 mL/hr at 08/06/22 0657    norepinephrine 70 mcg/min (08/06/22 0600)    sodium chloride       PRN Meds:sodium chloride, sodium chloride flush, sodium chloride, potassium chloride, magnesium sulfate, magnesium hydroxide, Saline Mouthwash, alteplase (CATHFLO) with sterile water injection, sodium chloride flush, sodium chloride, acetaminophen **OR** acetaminophen, sodium chloride    Objective:   Vitals:   T-max:  Patient Vitals for the past 8 hrs:   BP Temp Temp src Pulse Resp SpO2 Height   08/06/22 0742 -- -- -- (!) 109 (!) 31 99 % --   08/06/22 0700 (!) 89/52 -- -- (!) 102 30 98 % --   08/06/22 0645 (!) 91/53 -- -- (!) 102 (!) 32 99 % --   08/06/22 0630 (!) 87/48 -- -- (!) 104 (!) 32 100 % --   08/06/22 0615 (!) 86/48 -- -- (!) 107 (!) 33 99 % --   08/06/22 0600 (!) 87/49 99.3 °F (37.4 °C) Bladder (!) 103 (!) 33 99 % --   08/06/22 0545 (!) 80/49 -- -- (!) 104 (!) 33 97 % --   08/06/22 0530 (!) 92/47 -- -- (!) 102 (!) 33 99 % --   08/06/22 0515 (!) 98/46 -- -- 99 (!) 32 100 % --   08/06/22 0500 (!) 77/49 -- -- (!) 105 24 (!) 89 % --   08/06/22 0445 (!) 84/49 -- -- (!) 102 (!) 32 92 % --   08/06/22 0435 -- -- -- (!) 102 (!) 33 94 % --   08/06/22 0430 (!) 88/48 99.7 °F (37.6 °C) -- (!) 105 (!) 33 96 % --   08/06/22 0418 (!) 80/48 99.9 °F (37.7 °C) -- (!) 106 (!) 34 96 % --   08/06/22 0415 (!) 80/48 -- -- (!) 106 (!) 35 95 % --   08/06/22 0400 (!) 84/50 -- -- (!) 104 (!) 35 98 % --   08/06/22 0345 -- -- -- (!) 105 (!) 33 99 % --   08/06/22 0330 (!) 84/44 -- -- (!) 105 (!) 36 99 % --   08/06/22 0315 (!) 91/53 -- -- (!) 104 (!) 37 100 % --   08/06/22 0300 (!) 84/54 -- -- (!) 104 (!) 36 98 % --   08/06/22 0245 (!) 82/53 -- -- (!) 104 (!) 34 95 % --   08/06/22 0230 (!) 83/48 -- -- (!) 105 (!) 34 95 % --   08/06/22 0215 (!) 90/54 -- -- (!) 107 (!) 34 95 % --   08/06/22 0200 (!) 83/53 -- -- (!) 120 29 97 % --   08/06/22 0145 (!) 71/40 -- -- (!) 130 26 93 % --   08/06/22 0130 (!) 71/42 -- -- (!) 126 29 99 % --   08/06/22 0115 (!) 80/50 -- -- (!) 125 (!) 31 100 % --   08/06/22 0100 (!) 85/53 -- -- (!) 128 25 (!) 86 % --   08/06/22 0045 (!) 90/54 -- -- (!) 135 24 96 % --   08/06/22 0030 (!) 101/59 -- -- (!) 141 21 100 % 6' 2.02\" (1.88 m)   08/06/22 0015 119/71 -- -- (!) 153 26 100 % --       Intake/Output Summary (Last 24 hours) at 8/6/2022 0807  Last data filed at 8/6/2022 0752  Gross per 24 hour   Intake 5961 ml   Output 350 ml   Net 5611 ml       Review of Systems   Reason unable to perform ROS: intubated and sedated. Physical Exam  Constitutional:       Appearance: He is obese. He is ill-appearing. Eyes:      Pupils: Pupils are equal, round, and reactive to light. Cardiovascular:      Rate and Rhythm: Regular rhythm. Tachycardia present.    Pulmonary:      Comments: Mechanical breath sounds  Abdominal:      General: Bowel sounds are normal.   Neurological:      Comments: Intubated and sedated         LABS:    CBC:   Recent Labs     08/05/22 2045 08/06/22  0258   WBC 0.2* 0.3*   HGB 6.8* 6.8*   HCT 19.9* 19.5*   PLT 11* 13*   MCV 91.3 91.2 Renal:    Recent Labs     08/05/22 2045 08/06/22 0258    139   K 5.4* 5.0    114*   CO2 14* 13*   BUN 66* 68*   CREATININE 3.1* 4.0*   GLUCOSE 137* 119*   CALCIUM 8.5 7.1*   MG 1.60* 2.30   PHOS  --  1.9*   ANIONGAP 17* 12     Hepatic:   Recent Labs     08/05/22 2045 08/06/22 0258   AST 14* 24   ALT 19 18   BILITOT 0.3 1.2*   BILIDIR <0.2 1.1*   PROT 6.0* 4.4*   LABALBU 4.1 2.8*   ALKPHOS 72 72     Troponin: No results for input(s): TROPONINI in the last 72 hours. BNP: No results for input(s): BNP in the last 72 hours. Lipids: No results for input(s): CHOL, HDL in the last 72 hours. Invalid input(s): LDLCALCU, TRIGLYCERIDE  ABGs:    Recent Labs     08/06/22 0244 08/06/22 0400 08/06/22 0633   PHART 7.205* 7.202* 7.266*   OYY9NHQ 32.2* 32.1* 24.1*   PO2ART 136.4* 305.4* 121.4*   [id number] 12.7* 12.6* 11.0*   BEART -15* -15* -16*   Q2CVVKGB 99 100 98   GPZ5AJO 14 14 12       INR:   Recent Labs     08/06/22 0258   INR 1.55*     Lactate:   Recent Labs     08/06/22 0400 08/06/22 0633   LACTATE 3.14* 5.46*     Cultures:  -----------------------------------------------------------------  RAD:   XR CHEST PORTABLE   Final Result      Mild bibasilar atelectasis. XR ABDOMEN (KUB) (SINGLE AP VIEW)   Final Result   Findings/Impression:    The tip of the enteric tube terminates in the body of the stomach. XR CHEST PORTABLE   Final Result      No acute pulmonary disease. CT CHEST WO CONTRAST    (Results Pending)         Assessment/Plan:   Ondina Posada is a 77 y.o. male, who presented to the ED with fever and right shoulder pain. He was found to be COVID positive and required intubation for respiratory failure. Acute respiratory failure due to septic shock  -Ventilator for respiratory failure.  -For COVID-19 infection - Decadron. Baricitinib as pharmacy advised it was better for this patient than remdesivir. Septic Shock 2/2 E.  Coli ESBL bacteremia  -Meropenem, vancomycin, and eraxis.  -Positive blood cultures for E. Coli ESBL  -Positive COVID PCR. -Pressors:  70 of levo, 300 of Brandon, 0.03 of vasopressin. -Maintain MAP > 65.   -F/u blood and throat cultures. Multiple Myeloma  Thrombocytopenia 2/2 chemo. Receives platelet infusions.  -Oncology consulted. -S/p autologous bone marrow transplantation  -Actively on chemo (bendamustine and bortezomib). CKD Stage 4  -Avoid contrast and nephrotoxic agents. Code Status: Full code  FEN: NPO  PPX: SCD  DISPO: ICU    Edwin Escamilla MD, PGY-1  08/06/22  8:07 AM    This patient has been staffed and discussed with Dr. Emeli Ortiz MD.         Patient was seen, examined and discussed with Dr. Juan Sanchez. I agree with the history of present illness, past medical/surgical histories, family history, social history, medication list and allergies as listed. The review of systems is as noted above. My physical exam confirms the findings listed  Chart was reviewed including labs, CXR,  EKG and medical records confirm the findings noted     Acute respiratory failure on mechanical vent support. , RR 24, FiO2 40, PEEP 40. ABG 7.26/24/121  ESBL E.coli septicemia   Septic shock. On levo 100, Brandon 300, Vaso 0.03  JUDY on CKD. Poor UOP. Family doesn't want dialysis. Lactic acidosis - now trending up  Pancytopenia, multiple myeloma, s/p SCT, currently on chemo  COVID19 infection     Increase RR to 28  Continue bicarb infusion   Continue merrem  D/c vanc  D/c baricitinib and dexamethasone. Pt does not have COVID pneumonia.   CXR is clear and he is not on high O2 supplement and pO2 is 121  Prognosis and code status discussed with the wife    Pt has a high probability of imminent or life-threatening deterioration requiring close monitoring, and highly complex decision-making and/or interventions of high intensity to assess, manipulate, and support his critical organ systems to prevent a likely inevitable decline which could occur if left untreated. A total critical care time 55 minutes was used. This includes but not limited to examining patient, collaborating with other physicians, monitoring vital signs, telemetry, continuous pulse oximetry, and clinical response to IV medications, documentation time, review and interpretation of laboratory and radiological data, review of nursing notes and old record review. This time excludes any time that may have been spent performing procedures for life threatening organ failure.

## 2022-08-06 NOTE — PROGRESS NOTES
Lactic critical at 6.7. ICU team aware. Levo titrated down to 60 mcg/min. Brandon maxed at 300 and vaso remains at 0.03.

## 2022-08-06 NOTE — PROGRESS NOTES
Pt levo requirement increased from 5 to 40mcg/min over a short time. Residents notified and at bedside. A-line place. Vaso and Brandon ordered and started. Wife called and updated about pt change in status.

## 2022-08-06 NOTE — PROGRESS NOTES
Pharmacy Note - Renal Dosing and Extended Infusion Beta-Lactam Adjustment    Meropenem 2000 every 8 hours ordered for patient. Per [facility] Renal Dose Adjustment Policy and Extended Infusion Beta-Lactam Policy, order will be changed to 1000 mg 12 hours. Estimated Creatinine Clearance: Estimated Creatinine Clearance: 31 mL/min (A) (based on SCr of 3.1 mg/dL (H)). Dialysis Status, JUDY, CKD:   BMI: Body mass index is 30.16 kg/m². Rationale for Adjustment: Agent is renally eliminated and demonstrates time-dependent effect on bacterial eradication. Extended-infusion dosing strategy aims to enhance microbiologic and clinical efficacy. Pharmacy will continue to monitor renal function, cultures and sensitivities (where available) and adjust dose as necessary. Please call with any questions.     Nathan De La Torre, PharmD, BCPS

## 2022-08-06 NOTE — PROGRESS NOTES
bottles of proteinex daily to provide additional 312kcals and 78g protein. +Free water flush 30ml q4hrs. Anthropometric Measures:  Height: 6' 2\" (188 cm)  Ideal Body Weight (IBW): 190 lbs (86 kg)       Current Body Weight: 244 lb 11.4 oz (111 kg), 128.8 % IBW. Weight Source: Bed Scale  Current BMI (kg/m2): 31.4                          BMI Categories: Obese Class 1 (BMI 30.0-34. 9)    Estimated Daily Nutrient Needs:  Energy Requirements Based On: Kcal/kg (12-15)  Weight Used for Energy Requirements: Current  Energy (kcal/day): 4591-0373  Weight Used for Protein Requirements: Ideal (1.2-2)  Protein (g/day): 103-172  Method Used for Fluid Requirements: 1 ml/kcal  Fluid (ml/day): 3762-8899    Nutrition Diagnosis:   Inadequate oral intake related to impaired respiratory function as evidenced by NPO or clear liquid status due to medical condition, intubation    Nutrition Interventions:   Food and/or Nutrient Delivery: Start Tube Feeding (when medically feasible)  Nutrition Education/Counseling: No recommendation at this time  Coordination of Nutrition Care: Continue to monitor while inpatient       Goals:     Goals: by next RD assessment, Initiate nutrition support       Nutrition Monitoring and Evaluation:      Food/Nutrient Intake Outcomes: Enteral Nutrition Intake/Tolerance  Physical Signs/Symptoms Outcomes: Biochemical Data, Nutrition Focused Physical Findings, Weight, Fluid Status or Edema, Hemodynamic Status    Discharge Planning:     Too soon to determine     [address] [facility], [address]  Contact: [phone]

## 2022-08-06 NOTE — CONSENT
Informed Consent for Blood Component Transfusion Note    I have discussed with the partner the rationale for blood component transfusion; its benefits in treating or preventing fatigue, organ damage, or death; and its risk which includes mild transfusion reactions, rare risk of blood borne infection, or more serious but rare reactions. I have discussed the alternatives to transfusion, including the risk and consequences of not receiving transfusion. The partner had an opportunity to ask questions and had agreed to proceed with transfusion of blood components.     Electronically signed by Snow Hsu MD on 8/6/22 at 11:21 AM EDT

## 2022-08-06 NOTE — PROGRESS NOTES
Clinical Pharmacy Consult Note       Pharmacy consulted by Dr. Anuja Call in ED to order first dose of vancomycin. Indication :   febrile neutropenia    Patient Data:     Recent Labs     08/05/22 2045      K 5.4*      CO2 14*   BUN 66*   CREATININE 3.1*       Estimated Creatinine Clearance: 31 mL/min (A) (based on SCr of 3.1 mg/dL (H)). Recent Labs     08/05/22 2045   WBC 0.2*   HGB 6.8*   HCT 19.9*   MCV 91.3   PLT 11*       Height:    Weight: 235 lb (106.6 kg)    Plan: Will order vancomycin 1500 mg x 1 dose, to be administered in ED. Please note this consult covers ED vancomycin dose only. If admitting provider would like further vancomycin dose management by pharmacy, please place additional consult order. Thank you for the consult. Please call with questions.   Neena Osman, PharmD, [facility] 8/5/2022 10:52 PM

## 2022-08-06 NOTE — PROGRESS NOTES
Pt admitted to ICU from ED. Pt tachypneic, maxed on NRB. Decision made to intubate on arrival to ICU. RT and Residents at bedside. 2356-Etomidate 30mg given  2358- Rocuronium 50mg given   2359- Etomidate 10mg given  0001-ETT placed     Chest xray ordered. Levo at 5mcg/min. Wife updated. Will continue to monitor.

## 2022-08-06 NOTE — PROCEDURES
Ondina Posada is a 77 y.o. male patient. 1. Neutropenic fever ([facility]    2. Multiple myeloma, remission status unspecified ([facility]    3. COVID-19    4. Acute respiratory failure due to COVID-19 ([facility]    5. Septic shock ([facility]    6. Anemia, unspecified type    7. Hypomagnesemia    8. Hyperkalemia      Past Medical History:   Diagnosis Date    Cancer ([facility]     ESRD (end stage renal disease) on dialysis ([facility]     Mon-Wed-Fri     Blood pressure 128/73, pulse (!) 141, temperature 100 °F (37.8 °C), resp. rate 21, height 6' 2.02\" (1.88 m), weight 235 lb (106.6 kg), SpO2 100 %. Insert Arterial Line    Date/Time: 8/6/2022 2:30 AM  Performed by: Carolynn Ovalle MD  Authorized by: Carolynn Ovalle MD   Consent: Verbal consent obtained. Risks and benefits: risks, benefits and alternatives were discussed  Consent given by: patient  Patient understanding: patient states understanding of the procedure being performed  Patient consent: the patient's understanding of the procedure matches consent given  Procedure consent: procedure consent matches procedure scheduled  Relevant documents: relevant documents present and verified  Test results: test results available and properly labeled  Site marked: the operative site was marked  Imaging studies: imaging studies available  Patient identity confirmed: arm band and hospital-assigned identification number  Time out: Immediately prior to procedure a \"time out\" was called to verify the correct patient, procedure, equipment, support staff and site/side marked as required. Preparation: Patient was prepped and draped in the usual sterile fashion.   Indications: hemodynamic monitoring  Location: left radial    Sedation:  Patient sedated: yes  Sedatives: propofol    Enrike's test normal: yes  Needle gauge: 18  Seldinger technique: Seldinger technique used  Number of attempts: 3  Post-procedure: line sutured and dressing applied  Post-procedure CMS: normal  Patient tolerance: patient tolerated the procedure well with no immediate complications  Comments: EBL 5ml        Tha Burch MD  8/6/2022

## 2022-08-07 NOTE — PROGRESS NOTES
ICU Progress Note    Admit Date: 8/5/2022  Day: 2  Vent Day: 2  IV Access:Trifusion catheter  IV Fluids: N.S.  Vasopressors: Levo 42, John 300              Antibiotics: Vanco, merrem, eraxis  Diet: Diet NPO    CC: Fever and shoulder pain    Interval history: Patient had decreasing pressor requirements overnight.  He is now on levo 42 , john 300, and weaned off of vasopressin  Vent Mode: AC/PRVC Resp Rate (Set): 28 bmp/Vt (Set, mL): 500 mL/ /FiO2 : 30 %         Medications:     Scheduled Meds:   acyclovir  400 mg Oral BID    allopurinol  200 mg Oral Daily    oyster shell calcium w/D  1 tablet Oral Daily    pantoprazole  40 mg Oral QAM AC    sodium bicarbonate  650 mg Oral Daily    sodium chloride flush  5-40 mL IntraVENous 2 times per day    Saline Mouthwash  15 mL Swish & Spit 4x Daily AC & HS    sodium chloride flush  5-40 mL IntraVENous 2 times per day    meropenem  1,000 mg IntraVENous Q12H    anidulafungin  100 mg IntraVENous Q24H    filgrastim/filgrastim biosimilar  300 mcg SubCUTAneous Daily     Continuous Infusions:   sodium chloride      sodium chloride      sodium chloride      sodium chloride      sodium chloride      fentaNYL 50 mcg/hr (08/06/22 1852)    vasopressin (Septic Shock) infusion 0.03 Units/min (08/06/22 2214)    phenylephrine (JOHN-SYNEPHRINE) 50mg/250mL infusion 300 mcg/min (08/07/22 0729)    propofol 50 mcg/kg/min (08/07/22 0622)    IV infusion builder 150 mL/hr at 08/07/22 0141    sodium chloride      norepinephrine 42 mcg/min (08/07/22 0622)    sodium chloride       PRN Meds:sodium chloride, sodium chloride, sodium chloride, sodium chloride flush, sodium chloride, potassium chloride, magnesium sulfate, magnesium hydroxide, Saline Mouthwash, alteplase (CATHFLO) with sterile water injection, sodium chloride flush, sodium chloride, acetaminophen **OR** acetaminophen, sodium chloride, sodium chloride    Objective:   Vitals:   T-max:  Patient Vitals for the past 8 hrs:   BP Temp Temp src Pulse Resp sounds  Abdominal:      General: Bowel sounds are normal.   Neurological:      Comments: Intubated and sedated         LABS:    CBC:   Recent Labs     08/06/22  1838 08/07/22  0336 08/07/22  0653   WBC 1.0* 0.4* 0.4*   HGB 8.6* 8.1* 7.6*   HCT 22.1* 20.6* 18.9*   PLT 30* 12* 22*   MCV 87.9 88.2 87.2       Renal:    Recent Labs     08/05/22 2045 08/06/22 0258 08/07/22  0336    139 126*   K 5.4* 5.0 5.6*    114* 91*   CO2 14* 13* 22   BUN 66* 68* 71*   CREATININE 3.1* 4.0* 4.8*   GLUCOSE 137* 119* 274*   CALCIUM 8.5 7.1* 6.2*   MG 1.60* 2.30 1.80   PHOS  --  1.9* 5.6*   ANIONGAP 17* 12 13       Hepatic:   Recent Labs     08/05/22 2045 08/06/22 0258   AST 14* 24   ALT 19 18   BILITOT 0.3 1.2*   BILIDIR <0.2 1.1*   PROT 6.0* 4.4*   LABALBU 4.1 2.8*   ALKPHOS 72 72       Troponin: No results for input(s): TROPONINI in the last 72 hours. BNP: No results for input(s): BNP in the last 72 hours. Lipids: No results for input(s): CHOL, HDL in the last 72 hours. Invalid input(s): LDLCALCU, TRIGLYCERIDE  ABGs:    Recent Labs     08/06/22  0244 08/06/22  0400 08/06/22  0633   PHART 7.205* 7.202* 7.266*   KDU9ANS 32.2* 32.1* 24.1*   PO2ART 136.4* 305.4* 121.4*   UVG3GWU 12.7* 12.6* 11.0*   BEART -15* -15* -16*   L3AXRBDC 99 100 98   BSP1XTH 14 14 12         INR:   Recent Labs     08/06/22  0258   INR 1.55*       Lactate:   Recent Labs     08/06/22  0400 08/06/22  0633   LACTATE 3.14* 5.46*       Cultures:  -----------------------------------------------------------------  RAD:   XR CHEST PORTABLE   Final Result      Mild bibasilar atelectasis. XR ABDOMEN (KUB) (SINGLE AP VIEW)   Final Result   Findings/Impression:    The tip of the enteric tube terminates in the body of the stomach. XR CHEST PORTABLE   Final Result      No acute pulmonary disease.          XR CHEST PORTABLE    (Results Pending)         Assessment/Plan:   Merline Cordoba is a 77 y.o. male, who presented to the ED with fever and right his critical organ systems to prevent a likely inevitable decline which could occur if left untreated. A total critical care time 35 minutes was used. This includes but not limited to examining patient, collaborating with other physicians, monitoring vital signs, telemetry, continuous pulse oximetry, and clinical response to IV medications, documentation time, review and interpretation of laboratory and radiological data, review of nursing notes and old record review.  This time excludes any time that may have been spent performing procedures for life threatening organ failure

## 2022-08-07 NOTE — PROGRESS NOTES
Hospitalist Progress Note      PCP: No primary care provider on file. Date of Admission: 8/5/2022    Chief Complaint: Right shoulder pain, respiratory failure    HPI: Gideon LeDarya Samano is a 77year old male who presented to the ED on 8/6/22 with complaints of a fever and right shoulder pain (he denies any injury) and fever of 103F. In the ER he was noted to have increasing oxygen requirements. Ultimately her required intubation and pressor support and was admitted to ICU. His past medical history includes kappa light chain multiple myeloma (dx 10/2018), autologous bone marrow transplantation, chemotherapy induced thrombocytopenia, CKD stage 4,, anemia 2/2 CKD, T2DM, and IgA deficiency. Subjective: Intubated and sedated.      Medications:  Reviewed    Infusion Medications    sodium chloride      sodium chloride      sodium chloride      sodium chloride      sodium chloride      fentaNYL 50 mcg/hr (08/06/22 1852)    vasopressin (Septic Shock) infusion 0.03 Units/min (08/06/22 2214)    phenylephrine (JOHN-SYNEPHRINE) 50mg/250mL infusion 300 mcg/min (08/07/22 0729)    propofol 50 mcg/kg/min (08/07/22 0622)    IV infusion builder 150 mL/hr at 08/07/22 0141    sodium chloride      norepinephrine 42 mcg/min (08/07/22 0622)    sodium chloride       Scheduled Medications    acyclovir  400 mg Oral BID    allopurinol  200 mg Oral Daily    oyster shell calcium w/D  1 tablet Oral Daily    pantoprazole  40 mg Oral QAM AC    sodium bicarbonate  650 mg Oral Daily    sodium chloride flush  5-40 mL IntraVENous 2 times per day    Saline Mouthwash  15 mL Swish & Spit 4x Daily AC & HS    sodium chloride flush  5-40 mL IntraVENous 2 times per day    meropenem  1,000 mg IntraVENous Q12H    anidulafungin  100 mg IntraVENous Q24H    filgrastim/filgrastim biosimilar  300 mcg SubCUTAneous Daily     PRN Meds: sodium chloride, sodium chloride, sodium chloride, sodium chloride flush, sodium chloride, potassium chloride, magnesium XR CHEST PORTABLE   Final Result      No acute pulmonary disease. XR CHEST PORTABLE    (Results Pending)       Assessment/Plan:    Active Hospital Problems    Diagnosis Date Noted    Acute respiratory failure with hypoxia ([facility] [address] [J96.01] 08/05/2022     Priority: Medium    Neutropenic fever ([facility] [address] [D70.9, R50.81] 02/24/2020       Acute respiratory failure on mechanical vent support due to septic shock  CXR no acute disease on admission    Septic shock - E.  Coli bacteremia  3 pressor support with bicarb infusion  Neutropenic fever  On meropenem and Eraxis  Vancomycin discontinued    JUDY on CKD stage IV  Family does not think patient would want dialysis/CRRT again under any circumstances  Nephrology consulted    Covid 19 infection    Multiple Myeloma on chemotherapy  Oncology consulted    DVT Prophylaxis: SCDs  Diet: Diet NPO  Code Status: Full Code    PT/OT Eval Status:     Dispo - ICU    Lorene Castillo DO

## 2022-08-07 NOTE — PLAN OF CARE
Problem: Discharge Planning  Goal: Discharge to home or other facility with appropriate resources  8/7/2022 0713 by Keila Gil RN  Outcome: Not Progressing  Flowsheets (Taken 8/6/2022 2000)  Discharge to home or other facility with appropriate resources: Identify barriers to discharge with patient and caregiver     Problem: Safety - Medical Restraint  Goal: Remains free of injury from restraints (Restraint for Interference with Medical Device)  Description: INTERVENTIONS:  1. Determine that other, less restrictive measures have been tried or would not be effective before applying the restraint  2. Evaluate the patient's condition at the time of restraint application  3. Inform patient/family regarding the reason for restraint  4.  Q2H: Monitor safety, psychosocial status, comfort, nutrition and hydration  8/7/2022 0713 by Keila Gil RN  Outcome: Not Progressing  Flowsheets  Taken 8/7/2022 0000 by Jorge Weber RN  Remains free of injury from restraints (restraint for interference with medical device): Every 2 hours: Monitor safety, psychosocial status, comfort, nutrition and hydration  Taken 8/6/2022 2000 by Keila Gil RN  Remains free of injury from restraints (restraint for interference with medical device): Every 2 hours: Monitor safety, psychosocial status, comfort, nutrition and hydration     Problem: Nutrition Deficit:  Goal: Optimize nutritional status  8/7/2022 0713 by Keila Gil RN  Outcome: Not Progressing     Problem: Safety - Adult  Goal: Free from fall injury  8/7/2022 0713 by Keila Gil RN  Outcome: Not Progressing  Flowsheets (Taken 8/6/2022 2207)  Free From Fall Injury: Instruct family/caregiver on patient safety     Problem: ABCDS Injury Assessment  Goal: Absence of physical injury  8/7/2022 0713 by Keila Gil RN  Outcome: Not Progressing  Flowsheets (Taken 8/6/2022 2207)  Absence of Physical Injury: Implement safety measures based on patient assessment Problem: Skin/Tissue Integrity  Goal: Absence of new skin breakdown  Description: 1. Monitor for areas of redness and/or skin breakdown  2. Assess vascular access sites hourly  3. Every 4-6 hours minimum:  Change oxygen saturation probe site  4. Every 4-6 hours:  If on nasal continuous positive airway pressure, respiratory therapy assess nares and determine need for appliance change or resting period.   8/7/2022 0713 by Samanta Walker RN  Outcome: Not Progressing     Problem: Neurosensory - Adult  Goal: Achieves stable or improved neurological status  8/7/2022 0713 by Samanta Walker RN  Outcome: Not Progressing  Flowsheets (Taken 8/6/2022 2000)  Achieves stable or improved neurological status: Assess for and report changes in neurological status     Problem: Cardiovascular - Adult  Goal: Maintains optimal cardiac output and hemodynamic stability  8/7/2022 0713 by Samanta Walker RN  Outcome: Not Progressing  Flowsheets (Taken 8/6/2022 2000)  Maintains optimal cardiac output and hemodynamic stability: Monitor blood pressure and heart rate     Problem: Cardiovascular - Adult  Goal: Absence of cardiac dysrhythmias or at baseline  8/7/2022 0713 by Samanta Walker RN  Outcome: Not Progressing  Flowsheets (Taken 8/6/2022 2000)  Absence of cardiac dysrhythmias or at baseline: Monitor cardiac rate and rhythm     Problem: Musculoskeletal - Adult  Goal: Return mobility to safest level of function  8/7/2022 0713 by Samanta Walker RN  Outcome: Not Progressing  Flowsheets (Taken 8/6/2022 2000)  Return Mobility to Safest Level of Function: Assess patient stability and activity tolerance for standing, transferring and ambulating with or without assistive devices     Problem: Gastrointestinal - Adult  Goal: Minimal or absence of nausea and vomiting  8/7/2022 0713 by Samanta Walker RN  Outcome: Not Progressing     Problem: Metabolic/Fluid and Electrolytes - Adult  Goal: Electrolytes maintained within normal limits  8/7/2022 0713 by Natanael Clarke RN  Outcome: Not Progressing  Flowsheets (Taken 8/6/2022 2000)  Electrolytes maintained within normal limits: Monitor labs and assess patient for signs and symptoms of electrolyte imbalances     Problem: Metabolic/Fluid and Electrolytes - Adult  Goal: Hemodynamic stability and optimal renal function maintained  8/7/2022 0713 by Natanael Clarke RN  Outcome: Not Progressing  Flowsheets (Taken 8/6/2022 2000)  Hemodynamic stability and optimal renal function maintained: Monitor labs and assess for signs and symptoms of volume excess or deficit     Problem: Hematologic - Adult  Goal: Maintains hematologic stability  8/7/2022 0713 by Natanael Clarke RN  Outcome: Not Progressing  Flowsheets (Taken 8/6/2022 2000)  Maintains hematologic stability: Assess for signs and symptoms of bleeding or hemorrhage     Problem: Genitourinary - Adult  Goal: Absence of urinary retention  8/7/2022 0713 by Natanael Clarke RN  Outcome: Progressing  Flowsheets (Taken 8/6/2022 2000)  Absence of urinary retention: Assess patients ability to void and empty bladder     Problem: Genitourinary - Adult  Goal: Urinary catheter remains patent  8/7/2022 0713 by Natanael Clarke RN  Outcome: Progressing  Flowsheets (Taken 8/6/2022 2000)  Urinary catheter remains patent: Assess patency of urinary catheter     Problem: Infection - Adult  Goal: Absence of infection at discharge  8/7/2022 0713 by Natanael Clarke RN  Outcome: Progressing  Flowsheets  Taken 8/6/2022 2207  Absence of infection at discharge: Assess and monitor for signs and symptoms of infection  Taken 8/6/2022 2000  Absence of infection at discharge: Assess and monitor for signs and symptoms of infection     Problem: Infection - Adult  Goal: Absence of infection during hospitalization  8/7/2022 0713 by Natanael Clarke RN  Outcome: Progressing  Flowsheets  Taken 8/6/2022 2207  Absence of infection during hospitalization: Assess and monitor for other, less restrictive measures have been tried or would not be effective before applying the restraint  2. Evaluate the patient's condition at the time of restraint application  3. Inform patient/family regarding the reason for restraint  4.  Q2H: Monitor safety, psychosocial status, comfort, nutrition and hydration  8/7/2022 0713 by Brenden Irby RN  Outcome: Not Progressing  Flowsheets  Taken 8/7/2022 0000 by Yessenia De Los Santos RN  Remains free of injury from restraints (restraint for interference with medical device): Every 2 hours: Monitor safety, psychosocial status, comfort, nutrition and hydration  Taken 8/6/2022 2000 by Brenden Irby RN  Remains free of injury from restraints (restraint for interference with medical device): Every 2 hours: Monitor safety, psychosocial status, comfort, nutrition and hydration  8/6/2022 1827 by Yasir Jacobson RN  Outcome: Not Progressing  Flowsheets  Taken 8/6/2022 1400  Remains free of injury from restraints (restraint for interference with medical device):   Determine that other, less restrictive measures have been tried or would not be effective before applying the restraint   Evaluate the patient's condition at the time of restraint application   Every 2 hours: Monitor safety, psychosocial status, comfort, nutrition and hydration  Taken 8/6/2022 1200  Remains free of injury from restraints (restraint for interference with medical device): Determine that other, less restrictive measures have been tried or would not be effective before applying the restraint  Taken 8/6/2022 1000  Remains free of injury from restraints (restraint for interference with medical device): Determine that other, less restrictive measures have been tried or would not be effective before applying the restraint     Problem: Nutrition Deficit:  Goal: Optimize nutritional status  8/7/2022 0713 by Brenden Irby RN  Outcome: Not Progressing  8/6/2022 1827 by Yasir Jacobson RN  Outcome: Not Progressing     Problem: Safety - Adult  Goal: Free from fall injury  8/7/2022 0713 by Merced Winters RN  Outcome: Not Progressing  Flowsheets (Taken 8/6/2022 2207)  Free From Fall Injury: Instruct family/caregiver on patient safety  8/6/2022 1827 by Shiv Garay RN  Outcome: Not Progressing     Problem: ABCDS Injury Assessment  Goal: Absence of physical injury  8/7/2022 0713 by Merced Winters RN  Outcome: Not Progressing  Flowsheets (Taken 8/6/2022 2207)  Absence of Physical Injury: Implement safety measures based on patient assessment  8/6/2022 1827 by Shiv Garay RN  Outcome: Not Progressing     Problem: Skin/Tissue Integrity  Goal: Absence of new skin breakdown  Description: 1. Monitor for areas of redness and/or skin breakdown  2. Assess vascular access sites hourly  3. Every 4-6 hours minimum:  Change oxygen saturation probe site  4. Every 4-6 hours:  If on nasal continuous positive airway pressure, respiratory therapy assess nares and determine need for appliance change or resting period.   8/7/2022 0713 by Merced Winters RN  Outcome: Not Progressing  8/6/2022 1827 by Shiv Garay RN  Outcome: Not Progressing     Problem: Neurosensory - Adult  Goal: Achieves stable or improved neurological status  8/7/2022 0713 by Merced Winters RN  Outcome: Not Progressing  Flowsheets (Taken 8/6/2022 2000)  Achieves stable or improved neurological status: Assess for and report changes in neurological status  8/6/2022 1827 by Shiv Garay RN  Outcome: Not Progressing  Flowsheets (Taken 8/6/2022 1715)  Achieves stable or improved neurological status:   Assess for and report changes in neurological status   Initiate measures to prevent increased intracranial pressure     Problem: Cardiovascular - Adult  Goal: Maintains optimal cardiac output and hemodynamic stability  8/7/2022 0713 by Merced Winters RN  Outcome: Not Progressing  Flowsheets (Taken 8/6/2022 2000)  Maintains optimal cardiac output and hemodynamic stability: Monitor blood pressure and heart rate  8/6/2022 1827 by Mendel Koller, RN  Outcome: Not Progressing  Flowsheets (Taken 8/6/2022 1715)  Maintains optimal cardiac output and hemodynamic stability:   Monitor blood pressure and heart rate   Monitor urine output and notify Licensed Independent Practitioner for values outside of normal range  Goal: Absence of cardiac dysrhythmias or at baseline  8/7/2022 0713 by Rock Ina RN  Outcome: Not Progressing  Flowsheets (Taken 8/6/2022 2000)  Absence of cardiac dysrhythmias or at baseline: Monitor cardiac rate and rhythm  8/6/2022 1827 by Mendel Koller, RN  Outcome: Not Progressing  Flowsheets (Taken 8/6/2022 1715)  Absence of cardiac dysrhythmias or at baseline:   Monitor cardiac rate and rhythm   Assess for signs of decreased cardiac output     Problem: Musculoskeletal - Adult  Goal: Return mobility to safest level of function  8/7/2022 0713 by Rock Ina RN  Outcome: Not Progressing  Flowsheets (Taken 8/6/2022 2000)  Return Mobility to Safest Level of Function: Assess patient stability and activity tolerance for standing, transferring and ambulating with or without assistive devices  8/6/2022 1827 by Mendel Koller, RN  Outcome: Not Progressing  Flowsheets (Taken 8/6/2022 1715)  Return Mobility to Safest Level of Function:   Assist with transfers and ambulation using safe patient handling equipment as needed   Assess patient stability and activity tolerance for standing, transferring and ambulating with or without assistive devices     Problem: Gastrointestinal - Adult  Goal: Minimal or absence of nausea and vomiting  8/7/2022 0713 by Rock Ina RN  Outcome: Not Progressing  8/6/2022 1827 by Mendel Koller, RN  Outcome: Not Progressing  Flowsheets (Taken 8/6/2022 1715)  Minimal or absence of nausea and vomiting:   Administer IV fluids as ordered to ensure adequate hydration   Maintain NPO status until nausea and vomiting are resolved     Problem: Genitourinary - Adult  Goal: Absence of urinary retention  8/7/2022 0713 by Cristina Milner RN  Outcome: Progressing  Flowsheets (Taken 8/6/2022 2000)  Absence of urinary retention: Assess patients ability to void and empty bladder  8/6/2022 1827 by Giovanny Cormier RN  Outcome: Not Progressing  Flowsheets (Taken 8/6/2022 1715)  Absence of urinary retention: Assess patients ability to void and empty bladder  Goal: Urinary catheter remains patent  8/7/2022 0713 by Cristina Milner RN  Outcome: Progressing  Flowsheets (Taken 8/6/2022 2000)  Urinary catheter remains patent: Assess patency of urinary catheter  8/6/2022 1827 by Giovanny Cormier RN  Outcome: Not Progressing  Flowsheets (Taken 8/6/2022 1715)  Urinary catheter remains patent: Assess patency of urinary catheter     Problem: Infection - Adult  Goal: Absence of infection at discharge  8/7/2022 0713 by Cristina Milner RN  Outcome: Progressing  Flowsheets  Taken 8/6/2022 2207  Absence of infection at discharge: Assess and monitor for signs and symptoms of infection  Taken 8/6/2022 2000  Absence of infection at discharge: Assess and monitor for signs and symptoms of infection  8/6/2022 1827 by Giovanny Cormier RN  Outcome: Not Progressing  Flowsheets (Taken 8/6/2022 1715)  Absence of infection at discharge:   Assess and monitor for signs and symptoms of infection   Monitor lab/diagnostic results   Monitor all insertion sites i.e., indwelling lines, tubes and drains  Goal: Absence of infection during hospitalization  8/7/2022 0713 by Cristina Milner RN  Outcome: Progressing  Flowsheets  Taken 8/6/2022 2207  Absence of infection during hospitalization: Assess and monitor for signs and symptoms of infection  Taken 8/6/2022 2000  Absence of infection during hospitalization: Assess and monitor for signs and symptoms of infection  8/6/2022 1827 by Giovanny Cormier RN  Outcome: Not Progressing  Flowsheets (Taken 8/6/2022 1715)  Absence of infection during hospitalization:   Assess and monitor for signs and symptoms of infection   Monitor lab/diagnostic results   Monitor all insertion sites i.e., indwelling lines, tubes and drains  Goal: Absence of fever/infection during anticipated neutropenic period  8/7/2022 0713 by Cristina Milner RN  Outcome: Progressing  Flowsheets  Taken 8/6/2022 2207  Absence of fever/infection during anticipated neutropenic period: Monitor white blood cell count  Taken 8/6/2022 2000  Absence of fever/infection during anticipated neutropenic period: Monitor white blood cell count  8/6/2022 1827 by Giovanny Cormier RN  Outcome: Not Progressing  Flowsheets (Taken 8/6/2022 1715)  Absence of fever/infection during anticipated neutropenic period:   Monitor white blood cell count   Administer growth factors as ordered     Problem: Metabolic/Fluid and Electrolytes - Adult  Goal: Electrolytes maintained within normal limits  8/7/2022 0713 by Cristina Milner RN  Outcome: Not Progressing  Flowsheets (Taken 8/6/2022 2000)  Electrolytes maintained within normal limits: Monitor labs and assess patient for signs and symptoms of electrolyte imbalances  8/6/2022 1827 by Giovanny Cormier RN  Outcome: Not Progressing  Flowsheets (Taken 8/6/2022 1715)  Electrolytes maintained within normal limits:   Monitor labs and assess patient for signs and symptoms of electrolyte imbalances   Administer electrolyte replacement as ordered  Goal: Hemodynamic stability and optimal renal function maintained  8/7/2022 0713 by Cristina Milner RN  Outcome: Not Progressing  Flowsheets (Taken 8/6/2022 2000)  Hemodynamic stability and optimal renal function maintained: Monitor labs and assess for signs and symptoms of volume excess or deficit  8/6/2022 1827 by Giovanny Cormier RN  Outcome: Not Progressing     Problem: Hematologic - Adult  Goal: Maintains hematologic stability  8/7/2022 0713 by Arya Fung, RN  Outcome: Not Progressing  Flowsheets (Taken 8/6/2022 2000)  Maintains hematologic stability: Assess for signs and symptoms of bleeding or hemorrhage  8/6/2022 1827 by Yohana Newberry RN  Outcome: Not Progressing  Flowsheets (Taken 8/6/2022 1715)  Maintains hematologic stability:   Assess for signs and symptoms of bleeding or hemorrhage   Monitor labs for bleeding or clotting disorders

## 2022-08-07 NOTE — PROGRESS NOTES
Pt using accessory muscles to breathe on the vent. RR 26-30s and o2 sats around 92%. ICU team aware. Sedation increased for vent compliance. Wife updated at bedside.

## 2022-08-07 NOTE — PROGRESS NOTES
Vaso turned off @ 1136. MAP sustaining above 65 on 28 levo and 300 gisel. Sedation weaned down d/t unresponsiveness. Propofol @ 20 and fentanyl @ 25. Pt now responding to voice. Pt does not follow commands, moves all 4 extremities non-purposefully.

## 2022-08-07 NOTE — PROGRESS NOTES
Nephrology Progress Note  [facility]  [phone]   [facility]. com    Patient:  Alina Nava   : 1955    CC:  JUDY, CKD    Subjective:  Mr. Jean-Paul Abdi remains in the ICU on 3 pressors, intubated. Poor urine output. He got platelets yesterday. ROS:   Limited d/t pt factors. SHx:  No visitors this morning. Meds:  Scheduled Meds:   acyclovir  400 mg Oral BID    allopurinol  200 mg Oral Daily    oyster shell calcium w/D  1 tablet Oral Daily    pantoprazole  40 mg Oral QAM AC    sodium bicarbonate  650 mg Oral Daily    sodium chloride flush  5-40 mL IntraVENous 2 times per day    Saline Mouthwash  15 mL Swish & Spit 4x Daily AC & HS    sodium chloride flush  5-40 mL IntraVENous 2 times per day    meropenem  1,000 mg IntraVENous Q12H    anidulafungin  100 mg IntraVENous Q24H    filgrastim/filgrastim biosimilar  300 mcg SubCUTAneous Daily     Continuous Infusions:   sodium chloride      sodium chloride      sodium chloride      sodium chloride      sodium chloride      fentaNYL 50 mcg/hr (22)    vasopressin (Septic Shock) infusion 0.03 Units/min (22)    phenylephrine (JOHN-SYNEPHRINE) 50mg/250mL infusion 300 mcg/min (22 0729)    propofol 50 mcg/kg/min (22)    IV infusion builder 150 mL/hr at 22 0846    sodium chloride      norepinephrine 42 mcg/min (22)    sodium chloride       PRN Meds:.sodium chloride, sodium chloride, sodium chloride, sodium chloride flush, sodium chloride, potassium chloride, magnesium sulfate, magnesium hydroxide, Saline Mouthwash, alteplase (CATHFLO) with sterile water injection, sodium chloride flush, sodium chloride, acetaminophen **OR** acetaminophen, sodium chloride, sodium chloride    Vitals:  /69   Pulse 99   Temp 100.4 °F (38 °C) (Bladder)   Resp 29   Ht 6' 2\" (1.88 m)   Wt 244 lb 11.4 oz (111 kg)   SpO2 99%   BMI 31.42 kg/m²     Physical Exam:  Gen: Resting in bed, in the ICU.   Intubated, on pressors. HEENT: MMM, OP clear. CV: RRR, no S3.  Lungs: +vent, decreased BS bilaterally. Abd: Soft, decreased BS. Ext: + edema, no cyanosis  Skin: Cool. No rashes appreciated. : +peters    Labs:  CBC:   Lab Results   Component Value Date/Time    WBC 0.4 08/07/2022 06:53 AM    RBC 2.17 08/07/2022 06:53 AM    HGB 7.6 08/07/2022 06:53 AM    HCT 18.9 08/07/2022 06:53 AM    MCV 87.2 08/07/2022 06:53 AM    MCH 34.9 08/07/2022 06:53 AM    MCHC 40.0 08/07/2022 06:53 AM    RDW 16.5 08/07/2022 06:53 AM    PLT 22 08/07/2022 06:53 AM    MPV 7.4 08/07/2022 06:53 AM     BMP:    Lab Results   Component Value Date/Time     08/07/2022 03:36 AM    K 5.6 08/07/2022 03:36 AM    K 4.5 01/21/2022 04:43 AM    CL 91 08/07/2022 03:36 AM    CO2 22 08/07/2022 03:36 AM    BUN 71 08/07/2022 03:36 AM    LABALBU 2.8 08/06/2022 02:58 AM    CREATININE 4.8 08/07/2022 03:36 AM    CALCIUM 6.2 08/07/2022 03:36 AM    GFRAA 15 08/07/2022 03:36 AM    LABGLOM 12 08/07/2022 03:36 AM    GLUCOSE 274 08/07/2022 03:36 AM       Assessment/Plan:    1. JUDY on CKD stage 4: He follows with my partner Dr. Delia Yang. 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 He was on dialysis for almost a year back in 2020. His JUDY is due to hemodynamic ATN from his shock. I discussed the situation with his family 8/6. They tell me he would not want dialysis again under any circumstances and would rather pass away than get dialysis again. If his family changes their mind we would start CRRT. 2. Acute respiratory failure d/t COVID: On vent. Getting steroids and baricitinib. 3. Septic shock with neutropenia: On 3 pressors. Empiric antibiotics. Now on merrem, eraxis. 4. Multiple myeloma: S/p transplant the past.  Got bendumustine and bortezomib recently. 5. Pancytopenia: From recent chemo. 6. Hyperkalemia: Treat medically as the family does not want HD/CRRT. 6. Acidosis: Better after bicarb gtt.   Lactate trending down but still 4.0.     Disposition: The family indicated that they do not want dialysis/CRRT. Should this change we can readdress. Critical care time 33 minutes. Ananya Coley MD  [facility]  Office Number: [phone]  [facility]. [facility]

## 2022-08-07 NOTE — PROGRESS NOTES
[facility] Progress note       Attending Physician: Mark Mariano DO    Primary Care: No primary care provider on file. Referring MD: No referring provider defined for this encounter. Name: April Servin :  1955  MRN:  [id number]    Admission: 2022      Date: 2022    Reason for Admission:   Fever     Reason for consult: Hx of multiple myeloma     Interval hx: Still on 3 pressors, minimal urine outpt. Wife refused CRRT. She is not present at bedside. Pt appears volume overloaded. Past Surgical History:   Procedure Laterality Date    IR TUNNELED CATHETER PLACEMENT GREATER THAN 5 YEARS  2022    IR TUNNELED CATHETER PLACEMENT GREATER THAN 5 YEARS 2022 Annette Patsy SPECIAL PROCEDURES       Past Medical History:   Diagnosis Date    Cancer ([facility] [address] [facility]     ESRD (end stage renal disease) on dialysis ([facility]     Mon-Wed-Fri       Prior to Admission medications    Medication Sig Start Date End Date Taking?  Authorizing Provider   Magnesium Oxide 400 MG CAPS Take 400 mg by mouth 2 times daily  Patient not taking: Reported on 2022   [place] KIMBERLEY Carlos - NP   allopurinol (ZYLOPRIM) 100 MG tablet Take 2 tablets by mouth daily 22   [facility]   acyclovir (ZOVIRAX) 400 MG tablet Take 1 tablet by mouth 2 times daily 3/30/22   [place]   amLODIPine (NORVASC) 5 MG tablet Take 1 tablet by mouth daily 3/28/22   Thnoy Stein MD   pantoprazole sodium (PROTONIX) 40 MG PACK packet Take 40 mg by mouth every morning (before breakfast)    Historical Provider, MD   Calcium Citrate-Vitamin D (CALCIUM + VIT D, BARIATRIC ADVANTAGE, CHEWABLE TABLET) Take 1 tablet by mouth daily 3/16/22   [place] KIMBERLEY Carlos NP   levETIRAcetam (KEPPRA) 500 MG tablet Take 1 tablet by mouth every 12 hours  Patient not taking: Reported on 2022   Martin Cooney MD   sodium bicarbonate 650 MG tablet Take 650 mg by mouth daily    Historical self-care and confined to bed or chair    General: intubated and sedated   HEENT: normocephalic, no scleral erythema or icterus, Oral mucosa moist and intact, throat clear, ET tube in place  NECK: supple without palpable adenopathy  BACK: Straight negative CVAT  SKIN: warm dry and intact without lesions rashes or masses  CHEST: CTA bilaterally without use of accessory muscles  CV: Normal S1 S2, RRR, no MRG  ABD: NT ND normoactive BS, no palpable masses or hepatosplenomegaly  EXTREMITIES: LE edema , denies calf tenderness  NEURO: CN II - XII grossly intact,sedated, not following commands       Laboratory Data:   CBC:   Recent Labs     08/06/22 1838 08/07/22 0336 08/07/22  0653   WBC 1.0* 0.4* 0.4*   HGB 8.6* 8.1* 7.6*   HCT 22.1* 20.6* 18.9*   MCV 87.9 88.2 87.2   PLT 30* 12* 22*       BMP/Mag:  Recent Labs     08/05/22 2045 08/06/22 0258 08/07/22  0336    139 126*   K 5.4* 5.0 5.6*    114* 91*   CO2 14* 13* 22   PHOS  --  1.9* 5.6*   BUN 66* 68* 71*   CREATININE 3.1* 4.0* 4.8*   MG 1.60* 2.30 1.80       LIVP:   Recent Labs     08/05/22 2045 08/06/22 0258   AST 14* 24   ALT 19 18   BILIDIR <0.2 1.1*   BILITOT 0.3 1.2*   ALKPHOS 72 72       Coags:   Recent Labs     08/06/22 0258   PROTIME 18.5*   INR 1.55*   APTT 31.6       Uric Acid   Recent Labs     08/05/22 2045 08/06/22 0258 08/07/22  0336   LABURIC 7.3* 7.3* 7.5*         PROBLEM LIST:                   TREATMENT:            1. CyBorD (started 10/30/18)  2. Revlimid / Glenys Rhymes / Dex on clinical trial (started 2/1/19)  3. Carfilzomib / Pomalidomide / Dex x 6 cycles (Relapse #1 - started 8/26/19 - 2/2020)  4. Cytoxan Mobilization w/ 25% dose reduction (2/17/20)  5. Melphalan 140mg/m2 & Autologous SCT 3/12/20  6. Kyprolis/Ebony/Dex (6/30/21-7/28/21)- progression  7. Velcade/Ebony/Dex (5/18/21-8/3/21)- progression  8.   HD - CTX               - Cycle 1 9/27/21              - Cycle 2 10/18/21              - Cycle 3 11/8/21- stopped d/t severe fatigue  9. Bendamustine/Velcade/Dex 12/06/21  10. Velcade only 2/11/22  11. Fludarabine/Cytoxan followed by CAR-T infusion 3/21/22  12. Stem Cell Boost 4/18/22   13. Selinexor/Velcade/Dex (C1D1 5/18/22). 14. Isatuximab/Kyprolis/Dex       ASSESSMENT AND PLAN:           #Septic Shock secondary to Ecoli bacteremia  - febrile, ESBL bacteremia, procalcitonin of >100  - on three  pressors, vasopressin, norepinephrine and phenyeprine   - manage per ICU  - current current management    # Kappa Light Chain Multiple Myeloma:  Currently in [facility]  - BM biopsy: 1/12/22.  20% plasma cells . Cloteal Emerson p53 positive 79.5% of cells  - s/p Fludarabine and Cytoxan 3/16/22-3/18/22, followed by Abecma (3/21/22)    - Post Stem Cell Boost 4/18/22  - S/P Dex 40 mg daily for 4 days ending 7/4/22.  - Selinexor/Velcade/Dex (C1D1 5/18/22). With disease progression noted on MMP 6/27/22. Light chains  - 5/18/22: KLC 4340, ratio 1572    - 6/27/22: KLC 58564.13; ratio 4106  - 7/27/22: KLC 21269.16; ratio 5522.29   Currently on treated with Isatuximab/Kyprolis/Dex (C1D1 7/6/22), holding treatment while admitted     # ID: febrile this morning   - recommend repeat cultures  - ESBL bacteremia, on meropenem  - Covid 19 positive as well  - continue meropenem, vancomycin, eraxis,   - Cont Acyclovir ppx      # Heme:  Pancytopenia d/t recent chemotherapy  - Transfuse for Hgb < 7 and Platelets < 90X (recent subdural)  - Platelet transfusion today 8/3/22   - PRA sent (4/7/22): Positive (Will receive Matched Crossed Plts): Type O compatible, will receive Type O moving forward 4/18/22.   - started GCSF 8/6/22     #. CKDMetabolic:    Was on dialysis prior   - Off HD currently.   Followed by Dr. Caryn Fung  - Baseline Cr 2.1-2.6  - Cont allopurinol  - in renal failure now, nephrology following  - 80 mg of lasix given  - Wife refused dialysis and CRRT      # Pulmonary  - acute respiratpry failure on mechanical ventilation  - secondary to septic shock  - manage vent per ICU  - ct chest pending       Neuro:  Recent seizure/subdural hematoma (1/2/022)  - Cont Keppra 500 mg PO BID indefinitely  - Keep platelets >83G  - currenty on sedation and not responding       Junaid Lopez, DO

## 2022-08-07 NOTE — PLAN OF CARE
Problem: Discharge Planning  Goal: Discharge to home or other facility with appropriate resources  8/7/2022 1925 by Shiv Garay RN  Outcome: Progressing  Flowsheets (Taken 8/7/2022 0800 by Norma Philip RN)  Discharge to home or other facility with appropriate resources: Identify barriers to discharge with patient and caregiver     Problem: Pain  Goal: Verbalizes/displays adequate comfort level or baseline comfort level  Outcome: Progressing  Flowsheets (Taken 8/7/2022 1600)  Verbalizes/displays adequate comfort level or baseline comfort level:   Encourage patient to monitor pain and request assistance   Assess pain using appropriate pain scale   Administer analgesics based on type and severity of pain and evaluate response     Problem: Safety - Medical Restraint  Goal: Remains free of injury from restraints (Restraint for Interference with Medical Device)  Description: INTERVENTIONS:  1. Determine that other, less restrictive measures have been tried or would not be effective before applying the restraint  2. Evaluate the patient's condition at the time of restraint application  3. Inform patient/family regarding the reason for restraint  4. Q2H: Monitor safety, psychosocial status, comfort, nutrition and hydration  8/7/2022 1925 by Shiv Garay RN  Outcome: Progressing     Problem: Nutrition Deficit:  Goal: Optimize nutritional status  8/7/2022 1925 by Shiv Garay RN  Outcome: Progressing     Problem: Safety - Adult  Goal: Free from fall injury  8/7/2022 1925 by Shiv Garay RN  Outcome: Progressing     Problem: Skin/Tissue Integrity  Goal: Absence of new skin breakdown  Description: 1. Monitor for areas of redness and/or skin breakdown  2. Assess vascular access sites hourly  3. Every 4-6 hours minimum:  Change oxygen saturation probe site  4.   Every 4-6 hours:  If on nasal continuous positive airway pressure, respiratory therapy assess nares and determine need for appliance change or resting period. 8/7/2022 1925 by Yohana Newberry RN  Outcome: Progressing     Problem: Neurosensory - Adult  Goal: Achieves stable or improved neurological status  8/7/2022 1925 by Yohana Newberry RN  Outcome: Progressing  Flowsheets (Taken 8/7/2022 0800 by Haleigh Temple RN)  Achieves stable or improved neurological status:   Assess for and report changes in neurological status   Maintain blood pressure and fluid volume within ordered parameters to optimize cerebral perfusion and minimize risk of hemorrhage   Monitor temperature, glucose, and sodium.  Initiate appropriate interventions as ordered     Problem: Cardiovascular - Adult  Goal: Maintains optimal cardiac output and hemodynamic stability  8/7/2022 1925 by Yohana Newberry RN  Outcome: Progressing  Flowsheets (Taken 8/7/2022 0800 by Haleigh Temple RN)  Maintains optimal cardiac output and hemodynamic stability:   Monitor blood pressure and heart rate   Administer vasoactive medications as ordered     Problem: Infection - Adult  Goal: Absence of infection at discharge  8/7/2022 1925 by Yohana Newberry RN  Outcome: Progressing  Flowsheets (Taken 8/7/2022 0800 by Haleigh Temple RN)  Absence of infection at discharge:   Assess and monitor for signs and symptoms of infection   Monitor lab/diagnostic results   Monitor all insertion sites i.e., indwelling lines, tubes and drains   Identify and instruct in appropriate isolation precautions for identified infection/condition     Problem: Discharge Planning  Goal: Discharge to home or other facility with appropriate resources  8/7/2022 1925 by Yohana Newberry RN  Outcome: Progressing  Flowsheets (Taken 8/7/2022 0800 by Haleigh Temple RN)  Discharge to home or other facility with appropriate resources: Identify barriers to discharge with patient and caregiver  8/7/2022 0713 by Arya Fung RN  Outcome: Not Progressing  Flowsheets (Taken 8/6/2022 2000)  Discharge to home or other facility with appropriate resources: Identify barriers to discharge with patient and caregiver     Problem: Safety - Medical Restraint  Goal: Remains free of injury from restraints (Restraint for Interference with Medical Device)  Description: INTERVENTIONS:  1. Determine that other, less restrictive measures have been tried or would not be effective before applying the restraint  2. Evaluate the patient's condition at the time of restraint application  3. Inform patient/family regarding the reason for restraint  4.  Q2H: Monitor safety, psychosocial status, comfort, nutrition and hydration  8/7/2022 1925 by Daija Perez RN  Outcome: Progressing  8/7/2022 0713 by Samanta Walker RN  Outcome: Not Progressing  Flowsheets  Taken 8/7/2022 0000 by Todd Kingsley RN  Remains free of injury from restraints (restraint for interference with medical device): Every 2 hours: Monitor safety, psychosocial status, comfort, nutrition and hydration  Taken 8/6/2022 2000 by Samanta Walker RN  Remains free of injury from restraints (restraint for interference with medical device): Every 2 hours: Monitor safety, psychosocial status, comfort, nutrition and hydration     Problem: Nutrition Deficit:  Goal: Optimize nutritional status  8/7/2022 1925 by Daija Perez RN  Outcome: Progressing  8/7/2022 0713 by Samanta Walker RN  Outcome: Not Progressing     Problem: Safety - Adult  Goal: Free from fall injury  8/7/2022 1925 by Daija Perez RN  Outcome: Progressing  8/7/2022 0713 by Samanta Walker RN  Outcome: Not Progressing  Flowsheets (Taken 8/6/2022 2207)  Free From Fall Injury: Instruct family/caregiver on patient safety     Problem: ABCDS Injury Assessment  Goal: Absence of physical injury  8/7/2022 1925 by Daija Perez RN  Outcome: Progressing  8/7/2022 0713 by Samanta Walker RN  Outcome: Not Progressing  Flowsheets (Taken 8/6/2022 2207)  Absence of Physical Injury: Implement safety measures based on patient assessment     Problem: Skin/Tissue Integrity  Goal: Absence of new skin breakdown  Description: 1. Monitor for areas of redness and/or skin breakdown  2. Assess vascular access sites hourly  3. Every 4-6 hours minimum:  Change oxygen saturation probe site  4. Every 4-6 hours:  If on nasal continuous positive airway pressure, respiratory therapy assess nares and determine need for appliance change or resting period. 8/7/2022 1925 by Deniece Olszewski, RN  Outcome: Progressing  8/7/2022 0713 by Constantino Rose RN  Outcome: Not Progressing     Problem: Neurosensory - Adult  Goal: Achieves stable or improved neurological status  8/7/2022 1925 by Deniece Olszewski, RN  Outcome: Progressing  Flowsheets (Taken 8/7/2022 0800 by Irina Whitt RN)  Achieves stable or improved neurological status:   Assess for and report changes in neurological status   Maintain blood pressure and fluid volume within ordered parameters to optimize cerebral perfusion and minimize risk of hemorrhage   Monitor temperature, glucose, and sodium.  Initiate appropriate interventions as ordered  8/7/2022 2989 by Constantino Rose RN  Outcome: Not Progressing  Flowsheets (Taken 8/6/2022 2000)  Achieves stable or improved neurological status: Assess for and report changes in neurological status     Problem: Cardiovascular - Adult  Goal: Maintains optimal cardiac output and hemodynamic stability  8/7/2022 1925 by Deniece Olszewski, RN  Outcome: Progressing  Flowsheets (Taken 8/7/2022 0800 by Irina Whitt RN)  Maintains optimal cardiac output and hemodynamic stability:   Monitor blood pressure and heart rate   Administer vasoactive medications as ordered  8/7/2022 0713 by Constantino Rose RN  Outcome: Not Progressing  Flowsheets (Taken 8/6/2022 2000)  Maintains optimal cardiac output and hemodynamic stability: Monitor blood pressure and heart rate  Goal: Absence of cardiac dysrhythmias or at baseline  8/7/2022 1925 by Oscar Woo ALEXI Boyle  Outcome: Progressing  Flowsheets (Taken 8/7/2022 0800 by Barbara Barfield RN)  Absence of cardiac dysrhythmias or at baseline:   Monitor cardiac rate and rhythm   Assess for signs of decreased cardiac output  8/7/2022 0713 by Miquel Deng RN  Outcome: Not Progressing  Flowsheets (Taken 8/6/2022 2000)  Absence of cardiac dysrhythmias or at baseline: Monitor cardiac rate and rhythm     Problem: Musculoskeletal - Adult  Goal: Return mobility to safest level of function  8/7/2022 1925 by Davi Matthews RN  Outcome: Progressing  8/7/2022 0713 by Miquel Deng RN  Outcome: Not Progressing  Flowsheets (Taken 8/6/2022 2000)  Return Mobility to Safest Level of Function: Assess patient stability and activity tolerance for standing, transferring and ambulating with or without assistive devices     Problem: Gastrointestinal - Adult  Goal: Minimal or absence of nausea and vomiting  8/7/2022 1925 by Davi Matthews RN  Outcome: Progressing  8/7/2022 0713 by Miquel Deng RN  Outcome: Not Progressing     Problem: Metabolic/Fluid and Electrolytes - Adult  Goal: Electrolytes maintained within normal limits  8/7/2022 1925 by Davi Matthews RN  Outcome: Progressing  Flowsheets (Taken 8/7/2022 0800 by Barbara Barfield RN)  Electrolytes maintained within normal limits: Monitor labs and assess patient for signs and symptoms of electrolyte imbalances  8/7/2022 0713 by Miquel Deng RN  Outcome: Not Progressing  Flowsheets (Taken 8/6/2022 2000)  Electrolytes maintained within normal limits: Monitor labs and assess patient for signs and symptoms of electrolyte imbalances  Goal: Hemodynamic stability and optimal renal function maintained  8/7/2022 1925 by Davi Matthews RN  Outcome: Progressing  Flowsheets (Taken 8/7/2022 0800 by Barbara Barfield RN)  Hemodynamic stability and optimal renal function maintained: Monitor response to interventions for patient's volume status, including labs, urine output, blood pressure (other measures as available)  8/7/2022 0713 by Marylynn Holstein, RN  Outcome: Not Progressing  Flowsheets (Taken 8/6/2022 2000)  Hemodynamic stability and optimal renal function maintained: Monitor labs and assess for signs and symptoms of volume excess or deficit     Problem: Hematologic - Adult  Goal: Maintains hematologic stability  8/7/2022 1925 by Stacia Nguyễn RN  Outcome: Progressing  Flowsheets (Taken 8/7/2022 0800 by Shruthi Reddy RN)  Maintains hematologic stability:   Assess for signs and symptoms of bleeding or hemorrhage   Administer blood products/factors as ordered  8/7/2022 0713 by Marylynn Holstein, RN  Outcome: Not Progressing  Flowsheets (Taken 8/6/2022 2000)  Maintains hematologic stability: Assess for signs and symptoms of bleeding or hemorrhage

## 2022-08-08 PROBLEM — A49.9 ESBL (EXTENDED SPECTRUM BETA-LACTAMASE) PRODUCING BACTERIA INFECTION: Status: ACTIVE | Noted: 2022-01-01

## 2022-08-08 PROBLEM — R65.21 SEPTIC SHOCK (HCC): Status: ACTIVE | Noted: 2022-01-01

## 2022-08-08 PROBLEM — A41.9 SEPTIC SHOCK (HCC): Status: ACTIVE | Noted: 2022-01-01

## 2022-08-08 PROBLEM — Z16.12 ESBL (EXTENDED SPECTRUM BETA-LACTAMASE) PRODUCING BACTERIA INFECTION: Status: ACTIVE | Noted: 2022-01-01

## 2022-08-08 NOTE — CARE COORDINATION
Case Management Assessment           Initial Evaluation                Date / Time of Evaluation: 8/8/2022 11:31 AM                 Assessment Completed by: Magdaleno Plunkett RN    Patient Name: Regan Arthur     YOB: 1955  Diagnosis: Hyperkalemia [E87.5]  Hypomagnesemia [E83.42]  Neutropenic fever ([address] [D70.9, R50.81]  Acute respiratory failure with hypoxia ([address] [J96.01]  Septic shock ([address] [A41.9, R65.21]  Anemia, unspecified type [D64.9]  Multiple myeloma, remission status unspecified ([address] [C90.00]  COVID-19 [U07.1]  Acute respiratory failure due to COVID-19 ([address] [U07.1, J96.00]     Date / Time: 8/5/2022  6:44 PM    Patient Admission Status: Inpatient    If patient is discharged prior to next notation, then this note serves as note for discharge by case management. Current PCP: Receives most care from Dr. Anna Marie Reilly with Oncology    Chart Reviewed: Yes  Patient/ Family Interviewed: No    Initial assessment completed at bedside with: Pt is COVID+, intubated and sedated. A call was laced to his significant other. Left a message requesting a call back.  The initial assessment was completed via chart review    Emergency Contacts:  Extended Emergency Contact Information  Primary Emergency Contact: Brett Anders  Address: [address] [place] [address] Phone: [phone]  Mobile Phone: [phone]  Relation: Domestic Partner  Secondary Emergency Contact: Omkar Medellinro, [address] [place] [address] Phone: [phone]  Relation: Brother/Sister    Advance Directives:   Code Status: Full Code    Financial  Payor: Jacquelin Ilir / Plan: MEDICARE PART A AND B / Product Type: *No Product type* /     Pre-cert required for SNF: No    Pharmacy    [address] [id number] Amelia Purcell [address] [place] [address]  Phone: [phone] Fax: [phone]      Potential assistance Purchasing Medications:  no  Does

## 2022-08-08 NOTE — PROGRESS NOTES
Nephrology Progress Note  [facility]  [phone]   [email or web address] com    Patient:  Ondina Posada   : 1955    CC:  JUDY, CKD    Subjective:  -pt seen and examined  -PMSHx and meds reviewed  -No family at bedside    Seen in ICU  Worsening JUDY, wife has refused KRT  Remains on pressors  febrile    ROS:   Limited d/t pt factors. SHx:  No visitors this morning. Meds:  Scheduled Meds:   pantoprazole  40 mg IntraVENous Daily    acyclovir  400 mg Oral BID    allopurinol  200 mg Oral Daily    sodium chloride flush  5-40 mL IntraVENous 2 times per day    Saline Mouthwash  15 mL Swish & Spit 4x Daily AC & HS    sodium chloride flush  5-40 mL IntraVENous 2 times per day    meropenem  1,000 mg IntraVENous Q12H    anidulafungin  100 mg IntraVENous Q24H    filgrastim/filgrastim biosimilar  300 mcg SubCUTAneous Daily     Continuous Infusions:   sodium chloride      sodium chloride      sodium chloride      sodium chloride      fentaNYL 50 mcg/hr (22 0954)    vasopressin (Septic Shock) infusion Stopped (22 1133)    phenylephrine (JOHN-SYNEPHRINE) 50mg/250mL infusion 100 mcg/min (22 0905)    propofol 25 mcg/kg/min (22 0900)    norepinephrine 30 mcg/min (22 0932)     PRN Meds:.sodium chloride, sodium chloride, sodium chloride flush, sodium chloride, potassium chloride, magnesium sulfate, magnesium hydroxide, Saline Mouthwash, alteplase (CATHFLO) with sterile water injection, sodium chloride flush, sodium chloride, acetaminophen **OR** acetaminophen    Vitals:  BP (!) 107/58   Pulse 90   Temp 99.7 °F (37.6 °C) (Bladder)   Resp 20   Ht 6' 2\" (1.88 m)   Wt 283 lb 4.8 oz (128.5 kg)   SpO2 99%   BMI 36.37 kg/m²     Physical Exam:  Gen: Resting in bed, in the ICU. Intubated, on pressors. HEENT: MMM, OP clear. CV: RRR, no S3.  Lungs: +vent, decreased BS bilaterally. Abd: Soft, decreased BS. Ext: + edema, no cyanosis  Skin: Cool. No rashes appreciated.   : +lorraine    Labs:  CBC:   Lab Results   Component Value Date/Time    WBC 0.5 08/08/2022 07:36 AM    RBC 2.14 08/08/2022 07:36 AM    HGB 7.2 08/08/2022 07:36 AM    HCT 18.9 08/08/2022 07:36 AM    MCV 88.4 08/08/2022 07:36 AM    MCH 33.5 08/08/2022 07:36 AM    MCHC 37.9 08/08/2022 07:36 AM    RDW 16.6 08/08/2022 07:36 AM    PLT 28 08/08/2022 07:36 AM    MPV 7.2 08/08/2022 07:36 AM     BMP:    Lab Results   Component Value Date/Time     08/08/2022 04:21 AM    K 4.6 08/08/2022 04:21 AM    K 4.5 01/21/2022 04:43 AM    CL 86 08/08/2022 04:21 AM    CO2 18 08/08/2022 04:21 AM    BUN 76 08/08/2022 04:21 AM    LABALBU 2.2 08/08/2022 04:21 AM    CREATININE 5.3 08/08/2022 04:21 AM    CALCIUM 5.9 08/08/2022 04:21 AM    GFRAA 13 08/08/2022 04:21 AM    LABGLOM 11 08/08/2022 04:21 AM    GLUCOSE 91 08/08/2022 04:21 AM       Assessment/Plan:    JUDY on CKD stage 4: He follows with my partner Dr. José Reza. 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 He was on dialysis for almost a year back in 2020. His JUDY is due to hemodynamic ATN from his shock. Pt and family has declined KRT. Continue supportive care  -UO improving  -trial of albumin/lasix  -continue pressors    Hyperkalemia: Treat medically as the family does not want HD/CKRT. -resolved  Hyponatremia: hypervolemic-trial of IV lasix  Hypcalcemia: replace-due to JUDY on CKD  Metabolic acidosis: s/psodium bicarbonate    Septic shock: due to e.coli bactremia  -on pressors  -on merrem/Eraxiz/filgrastrim     Acute respiratory failure d/t COVID: On vent.    -per critical care     Multiple myeloma: S/p transplant the past.  Got bendumustine and bortezomib recently. -per Oncology     Pancytopenia: From recent chemo.  -got platelets transfusion  -per Oncology    Critical care time 35mins 10:45-11:20    Lyly Camara MD  [facility]  Office Number: [phone]  [facility]. [facility]

## 2022-08-08 NOTE — PLAN OF CARE
Problem: Discharge Planning  Goal: Discharge to home or other facility with appropriate resources  8/8/2022 1722 by Santiago Espinosa RN  Outcome: Progressing  Flowsheets (Taken 8/8/2022 0800)  Discharge to home or other facility with appropriate resources:   Identify barriers to discharge with patient and caregiver   Arrange for needed discharge resources and transportation as appropriate  8/8/2022 0653 by Sumit Gan RN  Outcome: Progressing  Flowsheets (Taken 8/7/2022 2000)  Discharge to home or other facility with appropriate resources: Arrange for needed discharge resources and transportation as appropriate     Problem: Pain  Goal: Verbalizes/displays adequate comfort level or baseline comfort level  8/8/2022 1722 by Santiago Espinosa RN  Outcome: Progressing  8/8/2022 0653 by Sumit Gan RN  Outcome: Progressing  Flowsheets  Taken 8/8/2022 0400  Verbalizes/displays adequate comfort level or baseline comfort level: Encourage patient to monitor pain and request assistance  Taken 8/7/2022 2000  Verbalizes/displays adequate comfort level or baseline comfort level: Encourage patient to monitor pain and request assistance     Problem: Safety - Medical Restraint  Goal: Remains free of injury from restraints (Restraint for Interference with Medical Device)  Description: INTERVENTIONS:  1. Determine that other, less restrictive measures have been tried or would not be effective before applying the restraint  2. Evaluate the patient's condition at the time of restraint application  3. Inform patient/family regarding the reason for restraint  4.  Q2H: Monitor safety, psychosocial status, comfort, nutrition and hydration  8/8/2022 1722 by Santiago Espinosa RN  Outcome: Progressing  Flowsheets (Taken 8/8/2022 0800)  Remains free of injury from restraints (restraint for interference with medical device):   Determine that other, less restrictive measures have been tried or would not be effective before applying the restraint   Evaluate the patient's condition at the time of restraint application   Inform patient/family regarding the reason for restraint   Every 2 hours: Monitor safety, psychosocial status, comfort, nutrition and hydration  8/8/2022 0653 by Kaela Jacome RN  Outcome: Progressing  Flowsheets (Taken 8/7/2022 2000)  Remains free of injury from restraints (restraint for interference with medical device): Every 2 hours: Monitor safety, psychosocial status, comfort, nutrition and hydration     Problem: Nutrition Deficit:  Goal: Optimize nutritional status  8/8/2022 1722 by Mayte Garcia RN  Outcome: Progressing  8/8/2022 0653 by Kaela Jacome RN  Outcome: Progressing     Problem: Safety - Adult  Goal: Free from fall injury  8/8/2022 1722 by Mayte Garcia RN  Outcome: Progressing  8/8/2022 0653 by Kaela Jacome RN  Outcome: Progressing  Flowsheets (Taken 8/7/2022 2122)  Free From Fall Injury: Instruct family/caregiver on patient safety     Problem: ABCDS Injury Assessment  Goal: Absence of physical injury  8/8/2022 1722 by Mayte Garcia RN  Outcome: Progressing  8/8/2022 0653 by Kaela Jacome RN  Outcome: Progressing  Flowsheets (Taken 8/7/2022 2122)  Absence of Physical Injury: Implement safety measures based on patient assessment     Problem: Skin/Tissue Integrity  Goal: Absence of new skin breakdown  Description: 1. Monitor for areas of redness and/or skin breakdown  2. Assess vascular access sites hourly  3. Every 4-6 hours minimum:  Change oxygen saturation probe site  4. Every 4-6 hours:  If on nasal continuous positive airway pressure, respiratory therapy assess nares and determine need for appliance change or resting period.   8/8/2022 1722 by Mayte Garcia RN  Outcome: Progressing  8/8/2022 0653 by Kaela Jacome RN  Outcome: Progressing     Problem: Neurosensory - Adult  Goal: Achieves stable or improved neurological status  8/8/2022 1722 by Mayte Garcia RN  Outcome: Progressing  Flowsheets (Taken 8/8/2022 0800)  Achieves stable or improved neurological status:   Initiate measures to prevent increased intracranial pressure   Assess for and report changes in neurological status   Maintain blood pressure and fluid volume within ordered parameters to optimize cerebral perfusion and minimize risk of hemorrhage   Monitor temperature, glucose, and sodium.  Initiate appropriate interventions as ordered  8/8/2022 8087 by Cosimo Schwab, RN  Outcome: Progressing  Flowsheets (Taken 8/7/2022 2000)  Achieves stable or improved neurological status: Assess for and report changes in neurological status     Problem: Cardiovascular - Adult  Goal: Maintains optimal cardiac output and hemodynamic stability  8/8/2022 1722 by Marquis Appiah RN  Outcome: Progressing  Flowsheets (Taken 8/8/2022 0800)  Maintains optimal cardiac output and hemodynamic stability:   Assess for signs of decreased cardiac output   Monitor blood pressure and heart rate   Monitor urine output and notify Licensed Independent Practitioner for values outside of normal range   Administer vasoactive medications as ordered   Administer fluid and/or volume expanders as ordered  8/8/2022 0653 by Cosimo Schwab, RN  Outcome: Progressing  Flowsheets (Taken 8/7/2022 2000)  Maintains optimal cardiac output and hemodynamic stability: Monitor blood pressure and heart rate  Goal: Absence of cardiac dysrhythmias or at baseline  8/8/2022 1722 by Marquis Appiah RN  Outcome: Progressing  Flowsheets (Taken 8/8/2022 0800)  Absence of cardiac dysrhythmias or at baseline:   Monitor cardiac rate and rhythm   Assess for signs of decreased cardiac output   Administer antiarrhythmia medication and electrolyte replacement as ordered  8/8/2022 0653 by Cosimo Schwab, RN  Outcome: Progressing  Flowsheets (Taken 8/7/2022 2000)  Absence of cardiac dysrhythmias or at baseline: Monitor cardiac rate and rhythm     Problem: Musculoskeletal - Adult  Goal: Return mobility to safest level of function  8/8/2022 1722 by Zulay Gil RN  Outcome: Progressing  8/8/2022 0653 by Izzy Paniagua RN  Outcome: Progressing  Flowsheets (Taken 8/7/2022 2000)  Return Mobility to Safest Level of Function: Assess patient stability and activity tolerance for standing, transferring and ambulating with or without assistive devices     Problem: Gastrointestinal - Adult  Goal: Minimal or absence of nausea and vomiting  8/8/2022 1722 by Zulay Gil RN  Outcome: Progressing  8/8/2022 0653 by Izzy Paniagua RN  Outcome: Progressing  Flowsheets (Taken 8/7/2022 2000)  Minimal or absence of nausea and vomiting: Administer IV fluids as ordered to ensure adequate hydration     Problem: Genitourinary - Adult  Goal: Absence of urinary retention  8/8/2022 1722 by Zulay Gil RN  Outcome: Progressing  Flowsheets (Taken 8/8/2022 0800)  Absence of urinary retention:   Assess patients ability to void and empty bladder   Monitor intake/output and perform bladder scan as needed   Place urinary catheter per Licensed Independent Practitioner order if needed  8/8/2022 0653 by Izzy Paniagua RN  Outcome: Progressing  Flowsheets (Taken 8/7/2022 2000)  Absence of urinary retention: Assess patients ability to void and empty bladder  Goal: Urinary catheter remains patent  8/8/2022 1722 by Zulay Gil RN  Outcome: Progressing  Flowsheets (Taken 8/8/2022 0800)  Urinary catheter remains patent: Assess patency of urinary catheter  8/8/2022 0653 by Izzy Paniagua RN  Outcome: Progressing  Flowsheets (Taken 8/7/2022 2000)  Urinary catheter remains patent: Assess patency of urinary catheter     Problem: Infection - Adult  Goal: Absence of infection at discharge  Outcome: Progressing  Flowsheets (Taken 8/8/2022 0800)  Absence of infection at discharge:   Assess and monitor for signs and symptoms of infection   Monitor lab/diagnostic results   Monitor all insertion sites i.e., indwelling lines, tubes and drains  Goal: Absence of infection during hospitalization  Outcome: Progressing  Flowsheets (Taken 8/8/2022 0800)  Absence of infection during hospitalization:   Assess and monitor for signs and symptoms of infection   Monitor lab/diagnostic results   Monitor all insertion sites i.e., indwelling lines, tubes and drains   Administer medications as ordered   Identify and instruct in appropriate isolation precautions for identified infection/condition  Goal: Absence of fever/infection during anticipated neutropenic period  Outcome: Progressing  Flowsheets (Taken 8/8/2022 0800)  Absence of fever/infection during anticipated neutropenic period:   Monitor white blood cell count   Implement neutropenic guidelines     Problem: Metabolic/Fluid and Electrolytes - Adult  Goal: Electrolytes maintained within normal limits  Outcome: Progressing  Flowsheets (Taken 8/8/2022 0800)  Electrolytes maintained within normal limits:   Monitor labs and assess patient for signs and symptoms of electrolyte imbalances   Administer electrolyte replacement as ordered   Monitor response to electrolyte replacements, including repeat lab results as appropriate   Fluid restriction as ordered  Goal: Hemodynamic stability and optimal renal function maintained  Outcome: Progressing  Flowsheets (Taken 8/8/2022 0800)  Hemodynamic stability and optimal renal function maintained:   Monitor labs and assess for signs and symptoms of volume excess or deficit   Monitor intake, output and patient weight   Monitor response to interventions for patient's volume status, including labs, urine output, blood pressure (other measures as available)     Problem: Hematologic - Adult  Goal: Maintains hematologic stability  Outcome: Progressing  Flowsheets (Taken 8/8/2022 0800)  Maintains hematologic stability:   Assess for signs and symptoms of bleeding or hemorrhage   Monitor labs for bleeding or clotting disorders   Administer blood products/factors as ordered

## 2022-08-08 NOTE — PROGRESS NOTES
Pharmacy Note - Renal Dosing    Meropenem ordered for treatment of Sepsis. Per [facility] Renal Dose Adjustment Policy, Meropenem will be changed to 500mg IV q12h extended infusion. Estimated Creatinine Clearance: Estimated Creatinine Clearance: 20 mL/min (A) (based on SCr of 5.3 mg/dL [facility])). Dialysis Status, JUDY, CKD: JUDY on CKD  BMI: Body mass index is 36.37 kg/m². Rationale for Adjustment: Agent is renally eliminated. Pharmacy will continue to monitor renal function and adjust dose as necessary. Please call with any questions.     Keith Dolan PharmD., BCPS   8/8/2022 2:50 PM  Wireless: [phone]

## 2022-08-08 NOTE — PLAN OF CARE
Problem: Discharge Planning  Goal: Discharge to home or other facility with appropriate resources  8/8/2022 0653 by Rock Ina RN  Outcome: Progressing  Flowsheets (Taken 8/7/2022 2000)  Discharge to home or other facility with appropriate resources: Arrange for needed discharge resources and transportation as appropriate  8/7/2022 1925 by Mendel Koller, RN  Outcome: Progressing  Flowsheets (Taken 8/7/2022 0800 by Nazario Vasquez RN)  Discharge to home or other facility with appropriate resources: Identify barriers to discharge with patient and caregiver     Problem: Pain  Goal: Verbalizes/displays adequate comfort level or baseline comfort level  8/8/2022 0653 by Rock Ina RN  Outcome: Progressing  Flowsheets  Taken 8/8/2022 0400  Verbalizes/displays adequate comfort level or baseline comfort level: Encourage patient to monitor pain and request assistance  Taken 8/7/2022 2000  Verbalizes/displays adequate comfort level or baseline comfort level: Encourage patient to monitor pain and request assistance     Problem: Safety - Medical Restraint  Goal: Remains free of injury from restraints (Restraint for Interference with Medical Device)  Description: INTERVENTIONS:  1. Determine that other, less restrictive measures have been tried or would not be effective before applying the restraint  2. Evaluate the patient's condition at the time of restraint application  3. Inform patient/family regarding the reason for restraint  4.  Q2H: Monitor safety, psychosocial status, comfort, nutrition and hydration  8/8/2022 0653 by Rock Ina RN  Outcome: Progressing  Flowsheets (Taken 8/7/2022 2000)  Remains free of injury from restraints (restraint for interference with medical device): Every 2 hours: Monitor safety, psychosocial status, comfort, nutrition and hydration     Problem: Nutrition Deficit:  Goal: Optimize nutritional status  8/8/2022 0653 by Rock Ina RN  Outcome: Progressing     Problem: Safety - Adult  Goal: Free from fall injury  8/8/2022 0653 by Ruba Gould RN  Outcome: Progressing  Flowsheets (Taken 8/7/2022 2122)  Free From Fall Injury: Instruct family/caregiver on patient safety     Problem: ABCDS Injury Assessment  Goal: Absence of physical injury  8/8/2022 0653 by Ruba Gould RN  Outcome: Progressing  Flowsheets (Taken 8/7/2022 2122)  Absence of Physical Injury: Implement safety measures based on patient assessment     Problem: Skin/Tissue Integrity  Goal: Absence of new skin breakdown  Description: 1. Monitor for areas of redness and/or skin breakdown  2. Assess vascular access sites hourly  3. Every 4-6 hours minimum:  Change oxygen saturation probe site  4. Every 4-6 hours:  If on nasal continuous positive airway pressure, respiratory therapy assess nares and determine need for appliance change or resting period.   8/8/2022 0653 by Ruba Gould RN  Outcome: Progressing     Problem: Cardiovascular - Adult  Goal: Maintains optimal cardiac output and hemodynamic stability  8/8/2022 0653 by Ruba Gould RN  Outcome: Progressing  Flowsheets (Taken 8/7/2022 2000)  Maintains optimal cardiac output and hemodynamic stability: Monitor blood pressure and heart rate     Problem: Cardiovascular - Adult  Goal: Absence of cardiac dysrhythmias or at baseline  8/8/2022 0653 by Ruba Gould RN  Outcome: Progressing  Flowsheets (Taken 8/7/2022 2000)  Absence of cardiac dysrhythmias or at baseline: Monitor cardiac rate and rhythm     Problem: Musculoskeletal - Adult  Goal: Return mobility to safest level of function  8/8/2022 0653 by Ruba Gould RN  Outcome: Progressing  Flowsheets (Taken 8/7/2022 2000)  Return Mobility to Safest Level of Function: Assess patient stability and activity tolerance for standing, transferring and ambulating with or without assistive devices     Problem: Genitourinary - Adult  Goal: Absence of urinary retention  8/8/2022 0653 by Ruba Gould RN  Outcome: Progressing  Flowsheets (Taken 8/7/2022 2000)  Absence of urinary retention: Assess patients ability to void and empty bladder     Problem: Gastrointestinal - Adult  Goal: Minimal or absence of nausea and vomiting  8/8/2022 0653 by Andrea Edgar RN  Outcome: Progressing  Flowsheets (Taken 8/7/2022 2000)  Minimal or absence of nausea and vomiting: Administer IV fluids as ordered to ensure adequate hydration     Problem: Genitourinary - Adult  Goal: Urinary catheter remains patent  8/8/2022 0653 by Andrea Edgar RN  Outcome: Progressing  Flowsheets (Taken 8/7/2022 2000)  Urinary catheter remains patent: Assess patency of urinary catheter

## 2022-08-08 NOTE — CONSULTS
Chicho Flower is a 77 y.o. male with PMH of multiple myeloma (dx 10/2018), autologous bone marrow transplantation, chemotherapy induced thrombocytopenia, CKD stage 4, anemia 2/2 CKD, T2DM, and IgA deficiency who presented with fever and right shoulder pain. Pt underwent chemo on the day prior to presentation. He follows with Dr. Sherol Goldberg at [facility]. In the ED, he was found to be COVID positive and had increasing oxygen requirements. Despite using a non-rebreather mask, he was still having significant work of breathing. He was intubated and admitted to the ICU. D/w Dr. Gonzalo Galeano, ICU team and oncology team. Dr. Sherol Goldberg planning to call pt's wife to discuss prognosis and recommendations. Will reach out to pt's wife once she has spoken with Dr. Sherol Goldberg. Will continue to follow closely.      [address] [facility]  Inpatient Palliative Care  [phone]

## 2022-08-08 NOTE — PROGRESS NOTES
ICU Progress Note    Admit Date: 8/5/2022  Day: 3  Vent Day: 3  IV Access:Trifusion catheter  IV Fluids: N.S.  Vasopressors: Levo 30, John 100              Antibiotics: Merrem, eraxis  Diet: Diet NPO    CC: Fever and shoulder pain    Interval history: Patient had decreasing pressor requirements overnight.  He is now on levo 42 , john 300, and weaned off of vasopressin  Vent Mode: AC/PRVC Resp Rate (Set): 20 bmp/Vt (Set, mL): 500 mL/ /FiO2 : 30 %         Medications:     Scheduled Meds:   calcium gluconate  1,000 mg IntraVENous Once    acyclovir  400 mg Oral BID    allopurinol  200 mg Oral Daily    pantoprazole  40 mg Oral QAM AC    sodium chloride flush  5-40 mL IntraVENous 2 times per day    Saline Mouthwash  15 mL Swish & Spit 4x Daily AC & HS    sodium chloride flush  5-40 mL IntraVENous 2 times per day    meropenem  1,000 mg IntraVENous Q12H    anidulafungin  100 mg IntraVENous Q24H    filgrastim/filgrastim biosimilar  300 mcg SubCUTAneous Daily     Continuous Infusions:   sodium chloride      sodium chloride      sodium chloride      sodium chloride      sodium chloride      sodium chloride      fentaNYL 50 mcg/hr (08/08/22 0620)    vasopressin (Septic Shock) infusion Stopped (08/07/22 1133)    phenylephrine (JOHN-SYNEPHRINE) 50mg/250mL infusion 75 mcg/min (08/08/22 0620)    propofol 25 mcg/kg/min (08/08/22 0640)    sodium chloride      norepinephrine 30 mcg/min (08/08/22 0620)    sodium chloride       PRN Meds:sodium chloride, sodium chloride, sodium chloride, sodium chloride, sodium chloride flush, sodium chloride, potassium chloride, magnesium sulfate, magnesium hydroxide, Saline Mouthwash, alteplase (CATHFLO) with sterile water injection, sodium chloride flush, sodium chloride, acetaminophen **OR** acetaminophen, sodium chloride, sodium chloride    Objective:   Vitals:   T-max:  Patient Vitals for the past 8 hrs:   BP Temp Temp src Pulse Resp SpO2 Weight   08/08/22 0645 (!) 109/48 99.1 °F (37.3 °C) -- 94 18 99 % --   08/08/22 0635 (!) 112/48 99.1 °F (37.3 °C) -- 93 19 98 % --   08/08/22 0630 110/68 99.1 °F (37.3 °C) -- 93 17 99 % --   08/08/22 0615 -- -- -- 92 20 99 % --   08/08/22 0613 (!) 102/45 99.1 °F (37.3 °C) -- 92 19 99 % --   08/08/22 0600 109/62 -- -- 93 18 99 % 283 lb 4.8 oz (128.5 kg)   08/08/22 0545 -- -- -- 90 20 99 % --   08/08/22 0530 -- -- -- 91 23 97 % --   08/08/22 0528 -- -- -- 91 16 96 % --   08/08/22 0515 -- -- -- 90 20 97 % --   08/08/22 0500 114/69 -- -- 91 24 96 % --   08/08/22 0445 -- -- -- 90 14 97 % --   08/08/22 0430 -- -- -- -- 21 98 % --   08/08/22 0415 -- -- -- 87 19 99 % --   08/08/22 0400 116/69 99.5 °F (37.5 °C) Bladder 86 18 99 % --   08/08/22 0345 -- -- -- 87 19 100 % --   08/08/22 0330 -- -- -- 87 23 100 % --   08/08/22 0315 -- -- -- 87 20 100 % --   08/08/22 0300 118/68 -- -- 87 19 99 % --   08/08/22 0245 -- -- -- 87 18 99 % --   08/08/22 0230 -- -- -- 88 21 100 % --   08/08/22 0215 -- -- -- 87 19 100 % --   08/08/22 0200 109/64 -- -- 86 20 100 % --   08/08/22 0145 -- -- -- 88 21 99 % --   08/08/22 0137 -- -- -- 87 18 99 % --   08/08/22 0130 -- -- -- 87 21 100 % --   08/08/22 0115 -- -- -- 88 20 100 % --   08/08/22 0100 116/68 -- -- 88 19 100 % --   08/08/22 0045 -- -- -- 88 17 100 % --   08/08/22 0030 -- -- -- 88 18 99 % --   08/08/22 0015 -- -- -- 89 19 99 % --   08/08/22 0000 (!) 101/58 99.7 °F (37.6 °C) -- 87 19 100 % --   08/07/22 2345 -- -- -- 87 13 99 % --   08/07/22 2330 -- -- -- 89 18 99 % --   08/07/22 2315 -- -- -- 90 19 98 % --         Intake/Output Summary (Last 24 hours) at 8/8/2022 0701  Last data filed at 8/8/2022 0645  Gross per 24 hour   Intake 8890.17 ml   Output 1445 ml   Net 7445.17 ml         Review of Systems   Unable to perform ROS: Intubated (intubated and sedated)     Physical Exam  Constitutional:       Appearance: He is obese. He is ill-appearing. Eyes:      Pupils: Pupils are equal, round, and reactive to light.    Cardiovascular:      Rate and Rhythm: Regular rhythm. Tachycardia present. Pulmonary:      Comments: Mechanical breath sounds  Abdominal:      General: Bowel sounds are normal.   Neurological:      Comments: Intubated and sedated         LABS:    CBC:   Recent Labs     08/07/22 0336 08/07/22  0653 08/08/22  0421   WBC 0.4* 0.4* 0.5*   HGB 8.1* 7.6* 7.2*   HCT 20.6* 18.9* 18.6*   PLT 12* 22* 17*   MCV 88.2 87.2 87.3       Renal:    Recent Labs     08/06/22 0258 08/07/22 0336 08/08/22  0421    126* 123*   K 5.0 5.6* 4.6   * 91* 86*   CO2 13* 22 18*   BUN 68* 71* 76*   CREATININE 4.0* 4.8* 5.3*   GLUCOSE 119* 274* 91   CALCIUM 7.1* 6.2* 5.9*   MG 2.30 1.80 2.10   PHOS 1.9* 5.6*  --    ANIONGAP 12 13 19*       Hepatic:   Recent Labs     08/05/22 2045 08/06/22 0258 08/08/22  0421   AST 14* 24 38*   ALT 19 18 24   BILITOT 0.3 1.2* 2.8*   BILIDIR <0.2 1.1* 2.7*   PROT 6.0* 4.4* 4.0*   LABALBU 4.1 2.8* 2.2*   ALKPHOS 72 72 99       Troponin: No results for input(s): TROPONINI in the last 72 hours. BNP: No results for input(s): BNP in the last 72 hours. Lipids: No results for input(s): CHOL, HDL in the last 72 hours. Invalid input(s): LDLCALCU, TRIGLYCERIDE  ABGs:    Recent Labs     08/06/22  0633 08/07/22  0826 08/07/22  1448   PHART 7.266* 7.391 7.439   VQM8TJS 24.1* 33.4* 33.6*   PO2ART 121.4* 145.0* 99.8   LFU1XUI 11.0* 20* 23   BEART -16* -4.2* -1.2   P4EVVCKQ 98 100 99   KYH1LNC 12 21 24         INR:   Recent Labs     08/06/22  0258 08/08/22  0421   INR 1.55* 1.51*       Lactate:   Recent Labs     08/06/22  0400 08/06/22  0633   LACTATE 3.14* 5.46*       Cultures:  -----------------------------------------------------------------  RAD:   XR CHEST PORTABLE   Final Result      Mild bibasilar atelectasis. XR ABDOMEN (KUB) (SINGLE AP VIEW)   Final Result   Findings/Impression:    The tip of the enteric tube terminates in the body of the stomach. XR CHEST PORTABLE   Final Result      No acute pulmonary disease.          XR CHEST PORTABLE    (Results Pending)         Assessment/Plan:   April Grenville is a 77 y.o. male, who presented to the ED with fever and right shoulder pain. He was found to be COVID positive and required intubation for respiratory failure. Acute respiratory failure due to septic shock  -Ventilator for respiratory failure. -Vent Mode: AC/PRVC Resp Rate (Set): 20 bmp/Vt (Set, mL): 500 mL/ /FiO2 : 30 %       Septic Shock 2/2 E. Coli ESBL bacteremia  -Meropenem and eraxis.  -Positive blood cultures for E. Coli ESBL  -Positive COVID PCR   -Pressors:  30  of levo, 100 of Brandon  -Maintain MAP > 65.   -F/u blood and throat cultures. Multiple Myeloma  Thrombocytopenia 2/2 chemo. Receives platelet infusions.  -Oncology consulted. -S/p autologous bone marrow transplantation  -Actively on chemo (bendamustine and bortezomib). CKD Stage 4  -Avoid contrast and nephrotoxic agents.  -Family does not want CRRT    Palliative  -Will consult to discuss how aggressive care should be moving forwards. Code Status: Full code  FEN: NPO  PPX: SCD  DISPO: ICU    This patient has been staffed and discussed with Dr. Justo Mccollum MD.     Maciej Matthews MD, PGY-1  22  7:01 AM    Pulm/CC    Patient was seen, examined and discussed with Dr. Cleo Aquino. I agree with the interval history. My physical exam confirms the findings listed below  Chart was reviewed including labs and medical records confirm the findings noted     Patient remains intubated and sedated with propofol and fentanyl. He had no meaningful interaction with me  He is now on Levophed of 30, Brandon-Synephrine of 100 and was weaned off vasopressin since yesterday  With Lasix yesterday he had urine output of 1.5 L but still is net positive 7.5 L for 24 hours and 17 L for admission  BUN is 76 and creatinine 5.3 today, up from BUN of 71 and creatinine of 4.8 yesterday.    Sodium remains low at 123  Venous pH today was 7.4  Family not present on rounds but have declined dialysis given his bad experience with it and the past    Assessment  Acute respiratory failure on mechanical vent support. , RR 20, FiO2 30, PEEP 5    ESBL E.coli septicemia  Septic shock. JUDY on CKD -worsened  Lactic acidosis -resolved  Multiple myeloma -refractory despite CAR-T  Pancytopenia  COVID19 infection     Plan  No vent changes needed  Daily ABG and wean ventilator as tolerated  Lasix 100 mg x1 with albumin  Sedation holiday today  Continue merrem  Titrate vasopressors for goal map of 65  Palliative care consult  Full code    Prognosis discussed with Dr. Arlen Jin, oncology, who knows Jeffrey De Luna well. Given patient's refractory multiple myeloma, septic shock, renal failure, respiratory failure his chances of a meaningful survival are low. Dr. Arlen Jin reached out to wife today to discuss goals of care but phone call was not answered. Plan is to reach out again to family tomorrow morning. Pt has a high probability of imminent or life-threatening deterioration requiring close monitoring, and highly complex decision-making and/or interventions of high intensity to assess, manipulate, and support his critical organ systems to prevent a likely inevitable decline which could occur if left untreated. A total critical care time 32 minutes was used. This includes but not limited to examining patient, collaborating with other physicians, monitoring vital signs, telemetry, continuous pulse oximetry, and clinical response to IV medications, documentation time, review and interpretation of laboratory and radiological data, review of nursing notes and old record review.  This time excludes any time that may have been spent performing procedures for life threatening organ failure    Cait Macias MD

## 2022-08-08 NOTE — PROGRESS NOTES
Pt febrile to 38.4 C. Tylenol given without decrease in temperature. ICU residents made aware. No new orders at this time. Urine output total only 650ml after 10 mg IVP lasix adminitered. Propofol remained of throughout shift. Patient noted to have strong twitching in upper extremities and face with stimulation or movement of arms. Twitching stops immediately after stimulation stopped and muscles return to relax. ICU residents brought to bedside to witness.

## 2022-08-08 NOTE — PROGRESS NOTES
Sedation holiday initiated for better assessment of neuro status. Propofol and fentanyl paused at 1315. Will keep sedation paused until neuro status can more accurately be determined. At this time patient is resting comfortably, no signs of tachypnea, tachycardia and patient has not shown signs of wakefulness yet.

## 2022-08-08 NOTE — PROGRESS NOTES
Pt opens eyes to voice and spontaneously moves head, arms and legs but closes eyes once stimulus is stopped. Fentanyl restarted at 1515 d/t pt appearing more uncomfortable, taking very large breaths and fidgeting around more. Propofol remains off at this time.

## 2022-08-08 NOTE — PROGRESS NOTES
[address] [facility] [employer] Progress Note    2022     Manuelito Montes De Oca    MRN: [id number]    : 1955    Referring MD: No referring provider defined for this encounter. SUBJECTIVE:  Intubated & Sedated. Making some urine.  Febrile to 38 today     ECOG PS:  (4) Completely disabled, unable to carry out self-care and confined to bed or chair    KPS: 20% Very sick; hospital admission necessary; active supportive treatment necessary    Isolation: None    Medications    Scheduled Meds:   calcium gluconate  1,000 mg IntraVENous Once    calcium gluconate  1,000 mg IntraVENous Once    pantoprazole  40 mg IntraVENous Daily    acyclovir  400 mg Oral BID    allopurinol  200 mg Oral Daily    sodium chloride flush  5-40 mL IntraVENous 2 times per day    Saline Mouthwash  15 mL Swish & Spit 4x Daily AC & HS    sodium chloride flush  5-40 mL IntraVENous 2 times per day    meropenem  1,000 mg IntraVENous Q12H    anidulafungin  100 mg IntraVENous Q24H    filgrastim/filgrastim biosimilar  300 mcg SubCUTAneous Daily     Continuous Infusions:   sodium chloride      sodium chloride      sodium chloride      sodium chloride      fentaNYL 50 mcg/hr (22)    vasopressin (Septic Shock) infusion Stopped (22 1133)    phenylephrine (JOHN-SYNEPHRINE) 50mg/250mL infusion 75 mcg/min (22)    propofol 25 mcg/kg/min (22 0640)    norepinephrine 30 mcg/min (22)     PRN Meds:.sodium chloride, sodium chloride, sodium chloride flush, sodium chloride, potassium chloride, magnesium sulfate, magnesium hydroxide, Saline Mouthwash, alteplase (CATHFLO) with sterile water injection, sodium chloride flush, sodium chloride, acetaminophen **OR** acetaminophen    ROS:  As noted above, otherwise remainder of 10-point ROS negative    Physical Exam:     I&O:    Intake/Output Summary (Last 24 hours) at 2022 0837  Last data filed at 2022 0645  Gross per 24 hour   Intake 4290.17 ml   Output 1420 ml   Net 2870.17 ml Vital Signs:  /64   Pulse 93   Temp 99.1 °F (37.3 °C)   Resp 20   Ht 6' 2\" (1.88 m)   Wt 283 lb 4.8 oz (128.5 kg)   SpO2 99%   BMI 36.37 kg/m²     Weight:    Wt Readings from Last 3 Encounters:   08/08/22 283 lb 4.8 oz (128.5 kg)   05/02/22 248 lb 14.4 oz (112.9 kg)   04/30/22 251 lb 12.3 oz (114.2 kg)         General: Awake, alert and oriented. HEENT: normocephalic, PERRL, no scleral erythema or icterus, Oral mucosa moist and intact, throat clear  NECK: supple without palpable adenopathy  BACK: Straight negative CVAT  SKIN: warm dry and intact without lesions rashes or masses  CHEST: CTA bilaterally without use of accessory muscles  CV: Normal S1 S2, RRR, no MRG  ABD: NT ND normoactive BS, no palpable masses or hepatosplenomegaly  EXTREMITIES: without edema, denies calf tenderness  NEURO: CN II - XII grossly intact  CATHETER: Right A-line; RUE TL PICC - CDI    Data    CBC:   Recent Labs     08/07/22  0653 08/08/22  0421 08/08/22  0736   WBC 0.4* 0.5* 0.5*   HGB 7.6* 7.2* 7.2*   HCT 18.9* 18.6* 18.9*   MCV 87.2 87.3 88.4   PLT 22* 17* 28*     BMP/Mag:  Recent Labs     08/06/22 0258 08/07/22  0336 08/08/22  0421    126* 123*   K 5.0 5.6* 4.6   * 91* 86*   CO2 13* 22 18*   PHOS 1.9* 5.6*  --    BUN 68* 71* 76*   CREATININE 4.0* 4.8* 5.3*   MG 2.30 1.80 2.10     LIVP:   Recent Labs     08/05/22 2045 08/06/22  0258 08/08/22  0421   AST 14* 24 38*   ALT 19 18 24   BILIDIR <0.2 1.1* 2.7*   BILITOT 0.3 1.2* 2.8*   ALKPHOS 72 72 99     Coags:   Recent Labs     08/06/22  0258 08/08/22  0421   PROTIME 18.5* 18.1*   INR 1.55* 1.51*   APTT 31.6 37.6*     Uric Acid   Recent Labs     08/05/22 2045 08/06/22  0258 08/07/22  0336   LABURIC 7.3* 7.3* 7.5*       PROBLEM LIST:                   TREATMENT:            1. CyBorD (started 10/30/18)  2. Revlimid / Avanell Needle / Dex on clinical trial (started 2/1/19)  3. Carfilzomib / Pomalidomide / Dex x 6 cycles (Relapse #1 - started 8/26/19 - 2/2020)  4. Cytoxan Mobilization w/ 25% dose reduction (2/17/20)  5. Melphalan 140mg/m2 & Autologous SCT 3/12/20  6. Kyprolis/Ebony/Dex (6/30/21-7/28/21)- progression  7. Velcade/Ebony/Dex (5/18/21-8/3/21)- progression  8. HD - CTX               - Cycle 1 9/27/21              - Cycle 2 10/18/21              - Cycle 3 11/8/21- stopped d/t severe fatigue  9. Bendamustine/Velcade/Dex 12/06/21  10. Velcade only 2/11/22  11. Fludarabine/Cytoxan followed by CAR-T infusion 3/21/22  12. Stem Cell Boost 4/18/22  13. Selinexor/Velcade/Dex (C1D1 5/18/22). 14. Isatuximab/Kyprolis/Dex     ASSESSMENT AND PLAN:           1. Kappa Light Chain Multiple Myeloma:  Currently w/relapse on active tx  - BM biopsy: 1/12/22.  20% plasma cells . Lili Gagnon p53 positive 79.5% of cells  - s/p Fludarabine and Cytoxan 3/16/22-3/18/22, followed by Abecma (3/21/22)    - Post Stem Cell Boost 4/18/22     Light chains  - 5/18/22: [facility] 4340, ratio 1572 - started selinexor/velcade/dex  - 6/27/22: [address] [address].13; ratio 4106 - switched to jaquan/kyprolis/dex  - 7/27/22: [facility] [address].16; ratio 5522.29    Currently on treated with Isatuximab/Kyprolis/Dex (C1D1 7/6/22)  - hold treatment while admitted w/critical issues    2. ID: Septic Shock POA 2/2 ESBL E.Coli bacteremia & COVID19 PNA  Septic Shock:  - Blood Cxs 8//22: ESBL E.Coli  - ICU team following  - Cont vasopressors to keep MAP >65 - currently on levo at 30 & gisel at 75. Vaso off now  COVID19:  - COVID19: + swab 8/6/22  - Pulmonary team following    3. Heme: Pancytopenia 2/2 myeloma, chemotherapy, septic shock  - Transfuse for Hgb < 7 and Platelets < 50 K while intubated  - Plt transfusion today   - PRA (4/7/22): Positive (Will receive Matched Crossed Plts): Type O compatible, will receive Type O moving forward 4/18/22.  - Cont GCSF 8/6/22    4.  Metabolic: JUDY 2/2 septic shock & light chain nephropathy  - Consult nephrology - appreciate assistance  - Refusing CRRT - wife states pt would not want dialysis again under any circumstances  - Cont allopurinol    5. Pulmonary: Acute respiratory failure POA 2/2 septic shock & COVID19  - Intubated 8/6/22:   - Pulm following    6. Neuro: Recent seizure/subdural hematoma (1/2/022)  - Cont Keppra 500 mg PO BID indefinitely  - Keep platelets >96F    7. GI / Nutrition:     - NPO at this time    8. Code Status: Full Code at this time, wife POA  - Consult palliative care  - Refusing dialysis / CRRT d/t pts wishes per wife      - DVT Prophylaxis: Platelets <17,133 cells/dL - prophylactic lovenox on hold and mechanical prophylaxis with bilateral SCDs while in bed in place. Contraindications to pharmacologic prophylaxis: Thrombocytopenia  Contraindications to mechanical prophylaxis: None    - Disposition: Uncertain at this time. He remains gravely, critically ill with ongoing relapsed/refractory myeloma. Discussing palliative care with family.     Sheryl Michelle, APRN - CNP

## 2022-08-09 NOTE — PROGRESS NOTES
Palliative Care Chart Review  and Check in Note:     NAME:  Cristhian Harrington  Admit Date: 8/5/2022  Hospital Day:  Hospital Day: 5   Current Code status: Limited    Palliative care is continuing to following Mr. Deya Coates for symptom management,  and goals of care discussion as needed. Patient's chart reviewed today 8/9/22. Spoke at length with Duke's significant other, Carlos Carlton, and Dr. Arlen Jin about his current condition, prognosis, and goals of care. She understands how ill he is and that even if he were able to get through this infection, that he would still have a treatment refractory cancer to face. After the discussion, Carlos Carlton is ready to proceed with hospice care. She is contacting her family to prepare for withdraw of care. Ricarda, , will send referral to [address] [place] [address]. Addendum 1545: Carlos Carlton is ready to withdraw care. Orders for comfort medications were placed. Ophelia Marc is aware. The following are the currently established goals/code status, and Symptom management.      Goals of care: comfort/hospice care    Code status: Limited Code - DNR/DNI    [facility] [address]  08/09/22  12:26 PM

## 2022-08-09 NOTE — DISCHARGE SUMMARY
INTERNAL MEDICINE DEPARTMENT AT [address] [place] [address]  DISCHARGE SUMMARY    Patient ID: Kaylee Kuhn                                             Discharge Date: 8/9/2022   Patient's PCP: No primary care provider on file. Discharge Physician: Ángel Ruffin MD MD  Admit Date: 8/5/2022   Admitting Physician: Fransisco Voss MD    PROBLEMS DURING HOSPITALIZATION:  Present on Admission:   Neutropenic fever ([address]   Acute hypoxemic respiratory failure ([address]   Septic shock ([address]   ESBL (extended spectrum beta-lactamase) producing bacteria infection      DISCHARGE DIAGNOSES:    HPI: Patient came into the ED with fever and shoulder pain. He has a history of current multiple myeloma. He was diagnosed with septic shock 2/2 E. Coli ESBL bacteremia and was COIVD-19 positive. He required pressors and a ventilator. His kidney function was declining and family declined dialysis. Oncology noted that his cancer was terminal and informed the family the patient had a poor prognosis. The family made the decision to make the patient DNR-CC. Care was withdrawn this afternoon and the patient was extubated. He was declared dead on 8/9/2022 at 16:32. The following issues were addressed during hospitalization:  -Acute respiratory failure due to septic shock  -Septic Shock 2/2 E.  Coli ESBL bacteremia  -Multiple myeloma   -CKD Stage 4    Physical Exam:  Patient was examined and the following were absent: Pulses, Blood Pressure, and Respiratory effort    Consults: pulmonary/intensive care, nephrology, and hematology/oncology  Disposition: Patient was pronounced dead 8/9/22    Time Spent on discharge is more than 30 minutes    Signed:  Ángel Ruffin MD, MD, PGY-1  8/9/2022

## 2022-08-09 NOTE — PROGRESS NOTES
Hospitalist Progress Note      PCP: No primary care provider on file. Date of Admission: 8/5/2022    Chief Complaint: Right shoulder pain, respiratory failure    HPI:  77year old male with hx of kappa light chain multiple myeloma (dx 10/2018), autologous bone marrow transplantation, chemotherapy induced thrombocytopenia, CKD stage 4,, anemia 2/2 CKD, T2DM, and IgA deficiency who presented to the ED on 8/6/22 with complaints of a fever and right shoulder pain. He denied any injury. He had fever of 103F. In the ER he was noted to have increasing oxygen requirements. Ultimately required intubation and pressor support and was admitted to ICU. Subjective: Intubated and sedated. Medications:  Reviewed      Exam:    BP (!) 147/76   Pulse 100   Temp (!) 100.9 °F (38.3 °C) (Bladder)   Resp 18   Ht 6' 2\" (1.88 m)   Wt 283 lb 4.8 oz (128.5 kg)   SpO2 98%   BMI 36.37 kg/m²     General appearance: No apparent distress, intubated  HEENT: Pupils equal, round, and reactive to light. Neck: No jugular venous distention. Respiratory:  Intubated  Cardiovascular: Regular rate and rhythm with normal S1/S2 without murmurs, rubs or gallops. Abdomen: Soft, non-tender, non-distended with normal bowel sounds. Musculoskelatal: No clubbing, cyanosis or edema bilaterally. Skin: Skin color, texture, turgor normal.  No rashes or lesions.   Neurologic:  Sedated  Psychiatric: Unable to assess    Labs:   Recent Labs     08/07/22  0653 08/08/22  0421 08/08/22  0736   WBC 0.4* 0.5* 0.5*   HGB 7.6* 7.2* 7.2*   HCT 18.9* 18.6* 18.9*   PLT 22* 17* 28*       Recent Labs     08/06/22  0258 08/07/22  0336 08/08/22  0421    126* 123*   K 5.0 5.6* 4.6   * 91* 86*   CO2 13* 22 18*   BUN 68* 71* 76*   CREATININE 4.0* 4.8* 5.3*   CALCIUM 7.1* 6.2* 5.9*   PHOS 1.9* 5.6*  --        Recent Labs     08/06/22  0258 08/08/22  0421   AST 24 38*   ALT 18 24   BILIDIR 1.1* 2.7*   BILITOT 1.2* 2.8*   ALKPHOS 72 99       Recent Labs     08/06/22  0258 08/08/22  0421   INR 1.55* 1.51*       No results for input(s): CKTOTAL, TROPONINI in the last 72 hours. Studies:  XR CHEST PORTABLE   Final Result      Low lung volumes with bibasilar atelectasis and central bronchovascular crowding. Cardiomegaly. Lines and tubes without change. XR CHEST PORTABLE   Final Result      Mild bibasilar atelectasis. XR ABDOMEN (KUB) (SINGLE AP VIEW)   Final Result   Findings/Impression:    The tip of the enteric tube terminates in the body of the stomach. XR CHEST PORTABLE   Final Result      No acute pulmonary disease. Assessment/Plan:      Acute hypoxic respiratory failure: POA  - Felt sec to severe sepsis with septic shock  - CXR no acute disease on admission  - On mechanical vent support      ESBL E. Coli bacteremia with severe sepsis and septic shock: POA  Neutropenic fever: POA  Immunosuppression sec to Multiple myeloma and chemotherapy: POA  Covid 19 infection: POA  - Positive blood cultures for E. Coli ESBL  -Positive COVID PCR  - Had 3 pressor support with bicarb infusion  - On meropenem and empirically on Eraxis  - Vancomycin discontinued      JUDY on CKD stage IV  - Family does not think patient would want dialysis/CRRT again under any circumstances  - Nephrology consulted      Multiple Myeloma : POA  Pancytopenia  - His MM is felt refractory despite CAR-T  - Oncology consulted: As it is felt his chances of a meaningful survival are low. Dr. Germaine Ayala to discuss goals of care with wife. DVT Prophylaxis: SCDs  Diet: Diet NPO  Code Status: Full Code    PT/OT Eval Status:     Disposition - ICU  - Prognosis felt to be poor. - Discussion with family (wife} re goals of care.      Livan Underwood MD

## 2022-08-09 NOTE — PROGRESS NOTES
ICU Progress Note    Admit Date: 8/5/2022  Day: 4  Vent Day: 4  IV Access:Trifusion catheter  IV Fluids: N.S.  Vasopressors: Levo 30, John 100              Antibiotics: Merrem, eraxis  Diet: Diet NPO    CC: Fever and shoulder pain    Interval history: Patient had decreasing pressor requirements overnight.  He is now on levo 42 , john 300, and weaned off of vasopressin  Vent Mode: AC/PRVC Resp Rate (Set): 20 bmp/Vt (Set, mL): 500 mL/ /FiO2 : 30 %         Medications:     Scheduled Meds:   pantoprazole  40 mg IntraVENous Daily    meropenem  500 mg IntraVENous Q12H    acyclovir  400 mg Oral BID    allopurinol  200 mg Oral Daily    sodium chloride flush  5-40 mL IntraVENous 2 times per day    Saline Mouthwash  15 mL Swish & Spit 4x Daily AC & HS    sodium chloride flush  5-40 mL IntraVENous 2 times per day    anidulafungin  100 mg IntraVENous Q24H    filgrastim/filgrastim biosimilar  300 mcg SubCUTAneous Daily     Continuous Infusions:   sodium chloride      sodium chloride      sodium chloride      sodium chloride      sodium chloride      sodium chloride      fentaNYL 75 mcg/hr (08/09/22 1018)    vasopressin (Septic Shock) infusion Stopped (08/07/22 1133)    phenylephrine (JOHN-SYNEPHRINE) 50mg/250mL infusion 100 mcg/min (08/09/22 0646)    propofol Stopped (08/08/22 1312)    norepinephrine 30 mcg/min (08/09/22 0606)     PRN Meds:sodium chloride, sodium chloride, sodium chloride, sodium chloride, sodium chloride flush, sodium chloride, potassium chloride, magnesium sulfate, magnesium hydroxide, Saline Mouthwash, alteplase (CATHFLO) with sterile water injection, sodium chloride flush, sodium chloride, acetaminophen **OR** acetaminophen    Objective:   Vitals:   T-max:  Patient Vitals for the past 8 hrs:   BP Temp Pulse Resp SpO2 Weight   08/09/22 1145 -- -- 94 21 -- --   08/09/22 1130 -- -- 95 22 -- --   08/09/22 1115 -- -- 95 22 -- --   08/09/22 1100 -- -- 97 14 -- --   08/09/22 1045 -- -- 96 21 -- --   08/09/22 1030 -- -- 96 19 -- --   08/09/22 1015 -- -- 96 20 -- --   08/09/22 1008 (!) 119/55 (!) 102 °F (38.9 °C) 96 22 97 % --   08/09/22 1000 -- -- 97 18 -- --   08/09/22 0800 -- -- 97 -- -- --   08/09/22 0731 (!) 107/52 (!) 101.3 °F (38.5 °C) 98 21 97 % --   08/09/22 0727 -- -- 100 24 97 % --   08/09/22 0600 -- -- 95 20 -- --   08/09/22 0516 -- -- -- -- -- 285 lb 8 oz (129.5 kg)   08/09/22 0500 -- -- 93 21 -- --       Intake/Output Summary (Last 24 hours) at 8/9/2022 1207  Last data filed at 8/9/2022 1000  Gross per 24 hour   Intake 2518.93 ml   Output 2355 ml   Net 163.93 ml       Review of Systems   Unable to perform ROS: Intubated (intubated and sedated)     Physical Exam  Constitutional:       Appearance: He is obese. He is ill-appearing. Eyes:      Pupils: Pupils are equal, round, and reactive to light. Cardiovascular:      Rate and Rhythm: Normal rate and regular rhythm. Pulmonary:      Comments: Mechanical breath sounds  Abdominal:      General: Bowel sounds are normal.   Neurological:      Comments: Intubated and sedated         LABS:    CBC:   Recent Labs     08/08/22 0421 08/08/22  0736 08/09/22  0345   WBC 0.5* 0.5* 0.4*   HGB 7.2* 7.2* 6.4*   HCT 18.6* 18.9* 18.3*   PLT 17* 28* 13*   MCV 87.3 88.4 88.0     Renal:    Recent Labs     08/07/22  0336 08/08/22  0421 08/09/22  0345   * 123* 123*   K 5.6* 4.6 5.0   CL 91* 86* 86*   CO2 22 18* 18*   BUN 71* 76* 88*   CREATININE 4.8* 5.3* 6.4*   GLUCOSE 274* 91 106*   CALCIUM 6.2* 5.9* 6.5*   MG 1.80 2.10 2.10   PHOS 5.6*  --   --    ANIONGAP 13 19* 19*     Hepatic:   Recent Labs     08/08/22  0421 08/09/22  0345   AST 38* 17   ALT 24 18   BILITOT 2.8* 3.6*   BILIDIR 2.7* 3.1*   PROT 4.0* 4.9*   LABALBU 2.2* 2.5*   ALKPHOS 99 98     Troponin: No results for input(s): TROPONINI in the last 72 hours. BNP: No results for input(s): BNP in the last 72 hours. Lipids: No results for input(s): CHOL, HDL in the last 72 hours.     Invalid input(s): LDLCALCU, TRIGLYCERIDE  ABGs:    Recent Labs     08/07/22  0826 08/07/22  1448 08/09/22  0345   PHART 7.391 7.439 7.364   MXJ6TFJ 33.4* 33.6* 35.6   PO2ART 145.0* 99.8 172.0*   AIJ5SSH 20* 23 20*   BEART -4.2* -1.2 -4.7*   X0HPQVGH 100 99 100   MNN0ASC 21 24 21       INR:   Recent Labs     08/08/22  0421   INR 1.51*     Lactate:   No results for input(s): LACTATE in the last 72 hours. Cultures:  -----------------------------------------------------------------  RAD:   XR CHEST PORTABLE   Final Result      Low lung volumes with bibasilar atelectasis and central bronchovascular crowding. Cardiomegaly. Lines and tubes without change. XR CHEST PORTABLE   Final Result      Mild bibasilar atelectasis. XR ABDOMEN (KUB) (SINGLE AP VIEW)   Final Result   Findings/Impression:    The tip of the enteric tube terminates in the body of the stomach. XR CHEST PORTABLE   Final Result      No acute pulmonary disease. Assessment/Plan:   Damian Archer is a 77 y.o. male, who presented to the ED with fever and right shoulder pain. He was found to be COVID positive and required intubation for respiratory failure. Acute respiratory failure due to septic shock  -Vent Mode: AC/PRVC Resp Rate (Set): 20 bmp/Vt (Set, mL): 500 mL/ /FiO2 : 30 %  -Daily ABGs and wean as tolerated. -Sedation holiday yesterday. Septic Shock 2/2 E. Coli ESBL bacteremia  -Meropenem and eraxis.  -Positive blood cultures for E. Coli ESBL  -Positive COVID PCR   -Pressors:  30  of levo, 100 of Brandon  -Maintain MAP > 65.   -F/u blood and throat cultures. Multiple Myeloma  Thrombocytopenia 2/2 chemo. Receives platelet infusions.  -Blood transfusion today as Hb was 6.4.  -Oncology consulted. -S/p autologous bone marrow transplantation  -His MM has been refractory despite chemo (bendamustine and bortezomib with last session on 8/4).      CKD Stage 4  -Avoid contrast and nephrotoxic agents.  -Family does not want CRRT  -Increasing

## 2022-08-09 NOTE — PROGRESS NOTES
Pt AM labs are as follows. Latest Reference Range & Units 8/9/22 03:45   BUN,BUNPL 7 - 20 mg/dL 88 (HH)   Creatinine 0.8 - 1.3 mg/dL 6.4 (HH)   (HH): Data is critically high   Latest Reference Range & Units 8/9/22 03:45   WBC 4.0 - 11.0 K/uL 0.4 (LL)   RBC 4.20 - 5.90 M/uL 2.08 (L)   Hemoglobin Quant 13.5 - 17.5 g/dL 6.4 (LL)   Hematocrit 40.5 - 52.5 % 18.3 (LL)   MCV 80.0 - 100.0 fL 88.0   MCH 26.0 - 34.0 pg 30.8   MCHC 31.0 - 36.0 g/dL 35.0   MPV 5.0 - 10.5 fL 8.0   RDW 12.4 - 15.4 % 16.5 (H)   Platelet Count 376 - 450 K/uL 13 (LL)   (LL): Data is critically low  (L): Data is abnormally low  (H): Data is abnormally high    ICU residents aware and awaiting ordered for irradiated blood. Will continue to monitor.

## 2022-08-09 NOTE — PROGRESS NOTES
[address] [facility] Progress Note    2022     Brenda Hannah    MRN: [id number]    : 1955    Referring MD: No referring provider defined for this encounter. SUBJECTIVE:  intubated, bleeding from his ET tube. He has some myoclonus jerks. He has been febrile as well.  Not following commands     ECOG PS:  (4) Completely disabled, unable to carry out self-care and confined to bed or chair    KPS: 20% Very sick; hospital admission necessary; active supportive treatment necessary    Isolation: None    Medications    Scheduled Meds:   pantoprazole  40 mg IntraVENous Daily    meropenem  500 mg IntraVENous Q12H    acyclovir  400 mg Oral BID    allopurinol  200 mg Oral Daily    sodium chloride flush  5-40 mL IntraVENous 2 times per day    Saline Mouthwash  15 mL Swish & Spit 4x Daily AC & HS    sodium chloride flush  5-40 mL IntraVENous 2 times per day    anidulafungin  100 mg IntraVENous Q24H    filgrastim/filgrastim biosimilar  300 mcg SubCUTAneous Daily     Continuous Infusions:   sodium chloride      sodium chloride      sodium chloride      sodium chloride      sodium chloride      sodium chloride      fentaNYL 50 mcg/hr (22 0559)    vasopressin (Septic Shock) infusion Stopped (22 1133)    phenylephrine (JOHN-SYNEPHRINE) 50mg/250mL infusion 100 mcg/min (22 0646)    propofol Stopped (22 1312)    norepinephrine 30 mcg/min (22 0606)     PRN Meds:.sodium chloride, sodium chloride, sodium chloride, sodium chloride, sodium chloride flush, sodium chloride, potassium chloride, magnesium sulfate, magnesium hydroxide, Saline Mouthwash, alteplase (CATHFLO) with sterile water injection, sodium chloride flush, sodium chloride, acetaminophen **OR** acetaminophen    ROS:  As noted above, otherwise remainder of 10-point ROS negative    Physical Exam:     I&O:    Intake/Output Summary (Last 24 hours) at 2022 0827  Last data filed at 2022 0559  Gross per 24 hour   Intake 2146.43 ml   Output 2580 ml   Net -433.57 ml         Vital Signs:  BP (!) 107/52   Pulse 98   Temp (!) 101.3 °F (38.5 °C)   Resp 21   Ht 6' 2\" (1.88 m)   Wt 285 lb 8 oz (129.5 kg)   SpO2 97%   BMI 36.66 kg/m²     Weight:    Wt Readings from Last 3 Encounters:   08/09/22 285 lb 8 oz (129.5 kg)   05/02/22 248 lb 14.4 oz (112.9 kg)   04/30/22 251 lb 12.3 oz (114.2 kg)         General: Awake, alert and oriented. HEENT: normocephalic, PERRL, no scleral erythema or icterus, Oral mucosa moist and intact, throat clear  NECK: supple without palpable adenopathy  BACK: Straight negative CVAT  SKIN: warm dry and intact without lesions rashes or masses  CHEST: CTA bilaterally without use of accessory muscles  CV: Normal S1 S2, RRR, no MRG  ABD: NT ND normoactive BS, no palpable masses or hepatosplenomegaly  EXTREMITIES: without edema, denies calf tenderness  NEURO: CN II - XII grossly intact  CATHETER: Right A-line; RUE TL PICC - CDI    Data    CBC:   Recent Labs     08/08/22 0421 08/08/22  0736 08/09/22  0345   WBC 0.5* 0.5* 0.4*   HGB 7.2* 7.2* 6.4*   HCT 18.6* 18.9* 18.3*   MCV 87.3 88.4 88.0   PLT 17* 28* 13*       BMP/Mag:  Recent Labs     08/07/22  0336 08/08/22 0421 08/09/22  0345   * 123* 123*   K 5.6* 4.6 5.0   CL 91* 86* 86*   CO2 22 18* 18*   PHOS 5.6*  --   --    BUN 71* 76* 88*   CREATININE 4.8* 5.3* 6.4*   MG 1.80 2.10 2.10       LIVP:   Recent Labs     08/08/22  0421 08/09/22  0345   AST 38* 17   ALT 24 18   BILIDIR 2.7* 3.1*   BILITOT 2.8* 3.6*   ALKPHOS 99 98       Coags:   Recent Labs     08/08/22  0421   PROTIME 18.1*   INR 1.51*   APTT 37.6*       Uric Acid   Recent Labs     08/07/22  0336   LABURIC 7.5*         PROBLEM LIST:                   TREATMENT:            1. CyBorD (started 10/30/18)  2. Revlimid / Reggie Senters / Dex on clinical trial (started 2/1/19)  3. Carfilzomib / Pomalidomide / Dex x 6 cycles (Relapse #1 - started 8/26/19 - 2/2020)  4. Cytoxan Mobilization w/ 25% dose reduction (2/17/20)  5.  Melphalan 140mg/m2 & Autologous SCT 3/12/20  6. Kyprolis/Ebony/Dex (6/30/21-7/28/21)- progression  7. Velcade/Ebony/Dex (5/18/21-8/3/21)- progression  8. HD - CTX               - Cycle 1 9/27/21              - Cycle 2 10/18/21              - Cycle 3 11/8/21- stopped d/t severe fatigue  9. Bendamustine/Velcade/Dex 12/06/21  10. Velcade only 2/11/22  11. Fludarabine/Cytoxan followed by CAR-T infusion 3/21/22  12. Stem Cell Boost 4/18/22  13. Selinexor/Velcade/Dex (C1D1 5/18/22). 14. Isatuximab/Kyprolis/Dex     ASSESSMENT AND PLAN:           1. Kappa Light Chain Multiple Myeloma:  Currently w/relapse on active tx  - BM biopsy: 1/12/22.  20% plasma cells . Michelle Vitale p53 positive 79.5% of cells  - s/p Fludarabine and Cytoxan 3/16/22-3/18/22, followed by [place] (3/21/22)    - Post Stem Cell Boost 4/18/22     Light chains  - 5/18/22: [facility] 4340, ratio 1572 - started selinexor/velcade/dex  - 6/27/22: [address] [address].13; ratio 4106 - switched to jaquan/kyprolis/dex  - 7/27/22: [facility] 71095.16; ratio 5522.29    Currently on treated with Isatuximab/Kyprolis/Dex (C1D1 7/6/22)  - hold treatment while admitted w/critical issues    2. ID: Septic Shock POA 2/2 ESBL E.Coli bacteremia & COVID19 PNA  Septic Shock:  - Blood Cxs 8//22: ESBL E.Coli  - ICU team following  - Cont vasopressors to keep MAP >65 - currently on levo at 30 & gisel at 100. Vaso off  COVID19:  - COVID19: + swab 8/6/22  - Pulmonary team following    3. Heme: Pancytopenia 2/2 myeloma, chemotherapy, septic shock  - Transfuse for Hgb < 7 and Platelets < 50 K while intubated  - PRBC & Plt transfusion today   - PRA (4/7/22): Positive (Will receive Matched Crossed Plts): Type O compatible, will receive Type O moving forward 4/18/22.  - Cont GCSF 8/6/22    4. Metabolic: JUDY 2/2 septic shock & light chain nephropathy  - Consult nephrology - appreciate assistance  - Refusing CRRT - wife states pt would not want dialysis again under any circumstances  - Cont allopurinol    5.  Pulmonary: Acute respiratory failure POA 2/2 septic shock & COVID19  - Intubated 8/6/22, more d/t septic shock and acidosis and not so much hypoxemia  - Pulm following    6. Neuro: Recent seizure/subdural hematoma (1/2/022)  - Cont Keppra 500 mg PO BID indefinitely    7. GI / Nutrition:     - NPO at this time    8. Code Status: Full Code at this time, wife POA  - Consult palliative care - will discuss with wife today   - Refusing dialysis / CRRT d/t pts wishes per wife      - DVT Prophylaxis: Platelets <40,975 cells/dL - prophylactic lovenox on hold and mechanical prophylaxis with bilateral SCDs while in bed in place. Contraindications to pharmacologic prophylaxis: Thrombocytopenia  Contraindications to mechanical prophylaxis: None    - Disposition: Uncertain at this time. He remains gravely, critically ill with ongoing relapsed/refractory myeloma. Discussing palliative care with family.  Will discuss comfort measures and withdrawing care,     KIMBERLEY Hyman - CNP

## 2022-08-09 NOTE — DEATH NOTES
Death Pronouncement Note  Patient's Name: Leann Gold   Patient's YOB: 1955  MRN Number: [id number]    Admitting Provider: Kaushik Lamas MD  Attending Provider: Austin Abdalla MD    Patient was examined and the following were absent: Pulses, Blood Pressure, and Respiratory effort    I declared the patient dead on 8/9/2022 at 16:32     Preliminary Cause of Death:   Septic shock 2/2 Ecoli ESBL bacteremia    Electronically signed by Kayleen Roberts MD on 8/9/22 at 4:33 PM EDT

## 2022-08-09 NOTE — PLAN OF CARE
Problem: Discharge Planning  Goal: Discharge to home or other facility with appropriate resources  8/8/2022 2147 by Antwon Ruelas RN  Outcome: Progressing     Problem: Pain  Goal: Verbalizes/displays adequate comfort level or baseline comfort level  8/8/2022 2147 by Antwon Ruelas RN  Outcome: Progressing     Problem: Safety - Medical Restraint  Goal: Remains free of injury from restraints (Restraint for Interference with Medical Device)  Description: INTERVENTIONS:  1. Determine that other, less restrictive measures have been tried or would not be effective before applying the restraint  2. Evaluate the patient's condition at the time of restraint application  3. Inform patient/family regarding the reason for restraint  4. Q2H: Monitor safety, psychosocial status, comfort, nutrition and hydration  8/8/2022 2147 by Antwon Ruelas RN  Outcome: Progressing     Problem: Nutrition Deficit:  Goal: Optimize nutritional status  8/8/2022 2147 by Antwon Ruelas RN  Outcome: Progressing     Problem: Safety - Adult  Goal: Free from fall injury  8/8/2022 2147 by Antwon Ruelas RN  Outcome: Progressing  Flowsheets (Taken 8/8/2022 1723 by Zulay Gil RN)  Free From Fall Injury: Instruct family/caregiver on patient safety     Problem: ABCDS Injury Assessment  Goal: Absence of physical injury  8/8/2022 2147 by Antwon Ruelas RN  Outcome: Progressing  Flowsheets (Taken 8/8/2022 1723 by Zulay Gil RN)  Absence of Physical Injury: Implement safety measures based on patient assessment     Problem: Skin/Tissue Integrity  Goal: Absence of new skin breakdown  Description: 1. Monitor for areas of redness and/or skin breakdown  2. Assess vascular access sites hourly  3. Every 4-6 hours minimum:  Change oxygen saturation probe site  4. Every 4-6 hours:  If on nasal continuous positive airway pressure, respiratory therapy assess nares and determine need for appliance change or resting period.   8/8/2022 2147 by Andrea Sewell RN  Outcome: Progressing     Problem: Neurosensory - Adult  Goal: Achieves stable or improved neurological status  8/8/2022 2147 by Andrea Sewell RN  Outcome: Progressing     Problem: Cardiovascular - Adult  Goal: Maintains optimal cardiac output and hemodynamic stability  8/8/2022 2147 by Andrea Sewell RN  Outcome: Progressing     Problem: Cardiovascular - Adult  Goal: Absence of cardiac dysrhythmias or at baseline  8/8/2022 2147 by Andrea Sewell RN  Outcome: Progressing     Problem: Musculoskeletal - Adult  Goal: Return mobility to safest level of function  8/8/2022 2147 by Andrea Sewell RN  Outcome: Progressing     Problem: Gastrointestinal - Adult  Goal: Minimal or absence of nausea and vomiting  8/8/2022 2147 by Andrea Sewell RN  Outcome: Progressing     Problem: Genitourinary - Adult  Goal: Absence of urinary retention  8/8/2022 2147 by Andrea Sewell RN  Outcome: Progressing     Problem: Genitourinary - Adult  Goal: Urinary catheter remains patent  8/8/2022 2147 by Andrea Sewell RN  Outcome: Progressing     Problem: Infection - Adult  Goal: Absence of infection at discharge  8/8/2022 2147 by Andrea Sewell RN  Outcome: Progressing     Problem: Infection - Adult  Goal: Absence of infection during hospitalization  8/8/2022 2147 by Andrea Sewell RN  Outcome: Progressing     Problem: Infection - Adult  Goal: Absence of fever/infection during anticipated neutropenic period  8/8/2022 2147 by Andrea Sewell RN  Outcome: Progressing     Problem: Metabolic/Fluid and Electrolytes - Adult  Goal: Electrolytes maintained within normal limits  8/8/2022 2147 by Andrea Sewell RN  Outcome: Progressing     Problem: Metabolic/Fluid and Electrolytes - Adult  Goal: Hemodynamic stability and optimal renal function maintained  8/8/2022 2147 by Andrea Sewell RN  Outcome: Progressing     Problem: Hematologic - Adult  Goal: Maintains hematologic stability  8/8/2022 2147 by Janis Sheryl Hull RN  Outcome: Progressing

## 2022-08-09 NOTE — PROGRESS NOTES
Nephrology Progress Note  [facility]  [phone]   [email or web address] com    Patient:  Mela Monisvais   : 1955    CC:  JUDY, CKD    Subjective:  -pt seen and examined  -PMSHx and meds reviewed  -family at bedside    Events [id number] max pressors  Remains unresponsive    ROS:   Limited d/t pt factors. SHx:  No visitors this morning. Meds:  Scheduled Meds:   pantoprazole  40 mg IntraVENous Daily    meropenem  500 mg IntraVENous Q12H    acyclovir  400 mg Oral BID    allopurinol  200 mg Oral Daily    sodium chloride flush  5-40 mL IntraVENous 2 times per day    Saline Mouthwash  15 mL Swish & Spit 4x Daily AC & HS    sodium chloride flush  5-40 mL IntraVENous 2 times per day    anidulafungin  100 mg IntraVENous Q24H    filgrastim/filgrastim biosimilar  300 mcg SubCUTAneous Daily     Continuous Infusions:   sodium chloride      sodium chloride      sodium chloride      sodium chloride      sodium chloride      sodium chloride      fentaNYL 75 mcg/hr (22 1018)    vasopressin (Septic Shock) infusion Stopped (22 1133)    phenylephrine (JOHN-SYNEPHRINE) 50mg/250mL infusion 100 mcg/min (22 0646)    propofol Stopped (22 1312)    norepinephrine 30 mcg/min (22 0606)     PRN Meds:.sodium chloride, sodium chloride, sodium chloride, sodium chloride, sodium chloride flush, sodium chloride, potassium chloride, magnesium sulfate, magnesium hydroxide, Saline Mouthwash, alteplase (CATHFLO) with sterile water injection, sodium chloride flush, sodium chloride, acetaminophen **OR** acetaminophen    Vitals:  BP (!) 119/55   Pulse 96   Temp (!) 102 °F (38.9 °C)   Resp 22   Ht 6' 2\" (1.88 m)   Wt 285 lb 8 oz (129.5 kg)   SpO2 97%   BMI 36.66 kg/m²     Physical Exam:  Gen: Resting in bed, in the ICU. Intubated, on pressors. HEENT: MMM, OP clear. CV: RRR, no S3.  Lungs: +vent, decreased BS bilaterally. Abd: Soft, decreased BS. Ext: + edema, no cyanosis  Skin: Cool.   No hypervolemic-monitor  Hypocalcemia: replace-due to JUDY on CKD  Metabolic acidosis: s/p sodium bicarbonate  -recurrent due to progressive JUDY    Septic shock: due to e.coli bactremia  -on pressors  -on merrem/Eraxiz/filgrastrim    Acute respiratory failure d/t COVID: On vent.    -per critical care     Multiple myeloma: S/p transplant the past.  Got bendumustine and bortezomib recently. -per Oncology     Pancytopenia: From recent chemo.  -getting prn transfusion    Critical care time 35mins 11:00-11:30    Misty Berry MD  [facility]  Office Number: [phone]  [id number] [place] [address]

## 2022-08-09 NOTE — PROGRESS NOTES
Pt extubated and comfort care measures initiated. Pressor gtt stopped at this time. Family at bedside.

## 2022-08-10 LAB
BLOOD BANK DISPENSE STATUS: NORMAL
BLOOD BANK PRODUCT CODE: NORMAL
BPU ID: NORMAL
DESCRIPTION BLOOD BANK: NORMAL

## 2022-09-05 LAB
CULTURE, FUNGUS BLOOD: NORMAL
FUNGUS (MYCOLOGY) CULTURE: NORMAL
FUNGUS STAIN: NORMAL

## 2023-01-12 NOTE — ED PROVIDER NOTES
[address] ENCOUNTER          PHYSICIAN ASSISTANT NOTE       Date of evaluation: 8/5/2022    Chief Complaint     Fever ([place]c) and Shoulder Pain (Right shoulder pain began today denies any injury )      History of Present Illness     Brenda Hannah is a 77 y.o. male with a history of multiple myeloma who presents to the emergency department with right shoulder blade pain and fever. The patient states that this morning he developed pain in his right posterior shoulder blade area that worsens with movement and deep breathing. He at that time started noticing that he had diffuse tremors and felt unwell. He was noted to be febrile to 103 Fahrenheit. He states that he otherwise has no chest pain, shortness of breath, abdominal pain, nausea, vomiting, diarrhea, lower extremity swelling, orthopnea, PND, rashes. He states that he is able to range his right shoulder without any difficulties and has not noticed any overlying skin changes. He did have chemotherapy yesterday. He is on dexamethasone and 2 other chemotherapy agents. He follows with Dr. Constantino Mullen with [facility]. He has a tunneled central line in his right chest.    Review of Systems     Review of Systems   Constitutional:  Positive for chills, fatigue and fever. HENT:  Negative for congestion, ear pain, rhinorrhea and sore throat. Eyes:  Negative for pain, discharge and itching. Respiratory:  Negative for cough, shortness of breath and wheezing. Cardiovascular:  Negative for chest pain, palpitations and leg swelling. Gastrointestinal:  Negative for abdominal pain, constipation, nausea and vomiting. Genitourinary:  Negative for dysuria, flank pain and hematuria. Musculoskeletal:  Negative for back pain and myalgias. Neurological:  Negative for dizziness, syncope and headaches. Psychiatric/Behavioral:  Negative for agitation and behavioral problems.       Past Medical, Surgical, Family, and Social History     He has a past medical history of Cancer ([facility] [address] and ESRD (end stage renal disease) on dialysis ([facility] [address] He has a past surgical history that includes IR TUNNELED CVC PLACE WO SQ PORT/PUMP > 5 YEARS (2/1/2022). His family history includes Cancer in his father. He reports that he has never smoked. He has never used smokeless tobacco. He reports that he does not currently use alcohol. He reports that he does not use drugs. Medications     Current Discharge Medication List        CONTINUE these medications which have NOT CHANGED    Details   Magnesium Oxide 400 MG CAPS Take 400 mg by mouth 2 times daily  Qty: 60 capsule, Refills: 2      allopurinol (ZYLOPRIM) 100 MG tablet Take 2 tablets by mouth daily  Qty: 60 tablet, Refills: 2      acyclovir (ZOVIRAX) 400 MG tablet Take 1 tablet by mouth 2 times daily  Qty: 30 tablet, Refills: 2      amLODIPine (NORVASC) 5 MG tablet Take 1 tablet by mouth daily  Qty: 30 tablet, Refills: 3      pantoprazole sodium (PROTONIX) 40 MG PACK packet Take 40 mg by mouth every morning (before breakfast)      Calcium Citrate-Vitamin D (CALCIUM + VIT D, BARIATRIC ADVANTAGE, CHEWABLE TABLET) Take 1 tablet by mouth daily  Qty: 30 tablet, Refills: 2      levETIRAcetam (KEPPRA) 500 MG tablet Take 1 tablet by mouth every 12 hours  Qty: 60 tablet, Refills: 3      sodium bicarbonate 650 MG tablet Take 650 mg by mouth daily      prochlorperazine (COMPAZINE) 10 MG tablet Take 10 mg by mouth every 6 hours as needed       levoFLOXacin (LEVAQUIN) 250 MG tablet Take 250 mg by mouth daily For prophylaxis during chemotherapy             Allergies     He is allergic to decadron [dexamethasone]. Physical Exam     INITIAL VITALS: BP: 119/84, Temp: 98 °F (36.7 °C), Heart Rate: (!) 155, Resp: (!) 32, SpO2: 100 %  Physical Exam  Constitutional:       General: He is in acute distress. Appearance: He is obese. He is ill-appearing. Comments: Pale, tachypneic   HENT:      Head: Normocephalic and atraumatic. Right Ear: External ear normal.      Left Ear: External ear normal.      Nose: Nose normal.      Mouth/Throat:      Mouth: Mucous membranes are moist.      Pharynx: No oropharyngeal exudate or posterior oropharyngeal erythema. Eyes:      Extraocular Movements: Extraocular movements intact. Pupils: Pupils are equal, round, and reactive to light. Cardiovascular:      Rate and Rhythm: Regular rhythm. Tachycardia present. Pulses: Normal pulses. Heart sounds: Normal heart sounds. No murmur heard. No gallop. Pulmonary:      Effort: Respiratory distress present. Breath sounds: No stridor. No wheezing, rhonchi or rales. Chest:      Chest wall: No tenderness. Abdominal:      General: Abdomen is flat. Bowel sounds are normal. There is no distension. Palpations: Abdomen is soft. Tenderness: There is no abdominal tenderness. There is no right CVA tenderness, left CVA tenderness, guarding or rebound. Musculoskeletal:      Cervical back: Normal range of motion and neck supple. Right lower leg: No edema. Left lower leg: No edema. Comments: Tenderness to palpation of the right upper thoracic paraspinal musculature with no overlying skin changes or other gross abnormalities. Normal range of motion of the right shoulder with no pain on passive range of motion, effusion, overlying skin changes   Skin:     Capillary Refill: Capillary refill takes less than 2 seconds. Findings: No rash. Comments: Tunneled catheter in the right upper chest.  Dressing clean, dry, intact. No erythema, swelling, discharge   Neurological:      Mental Status: He is oriented to person, place, and time.    Psychiatric:         Mood and Affect: Mood normal.         Behavior: Behavior normal.       Diagnostic Results     EKG   Interpreted in conjunction with emergency department physician Edita Sanders MD  Rhythm: sinus tachycardia  Rate: tachycardia  Axis: normal  : none  Conduction: normal  ST Segments: depression in  v4, v5, v6, I, and II  T Waves: no acute change  Q Waves:none  Clinical Impression: Sinus tachycardia with ST depressions likely demand  Comparison:      RADIOLOGY:  XR ABDOMEN (KUB) (SINGLE AP VIEW)   Final Result   Findings/Impression:    The tip of the enteric tube terminates in the body of the stomach. XR CHEST PORTABLE   Final Result      No acute pulmonary disease.          XR CHEST PORTABLE    (Results Pending)   XR CHEST PORTABLE    (Results Pending)       LABS:   Results for orders placed or performed during the hospital encounter of 08/05/22   COVID-19 & Influenza Combo    Specimen: Nasopharyngeal Swab   Result Value Ref Range    SARS-CoV-2 RNA, RT PCR DETECTED (A) NOT DETECTED    INFLUENZA A NOT DETECTED NOT DETECTED    INFLUENZA B NOT DETECTED NOT DETECTED   Basic Metabolic Panel   Result Value Ref Range    Sodium 140 136 - 145 mmol/L    Potassium 5.4 (H) 3.5 - 5.1 mmol/L    Chloride 109 99 - 110 mmol/L    CO2 14 (LL) 21 - 32 mmol/L    Anion Gap 17 (H) 3 - 16    Glucose 137 (H) 70 - 99 mg/dL    BUN 66 (H) 7 - 20 mg/dL    Creatinine 3.1 (H) 0.8 - 1.3 mg/dL    GFR Non-African American 20 (A) >60    GFR  24 (A) >60    Calcium 8.5 8.3 - 10.6 mg/dL   CBC with Auto Differential   Result Value Ref Range    WBC 0.2 (LL) 4.0 - 11.0 K/uL    RBC 2.18 (L) 4.20 - 5.90 M/uL    Hemoglobin 6.8 (LL) 13.5 - 17.5 g/dL    Hematocrit 19.9 (LL) 40.5 - 52.5 %    MCV 91.3 80.0 - 100.0 fL    MCH 31.3 26.0 - 34.0 pg    MCHC 34.3 31.0 - 36.0 g/dL    RDW 16.0 (H) 12.4 - 15.4 %    Platelets 11 (LL) 597 - 450 K/uL    MPV 7.7 5.0 - 10.5 fL    PLATELET SLIDE REVIEW Decrease     Path Consult Yes     Bands Relative 2 0 - 7 %    nRBC 2 (A) /100 WBC    Toxic Granulation Slight (A)     Dohle Bodies Slight     Anisocytosis 1+ (A)    Hepatic function panel   Result Value Ref Range    Total Protein 6.0 (L) 6.4 - 8.2 g/dL    Albumin 4.1 3.4 - 5.0 g/dL    Alkaline Phosphatase 72 40 - 129 U/L    ALT 19 10 - 40 U/L    AST 14 (L) 15 - 37 U/L    Total Bilirubin 0.3 0.0 - 1.0 mg/dL    Bilirubin, Direct <0.2 0.0 - 0.3 mg/dL    Bilirubin, Indirect see below 0.0 - 1.0 mg/dL   Magnesium   Result Value Ref Range    Magnesium 1.60 (L) 1.80 - 2.40 mg/dL   Lactate dehydrogenase   Result Value Ref Range     (H) 100 - 190 U/L   Uric acid   Result Value Ref Range    Uric Acid, Serum 7.3 (H) 3.5 - 7.2 mg/dL   Lactate, Sepsis   Result Value Ref Range    Lactic Acid, Sepsis 4.8 (HH) 0.4 - 1.9 mmol/L   Blood gas, venous (Lab)   Result Value Ref Range    pH, John 7.258 (L) 7.350 - 7.450    pCO2, John 37.2 (L) 41.0 - 51.0 mmHg    pO2, John <30.0 25.0 - 40.0 mmHg    HCO3, Venous 16.6 (L) 24.0 - 28.0 mmol/L    Base Excess, John -9.7 (L) -2.0 - 3.0 mmol/L    O2 Sat, John 33 Not established %    Carboxyhemoglobin 1.3 0.0 - 1.5 %    MetHgb, John 0.5 0.0 - 1.5 %    TC02 (Calc), John 18 mmol/L    Hemoglobin, John, Reduced 65.70 %   EKG 12 Lead   Result Value Ref Range    Ventricular Rate 153 BPM    Atrial Rate 153 BPM    P-R Interval 120 ms    QRS Duration 68 ms    Q-T Interval 244 ms    QTc Calculation (Bazett) 389 ms    P Axis 5 degrees    R Axis 35 degrees    T Axis 66 degrees    Diagnosis       EKG performed in ER and to be interpreted by ER physician. Confirmed by MD, ER ([id number]),  Chelsea Herrera ([phone]) on 8/5/2022 7:19:07 PM       ED BEDSIDE ULTRASOUND:  No results found. RECENT VITALS:  BP: 114/63, Temp: 99.9 °F (37.7 °C), Heart Rate: (!) 141, Resp: 21, SpO2: 100 %     Procedures     none    ED Course     Nursing Notes, Past Medical Hx,Past Surgical Hx, Social Hx, Allergies, and Family Hx were reviewed.          The patient was given the following medications:  Orders Placed This Encounter   Medications    0.9 % sodium chloride IV bolus 2,466 mL    cefepime (MAXIPIME) 2000 mg IVPB minibag     Order Specific Question:   Antimicrobial Indications     Answer:   Febrile Neutropenia    acetaminophen (TYLENOL) tablet 650 mg magnesium sulfate 4000 mg in 100 mL IVPB premix    DISCONTD: lactated ringers bolus    lactated ringers bolus    norepinephrine (LEVOPHED) 16 mg in dextrose 5 % 250 mL infusion     Order Specific Question:   Titrate Infusion? Answer:   Yes     Order Specific Question:   Initial Infusion Dose: Answer:   5 mcg/min     Order Specific Question:   Goal of Therapy is: Answer:   MAP greater than 65 mmHg     Order Specific Question:   Contact Provider if:     Answer:   Patient is receiving the maximum dose and is not achieving the goal of therapy    dexamethasone (DECADRON) injection 6 mg    pantoprazole (PROTONIX) injection 40 mg    0.9 % sodium chloride infusion    baricitinib (OLUMIANT) tablet 2 mg    vancomycin (VANCOCIN) 1,500 mg in dextrose 5 % 250 mL IVPB     Order Specific Question:   Antimicrobial Indications     Answer: Other     Order Specific Question:   Other Abx Indication     Answer:   febrile neutropenia    propofol 1000 MG/100ML injection     Spraggs, Evelin: cabinet override    etomidate (AMIDATE) 2 MG/ML injection     Spraggs Evelin: cabinet override       CONSULTS:  [address] TO HEMATOLOGY  IP CONSULT TO ONCOLOGY  IP CONSULT TO [address] [place] [address] / ASSESSMENT / Franky Lundborg is a 77 y.o. male with a history of multiple myeloma on current chemotherapy, known to be neutropenic with an [address] of 300 yesterday presents with complaints of fatigue, malaise, fever to 103 at home and right posterior shoulder pain. On exam, the patient is tachycardic to the 150s, afebrile initially, tachypneic in the 30s but saturating well on room air. He is normotensive initially as well. He is pale, ill-appearing. He has tenderness to palpation of the right medial portion of the scapula with no overlying skin changes. Otherwise unremarkable physical exam.  He does have a tunneled catheter in the right upper chest with no significant findings.     Upon arrival the patient was immediately initiated on neutropenic fever protocol. He was given vancomycin and cefepime and given weight-based fluids per sepsis protocol. I obtained CBC, BMP, hepatic function panel, blood cultures, VBG, lactate, COVID and influenza test, fungus culture, HSV culture, urine culture, urinalysis, LDH, uric acid, chest x-ray. Initial lactate is 4.8. VBG shows a CO2 of 14, consistent with significant tachypnea and hyperventilation on physical exam, likely compensatory effort in regards to metabolic acidosis. This is also reflected with a pH of 7.25. Potassium is 5.4 but EKG shows no acute hyperkalemic changes. Magnesium was low at 1.6, which was replaced with 4 g of magnesium IV. Creatinine is at baseline at 3.1. He does have an anion gap of 17, consistent with anion gap metabolic acidosis with respiratory compensation. Hepatic function panel is normal.  Glucose is normal.  LDH is 232. Uric acid is 7.3. CBC shows baseline leukopenia and neutropenia. He does have from a cytopenia with platelet count of 11. He also does have significant anemia with a hemoglobin of 6.8, therefore he was given 2 units of PRBCs. No indication for platelet infusion at this time. He did test positive for COVID-19 as well. Later during the emergency department stay, the patient unfortunately did become hypotensive and increasingly tachycardic. He was given an additional liter of fluids but blood pressure continued to drop to 88N systolic, therefore he was started on Levophed. Pressures at the time of admission to ICU were 90s/50s. He additionally also began requiring oxygen up to 15 L of nonrebreather for which she is saturating at 100% currently. Case discussed with Dr. Mike Angel with heme-onc. Differential diagnosis of pulmonary embolism discussed and risk-benefit discussion had with Dr. Yogesh Nielson and we reached the decision to not proceed with CTPA at this time given his kidney function.   Upon further discussion once further work-up was resulted she did recommend Protonix, remdesivir (although further discussion with pharmacy did reveal that this patient is actually better suited for baricitinib, for which I have ordered), he was given Decadron as well for COVID-19. At this time, the patient will be admitted to the ICU primarily. I spoke with the intensivist who was in agreement to the plan. The patient will be moved to the ICU as soon as possible. This patient was also evaluated by the attending physician. All care plans were discussed and agreed upon. Clinical Impression     1. Neutropenic fever ([address]    2. Multiple myeloma, remission status unspecified ([address]    3. COVID-19    4. Acute respiratory failure due to COVID-19 ([address]    5. Septic shock ([address]    6. Anemia, unspecified type    7. Hypomagnesemia    8. Hyperkalemia        Disposition     PATIENT REFERRED TO:  No follow-up provider specified.     DISCHARGE MEDICATIONS:  Current Discharge Medication List          DISPOSITION Admitted 08/05/2022 11:15:13 PM        Mary Arellano, [id number]  08/06/22 [address] [place] [address]  08/06/22 0285 Graft Donor Site Bandage (Optional-Leave Blank If You Don't Want In Note): Steri-strips and a pressure bandage were applied to the donor site.